# Patient Record
Sex: MALE | Race: WHITE | NOT HISPANIC OR LATINO | Employment: OTHER | URBAN - METROPOLITAN AREA
[De-identification: names, ages, dates, MRNs, and addresses within clinical notes are randomized per-mention and may not be internally consistent; named-entity substitution may affect disease eponyms.]

---

## 2017-02-20 ENCOUNTER — OFFICE VISIT (OUTPATIENT)
Dept: INTERNAL MEDICINE | Facility: CLINIC | Age: 77
End: 2017-02-20
Payer: COMMERCIAL

## 2017-02-20 VITALS
SYSTOLIC BLOOD PRESSURE: 142 MMHG | TEMPERATURE: 98.4 F | DIASTOLIC BLOOD PRESSURE: 84 MMHG | OXYGEN SATURATION: 98 % | HEART RATE: 54 BPM

## 2017-02-20 DIAGNOSIS — Z12.11 COLON CANCER SCREENING: ICD-10-CM

## 2017-02-20 DIAGNOSIS — D69.6 THROMBOCYTOPENIA (H): ICD-10-CM

## 2017-02-20 DIAGNOSIS — Z12.5 SPECIAL SCREENING FOR MALIGNANT NEOPLASM OF PROSTATE: ICD-10-CM

## 2017-02-20 DIAGNOSIS — M10.9 GOUT, UNSPECIFIED: ICD-10-CM

## 2017-02-20 DIAGNOSIS — I10 ESSENTIAL HYPERTENSION, BENIGN: ICD-10-CM

## 2017-02-20 DIAGNOSIS — N52.9 ERECTILE DYSFUNCTION, UNSPECIFIED ERECTILE DYSFUNCTION TYPE: ICD-10-CM

## 2017-02-20 DIAGNOSIS — Z00.00 MEDICARE ANNUAL WELLNESS VISIT, SUBSEQUENT: Primary | ICD-10-CM

## 2017-02-20 DIAGNOSIS — I34.2 NON-RHEUMATIC MITRAL VALVE STENOSIS: ICD-10-CM

## 2017-02-20 DIAGNOSIS — E78.5 HYPERLIPIDEMIA LDL GOAL <130: ICD-10-CM

## 2017-02-20 LAB
ALBUMIN SERPL-MCNC: 4.1 G/DL (ref 3.4–5)
ALP SERPL-CCNC: 74 U/L (ref 40–150)
ALT SERPL W P-5'-P-CCNC: 25 U/L (ref 0–70)
ANION GAP SERPL CALCULATED.3IONS-SCNC: 8 MMOL/L (ref 3–14)
AST SERPL W P-5'-P-CCNC: 23 U/L (ref 0–45)
BILIRUB SERPL-MCNC: 0.7 MG/DL (ref 0.2–1.3)
BUN SERPL-MCNC: 15 MG/DL (ref 7–30)
CALCIUM SERPL-MCNC: 9.5 MG/DL (ref 8.5–10.1)
CHLORIDE SERPL-SCNC: 103 MMOL/L (ref 94–109)
CHOLEST SERPL-MCNC: 160 MG/DL
CO2 SERPL-SCNC: 28 MMOL/L (ref 20–32)
CREAT SERPL-MCNC: 0.99 MG/DL (ref 0.66–1.25)
GFR SERPL CREATININE-BSD FRML MDRD: 73 ML/MIN/1.7M2
GLUCOSE SERPL-MCNC: 98 MG/DL (ref 70–99)
HDLC SERPL-MCNC: 40 MG/DL
HGB BLD-MCNC: 13.9 G/DL (ref 13.3–17.7)
LDLC SERPL CALC-MCNC: 90 MG/DL
NONHDLC SERPL-MCNC: 120 MG/DL
PLATELET # BLD AUTO: 57 10E9/L (ref 150–450)
POTASSIUM SERPL-SCNC: 4.9 MMOL/L (ref 3.4–5.3)
PROT SERPL-MCNC: 7.7 G/DL (ref 6.8–8.8)
SODIUM SERPL-SCNC: 139 MMOL/L (ref 133–144)
TRIGL SERPL-MCNC: 148 MG/DL

## 2017-02-20 PROCEDURE — 80061 LIPID PANEL: CPT | Performed by: INTERNAL MEDICINE

## 2017-02-20 PROCEDURE — 85049 AUTOMATED PLATELET COUNT: CPT | Performed by: INTERNAL MEDICINE

## 2017-02-20 PROCEDURE — 80053 COMPREHEN METABOLIC PANEL: CPT | Performed by: INTERNAL MEDICINE

## 2017-02-20 PROCEDURE — 36415 COLL VENOUS BLD VENIPUNCTURE: CPT | Performed by: INTERNAL MEDICINE

## 2017-02-20 PROCEDURE — 99397 PER PM REEVAL EST PAT 65+ YR: CPT | Performed by: INTERNAL MEDICINE

## 2017-02-20 PROCEDURE — 85018 HEMOGLOBIN: CPT | Performed by: INTERNAL MEDICINE

## 2017-02-20 RX ORDER — LISINOPRIL 20 MG/1
20 TABLET ORAL 2 TIMES DAILY
Qty: 180 TABLET | Refills: 3 | Status: SHIPPED | OUTPATIENT
Start: 2017-02-20 | End: 2018-03-14

## 2017-02-20 RX ORDER — SILDENAFIL 100 MG/1
100 TABLET, FILM COATED ORAL DAILY PRN
Qty: 5 TABLET | Refills: 11 | Status: SHIPPED | OUTPATIENT
Start: 2017-02-20 | End: 2018-05-17

## 2017-02-20 RX ORDER — PRAVASTATIN SODIUM 20 MG
20 TABLET ORAL DAILY
Qty: 90 TABLET | Refills: 3 | Status: SHIPPED | OUTPATIENT
Start: 2017-02-20 | End: 2018-03-14 | Stop reason: SINTOL

## 2017-02-20 RX ORDER — ALLOPURINOL 100 MG/1
100 TABLET ORAL DAILY
Qty: 90 TABLET | Refills: 3 | Status: SHIPPED | OUTPATIENT
Start: 2017-02-20 | End: 2018-03-05

## 2017-02-20 NOTE — PROGRESS NOTES
SUBJECTIVE:                                                            Mars Reyes is a 76 year old male who presents for Preventive Visit.  Are you in the first 12 months of your Medicare Part B coverage?  No    Answers for HPI/ROS submitted by the patient on 2/17/2017   Annual Exam:  Getting at least 3 servings of Calcium per day:: Yes  Bi-annual eye exam:: Yes  Dental care twice a year:: Yes  Sleep apnea or symptoms of sleep apnea:: None  Diet:: Regular (no restrictions)  Frequency of exercise:: 4-5 days/week  Taking medications regularly:: Yes  Medication side effects:: Lightheadedness  Additional concerns today:: YES  PHQ-2 Depressed: Not at all, Not at all  PHQ-2 Score: 0  Duration of exercise:: 45-60 minutes          PROBLEMS TO ADD ON...  1. Intermittent dizziness with going from sitting to standings     All Histories reviewed and updated in [facility] as appropriate.  Social History   Substance Use Topics     Smoking status: Never Smoker     Smokeless tobacco: Never Used     Alcohol use 0.0 oz/week     0 Standard drinks or equivalent per week      Comment: 3 beers per week       The patient does not drink >3 drinks per day nor >7 drinks per week.    Today's PHQ-2 Score:   PHQ-2 ( 1999 Pfizer) 2/17/2017 6/6/2016   Q1: Little interest or pleasure in doing things - 0   Q2: Feeling down, depressed or hopeless - 0   PHQ-2 Score - 0   Little interest or pleasure in doing things Not at all -   Feeling down, depressed or hopeless Not at all -   PHQ-2 Score 0 -       Do you feel safe in your environment - Yes    Do you have a Health Care Directive?: No: Advance care planning was reviewed with patient; patient declined at this time.    Current providers sharing in care for this patient include:   Patient Care Team:  Pb Warren MD as PCP - General      Hearing impairment: No    Ability to successfully perform activities of daily living: Yes, no assistance needed     Fall risk:       Home safety:  none  "identified      The following health maintenance items are reviewed in Epic and correct as of today:  Health Maintenance   Topic Date Due     AORTIC ANEURYSM SCREENING (SYSTEM ASSIGNED)  10/10/2006     INFLUENZA VACCINE (SYSTEM ASSIGNED)  09/01/2016     ADVANCE DIRECTIVE PLANNING Q5 YRS (NO INBASKET)  12/01/2016     FALL RISK ASSESSMENT  02/10/2017     TETANUS IMMUNIZATION (SYSTEM ASSIGNED)  02/21/2018     COLONOSCOPY Q10 YR INBASKET MESSAGE  12/14/2020     LIPID SCREEN Q5 YR MALE (SYSTEM ASSIGNED)  02/10/2021     PNEUMOCOCCAL  Completed         Immunization History   Administered Date(s) Administered     Hepatitis A Vac Ped/Adol-2 Dose 02/20/1997, 05/20/2005     Hepatitis B 02/20/1997     IPV 02/20/1997     Influenza (High Dose) 3 valent vaccine 10/20/2015, 10/01/2016     Influenza (IIV3) 02/20/1997, 11/16/2005, 12/04/2006, 10/16/2007, 11/20/2008, 10/17/2010, 01/07/2013, 09/16/2013     Meningococcal (Menomune ) 02/20/1997     Pneumococcal (PCV 13) 02/10/2016     Pneumococcal 23 valent 12/04/2006     TD (ADULT, 7+) 02/20/1997     TDAP (ADACEL AGES 11-64) 02/21/2008     Typhoid IM 02/20/1997     Yellow Fever 05/20/2005          ROS:  C: NEGATIVE for fever, chills, change in weight  E/M: NEGATIVE for ear, mouth and throat problems  R: NEGATIVE for significant cough or SOB  CV: NEGATIVE for chest pain, palpitations or peripheral edema  GI: NEGATIVE for nausea, abdominal pain, heartburn, or change in bowel habits  : NEGATIVE for frequency, dysuria, or hematuria  M: NEGATIVE for significant arthralgias or myalgia less mild chronic hip pain.  H: NEGATIVE for bleeding problems  P: NEGATIVE for changes in mood or affect    OBJECTIVE:                                                            /84  Pulse 54  Temp 98.4  F (36.9  C) (Oral)  SpO2 98% Estimated body mass index is 26.57 kg/(m^2) as calculated from the following:    Height as of 2/10/16: 5' 10\" (1.778 m).    Weight as of 6/6/16: 185 lb 3.2 oz (84 " kg).  EXAM:   GENERAL: alert and no distress  EYES: Eyes grossly normal to inspection, PERRL and conjunctivae and sclerae normal  HENT: ear canals and TM's normal, nose and mouth without ulcers or lesions  NECK: no adenopathy, no asymmetry, masses, or scars and thyroid normal to palpation  RESP: lungs clear to auscultation - no rales, rhonchi or wheezes  CV: regular rate and rhythm, normal S1 S2, no click or rub, no peripheral edema and peripheral pulses strong, noted mitral murmur.  MS: no gross musculoskeletal defects noted, no edema  NEURO: No focal changes  PSYCH: mentation appears normal, affect normal/bright    ASSESSMENT / PLAN:                                                            1. Medicare annual wellness visit, subsequent  As ordered  - Hemoglobin    2. Essential hypertension, benign  Stable on therapy  - Comprehensive metabolic panel  - Lipid Profile  - lisinopril (PRINIVIL/ZESTRIL) 20 MG tablet; Take 1 tablet (20 mg) by mouth 2 times daily  Dispense: 180 tablet; Refill: 3    3. Hyperlipidemia LDL goal <130  Labs as fasting  - Lipid Profile  - pravastatin (PRAVACHOL) 20 MG tablet; Take 1 tablet (20 mg) by mouth daily  Dispense: 90 tablet; Refill: 3    4. Colon cancer screening  Discussed future FIT    6. Gout, unspecified  stable  - allopurinol (ZYLOPRIM) 100 MG tablet; Take 1 tablet (100 mg) by mouth daily  Dispense: 90 tablet; Refill: 3    7. Erectile dysfunction, unspecified erectile dysfunction type  Refilled per request  - sildenafil (REVATIO/VIAGRA) 100 MG cap/tab; Take 1 tablet (100 mg) by mouth daily as needed for erectile dysfunction Take 30 min to 4 hours before intercourse.  Never use with nitroglycerin, terazosin or doxazosin.  Dispense: 5 tablet; Refill: 11    8. Non-rheumatic mitral valve stenosis  Repeat echocardiogram discussed  - Echocardiogram Complete; Future    9. Special screening for malignant neoplasm of prostate  As screening 6/2017  - PSA, screen; Future    PSA at labs  "6/2017 ordered    End of Life Planning:  Patient currently has an advanced directive: No.  I have verified the patient's ablity to prepare an advanced directive/make health care decisions.  Literature was provided to assist patient in preparing an advanced directive.    COUNSELING:  Reviewed preventive health counseling, as reflected in patient instructions       Regular exercise       Healthy diet/nutrition        Estimated body mass index is 26.57 kg/(m^2) as calculated from the following:    Height as of 2/10/16: 5' 10\" (1.778 m).    Weight as of 6/6/16: 185 lb 3.2 oz (84 kg).     reports that he has never smoked. He has never used smokeless tobacco.      Appropriate preventive services were discussed with this patient, including applicable screening as appropriate for cardiovascular disease, diabetes, osteopenia/osteoporosis, and glaucoma.  As appropriate for age/gender, discussed screening for colorectal cancer, prostate cancer, breast cancer, and cervical cancer. Checklist reviewing preventive services available has been given to the patient.    Reviewed patients plan of care and provided an AVS. The Basic Care Plan (routine screening as documented in Health Maintenance) for Mars meets the Care Plan requirement. This Care Plan has been established and reviewed with the Patient.    Counseling Resources:  ATP IV Guidelines  Pooled Cohorts Equation Calculator  Breast Cancer Risk Calculator  FRAX Risk Assessment  ICSI Preventive Guidelines  Dietary Guidelines for Americans, 2010  USDA's MyPlate  ASA Prophylaxis  Lung CA Screening    Pb Warren MD  [facility]    THE MEDICATION LIST HAS BEEN FULLY RECONCILED BY THE M.D. AND THE NURSING STAFF.    "

## 2017-02-20 NOTE — PATIENT INSTRUCTIONS
Services Typically covered by Medicare Recommended Completed   Vaccines    Pneumonoccol    Influenza    Hepatitis B (if medium/high risk)     Once for patients after age 65    Yearly  Medium/high risk factors:    End Stage Kidney Disease    Hemophiliacs who received Factor XIII or IX concentrates    Clients of institutions for developmentally disabled    Persons who live in same house as a Hepatitis B carrier    Homosexual men    Illicit injectable drug users    Health care workers     Mammogram Covered: One-time screen between age 35-39, annually for age 40+     Pap and Pelvic Exam Covered: Annually if  high risk,  or childbearing age with abnormal Pap in last 3 years.  Q24 months for all other women     Prostate Cancer Screening    Digital rectal exam    PSA Covered: Annually for all men > age 50     Corolrectal Cancer Screening Screening colonoscopy every 10 years, more often for high risk patients     Diabetes Self-Management Training Requires referral by treating physician for patient with diabetes     Diabetes Screening    Fasting blood sugar or glucose tolerance test   Once yearly, twice yearly if prediabetic     Cardiovascular Screening Blood Tests    Total Cholesterol    HDL    Triglycerides Every 5 years     Medical Nutrition Therapy for Diabetes or Renal Disease Requires referral by treating physician for patient with diabetes or kidney disease     Glaucoma Screening Annually for patients with one of the following risk factors:    Diabetes Mellitus    Family history of Glaucoma    -American age 50 and over    -American age 65 and over     Bone Mass Measurement Every 24 months if one of the following risk factors:    Estrogen deficiency    Vertebral abnormalities on x-ray indicative of Osteoporosis, Osteopenia, or Vertebral fracture    Receiving/expected to receive the equivalent of at least 5 mg of Prednisone per day for > 3 months    Hyperparathyroidism    Patient being monitored for  response to Osteoporosis Therapy     One-time AAA screen  Must be ordered as part of Medicare IPPE   Any patient with a family history of AAA    Males Age 65-75, with history of smoking at least 100 cigarettes in lifetime     Smoking Cessation Counseling Beneficiaries who use tobacco are eligible to receive 2 cessation attempts per year; each attempt includes maximum of 4 sessions     HIV Screening Annually for beneficiaries at increased risk:       Increased risk for HIV infection is defined in the  National Coverage Determinations (NCD) Manual,  Publication 100-03 Sections 190.14 (diagnostic) and 210.7 (screening). See http://www.cms.gov/manuals/downloads/tjb183k0_Jqol0.pdf and http://www.cms.gov/manuals/downloads/fob065k9_Vbsr6.pdf on the Internet.  Three times per pregnancy for beneficiaries who are pregnant.     Future Annual Wellness Visit Annually, for all beneficiaries.

## 2017-02-20 NOTE — MR AVS SNAPSHOT
After Visit Summary   2/20/2017    Mars Reyes    MRN: [id number]           Patient Information     Date Of Birth          1940        Visit Information        Provider Department      2/20/2017 10:20 AM Pb Warren MD [facility]        Today's Diagnoses     Medicare annual wellness visit, subsequent    -  1    Essential hypertension, benign        Hyperlipidemia LDL goal <130        Colon cancer screening        BENIGN HYPERTENSION        Gout, unspecified        Erectile dysfunction, unspecified erectile dysfunction type        Non-rheumatic mitral valve stenosis          Care Instructions          Services Typically covered by Medicare Recommended Completed   Vaccines    Pneumonoccol    Influenza    Hepatitis B (if medium/high risk)     Once for patients after age 65    Yearly  Medium/high risk factors:    End Stage Kidney Disease    Hemophiliacs who received Factor XIII or IX concentrates    Clients of institutions for developmentally disabled    Persons who live in same house as a Hepatitis B carrier    Homosexual men    Illicit injectable drug users    Health care workers     Mammogram Covered: One-time screen between age 35-39, annually for age 40+     Pap and Pelvic Exam Covered: Annually if  high risk,  or childbearing age with abnormal Pap in last 3 years.  Q24 months for all other women     Prostate Cancer Screening    Digital rectal exam    PSA Covered: Annually for all men > age 50     Corolrectal Cancer Screening Screening colonoscopy every 10 years, more often for high risk patients     Diabetes Self-Management Training Requires referral by treating physician for patient with diabetes     Diabetes Screening    Fasting blood sugar or glucose tolerance test   Once yearly, twice yearly if prediabetic     Cardiovascular Screening Blood Tests    Total Cholesterol    HDL    Triglycerides Every 5 years     Medical Nutrition Therapy for Diabetes or Renal  Disease Requires referral by treating physician for patient with diabetes or kidney disease     Glaucoma Screening Annually for patients with one of the following risk factors:    Diabetes Mellitus    Family history of Glaucoma    -American age 50 and over    -American age 65 and over     Bone Mass Measurement Every 24 months if one of the following risk factors:    Estrogen deficiency    Vertebral abnormalities on x-ray indicative of Osteoporosis, Osteopenia, or Vertebral fracture    Receiving/expected to receive the equivalent of at least 5 mg of Prednisone per day for > 3 months    Hyperparathyroidism    Patient being monitored for response to Osteoporosis Therapy     One-time AAA screen  Must be ordered as part of Medicare IPPE   Any patient with a family history of AAA    Males Age 65-75, with history of smoking at least 100 cigarettes in lifetime     Smoking Cessation Counseling Beneficiaries who use tobacco are eligible to receive 2 cessation attempts per year; each attempt includes maximum of 4 sessions     HIV Screening Annually for beneficiaries at increased risk:       Increased risk for HIV infection is defined in the  National Coverage Determinations (NCD) Manual,  Publication 100-03 Sections 190.14 (diagnostic) and 210.7 (screening). See http://www.cms.gov/manuals/downloads/div996c0_Unpk8.pdf and http://www.cms.gov/manuals/downloads/xnb332t8_Ectb8.pdf on the Internet.  Three times per pregnancy for beneficiaries who are pregnant.     Future Annual Wellness Visit Annually, for all beneficiaries.             Follow-ups after your visit        Follow-up notes from your care team     Return if symptoms worsen or fail to improve.      Future tests that were ordered for you today     Open Future Orders        Priority Expected Expires Ordered    Echocardiogram Complete Routine  2/20/2018 2/20/2017            Who to contact     If you have questions or need follow up information about today's  clinic visit or your schedule please contact [facility] directly at [phone].  Normal or non-critical lab and imaging results will be communicated to you by MyChart, letter or phone within 4 business days after the clinic has received the results. If you do not hear from us within 7 days, please contact the clinic through [employer] or phone. If you have a critical or abnormal lab result, we will notify you by phone as soon as possible.  Submit refill requests through [employer] or call your pharmacy and they will forward the refill request to us. Please allow 3 business days for your refill to be completed.          Additional Information About Your Visit        [employer] Information     [employer] gives you secure access to your electronic health record. If you see a primary care provider, you can also send messages to your care team and make appointments. If you have questions, please call your primary care clinic.  If you do not have a primary care provider, please call [phone] and they will assist you.        Care EveryWhere ID     This is your Care EveryWhere ID. This could be used by other organizations to access your [place] medical records  [id number]        Your Vitals Were     Pulse Temperature Pulse Oximetry             54 98.4  F (36.9  C) (Oral) 98%          Blood Pressure from Last 3 Encounters:   02/20/17 142/84   06/06/16 130/82   02/10/16 120/84    Weight from Last 3 Encounters:   06/06/16 185 lb 3.2 oz (84 kg)   02/10/16 187 lb 8 oz (85 kg)   11/17/15 185 lb 8 oz (84.1 kg)              We Performed the Following     Comprehensive metabolic panel     Hemoglobin     Lipid Profile          Where to get your medicines      These medications were sent to [facility] [address] [place], [place] - [address] AT [facility] [address]  [address], [facility] [address]     Phone:  [phone]     allopurinol 100 MG tablet    lisinopril 20 MG  tablet    pravastatin 20 MG tablet         Some of these will need a paper prescription and others can be bought over the counter.  Ask your nurse if you have questions.     Bring a paper prescription for each of these medications     sildenafil 100 MG cap/tab          Primary Care Provider Office Phone # Fax #    Pb Warren [phone]       [facility] [address] [facility] [address]        Thank you!     Thank you for choosing [facility]  for your care. Our goal is always to provide you with excellent care. Hearing back from our patients is one way we can continue to improve our services. Please take a few minutes to complete the written survey that you may receive in the mail after your visit with us. Thank you!             Your Updated Medication List - Protect others around you: Learn how to safely use, store and throw away your medicines at www.disposemymeds.org.          This list is accurate as of: 2/20/17 10:32 AM.  Always use your most recent med list.                   Brand Name Dispense Instructions for use    allopurinol 100 MG tablet    ZYLOPRIM    90 tablet    Take 1 tablet (100 mg) by mouth daily       aspirin 81 MG tablet     100    1 tab po QD (Once per day)       lisinopril 20 MG tablet    PRINIVIL/ZESTRIL    180 tablet    Take 1 tablet (20 mg) by mouth 2 times daily       pravastatin 20 MG tablet    PRAVACHOL    90 tablet    Take 1 tablet (20 mg) by mouth daily       sildenafil 100 MG cap/tab    REVATIO/VIAGRA    5 tablet    Take 1 tablet (100 mg) by mouth daily as needed for erectile dysfunction Take 30 min to 4 hours before intercourse.  Never use with nitroglycerin, terazosin or doxazosin.

## 2017-02-21 ENCOUNTER — HOSPITAL ENCOUNTER (OUTPATIENT)
Facility: CLINIC | Age: 77
Setting detail: SPECIMEN
Discharge: HOME | End: 2017-02-21
Attending: INTERNAL MEDICINE | Admitting: INTERNAL MEDICINE
Payer: MEDICARE

## 2017-02-21 ENCOUNTER — CARE COORDINATION (OUTPATIENT)
Dept: ONCOLOGY | Facility: CLINIC | Age: 77
End: 2017-02-21

## 2017-02-21 ENCOUNTER — ONCOLOGY VISIT (OUTPATIENT)
Dept: ONCOLOGY | Facility: CLINIC | Age: 77
End: 2017-02-21
Attending: INTERNAL MEDICINE
Payer: COMMERCIAL

## 2017-02-21 VITALS
TEMPERATURE: 98.3 F | HEART RATE: 101 BPM | SYSTOLIC BLOOD PRESSURE: 103 MMHG | OXYGEN SATURATION: 97 % | BODY MASS INDEX: 27.52 KG/M2 | WEIGHT: 191.8 LBS | RESPIRATION RATE: 16 BRPM | DIASTOLIC BLOOD PRESSURE: 66 MMHG

## 2017-02-21 DIAGNOSIS — I10 ESSENTIAL HYPERTENSION, BENIGN: ICD-10-CM

## 2017-02-21 DIAGNOSIS — D69.6 THROMBOCYTOPENIA (H): Primary | ICD-10-CM

## 2017-02-21 LAB
FERRITIN SERPL-MCNC: 84 NG/ML (ref 26–388)
FOLATE SERPL-MCNC: 20 NG/ML
IRON SATN MFR SERPL: 22 % (ref 15–46)
IRON SERPL-MCNC: 77 UG/DL (ref 35–180)
LDH SERPL L TO P-CCNC: 180 U/L (ref 85–227)
RETICS # AUTO: 54.5 10E9/L (ref 25–95)
RETICS/RBC NFR AUTO: 1.2 % (ref 0.5–2)
TIBC SERPL-MCNC: 349 UG/DL (ref 240–430)
TSH SERPL DL<=0.005 MIU/L-ACNC: 2.54 MU/L (ref 0.4–4)
VIT B12 SERPL-MCNC: 372 PG/ML (ref 193–986)

## 2017-02-21 PROCEDURE — 83615 LACTATE (LD) (LDH) ENZYME: CPT | Performed by: INTERNAL MEDICINE

## 2017-02-21 PROCEDURE — 84443 ASSAY THYROID STIM HORMONE: CPT | Performed by: INTERNAL MEDICINE

## 2017-02-21 PROCEDURE — 00000402 ZZHCL STATISTIC TOTAL PROTEIN: Performed by: INTERNAL MEDICINE

## 2017-02-21 PROCEDURE — 99204 OFFICE O/P NEW MOD 45 MIN: CPT | Performed by: INTERNAL MEDICINE

## 2017-02-21 PROCEDURE — 82746 ASSAY OF FOLIC ACID SERUM: CPT | Performed by: INTERNAL MEDICINE

## 2017-02-21 PROCEDURE — 36415 COLL VENOUS BLD VENIPUNCTURE: CPT

## 2017-02-21 PROCEDURE — 99211 OFF/OP EST MAY X REQ PHY/QHP: CPT

## 2017-02-21 PROCEDURE — 85045 AUTOMATED RETICULOCYTE COUNT: CPT | Performed by: INTERNAL MEDICINE

## 2017-02-21 PROCEDURE — 82728 ASSAY OF FERRITIN: CPT | Performed by: INTERNAL MEDICINE

## 2017-02-21 PROCEDURE — 84165 PROTEIN E-PHORESIS SERUM: CPT | Performed by: INTERNAL MEDICINE

## 2017-02-21 PROCEDURE — 85730 THROMBOPLASTIN TIME PARTIAL: CPT | Performed by: INTERNAL MEDICINE

## 2017-02-21 PROCEDURE — 83540 ASSAY OF IRON: CPT | Performed by: INTERNAL MEDICINE

## 2017-02-21 PROCEDURE — 83550 IRON BINDING TEST: CPT | Performed by: INTERNAL MEDICINE

## 2017-02-21 PROCEDURE — 82607 VITAMIN B-12: CPT | Performed by: INTERNAL MEDICINE

## 2017-02-21 ASSESSMENT — PAIN SCALES - GENERAL: PAINLEVEL: NO PAIN (0)

## 2017-02-21 NOTE — MR AVS SNAPSHOT
After Visit Summary   2/21/2017    Mars Reyes    MRN: [id number]           Patient Information     Date Of Birth          1940        Visit Information        Provider Department      2/21/2017 3:00 PM iPno Mayer MD [facility]        Today's Diagnoses     Thrombocytopenia (H)    -  1    Essential hypertension, benign          Care Instructions    Labs.  Bone marrow biopsy this week.  US spleen this week.  Will call him with result.      You are scheduled for a Bone Marrow Biopsy on __________________  Check in 1 hour before your scheduled time.  You will need a .  Nothing to eat or drink after midnight          Follow-ups after your visit        Your next 10 appointments already scheduled     Feb 22, 2017  1:30 PM CST   US ABDOMEN LIMITED with [id number]   [facility] ([facility])    [address]  [facility] [address] [phone]           Please bring a list of your medicines (including vitamins, minerals and over-the-counter drugs). Also, tell your doctor about any allergies you may have. Wear comfortable clothes and leave your valuables at home.  Adults: No eating or drinking for 8 hours before the exam. You may take medicine with a small sip of water.  Children: - Children 6+ years: No food or drink for 6 hours before exam. - Children 1-5 years: No food or drink for 4 hours before exam. - Infants, breast-fed: may have breast milk up to 2 hours before exam. - Infants, formula: may have bottle until 4 hours before exam.  Please call the Imaging Department at your exam site with any questions.              Future tests that were ordered for you today     Open Future Orders        Priority Expected Expires Ordered    US Abdomen Limited Routine  2/21/2018 2/21/2017    PSA, screen Routine 6/1/2017 6/30/2017 2/20/2017    Echocardiogram Complete Routine  2/20/2018 2/20/2017            Who to contact     If  you have questions or need follow up information about today's clinic visit or your schedule please contact [facility] directly at [phone].  Normal or non-critical lab and imaging results will be communicated to you by [employer], letter or phone within 4 business days after the clinic has received the results. If you do not hear from us within 7 days, please contact the clinic through [employer] or phone. If you have a critical or abnormal lab result, we will notify you by phone as soon as possible.  Submit refill requests through [employer] or call your pharmacy and they will forward the refill request to us. Please allow 3 business days for your refill to be completed.          Additional Information About Your Visit        [employer] Information     [employer] gives you secure access to your electronic health record. If you see a primary care provider, you can also send messages to your care team and make appointments. If you have questions, please call your primary care clinic.  If you do not have a primary care provider, please call [phone] and they will assist you.        Care EveryWhere ID     This is your Care EveryWhere ID. This could be used by other organizations to access your [place] medical records  [id number]        Your Vitals Were     Pulse Temperature Respirations Pulse Oximetry BMI (Body Mass Index)       101 98.3  F (36.8  C) (Oral) 16 97% 27.52 kg/m2        Blood Pressure from Last 3 Encounters:   02/21/17 103/66   02/20/17 142/84   06/06/16 130/82    Weight from Last 3 Encounters:   02/21/17 87 kg (191 lb 12.8 oz)   06/06/16 84 kg (185 lb 3.2 oz)   02/10/16 85 kg (187 lb 8 oz)              We Performed the Following     Bone marrow biopsy     Ferritin     Folate     Iron and iron binding capacity     Lactate Dehydrogenase     Lupus panel     Protein electrophoresis     Reticulocyte count     TSH with free T4 reflex     Vitamin B12        Primary Care Provider Office Phone # Fax #     Pb Warren [phone]       [facility] [address] [facility] [address]        Thank you!     Thank you for choosing [facility]  for your care. Our goal is always to provide you with excellent care. Hearing back from our patients is one way we can continue to improve our services. Please take a few minutes to complete the written survey that you may receive in the mail after your visit with us. Thank you!             Your Updated Medication List - Protect others around you: Learn how to safely use, store and throw away your medicines at www.disposemymeds.org.          This list is accurate as of: 2/21/17  3:59 PM.  Always use your most recent med list.                   Brand Name Dispense Instructions for use    allopurinol 100 MG tablet    ZYLOPRIM    90 tablet    Take 1 tablet (100 mg) by mouth daily       lisinopril 20 MG tablet    PRINIVIL/ZESTRIL    180 tablet    Take 1 tablet (20 mg) by mouth 2 times daily       pravastatin 20 MG tablet    PRAVACHOL    90 tablet    Take 1 tablet (20 mg) by mouth daily       sildenafil 100 MG cap/tab    REVATIO/VIAGRA    5 tablet    Take 1 tablet (100 mg) by mouth daily as needed for erectile dysfunction Take 30 min to 4 hours before intercourse.  Never use with nitroglycerin, terazosin or doxazosin.

## 2017-02-21 NOTE — PATIENT INSTRUCTIONS
Labs.  Bone marrow biopsy this week.  US spleen this week.  Will call him with result.      You are scheduled for a Bone Marrow Biopsy on __________________  Check in 1 hour before your scheduled time.  You will need a .  Nothing to eat or drink after midnight

## 2017-02-21 NOTE — PROGRESS NOTES
I called and spoke with patient introducing myself and my role as care coordinator. Records are in [facility]. Directions have been reviewed.  Will follow up with patient as needed for care coordination needs.Thais Saxena

## 2017-02-21 NOTE — PROGRESS NOTES
"Mars Reyes is a 76 year old male who presents for:  Chief Complaint   Patient presents with     Oncology Clinic Visit     thrombocytopenia         Initial Vitals:  /66 (BP Location: Left arm, Patient Position: Chair, Cuff Size: Adult Large)  Pulse 101  Temp 98.3  F (36.8  C) (Oral)  Resp 16  Wt 87 kg (191 lb 12.8 oz)  SpO2 97%  BMI 27.52 kg/m2 Estimated body mass index is 27.52 kg/(m^2) as calculated from the following:    Height as of 2/10/16: 1.778 m (5' 10\").    Weight as of this encounter: 87 kg (191 lb 12.8 oz).. Body surface area is 2.07 meters squared. BP completed using cuff size: regular  No Pain (0) No LMP for male patient. Allergies and medications reviewed.     Medications: Medication refills not needed today.  Pharmacy name entered into [employer]:    [place] [id number] &  [facility] [address] [place] [address] [address] AT [place] [address] [facility] [id number] [place] [address] [facility] [address] AT [facility] [address]    Comments: New patient Thrombocytopenia     5 minutes for nursing intake (face to face time)   Geraldine Granados MA    Medical Assistant Note:  Mars Reyes presents today for lab visit.    Patient seen by provider today: Yes: The patient seen Dr. Mayer today.   present during visit today: Not Applicable.    Concerns: No Concerns.    Procedure:  Lab draw site: LAB, Needle type: BF, Gauge: 21 g gauze and coban applied.    Post Assessment:  Labs drawn without difficulty: Yes.    Discharge Plan:  Departure Mode: Ambulatory.    Geraldine Granados MA          DISCHARGE PLAN:  Zoe to schedule bone marrow biopsy ( without sedation per patient) and Sple[facility]  Will call him with lab, pathology and imaging results.  At [id number] ok to lm     Bone marrow Biopsy sheet given to him     Next appointments: See patient instruction section  Departure Mode: Ambulatory  Accompanied by: Self  8 minutes for nursing " discharge (face to face time)   Nyla Hernandes RN

## 2017-02-22 ENCOUNTER — HOSPITAL ENCOUNTER (OUTPATIENT)
Dept: ULTRASOUND IMAGING | Facility: CLINIC | Age: 77
Discharge: HOME | End: 2017-02-22
Attending: INTERNAL MEDICINE | Admitting: INTERNAL MEDICINE
Payer: MEDICARE

## 2017-02-22 DIAGNOSIS — D69.6 THROMBOCYTOPENIA (H): ICD-10-CM

## 2017-02-22 PROCEDURE — 76705 ECHO EXAM OF ABDOMEN: CPT

## 2017-02-22 NOTE — PROGRESS NOTES
REASON FOR CONSULTATION:  This consult has been requested by Dr. Warren for thrombocytopenia.      HISTORY OF PRESENT ILLNESS:  Mr. Mars Reyes is a 76-year-old gentleman who has thrombocytopenia for at least the last 5 years.  The patient overall is doing good.  No complication from thrombocytopenia.  He does not have any bleeding or bruising problem.  No bleeding from any site.  No blood in the urine or stool.  No black stool.  Labs are reviewed and summarized below.   1.  On 2009, platelets of 191.   2.  On 2011, platelets of 75.   3.  Since then, multiple times platelet has been low.  On 2017, platelets of 57 with hemoglobin of 13.9. Normal CMP.     REVIEW OF SYSTEMS:  The patient is doing well.  He has no complaints or concerns.  He is pretty active for his age.  No headache.  No dizziness.  No neck pain.  No chest pain.  No difficulty breathing.  No abdominal pain, nausea or vomiting.  No urinary or bowel complaints.  No bleeding.  No fever, chills or night sweats.  Appetite has been good.  All other review of systems negative.      ALLERGIES:  Reviewed.      MEDICATIONS:  Reviewed.      PAST MEDICAL HISTORY:   1.  Thrombocytopenia.   2.  Mitral valve stenosis.   3.  Pulmonary hypertension.   4.  Hyperlipidemia.   5.  Hypertension.   6.  Gout.   7.  Osteoarthritis, status post hip replacement.      SOCIAL HISTORY:    -He is a .    -He does not smoke.    -Occasional alcohol use.      FAMILY HISTORY:    -Father  of heart disease.    -Mother  in her 80s from Alzheimer's.    -He has 1 brother in fairly good health for his age.    -No family history of any blood disorder or malignancy.      PHYSICAL EXAMINATION:   GENERAL:  He is alert and oriented x3.   VITAL SIGNS:  Reviewed.   Rest of systems not examined as he had a completed physical on 2017.      LABORATORY DATA:  Reviewed.      ASSESSMENT:   1.  A 76-year-old gentleman with chronic thrombocytopenia.   2.  Other medical  problems including hypertension and hyperlipidemia.      RECOMMENDATIONS:   1.  I had a long discussion with the patient.  Labs were all reviewed.  I explained to him that he has been thrombocytopenic for at least 5 years.  His thrombocytopenia has gotten worse.      Discussed regarding different causes of thrombocytopenia.  General mechanism is decreased production or increased destruction.  Different causes including but not limited to medications, hepatitis C, HIV, vitamin B12 deficiency, folate deficiency, multiple myeloma, myelofibrosis, MDS, ITP, splenomegaly and other causes were discussed.  Given his age, we need to rule out MDS.      We discussed regarding further workup.  Will get some labs.  Will also get a bone marrow biopsy.  I explained to the patient that is the only way to diagnose MDS.  Procedure was explained.  He is agreeable for it.  Will also get an ultrasound of the spleen.      2.  Complications of thrombocytopenia discussed.  It can cause easy bruising and bleeding.  Most of the bleeding complications happen when platelets are below 30.  His platelets are above 50.  With that risk of spontaneous bleeding is low.  I told the patient to be careful and avoid trauma.      3.  The patient is going to [place] next week.  Will call him with the results of the blood tests and bone marrow biopsy.  Further appointment to be decided after those investigations are completed.       4.  Patient had a few questions, which were all answered.  Advised him to see a physician if he has bleeding from any site, easy bruising, red skin rash, unexplained pain or any other concerns.      Thanks for the consult.      Total time spent 45 minutes, most of time was spent in counseling.         AKOSUA CHONG MD             D: 2017 17:13   T: 2017 20:32   MT: INES      Name:     DESEAN MARTINEZ   MRN:      [id number]        Account:      [id number]   :      1940           Visit Date:   2017       Document: [id number]

## 2017-02-23 ENCOUNTER — SURGERY (OUTPATIENT)
Age: 77
End: 2017-02-23

## 2017-02-23 ENCOUNTER — HOSPITAL ENCOUNTER (OUTPATIENT)
Facility: CLINIC | Age: 77
Discharge: HOME | End: 2017-02-23
Attending: PATHOLOGY | Admitting: PATHOLOGY
Payer: MEDICARE

## 2017-02-23 VITALS
SYSTOLIC BLOOD PRESSURE: 124 MMHG | OXYGEN SATURATION: 97 % | DIASTOLIC BLOOD PRESSURE: 70 MMHG | RESPIRATION RATE: 16 BRPM

## 2017-02-23 LAB
ALBUMIN SERPL ELPH-MCNC: 4.2 G/DL (ref 3.7–5.1)
ALPHA1 GLOB SERPL ELPH-MCNC: 0.3 G/DL (ref 0.2–0.4)
ALPHA2 GLOB SERPL ELPH-MCNC: 0.6 G/DL (ref 0.5–0.9)
B-GLOBULIN SERPL ELPH-MCNC: 0.8 G/DL (ref 0.6–1)
BASOPHILS # BLD AUTO: 0.1 10E9/L (ref 0–0.2)
BASOPHILS NFR BLD AUTO: 1 %
DIFFERENTIAL METHOD BLD: ABNORMAL
EOSINOPHIL # BLD AUTO: 0.2 10E9/L (ref 0–0.7)
EOSINOPHIL NFR BLD AUTO: 2.7 %
ERYTHROCYTE [DISTWIDTH] IN BLOOD BY AUTOMATED COUNT: 12.7 % (ref 10–15)
GAMMA GLOB SERPL ELPH-MCNC: 0.9 G/DL (ref 0.7–1.6)
HCT VFR BLD AUTO: 37 % (ref 40–53)
HGB BLD-MCNC: 12.6 G/DL (ref 13.3–17.7)
IMM GRANULOCYTES # BLD: 0 10E9/L (ref 0–0.4)
IMM GRANULOCYTES NFR BLD: 0.3 %
LA PPP-IMP: NORMAL
LYMPHOCYTES # BLD AUTO: 1.5 10E9/L (ref 0.8–5.3)
LYMPHOCYTES NFR BLD AUTO: 23.1 %
M PROTEIN SERPL ELPH-MCNC: 0.1 G/DL
MCH RBC QN AUTO: 29.6 PG (ref 26.5–33)
MCHC RBC AUTO-ENTMCNC: 34.1 G/DL (ref 31.5–36.5)
MCV RBC AUTO: 87 FL (ref 78–100)
MONOCYTES # BLD AUTO: 0.8 10E9/L (ref 0–1.3)
MONOCYTES NFR BLD AUTO: 13.3 %
NEUTROPHILS # BLD AUTO: 3.8 10E9/L (ref 1.6–8.3)
NEUTROPHILS NFR BLD AUTO: 59.6 %
NRBC # BLD AUTO: 0 10*3/UL
NRBC BLD AUTO-RTO: 0 /100
PLATELET # BLD AUTO: 51 10E9/L (ref 150–450)
PROT PATTERN SERPL ELPH-IMP: ABNORMAL
RBC # BLD AUTO: 4.25 10E12/L (ref 4.4–5.9)
RETICS # AUTO: 56.5 10E9/L (ref 25–95)
RETICS/RBC NFR AUTO: 1.3 % (ref 0.5–2)
WBC # BLD AUTO: 6.3 10E9/L (ref 4–11)

## 2017-02-23 PROCEDURE — 85045 AUTOMATED RETICULOCYTE COUNT: CPT | Performed by: PATHOLOGY

## 2017-02-23 PROCEDURE — 85060 BLOOD SMEAR INTERPRETATION: CPT | Performed by: INTERNAL MEDICINE

## 2017-02-23 PROCEDURE — 88185 FLOWCYTOMETRY/TC ADD-ON: CPT | Performed by: PATHOLOGY

## 2017-02-23 PROCEDURE — 85025 COMPLETE CBC W/AUTO DIFF WBC: CPT | Performed by: PATHOLOGY

## 2017-02-23 PROCEDURE — 88311 DECALCIFY TISSUE: CPT | Performed by: INTERNAL MEDICINE

## 2017-02-23 PROCEDURE — 40000424 ZZHCL STATISTIC BONE MARROW CORE PERF TC 38221: Performed by: INTERNAL MEDICINE

## 2017-02-23 PROCEDURE — 40000847 ZZHCL STATISTIC MORPHOLOGY W/INTERP HISTOLOGY TC 85060: Performed by: INTERNAL MEDICINE

## 2017-02-23 PROCEDURE — 88305 TISSUE EXAM BY PATHOLOGIST: CPT | Performed by: INTERNAL MEDICINE

## 2017-02-23 PROCEDURE — 40000948 ZZHCL STATISTIC BONE MARROW ASP TC 85097: Performed by: INTERNAL MEDICINE

## 2017-02-23 PROCEDURE — G0364 BONE MARROW ASPIRATE &BIOPSY: HCPCS

## 2017-02-23 PROCEDURE — 27210995 ZZH RX 272: Performed by: PATHOLOGY

## 2017-02-23 PROCEDURE — 00000058 ZZHCL STATISTIC BONE MARROW ASP PERF TC 38220: Performed by: INTERNAL MEDICINE

## 2017-02-23 PROCEDURE — 00000159 ZZHCL STATISTIC H-SEND OUTS PREP: Performed by: INTERNAL MEDICINE

## 2017-02-23 PROCEDURE — 88313 SPECIAL STAINS GROUP 2: CPT | Mod: 26 | Performed by: INTERNAL MEDICINE

## 2017-02-23 PROCEDURE — 88305 TISSUE EXAM BY PATHOLOGIST: CPT | Mod: 26 | Performed by: INTERNAL MEDICINE

## 2017-02-23 PROCEDURE — 88275 CYTOGENETICS 100-300: CPT | Performed by: PATHOLOGY

## 2017-02-23 PROCEDURE — 88280 CHROMOSOME KARYOTYPE STUDY: CPT | Performed by: PATHOLOGY

## 2017-02-23 PROCEDURE — 88182 CELL MARKER STUDY: CPT | Performed by: INTERNAL MEDICINE

## 2017-02-23 PROCEDURE — 88271 CYTOGENETICS DNA PROBE: CPT | Performed by: PATHOLOGY

## 2017-02-23 PROCEDURE — 38221 DX BONE MARROW BIOPSIES: CPT | Performed by: INTERNAL MEDICINE

## 2017-02-23 PROCEDURE — 88313 SPECIAL STAINS GROUP 2: CPT | Performed by: INTERNAL MEDICINE

## 2017-02-23 PROCEDURE — 88184 FLOWCYTOMETRY/ TC 1 MARKER: CPT | Performed by: PATHOLOGY

## 2017-02-23 PROCEDURE — 88264 CHROMOSOME ANALYSIS 20-25: CPT | Performed by: PATHOLOGY

## 2017-02-23 PROCEDURE — 88342 IMHCHEM/IMCYTCHM 1ST ANTB: CPT | Performed by: INTERNAL MEDICINE

## 2017-02-23 PROCEDURE — 88341 IMHCHEM/IMCYTCHM EA ADD ANTB: CPT | Performed by: INTERNAL MEDICINE

## 2017-02-23 PROCEDURE — 88305 TISSUE EXAM BY PATHOLOGIST: CPT | Mod: 26,76 | Performed by: INTERNAL MEDICINE

## 2017-02-23 PROCEDURE — 88341 IMHCHEM/IMCYTCHM EA ADD ANTB: CPT | Mod: 26 | Performed by: INTERNAL MEDICINE

## 2017-02-23 PROCEDURE — 88342 IMHCHEM/IMCYTCHM 1ST ANTB: CPT | Mod: 26 | Performed by: INTERNAL MEDICINE

## 2017-02-23 PROCEDURE — 88311 DECALCIFY TISSUE: CPT | Mod: 26 | Performed by: INTERNAL MEDICINE

## 2017-02-23 PROCEDURE — 38221 DX BONE MARROW BIOPSIES: CPT | Performed by: PATHOLOGY

## 2017-02-23 PROCEDURE — 36415 COLL VENOUS BLD VENIPUNCTURE: CPT | Performed by: PATHOLOGY

## 2017-02-23 PROCEDURE — 85097 BONE MARROW INTERPRETATION: CPT | Performed by: INTERNAL MEDICINE

## 2017-02-23 PROCEDURE — 88237 TISSUE CULTURE BONE MARROW: CPT | Performed by: PATHOLOGY

## 2017-02-23 RX ORDER — LIDOCAINE HYDROCHLORIDE 10 MG/ML
8-10 INJECTION, SOLUTION EPIDURAL; INFILTRATION; INTRACAUDAL; PERINEURAL
Status: DISCONTINUED | OUTPATIENT
Start: 2017-02-23 | End: 2017-02-23 | Stop reason: HOSPADM

## 2017-02-23 RX ADMIN — LIDOCAINE HYDROCHLORIDE 5 ML: 10 INJECTION, SOLUTION EPIDURAL; INFILTRATION; INTRACAUDAL; PERINEURAL at 09:55

## 2017-02-23 NOTE — PROCEDURES
Bone marrow Biopsy.   Indication: thrombocytopenia  The patient's identification was positively verified.  Risks and benefits to include but not limited to pain, bleeding, infection, need for addtional biopsies etc, were discussed with the patient.  An opportunity for questions and answers was given and questions were answered.  Signed informed consent was obtained.  The patient was placed in the prone position.  Medical staff reconfirmed the patient's name, date of birth, procedure, and the procedure site marking. The right posterior iliac crest was sterilely prepped in the usual fashion.   4 mL of 1% Lidocaine was used for local anesthesia. Trephine bone marrow core was obtained from the right posterior iliac crest with a 8 gauge needle. 25 cc bone marrow aspirate was obtained from the right posterior iliac crest with a 15 gauge needle.  There were no complications and the patient tolerated the procedure well. The biopsy site was cleaned with alcohol and a sterile dressing was placed over the biopsy incision using a pressure bandage. Post-procedure wound care instructions, including routine dressing instructions and analgesia, were given to the patient.

## 2017-02-23 NOTE — IP AVS SNAPSHOT
[facility]    [address] [facility] [place] [address]    Phone:  [phone]    Fax:  [phone]                                       After Visit Summary   2/23/2017    Mars Reyes    MRN: [id number]           After Visit Summary Signature Page     I have received my discharge instructions, and my questions have been answered. I have discussed any challenges I see with this plan with the nurse or doctor.    ..........................................................................................................................................  Patient/Patient Representative Signature      ..........................................................................................................................................  Patient Representative Print Name and Relationship to Patient    ..................................................               ................................................  Date                                            Time    ..........................................................................................................................................  Reviewed by Signature/Title    ...................................................              ..............................................  Date                                                            Time

## 2017-02-23 NOTE — IP AVS SNAPSHOT
MRN:[id number]                      After Visit Summary   2/23/2017    Mars Reyes    MRN: [id number]           Thank you!     Thank you for choosing [facility] for your care. Our goal is always to provide you with excellent care. Hearing back from our patients is one way we can continue to improve our services. Please take a few minutes to complete the written survey that you may receive in the mail after you visit. If you would like to speak to someone directly about your visit please contact Patient Relations at [phone]. Thank you!          Patient Information     Date Of Birth          1940        About your hospital stay     You were admitted on:  February 23, 2017 You last received care in the:  [facility]    You were discharged on:  February 23, 2017       Who to Call     For medical emergencies, please call 911.  For non-urgent questions about your medical care, please call your primary care provider or clinic, [phone]  For questions related to your surgery, please call your surgery clinic        Attending Provider     Provider Specialty    Romie Esquivel MD Pathology       Primary Care Provider Office Phone # Fax #    Pb Warren [phone]       [facility] [address] [facility] [address]        After Care Instructions     Activity       Relax and take it easy for 24 hours.  Resume regular activity after 24 hours.            Diet Instructions       Resume pre-procedure diet and drink plenty of fluids.  If you received sedation, you may feel a little nauseated, so start with a clear liquid diet until the nausea passes.            Do not  immerse bone marrow biopsy puncture site in water       Do not immerse puncture site in water.  Do not take a bath, sit in a hot tub, spa or swim until the puncture site has healed.            Drainage       Drainage should be minimal.  IF bleeding should occur  and soaks through the dressing, lie down and put pressure on the puncture site.  IF bleeding persists, apply gentle pressure with your hand over the dressing for 5 minutes.  IF the pressure doesn't stop the bleeding, contact your Provider immediately.            Dressing       Keep the dressing dry and in place for 24 hours, unless instructed otherwise.  IF bleeding soaks through the dressing in the first 24 hours do NOT remove the dressing as you may pull off any scab that has formed.  Instead, reinforce the dressing with extra gauze and tape.            No Alcohol       For 24 hours post procedure            Shower       No shower for 24 hours post procedure. May shower Postoperative Day 1.                  Further instructions from your care team       BONE MARROW BIOPSY   PATIENT DISCHARGE INSTRUCTIONS  What is a bone marrow biopsy?    A bone marrow biopsy is a procedure done to remove a small amount of marrow from your bone. Bone marrow is the soft, spongy inside of your larger bones. Bone marrow makes blood cells called platelets, red blood cells (RBCs) and white blood cells (WBCs). Platelets help your blood to clot (stop bleeding).RBCs carry oxygen to your tissues, and WBCs help your body to fight infection.     During a biopsy, caregivers use a needle and syringe to draw marrow out of your bone. The bone marrow is usually taken from the hip bone.     After the biopsy, caregivers send your bone marrow to a lab for tests.   Why do I need a bone marrow biopsy?    You may need a bone marrow biopsy to find out why you are having problems with your blood cells.     You may need the biopsy if you have anemia (not enough RBCs).      You may need a biopsy to learn if you have cancer in your bone marrow.     A bone marrow biopsy may also be done to see how you are doing during some cancer treatments.     The biopsy results will help caregivers plan the best treatment for you.   How can I take care of myself after the  biopsy?    Rest for the remainder of the day.     You may feel sore for two or three days after the biopsy. Your caregiver may suggest ice treatments. Ice causes blood vessels to constrict (get small) which helps decrease inflammation (swelling, pain, and redness). Ice is best started after the biopsy and for the next 24 to 48 hours afterwards. Put crushed ice in a plastic bag and cover it with a towel. Place this on the biopsy area for 15 to 20 minutes every hour as long as you need it. Do not sleep on the ice pack as you can get frostbite.    Keep the area clean and dry for 24 hours. After 24 hours, carefully wash the area with soap and  water during your bath or shower. Afterwards put on a clean, new bandage. Change your bandage if it gets wet or dirty. If you cannot reach the bandage, ask someone else to help you change it.    Diet: Eat a variety of healthy foods, and drink plenty of fluids.     Resume meds on at home. With the exception of ________________________________________________________________________________________________________________________________    Quit Smoking: It is never too late to quit smoking. Smoking harms the heart, lungs, and the blood. You are more likely to have a heart attack, lung disease, and cancer if you smoke. You will help yourself and those around you by not smoking. Ask your caregiver for more information about how to stop smoking if you are having trouble quitting.   Notify your Doctor if you experience the following symptoms:     You have severe pain the area of the biopsy for more than 24 hours.    You have any bleeding other than a small spot on the dressing.     You have an increased amount of bruising after the first 24 hours.     You have chills, a cough, or feel weak and achy. These could be signs of an infection.    Your skin is itchy, swollen, and has a rash.    **Call immediately if you experience chills and or a temperature over 100 degrees**    Follow up  visit information:     Keep all appointments.     Write down any questions you may have. This way you will remember to ask these questions at your next office visit.     Keep a list of medications you take, the amounts, and when you take them.        Pending Results     Date and Time Order Name Status Description    2/23/2017 1009 Surgical pathology exam In process     2/23/2017 1009 CHROMOSOME BONE MARROW With Professional Interpretation In process     2/23/2017 0957 Leukemia Lymphoma Evaluation (Flow Cytometry) In process     2/23/2017 0957 Bone marrow biopsy In process     2/21/2017 1535 LUPUS PANEL In process     2/21/2017 1528 PROTEIN ELECTROPHORESIS In process             Admission Information     Date & Time Provider Department Dept. Phone    2/23/2017 Romie Esquivel MD [facility] [phone]      MyChart Information     [employer] gives you secure access to your electronic health record. If you see a primary care provider, you can also send messages to your care team and make appointments. If you have questions, please call your primary care clinic.  If you do not have a primary care provider, please call [phone] and they will assist you.        Care EveryWhere ID     This is your Care EveryWhere ID. This could be used by other organizations to access your [place] medical records  [id number]           Review of your medicines      CONTINUE these medicines which have NOT CHANGED        Dose / Directions    allopurinol 100 MG tablet   Commonly known as:  ZYLOPRIM   Used for:  Gout, unspecified        Dose:  100 mg   Take 1 tablet (100 mg) by mouth daily   Quantity:  90 tablet   Refills:  3       lisinopril 20 MG tablet   Commonly known as:  PRINIVIL/ZESTRIL   Used for:  Essential hypertension, benign        Dose:  20 mg   Take 1 tablet (20 mg) by mouth 2 times daily   Quantity:  180 tablet   Refills:  3       pravastatin 20 MG tablet   Commonly known as:  PRAVACHOL   Used for:   Hyperlipidemia LDL goal <130        Dose:  20 mg   Take 1 tablet (20 mg) by mouth daily   Quantity:  90 tablet   Refills:  3       sildenafil 100 MG cap/tab   Commonly known as:  REVATIO/VIAGRA   Used for:  Erectile dysfunction, unspecified erectile dysfunction type        Dose:  100 mg   Take 1 tablet (100 mg) by mouth daily as needed for erectile dysfunction Take 30 min to 4 hours before intercourse.  Never use with nitroglycerin, terazosin or doxazosin.   Quantity:  5 tablet   Refills:  11                Protect others around you: Learn how to safely use, store and throw away your medicines at www.disposemymeds.org.             Medication List: This is a list of all your medications and when to take them. Check marks below indicate your daily home schedule. Keep this list as a reference.      Medications           Morning Afternoon Evening Bedtime As Needed    allopurinol 100 MG tablet   Commonly known as:  ZYLOPRIM   Take 1 tablet (100 mg) by mouth daily                                lisinopril 20 MG tablet   Commonly known as:  PRINIVIL/ZESTRIL   Take 1 tablet (20 mg) by mouth 2 times daily                                pravastatin 20 MG tablet   Commonly known as:  PRAVACHOL   Take 1 tablet (20 mg) by mouth daily                                sildenafil 100 MG cap/tab   Commonly known as:  REVATIO/VIAGRA   Take 1 tablet (100 mg) by mouth daily as needed for erectile dysfunction Take 30 min to 4 hours before intercourse.  Never use with nitroglycerin, terazosin or doxazosin.

## 2017-02-23 NOTE — OR NURSING
Joaquina STOREY assisting Dr Esquivel, pt tolerating well, to recovery for 45 minutes with bandage checks. MD stated he wanted t to sit up with back against bed

## 2017-02-23 NOTE — DISCHARGE INSTRUCTIONS
BONE MARROW BIOPSY   PATIENT DISCHARGE INSTRUCTIONS  What is a bone marrow biopsy?    A bone marrow biopsy is a procedure done to remove a small amount of marrow from your bone. Bone marrow is the soft, spongy inside of your larger bones. Bone marrow makes blood cells called platelets, red blood cells (RBCs) and white blood cells (WBCs). Platelets help your blood to clot (stop bleeding).RBCs carry oxygen to your tissues, and WBCs help your body to fight infection.     During a biopsy, caregivers use a needle and syringe to draw marrow out of your bone. The bone marrow is usually taken from the hip bone.     After the biopsy, caregivers send your bone marrow to a lab for tests.   Why do I need a bone marrow biopsy?    You may need a bone marrow biopsy to find out why you are having problems with your blood cells.     You may need the biopsy if you have anemia (not enough RBCs).      You may need a biopsy to learn if you have cancer in your bone marrow.     A bone marrow biopsy may also be done to see how you are doing during some cancer treatments.     The biopsy results will help caregivers plan the best treatment for you.   How can I take care of myself after the biopsy?    Rest for the remainder of the day.     You may feel sore for two or three days after the biopsy. Your caregiver may suggest ice treatments. Ice causes blood vessels to constrict (get small) which helps decrease inflammation (swelling, pain, and redness). Ice is best started after the biopsy and for the next 24 to 48 hours afterwards. Put crushed ice in a plastic bag and cover it with a towel. Place this on the biopsy area for 15 to 20 minutes every hour as long as you need it. Do not sleep on the ice pack as you can get frostbite.    Keep the area clean and dry for 24 hours. After 24 hours, carefully wash the area with soap and  water during your bath or shower. Afterwards put on a clean, new bandage. Change your bandage if it gets wet or dirty.  If you cannot reach the bandage, ask someone else to help you change it.    Diet: Eat a variety of healthy foods, and drink plenty of fluids.     Resume meds on at home. With the exception of ________________________________________________________________________________________________________________________________    Quit Smoking: It is never too late to quit smoking. Smoking harms the heart, lungs, and the blood. You are more likely to have a heart attack, lung disease, and cancer if you smoke. You will help yourself and those around you by not smoking. Ask your caregiver for more information about how to stop smoking if you are having trouble quitting.   Notify your Doctor if you experience the following symptoms:     You have severe pain the area of the biopsy for more than 24 hours.    You have any bleeding other than a small spot on the dressing.     You have an increased amount of bruising after the first 24 hours.     You have chills, a cough, or feel weak and achy. These could be signs of an infection.    Your skin is itchy, swollen, and has a rash.    **Call immediately if you experience chills and or a temperature over 100 degrees**    Follow up visit information:     Keep all appointments.     Write down any questions you may have. This way you will remember to ask these questions at your next office visit.     Keep a list of medications you take, the amounts, and when you take them.

## 2017-02-23 NOTE — OR NURSING
Ice pack applied to procedure site, low back. Dressing remains dry and intact. Patient instructed to use ice pack off and on today. And to report any bleeding from site.

## 2017-02-24 LAB
COPATH REPORT: NORMAL
COPATH REPORT: NORMAL

## 2017-02-27 ENCOUNTER — TELEPHONE (OUTPATIENT)
Dept: ONCOLOGY | Facility: CLINIC | Age: 77
End: 2017-02-27

## 2017-02-27 NOTE — TELEPHONE ENCOUNTER
Patient called requesting his labs, bone marrow biopsy,and ultrasound of the spleen results. Patient is leaving to go out of town so he is requesting that Dr. Mayer call him with results. Richelle Watt RN

## 2017-03-09 ENCOUNTER — MYC MEDICAL ADVICE (OUTPATIENT)
Dept: ONCOLOGY | Facility: CLINIC | Age: 77
End: 2017-03-09

## 2017-03-14 LAB — COPATH REPORT: NORMAL

## 2017-03-15 LAB — COPATH REPORT: NORMAL

## 2017-03-15 NOTE — TELEPHONE ENCOUNTER
Patient informed of bone marrow. No MDS. No leukemia. He will see us with CBC when he returns from [place] in next 1-2 months.

## 2017-05-10 ENCOUNTER — MYC MEDICAL ADVICE (OUTPATIENT)
Dept: INTERNAL MEDICINE | Facility: CLINIC | Age: 77
End: 2017-05-10

## 2017-05-10 ENCOUNTER — TELEPHONE (OUTPATIENT)
Dept: INTERNAL MEDICINE | Facility: CLINIC | Age: 77
End: 2017-05-10

## 2017-05-10 ENCOUNTER — RADIANT APPOINTMENT (OUTPATIENT)
Dept: CARDIOLOGY | Facility: CLINIC | Age: 77
End: 2017-05-10
Attending: INTERNAL MEDICINE
Payer: COMMERCIAL

## 2017-05-10 DIAGNOSIS — I34.2 NON-RHEUMATIC MITRAL VALVE STENOSIS: Primary | ICD-10-CM

## 2017-05-10 DIAGNOSIS — I35.9 AORTIC VALVE DISORDER: ICD-10-CM

## 2017-05-10 DIAGNOSIS — I34.2 NON-RHEUMATIC MITRAL VALVE STENOSIS: ICD-10-CM

## 2017-05-10 DIAGNOSIS — D69.6 THROMBOCYTOPENIA (H): Primary | ICD-10-CM

## 2017-05-10 PROCEDURE — 93306 TTE W/DOPPLER COMPLETE: CPT | Mod: TC

## 2017-05-10 PROCEDURE — 93306 TTE W/DOPPLER COMPLETE: CPT | Mod: 26 | Performed by: INTERNAL MEDICINE

## 2017-05-10 NOTE — TELEPHONE ENCOUNTER
Patient called and informed of echocardiogram results, see below.  Suggest appt with Cardiology for assessment and need for further evaluation.    [facility]: [facility] [phone]     Interpretation Summary     Left ventricular systolic function is normal. The visual ejection fraction is  estimated at 60-65%. There is mild to moderate concentric left ventricular  hypertrophy.  The left atrium is moderate to severely dilated.  There is mild mitral stenosis. There is mild to moderate (1-2+) mitral  regurgitation.  Moderate to severe valvular aortic stenosis is present and the mean AoV  pressure gradient is 21mmHg. There is mild to moderate (1-2+) aortic  regurgitation.  There is moderate (2+) tricuspid regurgitation. Right ventricular systolic  pressure is elevated, consistent with mild to moderate pulmonary hypertension.  Aortic stenosis severity has progressed as has mitral insufficiency.

## 2017-05-12 ENCOUNTER — OFFICE VISIT (OUTPATIENT)
Dept: CARDIOLOGY | Facility: CLINIC | Age: 77
End: 2017-05-12
Payer: COMMERCIAL

## 2017-05-12 VITALS
SYSTOLIC BLOOD PRESSURE: 104 MMHG | BODY MASS INDEX: 27.3 KG/M2 | HEIGHT: 70 IN | HEART RATE: 88 BPM | WEIGHT: 190.7 LBS | DIASTOLIC BLOOD PRESSURE: 64 MMHG

## 2017-05-12 DIAGNOSIS — I35.0 NONRHEUMATIC AORTIC VALVE STENOSIS: ICD-10-CM

## 2017-05-12 DIAGNOSIS — I34.2 NON-RHEUMATIC MITRAL VALVE STENOSIS: Primary | ICD-10-CM

## 2017-05-12 DIAGNOSIS — R06.09 DYSPNEA ON EXERTION: ICD-10-CM

## 2017-05-12 PROCEDURE — 99204 OFFICE O/P NEW MOD 45 MIN: CPT | Performed by: INTERNAL MEDICINE

## 2017-05-12 PROCEDURE — 93000 ELECTROCARDIOGRAM COMPLETE: CPT | Performed by: INTERNAL MEDICINE

## 2017-05-12 NOTE — MR AVS SNAPSHOT
After Visit Summary   5/12/2017    Mars Reyes    MRN: [id number]           Patient Information     Date Of Birth          1940        Visit Information        Provider Department      5/12/2017 2:15 PM Brandt Iglesias MD [facility] [place]        Today's Diagnoses     Non-rheumatic mitral valve stenosis    -  1    Dyspnea on exertion        Nonrheumatic aortic valve stenosis           Follow-ups after your visit        Additional Services     Follow-Up with Cardiologist       F/u after echo                  Your next 10 appointments already scheduled     May 16, 2017  1:15 PM CDT   Return Visit with  Oncology Nurse   [facility] ([facility])    [facility]  [address]  [place] [address]   [phone]            May 16, 2017  1:30 PM CDT   Return Visit with Pino Mayer MD   [facility] ([facility])    [facility]  [address]  [place] [address]   [phone]            May 17, 2017  1:00 PM CDT   NM MPI TREADMILL with RHNM1   [facility] ([facility])    [address]  [facility] [id number] [phone]           For a ONE day exam: Allow 3-4 hours for test. For a TWO day exam: Allow 2 hours PER day for test.  You may need to stop some medicines before the test. Follow your doctor s orders. - If you take a beta blocker: Follow your doctor s specific instructions on taking it prior to and on the day of your exam. - If you take Aggrenox or dipyridamole (Persantine, Permole), stop taking it 48 hours before your test. - If you take Viagra, Cialis or Levitra, stop taking it 48 hours before your test. - If you take theophylline or aminophylline, stop taking it 12 hours before your test.  For patients with diabetes: - If you take insulin, call your diabetes care team. Ask if you  should take a 1/2 dose the morning of your test. - If you take diabetes medicine by mouth, don t take it on the morning of your test. Bring it with you to take after the test. (If you have questions, call your diabetes care team.)  Do not take nitrates on the day of your test. Do not wear your Nitro-Patch.  Stop all caffeine 12 hours before the test. This includes coffee, tea, soda pop, chocolate and certain medicines (such as Anacin, Excedrin and NoDoz). Also avoid decaf coffee and tea, as these contain small amounts of caffeine.  No alcohol, smoking or other tobacco for 12 hours before the test.  Stop eating 3 hours before the test. You may drink water.  Please wear a loose two-piece outfit. If you will have an exercise test, bring rubber-soled walking shoes.  When you arrive, please tell us if you: - Have diabetes - Are breastfeeding - May be pregnant - Have a pacemaker of ICD (implantable defibrillator).  Please call your Imaging Department at your exam site with any questions.            May 17, 2017  2:30 PM CDT   Ekg Stress Nm Exercise with RESRM3   [facility] ([facility])    [address] [facility] [address] [phone]              Future tests that were ordered for you today     Open Future Orders        Priority Expected Expires Ordered    Echocardiogram Routine 5/12/2018 6/16/2018 5/12/2017    Follow-Up with Cardiologist Routine 5/12/2018 9/24/2018 5/12/2017    NM Exercise stress test (nuc card) Routine 5/19/2017 5/12/2018 5/12/2017            Who to contact     If you have questions or need follow up information about today's clinic visit or your schedule please contact [facility] [place] directly at [phone].  Normal or non-critical lab and imaging results will be communicated to you by MyChart, letter or phone within 4 business days after the clinic has received the results. If you do not hear from us within 7  "days, please contact the clinic through Meteo-Logic or phone. If you have a critical or abnormal lab result, we will notify you by phone as soon as possible.  Submit refill requests through Meteo-Logic or call your pharmacy and they will forward the refill request to us. Please allow 3 business days for your refill to be completed.          Additional Information About Your Visit        ClicDataharThe Muse Information     Meteo-Logic gives you secure access to your electronic health record. If you see a primary care provider, you can also send messages to your care team and make appointments. If you have questions, please call your primary care clinic.  If you do not have a primary care provider, please call [phone] and they will assist you.        Care EveryWhere ID     This is your Care EveryWhere ID. This could be used by other organizations to access your [place] medical records  [id number]        Your Vitals Were     Pulse Height BMI (Body Mass Index)             88 1.778 m (5' 10\") 27.36 kg/m2          Blood Pressure from Last 3 Encounters:   05/12/17 104/64   02/23/17 124/70   02/21/17 103/66    Weight from Last 3 Encounters:   05/12/17 86.5 kg (190 lb 11.2 oz)   02/21/17 87 kg (191 lb 12.8 oz)   06/06/16 84 kg (185 lb 3.2 oz)              We Performed the Following     EKG 12-lead complete w/read - Clinics (performed today)        Primary Care Provider Office Phone # Fax #    Pb Warren [phone]       [facility] [address] [place] [id number]        Thank you!     Thank you for choosing [facility] [place]  for your care. Our goal is always to provide you with excellent care. Hearing back from our patients is one way we can continue to improve our services. Please take a few minutes to complete the written survey that you may receive in the mail after your visit with us. Thank you!             Your Updated Medication List - Protect others around " you: Learn how to safely use, store and throw away your medicines at www.disposemymeds.org.          This list is accurate as of: 5/12/17  2:55 PM.  Always use your most recent med list.                   Brand Name Dispense Instructions for use    allopurinol 100 MG tablet    ZYLOPRIM    90 tablet    Take 1 tablet (100 mg) by mouth daily       lisinopril 20 MG tablet    PRINIVIL/ZESTRIL    180 tablet    Take 1 tablet (20 mg) by mouth 2 times daily       pravastatin 20 MG tablet    PRAVACHOL    90 tablet    Take 1 tablet (20 mg) by mouth daily       sildenafil 100 MG cap/tab    REVATIO/VIAGRA    5 tablet    Take 1 tablet (100 mg) by mouth daily as needed for erectile dysfunction Take 30 min to 4 hours before intercourse.  Never use with nitroglycerin, terazosin or doxazosin.

## 2017-05-12 NOTE — LETTER
5/12/2017    Pb Warren MD  [facility]  [address] [place] [address]    RE: Mars Reyes       Dear Colleague,    I had the pleasure of seeing Mars Reyes in the [facility].    CARDIOLOGY CONSULT    REASON FOR CONSULT: aortic stenosis    PRIMARY CARE PHYSICIAN:  Pb Warren    HISTORY OF PRESENT ILLNESS:  76-year-old male seen for evaluation of valvular heart disease.    Echo in 2015: EF 60%, mild-moderate LVH, severe LAE, mild MR, mild MS (mean 4mmHg), moderate TR, RVSP 34mmHg, mild AS (mean 14mmHg, Vmax 2.7m/s, DI 0.44, LV 1.5cm2).     Overall he's been feeling fairly well recently.  He will go to the gym and worked out on occasion.  He has some mild dyspnea with exertion.  On one or 2 occasions he has a little twinge of discomfort in his chest, but nothing predictable.  He has no lightheadedness, dizziness, or syncope.    Echo May 2017 showed ejection fraction 60%, mild to moderate LVH, normal right ventricle, moderate to severe left atrial enlargement, mild-to-moderate mitral regurgitation, mild mitral stenosis with mean gradient 5 mmHg, moderate tricuspid regurgitation, RVSP 44 mmHg, moderate aortic stenosis (mean 21mmHg, Vmax 3.3m/s, DI 0.39, LV 1.3cm2, SVI 44ml/m2), mild-moderate AI.     He recently has undergone evaluation for thrombocytopenia.  He has been off his aspirin recently because of this.    PAST MEDICAL HISTORY:  Past Medical History:   Diagnosis Date     Aftercare following joint replacement 6/3/2009     Contact dermatitis and other eczema, due to unspecified cause      Diverticulosis of colon (without mention of hemorrhage)      Essential hypertension, benign     abstracted 7/22/02     Gout, unspecified      Grief reaction 11/17/2010     Hip joint replacement by other means 6/3/2009     HTN (hypertension)      Hyperlipidemia LDL goal <130 10/31/2010     Mitral stenosis      Pulmonary HTN (H) 10/18/2011        MEDICATIONS:  Current Outpatient Prescriptions   Medication     lisinopril (PRINIVIL/ZESTRIL) 20 MG tablet     allopurinol (ZYLOPRIM) 100 MG tablet     pravastatin (PRAVACHOL) 20 MG tablet     sildenafil (REVATIO/VIAGRA) 100 MG cap/tab     No current facility-administered medications for this visit.        ALLERGIES:  Allergies   Allergen Reactions     Simvastatin      10/26/12: Arm pain - pt stopped it x 4-5 wks and arm pain didn't resolve sore started it        SOCIAL HISTORY:  I have reviewed this patient's social history and updated it with pertinent information if needed. Mars Reyes  reports that he has never smoked. He has never used smokeless tobacco. He reports that he drinks alcohol. He reports that he does not use illicit drugs.    FAMILY HISTORY:  I have reviewed this patient's family history and updated it with pertinent information if needed.   Family History   Problem Relation Age of Onset     Hypertension Mother        REVIEW OF SYSTEMS:  Constitutional:  No weight loss, fever, chills, weakness or fatigue.  HEENT:  Eyes:  No visual loss, blurred vision, double vision or yellow sclerae. No hearing loss, sneezing, congestion, runny nose or sore throat.  Skin:  No rash or itching.  Cardiovascular: per HPI  Respiratory: per HPI  GI:  No anorexia, nausea, vomiting or diarrhea. No abdominal pain or blood.  :  No dysurea, hematuria  Neurologic:  No headache, dizziness, syncope, paralysis, ataxia, numbness or tingling in the extremities. No change in bowel or bladder control.  Musculoskeletal:  No muscle, back pain, joint pain or stiffness.  Hematologic:  No anemia, bleeding or bruising.  Lymphatics:  No enlarged nodes. No history of splenectomy.  Psychiatric:  No history of depression or anxiety.  Endocrine:  No reports of sweating, cold or heat intolerance. No polyuria or polydipsia.  Allergies:  No history of asthma, hives, eczema or rhinitis.    PHYSICAL EXAM:      BP: 104/64 Pulse: 88             Vital Signs with Ranges  Pulse:  [88] 88  BP: (104)/(64) 104/64  190 lbs 11.2 oz    Constitutional: awake, alert, no distress  Eyes: PERRL, sclera nonicteric  ENT: trachea midline  Respiratory: Lungs clear  Cardiovascular: Regular rate and rhythm, 2/6 systolic crescendo ejection murmur at the upper sternal border, 2/6 holosystolic systolic murmur at the apex  GI: nondistended, nontender, bowel sounds present  Lymph/Hematologic: no lymphadenopathy  Skin: dry, no rash  Musculoskeletal: good muscle tone, strength 5/5 in upper and lower extremities  Neurologic: no focal deficits  Neuropsychiatric: appropriate affact    DATA:  Labs:   February 2017: Cholesterol 160, triglycerides 148, HDL 40, LDL 90    ASSESSMENT:  76-year-old male seen for evaluation of valvular heart disease.  There has been slight progression of the mitral and aortic valve disease in the past 2 years.  However nothing is worse than moderate at this point.  He really should not be having any symptoms from this.  He will need follow-up echo in one year.  It was explained that he'll probably need aortic and mitral valve replacement at some point.  He's probably 3-5 years away from needing surgery.    He has some mild dyspnea with exertion.  With the extensive calcification noted on the valves, he is at higher risk for underlying coronary disease.  Therefore treadmill nuclear stress test will be done.    RECOMMENDATIONS:  1.  Moderate aortic stenosis, mild to moderate mitral stenosis  - Repeat echo in one year    2.  Dyspnea on exertion  - Treadmill nuclear stress test    Follow-up in 1 year.    Brandt Iglesias MD  Cardiology - [facility]  Pager:  [phone]  Text Page  May 12, 2017    Thank you for allowing me to participate in the care of your patient.    Sincerely,     Brandt Iglesias MD     [facility]

## 2017-05-12 NOTE — PROGRESS NOTES
CARDIOLOGY CONSULT    REASON FOR CONSULT: aortic stenosis    PRIMARY CARE PHYSICIAN:  Pb Warren    HISTORY OF PRESENT ILLNESS:  76-year-old male seen for evaluation of valvular heart disease.    Echo in 2015: EF 60%, mild-moderate LVH, severe LAE, mild MR, mild MS (mean 4mmHg), moderate TR, RVSP 34mmHg, mild AS (mean 14mmHg, Vmax 2.7m/s, DI 0.44, LV 1.5cm2).     Overall he's been feeling fairly well recently.  He will go to the gym and worked out on occasion.  He has some mild dyspnea with exertion.  On one or 2 occasions he has a little twinge of discomfort in his chest, but nothing predictable.  He has no lightheadedness, dizziness, or syncope.    Echo May 2017 showed ejection fraction 60%, mild to moderate LVH, normal right ventricle, moderate to severe left atrial enlargement, mild-to-moderate mitral regurgitation, mild mitral stenosis with mean gradient 5 mmHg, moderate tricuspid regurgitation, RVSP 44 mmHg, moderate aortic stenosis (mean 21mmHg, Vmax 3.3m/s, DI 0.39, LV 1.3cm2, SVI 44ml/m2), mild-moderate AI.     He recently has undergone evaluation for thrombocytopenia.  He has been off his aspirin recently because of this.    PAST MEDICAL HISTORY:  Past Medical History:   Diagnosis Date     Aftercare following joint replacement 6/3/2009     Contact dermatitis and other eczema, due to unspecified cause      Diverticulosis of colon (without mention of hemorrhage)      Essential hypertension, benign     abstracted 7/22/02     Gout, unspecified      Grief reaction 11/17/2010     Hip joint replacement by other means 6/3/2009     HTN (hypertension)      Hyperlipidemia LDL goal <130 10/31/2010     Mitral stenosis      Pulmonary HTN (H) 10/18/2011       MEDICATIONS:  Current Outpatient Prescriptions   Medication     lisinopril (PRINIVIL/ZESTRIL) 20 MG tablet     allopurinol (ZYLOPRIM) 100 MG tablet     pravastatin (PRAVACHOL) 20 MG tablet     sildenafil (REVATIO/VIAGRA) 100 MG cap/tab     No current  facility-administered medications for this visit.        ALLERGIES:  Allergies   Allergen Reactions     Simvastatin      10/26/12: Arm pain - pt stopped it x 4-5 wks and arm pain didn't resolve sore started it        SOCIAL HISTORY:  I have reviewed this patient's social history and updated it with pertinent information if needed. Mars Reyes  reports that he has never smoked. He has never used smokeless tobacco. He reports that he drinks alcohol. He reports that he does not use illicit drugs.    FAMILY HISTORY:  I have reviewed this patient's family history and updated it with pertinent information if needed.   Family History   Problem Relation Age of Onset     Hypertension Mother        REVIEW OF SYSTEMS:  Constitutional:  No weight loss, fever, chills, weakness or fatigue.  HEENT:  Eyes:  No visual loss, blurred vision, double vision or yellow sclerae. No hearing loss, sneezing, congestion, runny nose or sore throat.  Skin:  No rash or itching.  Cardiovascular: per HPI  Respiratory: per HPI  GI:  No anorexia, nausea, vomiting or diarrhea. No abdominal pain or blood.  :  No dysurea, hematuria  Neurologic:  No headache, dizziness, syncope, paralysis, ataxia, numbness or tingling in the extremities. No change in bowel or bladder control.  Musculoskeletal:  No muscle, back pain, joint pain or stiffness.  Hematologic:  No anemia, bleeding or bruising.  Lymphatics:  No enlarged nodes. No history of splenectomy.  Psychiatric:  No history of depression or anxiety.  Endocrine:  No reports of sweating, cold or heat intolerance. No polyuria or polydipsia.  Allergies:  No history of asthma, hives, eczema or rhinitis.    PHYSICAL EXAM:      BP: 104/64 Pulse: 88            Vital Signs with Ranges  Pulse:  [88] 88  BP: (104)/(64) 104/64  190 lbs 11.2 oz    Constitutional: awake, alert, no distress  Eyes: PERRL, sclera nonicteric  ENT: trachea midline  Respiratory: Lungs clear  Cardiovascular: Regular rate and rhythm, 2/6  systolic crescendo ejection murmur at the upper sternal border, 2/6 holosystolic systolic murmur at the apex  GI: nondistended, nontender, bowel sounds present  Lymph/Hematologic: no lymphadenopathy  Skin: dry, no rash  Musculoskeletal: good muscle tone, strength 5/5 in upper and lower extremities  Neurologic: no focal deficits  Neuropsychiatric: appropriate affact    DATA:  Labs:   February 2017: Cholesterol 160, triglycerides 148, HDL 40, LDL 90    ASSESSMENT:  76-year-old male seen for evaluation of valvular heart disease.  There has been slight progression of the mitral and aortic valve disease in the past 2 years.  However nothing is worse than moderate at this point.  He really should not be having any symptoms from this.  He will need follow-up echo in one year.  It was explained that he'll probably need aortic and mitral valve replacement at some point.  He's probably 3-5 years away from needing surgery.    He has some mild dyspnea with exertion.  With the extensive calcification noted on the valves, he is at higher risk for underlying coronary disease.  Therefore treadmill nuclear stress test will be done.    RECOMMENDATIONS:  1.  Moderate aortic stenosis, mild to moderate mitral stenosis  - Repeat echo in one year    2.  Dyspnea on exertion  - Treadmill nuclear stress test    Follow-up in 1 year.    Brandt Iglesias MD  Cardiology - [facility]  Pager:  [phone]  Text Page  May 12, 2017

## 2017-05-16 ENCOUNTER — ONCOLOGY VISIT (OUTPATIENT)
Dept: ONCOLOGY | Facility: CLINIC | Age: 77
End: 2017-05-16
Attending: INTERNAL MEDICINE
Payer: MEDICARE

## 2017-05-16 ENCOUNTER — HOSPITAL ENCOUNTER (OUTPATIENT)
Facility: CLINIC | Age: 77
Setting detail: SPECIMEN
Discharge: HOME | End: 2017-05-16
Attending: INTERNAL MEDICINE | Admitting: INTERNAL MEDICINE
Payer: MEDICARE

## 2017-05-16 VITALS
TEMPERATURE: 98.4 F | DIASTOLIC BLOOD PRESSURE: 76 MMHG | RESPIRATION RATE: 16 BRPM | WEIGHT: 191 LBS | SYSTOLIC BLOOD PRESSURE: 112 MMHG | OXYGEN SATURATION: 99 % | HEART RATE: 82 BPM | BODY MASS INDEX: 26.74 KG/M2 | HEIGHT: 71 IN

## 2017-05-16 DIAGNOSIS — D47.2 MGUS (MONOCLONAL GAMMOPATHY OF UNKNOWN SIGNIFICANCE): ICD-10-CM

## 2017-05-16 DIAGNOSIS — D69.6 THROMBOCYTOPENIA (H): Primary | ICD-10-CM

## 2017-05-16 DIAGNOSIS — D69.6 THROMBOCYTOPENIA (H): ICD-10-CM

## 2017-05-16 LAB
BASOPHILS # BLD AUTO: 0 10E9/L (ref 0–0.2)
BASOPHILS NFR BLD AUTO: 0.3 %
DIFFERENTIAL METHOD BLD: ABNORMAL
EOSINOPHIL # BLD AUTO: 0.1 10E9/L (ref 0–0.7)
EOSINOPHIL NFR BLD AUTO: 1.6 %
ERYTHROCYTE [DISTWIDTH] IN BLOOD BY AUTOMATED COUNT: 12.9 % (ref 10–15)
HCT VFR BLD AUTO: 36.6 % (ref 40–53)
HGB BLD-MCNC: 12.6 G/DL (ref 13.3–17.7)
IMM GRANULOCYTES # BLD: 0 10E9/L (ref 0–0.4)
IMM GRANULOCYTES NFR BLD: 0.4 %
LYMPHOCYTES # BLD AUTO: 1.7 10E9/L (ref 0.8–5.3)
LYMPHOCYTES NFR BLD AUTO: 24.4 %
MCH RBC QN AUTO: 29.8 PG (ref 26.5–33)
MCHC RBC AUTO-ENTMCNC: 34.4 G/DL (ref 31.5–36.5)
MCV RBC AUTO: 87 FL (ref 78–100)
MONOCYTES # BLD AUTO: 0.9 10E9/L (ref 0–1.3)
MONOCYTES NFR BLD AUTO: 12.9 %
NEUTROPHILS # BLD AUTO: 4.3 10E9/L (ref 1.6–8.3)
NEUTROPHILS NFR BLD AUTO: 60.4 %
NRBC # BLD AUTO: 0 10*3/UL
NRBC BLD AUTO-RTO: 0 /100
PLATELET # BLD AUTO: 57 10E9/L (ref 150–450)
RBC # BLD AUTO: 4.23 10E12/L (ref 4.4–5.9)
WBC # BLD AUTO: 7.1 10E9/L (ref 4–11)

## 2017-05-16 PROCEDURE — 85025 COMPLETE CBC W/AUTO DIFF WBC: CPT | Performed by: INTERNAL MEDICINE

## 2017-05-16 PROCEDURE — 36415 COLL VENOUS BLD VENIPUNCTURE: CPT

## 2017-05-16 PROCEDURE — 99213 OFFICE O/P EST LOW 20 MIN: CPT | Performed by: INTERNAL MEDICINE

## 2017-05-16 PROCEDURE — 99211 OFF/OP EST MAY X REQ PHY/QHP: CPT

## 2017-05-16 ASSESSMENT — PAIN SCALES - GENERAL: PAINLEVEL: MODERATE PAIN (4)

## 2017-05-16 NOTE — MR AVS SNAPSHOT
After Visit Summary   5/16/2017    Mars Reyes    MRN: [id number]           Patient Information     Date Of Birth          1940        Visit Information        Provider Department      5/16/2017 1:15 PM Nurse, [facility]        Today's Diagnoses     Thrombocytopenia (H)           Follow-ups after your visit        Your next 10 appointments already scheduled     May 16, 2017  1:30 PM CDT   Return Visit with Pino Mayer MD   [facility] ([facility])    [facility]  [address]  [facility] [address]   [phone]            May 17, 2017  1:00 PM CDT   NM MPI TREADMILL with RHNM1   [facility] Nuclear Medicine ([facility])    [address]  [facility] [address] [phone]           For a ONE day exam: Allow 3-4 hours for test. For a TWO day exam: Allow 2 hours PER day for test.  You may need to stop some medicines before the test. Follow your doctor s orders. - If you take a beta blocker: Follow your doctor s specific instructions on taking it prior to and on the day of your exam. - If you take Aggrenox or dipyridamole (Persantine, Permole), stop taking it 48 hours before your test. - If you take Viagra, Cialis or Levitra, stop taking it 48 hours before your test. - If you take theophylline or aminophylline, stop taking it 12 hours before your test.  For patients with diabetes: - If you take insulin, call your diabetes care team. Ask if you should take a 1/2 dose the morning of your test. - If you take diabetes medicine by mouth, don t take it on the morning of your test. Bring it with you to take after the test. (If you have questions, call your diabetes care team.)  Do not take nitrates on the day of your test. Do not wear your Nitro-Patch.  Stop all caffeine 12 hours before the test. This includes coffee, tea, soda pop, chocolate and certain medicines (such as Anacin,  Excedrin and NoDoz). Also avoid decaf coffee and tea, as these contain small amounts of caffeine.  No alcohol, smoking or other tobacco for 12 hours before the test.  Stop eating 3 hours before the test. You may drink water.  Please wear a loose two-piece outfit. If you will have an exercise test, bring rubber-soled walking shoes.  When you arrive, please tell us if you: - Have diabetes - Are breastfeeding - May be pregnant - Have a pacemaker of ICD (implantable defibrillator).  Please call your Imaging Department at your exam site with any questions.            May 17, 2017  2:30 PM CDT   Ekg Stress Nm Exercise with RESRM3   [facility] ([facility])    [address] [facility] [address] [phone]              Who to contact     If you have questions or need follow up information about today's clinic visit or your schedule please contact [facility] directly at [phone].  Normal or non-critical lab and imaging results will be communicated to you by Rentamushart, letter or phone within 4 business days after the clinic has received the results. If you do not hear from us within 7 days, please contact the clinic through Locomizer or phone. If you have a critical or abnormal lab result, we will notify you by phone as soon as possible.  Submit refill requests through Locomizer or call your pharmacy and they will forward the refill request to us. Please allow 3 business days for your refill to be completed.          Additional Information About Your Visit        Locomizer Information     Locomizer gives you secure access to your electronic health record. If you see a primary care provider, you can also send messages to your care team and make appointments. If you have questions, please call your primary care clinic.  If you do not have a primary care provider, please call [phone] and they will assist you.        Care EveryWhere ID     This is your Care EveryWhere  ID. This could be used by other organizations to access your [place] medical records  [id number]         Blood Pressure from Last 3 Encounters:   05/12/17 104/64   02/23/17 124/70   02/21/17 103/66    Weight from Last 3 Encounters:   05/12/17 86.5 kg (190 lb 11.2 oz)   02/21/17 87 kg (191 lb 12.8 oz)   06/06/16 84 kg (185 lb 3.2 oz)              We Performed the Following     CBC with platelets differential        Primary Care Provider Office Phone # Fax #    Pb Warren [phone]       [facility] [address] [place] [address]        Thank you!     Thank you for choosing [facility]  for your care. Our goal is always to provide you with excellent care. Hearing back from our patients is one way we can continue to improve our services. Please take a few minutes to complete the written survey that you may receive in the mail after your visit with us. Thank you!             Your Updated Medication List - Protect others around you: Learn how to safely use, store and throw away your medicines at www.disposemymeds.org.          This list is accurate as of: 5/16/17  1:16 PM.  Always use your most recent med list.                   Brand Name Dispense Instructions for use    allopurinol 100 MG tablet    ZYLOPRIM    90 tablet    Take 1 tablet (100 mg) by mouth daily       lisinopril 20 MG tablet    PRINIVIL/ZESTRIL    180 tablet    Take 1 tablet (20 mg) by mouth 2 times daily       pravastatin 20 MG tablet    PRAVACHOL    90 tablet    Take 1 tablet (20 mg) by mouth daily       sildenafil 100 MG cap/tab    REVATIO/VIAGRA    5 tablet    Take 1 tablet (100 mg) by mouth daily as needed for erectile dysfunction Take 30 min to 4 hours before intercourse.  Never use with nitroglycerin, terazosin or doxazosin.

## 2017-05-16 NOTE — PROGRESS NOTES
Medical Assistant Note:  Mars Reyes presents today for lab visit.    Patient seen by provider today: Yes: Dr. Mayer.   present during visit today: Not Applicable.    Concerns: No Concerns.    Procedure:  Lab draw site: LAC, Needle type: BF, Gauge: 21 g gauze and coban applied.    Post Assessment:  Labs drawn without difficulty: Yes.    Discharge Plan:  Departure Mode: Ambulatory.    Face to Face Time: 5.    Geraldine Granados MA

## 2017-05-16 NOTE — PROGRESS NOTES
"Oncology Rooming Note    May 16, 2017 1:29 PM   Mars Reyes is a 76 year old male who presents for:    Chief Complaint   Patient presents with     Oncology Clinic Visit     follow - up     Initial Vitals: /76 (BP Location: Left arm, Patient Position: Chair, Cuff Size: Adult Regular)  Pulse 82  Temp 98.4  F (36.9  C) (Oral)  Resp 16  Ht 1.803 m (5' 11\")  Wt 86.6 kg (191 lb)  SpO2 99%  BMI 26.64 kg/m2 Estimated body mass index is 26.64 kg/(m^2) as calculated from the following:    Height as of this encounter: 1.803 m (5' 11\").    Weight as of this encounter: 86.6 kg (191 lb). Body surface area is 2.08 meters squared.  Moderate Pain (4) Comment: Data Unavailable   No LMP for male patient.  Allergies reviewed: Yes  Medications reviewed: Yes    Medications: Medication refills not needed today.  Pharmacy name entered into [employer]:    [facility] [address] [place] [address] [id number] [facility] [place] [id number] [facility] [address] [place] [address] [facility] [address] AT [facility] OF [address]    Clinical concerns: none Moreno was notified.    10 minutes for nursing intake (face to face time)     Elisabeth Benson MA              DISCHARGE PLAN:    Follow up in 3 months with labs  [facility]      Next appointments: See patient instruction section  Departure Mode: Ambulatory  Accompanied by: self  1 minutes for nursing discharge (face to face time)   Nyla Hernandes RN      "

## 2017-05-16 NOTE — MR AVS SNAPSHOT
"              After Visit Summary   5/16/2017    Mars Reyes    MRN: [id number]           Patient Information     Date Of Birth          1940        Visit Information        Provider Department      5/16/2017 1:30 PM Pino Mayer MD [facility]        Today's Diagnoses     Thrombocytopenia (H)    -  1    MGUS (monoclonal gammopathy of unknown significance)          Care Instructions    Follow up in 3 months with labs.        Follow-ups after your visit        Who to contact     If you have questions or need follow up information about today's clinic visit or your schedule please contact [facility] directly at [phone].  Normal or non-critical lab and imaging results will be communicated to you by Realvu Inchart, letter or phone within 4 business days after the clinic has received the results. If you do not hear from us within 7 days, please contact the clinic through [employer] or phone. If you have a critical or abnormal lab result, we will notify you by phone as soon as possible.  Submit refill requests through CamStent or call your pharmacy and they will forward the refill request to us. Please allow 3 business days for your refill to be completed.          Additional Information About Your Visit        MyChart Information     CamStent gives you secure access to your electronic health record. If you see a primary care provider, you can also send messages to your care team and make appointments. If you have questions, please call your primary care clinic.  If you do not have a primary care provider, please call [phone] and they will assist you.        Care EveryWhere ID     This is your Care EveryWhere ID. This could be used by other organizations to access your [place] medical records  [id number]        Your Vitals Were     Pulse Temperature Respirations Height Pulse Oximetry BMI (Body Mass Index)    82 98.4  F (36.9  C) (Oral) 16 1.803 m (5' 11\") 99% 26.64 kg/m2       Blood " Pressure from Last 3 Encounters:   No data found for BP    Weight from Last 3 Encounters:   No data found for Wt              Today, you had the following     No orders found for display       Primary Care Provider Office Phone # Fax #    Pb Warren [phone]       [facility] [address] [facility] [address]        Thank you!     Thank you for choosing [facility]  for your care. Our goal is always to provide you with excellent care. Hearing back from our patients is one way we can continue to improve our services. Please take a few minutes to complete the written survey that you may receive in the mail after your visit with us. Thank you!             Your Updated Medication List - Protect others around you: Learn how to safely use, store and throw away your medicines at www.disposemymeds.org.          This list is accurate as of: 5/16/17 11:59 PM.  Always use your most recent med list.                   Brand Name Dispense Instructions for use    allopurinol 100 MG tablet    ZYLOPRIM    90 tablet    Take 1 tablet (100 mg) by mouth daily       lisinopril 20 MG tablet    PRINIVIL/ZESTRIL    180 tablet    Take 1 tablet (20 mg) by mouth 2 times daily       pravastatin 20 MG tablet    PRAVACHOL    90 tablet    Take 1 tablet (20 mg) by mouth daily       sildenafil 100 MG cap/tab    REVATIO/VIAGRA    5 tablet    Take 1 tablet (100 mg) by mouth daily as needed for erectile dysfunction Take 30 min to 4 hours before intercourse.  Never use with nitroglycerin, terazosin or doxazosin.

## 2017-05-17 ENCOUNTER — HOSPITAL ENCOUNTER (OUTPATIENT)
Dept: NUCLEAR MEDICINE | Facility: CLINIC | Age: 77
Setting detail: NUCLEAR MEDICINE
End: 2017-05-17
Attending: INTERNAL MEDICINE
Payer: MEDICARE

## 2017-05-17 ENCOUNTER — HOSPITAL ENCOUNTER (OUTPATIENT)
Dept: CARDIOLOGY | Facility: CLINIC | Age: 77
Discharge: HOME | End: 2017-05-17
Attending: INTERNAL MEDICINE | Admitting: INTERNAL MEDICINE
Payer: MEDICARE

## 2017-05-17 DIAGNOSIS — I35.0 NONRHEUMATIC AORTIC VALVE STENOSIS: ICD-10-CM

## 2017-05-17 DIAGNOSIS — I34.2 NON-RHEUMATIC MITRAL VALVE STENOSIS: ICD-10-CM

## 2017-05-17 DIAGNOSIS — R06.09 DYSPNEA ON EXERTION: ICD-10-CM

## 2017-05-17 PROCEDURE — 93016 CV STRESS TEST SUPVJ ONLY: CPT | Performed by: INTERNAL MEDICINE

## 2017-05-17 PROCEDURE — 78452 HT MUSCLE IMAGE SPECT MULT: CPT | Mod: 26 | Performed by: INTERNAL MEDICINE

## 2017-05-17 PROCEDURE — A9502 TC99M TETROFOSMIN: HCPCS | Performed by: INTERNAL MEDICINE

## 2017-05-17 PROCEDURE — 93018 CV STRESS TEST I&R ONLY: CPT | Performed by: INTERNAL MEDICINE

## 2017-05-17 PROCEDURE — 34300033 ZZH RX 343: Performed by: INTERNAL MEDICINE

## 2017-05-17 PROCEDURE — 78452 HT MUSCLE IMAGE SPECT MULT: CPT

## 2017-05-17 RX ADMIN — TETROFOSMIN 10 MCI.: 0.23 INJECTION, POWDER, LYOPHILIZED, FOR SOLUTION INTRAVENOUS at 13:00

## 2017-05-17 RX ADMIN — TETROFOSMIN 31 MCI.: 0.23 INJECTION, POWDER, LYOPHILIZED, FOR SOLUTION INTRAVENOUS at 14:55

## 2017-05-17 NOTE — PROGRESS NOTES
HEMATOLOGIC HISTORY:  Mr. Mars Reyes is a gentleman with chronic thrombocytopenia and MGUS.   1.  On 05/20/2009, platelets of 191.   2.  On 12/21/2011, platelets of 75.   3.  On 02/20/2017, platelets of 57 with hemoglobin of 13.9.   -Normal CMP.  4  On 02/21/2017:    -Iron 77 and ferritin of 84.    -Folate of 20.    -LDH of 180.    -TSH of 2.54.    -Vitamin B12 of 372.    -SPEP reveals M spike of 0.1.   - Lupus negative.   5. US on 02/22/2017 reveals normal spleen size.  6.  Bone marrow aspiration biopsy was done on 02/23/2017:  It reveals normocellular bone marrow with 50% cellularity with trilineage hematopoiesis.  Adequate megakaryocyte.  Slight erythroid hypoplasia.  No increase in blasts. 5% to 10% plasma cells.  Cytogenetics normal.  FISH for myeloma panel normal.  Flow cytometry reveals rare lambda monotypic plasma cells.      SUBJECTIVE:  Mr. Reyes is a 76-year-old gentleman with chronic thrombocytopenia.  The patient has had multiple workups done including bone marrow biopsy.  No evidence of MDS or leukemia.  Clinical picture is consistent with chronic ITP.   The patient overall is doing good.  No complication from thrombocytopenia.  He does not have any bleeding or bruising problem.  No bleeding from any site.  No blood in the urine or stool.  No black stool.       The patient also has MGUS.      He is doing well.  No headache.  No dizziness.  No neck pain.  No chest pain or difficulty breathing.  No abdominal pain, nausea or vomiting.  No urinary or bowel complaints.  No bleeding.  No fever, chills or night sweats.  Appetite good.      PHYSICAL EXAMINATION:   GENERAL:  The patient was alert and oriented x3.   VITAL SIGNS:  Reviewed.   Rest of systems not examined.      LABORATORY DATA:  Reviewed.  Platelet is low, but stable at 57.  Hemoglobin mildly low at 12.6.  Normal WBC.      ASSESSMENT:   1.  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   2.  Monoclonal gammopathy of undetermined significance (MGUS).   3.  Other medical problems including hypertension and hyperlipidemia.      PLAN:   1.  I reviewed the CBC.  I explained to him that his platelet is low, but stable.  Hemoglobin mildly low.  WBC is normal.      The previous investigations were all discussed.  I told him that it is consistent with chronic ITP.  Bone marrow did not reveal any MDS, myelofibrosis or myeloma.      Complications of thrombocytopenia discussed.  I told the patient that if his platelet starts getting below 30, I will plan on treating him with a steroid.  He is agreeable for it.      He will keep a watch for any bleeding.  Advised him to go to the ER if he has bleeding from ear, nose or throat, blood in the stool, blood in the urine, black stool, red skin rash.      2.  The patient has MGUS.  With next lab, we will get immunofixation, free light chain and immunoglobulin levels.  Risk of progression to myeloma is low at 1% per year.      3.  He had multiple questions, which were all answered.  I will see him in 3 months' time with a CBC.  He will return sooner if he has any problem.         AKOSUA CHONG MD             D: 2017 14:14   T: 2017 04:49   MT: dina      Name:     DESEAN MARTINEZ   MRN:      [id number]        Account:      [id number]   :      1940           Visit Date:   2017      Document: [id number]

## 2017-05-18 ENCOUNTER — TELEPHONE (OUTPATIENT)
Dept: CARDIOLOGY | Facility: CLINIC | Age: 77
End: 2017-05-18

## 2017-05-18 NOTE — TELEPHONE ENCOUNTER
Pt called back, advised of normal results and per Dr. Iglesias f/u in one year, however to call if he has any questions or concerns prior to that. Pt agreed.   Nazario MEEHAN

## 2017-05-18 NOTE — TELEPHONE ENCOUNTER
Called pt to review nuc stress test result, no answer, left vm for call back.   Per  plan for f/u in 1 yr.   Nazario MEEHAN

## 2017-06-13 ENCOUNTER — MYC MEDICAL ADVICE (OUTPATIENT)
Dept: INTERNAL MEDICINE | Facility: CLINIC | Age: 77
End: 2017-06-13

## 2017-08-15 ENCOUNTER — HOSPITAL ENCOUNTER (OUTPATIENT)
Facility: CLINIC | Age: 77
Setting detail: SPECIMEN
Discharge: HOME | End: 2017-08-15
Attending: INTERNAL MEDICINE | Admitting: INTERNAL MEDICINE
Payer: MEDICARE

## 2017-08-15 ENCOUNTER — ONCOLOGY VISIT (OUTPATIENT)
Dept: ONCOLOGY | Facility: CLINIC | Age: 77
End: 2017-08-15
Attending: INTERNAL MEDICINE
Payer: MEDICARE

## 2017-08-15 DIAGNOSIS — D47.2 MGUS (MONOCLONAL GAMMOPATHY OF UNKNOWN SIGNIFICANCE): ICD-10-CM

## 2017-08-15 DIAGNOSIS — D69.6 THROMBOCYTOPENIA (H): ICD-10-CM

## 2017-08-15 LAB
ANION GAP SERPL CALCULATED.3IONS-SCNC: 8 MMOL/L (ref 3–14)
BUN SERPL-MCNC: 15 MG/DL (ref 7–30)
CALCIUM SERPL-MCNC: 9.3 MG/DL (ref 8.5–10.1)
CHLORIDE SERPL-SCNC: 100 MMOL/L (ref 94–109)
CO2 SERPL-SCNC: 25 MMOL/L (ref 20–32)
CREAT SERPL-MCNC: 0.89 MG/DL (ref 0.66–1.25)
ERYTHROCYTE [DISTWIDTH] IN BLOOD BY AUTOMATED COUNT: 12.9 % (ref 10–15)
GFR SERPL CREATININE-BSD FRML MDRD: 83 ML/MIN/1.7M2
GLUCOSE SERPL-MCNC: 88 MG/DL (ref 70–99)
HCT VFR BLD AUTO: 40.4 % (ref 40–53)
HGB BLD-MCNC: 14.2 G/DL (ref 13.3–17.7)
MCH RBC QN AUTO: 30 PG (ref 26.5–33)
MCHC RBC AUTO-ENTMCNC: 35.1 G/DL (ref 31.5–36.5)
MCV RBC AUTO: 85 FL (ref 78–100)
PLATELET # BLD AUTO: 62 10E9/L (ref 150–450)
POTASSIUM SERPL-SCNC: 4.9 MMOL/L (ref 3.4–5.3)
RBC # BLD AUTO: 4.74 10E12/L (ref 4.4–5.9)
SODIUM SERPL-SCNC: 133 MMOL/L (ref 133–144)
WBC # BLD AUTO: 6.5 10E9/L (ref 4–11)

## 2017-08-15 PROCEDURE — 82784 ASSAY IGA/IGD/IGG/IGM EACH: CPT | Performed by: INTERNAL MEDICINE

## 2017-08-15 PROCEDURE — 85027 COMPLETE CBC AUTOMATED: CPT | Performed by: INTERNAL MEDICINE

## 2017-08-15 PROCEDURE — 80048 BASIC METABOLIC PNL TOTAL CA: CPT | Performed by: INTERNAL MEDICINE

## 2017-08-15 PROCEDURE — 84165 PROTEIN E-PHORESIS SERUM: CPT | Performed by: INTERNAL MEDICINE

## 2017-08-15 PROCEDURE — 83883 ASSAY NEPHELOMETRY NOT SPEC: CPT | Performed by: INTERNAL MEDICINE

## 2017-08-15 PROCEDURE — 00000402 ZZHCL STATISTIC TOTAL PROTEIN: Performed by: INTERNAL MEDICINE

## 2017-08-15 PROCEDURE — 86334 IMMUNOFIX E-PHORESIS SERUM: CPT | Performed by: INTERNAL MEDICINE

## 2017-08-15 PROCEDURE — 36415 COLL VENOUS BLD VENIPUNCTURE: CPT

## 2017-08-15 NOTE — PROGRESS NOTES
Medical Assistant Note:  Mars Reyes presents today for labs.    Patient seen by provider today: No.   present during visit today: Not Applicable.    Concerns: No Concerns.    Procedure:  Lab draw site: LAC, Needle type: BF, Gauge: 21.    Post Assessment:  Labs drawn without difficulty: Yes.    Discharge Plan:  Gauze and coban applied. Some concerns about taking tylenol and that having an effect on plt results? I told him no.    Face to Face Time: 8min.    Elisabeth Benson MA

## 2017-08-15 NOTE — MR AVS SNAPSHOT
After Visit Summary   8/15/2017    Mars Reyes    MRN: [id number]           Patient Information     Date Of Birth          1940        Visit Information        Provider Department      8/15/2017 1:00 PM Nurse,  Oncology [facility]        Today's Diagnoses     Thrombocytopenia (H)        MGUS (monoclonal gammopathy of unknown significance)           Follow-ups after your visit        Who to contact     If you have questions or need follow up information about today's clinic visit or your schedule please contact [facility] directly at [phone].  Normal or non-critical lab and imaging results will be communicated to you by [email or web address], letter or phone within 4 business days after the clinic has received the results. If you do not hear from us within 7 days, please contact the clinic through ThoughtLeadr or phone. If you have a critical or abnormal lab result, we will notify you by phone as soon as possible.  Submit refill requests through ThoughtLeadr or call your pharmacy and they will forward the refill request to us. Please allow 3 business days for your refill to be completed.          Additional Information About Your Visit        MyChart Information     ThoughtLeadr gives you secure access to your electronic health record. If you see a primary care provider, you can also send messages to your care team and make appointments. If you have questions, please call your primary care clinic.  If you do not have a primary care provider, please call [phone] and they will assist you.        Care EveryWhere ID     This is your Care EveryWhere ID. This could be used by other organizations to access your [place] medical records  [id number]         Blood Pressure from Last 3 Encounters:   05/16/17 112/76   05/12/17 104/64   02/23/17 124/70    Weight from Last 3 Encounters:   05/16/17 86.6 kg (191 lb)   05/12/17 86.5 kg (190 lb 11.2 oz)   02/21/17 87 kg (191 lb 12.8 oz)              We  Performed the Following     Basic metabolic panel     CBC with platelets     Kappa and lambda light chain     Protein electrophoresis     Protein Immunofixation Serum        Primary Care Provider Office Phone # Fax #    Pb Warren [phone]       [address] [place] [address]        Equal Access to Services     PHANIZABELA JONNY : Hadii aad ku hadasho Soomaali, waaxda luqadaha, qaybta kaalmada adeegyada, waxay idiin haysamn ademin sawyer laterra . So [facility] [phone].    ATENCIÓN: Si habla español, tiene a candelario disposición servicios gratuitos de asistencia lingüística. [facility] [phone].    We comply with applicable federal civil rights laws and [place] laws. We do not discriminate on the basis of race, color, national origin, age, disability sex, sexual orientation or gender identity.            Thank you!     Thank you for choosing [facility]  for your care. Our goal is always to provide you with excellent care. Hearing back from our patients is one way we can continue to improve our services. Please take a few minutes to complete the written survey that you may receive in the mail after your visit with us. Thank you!             Your Updated Medication List - Protect others around you: Learn how to safely use, store and throw away your medicines at www.disposemymeds.org.          This list is accurate as of: 8/15/17  1:25 PM.  Always use your most recent med list.                   Brand Name Dispense Instructions for use Diagnosis    allopurinol 100 MG tablet    ZYLOPRIM    90 tablet    Take 1 tablet (100 mg) by mouth daily    Gout, unspecified       lisinopril 20 MG tablet    PRINIVIL/ZESTRIL    180 tablet    Take 1 tablet (20 mg) by mouth 2 times daily    Essential hypertension, benign       pravastatin 20 MG tablet    PRAVACHOL    90 tablet    Take 1 tablet (20 mg) by mouth daily    Hyperlipidemia LDL goal <130       sildenafil 100 MG cap/tab    REVATIO/VIAGRA     5 tablet    Take 1 tablet (100 mg) by mouth daily as needed for erectile dysfunction Take 30 min to 4 hours before intercourse.  Never use with nitroglycerin, terazosin or doxazosin.    Erectile dysfunction, unspecified erectile dysfunction type

## 2017-08-16 LAB
ALBUMIN SERPL ELPH-MCNC: 3.5 G/DL (ref 3.7–5.1)
ALPHA1 GLOB SERPL ELPH-MCNC: 0.3 G/DL (ref 0.2–0.4)
ALPHA2 GLOB SERPL ELPH-MCNC: 0.8 G/DL (ref 0.5–0.9)
B-GLOBULIN SERPL ELPH-MCNC: 0.7 G/DL (ref 0.6–1)
GAMMA GLOB SERPL ELPH-MCNC: 0.7 G/DL (ref 0.7–1.6)
IGA SERPL-MCNC: 180 MG/DL (ref 70–380)
IGG SERPL-MCNC: 887 MG/DL (ref 695–1620)
IGM SERPL-MCNC: 67 MG/DL (ref 60–265)
KAPPA LC UR-MCNC: 0.98 MG/DL (ref 0.33–1.94)
KAPPA LC/LAMBDA SER: 0.74 {RATIO} (ref 0.26–1.65)
LAMBDA LC SERPL-MCNC: 1.32 MG/DL (ref 0.57–2.63)
M PROTEIN SERPL ELPH-MCNC: 0 G/DL
PROT PATTERN SERPL ELPH-IMP: ABNORMAL
PROT PATTERN SERPL IFE-IMP: NORMAL

## 2017-08-31 NOTE — PROGRESS NOTES
Efrainsveta Mr. Reyes,    Your blood test are stable.  Platelet is low at 62.  It has been low for many months.  It's not getting worse.  WBC and hemoglobin are normal.  You are not anemic.  Basic metabolic panel is normal. SPEP and immunofixation are all normal.  Please follow-up in the clinic in three months time with CBC.  Please call us if you have any questions.     (Arrange for three months follow-up with CBC).    Pino Mayer

## 2017-10-25 ENCOUNTER — TELEPHONE (OUTPATIENT)
Dept: ONCOLOGY | Facility: CLINIC | Age: 77
End: 2017-10-25

## 2017-10-25 NOTE — TELEPHONE ENCOUNTER
Pt called stating that he is due for his lab test & exam with .    Per  on 8-31-17: (Arrange for three months follow-up with CBC).    Notified Pt of this & transferred him to scheduling to set up his appts.

## 2017-10-26 ENCOUNTER — ONCOLOGY VISIT (OUTPATIENT)
Dept: ONCOLOGY | Facility: CLINIC | Age: 77
End: 2017-10-26
Attending: INTERNAL MEDICINE
Payer: MEDICARE

## 2017-10-26 ENCOUNTER — HOSPITAL ENCOUNTER (OUTPATIENT)
Facility: CLINIC | Age: 77
Setting detail: SPECIMEN
Discharge: HOME | End: 2017-10-26
Attending: INTERNAL MEDICINE | Admitting: INTERNAL MEDICINE
Payer: MEDICARE

## 2017-10-26 VITALS
OXYGEN SATURATION: 96 % | WEIGHT: 182.8 LBS | HEART RATE: 74 BPM | RESPIRATION RATE: 16 BRPM | TEMPERATURE: 98.3 F | BODY MASS INDEX: 25.5 KG/M2 | DIASTOLIC BLOOD PRESSURE: 83 MMHG | SYSTOLIC BLOOD PRESSURE: 130 MMHG

## 2017-10-26 VITALS
OXYGEN SATURATION: 96 % | DIASTOLIC BLOOD PRESSURE: 83 MMHG | TEMPERATURE: 98.3 F | RESPIRATION RATE: 16 BRPM | BODY MASS INDEX: 25.5 KG/M2 | HEART RATE: 74 BPM | SYSTOLIC BLOOD PRESSURE: 130 MMHG | WEIGHT: 182.8 LBS

## 2017-10-26 DIAGNOSIS — D47.2 MGUS (MONOCLONAL GAMMOPATHY OF UNKNOWN SIGNIFICANCE): ICD-10-CM

## 2017-10-26 DIAGNOSIS — D69.6 THROMBOCYTOPENIA (H): Primary | ICD-10-CM

## 2017-10-26 DIAGNOSIS — D47.2 MGUS (MONOCLONAL GAMMOPATHY OF UNKNOWN SIGNIFICANCE): Primary | ICD-10-CM

## 2017-10-26 LAB
BASOPHILS # BLD AUTO: 0 10E9/L (ref 0–0.2)
BASOPHILS NFR BLD AUTO: 0.3 %
DIFFERENTIAL METHOD BLD: ABNORMAL
EOSINOPHIL # BLD AUTO: 0.1 10E9/L (ref 0–0.7)
EOSINOPHIL NFR BLD AUTO: 1.6 %
ERYTHROCYTE [DISTWIDTH] IN BLOOD BY AUTOMATED COUNT: 12.7 % (ref 10–15)
HCT VFR BLD AUTO: 41.4 % (ref 40–53)
HGB BLD-MCNC: 14.7 G/DL (ref 13.3–17.7)
IMM GRANULOCYTES # BLD: 0 10E9/L (ref 0–0.4)
IMM GRANULOCYTES NFR BLD: 0.2 %
LYMPHOCYTES # BLD AUTO: 1.3 10E9/L (ref 0.8–5.3)
LYMPHOCYTES NFR BLD AUTO: 21.5 %
MCH RBC QN AUTO: 30.4 PG (ref 26.5–33)
MCHC RBC AUTO-ENTMCNC: 35.5 G/DL (ref 31.5–36.5)
MCV RBC AUTO: 86 FL (ref 78–100)
MONOCYTES # BLD AUTO: 0.8 10E9/L (ref 0–1.3)
MONOCYTES NFR BLD AUTO: 12.2 %
NEUTROPHILS # BLD AUTO: 4 10E9/L (ref 1.6–8.3)
NEUTROPHILS NFR BLD AUTO: 64.2 %
NRBC # BLD AUTO: 0 10*3/UL
NRBC BLD AUTO-RTO: 0 /100
PLATELET # BLD AUTO: 64 10E9/L (ref 150–450)
RBC # BLD AUTO: 4.84 10E12/L (ref 4.4–5.9)
WBC # BLD AUTO: 6.2 10E9/L (ref 4–11)

## 2017-10-26 PROCEDURE — 99211 OFF/OP EST MAY X REQ PHY/QHP: CPT

## 2017-10-26 PROCEDURE — 36415 COLL VENOUS BLD VENIPUNCTURE: CPT

## 2017-10-26 PROCEDURE — 85025 COMPLETE CBC W/AUTO DIFF WBC: CPT | Performed by: INTERNAL MEDICINE

## 2017-10-26 PROCEDURE — 99213 OFFICE O/P EST LOW 20 MIN: CPT | Performed by: INTERNAL MEDICINE

## 2017-10-26 ASSESSMENT — PAIN SCALES - GENERAL
PAINLEVEL: MILD PAIN (3)
PAINLEVEL: MILD PAIN (3)

## 2017-10-26 NOTE — PROGRESS NOTES
Medical Assistant Note:  Mars Reyes presents today for lab only.    Patient seen by provider today: No.   present during visit today: Not Applicable.    Concerns: No Concerns.    Procedure:  Lab draw site: LAC, Needle type: BF, Gauge: 21.    Post Assessment:  Labs drawn without difficulty: Yes.    Discharge Plan:  Departure Mode: Ambulatory.    Face to Face Time: 5 minutes.    Alexandra Omalley MA

## 2017-10-26 NOTE — MR AVS SNAPSHOT
After Visit Summary   10/26/2017    Mars Reyes    MRN: [id number]           Patient Information     Date Of Birth          1940        Visit Information        Provider Department      10/26/2017 9:30 AM Pino Mayer MD [facility]        Today's Diagnoses     Thrombocytopenia (H)    -  1    MGUS (monoclonal gammopathy of unknown significance)          Care Instructions    Follow up in 6 months with labs.          Follow-ups after your visit        Your next 10 appointments already scheduled     Apr 25, 2018  8:30 AM CDT   Return Visit with  Oncology Nurse   [facility] ([facility])    [facility]  [address]  [place] [address]   [phone]            Apr 25, 2018  9:00 AM CDT   Return Visit with Pino Mayer MD   [facility] ([facility])    [facility]  [address]  [facility] [address]   [phone]              Who to contact     If you have questions or need follow up information about today's clinic visit or your schedule please contact [facility] directly at [phone].  Normal or non-critical lab and imaging results will be communicated to you by InCytuhart, letter or phone within 4 business days after the clinic has received the results. If you do not hear from us within 7 days, please contact the clinic through InCytuhart or phone. If you have a critical or abnormal lab result, we will notify you by phone as soon as possible.  Submit refill requests through eGenerations or call your pharmacy and they will forward the refill request to us. Please allow 3 business days for your refill to be completed.          Additional Information About Your Visit        InCytuhart Information     eGenerations gives you secure access to your electronic health record. If you see a primary care provider, you can also send messages to your care team and make  appointments. If you have questions, please call your primary care clinic.  If you do not have a primary care provider, please call [phone] and they will assist you.        Care EveryWhere ID     This is your Care EveryWhere ID. This could be used by other organizations to access your [place] medical records  [id number]        Your Vitals Were     Pulse Temperature Respirations Pulse Oximetry BMI (Body Mass Index)       74 98.3  F (36.8  C) (Oral) 16 96% 25.5 kg/m2        Blood Pressure from Last 3 Encounters:   No data found for BP    Weight from Last 3 Encounters:   No data found for Wt              Today, you had the following     No orders found for display       Primary Care Provider Office Phone # Fax #    Pb Warren [phone]       [address] [place] [address]        Equal Access to Services     ZEE FULLER : Hadii aad ku hadasho Soomaali, waaxda luqadaha, qaybta kaalmada adeegyada, michael lopez hayaan olegario warner . So [facility] [phone].    ATENCIÓN: Si habla español, tiene a candelario disposición servicios gratuitos de asistencia lingüística. [facility] [phone].    We comply with applicable federal civil rights laws and [place] laws. We do not discriminate on the basis of race, color, national origin, age, disability, sex, sexual orientation, or gender identity.            Thank you!     Thank you for choosing [facility]  for your care. Our goal is always to provide you with excellent care. Hearing back from our patients is one way we can continue to improve our services. Please take a few minutes to complete the written survey that you may receive in the mail after your visit with us. Thank you!             Your Updated Medication List - Protect others around you: Learn how to safely use, store and throw away your medicines at www.disposemymeds.org.          This list is accurate as of: 10/26/17 11:59 PM.  Always use your most recent med list.                    Brand Name Dispense Instructions for use Diagnosis    allopurinol 100 MG tablet    ZYLOPRIM    90 tablet    Take 1 tablet (100 mg) by mouth daily    Gout, unspecified       lisinopril 20 MG tablet    PRINIVIL/ZESTRIL    180 tablet    Take 1 tablet (20 mg) by mouth 2 times daily    Essential hypertension, benign       pravastatin 20 MG tablet    PRAVACHOL    90 tablet    Take 1 tablet (20 mg) by mouth daily    Hyperlipidemia LDL goal <130       sildenafil 100 MG tablet    VIAGRA    5 tablet    Take 1 tablet (100 mg) by mouth daily as needed for erectile dysfunction Take 30 min to 4 hours before intercourse.  Never use with nitroglycerin, terazosin or doxazosin.    Erectile dysfunction, unspecified erectile dysfunction type

## 2017-10-26 NOTE — PROGRESS NOTES
HEMATOLOGIC HISTORY:  Mr. Mars Reyes is a gentleman with chronic thrombocytopenia and MGUS.   1.  On 05/20/2009, platelets of 191.   2.  On 12/21/2011, platelets of 75.   3.  On 02/20/2017, platelets of 57 with hemoglobin of 13.9.   -Normal CMP.  4  On 02/21/2017:    -Iron 77 and ferritin of 84.    -Folate of 20.    -LDH of 180.    -TSH of 2.54.    -Vitamin B12 of 372.    -SPEP reveals M spike of 0.1.   - Lupus negative.   5. US on 02/22/2017 reveals normal spleen size.  6.  Bone marrow aspiration biopsy was done on 02/23/2017:  It reveals normocellular bone marrow with 50% cellularity with trilineage hematopoiesis.  Adequate megakaryocyte.  Slight erythroid hypoplasia.  No increase in blasts. 5% to 10% plasma cells.  Cytogenetics normal.  FISH for myeloma panel normal.  Flow cytometry reveals rare lambda monotypic plasma cells.     SUBJECTIVE:    Mr. Mars Reyes is a 77-year-old gentleman who follows up in Hematology Clinic for chronic thrombocytopenia and MGUS.  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  He did have a bone marrow biopsy in 02/2017 which had revealed normocellular marrow with 50% cellularity and trilineage hematopoiesis.  There were adequate megakaryocytes.      Patient's platelet have been about 50.  He is doing well.  He has not been bleeding from any site.  No easy bruising.  No red skin rash.      Denies any headache.  No dizziness.  No neck pain.  No chest pain or difficulty breathing.  No abdominal pain, nausea or vomiting.  No urinary or bowel complaints.  No bleeding.  No fever, chills or night sweats.  Appetite has been good.      PHYSICAL EXAMINATION:   GENERAL:  He was alert and oriented x3.   VITAL SIGNS:  Reviewed.   EYES:  No icterus.   THROAT:  No ulcer.  No petechia.   NECK:  Supple, no lymphadenopathy.   LUNGS:  Good air entry bilaterally.  No crackles or wheezing.   HEART:  Regular.  No murmur.   ABDOMEN:  Soft and nontender.  No mass.   EXTREMITIES:  No edema.  No calf  swelling or tenderness.   SKIN:  No rash.  No petechiae.      LABORATORY DATA:  Reviewed.      ASSESSMENT:   1.  A 77-year-old gentleman with chronic idiopathic thrombocytopenic purpura.  Thrombocytopenia is stable.   2.  Monoclonal gammopathy of undetermined significance.   3.  Other medical problems including hypertension and hyperlipidemia.      PLAN:   1.  Labs were reviewed with him.  His thrombocytopenia is stable.  Platelet is 64.  No anemia or leukopenia.      With regard to thrombocytopenia, we will continue to monitor him.  Since his platelets have been stable for more than 6 months, we will recheck his CBC every 6 months. Patient advised to go to the emergency room if he has bleeding from any site, easy bruising, red skin rash or any other concerns      2.  I explained to the patient that if his platelet goes below 30 or if he has any bleeding symptoms, will start him on treatment.  I would recommend starting him on prednisone or dexamethasone as initial treatment.      3.  Discussed regarding MGUS.  Labs in 2017 had revealed M spike of 0.1.  Labs were repeated on 08/15/2017.  SPEP does not reveal any monoclonal protein.  Immunofixation did not reveal any monoclonal protein.  Kappa, lambda light chains are all normal.      I told the patient that at this time there is no evidence of MGUS but I want to repeat the labs 1 more time to make sure it is not present.  Will plan on repeating labs in 6 months.      4.  He had a few questions which were all answered.  He will be seen back in 6 months' time with labs including CBC, SPEP and immunofixation.         AKOSUA CHONG MD             D: 10/26/2017 10:42   T: 10/26/2017 11:06   MT: MARY      Name:     DESEAN MARTINEZ   MRN:      [id number]        Account:      [id number]   :      1940           Visit Date:   10/26/2017      Document: [id number]

## 2017-10-26 NOTE — MR AVS SNAPSHOT
After Visit Summary   10/26/2017    Mars Reyes    MRN: [id number]           Patient Information     Date Of Birth          1940        Visit Information        Provider Department      10/26/2017 8:45 AM Nurse,  Oncology [facility]        Today's Diagnoses     MGUS (monoclonal gammopathy of unknown significance)    -  1       Follow-ups after your visit        Your next 10 appointments already scheduled     Oct 26, 2017  9:30 AM CDT   Return Visit with Pino Mayer MD   [facility] ([facility])    [facility]  [address]  [facility] [id number] [phone]              Who to contact     If you have questions or need follow up information about today's clinic visit or your schedule please contact [facility] directly at [phone].  Normal or non-critical lab and imaging results will be communicated to you by MyChart, letter or phone within 4 business days after the clinic has received the results. If you do not hear from us within 7 days, please contact the clinic through [employer] or phone. If you have a critical or abnormal lab result, we will notify you by phone as soon as possible.  Submit refill requests through [employer] or call your pharmacy and they will forward the refill request to us. Please allow 3 business days for your refill to be completed.          Additional Information About Your Visit        MyChart Information     [employer] gives you secure access to your electronic health record. If you see a primary care provider, you can also send messages to your care team and make appointments. If you have questions, please call your primary care clinic.  If you do not have a primary care provider, please call [phone] and they will assist you.        Care EveryWhere ID     This is your Care EveryWhere ID. This could be used by other organizations to access your [facility]  records  [id number]        Your Vitals Were     Pulse Temperature Respirations Pulse Oximetry BMI (Body Mass Index)       74 98.3  F (36.8  C) (Oral) 16 96% 25.5 kg/m2        Blood Pressure from Last 3 Encounters:   10/26/17 130/83   05/16/17 112/76   05/12/17 104/64    Weight from Last 3 Encounters:   10/26/17 82.9 kg (182 lb 12.8 oz)   05/16/17 86.6 kg (191 lb)   05/12/17 86.5 kg (190 lb 11.2 oz)              We Performed the Following     CBC with platelets differential        Primary Care Provider Office Phone # Fax #    Pb Warren [phone]       [address] [place] [address]        Equal Access to Services     [facility]: Hadii aad ku hadasho Socarola, waaxda luqadaha, qaybta kaalmada olegarioyamicheal, michael warner . So [facility] [phone].    ATENCIÓN: Si habla español, tiene a candelario disposición servicios gratuitos de asistencia lingüística. [facility] [phone].    We comply with applicable federal civil rights laws and [place] laws. We do not discriminate on the basis of race, color, national origin, age, disability, sex, sexual orientation, or gender identity.            Thank you!     Thank you for choosing [facility]  for your care. Our goal is always to provide you with excellent care. Hearing back from our patients is one way we can continue to improve our services. Please take a few minutes to complete the written survey that you may receive in the mail after your visit with us. Thank you!             Your Updated Medication List - Protect others around you: Learn how to safely use, store and throw away your medicines at www.disposemymeds.org.          This list is accurate as of: 10/26/17  8:57 AM.  Always use your most recent med list.                   Brand Name Dispense Instructions for use Diagnosis    allopurinol 100 MG tablet    ZYLOPRIM    90 tablet    Take 1 tablet (100 mg) by mouth daily    Gout, unspecified        lisinopril 20 MG tablet    PRINIVIL/ZESTRIL    180 tablet    Take 1 tablet (20 mg) by mouth 2 times daily    Essential hypertension, benign       pravastatin 20 MG tablet    PRAVACHOL    90 tablet    Take 1 tablet (20 mg) by mouth daily    Hyperlipidemia LDL goal <130       sildenafil 100 MG tablet    VIAGRA    5 tablet    Take 1 tablet (100 mg) by mouth daily as needed for erectile dysfunction Take 30 min to 4 hours before intercourse.  Never use with nitroglycerin, terazosin or doxazosin.    Erectile dysfunction, unspecified erectile dysfunction type

## 2017-10-26 NOTE — PROGRESS NOTES
"Oncology Rooming Note    October 26, 2017 8:52 AM   Mars Reyes is a 77 year old male who presents for:    Chief Complaint   Patient presents with     Oncology Clinic Visit     MGUS     Initial Vitals: /83 (BP Location: Left arm)  Pulse 74  Temp 98.3  F (36.8  C) (Oral)  Resp 16  Wt 82.9 kg (182 lb 12.8 oz)  SpO2 96%  BMI 25.5 kg/m2 Estimated body mass index is 25.5 kg/(m^2) as calculated from the following:    Height as of 5/16/17: 1.803 m (5' 11\").    Weight as of this encounter: 82.9 kg (182 lb 12.8 oz). Body surface area is 2.04 meters squared.  Mild Pain (3) Comment: back pain   No LMP for male patient.  Allergies reviewed: Yes  Medications reviewed: Yes    Medications: Medication refills not needed today.  Pharmacy name entered into [employer]:    [place] [address] [address] [place] [address] DRUG STORE [address] [place] [address] [address] AT [place] [address] & [facility] DRUG STORE [id number] [place] [address] [facility] [address] AT [facility] OF [address]    Clinical concerns: None                     4 minutes for nursing intake (face to face time)      Geraldine Granados MA    DISCHARGE PLAN:  Next appointments: See patient instruction section.  Brought the patient to the [facility] for scheduling.  Departure Mode: Ambulatory  Accompanied by: self  3 minutes for nursing discharge (face to face time)   Geraldine Granados MA    "

## 2017-10-26 NOTE — Clinical Note
"    10/26/2017         RE: Mars Reyes  [address] [facility] [address]        Dear Colleague,    Thank you for referring your patient, Mars Reyes, to the [facility]. Please see a copy of my visit note below.    Oncology Rooming Note    October 26, 2017 8:52 AM   Mars Reyes is a 77 year old male who presents for:    Chief Complaint   Patient presents with     Oncology Clinic Visit     MGUS     Initial Vitals: /83 (BP Location: Left arm)  Pulse 74  Temp 98.3  F (36.8  C) (Oral)  Resp 16  Wt 82.9 kg (182 lb 12.8 oz)  SpO2 96%  BMI 25.5 kg/m2 Estimated body mass index is 25.5 kg/(m^2) as calculated from the following:    Height as of 5/16/17: 1.803 m (5' 11\").    Weight as of this encounter: 82.9 kg (182 lb 12.8 oz). Body surface area is 2.04 meters squared.  Mild Pain (3) Comment: back pain   No LMP for male patient.  Allergies reviewed: Yes  Medications reviewed: Yes    Medications: Medication refills not needed today.  Pharmacy name entered into [employer]:    [facility]-  &[address] [place] [address] DRUG STORE [address] [place] [address] - [address] AT [place] [address] & [facility] DRUG STORE [address] - [place] - [address] [facility] [address] AT [facility] OF [address]    Clinical concerns: None                     4 minutes for nursing intake (face to face time)      Geraldine Granados MA    DISCHARGE PLAN:  Next appointments: See patient instruction section.  Brought the patient to the [facility] for scheduling.  Departure Mode: Ambulatory  Accompanied by: self  3 minutes for nursing discharge (face to face time)   Geraldine Granados MA      HEMATOLOGIC HISTORY:  Mr. Mars Reyes is a gentleman with chronic thrombocytopenia and MGUS.   1.  On 05/20/2009, platelets of 191.   2.  On 12/21/2011, platelets of 75.   3.  On 02/20/2017, platelets of 57 with hemoglobin of 13.9.   -Normal CMP.  4  On 02/21/2017:    -Iron 77 and ferritin of 84.    -Folate of 20.  "   -LDH of 180.    -TSH of 2.54.    -Vitamin B12 of 372.    -SPEP reveals M spike of 0.1.   - Lupus negative.   5. US on 02/22/2017 reveals normal spleen size.  6.  Bone marrow aspiration biopsy was done on 02/23/2017:  It reveals normocellular bone marrow with 50% cellularity with trilineage hematopoiesis.  Adequate megakaryocyte.  Slight erythroid hypoplasia.  No increase in blasts. 5% to 10% plasma cells.  Cytogenetics normal.  FISH for myeloma panel normal.  Flow cytometry reveals rare lambda monotypic plasma cells.     SUBJECTIVE:    Mr. Mars Reyes is a 77-year-old gentleman who follows up in Hematology Clinic for chronic thrombocytopenia and MGUS.  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  He did have a bone marrow biopsy in 02/2017 which had revealed normocellular marrow with 50% cellularity and trilineage hematopoiesis.  There were adequate megakaryocytes.      Patient's platelet have been about 50.  He is doing well.  He has not been bleeding from any site.  No easy bruising.  No red skin rash.      Denies any headache.  No dizziness.  No neck pain.  No chest pain or difficulty breathing.  No abdominal pain, nausea or vomiting.  No urinary or bowel complaints.  No bleeding.  No fever, chills or night sweats.  Appetite has been good.      PHYSICAL EXAMINATION:   GENERAL:  He was alert and oriented x3.   VITAL SIGNS:  Reviewed.   EYES:  No icterus.   THROAT:  No ulcer.  No petechia.   NECK:  Supple, no lymphadenopathy.   LUNGS:  Good air entry bilaterally.  No crackles or wheezing.   HEART:  Regular.  No murmur.   ABDOMEN:  Soft and nontender.  No mass.   EXTREMITIES:  No edema.  No calf swelling or tenderness.   SKIN:  No rash.  No petechiae.      LABORATORY DATA:  Reviewed.      ASSESSMENT:   1.  A 77-year-old gentleman with chronic idiopathic thrombocytopenic purpura.  Thrombocytopenia is stable.   2.  Monoclonal gammopathy of undetermined significance.   3.  Other medical problems including  hypertension and hyperlipidemia.      PLAN:   1.  Labs were reviewed with him.  His thrombocytopenia is stable.  Platelet is 64.  No anemia or leukopenia.      With regard to thrombocytopenia, we will continue to monitor him.  Since his platelets have been stable for more than 6 months, we will recheck his CBC every 6 months. Patient advised to go to the emergency room if he has bleeding from any site, easy bruising, red skin rash or any other concerns      2.  I explained to the patient that if his platelet goes below 30 or if he has any bleeding symptoms, will start him on treatment.  I would recommend starting him on prednisone or dexamethasone as initial treatment.      3.  Discussed regarding MGUS.  Labs in 2017 had revealed M spike of 0.1.  Labs were repeated on 08/15/2017.  SPEP does not reveal any monoclonal protein.  Immunofixation did not reveal any monoclonal protein.  Kappa, lambda light chains are all normal.      I told the patient that at this time there is no evidence of MGUS but I want to repeat the labs 1 more time to make sure it is not present.  Will plan on repeating labs in 6 months.      4.  He had a few questions which were all answered.  He will be seen back in 6 months' time with labs including CBC, SPEP and immunofixation.         AKOSUA CHONG MD             D: 10/26/2017 10:42   T: 10/26/2017 11:06   MT: MARY      Name:     DESEAN MARTINEZ   MRN:      [id number]        Account:      [id number]   :      1940           Visit Date:   10/26/2017      Document: [id number]        Again, thank you for allowing me to participate in the care of your patient.        Sincerely,        Akosua Chong MD

## 2018-02-26 ENCOUNTER — TRANSFERRED RECORDS (OUTPATIENT)
Dept: HEALTH INFORMATION MANAGEMENT | Facility: CLINIC | Age: 78
End: 2018-02-26

## 2018-02-27 ENCOUNTER — TRANSFERRED RECORDS (OUTPATIENT)
Dept: HEALTH INFORMATION MANAGEMENT | Facility: CLINIC | Age: 78
End: 2018-02-27

## 2018-03-05 DIAGNOSIS — M10.9 GOUT: ICD-10-CM

## 2018-03-05 NOTE — LETTER
[facility]  [address] [address]  [phone]            Mars Reyes  [address] [facility] [address]        March 6, 2018    Dear Mars,    While refilling your prescription today for allipurinal, we noticed that you are due to have labs drawn and see your provider which you have an appt schedule 3/14/18..  We will refill your prescription for 30 days. Additional refills can be done after fasting labs are done which could be done day of your appt, 3/14/2018.    Taking care of your health is important to us and we look forward to seeing you in the near future.  Please call us at [phone] or [phone] (or use [employer]) to schedule an appointment.     Please disregard this notice if you have already made an appointment.    Sincerely,        [facility]

## 2018-03-06 RX ORDER — ALLOPURINOL 100 MG/1
TABLET ORAL
Qty: 30 TABLET | Refills: 0 | Status: SHIPPED | OUTPATIENT
Start: 2018-03-06 | End: 2018-03-14

## 2018-03-06 NOTE — TELEPHONE ENCOUNTER
"R            Prescription approved per [facility] Refill Protocol.  Pt has annual physical scheduled 3/14/18.  Labs for Uric Acid ALT entered.  Letter to pt informing pt that he will have lab work due , could be done at the appt.                                quested Prescriptions   Pending Prescriptions Disp Refills     allopurinol (ZYLOPRIM) 100 MG tablet [Pharmacy Med Name: ALLOPURINOL 100MG TABLETS] 90 tablet 0     Sig: TAKE 1 TABLET BY MOUTH DAILY    Gout Agents Protocol Failed    3/5/2018  6:24 PM       Failed - ALT on file in past 12 months    Recent Labs   Lab Test  02/20/17   1038   ALT  25            Failed - Uric acid greater than or equal to 6 on file in past 12 months    No lab results found.  If level is 6mg/dL or greater, ok to refill one time and refer to provider.          Passed - CBC on file in past 12 months    Recent Labs   Lab Test  10/26/17   0855   WBC  6.2   RBC  4.84   HGB  14.7   HCT  41.4   PLT  64*            Passed - Recent (12 mo) or future (30 days) visit within the authorizing provider's specialty    Patient had office visit in the last year or has a visit in the next 30 days with authorizing provider.  See \"Patient Info\" tab in inbasket, or \"Choose Columns\" in Meds & Orders section of the refill encounter.            Passed - Patient is age 18 or older       Passed - Normal serum creatinine on file in the past 12 months    Recent Labs   Lab Test  08/15/17   1320   CR  0.89             "

## 2018-03-11 ASSESSMENT — ACTIVITIES OF DAILY LIVING (ADL)
I_NEED_ASSISTANCE_FOR_THE_FOLLOWING_DAILY_ACTIVITIES:: NO ASSISTANCE IS NEEDED
CURRENT_FUNCTION: NO ASSISTANCE NEEDED

## 2018-03-14 ENCOUNTER — OFFICE VISIT (OUTPATIENT)
Dept: INTERNAL MEDICINE | Facility: CLINIC | Age: 78
End: 2018-03-14
Payer: COMMERCIAL

## 2018-03-14 VITALS
HEART RATE: 85 BPM | HEIGHT: 71 IN | RESPIRATION RATE: 16 BRPM | TEMPERATURE: 97.8 F | BODY MASS INDEX: 26.65 KG/M2 | DIASTOLIC BLOOD PRESSURE: 80 MMHG | OXYGEN SATURATION: 96 % | SYSTOLIC BLOOD PRESSURE: 128 MMHG | WEIGHT: 190.4 LBS

## 2018-03-14 DIAGNOSIS — I10 ESSENTIAL HYPERTENSION, BENIGN: ICD-10-CM

## 2018-03-14 DIAGNOSIS — Z00.00 MEDICARE ANNUAL WELLNESS VISIT, SUBSEQUENT: Primary | ICD-10-CM

## 2018-03-14 DIAGNOSIS — R55 SYNCOPE, UNSPECIFIED SYNCOPE TYPE: ICD-10-CM

## 2018-03-14 DIAGNOSIS — Z12.5 SPECIAL SCREENING FOR MALIGNANT NEOPLASM OF PROSTATE: ICD-10-CM

## 2018-03-14 DIAGNOSIS — M1A.9XX0 CHRONIC GOUT INVOLVING TOE WITHOUT TOPHUS, UNSPECIFIED CAUSE, UNSPECIFIED LATERALITY: ICD-10-CM

## 2018-03-14 DIAGNOSIS — N52.9 ERECTILE DYSFUNCTION, UNSPECIFIED ERECTILE DYSFUNCTION TYPE: ICD-10-CM

## 2018-03-14 DIAGNOSIS — E78.5 HYPERLIPIDEMIA LDL GOAL <130: ICD-10-CM

## 2018-03-14 DIAGNOSIS — D47.2 MGUS (MONOCLONAL GAMMOPATHY OF UNKNOWN SIGNIFICANCE): ICD-10-CM

## 2018-03-14 LAB
CHOLEST SERPL-MCNC: 196 MG/DL
HDLC SERPL-MCNC: 38 MG/DL
LDLC SERPL CALC-MCNC: 122 MG/DL
NONHDLC SERPL-MCNC: 158 MG/DL
PSA SERPL-ACNC: 3.21 UG/L (ref 0–4)
TRIGL SERPL-MCNC: 178 MG/DL

## 2018-03-14 PROCEDURE — 99397 PER PM REEVAL EST PAT 65+ YR: CPT | Performed by: INTERNAL MEDICINE

## 2018-03-14 PROCEDURE — 80061 LIPID PANEL: CPT | Performed by: INTERNAL MEDICINE

## 2018-03-14 PROCEDURE — 36415 COLL VENOUS BLD VENIPUNCTURE: CPT | Performed by: INTERNAL MEDICINE

## 2018-03-14 PROCEDURE — G0103 PSA SCREENING: HCPCS | Performed by: INTERNAL MEDICINE

## 2018-03-14 RX ORDER — LISINOPRIL 20 MG/1
20 TABLET ORAL 2 TIMES DAILY
Qty: 180 TABLET | Refills: 3 | Status: CANCELLED | OUTPATIENT
Start: 2018-03-14

## 2018-03-14 RX ORDER — ALLOPURINOL 100 MG/1
100 TABLET ORAL DAILY
Qty: 90 TABLET | Refills: 3 | Status: SHIPPED | OUTPATIENT
Start: 2018-03-14 | End: 2018-05-17

## 2018-03-14 RX ORDER — SILDENAFIL 100 MG/1
100 TABLET, FILM COATED ORAL DAILY PRN
Qty: 5 TABLET | Refills: 11 | Status: CANCELLED | OUTPATIENT
Start: 2018-03-14

## 2018-03-14 RX ORDER — LISINOPRIL 20 MG/1
20 TABLET ORAL DAILY
Qty: 90 TABLET | Refills: 3
Start: 2018-03-14 | End: 2018-11-05

## 2018-03-14 NOTE — MR AVS SNAPSHOT
After Visit Summary   3/14/2018    Mars Reyes    MRN: [id number]           Patient Information     Date Of Birth          1940        Visit Information        Provider Department      3/14/2018 11:20 AM Pb Warren MD [facility]        Today's Diagnoses     Medicare annual wellness visit, subsequent    -  1    Essential hypertension, benign        Hyperlipidemia LDL goal <130        MGUS (monoclonal gammopathy of unknown significance)        Erectile dysfunction, unspecified erectile dysfunction type        Special screening for malignant neoplasm of prostate        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, unspecified cause, unspecified laterality        Syncope, unspecified syncope type           Follow-ups after your visit        Your next 10 appointments already scheduled     Apr 25, 2018  8:30 AM CDT   Return Visit with  Oncology Nurse   [facility] ([facility])    [facility]  [address]  [facility] [address]   [phone]            Apr 25, 2018  9:00 AM CDT   Return Visit with Pino Mayer MD   [facility] ([facility])    [facility]  [address]  [facility] [address]   [phone]              Who to contact     If you have questions or need follow up information about today's clinic visit or your schedule please contact [facility] directly at [phone].  Normal or non-critical lab and imaging results will be communicated to you by MyChart, letter or phone within 4 business days after the clinic has received the results. If you do not hear from us within 7 days, please contact the clinic through MyChart or phone. If you have a critical or abnormal lab result, we will notify you by phone as soon as possible.  Submit refill requests through [employer] or call your pharmacy and they will forward the refill  "request to us. Please allow 3 business days for your refill to be completed.          Additional Information About Your Visit        [employer] Information     [employer] gives you secure access to your electronic health record. If you see a primary care provider, you can also send messages to your care team and make appointments. If you have questions, please call your primary care clinic.  If you do not have a primary care provider, please call [phone] and they will assist you.        Care EveryWhere ID     This is your Care EveryWhere ID. This could be used by other organizations to access your [place] medical records  [id number]        Your Vitals Were     Pulse Temperature Respirations Height Pulse Oximetry BMI (Body Mass Index)    85 97.8  F (36.6  C) (Oral) 16 5' 11\" (1.803 m) 96% 26.56 kg/m2       Blood Pressure from Last 3 Encounters:   03/14/18 128/80   10/26/17 130/83   10/26/17 130/83    Weight from Last 3 Encounters:   03/14/18 190 lb 6.4 oz (86.4 kg)   10/26/17 182 lb 12.8 oz (82.9 kg)   10/26/17 182 lb 12.8 oz (82.9 kg)              We Performed the Following     Lipid Profile     PSA, screen          Today's Medication Changes          These changes are accurate as of 3/14/18 11:58 AM.  If you have any questions, ask your nurse or doctor.               These medicines have changed or have updated prescriptions.        Dose/Directions    allopurinol 100 MG tablet   Commonly known as:  ZYLOPRIM   This may have changed:  See the new instructions.   Used for:  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, unspecified cause, unspecified laterality   Changed by:  Pb Warren MD        Dose:  100 mg   Take 1 tablet (100 mg) by mouth daily   Quantity:  90 tablet   Refills:  3         Stop taking these medicines if you haven't already. Please contact your care team if you have questions.     pravastatin 20 MG tablet   Commonly known as:  PRAVACHOL   Stopped by:  Pb Warren MD                Where to " get your medicines      These medications were sent to [id number] - [place], [place] - [address] AT [facility] [address]  [address], [facility] [address]     Phone:  [phone]     allopurinol 100 MG tablet         Some of these will need a paper prescription and others can be bought over the counter.  Ask your nurse if you have questions.     You don't need a prescription for these medications     lisinopril 20 MG tablet                Primary Care Provider Office Phone # Fax #    Pb Warren [phone]       [address] [facility] [address]        Equal Access to Services     ZEE FULLER : Hadii kwabena mcdaniels hadasho Soomaali, waaxda luqadaha, qaybta kaalmada adeegyada, michael cohen. So [facility] [phone].    ATENCIÓN: Si habla español, tiene a candelario disposición servicios gratuitos de asistencia lingüística. [facility] [phone].    We comply with applicable federal civil rights laws and [place] laws. We do not discriminate on the basis of race, color, national origin, age, disability, sex, sexual orientation, or gender identity.            Thank you!     Thank you for choosing [facility]  for your care. Our goal is always to provide you with excellent care. Hearing back from our patients is one way we can continue to improve our services. Please take a few minutes to complete the written survey that you may receive in the mail after your visit with us. Thank you!             Your Updated Medication List - Protect others around you: Learn how to safely use, store and throw away your medicines at www.disposemymeds.org.          This list is accurate as of 3/14/18 11:58 AM.  Always use your most recent med list.                   Brand Name Dispense Instructions for use Diagnosis    allopurinol 100 MG tablet    ZYLOPRIM    90 tablet    Take 1 tablet (100 mg) by mouth daily    Chronic gout  involving toe without tophus, unspecified cause, unspecified laterality       lisinopril 20 MG tablet    PRINIVIL/ZESTRIL    90 tablet    Take 1 tablet (20 mg) by mouth daily        sildenafil 100 MG tablet    VIAGRA    5 tablet    Take 1 tablet (100 mg) by mouth daily as needed for erectile dysfunction Take 30 min to 4 hours before intercourse.  Never use with nitroglycerin, terazosin or doxazosin.    Erectile dysfunction, unspecified erectile dysfunction type

## 2018-03-14 NOTE — PROGRESS NOTES
SUBJECTIVE:      Mars Reyes is a 77 year old male who presents for Preventive Visit.    Are you in the first 12 months of your Medicare Part B coverage?  No     Answers for HPI/ROS submitted by the patient on 3/11/2018   Annual Exam:  Getting at least 3 servings of Calcium per day:: Yes  Bi-annual eye exam:: Yes  Dental care twice a year:: Yes  Sleep apnea or symptoms of sleep apnea:: None  Diet:: Regular (no restrictions)  Frequency of exercise:: 4-5 days/week  Taking medications regularly:: Yes  Medication side effects:: Muscle aches  Additional concerns today:: YES  Activities of Daily Living: no assistance needed  Home safety: no safety concerns identified  Hearing Impairment:: no hearing concerns  PHQ-2 Score: 0  Duration of exercise:: 45-60 minutes    PROBLEMS TO ADD ON:    1. Seen in [place] ER on 2/26/18 for episode of syncope. See care everywhere. Zio patch and echo completed. Patient was instructed to decrease lisinopril to one tablet daily  2. Bilateral arm pain at night time while laying flat- concerns about side effects of statin.    All Histories reviewed and updated in EPIC as appropriate.              Social History    Substance Use Topics      Smoking status: Never Smoker      Smokeless tobacco: Never Used      Alcohol use 0.0 oz/week       0 Standard drinks or equivalent per week         Comment: 3 beers per week          The patient does not drink >3 drinks per day nor >7 drinks per week.     Today's PHQ-2 Score:   PHQ-2 ( 1999 [employer]) 2/17/2017 6/6/2016   Q1: Little interest or pleasure in doing things - 0   Q2: Feeling down, depressed or hopeless - 0   PHQ-2 Score - 0   Little interest or pleasure in doing things Not at all -   Feeling down, depressed or hopeless Not at all -   PHQ-2 Score 0 -         Do you feel safe in your environment - Yes     Do you have a Health Care Directive?: No: Advance care planning was reviewed with patient; patient declined at this time.     Current providers  sharing in care for this patient include:   Patient Care Team:  Pb Warren MD as PCP - General       Hearing impairment: No    Ability to successfully perform activities of daily living: Yes, no assistance needed     Fall risk:  Home safety:  none identified        The following health maintenance items are reviewed in Epic and correct as of today:       Health Maintenance   Topic Date Due     AORTIC ANEURYSM SCREENING (SYSTEM ASSIGNED)  10/10/2006     INFLUENZA VACCINE (SYSTEM ASSIGNED)  09/01/2016     ADVANCE DIRECTIVE PLANNING Q5 YRS (NO INBASKET)  12/01/2016     FALL RISK ASSESSMENT  02/10/2017     TETANUS IMMUNIZATION (SYSTEM ASSIGNED)  02/21/2018     COLONOSCOPY Q10 YR INBASKET MESSAGE  12/14/2020     LIPID SCREEN Q5 YR MALE (SYSTEM ASSIGNED)  02/10/2021     PNEUMOCOCCAL  Completed                 Immunization History   Administered Date(s) Administered     Hepatitis A Vac Ped/Adol-2 Dose 02/20/1997, 05/20/2005     Hepatitis B 02/20/1997     IPV 02/20/1997     Influenza (High Dose) 3 valent vaccine 10/20/2015, 10/01/2016     Influenza (IIV3) 02/20/1997, 11/16/2005, 12/04/2006, 10/16/2007, 11/20/2008, 10/17/2010, 01/07/2013, 09/16/2013     Meningococcal (Menomune ) 02/20/1997     Pneumococcal (PCV 13) 02/10/2016     Pneumococcal 23 valent 12/04/2006     TD (ADULT, 7+) 02/20/1997     TDAP (ADACEL AGES 11-64) 02/21/2008     Typhoid IM 02/20/1997     Yellow Fever 05/20/2005            ROS:  C: NEGATIVE for fever, chills, change in weight  E/M: NEGATIVE for ear, mouth and throat problems  R: NEGATIVE for significant cough or SOB  CV: NEGATIVE for chest pain, palpitations or peripheral edema  GI: NEGATIVE for nausea, abdominal pain, heartburn, or change in bowel habits  : NEGATIVE for frequency, dysuria, or hematuria  M: NEGATIVE for significant arthralgias or myalgia less intermittent arm pain for which she has related this to his statin which apparently resolved with discontinuation of his Pravachol.  H:  "NEGATIVE for bleeding problems  P: NEGATIVE for changes in mood or affect     OBJECTIVE:      /80  Pulse 85  Temp 97.8  F (36.6  C) (Oral)  Resp 16  Ht 5' 11\" (1.803 m)  Wt 190 lb 6.4 oz (86.4 kg)  SpO2 96%  BMI 26.56 kg/m2    EXAM:    GENERAL: alert and no distress  EYES: Eyes grossly normal to inspection, PERRL and conjunctivae and sclerae normal  HENT: ear canals and TM's normal, nose and mouth without ulcers or lesions  NECK: no adenopathy, no asymmetry, masses, or scars and thyroid normal to palpation  RESP: lungs clear to auscultation - no rales, rhonchi or wheezes  CV: regular rate and rhythm, normal S1 S2, no click or rub, no peripheral edema and peripheral pulses strong, noted mitral/aortic murmur.  Prostate nontender and w/o obvious nodular changes  MS: no gross musculoskeletal defects noted, no edema  NEURO: No focal changes  PSYCH: mentation appears normal, affect normal/bright     ASSESSMENT/PLAN:       (Z00.00) Medicare annual wellness visit, subsequent  (primary encounter diagnosis)  Comment: Letter sent to patient regarding need for updated tetanus booster unless done elsewhere.  Plan: Routine labs ordered.    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  Plan: As known platelet count 74,000 per    (N52.9) Erectile dysfunction, unspecified erectile dysfunction type  Comment: Patient has declined refill of Viagra per  Plan:     (Z12.5) Special screening for malignant neoplasm of prostate  Comment: Ordered as routine screening and discussed benefits and risks of screening and his age  Plan: PSA, screen            (M1A.9XX0) " 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, unspecified cause, unspecified laterality  Comment: Stable without recurrence on allopurinol medication refill  Plan: allopurinol (ZYLOPRIM) 100 MG tablet            (R55) Syncope, unspecified syncope type  Comment: No focal etiology identified.  Outpatient labs chest x-ray, EKG and echocardiogram all reviewed with patient done at outpatient clinic setting  Plan: Just patient follow-up with cardiologist for routine assessment.  Continue with single dosing of lisinopril    THE MEDICATION LIST HAS BEEN FULLY RECONCILED BY THE M.D. AND THE NURSING STAFF.

## 2018-03-14 NOTE — NURSING NOTE
"Chief Complaint   Patient presents with     Wellness Visit       Initial /80  Pulse 85  Temp 97.8  F (36.6  C) (Oral)  Resp 16  Ht 5' 11\" (1.803 m)  Wt 190 lb 6.4 oz (86.4 kg)  SpO2 96%  BMI 26.56 kg/m2 Estimated body mass index is 26.56 kg/(m^2) as calculated from the following:    Height as of this encounter: 5' 11\" (1.803 m).    Weight as of this encounter: 190 lb 6.4 oz (86.4 kg).  Medication Reconciliation: complete   Rosie Campoverde CMA      "

## 2018-03-14 NOTE — LETTER
[facility]  [address] [address]  [phone]      3/14/2018       Mars Reyes  [address] [facility] [address]        Dear Mars,      Your cholesterol is slightly high and could be treated more aggressively.  Please follow-up with me to discuss your further options if you are interested.  If you have no interest in starting medication at this point I would increase your diet and exercise and watch your weight closely and consider repeating your cholesterol level in 6-12 months for comparison.    In addition, your PSA or prostate screening antigen is normal.  Your PSA level falls within a normal value but again is still slightly higher than your baseline.  I think would be reasonable to recheck this also in 6-12 months for reassurance in comparison.      Sincerely,      Pb Warren MD  Internal Medicine

## 2018-03-16 ENCOUNTER — CARE COORDINATION (OUTPATIENT)
Dept: CARDIOLOGY | Facility: CLINIC | Age: 78
End: 2018-03-16

## 2018-03-16 NOTE — PROGRESS NOTES
Pt called, is going to setup f/u appt 5/2018 w/ Dr. Iglesias, had an echo 2/26/18 (in care everywhere) during an ED visit for syncope, likely r/t orthostatic hypotension. Pt wants to know if he still needs the ordered echo for 5/2018 prior to f/u w/ Dr. Iglesias.   Sent message to Dr. Iglesias for recommendations. Advised pt to setup appt w/ Dr. Iglesias and I will let him know about the echo.       Addendum - per Dr. Iglesias, pt does not need another echo prior to f/u. Called pt who agreed and cancelled echo. Confirmed f/u appt 5/25/18 w/ Dr. Iglesias.   Nazario MEEHAN

## 2018-03-19 ENCOUNTER — CARE COORDINATION (OUTPATIENT)
Dept: CARDIOLOGY | Facility: CLINIC | Age: 78
End: 2018-03-19

## 2018-03-19 NOTE — TELEPHONE ENCOUNTER
Patient can come in tomorrow during my vascular time or on Friday at 9:15.  He probably should be seen sooner with the abnormal Zio results.    Brandt Iglesias MD  Cardiology - [facility]  Pager: [phone]  Text Page  March 19, 2018

## 2018-03-19 NOTE — PROGRESS NOTES
Last OV 5/12/2017 w/ Dr. Iglesias for moderate aortic stenosis, mild to moderate mitral stenosis. Echo 5/2017. Nuc stress test done 5/2017 for ARGUETA, was negative for ischemia.    Pt was hospitalized 2/26/18 at [facility] in [place] for syncopal event associated w/ nausea. Had echo with no changes from previous year. Thought to be r/t orthostatic hypotension, less likely seizure. Pt was dc'd with ziopatch. Pt is scheduled to see Dr. Iglesias 5/12/18.    Today, received copy of Ziopatch results, showing one episode of V tach on 3/1/18 lasting 36.5 seconds with max  (avg 149). 34 SVT runs with fastest lasting 1 min 28 sec with max , longest lasting 1 min 46 sec avg . Noted 2nd degree AVB - Mobitz 1 (wenckeback) present. Noted PACs 2.7% burden.   Pt did not trigger any events on monitor, all were auto-triggered.     F/u is scheduled 5/12/18. Routing to Dr. Iglesias - I have copy of ziopatch results/strips - will leave copy for you to review in clinic tomorrow. Advised if you'd like sooner f/u. Soonest on your schedule would be 4/17/18.  Nazario MEEHAN

## 2018-03-19 NOTE — PROGRESS NOTES
Called pt, states he was asymptomatic while he had ziopatch on. Agreed to see Dr. Iglesias tomorrow 3/20 at 2:15pm. Sent stat rec request fax to [facility] med recs.   Nazario MEEHAN

## 2018-03-20 ENCOUNTER — OFFICE VISIT (OUTPATIENT)
Dept: CARDIOLOGY | Facility: CLINIC | Age: 78
End: 2018-03-20
Attending: INTERNAL MEDICINE
Payer: COMMERCIAL

## 2018-03-20 VITALS
SYSTOLIC BLOOD PRESSURE: 120 MMHG | BODY MASS INDEX: 26.81 KG/M2 | WEIGHT: 191.5 LBS | HEART RATE: 92 BPM | DIASTOLIC BLOOD PRESSURE: 82 MMHG | HEIGHT: 71 IN

## 2018-03-20 DIAGNOSIS — I34.2 NON-RHEUMATIC MITRAL VALVE STENOSIS: ICD-10-CM

## 2018-03-20 DIAGNOSIS — R06.09 DYSPNEA ON EXERTION: ICD-10-CM

## 2018-03-20 DIAGNOSIS — I35.0 NONRHEUMATIC AORTIC VALVE STENOSIS: ICD-10-CM

## 2018-03-20 PROCEDURE — 99214 OFFICE O/P EST MOD 30 MIN: CPT | Performed by: INTERNAL MEDICINE

## 2018-03-20 NOTE — PROGRESS NOTES
CARDIOLOGY VISIT    REASON FOR VISIT: f/u AS, SVT    SUBJECTIVE:  76-year-old male seen for evaluation of valvular heart disease.     Echo in 2015: EF 60%, mild-moderate LVH, severe LAE, mild MR, mild MS (mean 4mmHg), moderate TR, RVSP 34mmHg, mild AS (mean 14mmHg, Vmax 2.7m/s, DI 0.44, LV 1.5cm2).      Echo May 2017 showed ejection fraction 60%, mild to moderate LVH, normal right ventricle, moderate to severe left atrial enlargement, mild-to-moderate mitral regurgitation, mild mitral stenosis with mean gradient 5 mmHg, moderate tricuspid regurgitation, RVSP 44 mmHg, moderate aortic stenosis (mean 21mmHg, Vmax 3.3m/s, DI 0.39, LV 1.3cm2, SVI 44ml/m2), mild-moderate AI.      He recently has undergone evaluation for thrombocytopenia.  He has been off his aspirin recently because of this.     Echo Feb 2018 ([facility]) showed EF 75%, mild-moderate MS with mean 5mmHg, moderate AS (mean 23mmHg, Vmax 3.5m/s, LV 1.3cm2, DI 0.44, SVI 45ml/m2).     He spends the winter in [place].  Recently he was there and had a syncopal episode.  He got up in the morning around 9 AM and was walking to the bathroom.  He felt dizzy and then had a syncopal episode.  He was out for no more than a few minutes.  EMS was called and reportedly his blood pressure was 85 systolic but no arrhythmia was documented.  He was watched overnight in the hospital with stable vital signs and no arrhythmias.    11 day Zio patch from March 2018 showed baseline rhythm of sinus, he had paroxysmal SVT lasting up to 90 seconds on several occasions, on one episode QRS was borderline wide, likely SVT with aberrancy, there were no pauses, heart block, or VT.    Otherwise is feeling quite well.  He continues to exercise on a regular basis with no exertional symptoms.    MEDICATIONS:  Current Outpatient Prescriptions   Medication     allopurinol (ZYLOPRIM) 100 MG tablet     lisinopril (PRINIVIL/ZESTRIL) 20 MG tablet     sildenafil (REVATIO/VIAGRA) 100 MG  "cap/tab     No current facility-administered medications for this visit.        ALLERGIES:  Allergies   Allergen Reactions     Pravachol [Pravastatin]      Simvastatin      10/26/12: Arm pain - pt stopped it x 4-5 wks and arm pain didn't resolve sore started it        REVIEW OF SYSTEMS:  Constitutional:  No weight loss, fever, chills, weakness or fatigue.  HEENT:  Eyes:  No visual loss, blurred vision, double vision or yellow sclerae. No hearing loss, sneezing, congestion, runny nose or sore throat.  Skin:  No rash or itching.  Cardiovascular: per HPI  Respiratory: per HPI  GI:  No anorexia, nausea, vomiting or diarrhea. No abdominal pain or blood.  :  No dysurea, hematuria  Neurologic:  No headache, dizziness, syncope, paralysis, ataxia, numbness or tingling in the extremities. No change in bowel or bladder control.  Musculoskeletal:  No muscle, back pain, joint pain or stiffness.  Hematologic:  No anemia, bleeding or bruising.  Lymphatics:  No enlarged nodes. No history of splenectomy.  Psychiatric:  No history of depression or anxiety.  Endocrine:  No reports of sweating, cold or heat intolerance. No polyuria or polydipsia.  Allergies:  No history of asthma, hives, eczema or rhinitis.    PHYSICAL EXAM:  /82 (BP Location: Right arm, Patient Position: Sitting, Cuff Size: Adult Regular)  Pulse 92  Ht 1.803 m (5' 11\")  Wt 86.9 kg (191 lb 8 oz)  BMI 26.71 kg/m2  Constitutional: awake, alert, no distress  Eyes: PERRL, sclera nonicteric  ENT: trachea midline  Respiratory: Lungs clear  Cardiovascular: Regular rate and rhythm, 2/6 crescendo systolic murmur at the upper sternal border  GI: nondistended, nontender, bowel sounds present  Lymph/Hematologic: no lymphadenopathy  Skin: dry, no rash  Musculoskeletal: good muscle tone, strength 5/5 in upper and lower extremities  Neurologic: no focal deficits  Neuropsychiatric: appropriate affact    ASSESSMENT:  77-year-old male seen for follow-up of valvular heart " disease and paroxysmal SVT.  It is hard to say if the paroxysmal SVT was the cause of his recent syncopal episode.  Suspect his syncope was more from a lower blood pressure and orthostatic related.  He did not have any symptoms when he had up to 90 seconds of SVT on his Zio patch.  At this point would not recommend starting a beta blocker or other medical therapy.  If he were to continue to have symptoms of dizziness, palpitations, or lightheadedness, repeat Zio patch could be ordered.  There would be a low threshold for starting metoprolol or considering SVT ablation.    His valves are stable.  Echo can be repeated next year.    RECOMMENDATIONS:  1.  Paroxysmal SVT  - No further management now, if symptoms increase, consider repeat Zio patch and possibly starting metoprolol  - Low threshold for EP referral to discuss ablation    2.  Moderate mitral and aortic stenosis  - Repeat echo In early 2019     Follow-up in 4 months.    Brandt Iglesias MD  Cardiology - [facility]  Pager:  [phone]  Text Page  March 20, 2018

## 2018-03-20 NOTE — LETTER
3/20/2018    Pb Warren MD  [address] [facility] [address]    RE: Mars Reyes       Dear Colleague,    I had the pleasure of seeing Mars Reyes in the [facility].    CARDIOLOGY VISIT    REASON FOR VISIT: f/u AS, SVT    SUBJECTIVE:  76-year-old male seen for evaluation of valvular heart disease.     Echo in 2015: EF 60%, mild-moderate LVH, severe LAE, mild MR, mild MS (mean 4mmHg), moderate TR, RVSP 34mmHg, mild AS (mean 14mmHg, Vmax 2.7m/s, DI 0.44, LV 1.5cm2).      Echo May 2017 showed ejection fraction 60%, mild to moderate LVH, normal right ventricle, moderate to severe left atrial enlargement, mild-to-moderate mitral regurgitation, mild mitral stenosis with mean gradient 5 mmHg, moderate tricuspid regurgitation, RVSP 44 mmHg, moderate aortic stenosis (mean 21mmHg, Vmax 3.3m/s, DI 0.39, LV 1.3cm2, SVI 44ml/m2), mild-moderate AI.      He recently has undergone evaluation for thrombocytopenia.  He has been off his aspirin recently because of this.     Echo Feb 2018 ([facility]) showed EF 75%, mild-moderate MS with mean 5mmHg, moderate AS (mean 23mmHg, Vmax 3.5m/s, LV 1.3cm2, DI 0.44, SVI 45ml/m2).     He spends the winter in [place].  Recently he was there and had a syncopal episode.  He got up in the morning around 9 AM and was walking to the bathroom.  He felt dizzy and then had a syncopal episode.  He was out for no more than a few minutes.  EMS was called and reportedly his blood pressure was 85 systolic but no arrhythmia was documented.  He was watched overnight in the hospital with stable vital signs and no arrhythmias.    11 day Zio patch from March 2018 showed baseline rhythm of sinus, he had paroxysmal SVT lasting up to 90 seconds on several occasions, on one episode QRS was borderline wide, likely SVT with aberrancy, there were no pauses, heart block, or VT.    Otherwise is feeling quite well.  He continues to exercise on a regular basis with no  "exertional symptoms.    MEDICATIONS:  Current Outpatient Prescriptions   Medication     allopurinol (ZYLOPRIM) 100 MG tablet     lisinopril (PRINIVIL/ZESTRIL) 20 MG tablet     sildenafil (REVATIO/VIAGRA) 100 MG cap/tab     No current facility-administered medications for this visit.        ALLERGIES:  Allergies   Allergen Reactions     Pravachol [Pravastatin]      Simvastatin      10/26/12: Arm pain - pt stopped it x 4-5 wks and arm pain didn't resolve sore started it        REVIEW OF SYSTEMS:  Constitutional:  No weight loss, fever, chills, weakness or fatigue.  HEENT:  Eyes:  No visual loss, blurred vision, double vision or yellow sclerae. No hearing loss, sneezing, congestion, runny nose or sore throat.  Skin:  No rash or itching.  Cardiovascular: per HPI  Respiratory: per HPI  GI:  No anorexia, nausea, vomiting or diarrhea. No abdominal pain or blood.  :  No dysurea, hematuria  Neurologic:  No headache, dizziness, syncope, paralysis, ataxia, numbness or tingling in the extremities. No change in bowel or bladder control.  Musculoskeletal:  No muscle, back pain, joint pain or stiffness.  Hematologic:  No anemia, bleeding or bruising.  Lymphatics:  No enlarged nodes. No history of splenectomy.  Psychiatric:  No history of depression or anxiety.  Endocrine:  No reports of sweating, cold or heat intolerance. No polyuria or polydipsia.  Allergies:  No history of asthma, hives, eczema or rhinitis.    PHYSICAL EXAM:  /82 (BP Location: Right arm, Patient Position: Sitting, Cuff Size: Adult Regular)  Pulse 92  Ht 1.803 m (5' 11\")  Wt 86.9 kg (191 lb 8 oz)  BMI 26.71 kg/m2  Constitutional: awake, alert, no distress  Eyes: PERRL, sclera nonicteric  ENT: trachea midline  Respiratory: Lungs clear  Cardiovascular: Regular rate and rhythm, 2/6 crescendo systolic murmur at the upper sternal border  GI: nondistended, nontender, bowel sounds present  Lymph/Hematologic: no lymphadenopathy  Skin: dry, no " rash  Musculoskeletal: good muscle tone, strength 5/5 in upper and lower extremities  Neurologic: no focal deficits  Neuropsychiatric: appropriate affact    ASSESSMENT:  77-year-old male seen for follow-up of valvular heart disease and paroxysmal SVT.  It is hard to say if the paroxysmal SVT was the cause of his recent syncopal episode.  Suspect his syncope was more from a lower blood pressure and orthostatic related.  He did not have any symptoms when he had up to 90 seconds of SVT on his Zio patch.  At this point would not recommend starting a beta blocker or other medical therapy.  If he were to continue to have symptoms of dizziness, palpitations, or lightheadedness, repeat Zio patch could be ordered.  There would be a low threshold for starting metoprolol or considering SVT ablation.    His valves are stable.  Echo can be repeated next year.    RECOMMENDATIONS:  1.  Paroxysmal SVT  - No further management now, if symptoms increase, consider repeat Zio patch and possibly starting metoprolol  - Low threshold for EP referral to discuss ablation    2.  Moderate mitral and aortic stenosis  - Repeat echo In early 2019     Follow-up in 4 months.    Brandt Iglesias MD  Cardiology - [facility]  Pager:  [phone]  Text Page  March 20, 2018      Thank you for allowing me to participate in the care of your patient.    Sincerely,     Brandt Iglesias MD     [facility]

## 2018-03-20 NOTE — LETTER
3/20/2018    Pb Warren MD  [address] [facility] [address]    RE: Mars Reyes       Dear Colleague,    I had the pleasure of seeing Mars Reyes in the [facility].    CARDIOLOGY VISIT    REASON FOR VISIT: f/u AS, SVT    SUBJECTIVE:  76-year-old male seen for evaluation of valvular heart disease.     Echo in 2015: EF 60%, mild-moderate LVH, severe LAE, mild MR, mild MS (mean 4mmHg), moderate TR, RVSP 34mmHg, mild AS (mean 14mmHg, Vmax 2.7m/s, DI 0.44, LV 1.5cm2).      Echo May 2017 showed ejection fraction 60%, mild to moderate LVH, normal right ventricle, moderate to severe left atrial enlargement, mild-to-moderate mitral regurgitation, mild mitral stenosis with mean gradient 5 mmHg, moderate tricuspid regurgitation, RVSP 44 mmHg, moderate aortic stenosis (mean 21mmHg, Vmax 3.3m/s, DI 0.39, LV 1.3cm2, SVI 44ml/m2), mild-moderate AI.      He recently has undergone evaluation for thrombocytopenia.  He has been off his aspirin recently because of this.     Echo Feb 2018 ([facility]) showed EF 75%, mild-moderate MS with mean 5mmHg, moderate AS (mean 23mmHg, Vmax 3.5m/s, LV 1.3cm2, DI 0.44, SVI 45ml/m2).     He spends the winter in [place].  Recently he was there and had a syncopal episode.  He got up in the morning around 9 AM and was walking to the bathroom.  He felt dizzy and then had a syncopal episode.  He was out for no more than a few minutes.  EMS was called and reportedly his blood pressure was 85 systolic but no arrhythmia was documented.  He was watched overnight in the hospital with stable vital signs and no arrhythmias.    11 day Zio patch from March 2018 showed baseline rhythm of sinus, he had paroxysmal SVT lasting up to 90 seconds on several occasions, on one episode QRS was borderline wide, likely SVT with aberrancy, there were no pauses, heart block, or VT.    Otherwise is feeling quite well.  He continues to exercise on a regular basis with no  "exertional symptoms.    MEDICATIONS:  Current Outpatient Prescriptions   Medication     allopurinol (ZYLOPRIM) 100 MG tablet     lisinopril (PRINIVIL/ZESTRIL) 20 MG tablet     sildenafil (REVATIO/VIAGRA) 100 MG cap/tab     No current facility-administered medications for this visit.        ALLERGIES:  Allergies   Allergen Reactions     Pravachol [Pravastatin]      Simvastatin      10/26/12: Arm pain - pt stopped it x 4-5 wks and arm pain didn't resolve sore started it        REVIEW OF SYSTEMS:  Constitutional:  No weight loss, fever, chills, weakness or fatigue.  HEENT:  Eyes:  No visual loss, blurred vision, double vision or yellow sclerae. No hearing loss, sneezing, congestion, runny nose or sore throat.  Skin:  No rash or itching.  Cardiovascular: per HPI  Respiratory: per HPI  GI:  No anorexia, nausea, vomiting or diarrhea. No abdominal pain or blood.  :  No dysurea, hematuria  Neurologic:  No headache, dizziness, syncope, paralysis, ataxia, numbness or tingling in the extremities. No change in bowel or bladder control.  Musculoskeletal:  No muscle, back pain, joint pain or stiffness.  Hematologic:  No anemia, bleeding or bruising.  Lymphatics:  No enlarged nodes. No history of splenectomy.  Psychiatric:  No history of depression or anxiety.  Endocrine:  No reports of sweating, cold or heat intolerance. No polyuria or polydipsia.  Allergies:  No history of asthma, hives, eczema or rhinitis.    PHYSICAL EXAM:  /82 (BP Location: Right arm, Patient Position: Sitting, Cuff Size: Adult Regular)  Pulse 92  Ht 1.803 m (5' 11\")  Wt 86.9 kg (191 lb 8 oz)  BMI 26.71 kg/m2  Constitutional: awake, alert, no distress  Eyes: PERRL, sclera nonicteric  ENT: trachea midline  Respiratory: Lungs clear  Cardiovascular: Regular rate and rhythm, 2/6 crescendo systolic murmur at the upper sternal border  GI: nondistended, nontender, bowel sounds present  Lymph/Hematologic: no lymphadenopathy  Skin: dry, no " rash  Musculoskeletal: good muscle tone, strength 5/5 in upper and lower extremities  Neurologic: no focal deficits  Neuropsychiatric: appropriate affact    ASSESSMENT:  77-year-old male seen for follow-up of valvular heart disease and paroxysmal SVT.  It is hard to say if the paroxysmal SVT was the cause of his recent syncopal episode.  Suspect his syncope was more from a lower blood pressure and orthostatic related.  He did not have any symptoms when he had up to 90 seconds of SVT on his Zio patch.  At this point would not recommend starting a beta blocker or other medical therapy.  If he were to continue to have symptoms of dizziness, palpitations, or lightheadedness, repeat Zio patch could be ordered.  There would be a low threshold for starting metoprolol or considering SVT ablation.    His valves are stable.  Echo can be repeated next year.    RECOMMENDATIONS:  1.  Paroxysmal SVT  - No further management now, if symptoms increase, consider repeat Zio patch and possibly starting metoprolol  - Low threshold for EP referral to discuss ablation    2.  Moderate mitral and aortic stenosis  - Repeat echo In early 2019     Follow-up in 4 months.    Brandt Iglesias MD  Cardiology - [facility]  Pager:  [phone]  Text Page  March 20, 2018      Thank you for allowing me to participate in the care of your patient.      Sincerely,     Brandt Iglesias MD     [facility]    cc:   Brandt Iglesias MD  [facility]  [address] [place] [address]

## 2018-03-20 NOTE — MR AVS SNAPSHOT
After Visit Summary   3/20/2018    Mars Reyes    MRN: [id number]           Patient Information     Date Of Birth          1940        Visit Information        Provider Department      3/20/2018 2:15 PM Brandt Iglesias MD [facility]        Today's Diagnoses     Non-rheumatic mitral valve stenosis        Dyspnea on exertion        Nonrheumatic aortic valve stenosis           Follow-ups after your visit        Additional Services     Follow-Up with Cardiologist                 Your next 10 appointments already scheduled     Apr 25, 2018  8:30 AM CDT   Return Visit with  Oncology Nurse   [facility] ([facility])    [facility]  [address]  [place] [address] [phone]            Apr 25, 2018  9:00 AM CDT   Return Visit with Pino Mayer MD   [facility] ([facility])    [facility]  [address]  [place] [address] [phone]              Future tests that were ordered for you today     Open Future Orders        Priority Expected Expires Ordered    Follow-Up with Cardiologist Routine 7/18/2018 3/20/2019 3/20/2018            Who to contact     If you have questions or need follow up information about today's clinic visit or your schedule please contact [facility] directly at [phone].  Normal or non-critical lab and imaging results will be communicated to you by MyChart, letter or phone within 4 business days after the clinic has received the results. If you do not hear from us within 7 days, please contact the clinic through MyChart or phone. If you have a critical or abnormal lab result, we will notify you by phone as soon as possible.  Submit refill requests through [email or web address] or call your pharmacy and they will forward the refill request to us. Please allow 3  "business days for your refill to be completed.          Additional Information About Your Visit        MyChart Information     [employer] gives you secure access to your electronic health record. If you see a primary care provider, you can also send messages to your care team and make appointments. If you have questions, please call your primary care clinic.  If you do not have a primary care provider, please call [phone] and they will assist you.        Care EveryWhere ID     This is your Care EveryWhere ID. This could be used by other organizations to access your [facility] medical records  [id number]        Your Vitals Were     Pulse Height BMI (Body Mass Index)             92 1.803 m (5' 11\") 26.71 kg/m2          Blood Pressure from Last 3 Encounters:   03/20/18 120/82   03/14/18 128/80   10/26/17 130/83    Weight from Last 3 Encounters:   03/20/18 86.9 kg (191 lb 8 oz)   03/14/18 86.4 kg (190 lb 6.4 oz)   10/26/17 82.9 kg (182 lb 12.8 oz)              We Performed the Following     Follow-Up with Cardiologist        Primary Care Provider Office Phone # Fax #    Pb Warren [phone]       [address] [place] [address]        Equal Access to Services     ZEE FULLER AH: Hadii aad ku hadasho Soomaali, waaxda luqadaha, qaybta kaalmada adeegyada, waxay idiin hayaan olegario kharastarr laterra cohen. So [facility] [phone].    ATENCIÓN: Si habla español, tiene a candelario disposición servicios gratuitos de asistencia lingüística. [facility] [phone].    We comply with applicable federal civil rights laws and [place] laws. We do not discriminate on the basis of race, color, national origin, age, disability, sex, sexual orientation, or gender identity.            Thank you!     Thank you for choosing [facility]  for your care. Our goal is always to provide you with excellent care. Hearing back from our patients is one way we can continue to improve our " services. Please take a few minutes to complete the written survey that you may receive in the mail after your visit with us. Thank you!             Your Updated Medication List - Protect others around you: Learn how to safely use, store and throw away your medicines at www.disposemymeds.org.          This list is accurate as of 3/20/18  2:58 PM.  Always use your most recent med list.                   Brand Name Dispense Instructions for use Diagnosis    allopurinol 100 MG tablet    ZYLOPRIM    90 tablet    Take 1 tablet (100 mg) by mouth daily    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, unspecified cause, unspecified laterality       lisinopril 20 MG tablet    PRINIVIL/ZESTRIL    90 tablet    Take 1 tablet (20 mg) by mouth daily        sildenafil 100 MG tablet    VIAGRA    5 tablet    Take 1 tablet (100 mg) by mouth daily as needed for erectile dysfunction Take 30 min to 4 hours before intercourse.  Never use with nitroglycerin, terazosin or doxazosin.    Erectile dysfunction, unspecified erectile dysfunction type

## 2018-04-07 DIAGNOSIS — M10.9 GOUT: ICD-10-CM

## 2018-04-08 NOTE — TELEPHONE ENCOUNTER
"Requested Prescriptions   Pending Prescriptions Disp Refills     allopurinol (ZYLOPRIM) 100 MG tablet [Pharmacy Med Name: ALLOPURINOL 100MG TABLETS]  Last Written Prescription Date: 03/14/2018  Last Fill Quantity: 90,  # refills: 3   Last office visit: 3/14/2018 with prescribing provider:  OMARI   Future Office Visit:   Next 5 appointments (look out 90 days)     Apr 25, 2018  8:30 AM CDT   Return Visit with  Oncology Nurse   [facility] ([facility])    [facility]  [address]  [facility] [address]   [phone]            Apr 25, 2018  9:00 AM CDT   Return Visit with Pino Mayer MD   [facility])    [facility]  [address] [facility] [address]  [facility] [address]   [phone]                  30 tablet 0     Sig: TAKE 1 TABLET BY MOUTH DAILY    Gout Agents Protocol Failed    4/7/2018  5:56 PM       Failed - ALT on file in past 12 months    Recent Labs   Lab Test  02/20/17   1038   ALT  25            Failed - Uric acid greater than or equal to 6 on file in past 12 months    No lab results found.  If level is 6mg/dL or greater, ok to refill one time and refer to provider.          Passed - CBC on file in past 12 months    Recent Labs   Lab Test  10/26/17   0855   WBC  6.2   RBC  4.84   HGB  14.7   HCT  41.4   PLT  64*            Passed - Recent (12 mo) or future (30 days) visit within the authorizing provider's specialty    Patient had office visit in the last 12 months or has a visit in the next 30 days with authorizing provider or within the authorizing provider's specialty.  See \"Patient Info\" tab in inbasket, or \"Choose Columns\" in Meds & Orders section of the refill encounter.           Passed - Patient is age 18 or older       Passed - Normal serum creatinine on file in the past 12 months    Recent Labs   Lab Test  08/15/17   1320   CR  0.89               "

## 2018-04-10 RX ORDER — ALLOPURINOL 100 MG/1
TABLET ORAL
Qty: 30 TABLET | Refills: 0 | Status: SHIPPED | OUTPATIENT
Start: 2018-04-10 | End: 2018-11-05

## 2018-05-17 ENCOUNTER — HOSPITAL ENCOUNTER (OUTPATIENT)
Facility: CLINIC | Age: 78
Setting detail: SPECIMEN
Discharge: HOME | End: 2018-05-17
Attending: INTERNAL MEDICINE | Admitting: INTERNAL MEDICINE
Payer: MEDICARE

## 2018-05-17 ENCOUNTER — ONCOLOGY VISIT (OUTPATIENT)
Dept: ONCOLOGY | Facility: CLINIC | Age: 78
End: 2018-05-17
Attending: INTERNAL MEDICINE
Payer: MEDICARE

## 2018-05-17 VITALS
SYSTOLIC BLOOD PRESSURE: 115 MMHG | HEART RATE: 60 BPM | WEIGHT: 193.2 LBS | DIASTOLIC BLOOD PRESSURE: 67 MMHG | TEMPERATURE: 98.6 F | OXYGEN SATURATION: 95 % | BODY MASS INDEX: 26.95 KG/M2 | RESPIRATION RATE: 16 BRPM

## 2018-05-17 DIAGNOSIS — D69.6 THROMBOCYTOPENIA (H): ICD-10-CM

## 2018-05-17 DIAGNOSIS — D47.2 MGUS (MONOCLONAL GAMMOPATHY OF UNKNOWN SIGNIFICANCE): Primary | ICD-10-CM

## 2018-05-17 DIAGNOSIS — D47.2 MGUS (MONOCLONAL GAMMOPATHY OF UNKNOWN SIGNIFICANCE): ICD-10-CM

## 2018-05-17 LAB
ERYTHROCYTE [DISTWIDTH] IN BLOOD BY AUTOMATED COUNT: 12.9 % (ref 10–15)
HCT VFR BLD AUTO: 37.2 % (ref 40–53)
HGB BLD-MCNC: 12.7 G/DL (ref 13.3–17.7)
MCH RBC QN AUTO: 29.1 PG (ref 26.5–33)
MCHC RBC AUTO-ENTMCNC: 34.1 G/DL (ref 31.5–36.5)
MCV RBC AUTO: 85 FL (ref 78–100)
PLATELET # BLD AUTO: 86 10E9/L (ref 150–450)
RBC # BLD AUTO: 4.36 10E12/L (ref 4.4–5.9)
WBC # BLD AUTO: 7.6 10E9/L (ref 4–11)

## 2018-05-17 PROCEDURE — 86334 IMMUNOFIX E-PHORESIS SERUM: CPT | Performed by: INTERNAL MEDICINE

## 2018-05-17 PROCEDURE — 84165 PROTEIN E-PHORESIS SERUM: CPT | Performed by: INTERNAL MEDICINE

## 2018-05-17 PROCEDURE — 82784 ASSAY IGA/IGD/IGG/IGM EACH: CPT | Performed by: INTERNAL MEDICINE

## 2018-05-17 PROCEDURE — 00000402 ZZHCL STATISTIC TOTAL PROTEIN: Performed by: INTERNAL MEDICINE

## 2018-05-17 PROCEDURE — G0463 HOSPITAL OUTPT CLINIC VISIT: HCPCS

## 2018-05-17 PROCEDURE — 36415 COLL VENOUS BLD VENIPUNCTURE: CPT

## 2018-05-17 PROCEDURE — 85027 COMPLETE CBC AUTOMATED: CPT | Performed by: INTERNAL MEDICINE

## 2018-05-17 PROCEDURE — 99214 OFFICE O/P EST MOD 30 MIN: CPT | Performed by: INTERNAL MEDICINE

## 2018-05-17 ASSESSMENT — PAIN SCALES - GENERAL: PAINLEVEL: NO PAIN (0)

## 2018-05-17 NOTE — LETTER
5/17/2018         RE: Mars Reyes  [address] [facility] [address]        Dear Colleague,    Thank you for referring your patient, Mars Reyes, to the [facility]. Please see a copy of my visit note below.    Medical Assistant Note:  Mars Reyes presents today for yes.    Patient seen by provider today: Yes: Dr Mayer.   present during visit today: Not Applicable.    Concerns: No Concerns.    Procedure:  Lab draw site: LAC, Needle type: BF, Gauge: 21.  Gauze and coban applied  Post Assessment:  Labs drawn without difficulty: Yes.    Discharge Plan:  Departure Mode: Ambulatory.    Face to Face Time: 5.    Ynes Carreno MA              Again, thank you for allowing me to participate in the care of your patient.        Sincerely,        Oncology Nurse

## 2018-05-17 NOTE — PROGRESS NOTES
"Oncology Rooming Note    May 17, 2018 1:55 PM   Mars Reyes is a 77 year old male who presents for:    Chief Complaint   Patient presents with     Oncology Clinic Visit     MGUS (monoclonal gammopathy of unknown significance)     Initial Vitals: /67 (BP Location: Left arm, Patient Position: Sitting, Cuff Size: Adult Large)  Pulse 60  Temp 98.6  F (37  C) (Oral)  Resp 16  Wt 87.6 kg (193 lb 3.2 oz)  SpO2 95%  BMI 26.95 kg/m2 Estimated body mass index is 26.95 kg/(m^2) as calculated from the following:    Height as of 3/20/18: 1.803 m (5' 11\").    Weight as of this encounter: 87.6 kg (193 lb 3.2 oz). Body surface area is 2.09 meters squared.  No Pain (0) Comment: Data Unavailable   No LMP for male patient.  Allergies reviewed: Yes  Medications reviewed: Yes    Medications: Medication refills not needed today.  Pharmacy name entered into [facility]:    [facility] [address] [place] [address] DRUG STORE [id number] [place] [address] - [address] AT [place] [address] & [facility] DRUG STORE [address] - [place], [place] - [address] [facility] [address] AT [facility] [address]    Clinical concerns: None               5 minutes for nursing intake (face to face time)     Geraldine Granados MA            "

## 2018-05-17 NOTE — Clinical Note
"    5/17/2018         RE: Mars Reyes  [address] [facility] [address]        Dear Colleague,    Thank you for referring your patient, Mars Reyes, to the [facility]. Please see a copy of my visit note below.    Oncology Rooming Note    May 17, 2018 1:55 PM   Mars Reyes is a 77 year old male who presents for:    Chief Complaint   Patient presents with     Oncology Clinic Visit     MGUS (monoclonal gammopathy of unknown significance)     Initial Vitals: /67 (BP Location: Left arm, Patient Position: Sitting, Cuff Size: Adult Large)  Pulse 60  Temp 98.6  F (37  C) (Oral)  Resp 16  Wt 87.6 kg (193 lb 3.2 oz)  SpO2 95%  BMI 26.95 kg/m2 Estimated body mass index is 26.95 kg/(m^2) as calculated from the following:    Height as of 3/20/18: 1.803 m (5' 11\").    Weight as of this encounter: 87.6 kg (193 lb 3.2 oz). Body surface area is 2.09 meters squared.  No Pain (0) Comment: Data Unavailable   No LMP for male patient.  Allergies reviewed: Yes  Medications reviewed: Yes    Medications: Medication refills not needed today.  Pharmacy name entered into [facility]:    [place] [address] [address] [place] [address] DRUG STORE [id number] [place] [address] - [address] [place] [address] & [facility] DRUG STORE [id number] [place] [address] [facility] [address] AT [facility] OF [address]    Clinical concerns: None               5 minutes for nursing intake (face to face time)     Geraldine Granados MA              Visit Date:   05/17/2018      SUBJECTIVE:  Mr. Reyes is a 77-year-old gentleman who is here for followup regarding thrombocytopenia and MGUS.  Overall, patient is doing well.  The patient had a syncopal episode while he was in [place] for the winter.  He had multiple investigations done.  He has cardiac disease.  He follows up with a cardiologist.      REVIEW OF SYSTEMS:  Denies any headache.  No dizziness.  No chest pain.  No difficulty breathing.  No abdominal " pain, nausea or vomiting.  No new bowel complaints.  No bleeding.  No fever, chills or night sweats.  Appetite good.      PHYSICAL EXAMINATION:  Unchanged.      LABORATORY DATA:  Reviewed.  Platelet low but stable at 86.      ASSESSMENT:   1.  A 77-year-old gentleman with chronic idiopathic thrombocytopenic purpura.  Thrombocytopenia is stable.   2.  Monoclonal gammopathy of undetermined significance.   3.  Syncopal attack.   4.  Other medical problems including hypertension, hyperlipidemia.     5.  Mild normocytic anemia.      PLAN:   1.  I discussed with him regarding thrombocytopenia.  It is stable.  He has chronic ITP which is stable.   2.  Discussed regarding MGUS.  No clinical suspicion of progression.  SPEP is pending.  We will inform him of the results.   3.  The patient is mildly anemic.  At this time, we will simply monitor it.  If there is any worsening, further workup will be done.   4.  I will see him in October with CBC before he leaves for [place] for the winter.  Advised him to see a physician if he has bleeding from any site, worsening weakness, shortness of breath, bone pain or any other concerns.         AKOSUA CHONG MD             D: 2018   T: 2018   MT: JUDY      Name:     DESEAN MARTINEZ   MRN:      [id number]        Account:      [id number]   :      1940           Visit Date:   2018      Document: [id number]       Again, thank you for allowing me to participate in the care of your patient.        Sincerely,        Akosua Chong MD

## 2018-05-17 NOTE — PROGRESS NOTES
Medical Assistant Note:  Mars Reyes presents today for yes.    Patient seen by provider today: Yes: Dr Mayer.   present during visit today: Not Applicable.    Concerns: No Concerns.    Procedure:  Lab draw site: LAC, Needle type: BF, Gauge: 21.  Gauze and coban applied  Post Assessment:  Labs drawn without difficulty: Yes.    Discharge Plan:  Departure Mode: Ambulatory.    Face to Face Time: 5.    Ynes Carreno MA

## 2018-05-17 NOTE — MR AVS SNAPSHOT
After Visit Summary   5/17/2018    Mars Reyes    MRN: [id number]           Patient Information     Date Of Birth          1940        Visit Information        Provider Department      5/17/2018 1:00 PM Nurse,  [facility]        Today's Diagnoses     Thrombocytopenia (H)        MGUS (monoclonal gammopathy of unknown significance)           Follow-ups after your visit        Your next 10 appointments already scheduled     May 17, 2018  2:00 PM CDT   Return Visit with Pino Mayer MD   [facility] ([facility])    [facility]  [address]  [facility] [id number] [phone]              Who to contact     If you have questions or need follow up information about today's clinic visit or your schedule please contact [facility] [place] directly at [phone].  Normal or non-critical lab and imaging results will be communicated to you by MyChart, letter or phone within 4 business days after the clinic has received the results. If you do not hear from us within 7 days, please contact the clinic through SPAR[employer] or phone. If you have a critical or abnormal lab result, we will notify you by phone as soon as possible.  Submit refill requests through [employer] or call your pharmacy and they will forward the refill request to us. Please allow 3 business days for your refill to be completed.          Additional Information About Your Visit        MyChart Information     [employer] gives you secure access to your electronic health record. If you see a primary care provider, you can also send messages to your care team and make appointments. If you have questions, please call your primary care clinic.  If you do not have a primary care provider, please call [phone] and they will assist you.        Care EveryWhere ID     This is your Care EveryWhere ID. This could be used by other organizations to access your [place]  medical records  [id number]         Blood Pressure from Last 3 Encounters:   03/20/18 120/82   03/14/18 128/80   10/26/17 130/83    Weight from Last 3 Encounters:   03/20/18 86.9 kg (191 lb 8 oz)   03/14/18 86.4 kg (190 lb 6.4 oz)   10/26/17 82.9 kg (182 lb 12.8 oz)              We Performed the Following     CBC with platelets     Protein electrophoresis     Protein Immunofixation Serum        Primary Care Provider Office Phone # Fax #    Pb Warren [phone]       [address] [facility] [address]        Equal Access to Services     ZEE FULLER : Hadii kwabena matthewso Socarola, waaxda ahsanqadaha, qaybta kaalmada ademinyamicheal, michael warner . So [facility] [phone].    ATENCIÓN: Si habla español, tiene a candelario disposición servicios gratuitos de asistencia lingüística. Llame al [phone].    We comply with applicable federal civil rights laws and [place] laws. We do not discriminate on the basis of race, color, national origin, age, disability, sex, sexual orientation, or gender identity.            Thank you!     Thank you for choosing [facility]  for your care. Our goal is always to provide you with excellent care. Hearing back from our patients is one way we can continue to improve our services. Please take a few minutes to complete the written survey that you may receive in the mail after your visit with us. Thank you!             Your Updated Medication List - Protect others around you: Learn how to safely use, store and throw away your medicines at www.disposemymeds.org.          This list is accurate as of 5/17/18  1:16 PM.  Always use your most recent med list.                   Brand Name Dispense Instructions for use Diagnosis    * allopurinol 100 MG tablet    ZYLOPRIM    90 tablet    Take 1 tablet (100 mg) by mouth daily    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, unspecified cause, unspecified laterality       * allopurinol 100 MG  tablet    ZYLOPRIM    30 tablet    TAKE 1 TABLET BY MOUTH DAILY    Gout       lisinopril 20 MG tablet    PRINIVIL/ZESTRIL    90 tablet    Take 1 tablet (20 mg) by mouth daily        sildenafil 100 MG tablet    VIAGRA    5 tablet    Take 1 tablet (100 mg) by mouth daily as needed for erectile dysfunction Take 30 min to 4 hours before intercourse.  Never use with nitroglycerin, terazosin or doxazosin.    Erectile dysfunction, unspecified erectile dysfunction type       * Notice:  This list has 2 medication(s) that are the same as other medications prescribed for you. Read the directions carefully, and ask your doctor or other care provider to review them with you.

## 2018-05-18 LAB
ALBUMIN SERPL ELPH-MCNC: 4.2 G/DL (ref 3.7–5.1)
ALPHA1 GLOB SERPL ELPH-MCNC: 0.3 G/DL (ref 0.2–0.4)
ALPHA2 GLOB SERPL ELPH-MCNC: 0.7 G/DL (ref 0.5–0.9)
B-GLOBULIN SERPL ELPH-MCNC: 0.9 G/DL (ref 0.6–1)
GAMMA GLOB SERPL ELPH-MCNC: 0.9 G/DL (ref 0.7–1.6)
IGA SERPL-MCNC: 203 MG/DL (ref 70–380)
IGG SERPL-MCNC: 879 MG/DL (ref 695–1620)
IGM SERPL-MCNC: 24 MG/DL (ref 60–265)
M PROTEIN SERPL ELPH-MCNC: 0.1 G/DL
PROT PATTERN SERPL ELPH-IMP: ABNORMAL
PROT PATTERN SERPL IFE-IMP: ABNORMAL

## 2018-05-18 NOTE — PROGRESS NOTES
Visit Date:   05/17/2018     HEMATOLOGIC HISTORY:  Mr. Mars Reyes is a gentleman with chronic thrombocytopenia and MGUS.   1.  On 05/20/2009, platelets of 191. On 12/21/2011, platelets of 75.   2.  On 02/20/2017, platelets of 57 with hemoglobin of 13.9.   3  On 02/21/2017:    -Normal folate, LDH,TSH and vitamin B12 of 372.    -SPEP reveals M-spike of 0.1.   - Lupus negative.   4. US on 02/22/2017 reveals normal spleen size.  5.  Bone marrow biopsy on 02/23/2017 reveals normocellular bone marrow with 50% cellularity with trilineage hematopoiesis.  Adequate megakaryocyte.  Slight erythroid hypoplasia.  No increase in blasts. 5% to 10% plasma cells.  Cytogenetics normal.  FISH for myeloma panel normal.       SUBJECTIVE:    Mr. Reyes is a 77-year-old gentleman who is here for followup regarding thrombocytopenia and MGUS.  Overall, patient is doing well.  Patient had a syncopal episode while he was in [place] for the winter.  He had multiple investigations done.  He has cardiac disease.  He follows up with cardiologist.      REVIEW OF SYSTEMS:    Denies any headache.  No dizziness.  No chest pain.  No difficulty breathing.  No abdominal pain, nausea or vomiting.  No bowel complaints.  No bleeding.  No fever, chills or night sweats.  Appetite is good.      PHYSICAL EXAMINATION:   GENERAL:  Alert and oriented x 3.   VITAL SIGNS:  Reviewed.   EYES:  No icterus.   THROAT:  No ulcer.  No petechia.   NECK:  Supple. No lymphadenopathy.   LUNGS:  Good air entry bilaterally.  No crackles or wheezing.   HEART:  Regular.  No murmur.   ABDOMEN:  Soft and nontender.  No mass.   EXTREMITIES:  No edema.  No calf swelling or tenderness.   SKIN:  No rash.  No petechiae.     LABORATORY DATA:  Reviewed.  Platelet low but stable at 86.      ASSESSMENT:   1.  A 77-year-old gentleman with chronic immune thrombocytopenic purpura.  Thrombocytopenia is stable.   2.  Monoclonal gammopathy of undetermined significance.   3.  Syncopal attack.   4.   Other medical problems including hypertension and hyperlipidemia.     5.  Mild normocytic anemia.      PLAN:   1.  I discussed with him regarding thrombocytopenia.  It is stable.  He has chronic ITP which is stable.   2.  Discussed regarding MGUS.  No clinical suspicion of progression.  SPEP is pending.  We will inform him of the results.   3.  Patient is mildly anemic.  At this time, we will simply monitor it.  If there is any worsening, further workup will be done.   4.  I will see him in October with CBC before he leaves for [place] for the winter.  Advised him to see a physician if he has bleeding from any site, worsening weakness, shortness of breath, bone pain or any other concerns.     ADDENDUM:  Labs reveals stale MGUS.        AKOSUA CHONG MD             D: 2018   T: 2018   MT: JUDY      Name:     DESEAN MARTINEZ   MRN:      [id number]        Account:      [id number]   :      1940           Visit Date:   2018      Document: [id number]

## 2018-06-01 NOTE — MR AVS SNAPSHOT
After Visit Summary   5/17/2018    Mars Reyes    MRN: [id number]           Patient Information     Date Of Birth          1940        Visit Information        Provider Department      5/17/2018 2:00 PM Pino Mayer MD [facility]        Today's Diagnoses     MGUS (monoclonal gammopathy of unknown significance)    -  1    Thrombocytopenia (H)          Care Instructions    Follow up in October with CBC.    Patient will call us to schedule/Jerri          Follow-ups after your visit        Who to contact     If you have questions or need follow up information about today's clinic visit or your schedule please contact [facility] directly at [phone].  Normal or non-critical lab and imaging results will be communicated to you by [employer], letter or phone within 4 business days after the clinic has received the results. If you do not hear from us within 7 days, please contact the clinic through [employer] or phone. If you have a critical or abnormal lab result, we will notify you by phone as soon as possible.  Submit refill requests through [employer] or call your pharmacy and they will forward the refill request to us. Please allow 3 business days for your refill to be completed.          Additional Information About Your Visit        MyChart Information     [employer] gives you secure access to your electronic health record. If you see a primary care provider, you can also send messages to your care team and make appointments. If you have questions, please call your primary care clinic.  If you do not have a primary care provider, please call [phone] and they will assist you.        Care EveryWhere ID     This is your Care EveryWhere ID. This could be used by other organizations to access your [place] medical records  [id number]        Your Vitals Were     Pulse Temperature Respirations Pulse Oximetry BMI (Body Mass Index)       60 98.6  F (37  C) (Oral) 16 95% 26.95  kg/m2        Blood Pressure from Last 3 Encounters:   No data found for BP    Weight from Last 3 Encounters:   No data found for Wt              Today, you had the following     No orders found for display         Today's Medication Changes          These changes are accurate as of 5/17/18 11:59 PM.  If you have any questions, ask your nurse or doctor.               These medicines have changed or have updated prescriptions.        Dose/Directions    allopurinol 100 MG tablet   Commonly known as:  ZYLOPRIM   This may have changed:  Another medication with the same name was removed. Continue taking this medication, and follow the directions you see here.   Used for:  Gout   Changed by:  Pino Mayer MD        TAKE 1 TABLET BY MOUTH DAILY   Quantity:  30 tablet   Refills:  0         Stop taking these medicines if you haven't already. Please contact your care team if you have questions.     sildenafil 100 MG tablet   Commonly known as:  VIAGRA   Stopped by:  Pino Mayer MD                    Primary Care Provider Office Phone # Fax #    Pb Warren [phone]       [address] [place] [address]        Equal Access to Services     [facility]: Hadii kwabena ku hadasho Soomaali, waaxda luqadaha, qaybta kaalmada adeegyada, waxlorene warner . So [facility] [phone].    ATENCIÓN: Si habla español, tiene a candelario disposición servicios gratuitos de asistencia lingüística. Llame al [phone].    We comply with applicable federal civil rights laws and [place] laws. We do not discriminate on the basis of race, color, national origin, age, disability, sex, sexual orientation, or gender identity.            Thank you!     Thank you for choosing [facility]  for your care. Our goal is always to provide you with excellent care. Hearing back from our patients is one way we can continue to improve our services. Please take a few minutes to complete the written  survey that you may receive in the mail after your visit with us. Thank you!             Your Updated Medication List - Protect others around you: Learn how to safely use, store and throw away your medicines at www.disposemymeds.org.          This list is accurate as of 5/17/18 11:59 PM.  Always use your most recent med list.                   Brand Name Dispense Instructions for use Diagnosis    allopurinol 100 MG tablet    ZYLOPRIM    30 tablet    TAKE 1 TABLET BY MOUTH DAILY    Gout       ASPIRIN PO      Take 81 mg by mouth daily        lisinopril 20 MG tablet    PRINIVIL/ZESTRIL    90 tablet    Take 1 tablet (20 mg) by mouth daily        PRAVASTATIN SODIUM PO      Take 20 mg by mouth daily           independent

## 2018-06-16 DIAGNOSIS — E78.5 HYPERLIPIDEMIA LDL GOAL <130: ICD-10-CM

## 2018-06-18 NOTE — TELEPHONE ENCOUNTER
"Requested Prescriptions   Pending Prescriptions Disp Refills     pravastatin (PRAVACHOL) 20 MG tablet [Pharmacy Med Name: PRAVASTATIN 20MG TABLETS] 90 tablet 0      Last Written Prescription Date:    Last Fill Quantity: ,  # refills:    Last office visit: 3/14/2018 with prescribing provider:  03/14/18   Future Office Visit: 0  Next 5 appointments (look out 90 days)     Jul 11, 2018  1:45 PM CDT   Return Visit with Brandt Iglesias MD   [address] [address]   [phone]                Sig: TAKE 1 TABLET BY MOUTH DAILY    Statins Protocol Passed    6/16/2018 12:21 PM       Passed - LDL on file in past 12 months    Recent Labs   Lab Test  03/14/18   1154   LDL  122*            Passed - No abnormal creatine kinase in past 12 months    No lab results found.            Passed - Recent (12 mo) or future (30 days) visit within the authorizing provider's specialty    Patient had office visit in the last 12 months or has a visit in the next 30 days with authorizing provider or within the authorizing provider's specialty.  See \"Patient Info\" tab in inbasket, or \"Choose Columns\" in Meds & Orders section of the refill encounter.           Passed - Patient is age 18 or older        "

## 2018-06-21 RX ORDER — PRAVASTATIN SODIUM 20 MG
TABLET ORAL
Qty: 90 TABLET | Refills: 0 | Status: SHIPPED | OUTPATIENT
Start: 2018-06-21 | End: 2018-09-16

## 2018-06-21 NOTE — TELEPHONE ENCOUNTER
Medication did cause muscle pain in the past.  Patient re-started medication about 3 months ago with no side effects.  Would like refill sent to [facility].

## 2018-07-11 ENCOUNTER — OFFICE VISIT (OUTPATIENT)
Dept: CARDIOLOGY | Facility: CLINIC | Age: 78
End: 2018-07-11
Attending: INTERNAL MEDICINE
Payer: COMMERCIAL

## 2018-07-11 VITALS
OXYGEN SATURATION: 99 % | DIASTOLIC BLOOD PRESSURE: 70 MMHG | WEIGHT: 190.4 LBS | SYSTOLIC BLOOD PRESSURE: 130 MMHG | BODY MASS INDEX: 26.56 KG/M2 | HEART RATE: 75 BPM

## 2018-07-11 DIAGNOSIS — I35.0 NONRHEUMATIC AORTIC VALVE STENOSIS: ICD-10-CM

## 2018-07-11 DIAGNOSIS — I34.2 NON-RHEUMATIC MITRAL VALVE STENOSIS: ICD-10-CM

## 2018-07-11 PROCEDURE — 99214 OFFICE O/P EST MOD 30 MIN: CPT | Performed by: INTERNAL MEDICINE

## 2018-07-11 NOTE — LETTER
7/11/2018    Pb Warren MD  [address] [facility] [address]    RE: Mars Reyes       Dear Colleague,    I had the pleasure of seeing Mars Reyes in the [facility].    CARDIOLOGY VISIT    REASON FOR VISIT: f/u valve disease    SUBJECTIVE:  76-year-old male seen for evaluation of valvular heart disease and SVT.    Echo May 2017 showed ejection fraction 60%, mild to moderate LVH, normal right ventricle, moderate to severe left atrial enlargement, mild-to-moderate mitral regurgitation, mild mitral stenosis with mean gradient 5 mmHg, moderate tricuspid regurgitation, RVSP 44 mmHg, moderate aortic stenosis (mean 21mmHg, Vmax 3.3m/s, DI 0.39, LV 1.3cm2, SVI 44ml/m2), mild-moderate AI.       He recently has undergone evaluation for thrombocytopenia.  He has been off his aspirin recently because of this.      Echo Feb 2018 ([facility]) showed EF 75%, mild-moderate MS with mean 5mmHg, moderate AS (mean 23mmHg, Vmax 3.5m/s, LV 1.3cm2, DI 0.44, SVI 45ml/m2).      11 day Zio patch from March 2018 showed baseline rhythm of sinus, he had paroxysmal SVT lasting up to 90 seconds on several occasions, on one episode QRS was borderline wide, likely SVT with aberrancy, there were no pauses, heart block, or VT.     He's been doing well recently.  He goes walking frequently including a treadmill at 3.2 miles per hour with 3% incline for 15 minutes.  He has no exertional shortness breath or chest pain.  He has very brief episodes of dizziness on occasion.  A few times with his pulse oximeter he noted heart rate was over 120, but he had no symptoms.  His blood pressure runs  with heart rate in the 70-80 range.    MEDICATIONS:  Current Outpatient Prescriptions   Medication     allopurinol (ZYLOPRIM) 100 MG tablet     ASPIRIN PO     lisinopril (PRINIVIL/ZESTRIL) 20 MG tablet     pravastatin (PRAVACHOL) 20 MG tablet     PRAVASTATIN SODIUM PO     No current facility-administered  medications for this visit.      ALLERGIES:  Allergies   Allergen Reactions     Pravachol [Pravastatin]      Simvastatin      10/26/12: Arm pain - pt stopped it x 4-5 wks and arm pain didn't resolve sore started it      REVIEW OF SYSTEMS:  Constitutional:  No weight loss, fever, chills, weakness or fatigue.  HEENT:  Eyes:  No visual loss, blurred vision, double vision or yellow sclerae. No hearing loss, sneezing, congestion, runny nose or sore throat.  Skin:  No rash or itching.  Cardiovascular: per HPI  Respiratory: per HPI  GI:  No anorexia, nausea, vomiting or diarrhea. No abdominal pain or blood.  :  No dysurea, hematuria  Neurologic:  No headache, dizziness, syncope, paralysis, ataxia, numbness or tingling in the extremities. No change in bowel or bladder control.  Musculoskeletal:  No muscle, back pain, joint pain or stiffness.  Hematologic:  No anemia, bleeding or bruising.  Lymphatics:  No enlarged nodes. No history of splenectomy.  Psychiatric:  No history of depression or anxiety.  Endocrine:  No reports of sweating, cold or heat intolerance. No polyuria or polydipsia.  Allergies:  No history of asthma, hives, eczema or rhinitis.    PHYSICAL EXAM:      BP: 130/70 Pulse: 75     SpO2: 99 %      Vital Signs with Ranges  Pulse:  [75] 75  BP: (130)/(70) 130/70  SpO2:  [99 %] 99 %  190 lbs 6.4 oz    Constitutional: awake, alert, no distress  Eyes: PERRL, sclera nonicteric  ENT: trachea midline  Respiratory: Lungs clear  Cardiovascular: Regular rate and rhythm, 2/6 mid peaking systolic murmur at the upper sternal border, no lower extremity edema  GI: nondistended, nontender, bowel sounds present  Lymph/Hematologic: no lymphadenopathy  Skin: dry, no rash  Musculoskeletal: good muscle tone, strength 5/5 in upper and lower extremities  Neurologic: no focal deficits  Neuropsychiatric: appropriate affact    ASSESSMENT:  77-year-old male seen for follow-up of valvular heart disease and SVT.  He is probably having  some asymptomatic paroxysmal SVT.  However it is likely very brief, would not start any medication at this point.  However if symptoms occurred or increased, he could be referred to EP to discuss ablation.    He has no symptoms to suggest severe valvular heart disease.  He appears euvolemic on exam.    RECOMMENDATIONS:  1.  Paroxysmal SVT  - If he develops symptoms or more frequent SVT, then metoprolol  - Low threshold for EP referral to discuss ablation    2.  Moderate mitral and aortic stenosis  - Echo in 6 months.    Follow-up in 6 months.    Brandt Iglesias MD  Cardiology - [facility]  Pager:  [phone]  Text Page  July 11, 2018    Thank you for allowing me to participate in the care of your patient.    Sincerely,     Brandt Iglesias MD     [facility]

## 2018-07-11 NOTE — MR AVS SNAPSHOT
After Visit Summary   7/11/2018    Mars Reyes    MRN: [id number]           Patient Information     Date Of Birth          1940        Visit Information        Provider Department      7/11/2018 1:45 PM Brandt Iglesias MD [facility]   [place]        Today's Diagnoses     Non-rheumatic mitral valve stenosis        Nonrheumatic aortic valve stenosis           Follow-ups after your visit        Additional Services     Follow-Up with Cardiologist       F/u after echo                  Future tests that were ordered for you today     Open Future Orders        Priority Expected Expires Ordered    Follow-Up with Cardiologist Routine 1/7/2019 7/11/2019 7/11/2018    Echocardiogram Routine 1/7/2019 7/11/2019 7/11/2018            Who to contact     If you have questions or need follow up information about today's clinic visit or your schedule please contact [facility]   [facility] directly at [phone].  Normal or non-critical lab and imaging results will be communicated to you by [employer], letter or phone within 4 business days after the clinic has received the results. If you do not hear from us within 7 days, please contact the clinic through [employer] or phone. If you have a critical or abnormal lab result, we will notify you by phone as soon as possible.  Submit refill requests through [employer] or call your pharmacy and they will forward the refill request to us. Please allow 3 business days for your refill to be completed.          Additional Information About Your Visit        MyChart Information     [employer] gives you secure access to your electronic health record. If you see a primary care provider, you can also send messages to your care team and make appointments. If you have questions, please call your primary care clinic.  If you do not have a primary care provider, please call [phone] and they will assist you.        Care  EveryWhere ID     This is your Care EveryWhere ID. This could be used by other organizations to access your [place] medical records  [id number]        Your Vitals Were     Pulse Pulse Oximetry BMI (Body Mass Index)             75 99% 26.56 kg/m2          Blood Pressure from Last 3 Encounters:   07/11/18 130/70   05/17/18 115/67   03/20/18 120/82    Weight from Last 3 Encounters:   07/11/18 86.4 kg (190 lb 6.4 oz)   05/17/18 87.6 kg (193 lb 3.2 oz)   03/20/18 86.9 kg (191 lb 8 oz)              We Performed the Following     Follow-Up with Cardiologist        Primary Care Provider Office Phone # Fax #    Pb Warren [phone]       [address] [place] [address]        Equal Access to Services     [facility]: Hadii aad ku hadasho Soomaali, waaxda luqadaha, qaybta kaalmada adeegyada, michael lopez haybautista warner . So [facility] [phone].    ATENCIÓN: Si habla español, tiene a candelario disposición servicios gratuitos de asistencia lingüística. Llame al [phone].    We comply with applicable federal civil rights laws and [place] laws. We do not discriminate on the basis of race, color, national origin, age, disability, sex, sexual orientation, or gender identity.            Thank you!     Thank you for choosing [facility]   [facility]  for your care. Our goal is always to provide you with excellent care. Hearing back from our patients is one way we can continue to improve our services. Please take a few minutes to complete the written survey that you may receive in the mail after your visit with us. Thank you!             Your Updated Medication List - Protect others around you: Learn how to safely use, store and throw away your medicines at www.disposemymeds.org.          This list is accurate as of 7/11/18  2:24 PM.  Always use your most recent med list.                   Brand Name Dispense Instructions for use Diagnosis    allopurinol 100 MG  tablet    ZYLOPRIM    30 tablet    TAKE 1 TABLET BY MOUTH DAILY    Gout       ASPIRIN PO      Take 81 mg by mouth daily        lisinopril 20 MG tablet    PRINIVIL/ZESTRIL    90 tablet    Take 1 tablet (20 mg) by mouth daily        * PRAVASTATIN SODIUM PO      Take 20 mg by mouth daily        * pravastatin 20 MG tablet    PRAVACHOL    90 tablet    TAKE 1 TABLET BY MOUTH DAILY    Hyperlipidemia LDL goal <130       * Notice:  This list has 2 medication(s) that are the same as other medications prescribed for you. Read the directions carefully, and ask your doctor or other care provider to review them with you.

## 2018-07-11 NOTE — PROGRESS NOTES
CARDIOLOGY VISIT    REASON FOR VISIT: f/u valve disease    SUBJECTIVE:  76-year-old male seen for evaluation of valvular heart disease and SVT.    Echo May 2017 showed ejection fraction 60%, mild to moderate LVH, normal right ventricle, moderate to severe left atrial enlargement, mild-to-moderate mitral regurgitation, mild mitral stenosis with mean gradient 5 mmHg, moderate tricuspid regurgitation, RVSP 44 mmHg, moderate aortic stenosis (mean 21mmHg, Vmax 3.3m/s, DI 0.39, LV 1.3cm2, SVI 44ml/m2), mild-moderate AI.       He recently has undergone evaluation for thrombocytopenia.  He has been off his aspirin recently because of this.      Echo Feb 2018 ([facility]) showed EF 75%, mild-moderate MS with mean 5mmHg, moderate AS (mean 23mmHg, Vmax 3.5m/s, LV 1.3cm2, DI 0.44, SVI 45ml/m2).      11 day Zio patch from March 2018 showed baseline rhythm of sinus, he had paroxysmal SVT lasting up to 90 seconds on several occasions, on one episode QRS was borderline wide, likely SVT with aberrancy, there were no pauses, heart block, or VT.     He's been doing well recently.  He goes walking frequently including a treadmill at 3.2 miles per hour with 3% incline for 15 minutes.  He has no exertional shortness breath or chest pain.  He has very brief episodes of dizziness on occasion.  A few times with his pulse oximeter he noted heart rate was over 120, but he had no symptoms.  His blood pressure runs  with heart rate in the 70-80 range.    MEDICATIONS:  Current Outpatient Prescriptions   Medication     allopurinol (ZYLOPRIM) 100 MG tablet     ASPIRIN PO     lisinopril (PRINIVIL/ZESTRIL) 20 MG tablet     pravastatin (PRAVACHOL) 20 MG tablet     PRAVASTATIN SODIUM PO     No current facility-administered medications for this visit.        ALLERGIES:  Allergies   Allergen Reactions     Pravachol [Pravastatin]      Simvastatin      10/26/12: Arm pain - pt stopped it x 4-5 wks and arm pain didn't resolve sore started it         REVIEW OF SYSTEMS:  Constitutional:  No weight loss, fever, chills, weakness or fatigue.  HEENT:  Eyes:  No visual loss, blurred vision, double vision or yellow sclerae. No hearing loss, sneezing, congestion, runny nose or sore throat.  Skin:  No rash or itching.  Cardiovascular: per HPI  Respiratory: per HPI  GI:  No anorexia, nausea, vomiting or diarrhea. No abdominal pain or blood.  :  No dysurea, hematuria  Neurologic:  No headache, dizziness, syncope, paralysis, ataxia, numbness or tingling in the extremities. No change in bowel or bladder control.  Musculoskeletal:  No muscle, back pain, joint pain or stiffness.  Hematologic:  No anemia, bleeding or bruising.  Lymphatics:  No enlarged nodes. No history of splenectomy.  Psychiatric:  No history of depression or anxiety.  Endocrine:  No reports of sweating, cold or heat intolerance. No polyuria or polydipsia.  Allergies:  No history of asthma, hives, eczema or rhinitis.    PHYSICAL EXAM:      BP: 130/70 Pulse: 75     SpO2: 99 %      Vital Signs with Ranges  Pulse:  [75] 75  BP: (130)/(70) 130/70  SpO2:  [99 %] 99 %  190 lbs 6.4 oz    Constitutional: awake, alert, no distress  Eyes: PERRL, sclera nonicteric  ENT: trachea midline  Respiratory: Lungs clear  Cardiovascular: Regular rate and rhythm, 2/6 mid peaking systolic murmur at the upper sternal border, no lower extremity edema  GI: nondistended, nontender, bowel sounds present  Lymph/Hematologic: no lymphadenopathy  Skin: dry, no rash  Musculoskeletal: good muscle tone, strength 5/5 in upper and lower extremities  Neurologic: no focal deficits  Neuropsychiatric: appropriate affact    ASSESSMENT:  77-year-old male seen for follow-up of valvular heart disease and SVT.  He is probably having some asymptomatic paroxysmal SVT.  However it is likely very brief, would not start any medication at this point.  However if symptoms occurred or increased, he could be referred to EP to discuss ablation.    He has  no symptoms to suggest severe valvular heart disease.  He appears euvolemic on exam.    RECOMMENDATIONS:  1.  Paroxysmal SVT  - If he develops symptoms or more frequent SVT, then metoprolol  - Low threshold for EP referral to discuss ablation    2.  Moderate mitral and aortic stenosis  - Echo in 6 months.    Follow-up in 6 months.    Brandt Iglesias MD  Cardiology - [facility]  Pager:  [phone]  Text Page  July 11, 2018

## 2018-07-14 DIAGNOSIS — I10 ESSENTIAL HYPERTENSION, BENIGN: ICD-10-CM

## 2018-07-14 NOTE — TELEPHONE ENCOUNTER
"Requested Prescriptions   Pending Prescriptions Disp Refills     lisinopril (PRINIVIL/ZESTRIL) 20 MG tablet [Pharmacy Med Name: LISINOPRIL 20MG TABLETS]  Last Written Prescription Date:  03/14/2018  Last Fill Quantity: 90 tablet,  # refills: 3   Last office visit: 3/14/2018 with prescribing provider:  Pb Warren MD    Future Office Visit:     180 tablet 0     Sig: TAKE 1 TABLET BY MOUTH TWICE DAILY    ACE Inhibitors (Including Combos) Protocol Passed    7/14/2018  5:57 PM       Passed - Blood pressure under 140/90 in past 12 months    BP Readings from Last 3 Encounters:   07/11/18 130/70   05/17/18 115/67   03/20/18 120/82                Passed - Recent (12 mo) or future (30 days) visit within the authorizing provider's specialty    Patient had office visit in the last 12 months or has a visit in the next 30 days with authorizing provider or within the authorizing provider's specialty.  See \"Patient Info\" tab in inbasket, or \"Choose Columns\" in Meds & Orders section of the refill encounter.           Passed - Patient is age 18 or older       Passed - Normal serum creatinine on file in past 12 months    Recent Labs   Lab Test  08/15/17   1320   CR  0.89            Passed - Normal serum potassium on file in past 12 months    Recent Labs   Lab Test  08/15/17   1320   POTASSIUM  4.9               "

## 2018-07-17 RX ORDER — LISINOPRIL 20 MG/1
TABLET ORAL
Qty: 180 TABLET | Refills: 0 | Status: SHIPPED | OUTPATIENT
Start: 2018-07-17 | End: 2018-08-16

## 2018-08-16 ENCOUNTER — OFFICE VISIT (OUTPATIENT)
Dept: INTERNAL MEDICINE | Facility: CLINIC | Age: 78
End: 2018-08-16
Payer: COMMERCIAL

## 2018-08-16 ENCOUNTER — CARE COORDINATION (OUTPATIENT)
Dept: CARDIOLOGY | Facility: CLINIC | Age: 78
End: 2018-08-16

## 2018-08-16 ENCOUNTER — TELEPHONE (OUTPATIENT)
Dept: INTERNAL MEDICINE | Facility: CLINIC | Age: 78
End: 2018-08-16

## 2018-08-16 VITALS
DIASTOLIC BLOOD PRESSURE: 90 MMHG | TEMPERATURE: 97.8 F | SYSTOLIC BLOOD PRESSURE: 148 MMHG | BODY MASS INDEX: 26.3 KG/M2 | HEART RATE: 79 BPM | RESPIRATION RATE: 12 BRPM | OXYGEN SATURATION: 97 % | WEIGHT: 188.6 LBS

## 2018-08-16 DIAGNOSIS — I10 ESSENTIAL HYPERTENSION, BENIGN: Primary | ICD-10-CM

## 2018-08-16 PROCEDURE — 99213 OFFICE O/P EST LOW 20 MIN: CPT | Performed by: INTERNAL MEDICINE

## 2018-08-16 NOTE — TELEPHONE ENCOUNTER
Pt calling blood pressure running in 180's /100. Pt states he called cardiology and that MD wants him seen by someone here today before he will adjust the medication . Pt PCP has no appt available so put pt with partner at 3 pm . PCP please advise .Susan Gerardo RN

## 2018-08-16 NOTE — PROGRESS NOTES
"Pt called c/o elevated BPs for about 1 week. Pt's BP before meds is around 180/108, after meds has been 150-160's/. Pt's HR have been high 80's. Pt has been asymptomatic until this morning, he has a headache. He has been taking lisinopril 20 mg daily.     Last OV 7/11/18 w/ Dr. Iglesias for paroxysmal SVT, mod mitral and aortic stenosis. Noted \"He has very brief episodes of dizziness on occasion.  A few times with his pulse oximeter he noted heart rate was over 120, but he had no symptoms.  His blood pressure runs  with heart rate in the 70-80 range\". Also noted \"probably having some asymptomatic paroxysmal SVT.  However it is likely very brief, would not start any medication at this point.  However if symptoms occurred or increased, he could be referred to EP to discuss ablation. He has no symptoms to suggest severe valvular heart disease\". No med changes made. F/u in 6 months.      Routing to Dr. Iglesias for recommendations.   Nazario MEEHAN   "

## 2018-08-16 NOTE — PROGRESS NOTES
Pt called back. Will see his PCP today at 3pm. Wants to cancel appt w/ Melissa HUNT for tmr. Will call me back if needing to schedule w/ Dr. Iglesias. I advised pt to let PCP know to look at Dr. Iglesias 's note regarding metoprolol.   Nazario MEEHAN

## 2018-08-16 NOTE — TELEPHONE ENCOUNTER
I'm not sure whey his BP is up, as it seems to run OK when he was seen recently.  He should probably see someone, an EBENEZER or his PCP if BP continues to run high.  Metoprolol would be a good next med, as it might suppress his SVT also.    Brandt Iglesias MD  Cardiology - [facility]  Pager: [phone]  Text Page  August 16, 2018

## 2018-08-16 NOTE — MR AVS SNAPSHOT
After Visit Summary   8/16/2018    Mars Reyes    MRN: [id number]           Patient Information     Date Of Birth          1940        Visit Information        Provider Department      8/16/2018 3:00 PM Yaakov Yanes MD [facility]         Follow-ups after your visit        Who to contact     If you have questions or need follow up information about today's clinic visit or your schedule please contact [facility] directly at [phone].  Normal or non-critical lab and imaging results will be communicated to you by MyChart, letter or phone within 4 business days after the clinic has received the results. If you do not hear from us within 7 days, please contact the clinic through [employer] or phone. If you have a critical or abnormal lab result, we will notify you by phone as soon as possible.  Submit refill requests through [employer] or call your pharmacy and they will forward the refill request to us. Please allow 3 business days for your refill to be completed.          Additional Information About Your Visit        MyChart Information     [employer] gives you secure access to your electronic health record. If you see a primary care provider, you can also send messages to your care team and make appointments. If you have questions, please call your primary care clinic.  If you do not have a primary care provider, please call [phone] and they will assist you.        Care EveryWhere ID     This is your Care EveryWhere ID. This could be used by other organizations to access your [facility] medical records  [id number]        Your Vitals Were     Pulse Temperature Respirations Pulse Oximetry BMI (Body Mass Index)       79 97.8  F (36.6  C) (Oral) 12 97% 26.3 kg/m2        Blood Pressure from Last 3 Encounters:   08/16/18 148/90   07/11/18 130/70   05/17/18 115/67    Weight from Last 3 Encounters:   08/16/18 188 lb 9.6 oz (85.5 kg)   07/11/18 190  lb 6.4 oz (86.4 kg)   05/17/18 193 lb 3.2 oz (87.6 kg)              Today, you had the following     No orders found for display       Primary Care Provider Office Phone # Fax #    Pb Warren [phone]       [address] [facility] [address]        Equal Access to Services     [facility] : Hadii aad ku hadasho Soomaali, waaxda luqadaha, qaybta kaalmada adeegyada, waxay idiin hayaan adeeg kharash la'aan ah. So [place] [phone].    ATENCIÓN: Si habla español, tiene a candelario disposición servicios gratuitos de asistencia lingüística. Jesusame al [phone].    We comply with applicable federal civil rights laws and [place] laws. We do not discriminate on the basis of race, color, national origin, age, disability, sex, sexual orientation, or gender identity.            Thank you!     Thank you for choosing [facility]  for your care. Our goal is always to provide you with excellent care. Hearing back from our patients is one way we can continue to improve our services. Please take a few minutes to complete the written survey that you may receive in the mail after your visit with us. Thank you!             Your Updated Medication List - Protect others around you: Learn how to safely use, store and throw away your medicines at www.disposemymeds.org.          This list is accurate as of 8/16/18  3:41 PM.  Always use your most recent med list.                   Brand Name Dispense Instructions for use Diagnosis    allopurinol 100 MG tablet    ZYLOPRIM    30 tablet    TAKE 1 TABLET BY MOUTH DAILY    Gout       ASPIRIN PO      Take 81 mg by mouth daily        lisinopril 20 MG tablet    PRINIVIL/ZESTRIL    90 tablet    Take 1 tablet (20 mg) by mouth daily        pravastatin 20 MG tablet    PRAVACHOL    90 tablet    TAKE 1 TABLET BY MOUTH DAILY    Hyperlipidemia LDL goal <130

## 2018-08-16 NOTE — PROGRESS NOTES
"Called pt, relayed Dr. Iglesias recommendations \"I'm not sure whey his BP is up, as it seems to run OK when he was seen recently.  He should probably see someone, an EBENEZER or his PCP if BP continues to run high.  Metoprolol would be a good next med, as it might suppress his SVT also\". Scheduled pt to see Saumya Wong PA-C tomorrow at 0800. No other spots available until 8/27 or 8/28/18. Advised he could also see his PCP. Will keep scheduled for tmr for now and will call me back if he just wants to see his PCP.   Nazario MEEHAN   "

## 2018-08-16 NOTE — PROGRESS NOTES
SUBJECTIVE:   Mars Reyes is a 77 year old male who presents to clinic today for the following health issues:    Pt called clinic today-  blood pressure running in 180's /100. Pt states he called cardiology and that MD wants him seen by someone here today before he will adjust the medication . Pt PCP has no appt available so put pt with partner at 3 pm     Hypertension Follow-up    Outpatient blood pressures are being checked at home.  Results are 128 - 188 / 20 - 108.    Low Salt Diet: low salt      Amount of exercise or physical activity: None    Problems taking medications regularly: No    Medication side effects: none    Diet: low salt    Problem list and histories reviewed & adjusted, as indicated.  Additional history: as documented    Labs reviewed in EPIC    Reviewed and updated as needed this visit by clinical staff  Tobacco  Allergies  Meds       Reviewed and updated as needed this visit by Provider         ROS:  Constitutional, HEENT, cardiovascular, pulmonary, gi and gu systems are negative, except as otherwise noted.    OBJECTIVE:                                                    /90  Pulse 79  Temp 97.8  F (36.6  C) (Oral)  Resp 12  Wt 188 lb 9.6 oz (85.5 kg)  SpO2 97%  BMI 26.3 kg/m2  Body mass index is 26.3 kg/(m^2).  GENERAL APPEARANCE: alert and no distress  HENT: nose and mouth without ulcers or lesions and oropharynx clear  NECK: no adenopathy, no asymmetry, masses, or scars and thyroid normal to palpation  RESP: lungs clear to auscultation - no rales, rhonchi or wheezes  CV: regular rates and rhythm, normal S1 S2, no S3 or S4 and no murmur, click or rub  MS: extremities normal- no gross deformities noted    Diagnostic test results:  none      ASSESSMENT/PLAN:                                                    (I10) Essential hypertension, benign  (primary encounter diagnosis)  Plan: f/u w/PCP. # are not significantly elevated as noted by pt previously.    - though f/u needed .      Yaakov Yanes MD  [facility]

## 2018-08-17 ENCOUNTER — DOCUMENTATION ONLY (OUTPATIENT)
Dept: CARDIOLOGY | Facility: CLINIC | Age: 78
End: 2018-08-17

## 2018-08-17 NOTE — PROGRESS NOTES
Pt left vm advising he went to PCP, his BP was 148/90. PCP was not concerned at this point. Advised to f/u w/ PMD next week.   Notes in Epic.   Nazario MEEHAN

## 2018-09-16 DIAGNOSIS — E78.5 HYPERLIPIDEMIA LDL GOAL <130: ICD-10-CM

## 2018-09-16 NOTE — TELEPHONE ENCOUNTER
"Requested Prescriptions   Pending Prescriptions Disp Refills     pravastatin (PRAVACHOL) 20 MG tablet [Pharmacy Med Name: PRAVASTATIN 20MG TABLETS] 90 tablet 0    Last Written Prescription Date:  6/21/2108  Last Fill Quantity: 90,  # refills: 0   Last office visit: 8/16/2018 with prescribing provider:  8/16/2018   Future Office Visit:     Sig: TAKE 1 TABLET BY MOUTH DAILY    Statins Protocol Passed    9/16/2018 10:26 AM       Passed - LDL on file in past 12 months    Recent Labs   Lab Test  03/14/18   1154   LDL  122*            Passed - No abnormal creatine kinase in past 12 months    No lab results found.            Passed - Recent (12 mo) or future (30 days) visit within the authorizing provider's specialty    Patient had office visit in the last 12 months or has a visit in the next 30 days with authorizing provider or within the authorizing provider's specialty.  See \"Patient Info\" tab in inbasket, or \"Choose Columns\" in Meds & Orders section of the refill encounter.           Passed - Patient is age 18 or older          "

## 2018-09-18 RX ORDER — PRAVASTATIN SODIUM 20 MG
TABLET ORAL
Qty: 90 TABLET | Refills: 1 | Status: SHIPPED | OUTPATIENT
Start: 2018-09-18 | End: 2018-11-05

## 2018-10-20 DIAGNOSIS — I10 ESSENTIAL HYPERTENSION, BENIGN: ICD-10-CM

## 2018-10-20 NOTE — TELEPHONE ENCOUNTER
"Requested Prescriptions   Pending Prescriptions Disp Refills     lisinopril (PRINIVIL/ZESTRIL) 20 MG tablet [Pharmacy Med Name: LISINOPRIL 20MG TABLETS] 180 tablet 0    Last Written Prescription Date:  3/14/2018  Last Fill Quantity: 90,  # refills: 3   Last office visit: 8/16/2018 with prescribing provider:  8/16/2018   Future Office Visit:     Sig: TAKE 1 TABLET BY MOUTH TWICE DAILY    ACE Inhibitors (Including Combos) Protocol Failed    10/20/2018  1:46 PM       Failed - Blood pressure under 140/90 in past 12 months    BP Readings from Last 3 Encounters:   08/16/18 148/90   07/11/18 130/70   05/17/18 115/67                Failed - Normal serum creatinine on file in past 12 months    Recent Labs   Lab Test  08/15/17   1320   10/19/15   2205   CR  0.89   < >   --    CREAT   --    --   1.0    < > = values in this interval not displayed.            Failed - Normal serum potassium on file in past 12 months    Recent Labs   Lab Test  08/15/17   1320   POTASSIUM  4.9            Passed - Recent (12 mo) or future (30 days) visit within the authorizing provider's specialty    Patient had office visit in the last 12 months or has a visit in the next 30 days with authorizing provider or within the authorizing provider's specialty.  See \"Patient Info\" tab in inbasket, or \"Choose Columns\" in Meds & Orders section of the refill encounter.             Passed - Patient is age 18 or older          "

## 2018-10-22 RX ORDER — LISINOPRIL 20 MG/1
TABLET ORAL
Qty: 180 TABLET | Refills: 0 | OUTPATIENT
Start: 2018-10-22

## 2018-11-04 NOTE — PROGRESS NOTES
SUBJECTIVE:   Mars Reyes is a 78 year old male who presents to clinic today for the following health issues:    Hypertension Follow-up      Outpatient blood pressures are being checked at home.  Results are 130-120/90-80.    Low Salt Diet: low salt      Amount of exercise or physical activity: None    Problems taking medications regularly: No    Medication side effects: none    Diet: low fat/cholesterol      Problem list and histories reviewed & adjusted, as indicated.  Additional history: as documented    Patient Active Problem List   Diagnosis     Essential hypertension, benign     Gout     Anemia     Diverticulosis of large intestine     DJD (degenerative joint disease)     Aftercare following joint replacement     Hip joint replacement by other means     HYPERLIPIDEMIA LDL GOAL <130     Grief reaction     Pulmonary HTN (H)     Advanced directives, counseling/discussion     Non-rheumatic mitral valve stenosis     Thrombocytopenia (H)     MGUS (monoclonal gammopathy of unknown significance)     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, unspecified cause, unspecified laterality     Syncope, unspecified syncope type     Past Surgical History:   Procedure Laterality Date     BIOPSY  3/2017     BONE MARROW BIOPSY, BONE SPECIMEN, NEEDLE/TROCAR Right 2/23/2017    Procedure: BIOPSY BONE MARROW;  Surgeon: Romie Esquivel MD;  Location:  GI     COLONOSCOPY  2010     EYE SURGERY      Cataract, both eyes     ORTHOPEDIC SURGERY         Social History   Substance Use Topics     Smoking status: Never Smoker     Smokeless tobacco: Never Used     Alcohol use 0.0 oz/week      Comment: Max 2 beers per day     Family History   Problem Relation Age of Onset     Hypertension Mother          Current Outpatient Prescriptions   Medication Sig Dispense Refill     allopurinol (ZYLOPRIM) 100 MG tablet TAKE 1 TABLET BY MOUTH DAILY 30 tablet 0     ASPIRIN PO Take 81 mg by mouth daily       lisinopril (PRINIVIL/ZESTRIL) 20 MG  "tablet Take 1 tablet (20 mg) by mouth daily 90 tablet 3     pravastatin (PRAVACHOL) 20 MG tablet TAKE 1 TABLET BY MOUTH DAILY 90 tablet 1     Allergies   Allergen Reactions     Pravachol [Pravastatin]      Simvastatin      10/26/12: Arm pain - pt stopped it x 4-5 wks and arm pain didn't resolve sore started it      BP Readings from Last 3 Encounters:   08/16/18 148/90   07/11/18 130/70   05/17/18 115/67    Wt Readings from Last 3 Encounters:   08/16/18 188 lb 9.6 oz (85.5 kg)   07/11/18 190 lb 6.4 oz (86.4 kg)   05/17/18 193 lb 3.2 oz (87.6 kg)           Reviewed and updated as needed this visit by clinical staff       Reviewed and updated as needed this visit by Provider         ROS:  CONSTITUTIONAL: NEGATIVE for fever, chills, change in weight  EYES: NEGATIVE for vision changes or irritation  ENT/MOUTH: NEGATIVE for ear, mouth and throat problems  RESP: NEGATIVE for significant cough or SOB  CV: NEGATIVE for chest pain, palpitations or peripheral edema  GI: NEGATIVE for nausea, abdominal pain, heartburn, or change in bowel habits  : NEGATIVE for frequency, dysuria, or hematuria  MUSCULOSKELETAL: NEGATIVE for significant arthralgias or myalgia  NEURO: NEGATIVE for weakness, dizziness or paresthesias  ENDOCRINE: NEGATIVE for temperature intolerance, skin/hair changes  HEME: NEGATIVE for bleeding problems  PSYCHIATRIC: NEGATIVE for changes in mood or affect    OBJECTIVE:                                                    /78  Pulse 69  Temp 97.8  F (36.6  C) (Oral)  Resp 15  Ht 5' 11\" (1.803 m)  Wt 189 lb 6.4 oz (85.9 kg)  SpO2 97%  BMI 26.42 kg/m2  Body mass index is 26.42 kg/(m^2).  GENERAL: alert and no distress  EYES: Eyes grossly normal to inspection, extraocular movements - intact, and PERRL  HENT: ear canals- normal; TMs- normal; Nose- normal; Mouth- no ulcers, no lesions  NECK: no tenderness, no adenopathy, no asymmetry, no masses, no stiffness; thyroid- normal to palpation  RESP: lungs clear " to auscultation - no rales, no rhonchi, no wheezes  CV: regular rates and rhythm, normal S1 S2, no S3 or S4 and no murmur, no click or rub -  ABDOMEN: soft, no tenderness, no  hepatosplenomegaly, no masses, normal bowel sounds  MS: extremities- no gross deformities noted  PSYCH: Alert and oriented times 3; speech- coherent , normal rate and volume; able to articulate logical thoughts, able to abstract reason, no tangential thoughts, no hallucinations or delusions, affect- normal    LDL Cholesterol Calculated   Date Value Ref Range Status   03/14/2018 122 (H) <100 mg/dL Final     Comment:     Above desirable:  100-129 mg/dl  Borderline High:  130-159 mg/dL  High:             160-189 mg/dL  Very high:       >189 mg/dl          ASSESSMENT/PLAN:                                                      (I10) Essential hypertension, benign  (primary encounter diagnosis)  Comment: stable on therapy  Plan: lisinopril (PRINIVIL/ZESTRIL) 20 MG tablet,         Comprehensive metabolic panel            (D47.2) MGUS (monoclonal gammopathy of unknown significance)  Comment: as followed per Hematolgy  Plan:     (E78.5) Hyperlipidemia LDL goal <130  Comment: as ordered  Plan: pravastatin (PRAVACHOL) 20 MG tablet,         Comprehensive metabolic panel, Lipid Profile            (M1A.9XX0) 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, unspecified cause, unspecified laterality  Comment: stable on theapy  Plan: allopurinol (ZYLOPRIM) 100 MG tablet, Uric acid          See Patient Instructions    Pb Warren MD  [facility]    THE MEDICATION LIST HAS BEEN FULLY RECONCILED BY THE M.D. AND THE NURSING STAFF.

## 2018-11-05 ENCOUNTER — OFFICE VISIT (OUTPATIENT)
Dept: INTERNAL MEDICINE | Facility: CLINIC | Age: 78
End: 2018-11-05
Payer: COMMERCIAL

## 2018-11-05 VITALS
DIASTOLIC BLOOD PRESSURE: 78 MMHG | SYSTOLIC BLOOD PRESSURE: 138 MMHG | BODY MASS INDEX: 26.52 KG/M2 | WEIGHT: 189.4 LBS | OXYGEN SATURATION: 97 % | TEMPERATURE: 97.8 F | HEIGHT: 71 IN | HEART RATE: 69 BPM | RESPIRATION RATE: 15 BRPM

## 2018-11-05 DIAGNOSIS — I10 ESSENTIAL HYPERTENSION, BENIGN: Primary | ICD-10-CM

## 2018-11-05 DIAGNOSIS — E78.5 HYPERLIPIDEMIA LDL GOAL <130: ICD-10-CM

## 2018-11-05 DIAGNOSIS — D47.2 MGUS (MONOCLONAL GAMMOPATHY OF UNKNOWN SIGNIFICANCE): ICD-10-CM

## 2018-11-05 DIAGNOSIS — M1A.9XX0 CHRONIC GOUT INVOLVING TOE WITHOUT TOPHUS, UNSPECIFIED CAUSE, UNSPECIFIED LATERALITY: ICD-10-CM

## 2018-11-05 DIAGNOSIS — Z23 NEED FOR PROPHYLACTIC VACCINATION AND INOCULATION AGAINST INFLUENZA: ICD-10-CM

## 2018-11-05 PROCEDURE — 90662 IIV NO PRSV INCREASED AG IM: CPT | Performed by: INTERNAL MEDICINE

## 2018-11-05 PROCEDURE — 99214 OFFICE O/P EST MOD 30 MIN: CPT | Mod: 25 | Performed by: INTERNAL MEDICINE

## 2018-11-05 PROCEDURE — G0008 ADMIN INFLUENZA VIRUS VAC: HCPCS | Performed by: INTERNAL MEDICINE

## 2018-11-05 RX ORDER — PRAVASTATIN SODIUM 20 MG
20 TABLET ORAL DAILY
Qty: 90 TABLET | Refills: 3 | Status: SHIPPED | OUTPATIENT
Start: 2018-11-05 | End: 2019-08-14

## 2018-11-05 RX ORDER — LISINOPRIL 20 MG/1
20 TABLET ORAL DAILY
Qty: 90 TABLET | Refills: 3 | Status: SHIPPED | OUTPATIENT
Start: 2018-11-05 | End: 2019-08-14

## 2018-11-05 RX ORDER — ALLOPURINOL 100 MG/1
100 TABLET ORAL DAILY
Qty: 90 TABLET | Refills: 3 | Status: SHIPPED | OUTPATIENT
Start: 2018-11-05 | End: 2019-05-31

## 2018-11-05 NOTE — MR AVS SNAPSHOT
After Visit Summary   11/5/2018    Mars Reyes    MRN: [id number]           Patient Information     Date Of Birth          1940        Visit Information        Provider Department      11/5/2018 10:20 AM Pb Warren MD [facility]        Today's Diagnoses     Essential hypertension, benign    -  1    MGUS (monoclonal gammopathy of unknown significance)        Hyperlipidemia LDL goal <130        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, unspecified cause, unspecified laterality           Follow-ups after your visit        Future tests that were ordered for you today     Open Future Orders        Priority Expected Expires Ordered    Comprehensive metabolic panel Routine 11/5/2018 11/30/2018 11/5/2018    Lipid Profile Routine 11/5/2018 11/30/2018 11/5/2018    Uric acid Routine 11/5/2018 11/30/2018 11/5/2018            Who to contact     If you have questions or need follow up information about today's clinic visit or your schedule please contact [facility] directly at [phone].  Normal or non-critical lab and imaging results will be communicated to you by [employer], letter or phone within 4 business days after the clinic has received the results. If you do not hear from us within 7 days, please contact the clinic through tydyt or phone. If you have a critical or abnormal lab result, we will notify you by phone as soon as possible.  Submit refill requests through [employer] or call your pharmacy and they will forward the refill request to us. Please allow 3 business days for your refill to be completed.          Additional Information About Your Visit        MyChart Information     [employer] gives you secure access to your electronic health record. If you see a primary care provider, you can also send messages to your care team and make appointments. If you have questions, please call your primary care clinic.  If you do not have a primary care  "provider, please call [phone] and they will assist you.        Care EveryWhere ID     This is your Care EveryWhere ID. This could be used by other organizations to access your [place] medical records  [id number]        Your Vitals Were     Pulse Temperature Respirations Height Pulse Oximetry BMI (Body Mass Index)    69 97.8  F (36.6  C) (Oral) 15 5' 11\" (1.803 m) 97% 26.42 kg/m2       Blood Pressure from Last 3 Encounters:   11/05/18 138/78   08/16/18 148/90   07/11/18 130/70    Weight from Last 3 Encounters:   11/05/18 189 lb 6.4 oz (85.9 kg)   08/16/18 188 lb 9.6 oz (85.5 kg)   07/11/18 190 lb 6.4 oz (86.4 kg)                 Today's Medication Changes          These changes are accurate as of 11/5/18 10:35 AM.  If you have any questions, ask your nurse or doctor.               These medicines have changed or have updated prescriptions.        Dose/Directions    allopurinol 100 MG tablet   Commonly known as:  ZYLOPRIM   This may have changed:  See the new instructions.   Used for:  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, unspecified cause, unspecified laterality   Changed by:  Pb Warren MD        Dose:  100 mg   Take 1 tablet (100 mg) by mouth daily   Quantity:  90 tablet   Refills:  3       pravastatin 20 MG tablet   Commonly known as:  PRAVACHOL   This may have changed:  See the new instructions.   Used for:  Hyperlipidemia LDL goal <130   Changed by:  Pb Warren MD        Dose:  20 mg   Take 1 tablet (20 mg) by mouth daily   Quantity:  90 tablet   Refills:  3            Where to get your medicines      These medications were sent to [facility] [address] [place] [address] [address] AT [place] [address] & [facility] [address]  [address], [facility] [address]     Phone:  [phone]     allopurinol 100 MG tablet    lisinopril 20 MG tablet    pravastatin 20 MG tablet                Primary Care Provider Office Phone # Fax #    Pb Warren [phone] " [phone]       [address] [facility] [address]        Equal Access to Services     ZEE FULLER : Hadii kwabena mcdaniels jody Socarola, waaparnada luqadaha, qaybta kaalmada donn, michael hoin hayaabe chavezmin sawyer timmy cohen. So [facility] [phone].    ATENCIÓN: Si habla español, tiene a candelario disposición servicios gratuitos de asistencia lingüística. Llame al [phone].    We comply with applicable federal civil rights laws and [place] laws. We do not discriminate on the basis of race, color, national origin, age, disability, sex, sexual orientation, or gender identity.            Thank you!     Thank you for choosing [facility]  for your care. Our goal is always to provide you with excellent care. Hearing back from our patients is one way we can continue to improve our services. Please take a few minutes to complete the written survey that you may receive in the mail after your visit with us. Thank you!             Your Updated Medication List - Protect others around you: Learn how to safely use, store and throw away your medicines at www.disposemymeds.org.          This list is accurate as of 11/5/18 10:35 AM.  Always use your most recent med list.                   Brand Name Dispense Instructions for use Diagnosis    allopurinol 100 MG tablet    ZYLOPRIM    90 tablet    Take 1 tablet (100 mg) by mouth daily    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, unspecified cause, unspecified laterality       ASPIRIN PO      Take 81 mg by mouth daily        lisinopril 20 MG tablet    PRINIVIL/ZESTRIL    90 tablet    Take 1 tablet (20 mg) by mouth daily    Essential hypertension, benign       pravastatin 20 MG tablet    PRAVACHOL    90 tablet    Take 1 tablet (20 mg) by mouth daily    Hyperlipidemia LDL goal <130

## 2018-11-05 NOTE — PROGRESS NOTES

## 2018-11-08 DIAGNOSIS — E78.5 HYPERLIPIDEMIA LDL GOAL <130: ICD-10-CM

## 2018-11-08 DIAGNOSIS — M1A.9XX0 CHRONIC GOUT INVOLVING TOE WITHOUT TOPHUS, UNSPECIFIED CAUSE, UNSPECIFIED LATERALITY: ICD-10-CM

## 2018-11-08 DIAGNOSIS — I10 ESSENTIAL HYPERTENSION, BENIGN: ICD-10-CM

## 2018-11-08 LAB
ALBUMIN SERPL-MCNC: 3.9 G/DL (ref 3.4–5)
ALP SERPL-CCNC: 79 U/L (ref 40–150)
ALT SERPL W P-5'-P-CCNC: 23 U/L (ref 0–70)
ANION GAP SERPL CALCULATED.3IONS-SCNC: 8 MMOL/L (ref 3–14)
AST SERPL W P-5'-P-CCNC: 25 U/L (ref 0–45)
BILIRUB SERPL-MCNC: 1.7 MG/DL (ref 0.2–1.3)
BUN SERPL-MCNC: 14 MG/DL (ref 7–30)
CALCIUM SERPL-MCNC: 9.6 MG/DL (ref 8.5–10.1)
CHLORIDE SERPL-SCNC: 101 MMOL/L (ref 94–109)
CHOLEST SERPL-MCNC: 137 MG/DL
CO2 SERPL-SCNC: 28 MMOL/L (ref 20–32)
CREAT SERPL-MCNC: 1.09 MG/DL (ref 0.66–1.25)
GFR SERPL CREATININE-BSD FRML MDRD: 66 ML/MIN/1.7M2
GLUCOSE SERPL-MCNC: 95 MG/DL (ref 70–99)
HDLC SERPL-MCNC: 37 MG/DL
LDLC SERPL CALC-MCNC: 67 MG/DL
NONHDLC SERPL-MCNC: 100 MG/DL
POTASSIUM SERPL-SCNC: 4.6 MMOL/L (ref 3.4–5.3)
PROT SERPL-MCNC: 7.7 G/DL (ref 6.8–8.8)
SODIUM SERPL-SCNC: 137 MMOL/L (ref 133–144)
TRIGL SERPL-MCNC: 163 MG/DL
URATE SERPL-MCNC: 7.2 MG/DL (ref 3.5–7.2)

## 2018-11-08 PROCEDURE — 36415 COLL VENOUS BLD VENIPUNCTURE: CPT | Performed by: INTERNAL MEDICINE

## 2018-11-08 PROCEDURE — 80053 COMPREHEN METABOLIC PANEL: CPT | Performed by: INTERNAL MEDICINE

## 2018-11-08 PROCEDURE — 80061 LIPID PANEL: CPT | Performed by: INTERNAL MEDICINE

## 2018-11-08 PROCEDURE — 84550 ASSAY OF BLOOD/URIC ACID: CPT | Performed by: INTERNAL MEDICINE

## 2019-03-14 ASSESSMENT — ACTIVITIES OF DAILY LIVING (ADL): CURRENT_FUNCTION: NO ASSISTANCE NEEDED

## 2019-03-18 ENCOUNTER — HOSPITAL ENCOUNTER (OUTPATIENT)
Dept: CARDIOLOGY | Facility: CLINIC | Age: 79
Discharge: HOME | End: 2019-03-18
Attending: FAMILY MEDICINE | Admitting: FAMILY MEDICINE
Payer: COMMERCIAL

## 2019-03-18 ENCOUNTER — OFFICE VISIT (OUTPATIENT)
Dept: CARDIOLOGY | Facility: CLINIC | Age: 79
End: 2019-03-18
Payer: COMMERCIAL

## 2019-03-18 ENCOUNTER — HOSPITAL ENCOUNTER (OUTPATIENT)
Facility: CLINIC | Age: 79
End: 2019-03-18
Attending: INTERNAL MEDICINE
Payer: COMMERCIAL

## 2019-03-18 VITALS
HEIGHT: 71 IN | WEIGHT: 187.3 LBS | HEART RATE: 68 BPM | SYSTOLIC BLOOD PRESSURE: 150 MMHG | DIASTOLIC BLOOD PRESSURE: 82 MMHG | BODY MASS INDEX: 26.22 KG/M2

## 2019-03-18 DIAGNOSIS — I34.2 NON-RHEUMATIC MITRAL VALVE STENOSIS: ICD-10-CM

## 2019-03-18 DIAGNOSIS — I35.0 NONRHEUMATIC AORTIC VALVE STENOSIS: ICD-10-CM

## 2019-03-18 DIAGNOSIS — I48.3 TYPICAL ATRIAL FLUTTER (H): Primary | ICD-10-CM

## 2019-03-18 PROCEDURE — 93000 ELECTROCARDIOGRAM COMPLETE: CPT | Performed by: INTERNAL MEDICINE

## 2019-03-18 PROCEDURE — 99214 OFFICE O/P EST MOD 30 MIN: CPT | Mod: 25 | Performed by: INTERNAL MEDICINE

## 2019-03-18 PROCEDURE — 93306 TTE W/DOPPLER COMPLETE: CPT | Mod: 26 | Performed by: INTERNAL MEDICINE

## 2019-03-18 PROCEDURE — 93306 TTE W/DOPPLER COMPLETE: CPT

## 2019-03-18 ASSESSMENT — MIFFLIN-ST. JEOR: SCORE: 1596.72

## 2019-03-18 NOTE — PATIENT INSTRUCTIONS
1. Continue Eliquis until we see you back in spring    2. Will schedule a cardioversion (or shock) to the heart at [facility] this week.  The gets the heart out of atrial flutter 90% of the time.    3. It should be fine to travel this weekend    4. If you go back into atrial flutter, then I would like to to meet with an electrophysiologist to discuss an ablation

## 2019-03-18 NOTE — LETTER
3/18/2019    Pb Warren MD  [address] [facility] [address]    RE: Mars Reyes       Dear Colleague,    I had the pleasure of seeing Mars Reyes in the [facility].    CARDIOLOGY VISIT    REASON FOR VISIT: Aortic stenosis, atrial flutter    SUBJECTIVE:  76-year-old male seen for evaluation of valvular heart disease and SVT.     Echo May 2017 showed ejection fraction 60%, mild to moderate LVH, normal right ventricle, moderate to severe left atrial enlargement, mild-to-moderate mitral regurgitation, mild mitral stenosis with mean gradient 5 mmHg, moderate tricuspid regurgitation, RVSP 44 mmHg, moderate aortic stenosis (mean 21mmHg, Vmax 3.3m/s, DI 0.39, LV 1.3cm2, SVI 44ml/m2), mild-moderate AI.        He recently has undergone evaluation for thrombocytopenia.  He has been off his aspirin recently because of this.       Echo Feb 2018 ([facility]) showed EF 75%, mild-moderate MS with mean 5mmHg, moderate AS (mean 23mmHg, Vmax 3.5m/s, LV 1.3cm2, DI 0.44, SVI 45ml/m2).       11 day Zio patch from March 2018 showed baseline rhythm of sinus, he had paroxysmal SVT lasting up to 90 seconds on several occasions, on one episode QRS was borderline wide, likely SVT with aberrancy, there were no pauses, heart block, or VT.     In January he was in [place].  He had a syncopal episode.  He was found to be in rate controlled atrial flutter.  Eliquis was started, but no cardioversion or ablation was done.  They thought the syncope was from dehydration.    He continues to feel well.  He will exercise occasionally and has no symptoms.  Heart rate has been in the 60-80 range.    Echocardiogram March 2019 showed EF 60%, mild to moderate mitral stenosis with mean gradient 5 mmHg, moderate aortic stenosis with mean 24 mmHg, V-max 3.5 m/s, area 1.2 cm , DI 0.37.    MEDICATIONS:  Current Outpatient Medications   Medication     allopurinol (ZYLOPRIM) 100 MG tablet     ASPIRIN PO      "lisinopril (PRINIVIL/ZESTRIL) 20 MG tablet     pravastatin (PRAVACHOL) 20 MG tablet     No current facility-administered medications for this visit.        ALLERGIES:  Allergies   Allergen Reactions     Pravachol [Pravastatin]      Simvastatin      10/26/12: Arm pain - pt stopped it x 4-5 wks and arm pain didn't resolve sore started it        REVIEW OF SYSTEMS:  Constitutional:  No weight loss, fever, chills, weakness or fatigue.  HEENT:  Eyes:  No visual loss, blurred vision, double vision or yellow sclerae. No hearing loss, sneezing, congestion, runny nose or sore throat.  Skin:  No rash or itching.  Cardiovascular: per HPI  Respiratory: per HPI  GI:  No anorexia, nausea, vomiting or diarrhea. No abdominal pain or blood.  :  No dysurea, hematuria  Neurologic:  No headache, dizziness, syncope, paralysis, ataxia, numbness or tingling in the extremities. No change in bowel or bladder control.  Musculoskeletal:  No muscle, back pain, joint pain or stiffness.  Hematologic:  No anemia, bleeding or bruising.  Lymphatics:  No enlarged nodes. No history of splenectomy.  Psychiatric:  No history of depression or anxiety.  Endocrine:  No reports of sweating, cold or heat intolerance. No polyuria or polydipsia.  Allergies:  No history of asthma, hives, eczema or rhinitis.    PHYSICAL EXAM:  /82 (BP Location: Right arm, Patient Position: Chair, Cuff Size: Adult Regular)   Pulse 68   Ht 1.803 m (5' 11\")   Wt 85 kg (187 lb 4.8 oz)   BMI 26.12 kg/m     Constitutional: awake, alert, no distress  Eyes: PERRL, sclera nonicteric  ENT: trachea midline  Respiratory: Lungs clear  Cardiovascular: Regular rate, totally irregular rhythm, 2/6 mid peaking systolic ejection murmur at the upper sternal border, no lower extremity edema  GI: nondistended, nontender, bowel sounds present  Lymph/Hematologic: no lymphadenopathy  Skin: dry, no rash  Musculoskeletal: good muscle tone, strength 5/5 in upper and lower " extremities  Neurologic: no focal deficits  Neuropsychiatric: appropriate affact    DATA:  Lab: November 2018: Cholesterol 137, HDL 37, LDL 67, triglycerides 167, creatinine 1.1     EKG: March 18, 2019: Atrial flutter, rate 70    ASSESSMENT:  78-year-old male seen for follow-up of valvular heart disease and atrial flutter.  His valve issues are stable.  The aortic stenosis is in the moderate range.  He can have a repeat echo in about 1 year.    The atrial flutter is a new diagnosis.  He has had SVT in the past, but no documented flutter.  He has no symptoms, rates are controlled.  We talked about the options of cardioversion or ablation.  We agreed that cardioversion will be scheduled for this week.  He will stay on Eliquis.  He is traveling to [place] this weekend for about a month.  If he had recurrent atrial flutter, he will be sent to EP to discuss ablation.    RECOMMENDATIONS:  1.  Moderate aortic stenosis  -Atrial flutter, newly diagnosed repeat echo in about 1 year    2.  Atrial flutter, newly diagnosed  -Continue Eliquis, he has not missed any doses for the past 2 months cardioversion this week, no transesophageal echo needed  -If recurrent flutter in the future, refer to electrophysiology to discuss ablation    Follow-up in about 2 months when he returns from [place].    Brandt Iglesias MD  Cardiology - [facility]  Pager:  [phone]  Text Page  March 18, 2019      Thank you for allowing me to participate in the care of your patient.      Sincerely,     Brandt Iglesias MD     [facility]

## 2019-03-18 NOTE — PROGRESS NOTES
CARDIOLOGY VISIT    REASON FOR VISIT: Aortic stenosis, atrial flutter    SUBJECTIVE:  76-year-old male seen for evaluation of valvular heart disease and SVT.     Echo May 2017 showed ejection fraction 60%, mild to moderate LVH, normal right ventricle, moderate to severe left atrial enlargement, mild-to-moderate mitral regurgitation, mild mitral stenosis with mean gradient 5 mmHg, moderate tricuspid regurgitation, RVSP 44 mmHg, moderate aortic stenosis (mean 21mmHg, Vmax 3.3m/s, DI 0.39, LV 1.3cm2, SVI 44ml/m2), mild-moderate AI.        He recently has undergone evaluation for thrombocytopenia.  He has been off his aspirin recently because of this.       Echo Feb 2018 ([facility]) showed EF 75%, mild-moderate MS with mean 5mmHg, moderate AS (mean 23mmHg, Vmax 3.5m/s, LV 1.3cm2, DI 0.44, SVI 45ml/m2).       11 day Zio patch from March 2018 showed baseline rhythm of sinus, he had paroxysmal SVT lasting up to 90 seconds on several occasions, on one episode QRS was borderline wide, likely SVT with aberrancy, there were no pauses, heart block, or VT.     In January he was in [place].  He had a syncopal episode.  He was found to be in rate controlled atrial flutter.  Eliquis was started, but no cardioversion or ablation was done.  They thought the syncope was from dehydration.    He continues to feel well.  He will exercise occasionally and has no symptoms.  Heart rate has been in the 60-80 range.    Echocardiogram March 2019 showed EF 60%, mild to moderate mitral stenosis with mean gradient 5 mmHg, moderate aortic stenosis with mean 24 mmHg, V-max 3.5 m/s, area 1.2 cm , DI 0.37.    MEDICATIONS:  Current Outpatient Medications   Medication     allopurinol (ZYLOPRIM) 100 MG tablet     ASPIRIN PO     lisinopril (PRINIVIL/ZESTRIL) 20 MG tablet     pravastatin (PRAVACHOL) 20 MG tablet     No current facility-administered medications for this visit.        ALLERGIES:  Allergies   Allergen Reactions     Pravachol  "[Pravastatin]      Simvastatin      10/26/12: Arm pain - pt stopped it x 4-5 wks and arm pain didn't resolve sore started it        REVIEW OF SYSTEMS:  Constitutional:  No weight loss, fever, chills, weakness or fatigue.  HEENT:  Eyes:  No visual loss, blurred vision, double vision or yellow sclerae. No hearing loss, sneezing, congestion, runny nose or sore throat.  Skin:  No rash or itching.  Cardiovascular: per HPI  Respiratory: per HPI  GI:  No anorexia, nausea, vomiting or diarrhea. No abdominal pain or blood.  :  No dysurea, hematuria  Neurologic:  No headache, dizziness, syncope, paralysis, ataxia, numbness or tingling in the extremities. No change in bowel or bladder control.  Musculoskeletal:  No muscle, back pain, joint pain or stiffness.  Hematologic:  No anemia, bleeding or bruising.  Lymphatics:  No enlarged nodes. No history of splenectomy.  Psychiatric:  No history of depression or anxiety.  Endocrine:  No reports of sweating, cold or heat intolerance. No polyuria or polydipsia.  Allergies:  No history of asthma, hives, eczema or rhinitis.    PHYSICAL EXAM:  /82 (BP Location: Right arm, Patient Position: Chair, Cuff Size: Adult Regular)   Pulse 68   Ht 1.803 m (5' 11\")   Wt 85 kg (187 lb 4.8 oz)   BMI 26.12 kg/m    Constitutional: awake, alert, no distress  Eyes: PERRL, sclera nonicteric  ENT: trachea midline  Respiratory: Lungs clear  Cardiovascular: Regular rate, totally irregular rhythm, 2/6 mid peaking systolic ejection murmur at the upper sternal border, no lower extremity edema  GI: nondistended, nontender, bowel sounds present  Lymph/Hematologic: no lymphadenopathy  Skin: dry, no rash  Musculoskeletal: good muscle tone, strength 5/5 in upper and lower extremities  Neurologic: no focal deficits  Neuropsychiatric: appropriate affact    DATA:  Lab: November 2018: Cholesterol 137, HDL 37, LDL 67, triglycerides 167, creatinine 1.1     EKG: March 18, 2019: Atrial flutter, rate " 70    ASSESSMENT:  78-year-old male seen for follow-up of valvular heart disease and atrial flutter.  His valve issues are stable.  The aortic stenosis is in the moderate range.  He can have a repeat echo in about 1 year.    The atrial flutter is a new diagnosis.  He has had SVT in the past, but no documented flutter.  He has no symptoms, rates are controlled.  We talked about the options of cardioversion or ablation.  We agreed that cardioversion will be scheduled for this week.  He will stay on Eliquis.  He is traveling to [place] this weekend for about a month.  If he had recurrent atrial flutter, he will be sent to EP to discuss ablation.    RECOMMENDATIONS:  1.  Moderate aortic stenosis  -Atrial flutter, newly diagnosed repeat echo in about 1 year    2.  Atrial flutter, newly diagnosed  -Continue Eliquis, he has not missed any doses for the past 2 months cardioversion this week, no transesophageal echo needed  -If recurrent flutter in the future, refer to electrophysiology to discuss ablation    Follow-up in about 2 months when he returns from [place].    Brandt Iglesias MD  Cardiology - [facility]  Pager:  [phone]  Text Page  March 18, 2019

## 2019-03-19 ENCOUNTER — HOSPITAL ENCOUNTER (OUTPATIENT)
Facility: CLINIC | Age: 79
Setting detail: SPECIMEN
Discharge: HOME | End: 2019-03-19
Attending: INTERNAL MEDICINE | Admitting: INTERNAL MEDICINE
Payer: COMMERCIAL

## 2019-03-19 ENCOUNTER — TELEPHONE (OUTPATIENT)
Dept: CARDIOLOGY | Facility: CLINIC | Age: 79
End: 2019-03-19

## 2019-03-19 ENCOUNTER — ONCOLOGY VISIT (OUTPATIENT)
Dept: ONCOLOGY | Facility: CLINIC | Age: 79
End: 2019-03-19
Attending: INTERNAL MEDICINE
Payer: COMMERCIAL

## 2019-03-19 ENCOUNTER — INFUSION THERAPY VISIT (OUTPATIENT)
Dept: INFUSION THERAPY | Facility: CLINIC | Age: 79
End: 2019-03-19
Attending: INTERNAL MEDICINE
Payer: COMMERCIAL

## 2019-03-19 VITALS
RESPIRATION RATE: 16 BRPM | HEART RATE: 61 BPM | HEIGHT: 71 IN | WEIGHT: 187 LBS | DIASTOLIC BLOOD PRESSURE: 82 MMHG | OXYGEN SATURATION: 98 % | TEMPERATURE: 97.9 F | SYSTOLIC BLOOD PRESSURE: 131 MMHG | BODY MASS INDEX: 26.18 KG/M2

## 2019-03-19 DIAGNOSIS — D47.2 MGUS (MONOCLONAL GAMMOPATHY OF UNKNOWN SIGNIFICANCE): Primary | ICD-10-CM

## 2019-03-19 DIAGNOSIS — D69.6 THROMBOCYTOPENIA (H): ICD-10-CM

## 2019-03-19 LAB
ERYTHROCYTE [DISTWIDTH] IN BLOOD BY AUTOMATED COUNT: 12.7 % (ref 10–15)
HCT VFR BLD AUTO: 41 % (ref 40–53)
HGB BLD-MCNC: 14.1 G/DL (ref 13.3–17.7)
MCH RBC QN AUTO: 29.1 PG (ref 26.5–33)
MCHC RBC AUTO-ENTMCNC: 34.4 G/DL (ref 31.5–36.5)
MCV RBC AUTO: 85 FL (ref 78–100)
PLATELET # BLD AUTO: 69 10E9/L (ref 150–450)
RBC # BLD AUTO: 4.84 10E12/L (ref 4.4–5.9)
WBC # BLD AUTO: 8.8 10E9/L (ref 4–11)

## 2019-03-19 PROCEDURE — 36415 COLL VENOUS BLD VENIPUNCTURE: CPT

## 2019-03-19 PROCEDURE — G0463 HOSPITAL OUTPT CLINIC VISIT: HCPCS

## 2019-03-19 PROCEDURE — 85027 COMPLETE CBC AUTOMATED: CPT | Performed by: INTERNAL MEDICINE

## 2019-03-19 PROCEDURE — 99213 OFFICE O/P EST LOW 20 MIN: CPT | Performed by: INTERNAL MEDICINE

## 2019-03-19 ASSESSMENT — PAIN SCALES - GENERAL: PAINLEVEL: NO PAIN (0)

## 2019-03-19 ASSESSMENT — MIFFLIN-ST. JEOR: SCORE: 1595.36

## 2019-03-19 NOTE — TELEPHONE ENCOUNTER
Cardioversion    Scheduled: 03-21-19  Location: [facility]  Check-in time: 930 am  Procedure time: 1130 am     Instructions...     NPO after midnight, the night before the procedure.      Medications that can be taken on the morning of the procedure (with small sips of water): Eliquis and Lisinopril     Patient should take his other medications when he returns home.       Patient will need a ride home and a person stay with him for 24 hours after the procedure.      --------------------------------------------------------------------------------------------    Prep instructions were reviewed with patient over the phone. Patient has not missed any doses of Xarelto in the last four weeks. TGarbersRN

## 2019-03-19 NOTE — PROGRESS NOTES
Medical Assistant Note:  Mars Reyes presents today for lab draw  Patient seen by provider today: Yes / More[place]   present during visit today: Not Applicable.    Concerns: No Concerns.    Procedure:  Lab draw site: LAC, Needle type: BF, Gauge: 23g with gauze and coban.    Post Assessment:  Labs drawn without difficulty: Yes.    Discharge Plan:  Departure Mode: Ambulatory.    Face to Face Time: 4mins.    Savita Pradhan CMA

## 2019-03-19 NOTE — Clinical Note
3/19/2019         RE: Mars Reyes  [address] [facility] [address]        Dear Colleague,    Thank you for referring your patient, Mars Reyes, to the [facility]. Please see a copy of my visit note below.      Patient    Visit Date:   03/19/2019      SUBJECTIVE:  Mr. Reyes is a 78-year-old gentleman with MGUS and chronic thrombocytopenia.  He is here for followup.      Overall, he has been doing well.  He has atrial flutter.  He will be undergoing cardioversion in the next few days.      No headache.  No dizziness.  No chest pain.  No shortness of breath.  No abdominal pain, nausea or vomiting.  No bleeding.  Appetite good.  No urinary or bowel complaints.      PHYSICAL EXAMINATION:  Unchanged.      LABORATORY DATA:  Reviewed.      ASSESSMENT:   1.  A 78-year-old gentleman with chronic idiopathic thrombocytopenic purpura.  Thrombocytopenia is stable.  No bleeding.   2.  MGUS (IgG kappa).  MGUS has been stable.      PLAN:   1.  The patient is doing well.  Labs were reviewed with him.  He has chronic thrombocytopenia.  Platelet is stable.  No bleeding complication.      The patient will be undergoing cardioversion.  His platelets are in the safe range for the procedure or anticoagulation if needed.      The patient's thrombocytopenia is due to chronic ITP.  If his platelet goes below 30 or if he has bleeding, we will plan on treating him with a steroid.  I advised him to see a physician if he has bleeding from any site, easy bruising, red skin rash or any unexplained pain.      2.  He has MGUS.  Natural history of disease is explained.  I explained to him the risk of progression to myeloma is very low at 1% per year.  We will continue to monitor him.  I will see him in 6 months' time with labs.  I advised him to see a physician sooner if he has unexplained pain, bone pain, bone fracture, worsening weakness or any other concerns.         AKOSUA CHONG MD             D: 03/20/2019   T:  2019   MT: VH      Name:     DESEAN MARTINEZ   MRN:      [id number]        Account:      [id number]   :      1940           Visit Date:   2019      Document: [id number]       Again, thank you for allowing me to participate in the care of your patient.        Sincerely,        Pino Mayer MD

## 2019-03-20 ENCOUNTER — OFFICE VISIT (OUTPATIENT)
Dept: INTERNAL MEDICINE | Facility: CLINIC | Age: 79
End: 2019-03-20
Payer: COMMERCIAL

## 2019-03-20 ENCOUNTER — HOSPITAL ENCOUNTER (OUTPATIENT)
Dept: ULTRASOUND IMAGING | Facility: CLINIC | Age: 79
Discharge: HOME | End: 2019-03-20
Attending: INTERNAL MEDICINE | Admitting: INTERNAL MEDICINE
Payer: COMMERCIAL

## 2019-03-20 VITALS
RESPIRATION RATE: 14 BRPM | HEART RATE: 63 BPM | HEIGHT: 71 IN | TEMPERATURE: 97.2 F | BODY MASS INDEX: 25.89 KG/M2 | OXYGEN SATURATION: 98 % | DIASTOLIC BLOOD PRESSURE: 80 MMHG | SYSTOLIC BLOOD PRESSURE: 132 MMHG | WEIGHT: 184.9 LBS

## 2019-03-20 DIAGNOSIS — Z12.11 COLON CANCER SCREENING: ICD-10-CM

## 2019-03-20 DIAGNOSIS — D47.2 MGUS (MONOCLONAL GAMMOPATHY OF UNKNOWN SIGNIFICANCE): ICD-10-CM

## 2019-03-20 DIAGNOSIS — R09.89 BILATERAL CAROTID BRUITS: ICD-10-CM

## 2019-03-20 DIAGNOSIS — I48.92 ATRIAL FLUTTER, UNSPECIFIED TYPE (H): ICD-10-CM

## 2019-03-20 DIAGNOSIS — Z12.5 SCREENING PSA (PROSTATE SPECIFIC ANTIGEN): ICD-10-CM

## 2019-03-20 DIAGNOSIS — Z23 NEED FOR PROPHYLACTIC VACCINATION WITH TETANUS-DIPHTHERIA (TD): ICD-10-CM

## 2019-03-20 DIAGNOSIS — Z00.00 MEDICARE ANNUAL WELLNESS VISIT, SUBSEQUENT: Primary | ICD-10-CM

## 2019-03-20 DIAGNOSIS — I10 ESSENTIAL HYPERTENSION, BENIGN: ICD-10-CM

## 2019-03-20 DIAGNOSIS — E78.5 HYPERLIPIDEMIA LDL GOAL <130: ICD-10-CM

## 2019-03-20 LAB — PSA SERPL-ACNC: 3.86 UG/L (ref 0–4)

## 2019-03-20 PROCEDURE — G0103 PSA SCREENING: HCPCS | Performed by: INTERNAL MEDICINE

## 2019-03-20 PROCEDURE — 99214 OFFICE O/P EST MOD 30 MIN: CPT | Mod: 25 | Performed by: INTERNAL MEDICINE

## 2019-03-20 PROCEDURE — 36415 COLL VENOUS BLD VENIPUNCTURE: CPT | Performed by: INTERNAL MEDICINE

## 2019-03-20 PROCEDURE — 93880 EXTRACRANIAL BILAT STUDY: CPT

## 2019-03-20 PROCEDURE — 99397 PER PM REEVAL EST PAT 65+ YR: CPT | Performed by: INTERNAL MEDICINE

## 2019-03-20 ASSESSMENT — MIFFLIN-ST. JEOR: SCORE: 1585.83

## 2019-03-20 NOTE — PATIENT INSTRUCTIONS
Services Typically covered by Medicare Recommended Completed   Vaccines    Pneumonoccol    Influenza    Hepatitis B (if medium/high risk)     Once for patients after age 65    Yearly  Medium/high risk factors:    End Stage Kidney Disease    Hemophiliacs who received Factor XIII or IX concentrates    Clients of institutions for developmentally disabled    Persons who live in same house as a Hepatitis B carrier    Homosexual men    Illicit injectable drug users    Health care workers     Mammogram Covered: One-time screen between age 35-39, annually for age 40+     Pap and Pelvic Exam Covered: Annually if  high risk,  or childbearing age with abnormal Pap in last 3 years.  Q24 months for all other women     Prostate Cancer Screening    Digital rectal exam    PSA Covered: Annually for all men > age 50     Corolrectal Cancer Screening Screening colonoscopy every 10 years, more often for high risk patients     Diabetes Self-Management Training Requires referral by treating physician for patient with diabetes     Diabetes Screening    Fasting blood sugar or glucose tolerance test   Once yearly, twice yearly if prediabetic     Cardiovascular Screening Blood Tests    Total Cholesterol    HDL    Triglycerides Every 5 years     Medical Nutrition Therapy for Diabetes or Renal Disease Requires referral by treating physician for patient with diabetes or kidney disease     Glaucoma Screening Annually for patients with one of the following risk factors:    Diabetes Mellitus    Family history of Glaucoma    -American age 50 and over    -American age 65 and over     Bone Mass Measurement Every 24 months if one of the following risk factors:    Estrogen deficiency    Vertebral abnormalities on x-ray indicative of Osteoporosis, Osteopenia, or Vertebral fracture    Receiving/expected to receive the equivalent of at least 5 mg of Prednisone per day for > 3 months    Hyperparathyroidism    Patient being monitored for  response to Osteoporosis Therapy     One-time AAA screen  Must be ordered as part of Medicare IPPE   Any patient with a family history of AAA    Males Age 65-75, with history of smoking at least 100 cigarettes in lifetime     Smoking Cessation Counseling Beneficiaries who use tobacco are eligible to receive 2 cessation attempts per year; each attempt includes maximum of 4 sessions     HIV Screening Annually for beneficiaries at increased risk:       Increased risk for HIV infection is defined in the  National Coverage Determinations (NCD) Manual,  Publication 100-03 Sections 190.14 (diagnostic) and 210.7 (screening). See http://www.cms.gov/manuals/downloads/iro849c0_Tadd1.pdf and http://www.cms.gov/manuals/downloads/qfc191n3_Zsed2.pdf on the Internet.  Three times per pregnancy for beneficiaries who are pregnant.     Future Annual Wellness Visit Annually, for all beneficiaries.       Preventive Health Recommendations:     See your health care provider every year to    Review health changes.     Discuss preventive care.      Review your medicines if your doctor has prescribed any.    Talk with your health care provider about whether you should have a test to screen for prostate cancer (PSA).    Every 3 years, have a diabetes test (fasting glucose). If you are at risk for diabetes, you should have this test more often.    Every 5 years, have a cholesterol test. Have this test more often if you are at risk for high cholesterol or heart disease.     Every 10 years, have a colonoscopy. Or, have a yearly FIT test (stool test). These exams will check for colon cancer.    Talk to with your health care provider about screening for Abdominal Aortic Aneurysm if you have a family history of AAA or have a history of smoking.  Shots:     Get a flu shot each year.     Get a tetanus shot every 10 years.     Talk to your doctor about your pneumonia vaccines. There are now two you should receive - Pneumovax (PPSV 23) and Prevnar (PCV  13).    Talk to your pharmacist about a shingles vaccine.     Talk to your doctor about the hepatitis B vaccine.  Nutrition:     Eat at least 5 servings of fruits and vegetables each day.     Eat whole-grain bread, whole-wheat pasta and brown rice instead of white grains and rice.     Get adequate Calcium and Vitamin D.   Lifestyle    Exercise for at least 150 minutes a week (30 minutes a day, 5 days a week). This will help you control your weight and prevent disease.     Limit alcohol to one drink per day.     No smoking.     Wear sunscreen to prevent skin cancer.     See your dentist every six months for an exam and cleaning.     See your eye doctor every 1 to 2 years to screen for conditions such as glaucoma, macular degeneration and cataracts.    Personalized Prevention Plan  You are due for the preventive services outlined below.  Your care team is available to assist you in scheduling these services.  If you have already completed any of these items, please share that information with your care team to update in your medical record.    Health Maintenance Due   Topic Date Due     Zoster (Shingles) Vaccine (1 of 2) 10/10/1991     Diptheria Tetanus Pertussis (DTAP/TDAP/TD) Vaccine (3 - Td) 02/21/2018     Annual Wellness Visit  03/14/2019     FALL RISK ASSESSMENT  03/14/2019

## 2019-03-20 NOTE — LETTER
[facility]  [address] [address]  [phone]      3/20/2019       Mars Reyes  [facility] [place] [facility] [place] [facility] [id number]        Dear Mars,    In addition, your PSA or prostate screening antigen is stable and should be repeated annually.    Sincerely,      Pb Warren MD  Internal Medicine

## 2019-03-20 NOTE — PROGRESS NOTES
"  SUBJECTIVE:   Mars Reyes is a 78 year old male who presents for Preventive Visit.  Are you in the first 12 months of your Medicare Part B coverage?  No    Patient informs me that he was traveling abroad fell after getting off the plane and was seen and evaluated and found to be in atrial flutter.  At that time he was placed on Eliquis and is scheduled to see a cardiologist in the upcoming next few days for potential conversion.      Answers for HPI/ROS submitted by the patient on 3/14/2019   Annual Exam:  In general, how would you rate your overall physical health?: good  Frequency of exercise:: 4-5 days/week  Do you usually eat at least 4 servings of fruit and vegetables a day, include whole grains & fiber, and avoid regularly eating high fat or \"junk\" foods? : Yes  Taking medications regularly:: Yes  Medication side effects:: None  Activities of Daily Living: no assistance needed  Home safety: no safety concerns identified  Hearing Impairment:: no hearing concerns  In the past 6 months, have you been bothered by leaking of urine?: No  In general, how would you rate your overall mental or emotional health?: good  Additional concerns today:: No  Duration of exercise:: 45-60 minutes  PHQ-2 Score: (P) 0    Do you feel safe in your environment? Yes    Do you have a Health Care Directive? Yes: Advance Directive has been received and scanned.    Additional concerns to address?  YES    Fall risk: 0      Cognitive Screening: normal    Do you have sleep apnea, excessive snoring or daytime drowsiness?: no      Other concerns:  1. Patient had episode of syncope while in [place] in January. Was noted that patient was in atrial flutter when seen at the time. Scheduled with cardiology for a cardioversion   2. Left hip pain- does see orthopedist. Previous hx of total hip replacement.     Reviewed and updated as needed this visit by clinical staff  Meds         Reviewed and updated as needed this visit by Provider      "   Social History     Tobacco Use     Smoking status: Never Smoker     Smokeless tobacco: Never Used   Substance Use Topics     Alcohol use: Yes     Alcohol/week: 0.0 oz     Comment: Max 2 beers per day                           Current providers sharing in care for this patient include:   Patient Care Team:  Pb Warren MD as PCP - General  Pb Warren MD as Assigned PCP    The following health maintenance items are reviewed in Epic and correct as of today:  Health Maintenance   Topic Date Due     ZOSTER IMMUNIZATION (1 of 2) 10/10/1991     DTAP/TDAP/TD IMMUNIZATION (3 - Td) 02/21/2018     MEDICARE ANNUAL WELLNESS VISIT  03/14/2019     FALL RISK ASSESSMENT  03/14/2019     PHQ-2 Q1 YR  03/20/2020     COLONOSCOPY Q10 YR  12/14/2020     ADVANCE DIRECTIVE PLANNING Q5 YRS  03/14/2023     LIPID SCREEN Q5 YR MALE (SYSTEM ASSIGNED)  11/08/2023     INFLUENZA VACCINE  Completed     IPV IMMUNIZATION  Aged Out     MENINGITIS IMMUNIZATION  Aged Out       Immunization History   Administered Date(s) Administered     HEPA 02/20/1997, 05/20/2005     HepB 02/20/1997     Influenza (High Dose) 3 valent vaccine 10/20/2015, 10/01/2016, 11/05/2018     Influenza (IIV3) PF 02/20/1997, 11/16/2005, 12/04/2006, 10/16/2007, 11/20/2008, 10/17/2010, 01/07/2013, 09/16/2013, 09/30/2017     Meningococcal (Menomune ) 02/20/1997     Pneumo Conj 13-V (2010&after) 02/10/2016     Pneumococcal 23 valent 12/04/2006     Poliovirus, inactivated (IPV) 02/20/1997     TD (ADULT, 7+) 02/20/1997     TDAP Vaccine (Adacel) 02/21/2008     Typhoid IM 02/20/1997     Yellow Fever 05/20/2005         ROS:  CONSTITUTIONAL: NEGATIVE for fever, chills, change in weight  ENT/MOUTH: NEGATIVE for ear, mouth and throat problems  RESP: NEGATIVE for significant cough or SOB  CV: NEGATIVE for chest pain, palpitations or peripheral edema  GI: NEGATIVE for nausea, abdominal pain, heartburn, or change in bowel habits  : NEGATIVE for frequency, dysuria, or  "hematuria  MUSCULOSKELETAL: NEGATIVE for significant arthralgias or myalgia  NEURO: NEGATIVE for weakness, dizziness or paresthesias  HEME: NEGATIVE for bleeding problems  PSYCHIATRIC: NEGATIVE for changes in mood or affect    OBJECTIVE:   /80   Pulse 63   Temp 97.2  F (36.2  C) (Oral)   Resp 14   Ht 1.803 m (5' 11\")   Wt 83.9 kg (184 lb 14.4 oz)   SpO2 98%   BMI 25.79 kg/m   Estimated body mass index is 25.79 kg/m  as calculated from the following:    Height as of this encounter: 1.803 m (5' 11\").    Weight as of this encounter: 83.9 kg (184 lb 14.4 oz).  EXAM:   GENERAL: alert and no distress  EYES: Eyes grossly normal to inspection, PERRL and conjunctivae and sclerae normal  HENT: ear canals and TM's normal, nose and mouth without ulcers or lesions  NECK: no adenopathy, no asymmetry, masses, or scars and thyroid normal to palpation, + carotid bruits R>L.  RESP: lungs clear to auscultation - no rales, rhonchi or wheezes  CV: regular rate and rhythm, normal S1 S2, no S3 or S4, no click or rub, no peripheral edema and peripheral pulses strong  ABDOMEN: soft, nontender, no hepatosplenomegaly, no masses and bowel sounds normal.  MS: no gross musculoskeletal defects noted  NEURO: No focal changes  PSYCH: mentation appears normal, affect normal/bright      ASSESSMENT / PLAN:     (Z00.00) Medicare annual wellness visit, subsequent  (primary encounter diagnosis)  Comment: Advised updated tetanus booster as well as shingles vaccination  Plan:     (I48.92) Atrial flutter, unspecified type (H)  Comment: Rate controlled and following up with cardiology for potential conversion  Plan: Continue with anticoagulation as ordered    (R09.89) Bilateral carotid bruits  Comment: Suggest bilateral carotid ultrasound with consideration for CT angiogram  Plan: US Carotid Bilateral, OFFICE/OUTPT         VISIT,EST,LEVL IV            (I10) Essential hypertension, benign  Comment: Stable on current therapy continuing with " "medical management  Plan:     (E78.5) Hyperlipidemia LDL goal <130  Comment:   LDL Cholesterol Calculated   Date Value Ref Range Status   11/08/2018 67 <100 mg/dL Final     Comment:     Desirable:       <100 mg/dl     Plan: Stable at goal on therapy continue with current meds as ordered  (D47.2) MGUS (monoclonal gammopathy of unknown significance)  Comment: Followed per hematology, oncology as ordered with labs pending  Plan:     (Z12.5) Screening PSA (prostate specific antigen)  Comment: Ordered as routine screening per age  Plan: PSA, screen            (Z12.11) Colon cancer screening  Comment: Prior colonoscopy reviewed  Plan: Fecal colorectal cancer screen (FIT)            (Z23) Need for prophylactic vaccination with tetanus-diphtheria (Td)  Comment: Advised tetanus booster in pharmacy due to insurance coverage    Plan:        End of Life Planning:  Patient currently has an advanced directive: No.  I have verified the patient's ablity to prepare an advanced directive/make health care decisions.  Literature was provided to assist patient in preparing an advanced directive.    COUNSELING:  Reviewed preventive health counseling, as reflected in patient instructions       Regular exercise       Healthy diet/nutrition    BP Readings from Last 1 Encounters:   03/19/19 131/82     Estimated body mass index is 26.08 kg/m  as calculated from the following:    Height as of 3/19/19: 1.803 m (5' 11\").    Weight as of 3/19/19: 84.8 kg (187 lb).     reports that  has never smoked. he has never used smokeless tobacco.    Appropriate preventive services were discussed with this patient, including applicable screening as appropriate for cardiovascular disease, diabetes, osteopenia/osteoporosis, and glaucoma.  As appropriate for age/gender, discussed screening for colorectal cancer, prostate cancer, breast cancer, and cervical cancer. Checklist reviewing preventive services available has been given to the patient.    Reviewed " patients plan of care and provided an AVS. The Basic Care Plan (routine screening as documented in Health Maintenance) for Mars meets the Care Plan requirement. This Care Plan has been established and reviewed with the Patient.    Counseling Resources:  ATP IV Guidelines  Pooled Cohorts Equation Calculator  Breast Cancer Risk Calculator  FRAX Risk Assessment  ICSI Preventive Guidelines  Dietary Guidelines for Americans, 2010  [employer] MyPlate  ASA Prophylaxis  Lung CA Screening    Pb Warren MD  [facility]    THE MEDICATION LIST HAS BEEN FULLY RECONCILED BY THE M.D. AND THE NURSING STAFF.

## 2019-03-21 ENCOUNTER — HOSPITAL ENCOUNTER (OUTPATIENT)
Dept: CARDIOLOGY | Facility: CLINIC | Age: 79
End: 2019-03-21
Attending: INTERNAL MEDICINE
Payer: COMMERCIAL

## 2019-03-21 ENCOUNTER — ANESTHESIA (OUTPATIENT)
Dept: SURGERY | Facility: CLINIC | Age: 79
End: 2019-03-21
Payer: COMMERCIAL

## 2019-03-21 ENCOUNTER — ANESTHESIA EVENT (OUTPATIENT)
Dept: SURGERY | Facility: CLINIC | Age: 79
End: 2019-03-21
Payer: COMMERCIAL

## 2019-03-21 ENCOUNTER — HOSPITAL ENCOUNTER (OUTPATIENT)
Facility: CLINIC | Age: 79
Discharge: HOME | End: 2019-03-21
Attending: INTERNAL MEDICINE | Admitting: INTERNAL MEDICINE
Payer: COMMERCIAL

## 2019-03-21 VITALS
SYSTOLIC BLOOD PRESSURE: 127 MMHG | DIASTOLIC BLOOD PRESSURE: 81 MMHG | OXYGEN SATURATION: 97 % | RESPIRATION RATE: 16 BRPM | HEART RATE: 77 BPM

## 2019-03-21 DIAGNOSIS — I48.3 TYPICAL ATRIAL FLUTTER (H): ICD-10-CM

## 2019-03-21 LAB
ANION GAP SERPL CALCULATED.3IONS-SCNC: 5 MMOL/L (ref 3–14)
BUN SERPL-MCNC: 17 MG/DL (ref 7–30)
CALCIUM SERPL-MCNC: 9 MG/DL (ref 8.5–10.1)
CHLORIDE SERPL-SCNC: 107 MMOL/L (ref 94–109)
CO2 SERPL-SCNC: 25 MMOL/L (ref 20–32)
CREAT SERPL-MCNC: 0.87 MG/DL (ref 0.66–1.25)
GFR SERPL CREATININE-BSD FRML MDRD: 83 ML/MIN/{1.73_M2}
GLUCOSE SERPL-MCNC: 103 MG/DL (ref 70–99)
MAGNESIUM SERPL-MCNC: 2.1 MG/DL (ref 1.6–2.3)
POTASSIUM SERPL-SCNC: 4.3 MMOL/L (ref 3.4–5.3)
SODIUM SERPL-SCNC: 137 MMOL/L (ref 133–144)

## 2019-03-21 PROCEDURE — 25000128 H RX IP 250 OP 636: Performed by: NURSE ANESTHETIST, CERTIFIED REGISTERED

## 2019-03-21 PROCEDURE — 37000008 ZZH ANESTHESIA TECHNICAL FEE, 1ST 30 MIN

## 2019-03-21 PROCEDURE — 93010 ELECTROCARDIOGRAM REPORT: CPT | Performed by: INTERNAL MEDICINE

## 2019-03-21 PROCEDURE — G0328 FECAL BLOOD SCRN IMMUNOASSAY: HCPCS | Performed by: INTERNAL MEDICINE

## 2019-03-21 PROCEDURE — 92960 CARDIOVERSION ELECTRIC EXT: CPT

## 2019-03-21 PROCEDURE — 92960 CARDIOVERSION ELECTRIC EXT: CPT | Performed by: INTERNAL MEDICINE

## 2019-03-21 PROCEDURE — 83735 ASSAY OF MAGNESIUM: CPT | Performed by: INTERNAL MEDICINE

## 2019-03-21 PROCEDURE — 80048 BASIC METABOLIC PNL TOTAL CA: CPT | Performed by: INTERNAL MEDICINE

## 2019-03-21 RX ORDER — POTASSIUM CHLORIDE 1500 MG/1
20 TABLET, EXTENDED RELEASE ORAL
Status: DISCONTINUED | OUTPATIENT
Start: 2019-03-21 | End: 2019-03-21 | Stop reason: HOSPADM

## 2019-03-21 RX ORDER — LIDOCAINE 40 MG/G
CREAM TOPICAL
Status: DISCONTINUED | OUTPATIENT
Start: 2019-03-21 | End: 2019-03-21 | Stop reason: HOSPADM

## 2019-03-21 RX ORDER — PROPOFOL 10 MG/ML
INJECTION, EMULSION INTRAVENOUS PRN
Status: DISCONTINUED | OUTPATIENT
Start: 2019-03-21 | End: 2019-03-21

## 2019-03-21 RX ADMIN — PROPOFOL 20 MG: 10 INJECTION, EMULSION INTRAVENOUS at 11:42

## 2019-03-21 RX ADMIN — PROPOFOL 40 MG: 10 INJECTION, EMULSION INTRAVENOUS at 11:41

## 2019-03-21 NOTE — DISCHARGE INSTRUCTIONS
Discharge Instructions for Cardioversion  Your healthcare provider performed a procedure called cardioversion. Your healthcare provider used a controlled electric shock or a medicine to briefly stop all electrical activity in your heart. This helped restore your heart s normal rhythm. Here are some instructions to follow while you recover.  Home care    Because cardioversion typically requires sedation, you won't be able to drive home. You will need a ride. Wait at least 24 hours before driving a car or operating heavy machinery after receiving sedating medicines.    Don t be alarmed if the skin on your chest is irritated or feels like it is sunburned. Your healthcare provider may prescribe a soothing lotion to relieve this discomfort. These minor symptoms will go away in a few days.    Ask your healthcare provider about medicines to keep your heart rhythm steady.    If you were prescribed medicine, take it as instructed by your healthcare provider. Don t skip doses or take double doses. Cardioversion requires blood thinners for at least 4 weeks to prevent a delayed risk of stroke when treating atrial fibrillation or atrial flutter. Be sure you discuss which medicine you are taking to prevent stroke. Ask when you need to have your medicine levels checked, and whether you may be able to stop taking it in the future or whether it is recommended that you take it for life. Some of these blood-thinning medicines will have the dose adjusted, and interact with other medicines or foods. Your healthcare team will give you full instructions on what to watch out for. Report bleeding or symptoms of stroke immediately to your healthcare team and seek emergency medical attention.    Learn to take your own pulse. Keep a record of your results. Ask your healthcare provider when you should seek emergency medical attention. He or she will tell you which pulse rate reading is dangerous.     Keep in mind this procedure may need to be  repeated if the abnormal heart rhythm returns. After the procedure, your healthcare provider will tell you if the treatment worked or if you will need further treatments or medication.  Follow-up care  Make a follow-up appointment, or as directed.  Call 911  Call 911 right away if you have:    Chest pain    Shortness of breath    Loss of vision, speech, or strength or coordination in any body part   When to call your healthcare provider  Call your healthcare provider right away if you:    Feel faint, dizzy, or lightheaded    Have chest pain with increased activity    Have irregular heartbeat or fast pulse    Have bleeding issues from blood-thinning medicines  Date Last Reviewed: 3/1/2017    [id number] [employer]. [address] [place] [address], [place] [address]. All rights reserved. This information is not intended as a substitute for professional medical care. Always follow your healthcare professional's instructions.

## 2019-03-21 NOTE — PROGRESS NOTES
Visit Date:   03/19/2019     HEMATOLOGIC HISTORY:  Mr. Mars Reyes is a gentleman with chronic thrombocytopenia and MGUS.   1.  On 05/20/2009, platelets of 191. On 12/21/2011, platelets of 75.   2.  On 02/20/2017, platelets of 57 with hemoglobin of 13.9.   3  On 02/21/2017:    -Normal folate, LDH,TSH and vitamin B12 of 372.    -SPEP reveals M-spike of 0.1.   - Lupus negative.   4. US on 02/22/2017 reveals normal spleen size.  5.  Bone marrow biopsy on 02/23/2017 reveals normocellular bone marrow with 50% cellularity with trilineage hematopoiesis.  Adequate megakaryocyte.  Slight erythroid hypoplasia.  No increase in blasts. 5% to 10% plasma cells. -Cytogenetics and FISH for myeloma panel is normal.    6. Serum ROSA reveals monoclonal IgG immunoglobulin of kappa light chain type and monoclonal free immunoglobulin light chain of lambda chain type.      SUBJECTIVE:  Mr. Reyes is a 78-year-old gentleman with MGUS and chronic thrombocytopenia.  He is here for followup.      Overall, he has been doing well.  He has atrial flutter.  He will be undergoing cardioversion in the next few days.      No headache.  No dizziness.  No chest pain.  No shortness of breath.  No abdominal pain, nausea or vomiting.  No bleeding.  Appetite is good.  No urinary or bowel complaints.      PHYSICAL EXAMINATION:   GENERAL:  Alert and oriented x 3.   VITAL SIGNS:  Reviewed. ECOG PS os 1.  EYES:  No icterus.   THROAT:  No ulcer.  No petechia.   NECK:  Supple. No lymphadenopathy.   LUNGS:  Good air entry bilaterally.  No crackles or wheezing.   HEART:  Regular.  No murmur.   ABDOMEN:  Soft and nontender.  No mass.   EXTREMITIES:  No edema.  No calf swelling or tenderness.   SKIN:  No rash.  No petechiae.     LABORATORY DATA:  Reviewed.      ASSESSMENT:   1.  A 78-year-old gentleman with chronic immune thrombocytopenic purpura.  Thrombocytopenia is stable.  No bleeding.   2.  MGUS (IgG kappa).  MGUS has been stable.      PLAN:   1.  The patient is  doing well.  Labs were reviewed with him.  He has chronic thrombocytopenia.  Platelet is stable.  No bleeding complication.      The patient will be undergoing cardioversion.  His platelets are in the safe range for the procedure or anticoagulation if needed.      The patient's thrombocytopenia is due to chronic ITP.  If his platelet goes below 30 or if he has bleeding, we will plan on treating him with a steroid.  I advised him to see a physician if he has bleeding from any site, easy bruising, red skin rash or any unexplained pain.      2.  He has MGUS.  Natural history of disease is explained.  I explained to him the risk of progression to myeloma is very low at 1% per year.  We will continue to monitor him.      3. I will see him in 6 months' time with labs.  I advised him to see a physician sooner if he has unexplained pain, bone pain, bone fracture, worsening weakness or any other concerns.         AKOSUA CHONG MD             D: 2019   T: 2019   MT: VH      Name:     DESEAN MARTINEZ   MRN:      [id number]        Account:      [id number]   :      1940           Visit Date:   2019      Document: [id number]

## 2019-03-21 NOTE — IP AVS SNAPSHOT
MRN:[id number]                      After Visit Summary   3/21/2019    Mars Reyes    MRN: [id number]           Visit Information        Department      3/21/2019  9:21 AM [facility] Cardiopulmonary          Review of your medicines      UNREVIEWED medicines. Ask your doctor about these medicines       Dose / Directions   allopurinol 100 MG tablet  Commonly known as:  ZYLOPRIM  Used for:  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, unspecified cause, unspecified laterality      Dose:  100 mg  Take 1 tablet (100 mg) by mouth daily  Quantity:  90 tablet  Refills:  3     apixaban ANTICOAGULANT 5 MG tablet  Commonly known as:  ELIQUIS  Used for:  Typical atrial flutter (H)      Dose:  5 mg  Take 1 tablet (5 mg) by mouth 2 times daily  Quantity:  60 tablet  Refills:  3     lisinopril 20 MG tablet  Commonly known as:  PRINIVIL/ZESTRIL  Used for:  Essential hypertension, benign      Dose:  20 mg  Take 1 tablet (20 mg) by mouth daily  Quantity:  90 tablet  Refills:  3     pravastatin 20 MG tablet  Commonly known as:  PRAVACHOL  Used for:  Hyperlipidemia LDL goal <130      Dose:  20 mg  Take 1 tablet (20 mg) by mouth daily  Quantity:  90 tablet  Refills:  3              Protect others around you: Learn how to safely use, store and throw away your medicines at www.disposemymeds.org.       Follow-ups after your visit       Your next 10 appointments already scheduled    May 23, 2019 10:00 AM CDT  Return Visit with Alexandra Rojas PA-C  [facility] ([facility]) [address] [address] [address] [phone]      Care Instructions       Further instructions from your care team         Discharge Instructions for Cardioversion  Your healthcare provider performed a procedure called cardioversion. Your healthcare provider used a controlled electric shock or a medicine to briefly stop all electrical activity in your heart. This helped  restore your heart s normal rhythm. Here are some instructions to follow while you recover.  Home care    Because cardioversion typically requires sedation, you won't be able to drive home. You will need a ride. Wait at least 24 hours before driving a car or operating heavy machinery after receiving sedating medicines.    Don t be alarmed if the skin on your chest is irritated or feels like it is sunburned. Your healthcare provider may prescribe a soothing lotion to relieve this discomfort. These minor symptoms will go away in a few days.    Ask your healthcare provider about medicines to keep your heart rhythm steady.    If you were prescribed medicine, take it as instructed by your healthcare provider. Don t skip doses or take double doses. Cardioversion requires blood thinners for at least 4 weeks to prevent a delayed risk of stroke when treating atrial fibrillation or atrial flutter. Be sure you discuss which medicine you are taking to prevent stroke. Ask when you need to have your medicine levels checked, and whether you may be able to stop taking it in the future or whether it is recommended that you take it for life. Some of these blood-thinning medicines will have the dose adjusted, and interact with other medicines or foods. Your healthcare team will give you full instructions on what to watch out for. Report bleeding or symptoms of stroke immediately to your healthcare team and seek emergency medical attention.    Learn to take your own pulse. Keep a record of your results. Ask your healthcare provider when you should seek emergency medical attention. He or she will tell you which pulse rate reading is dangerous.     Keep in mind this procedure may need to be repeated if the abnormal heart rhythm returns. After the procedure, your healthcare provider will tell you if the treatment worked or if you will need further treatments or medication.  Follow-up care  Make a follow-up appointment, or as  directed.  Call 911  Call 911 right away if you have:    Chest pain    Shortness of breath    Loss of vision, speech, or strength or coordination in any body part   When to call your healthcare provider  Call your healthcare provider right away if you:    Feel faint, dizzy, or lightheaded    Have chest pain with increased activity    Have irregular heartbeat or fast pulse    Have bleeding issues from blood-thinning medicines  Date Last Reviewed: 3/1/2017    2245-3997 The [employer]. [address] [place] [address]. All rights reserved. This information is not intended as a substitute for professional medical care. Always follow your healthcare professional's instructions.          Additional Information About Your Visit       MyChart Information    [employer] gives you secure access to your electronic health record. If you see a primary care provider, you can also send messages to your care team and make appointments. If you have questions, please call your primary care clinic.  If you do not have a primary care provider, please call [phone] and they will assist you.       Care EveryWhere ID    This is your Care EveryWhere ID. This could be used by other organizations to access your [facility] medical records  [id number]       Your Vitals Were     Blood Pressure   141/94      Pulse   70    Respirations   16    Pulse Oximetry   100%           Primary Care Provider Office Phone # Fax #    Pb Warren [phone]      Equal Access to Services    [facility] : Hadii kwabena ku hadasho Soomaali, waaxda luqadaha, qaybta kaalmada adeegyada, michael lopez haybautista warner . So [facility] [phone].    ATENCIÓN: Si habla español, tiene a candelario disposición servicios gratuitos de asistencia lingüística. Llame al [phone].    We comply with applicable federal civil rights laws and [place] laws. We do not discriminate on the basis of race, color, national origin, age,  disability, sex, sexual orientation, or gender identity.           Thank you!    Thank you for choosing [facility] for your care. Our goal is always to provide you with excellent care. Hearing back from our patients is one way we can continue to improve our services. Please take a few minutes to complete the written survey that you may receive in the mail after you visit. If you would like to speak to someone directly about your visit please contact Patient Relations at [phone]. Thank you!              Medication List      ASK your doctor about these medications          Morning Afternoon Evening Bedtime As Needed    allopurinol 100 MG tablet  Also known as:  ZYLOPRIM  INSTRUCTIONS:  Take 1 tablet (100 mg) by mouth daily  Doctor's comments:  Profile Rx: patient will contact pharmacy when needed                     apixaban ANTICOAGULANT 5 MG tablet  Also known as:  ELIQUIS  INSTRUCTIONS:  Take 1 tablet (5 mg) by mouth 2 times daily                     lisinopril 20 MG tablet  Also known as:  PRINIVIL/ZESTRIL  INSTRUCTIONS:  Take 1 tablet (20 mg) by mouth daily                     pravastatin 20 MG tablet  Also known as:  PRAVACHOL  INSTRUCTIONS:  Take 1 tablet (20 mg) by mouth daily  Doctor's comments:  Profile Rx: patient will contact pharmacy when needed

## 2019-03-21 NOTE — IP AVS SNAPSHOT
[facility] Cardiopulmonary  [address]  [facility] [address]  Phone:  [phone]                                    After Visit Summary   3/21/2019    Mars Reyes    MRN: [id number]           After Visit Summary Signature Page    I have received my discharge instructions, and my questions have been answered. I have discussed any challenges I see with this plan with the nurse or doctor.    ..........................................................................................................................................  Patient/Patient Representative Signature      ..........................................................................................................................................  Patient Representative Print Name and Relationship to Patient    ..................................................               ................................................  Date                                   Time    ..........................................................................................................................................  Reviewed by Signature/Title    ...................................................              ..............................................  Date                                               Time          22EPIC Rev 08/18

## 2019-03-21 NOTE — PROCEDURES
DCCV    Indication- symptomatic atrial flutter  Risks/benefits discussed. Informed consent obtained. Patient has been in uninterrupted apixaban for more than a month including taking this am dose. Time out done. Under MAC by anesthesia 1 x 200 J synchronized biphasic shock successfully cardioverted to sinus rhythm, HR 70s, occasional PACs noted.    Recommendations  - continue apixaban   - f/u with Dr Iglesias already scheduled.  - no driving or operate heavy machinery for 24 hrs    D/w patient and his son Jakub.

## 2019-03-21 NOTE — ANESTHESIA PREPROCEDURE EVALUATION
Anesthesia Pre-Procedure Evaluation    Patient: Mars Reyes   MRN: [id number] : 1940          Preoperative Diagnosis: afib    Procedure(s):  ANESTHESIA CARDIOVERSION    Past Medical History:   Diagnosis Date     Aftercare following joint replacement 6/3/2009     Contact dermatitis and other eczema, due to unspecified cause      Diverticulosis of colon (without mention of hemorrhage)      DJD (degenerative joint disease)      Essential hypertension, benign     abstracted 02     Gout, unspecified      Grief reaction 2010     Hip joint replacement by other means 6/3/2009     Hyperlipidemia LDL goal <130 10/31/2010     MGUS (monoclonal gammopathy of unknown significance)      MGUS (monoclonal gammopathy of unknown significance)      Non-rheumatic aortic stenosis      Non-rheumatic mitral valve stenosis      PSVT (paroxysmal supraventricular tachycardia) (H)      Pulmonary HTN (H) 10/18/2011     Thrombocytopenia (H)      Thrombocytopenia (H)      Past Surgical History:   Procedure Laterality Date     BIOPSY  3/2017     BONE MARROW BIOPSY, BONE SPECIMEN, NEEDLE/TROCAR Right 2017    Procedure: BIOPSY BONE MARROW;  Surgeon: Romie Esquivel MD;  Location:  GI     COLONOSCOPY       EYE SURGERY      Cataract, both eyes     ORTHOPEDIC SURGERY       Anesthesia Evaluation     .             ROS/MED HX    ENT/Pulmonary:     (+), . Other pulmonary disease HTN.    Neurologic:       Cardiovascular:     (+) Dyslipidemia, hypertension----. : . . . :. Irregular Heartbeat/Palpitations, .       METS/Exercise Tolerance:     Hematologic:         Musculoskeletal:   (+) arthritis, , , -       GI/Hepatic:         Renal/Genitourinary:         Endo:         Psychiatric:         Infectious Disease:         Malignancy:         Other:                          Physical Exam      Airway   Mallampati: I    Dental     Cardiovascular       Pulmonary             Lab Results   Component Value Date    WBC 8.8 2019  "   HGB 14.1 03/19/2019    HCT 41.0 03/19/2019    PLT 69 (L) 03/19/2019     03/21/2019    POTASSIUM 4.3 03/21/2019    CHLORIDE 107 03/21/2019    CO2 25 03/21/2019    BUN 17 03/21/2019    CR 0.87 03/21/2019     (H) 03/21/2019    DIPAK 9.0 03/21/2019    MAG 2.1 03/21/2019    ALBUMIN 3.9 11/08/2018    PROTTOTAL 7.7 11/08/2018    ALT 23 11/08/2018    AST 25 11/08/2018    ALKPHOS 79 11/08/2018    BILITOTAL 1.7 (H) 11/08/2018    TSH 2.54 02/21/2017       Preop Vitals  BP Readings from Last 3 Encounters:   03/21/19 (!) 141/94   03/20/19 132/80   03/19/19 131/82    Pulse Readings from Last 3 Encounters:   03/21/19 70   03/20/19 63   03/19/19 61      Resp Readings from Last 3 Encounters:   03/21/19 16   03/20/19 14   03/19/19 16    SpO2 Readings from Last 3 Encounters:   03/21/19 100%   03/20/19 98%   03/19/19 98%      Temp Readings from Last 1 Encounters:   03/20/19 97.2  F (36.2  C) (Oral)    Ht Readings from Last 1 Encounters:   03/20/19 1.803 m (5' 11\")      Wt Readings from Last 1 Encounters:   03/20/19 83.9 kg (184 lb 14.4 oz)    Estimated body mass index is 25.79 kg/m  as calculated from the following:    Height as of 3/20/19: 1.803 m (5' 11\").    Weight as of 3/20/19: 83.9 kg (184 lb 14.4 oz).       Anesthesia Plan      History & Physical Review      ASA Status:  3 .    NPO Status:  > 8 hours    Plan for MAC with Intravenous induction. Reason for MAC:  Deep or markedly invasive procedure (G8)         Postoperative Care      Consents  Anesthetic plan, risks, benefits and alternatives discussed with:  Patient..                 ALEXANDRA Suh CRNA                    .  "

## 2019-03-21 NOTE — PROGRESS NOTES
Pt had successful cardioversion from atrial flutter to normal siinus rhythm. Vitals were stable and discharge instructions given to pt. And son.

## 2019-03-21 NOTE — ANESTHESIA CARE TRANSFER NOTE
Patient: Mars Reyes    Procedure(s):  ANESTHESIA CARDIOVERSION    Diagnosis: afib  Diagnosis Additional Information: No value filed.    Anesthesia Type:   MAC     Note:  Airway :Nasopharyngeal Airway  Patient transferred to:Telemetry/Step Down Unit  Handoff Report: Identifed the Patient, Identified the Reponsible Provider, Reviewed the pertinent medical history, Discussed the surgical course, Reviewed Intra-OP anesthesia mangement and issues during anesthesia, Set expectations for post-procedure period and Allowed opportunity for questions and acknowledgement of understanding      Vitals: (Last set prior to Anesthesia Care Transfer)    CRNA VITALS  3/21/2019 1115 - 3/21/2019 1203      3/21/2019             NIBP:  103/74    Pulse:  76    EKG:  NSR                Electronically Signed By: ALEXANDRA Suh CRNA  March 21, 2019  12:03 PM

## 2019-03-22 ENCOUNTER — HOSPITAL ENCOUNTER (EMERGENCY)
Facility: CLINIC | Age: 79
Discharge: HOME | End: 2019-03-22
Attending: EMERGENCY MEDICINE | Admitting: EMERGENCY MEDICINE
Payer: COMMERCIAL

## 2019-03-22 VITALS
RESPIRATION RATE: 12 BRPM | HEART RATE: 84 BPM | DIASTOLIC BLOOD PRESSURE: 95 MMHG | SYSTOLIC BLOOD PRESSURE: 161 MMHG | OXYGEN SATURATION: 97 % | TEMPERATURE: 97.4 F

## 2019-03-22 DIAGNOSIS — I10 ESSENTIAL HYPERTENSION: ICD-10-CM

## 2019-03-22 LAB
ANION GAP SERPL CALCULATED.3IONS-SCNC: 8 MMOL/L (ref 3–14)
BASOPHILS # BLD AUTO: 0.1 10E9/L (ref 0–0.2)
BASOPHILS NFR BLD AUTO: 0.7 %
BUN SERPL-MCNC: 14 MG/DL (ref 7–30)
CALCIUM SERPL-MCNC: 8.9 MG/DL (ref 8.5–10.1)
CHLORIDE SERPL-SCNC: 102 MMOL/L (ref 94–109)
CO2 SERPL-SCNC: 25 MMOL/L (ref 20–32)
CREAT SERPL-MCNC: 0.85 MG/DL (ref 0.66–1.25)
DIFFERENTIAL METHOD BLD: ABNORMAL
EOSINOPHIL # BLD AUTO: 0.1 10E9/L (ref 0–0.7)
EOSINOPHIL NFR BLD AUTO: 1.1 %
ERYTHROCYTE [DISTWIDTH] IN BLOOD BY AUTOMATED COUNT: 12.5 % (ref 10–15)
GFR SERPL CREATININE-BSD FRML MDRD: 84 ML/MIN/{1.73_M2}
GLUCOSE SERPL-MCNC: 91 MG/DL (ref 70–99)
HCT VFR BLD AUTO: 40.6 % (ref 40–53)
HGB BLD-MCNC: 13.5 G/DL (ref 13.3–17.7)
IMM GRANULOCYTES # BLD: 0 10E9/L (ref 0–0.4)
IMM GRANULOCYTES NFR BLD: 0.2 %
INTERPRETATION ECG - MUSE: NORMAL
LYMPHOCYTES # BLD AUTO: 1.1 10E9/L (ref 0.8–5.3)
LYMPHOCYTES NFR BLD AUTO: 13.5 %
MCH RBC QN AUTO: 29 PG (ref 26.5–33)
MCHC RBC AUTO-ENTMCNC: 33.3 G/DL (ref 31.5–36.5)
MCV RBC AUTO: 87 FL (ref 78–100)
MONOCYTES # BLD AUTO: 0.8 10E9/L (ref 0–1.3)
MONOCYTES NFR BLD AUTO: 10.3 %
NEUTROPHILS # BLD AUTO: 6 10E9/L (ref 1.6–8.3)
NEUTROPHILS NFR BLD AUTO: 74.2 %
NRBC # BLD AUTO: 0 10*3/UL
NRBC BLD AUTO-RTO: 0 /100
PLATELET # BLD AUTO: 72 10E9/L (ref 150–450)
POTASSIUM SERPL-SCNC: 4.2 MMOL/L (ref 3.4–5.3)
RBC # BLD AUTO: 4.66 10E12/L (ref 4.4–5.9)
SODIUM SERPL-SCNC: 135 MMOL/L (ref 133–144)
WBC # BLD AUTO: 8.1 10E9/L (ref 4–11)

## 2019-03-22 PROCEDURE — 93005 ELECTROCARDIOGRAM TRACING: CPT

## 2019-03-22 PROCEDURE — 85025 COMPLETE CBC W/AUTO DIFF WBC: CPT | Performed by: EMERGENCY MEDICINE

## 2019-03-22 PROCEDURE — 25000132 ZZH RX MED GY IP 250 OP 250 PS 637: Performed by: EMERGENCY MEDICINE

## 2019-03-22 PROCEDURE — 80048 BASIC METABOLIC PNL TOTAL CA: CPT | Performed by: EMERGENCY MEDICINE

## 2019-03-22 PROCEDURE — 99284 EMERGENCY DEPT VISIT MOD MDM: CPT

## 2019-03-22 RX ORDER — METOPROLOL TARTRATE 25 MG/1
12.5 TABLET, FILM COATED ORAL 2 TIMES DAILY
Qty: 14 TABLET | Refills: 0 | Status: SHIPPED | OUTPATIENT
Start: 2019-03-22 | End: 2019-05-23

## 2019-03-22 RX ORDER — ACETAMINOPHEN 500 MG
500 TABLET ORAL ONCE
Status: COMPLETED | OUTPATIENT
Start: 2019-03-22 | End: 2019-03-22

## 2019-03-22 RX ADMIN — ACETAMINOPHEN 500 MG: 500 TABLET, FILM COATED ORAL at 18:18

## 2019-03-22 RX ADMIN — METOPROLOL TARTRATE 12.5 MG: 25 TABLET ORAL at 19:33

## 2019-03-22 ASSESSMENT — ENCOUNTER SYMPTOMS
SHORTNESS OF BREATH: 0
HEADACHES: 1

## 2019-03-22 NOTE — ED AVS SNAPSHOT
[facility] Emergency Department  [address]  [facility] [address]  Phone:  [phone]  Fax:  [phone]                                    Mars Reyes   MRN: [id number]    Department:  [facility] Emergency Department   Date of Visit:  3/22/2019           After Visit Summary Signature Page    I have received my discharge instructions, and my questions have been answered. I have discussed any challenges I see with this plan with the nurse or doctor.    ..........................................................................................................................................  Patient/Patient Representative Signature      ..........................................................................................................................................  Patient Representative Print Name and Relationship to Patient    ..................................................               ................................................  Date                                   Time    ..........................................................................................................................................  Reviewed by Signature/Title    ...................................................              ..............................................  Date                                               Time          22EPIC Rev 08/18

## 2019-03-22 NOTE — ED PROVIDER NOTES
History     Chief Complaint:  headache    HPI   Mars Reyes is a 78 year old male with a history of HTN status post cardioversion under anesthesia yesterday for atrial flutter who presents with a headache and hypertension. The patient states that after his cardioversion yesterday he was at his baseline and he took his Lisinopril and Eliquis this morning as usual. Around 1400, he developed a posterior headache and took his blood pressure and it was 190/120. Yesterday before his cardioversion it was 150s/80s, which he notes is higher than normal for him. The headache has waxed and waned and he rates it a 5 out of 10 in severity.  He denies any chest pain or shortness of breath.         Allergies:  Pravachol [Pravastatin]  Simvastatin     Medications:    Zyloprin  Eliquis  Prinivil  Pravachol     Past Medical History:    DJD  HTN  Gout  Hyperlipidemia  MGUS  Aortic stenosis  Mitral valve stenosis  PSVT  Pulmonary HTN  Thrombocytopenia  Atrial flutter    Past Surgical History:    Anesthesia Cardioversion  Bone marrow biopsy  Cataract surgery  Orthopedic surgery    Family History:    HTN    Social History:  Patient presents alone  Negative for tobacco use.  Positive for alcohol use.   Marital Status:        Review of Systems   Respiratory: Negative for shortness of breath.    Cardiovascular: Negative for chest pain.   Neurological: Positive for headaches.   All other systems reviewed and are negative.        Physical Exam     Patient Vitals for the past 24 hrs:   BP Temp Temp src Pulse Heart Rate Resp SpO2   03/22/19 1845 (!) 160/94 -- -- -- 85 16 97 %   03/22/19 1830 (!) 158/93 -- -- 83 83 8 98 %   03/22/19 1820 (!) 156/101 -- -- 84 87 15 97 %   03/22/19 1815 (!) 162/100 -- -- 89 88 15 98 %   03/22/19 1810 (!) 162/100 -- -- -- 84 11 98 %   03/22/19 1745 (!) 152/101 -- -- -- 82 18 97 %   03/22/19 1718 (!) 178/117 97.4  F (36.3  C) Oral 95 -- 16 98 %   03/22/19 1715 (!) 178/117 -- -- -- -- -- 97 %          Physical Exam   Constitutional: He appears well-developed and well-nourished.   HENT:   Right Ear: External ear normal.   Left Ear: External ear normal.   Mouth/Throat: Oropharynx is clear and moist. No oropharyngeal exudate.   TM's clear bilaterally   Eyes: Conjunctivae are normal. Pupils are equal, round, and reactive to light. No scleral icterus.   Neck: Normal range of motion. Neck supple.   Cardiovascular: Normal rate, regular rhythm, normal heart sounds and intact distal pulses. Exam reveals no gallop and no friction rub.   No murmur heard.  Pulmonary/Chest: Effort normal and breath sounds normal. No respiratory distress. He has no wheezes. He has no rales.   Abdominal: Soft. Bowel sounds are normal. He exhibits no distension and no mass. There is no tenderness.   Musculoskeletal: Normal range of motion. He exhibits no edema.   Lymphadenopathy:     He has no cervical adenopathy.   Neurological: He is alert. No cranial nerve deficit.   Speech clear. 5/5 strength x 4. Gait normal. Normal sensation to light touch   Skin: Skin is warm and dry. No rash noted.   Psychiatric: He has a normal mood and affect.         Emergency Department Course   ECG:  Time: 1724  Vent. Rate 94 bpm. MI interval 216. QRS duration 84. QT/QTc 366/457. P-R-T axis 62 -4 18. Sinus rhythm with 1st degree AV block. Otherwise normal ECG.  Read time: 1729    Laboratory:  BMP: WNL (Creatinine: 0.85)(glucose: 91)  CBC: WBC: 8.1, HGB: 13.5, PLT: 72 (L)    Interventions:  1818 - Tylenol 500 mg PO  1933 - Lopressor 12.5 mg PO    Emergency Department Course:  Nursing notes and vitals reviewed.  1721: I performed an exam of the patient as documented above.     1850: I rechecked the patient. His headache has resolved.  Findings and plan explained to the Patient. Patient discharged home with instructions regarding supportive care, medications, and reasons to return. The importance of close follow-up was reviewed.     Impression & Plan    Medical  Decision Making:  Mars Reyes is a 78 year old male who presents with concern for hypertension and a posterior headache. His neurologic exam is normal. I do not believe at this point he needs a CT scan. His pressures have come down on her own gradually, although not completely resolved. His headache resolved after Tylenol. Pressures have been hanging around 150s/100, which is elevated for him. He is already on 20 mg of Lisinopril and there has been some mentioning of possibly starting on metoprolol in the past. We will go ahead and start him on a low dose of metoprolol. He is agreeable with that and we will have him check his blood pressure daily. He is given a prescription to go home with and he is asked to do that 12.5 mg twice daily. He needs to follow up next week with his primary care doctor for repeat evaluation. They can discuss if they want to continue with metoprolol or do something else. He is asymptomatic now so I do not believe at this point we would need to do any other procedures.     Diagnosis:    ICD-10-CM    1. Essential hypertension I10        Disposition:  discharged to home    Discharge Medications:     Medication List      Started    metoprolol tartrate 25 MG tablet  Commonly known as:  LOPRESSOR  12.5 mg, Oral, 2 TIMES DAILY            ILuisito, am serving as a scribe on 3/22/2019 at 5:21 PM to personally document services performed by Garry Perez MD based on my observations and the provider's statements to me.       Luisito Polk  3/22/2019   [facility] EMERGENCY DEPARTMENT       Garry Perez MD  03/22/19 5613

## 2019-03-23 DIAGNOSIS — Z12.11 COLON CANCER SCREENING: ICD-10-CM

## 2019-03-23 LAB
HEMOCCULT STL QL IA: NEGATIVE
INTERPRETATION ECG - MUSE: NORMAL

## 2019-04-05 ENCOUNTER — TRANSFERRED RECORDS (OUTPATIENT)
Dept: HEALTH INFORMATION MANAGEMENT | Facility: CLINIC | Age: 79
End: 2019-04-05

## 2019-05-21 ENCOUNTER — MYC MEDICAL ADVICE (OUTPATIENT)
Dept: INTERNAL MEDICINE | Facility: CLINIC | Age: 79
End: 2019-05-21

## 2019-05-23 ENCOUNTER — OFFICE VISIT (OUTPATIENT)
Dept: CARDIOLOGY | Facility: CLINIC | Age: 79
End: 2019-05-23
Attending: INTERNAL MEDICINE
Payer: COMMERCIAL

## 2019-05-23 VITALS
HEART RATE: 80 BPM | HEIGHT: 71 IN | WEIGHT: 189.3 LBS | DIASTOLIC BLOOD PRESSURE: 82 MMHG | SYSTOLIC BLOOD PRESSURE: 110 MMHG | BODY MASS INDEX: 26.5 KG/M2 | OXYGEN SATURATION: 96 %

## 2019-05-23 DIAGNOSIS — I35.0 NONRHEUMATIC AORTIC VALVE STENOSIS: ICD-10-CM

## 2019-05-23 DIAGNOSIS — I34.2 NON-RHEUMATIC MITRAL VALVE STENOSIS: ICD-10-CM

## 2019-05-23 DIAGNOSIS — I48.3 TYPICAL ATRIAL FLUTTER (H): Primary | ICD-10-CM

## 2019-05-23 PROCEDURE — 93000 ELECTROCARDIOGRAM COMPLETE: CPT | Performed by: PHYSICIAN ASSISTANT

## 2019-05-23 PROCEDURE — 99214 OFFICE O/P EST MOD 30 MIN: CPT | Performed by: PHYSICIAN ASSISTANT

## 2019-05-23 ASSESSMENT — MIFFLIN-ST. JEOR: SCORE: 1605.53

## 2019-05-23 NOTE — PATIENT INSTRUCTIONS
Thank you for your [facility] visit today. Your provider has recommended the following:  Medication Changes:  No change. I sent in a refill of the Eliquis for 1 year.  Recommendations:  Nothing new  Follow-up:  See Dr Iglesias for cardiology follow up in Spring 2020.    Reminder:  Please bring in your current medication list or your medication, over the counter supplements and vitamin bottles as we will review these at each office visit.

## 2019-05-23 NOTE — PROGRESS NOTES
Please see separate dictation for HPI, PHYSICAL EXAM AND IMPRESSION/PLAN.    CURRENT MEDICATIONS:  Current Outpatient Medications   Medication Sig Dispense Refill     allopurinol (ZYLOPRIM) 100 MG tablet Take 1 tablet (100 mg) by mouth daily 90 tablet 3     apixaban ANTICOAGULANT (ELIQUIS) 5 MG tablet Take 1 tablet (5 mg) by mouth 2 times daily 60 tablet 3     lisinopril (PRINIVIL/ZESTRIL) 20 MG tablet Take 1 tablet (20 mg) by mouth daily 90 tablet 3     pravastatin (PRAVACHOL) 20 MG tablet Take 1 tablet (20 mg) by mouth daily 90 tablet 3       ALLERGIES:     Allergies   Allergen Reactions     Pravachol [Pravastatin]      Simvastatin      10/26/12: Arm pain - pt stopped it x 4-5 wks and arm pain didn't resolve sore started it        PAST MEDICAL HISTORY:  Past Medical History:   Diagnosis Date     Aftercare following joint replacement 6/3/2009     Contact dermatitis and other eczema, due to unspecified cause      Diverticulosis of colon (without mention of hemorrhage)      DJD (degenerative joint disease)      Essential hypertension, benign     abstracted 7/22/02     Gout, unspecified      Grief reaction 11/17/2010     Hip joint replacement by other means 6/3/2009     Hyperlipidemia LDL goal <130 10/31/2010     MGUS (monoclonal gammopathy of unknown significance)      MGUS (monoclonal gammopathy of unknown significance)      Non-rheumatic aortic stenosis      Non-rheumatic mitral valve stenosis      PSVT (paroxysmal supraventricular tachycardia) (H)      Pulmonary HTN (H) 10/18/2011     Thrombocytopenia (H)      Thrombocytopenia (H)        PAST SURGICAL HISTORY:  Past Surgical History:   Procedure Laterality Date     ANESTHESIA CARDIOVERSION N/A 3/21/2019    Procedure: ANESTHESIA CARDIOVERSION;  Surgeon: GENERIC ANESTHESIA PROVIDER;  Location: [facility] OR     BIOPSY  3/2017     BONE MARROW BIOPSY, BONE SPECIMEN, NEEDLE/TROCAR Right 2/23/2017    Procedure: BIOPSY BONE MARROW;  Surgeon: Romie Esquivel MD;  Location:  [facility]     COLONOSCOPY  2010     EYE SURGERY      Cataract, both eyes     ORTHOPEDIC SURGERY         SOCIAL HISTORY:  Social History     Socioeconomic History     Marital status:      Spouse name: Not on file     Number of children: Not on file     Years of education: Not on file     Highest education level: Not on file   Occupational History     Not on file   Social Needs     Financial resource strain: Not on file     Food insecurity:     Worry: Not on file     Inability: Not on file     Transportation needs:     Medical: Not on file     Non-medical: Not on file   Tobacco Use     Smoking status: Never Smoker     Smokeless tobacco: Never Used   Substance and Sexual Activity     Alcohol use: Yes     Alcohol/week: 0.0 oz     Comment: Max 2 beers per day     Drug use: No     Sexual activity: Yes     Partners: Female     Birth control/protection: None   Lifestyle     Physical activity:     Days per week: Not on file     Minutes per session: Not on file     Stress: Not on file   Relationships     Social connections:     Talks on phone: Not on file     Gets together: Not on file     Attends Restorationist service: Not on file     Active member of club or organization: Not on file     Attends meetings of clubs or organizations: Not on file     Relationship status: Not on file     Intimate partner violence:     Fear of current or ex partner: Not on file     Emotionally abused: Not on file     Physically abused: Not on file     Forced sexual activity: Not on file   Other Topics Concern     Parent/sibling w/ CABG, MI or angioplasty before 65F 55M? No   Social History Narrative     Not on file       FAMILY HISTORY:  Family History   Problem Relation Age of Onset     Hypertension Mother        Review of Systems:  Skin:          Eyes:         ENT:         Respiratory:          Cardiovascular:         Gastroenterology:        Genitourinary:         Musculoskeletal:         Neurologic:         Psychiatric:         Heme/Lymph/Imm:          Endocrine:

## 2019-05-23 NOTE — LETTER
5/23/2019    Pb Warren MD  [address] [facility] [address]    RE: Mars Reyes       Dear Colleague,    I had the pleasure of seeing Mars Reyes in the [facility].    Please see separate dictation for HPI, PHYSICAL EXAM AND IMPRESSION/PLAN.    CURRENT MEDICATIONS:  Current Outpatient Medications   Medication Sig Dispense Refill     allopurinol (ZYLOPRIM) 100 MG tablet Take 1 tablet (100 mg) by mouth daily 90 tablet 3     apixaban ANTICOAGULANT (ELIQUIS) 5 MG tablet Take 1 tablet (5 mg) by mouth 2 times daily 60 tablet 3     lisinopril (PRINIVIL/ZESTRIL) 20 MG tablet Take 1 tablet (20 mg) by mouth daily 90 tablet 3     pravastatin (PRAVACHOL) 20 MG tablet Take 1 tablet (20 mg) by mouth daily 90 tablet 3       ALLERGIES:     Allergies   Allergen Reactions     Pravachol [Pravastatin]      Simvastatin      10/26/12: Arm pain - pt stopped it x 4-5 wks and arm pain didn't resolve sore started it        PAST MEDICAL HISTORY:  Past Medical History:   Diagnosis Date     Aftercare following joint replacement 6/3/2009     Contact dermatitis and other eczema, due to unspecified cause      Diverticulosis of colon (without mention of hemorrhage)      DJD (degenerative joint disease)      Essential hypertension, benign     abstracted 7/22/02     Gout, unspecified      Grief reaction 11/17/2010     Hip joint replacement by other means 6/3/2009     Hyperlipidemia LDL goal <130 10/31/2010     MGUS (monoclonal gammopathy of unknown significance)      MGUS (monoclonal gammopathy of unknown significance)      Non-rheumatic aortic stenosis      Non-rheumatic mitral valve stenosis      PSVT (paroxysmal supraventricular tachycardia) (H)      Pulmonary HTN (H) 10/18/2011     Thrombocytopenia (H)      Thrombocytopenia (H)        PAST SURGICAL HISTORY:  Past Surgical History:   Procedure Laterality Date     ANESTHESIA CARDIOVERSION N/A 3/21/2019    Procedure: ANESTHESIA CARDIOVERSION;  Surgeon:  GENERIC ANESTHESIA PROVIDER;  Location: [facility] OR     BIOPSY  3/2017     BONE MARROW BIOPSY, BONE SPECIMEN, NEEDLE/TROCAR Right 2/23/2017    Procedure: BIOPSY BONE MARROW;  Surgeon: Romie Esquivel MD;  Location:  GI     COLONOSCOPY  2010     EYE SURGERY      Cataract, both eyes     ORTHOPEDIC SURGERY         SOCIAL HISTORY:  Social History     Socioeconomic History     Marital status:      Spouse name: Not on file     Number of children: Not on file     Years of education: Not on file     Highest education level: Not on file   Occupational History     Not on file   Social Needs     Financial resource strain: Not on file     Food insecurity:     Worry: Not on file     Inability: Not on file     Transportation needs:     Medical: Not on file     Non-medical: Not on file   Tobacco Use     Smoking status: Never Smoker     Smokeless tobacco: Never Used   Substance and Sexual Activity     Alcohol use: Yes     Alcohol/week: 0.0 oz     Comment: Max 2 beers per day     Drug use: No     Sexual activity: Yes     Partners: Female     Birth control/protection: None   Lifestyle     Physical activity:     Days per week: Not on file     Minutes per session: Not on file     Stress: Not on file   Relationships     Social connections:     Talks on phone: Not on file     Gets together: Not on file     Attends Zoroastrianism service: Not on file     Active member of club or organization: Not on file     Attends meetings of clubs or organizations: Not on file     Relationship status: Not on file     Intimate partner violence:     Fear of current or ex partner: Not on file     Emotionally abused: Not on file     Physically abused: Not on file     Forced sexual activity: Not on file   Other Topics Concern     Parent/sibling w/ CABG, MI or angioplasty before 65F 55M? No   Social History Narrative     Not on file       FAMILY HISTORY:  Family History   Problem Relation Age of Onset     Hypertension Mother        Review of  Systems:  Skin:          Eyes:         ENT:         Respiratory:          Cardiovascular:         Gastroenterology:        Genitourinary:         Musculoskeletal:         Neurologic:         Psychiatric:         Heme/Lymph/Imm:         Endocrine:           Service Date: 05/23/2019      HISTORY OF PRESENT ILLNESS:  Mr. Reyes is a pleasant 78-year-old gentleman who presents to the office today for followup after a cardioversion back in March.      His cardiovascular history includes valvular heart disease and a history of paroxysmal SVT and typical atrial flutter.  Earlier this year, while traveling in [place] he had a syncopal episode.  He was found to be in rate controlled atrial flutter.  This was a new diagnosis for him.  He was started on Eliquis, but no cardioversion or ablation were performed.  It was felt that the syncope was due to dehydration.  When he returned to the [place], he had a followup echocardiogram which showed mild to moderate mitral stenosis and moderate aortic stenosis.  This was overall unchanged from his echocardiogram a year prior.  He saw Dr. Iglesias, who recommended he undergo a cardioversion.  This was performed on 03/21 with successful restoration of sinus rhythm.  He subsequently traveled to [place] and now presents today for followup.      The patient states he was not symptomatic with the atrial flutter previously.  He has not been having any cardiac symptoms or concerns since his cardioversion.  He does remain on the Eliquis.  He is on lisinopril for history of hypertension.      CURRENT CARDIAC MEDICATIONS:   1.  Eliquis 5 mg b.i.d.   2.  Lisinopril 20 mg daily.   3.  Pravastatin 20 mg daily.      The remainder of his medications, allergies and review of systems were reviewed and as are documented separately.      PHYSICAL EXAMINATION:   GENERAL:  The patient is a 78-year-old gentleman who appears his stated age.  He is in no apparent distress.   VITAL SIGNS:  Blood  pressure is 110/82, pulse is 80, weight is 189 pounds, which is stable.    PULMONARY:  Breathing is nonlabored.  Lungs are clear to auscultation.   CARDIAC:  Examination reveals a regular rate and rhythm.  He has a 2/6 systolic ejection murmur heard best at the upper left sternal border.  He also has a 1/6 diastolic murmur heard at the apex.   EXTREMITIES:  Lower extremities show no evidence of edema.   NEUROLOGIC:  Alert and oriented.      EKG performed at today's office visit shows sinus rhythm.  No other concerning findings are noted.      ASSESSMENT AND PLAN:  Mr. Reyes is a pleasant 78-year-old gentleman with a history of valvular heart disease including aortic and mitral stenosis, a history of paroxysmal SVT and a recent diagnosis of atrial flutter who underwent a cardioversion to sinus rhythm.  He remains in sinus rhythm today.  Overall, he is doing quite well from a cardiac standpoint.  We discussed that his CHADS-VASc score is 3 due to age and hypertension.  Additionally, with his history of valvular heart disease and his severely dilated left atrium.  We discussed that the risk of recurrence is quite elevated.  I recommended he stay on Eliquis long-term, and he was agreeable to this.  This was refilled today.      He will plan to follow up in the Cardiology office in the spring of 2020.  Of course, I encouraged him to contact us sooner with any questions or concerns.         ALEXANDRA SAUNDERS PA-C             D: 2019   T: 2019   MT: TODD      Name:     DESEAN REYES   MRN:      [id number]        Account:      [id number]   :      1940           Service Date: 2019      Document: [id number]       Thank you for allowing me to participate in the care of your patient.      Sincerely,     Alexandra Saunders PA-C     [facility]    cc:   Brandt Iglesias MD  [facility]  [address] [address]

## 2019-05-23 NOTE — LETTER
5/23/2019      Pb Warren MD  [address] [facility] [address]      RE: Mars Reyes       Dear Colleague,    I had the pleasure of seeing Mars Reyes in the [facility].    Service Date: 05/23/2019      HISTORY OF PRESENT ILLNESS:  Mr. Reyes is a pleasant 78-year-old gentleman who presents to the office today for followup after a cardioversion back in March.      His cardiovascular history includes valvular heart disease and a history of paroxysmal SVT and typical atrial flutter.  Earlier this year, while traveling in [place] he had a syncopal episode.  He was found to be in rate controlled atrial flutter.  This was a new diagnosis for him.  He was started on Eliquis, but no cardioversion or ablation were performed.  It was felt that the syncope was due to dehydration.  When he returned to the [place], he had a followup echocardiogram which showed mild to moderate mitral stenosis and moderate aortic stenosis.  This was overall unchanged from his echocardiogram a year prior.  He saw Dr. Iglesias, who recommended he undergo a cardioversion.  This was performed on 03/21 with successful restoration of sinus rhythm.  He subsequently traveled to [place] and now presents today for followup.      The patient states he was not symptomatic with the atrial flutter previously.  He has not been having any cardiac symptoms or concerns since his cardioversion.  He does remain on the Eliquis.  He is on lisinopril for history of hypertension.      CURRENT CARDIAC MEDICATIONS:   1.  Eliquis 5 mg b.i.d.   2.  Lisinopril 20 mg daily.   3.  Pravastatin 20 mg daily.      The remainder of his medications, allergies and review of systems were reviewed and as are documented separately.      PHYSICAL EXAMINATION:   GENERAL:  The patient is a 78-year-old gentleman who appears his stated age.  He is in no apparent distress.   VITAL SIGNS:  Blood pressure is 110/82, pulse is 80, weight is  189 pounds, which is stable.    PULMONARY:  Breathing is nonlabored.  Lungs are clear to auscultation.   CARDIAC:  Examination reveals a regular rate and rhythm.  He has a 2/6 systolic ejection murmur heard best at the upper left sternal border.  He also has a 1/6 diastolic murmur heard at the apex.   EXTREMITIES:  Lower extremities show no evidence of edema.   NEUROLOGIC:  Alert and oriented.      EKG performed at today's office visit shows sinus rhythm.  No other concerning findings are noted.      ASSESSMENT AND PLAN:  Mr. Martinez is a pleasant 78-year-old gentleman with a history of valvular heart disease including aortic and mitral stenosis, a history of paroxysmal SVT and a recent diagnosis of atrial flutter who underwent a cardioversion to sinus rhythm.  He remains in sinus rhythm today.  Overall, he is doing quite well from a cardiac standpoint.  We discussed that his CHADS-VASc score is 3 due to age and hypertension.  Additionally, with his history of valvular heart disease and his severely dilated left atrium.  We discussed that the risk of recurrence is quite elevated.  I recommended he stay on Eliquis long-term, and he was agreeable to this.  This was refilled today.      He will plan to follow up in the Cardiology office in the spring of 2020.  Of course, I encouraged him to contact us sooner with any questions or concerns.         TYSON SAUNDERS PA-C             D: 2019   T: 2019   MT: TODD      Name:     DESEAN MARTINEZ   MRN:      [id number]        Account:      [id number]   :      1940           Service Date: 2019      Document: [id number]         Outpatient Encounter Medications as of 2019   Medication Sig Dispense Refill     apixaban ANTICOAGULANT (ELIQUIS) 5 MG tablet Take 1 tablet (5 mg) by mouth 2 times daily 180 tablet 3     allopurinol (ZYLOPRIM) 100 MG tablet Take 1 tablet (100 mg) by mouth daily 90 tablet 3     lisinopril (PRINIVIL/ZESTRIL) 20 MG tablet  Take 1 tablet (20 mg) by mouth daily 90 tablet 3     pravastatin (PRAVACHOL) 20 MG tablet Take 1 tablet (20 mg) by mouth daily 90 tablet 3     [DISCONTINUED] apixaban ANTICOAGULANT (ELIQUIS) 5 MG tablet Take 1 tablet (5 mg) by mouth 2 times daily 60 tablet 3     [DISCONTINUED] metoprolol tartrate (LOPRESSOR) 25 MG tablet Take 0.5 tablets (12.5 mg) by mouth 2 times daily for 14 days 14 tablet 0     No facility-administered encounter medications on file as of 5/23/2019.        Again, thank you for allowing me to participate in the care of your patient.      Sincerely,    Alexandra Rojas PA-C     [facility]

## 2019-05-23 NOTE — PROGRESS NOTES
Service Date: 05/23/2019      HISTORY OF PRESENT ILLNESS:  Mr. Reyes is a pleasant 78-year-old gentleman who presents to the office today for followup after a cardioversion back in March.      His cardiovascular history includes valvular heart disease and a history of paroxysmal SVT and typical atrial flutter.  Earlier this year, while traveling in [place] he had a syncopal episode.  He was found to be in rate controlled atrial flutter.  This was a new diagnosis for him.  He was started on Eliquis, but no cardioversion or ablation were performed.  It was felt that the syncope was due to dehydration.  When [place] returned [place] the [place], he had a followup echocardiogram which showed mild to moderate mitral stenosis and moderate aortic stenosis.  This was overall unchanged from his echocardiogram a year prior.  He saw Dr. Iglesias, who recommended he undergo a cardioversion.  This was performed on 03/21 with successful restoration of sinus rhythm.  He subsequently traveled to [place] and now presents today for followup.      The patient states he was not symptomatic with the atrial flutter previously.  He has not been having any cardiac symptoms or concerns since his cardioversion.  He does remain on the Eliquis.  He is on lisinopril for history of hypertension.      CURRENT CARDIAC MEDICATIONS:   1.  Eliquis 5 mg b.i.d.   2.  Lisinopril 20 mg daily.   3.  Pravastatin 20 mg daily.      The remainder of his medications, allergies and review of systems were reviewed and as are documented separately.      PHYSICAL EXAMINATION:   GENERAL:  The patient is a 78-year-old gentleman who appears his stated age.  He is in no apparent distress.   VITAL SIGNS:  Blood pressure is 110/82, pulse is 80, weight is 189 pounds, which is stable.    PULMONARY:  Breathing is nonlabored.  Lungs are clear to auscultation.   CARDIAC:  Examination reveals a regular rate and rhythm.  He has a 2/6 systolic ejection murmur heard best at the  upper left sternal border.  He also has a 1/6 diastolic murmur heard at the apex.   EXTREMITIES:  Lower extremities show no evidence of edema.   NEUROLOGIC:  Alert and oriented.      EKG performed at today's office visit shows sinus rhythm.  No other concerning findings are noted.      ASSESSMENT AND PLAN:  Mr. Martinez is a pleasant 78-year-old gentleman with a history of valvular heart disease including aortic and mitral stenosis, a history of paroxysmal SVT and a recent diagnosis of atrial flutter who underwent a cardioversion to sinus rhythm.  He remains in sinus rhythm today.  Overall, he is doing quite well from a cardiac standpoint.  We discussed that his CHADS-VASc score is 3 due to age and hypertension.  Additionally, with his history of valvular heart disease and his severely dilated left atrium.  We discussed that the risk of recurrence is quite elevated.  I recommended he stay on Eliquis long-term, and he was agreeable to this.  This was refilled today.      He will plan to follow up in the Cardiology office in the spring of 2020.  Of course, I encouraged him to contact us sooner with any questions or concerns.         TYSON SAUNDERS PA-C             D: 2019   T: 2019   MT: TODD      Name:     DESEAN MARTINEZ   MRN:      [id number]        Account:      [id number]   :      1940           Service Date: 2019      Document: [id number]

## 2019-05-29 ENCOUNTER — TRANSFERRED RECORDS (OUTPATIENT)
Dept: HEALTH INFORMATION MANAGEMENT | Facility: CLINIC | Age: 79
End: 2019-05-29

## 2019-06-05 ENCOUNTER — HOSPITAL ENCOUNTER (INPATIENT)
Facility: CLINIC | Age: 79
Setting detail: SURGERY ADMIT
End: 2019-06-05
Attending: ORTHOPAEDIC SURGERY | Admitting: ORTHOPAEDIC SURGERY
Payer: COMMERCIAL

## 2019-06-05 ENCOUNTER — TRANSFERRED RECORDS (OUTPATIENT)
Dept: HEALTH INFORMATION MANAGEMENT | Facility: CLINIC | Age: 79
End: 2019-06-05

## 2019-06-07 ENCOUNTER — TRANSFERRED RECORDS (OUTPATIENT)
Dept: HEALTH INFORMATION MANAGEMENT | Facility: CLINIC | Age: 79
End: 2019-06-07

## 2019-07-10 ENCOUNTER — TELEPHONE (OUTPATIENT)
Dept: CARDIOLOGY | Facility: CLINIC | Age: 79
End: 2019-07-10

## 2019-07-10 NOTE — TELEPHONE ENCOUNTER
"Pt called stating he is having a \"point tooth extraction\" sometime next week. Dentist is wondering if he needs to hold his Eliquis. There was not a specific hold time requested. Called [facility] and spoke with staff working with . They don't work with Eliquis much to know the hold time. Reviewed w/ Alexandra MEEKS. Okay to hold for 2 days prior to procedure if dentist thinks bleeding risk is high. Of course we prefer pt to stay on AC if hold is not truly needed. ZORAN Rosales    "

## 2019-08-14 ENCOUNTER — OFFICE VISIT (OUTPATIENT)
Dept: INTERNAL MEDICINE | Facility: CLINIC | Age: 79
End: 2019-08-14
Payer: COMMERCIAL

## 2019-08-14 VITALS
SYSTOLIC BLOOD PRESSURE: 134 MMHG | BODY MASS INDEX: 25.41 KG/M2 | HEART RATE: 57 BPM | WEIGHT: 182.1 LBS | TEMPERATURE: 97.7 F | OXYGEN SATURATION: 98 % | DIASTOLIC BLOOD PRESSURE: 68 MMHG | RESPIRATION RATE: 15 BRPM

## 2019-08-14 DIAGNOSIS — Z01.812 PRE-OPERATIVE LABORATORY EXAMINATION: ICD-10-CM

## 2019-08-14 DIAGNOSIS — I48.92 ATRIAL FLUTTER, UNSPECIFIED TYPE (H): ICD-10-CM

## 2019-08-14 DIAGNOSIS — I10 ESSENTIAL HYPERTENSION, BENIGN: ICD-10-CM

## 2019-08-14 DIAGNOSIS — M16.9 OSTEOARTHRITIS OF HIP, UNSPECIFIED LATERALITY, UNSPECIFIED OSTEOARTHRITIS TYPE: ICD-10-CM

## 2019-08-14 DIAGNOSIS — D47.2 MGUS (MONOCLONAL GAMMOPATHY OF UNKNOWN SIGNIFICANCE): ICD-10-CM

## 2019-08-14 DIAGNOSIS — Z01.818 PREOP GENERAL PHYSICAL EXAM: Primary | ICD-10-CM

## 2019-08-14 DIAGNOSIS — E78.5 HYPERLIPIDEMIA LDL GOAL <130: ICD-10-CM

## 2019-08-14 DIAGNOSIS — I27.20 PULMONARY HTN (H): ICD-10-CM

## 2019-08-14 LAB
HGB BLD-MCNC: 14.5 G/DL (ref 13.3–17.7)
MRSA DNA SPEC QL NAA+PROBE: NEGATIVE
PLATELET # BLD AUTO: 68 10E9/L (ref 150–450)
POTASSIUM SERPL-SCNC: 4.6 MMOL/L (ref 3.4–5.3)
SPECIMEN SOURCE: NORMAL

## 2019-08-14 PROCEDURE — 85018 HEMOGLOBIN: CPT | Performed by: INTERNAL MEDICINE

## 2019-08-14 PROCEDURE — 36415 COLL VENOUS BLD VENIPUNCTURE: CPT | Performed by: INTERNAL MEDICINE

## 2019-08-14 PROCEDURE — 99215 OFFICE O/P EST HI 40 MIN: CPT | Performed by: INTERNAL MEDICINE

## 2019-08-14 PROCEDURE — 87641 MR-STAPH DNA AMP PROBE: CPT | Performed by: INTERNAL MEDICINE

## 2019-08-14 PROCEDURE — 85049 AUTOMATED PLATELET COUNT: CPT | Performed by: INTERNAL MEDICINE

## 2019-08-14 PROCEDURE — 93000 ELECTROCARDIOGRAM COMPLETE: CPT | Performed by: INTERNAL MEDICINE

## 2019-08-14 PROCEDURE — 84132 ASSAY OF SERUM POTASSIUM: CPT | Performed by: INTERNAL MEDICINE

## 2019-08-14 RX ORDER — LISINOPRIL 20 MG/1
20 TABLET ORAL DAILY
Qty: 90 TABLET | Refills: 3 | Status: SHIPPED | OUTPATIENT
Start: 2019-08-14 | End: 2020-03-26

## 2019-08-14 RX ORDER — PRAVASTATIN SODIUM 20 MG
20 TABLET ORAL DAILY
Qty: 90 TABLET | Refills: 3 | Status: SHIPPED | OUTPATIENT
Start: 2019-08-14 | End: 2020-07-14

## 2019-08-14 NOTE — Clinical Note
Sherry saw your patient Mars Reyes here for preoperative exam for revision of his hip.  He states you have been treating him for his atrial flutter via recent cardioversion.  It appears his atrial flutter is back according to our EKG even though his heart rate is controlled and he has no symptoms.  I wanted to make you aware this to make sure that you do not feel he needs to be seen by you prior to any surgical procedure at this time.Wanda [facility]yaH[facility] internal medicine

## 2019-08-14 NOTE — PROGRESS NOTES
[facility]  [address] [address]  [phone]  Dept: [phone]    PRE-OP EVALUATION:  Today's date: 2019    Mars Reyes (: 1940) presents for pre-operative evaluation assessment as requested by Dr. Stoner.  He requires evaluation and anesthesia risk assessment prior to undergoing surgery/procedure for treatment of left hip.    Proposed Surgery/ Procedure: Revision left total hip arthroplasty   Date of Surgery/ Procedure: 19  Time of Surgery/ Procedure: 7:30 am  Hospital/Surgical Facility: [facility]  Primary Physician: Pb Warren  Type of Anesthesia Anticipated: Spinal    Patient has a Health Care Directive or Living Will:  YES      HPI:     HPI related to upcoming procedure: He requires evaluation and anesthesia risk assessment prior to undergoing surgery/procedure for treatment of left hip.    Proposed Surgery/ Procedure: Revision left total hip arthroplasty   Date of Surgery/ Procedure: 19    See problem list for active medical problems.  Problems all longstanding and stable, except as noted/documented.  See ROS for pertinent symptoms related to these conditions.      MEDICAL HISTORY:     Patient Active Problem List    Diagnosis Date Noted     Atrial flutter, unspecified type (H) 2019     Priority: Medium     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, unspecified cause, unspecified laterality 2018     Priority: Medium     MGUS (monoclonal gammopathy of unknown significance) 2017     Priority: Medium     Thrombocytopenia (H) 2017     Priority: Medium     Non-rheumatic mitral valve stenosis 2015     Priority: Medium     Advanced directives, counseling/discussion 2011     Priority: Medium     Advance Directive Problem List Overview:   Name Relationship Phone    Primary Health Care Agent Cameron Reyes son [phone]         Alternative Health Care Agent Damian Reyes son [phone]       Advance  Directive received and scanned. Click on Code in the patient header to view. Verified with patient health care agents demographics as listed above  MEREDITH Mock RN  1/4/2012   Advance Directive Problem List Overview:      Patient states has Advance Directive and will bring in a copy to clinic. 12/1/2011          Pulmonary HTN (H) 10/18/2011     Priority: Medium     Per echocardiogram       Grief reaction 11/17/2010     Priority: Medium     HYPERLIPIDEMIA LDL GOAL <130 10/31/2010     Priority: Medium     Aftercare following joint replacement 06/03/2009     Priority: Medium     Hip joint replacement by other means 06/03/2009     Priority: Medium     DJD (degenerative joint disease) 11/20/2008     Priority: Medium     Essential hypertension, benign 09/18/2002     Priority: Medium     Anemia 09/18/2002     Priority: Medium     Problem list name updated by automated process. Provider to review       Diverticulosis of large intestine 09/18/2002     Priority: Medium     Problem list name updated by automated process. Provider to review        Past Medical History:   Diagnosis Date     Aftercare following joint replacement 6/3/2009     Arrhythmia     has had SVT and atrial flutter     Contact dermatitis and other eczema, due to unspecified cause      Diverticulosis of colon (without mention of hemorrhage)      DJD (degenerative joint disease)      Essential hypertension, benign     abstracted 7/22/02     Gout, unspecified      Grief reaction 11/17/2010     Hip joint replacement by other means 6/3/2009     Hyperlipidemia LDL goal <130 10/31/2010     MGUS (monoclonal gammopathy of unknown significance)      Non-rheumatic aortic stenosis      Non-rheumatic mitral valve stenosis      PSVT (paroxysmal supraventricular tachycardia) (H)      Pulmonary HTN (H) 10/18/2011     Thrombocytopenia (H)      Past Surgical History:   Procedure Laterality Date     ANESTHESIA CARDIOVERSION N/A 3/21/2019    Procedure: ANESTHESIA CARDIOVERSION;   Surgeon: SUKHWINDER ANESTHESIA PROVIDER;  Location: [facility] OR     BIOPSY  3/2017     BONE MARROW BIOPSY, BONE SPECIMEN, NEEDLE/TROCAR Right 2/23/2017    Procedure: BIOPSY BONE MARROW;  Surgeon: Romie Esquivel MD;  Location:  GI     COLONOSCOPY  2010     EYE SURGERY      Cataract, both eyes     ORTHOPEDIC SURGERY       Current Outpatient Medications   Medication Sig Dispense Refill     allopurinol (ZYLOPRIM) 100 MG tablet TAKE 1 TABLET BY MOUTH EVERY ONCE DAILY 90 tablet 1     apixaban ANTICOAGULANT (ELIQUIS) 5 MG tablet Take 1 tablet (5 mg) by mouth 2 times daily 180 tablet 3     lisinopril (PRINIVIL/ZESTRIL) 20 MG tablet Take 1 tablet (20 mg) by mouth daily 90 tablet 3     pravastatin (PRAVACHOL) 20 MG tablet Take 1 tablet (20 mg) by mouth daily 90 tablet 3     OTC products: None, except as noted above    Allergies   Allergen Reactions     Simvastatin      10/26/12: Arm pain - pt stopped it x 4-5 wks and arm pain didn't resolve sore started it       Latex Allergy: NO    Social History     Tobacco Use     Smoking status: Never Smoker     Smokeless tobacco: Never Used   Substance Use Topics     Alcohol use: Yes     Alcohol/week: 0.0 oz     Comment: Max 2 beers per day     History   Drug Use No       REVIEW OF SYSTEMS:   CONSTITUTIONAL: NEGATIVE for fever, chills, change in weight  INTEGUMENTARY/SKIN: NEGATIVE for worrisome rashes, moles or lesions  ENT/MOUTH: NEGATIVE for ear, mouth and throat problems  RESP: NEGATIVE for significant cough or SOB  CV: NEGATIVE for chest pain, palpitations or peripheral edema  GI: NEGATIVE for nausea, abdominal pain, heartburn, or change in bowel habits  : NEGATIVE for frequency, dysuria, or hematuria  NEURO: NEGATIVE for weakness, dizziness or paresthesias  HEME: NEGATIVE for bleeding problems  PSYCHIATRIC: NEGATIVE for changes in mood or affect    EXAM:   /68   Pulse 57   Temp 97.7  F (36.5  C) (Oral)   Resp 15   Wt 82.6 kg (182 lb 1.6 oz)   SpO2 98%   BMI 25.41  kg/m      GENERAL APPEARANCE:  alert and no distress     EYES: EOMI,  PERRL     HENT: ear canals and TM's normal and nose and mouth without ulcers or lesions     NECK: no adenopathy, no asymmetry, masses, or scars and thyroid normal to palpation     RESP: lungs clear to auscultation - no rales, rhonchi or wheezes     CV: regular rates and rhythm, normal S1 S2 and no click or rub, + ADRIAN/DM     ABDOMEN:  soft, nontender, no HSM or masses and bowel sounds normal     MS: antalgic gait     PSYCH: mentation appears normal and affect normal/bright    DIAGNOSTICS:     EKG: There appears to be an atrial flutter rhythm with a heart rate of roughly 57 and nonspecific ST and T wave changes associated with a flutter  Labs Drawn and in Process:   Unresulted Labs Ordered in the Past 30 Days of this Admission     No orders found from 7/15/2019 to 8/15/2019.          Recent Labs   Lab Test 03/22/19  1729 03/21/19  0951 03/19/19  1418   HGB 13.5  --  14.1   PLT 72*  --  69*    137  --    POTASSIUM 4.2 4.3  --    CR 0.85 0.87  --         IMPRESSION:   Reason for surgery/procedure: He requires evaluation and anesthesia risk assessment prior to undergoing surgery/procedure for treatment of left hip.    Proposed Surgery/ Procedure: Revision left total hip arthroplasty   Date of Surgery/ Procedure: 9/6/19    The proposed surgical procedure is considered mild risk.    REVISED CARDIAC RISK INDEX  The patient has the following serious cardiovascular risks for perioperative complications such as (MI, PE, VFib and 3  AV Block):  No serious cardiac risks  INTERPRETATION: 1 risks: Class II (low risk - 0.9% complication rate)    The patient has the following additional risks for perioperative complications:  No identified additional risks:      ICD-10-CM    1. Preop general physical exam Z01.818 Potassium     Hemoglobin     Platelet count     EKG 12-lead complete w/read - Clinics     EKG 12-lead complete w/read - Clinics   2. Osteoarthritis  of hip, unspecified laterality, unspecified osteoarthritis type M16.9    3. MGUS (monoclonal gammopathy of unknown significance) D47.2    4. Essential hypertension, benign I10 lisinopril (PRINIVIL/ZESTRIL) 20 MG tablet   5. Atrial flutter, unspecified type (H) I48.92 EKG 12-lead complete w/read - Clinics   6. Pulmonary HTN (H) I27.20    7. Hyperlipidemia LDL goal <130 E78.5 pravastatin (PRAVACHOL) 20 MG tablet       RECOMMENDATIONS:     --Consult hospital rounder / IM to assist post-op medical management    Cardiovascular Risk  Performs 4 METs exercise without symptoms (Light housework (dusting, washing dishes), Climb a flight of stairs and Walk on level ground at 15 minutes per mile (4 miles/hour)) .     His atrial fibrillation appears to not cause him any symptoms at present time.  He is adequately anticoagulated and has been advised to stop his anticoagulation therapy 3 days prior to surgery.  I will forward a message to his cardiologist to let them know that it appears that he is in a rate controlled atrial flutter rhythm at present time.    --Patient is to take all scheduled medications on the day of surgery EXCEPT for modifications listed below.    Anticoagulant or Antiplatelet Medication Use  ELIQUIS: Bleeding risk is at least moderate for this procedure and apixaban (Eliquis) should be stopped at least 72 hours prior to procedure based on a creatinine clearance < 30.      ACE Inhibitor or Angiotensin Receptor Blocker (ARB) Use  Ace inhibitor or Angiotensin Receptor Blocker (ARB) and should HOLD this medication for the 24 hours prior to surgery.    Please make note of patient's mild thrombocytopenia.    Also advised the patient to make contact with his primary cardiologist in regards to his atrial flutter which was recently treated with cardioversion.    APPROVAL GIVEN to proceed with proposed procedure, without further diagnostic evaluation     Signed Electronically by: Pb Warren MD    Copy of this  evaluation report is provided to requesting physician, Dr. Herbie Gomez Preop Guidelines    Revised Cardiac Risk Index

## 2019-08-15 ENCOUNTER — TELEPHONE (OUTPATIENT)
Dept: CARDIOLOGY | Facility: CLINIC | Age: 79
End: 2019-08-15

## 2019-08-15 DIAGNOSIS — I48.92 ATRIAL FLUTTER (H): Primary | ICD-10-CM

## 2019-08-15 NOTE — PROGRESS NOTES
Pb,    If he has not symptoms and heart rate is controlled, the aflutter should not be an issue for surgery.  The next step for treatment of his flutter would be seeing EP to discuss ablation, which usually is a more permanent fix.  I will have my nurse arrange an EP clinic visit; this can be done anytime, such as after surgery.    Brandt Iglesias MD  Cardiology - [facility]  Pager: [phone]  Text Page  August 15, 2019

## 2019-08-15 NOTE — TELEPHONE ENCOUNTER
----- Message from Brandt Iglesias MD sent at 8/15/2019  7:48 AM CDT -----  Regarding: EP referral  This patient has been seen for atrial flutter.  He was seen by his PCP for a pre-op and is back in rate controlled flutter.  He should be fine for surgery, but I would like him to see EP to discuss ablation.  This can be done anytime, no rush.  Thanks,  Pool        Called pt, left vm to call me back.   Entered order for EP referral.   Nazario RN, BSN  08/15/19 8:31 AM    Pt called back, reviewed recommendations and transferred to scheduling. Pt had no questions.   Nazario MEEHAN, BSN  08/15/19 8:44 AM

## 2019-09-09 ENCOUNTER — APPOINTMENT (OUTPATIENT)
Dept: PHYSICAL THERAPY | Facility: CLINIC | Age: 79
DRG: 467 | End: 2019-09-09
Attending: ORTHOPAEDIC SURGERY
Payer: COMMERCIAL

## 2019-09-09 ENCOUNTER — APPOINTMENT (OUTPATIENT)
Dept: GENERAL RADIOLOGY | Facility: CLINIC | Age: 79
DRG: 467 | End: 2019-09-09
Attending: PHYSICIAN ASSISTANT
Payer: COMMERCIAL

## 2019-09-09 ENCOUNTER — ANESTHESIA (OUTPATIENT)
Dept: SURGERY | Facility: CLINIC | Age: 79
DRG: 467 | End: 2019-09-09
Payer: COMMERCIAL

## 2019-09-09 ENCOUNTER — APPOINTMENT (OUTPATIENT)
Dept: GENERAL RADIOLOGY | Facility: CLINIC | Age: 79
DRG: 467 | End: 2019-09-09
Attending: ORTHOPAEDIC SURGERY
Payer: COMMERCIAL

## 2019-09-09 ENCOUNTER — ANESTHESIA EVENT (OUTPATIENT)
Dept: SURGERY | Facility: CLINIC | Age: 79
DRG: 467 | End: 2019-09-09
Payer: COMMERCIAL

## 2019-09-09 ENCOUNTER — HOSPITAL ENCOUNTER (INPATIENT)
Facility: CLINIC | Age: 79
LOS: 2 days | Discharge: HOME | DRG: 467 | End: 2019-09-11
Attending: ORTHOPAEDIC SURGERY | Admitting: ORTHOPAEDIC SURGERY
Payer: COMMERCIAL

## 2019-09-09 DIAGNOSIS — Z96.649 S/P REVISION OF TOTAL HIP: Primary | ICD-10-CM

## 2019-09-09 LAB
ABO + RH BLD: NORMAL
ABO + RH BLD: NORMAL
BLD GP AB SCN SERPL QL: NORMAL
BLOOD BANK CMNT PATIENT-IMP: NORMAL
CREAT SERPL-MCNC: 0.92 MG/DL (ref 0.66–1.25)
ERYTHROCYTE [DISTWIDTH] IN BLOOD BY AUTOMATED COUNT: 12.6 % (ref 10–15)
GFR SERPL CREATININE-BSD FRML MDRD: 80 ML/MIN/{1.73_M2}
GRAM STN SPEC: NORMAL
HCT VFR BLD AUTO: 42.1 % (ref 40–53)
HGB BLD-MCNC: 14.6 G/DL (ref 13.3–17.7)
Lab: NORMAL
MCH RBC QN AUTO: 29.7 PG (ref 26.5–33)
MCHC RBC AUTO-ENTMCNC: 34.7 G/DL (ref 31.5–36.5)
MCV RBC AUTO: 86 FL (ref 78–100)
PLATELET # BLD AUTO: 69 10E9/L (ref 150–450)
POTASSIUM SERPL-SCNC: 4.4 MMOL/L (ref 3.4–5.3)
RBC # BLD AUTO: 4.91 10E12/L (ref 4.4–5.9)
SPECIMEN EXP DATE BLD: NORMAL
SPECIMEN SOURCE: NORMAL
WBC # BLD AUTO: 7.4 10E9/L (ref 4–11)

## 2019-09-09 PROCEDURE — 27210794 ZZH OR GENERAL SUPPLY STERILE: Performed by: ORTHOPAEDIC SURGERY

## 2019-09-09 PROCEDURE — C1776 JOINT DEVICE (IMPLANTABLE): HCPCS | Performed by: ORTHOPAEDIC SURGERY

## 2019-09-09 PROCEDURE — 36415 COLL VENOUS BLD VENIPUNCTURE: CPT | Performed by: ANESTHESIOLOGY

## 2019-09-09 PROCEDURE — 86850 RBC ANTIBODY SCREEN: CPT | Performed by: ANESTHESIOLOGY

## 2019-09-09 PROCEDURE — 25000128 H RX IP 250 OP 636: Performed by: ANESTHESIOLOGY

## 2019-09-09 PROCEDURE — 25800025 ZZH RX 258: Performed by: ORTHOPAEDIC SURGERY

## 2019-09-09 PROCEDURE — 87176 TISSUE HOMOGENIZATION CULTR: CPT | Performed by: ORTHOPAEDIC SURGERY

## 2019-09-09 PROCEDURE — 25000128 H RX IP 250 OP 636: Performed by: PHYSICIAN ASSISTANT

## 2019-09-09 PROCEDURE — C1762 CONN TISS, HUMAN(INC FASCIA): HCPCS | Performed by: ORTHOPAEDIC SURGERY

## 2019-09-09 PROCEDURE — 25000566 ZZH SEVOFLURANE, EA 15 MIN: Performed by: ORTHOPAEDIC SURGERY

## 2019-09-09 PROCEDURE — C1713 ANCHOR/SCREW BN/BN,TIS/BN: HCPCS | Performed by: ORTHOPAEDIC SURGERY

## 2019-09-09 PROCEDURE — 40000170 ZZH STATISTIC PRE-PROCEDURE ASSESSMENT II: Performed by: ORTHOPAEDIC SURGERY

## 2019-09-09 PROCEDURE — 12000000 ZZH R&B MED SURG/OB

## 2019-09-09 PROCEDURE — 25000128 H RX IP 250 OP 636: Performed by: NURSE ANESTHETIST, CERTIFIED REGISTERED

## 2019-09-09 PROCEDURE — 25800030 ZZH RX IP 258 OP 636: Performed by: NURSE ANESTHETIST, CERTIFIED REGISTERED

## 2019-09-09 PROCEDURE — 86900 BLOOD TYPING SEROLOGIC ABO: CPT | Performed by: ANESTHESIOLOGY

## 2019-09-09 PROCEDURE — 87070 CULTURE OTHR SPECIMN AEROBIC: CPT | Performed by: ORTHOPAEDIC SURGERY

## 2019-09-09 PROCEDURE — 86901 BLOOD TYPING SEROLOGIC RH(D): CPT | Performed by: ANESTHESIOLOGY

## 2019-09-09 PROCEDURE — 25000125 ZZHC RX 250: Performed by: PHYSICIAN ASSISTANT

## 2019-09-09 PROCEDURE — 36000069 ZZH SURGERY LEVEL 5 EA 15 ADDTL MIN: Performed by: ORTHOPAEDIC SURGERY

## 2019-09-09 PROCEDURE — 0SRB03A REPLACEMENT OF LEFT HIP JOINT WITH CERAMIC SYNTHETIC SUBSTITUTE, UNCEMENTED, OPEN APPROACH: ICD-10-PCS | Performed by: ORTHOPAEDIC SURGERY

## 2019-09-09 PROCEDURE — 37000009 ZZH ANESTHESIA TECHNICAL FEE, EACH ADDTL 15 MIN: Performed by: ORTHOPAEDIC SURGERY

## 2019-09-09 PROCEDURE — 87205 SMEAR GRAM STAIN: CPT | Performed by: ORTHOPAEDIC SURGERY

## 2019-09-09 PROCEDURE — 97116 GAIT TRAINING THERAPY: CPT | Mod: GP

## 2019-09-09 PROCEDURE — 71000012 ZZH RECOVERY PHASE 1 LEVEL 1 FIRST HR: Performed by: ORTHOPAEDIC SURGERY

## 2019-09-09 PROCEDURE — 25800030 ZZH RX IP 258 OP 636: Performed by: PHYSICIAN ASSISTANT

## 2019-09-09 PROCEDURE — 40000277 XR SURGERY CARM FLUORO LESS THAN 5 MIN W STILLS

## 2019-09-09 PROCEDURE — 40000985 XR PELVIS AND HIP PORTABLE LEFT 1 VIEW

## 2019-09-09 PROCEDURE — 84132 ASSAY OF SERUM POTASSIUM: CPT | Performed by: ANESTHESIOLOGY

## 2019-09-09 PROCEDURE — 71000013 ZZH RECOVERY PHASE 1 LEVEL 1 EA ADDTL HR: Performed by: ORTHOPAEDIC SURGERY

## 2019-09-09 PROCEDURE — 85027 COMPLETE CBC AUTOMATED: CPT | Performed by: ANESTHESIOLOGY

## 2019-09-09 PROCEDURE — 25000125 ZZHC RX 250: Performed by: NURSE ANESTHETIST, CERTIFIED REGISTERED

## 2019-09-09 PROCEDURE — 97161 PT EVAL LOW COMPLEX 20 MIN: CPT | Mod: GP

## 2019-09-09 PROCEDURE — 36000067 ZZH SURGERY LEVEL 5 1ST 30 MIN: Performed by: ORTHOPAEDIC SURGERY

## 2019-09-09 PROCEDURE — 25000132 ZZH RX MED GY IP 250 OP 250 PS 637: Performed by: ANESTHESIOLOGY

## 2019-09-09 PROCEDURE — 97530 THERAPEUTIC ACTIVITIES: CPT | Mod: GP

## 2019-09-09 PROCEDURE — P9041 ALBUMIN (HUMAN),5%, 50ML: HCPCS | Performed by: ANESTHESIOLOGY

## 2019-09-09 PROCEDURE — 37000008 ZZH ANESTHESIA TECHNICAL FEE, 1ST 30 MIN: Performed by: ORTHOPAEDIC SURGERY

## 2019-09-09 PROCEDURE — 87075 CULTR BACTERIA EXCEPT BLOOD: CPT | Performed by: ORTHOPAEDIC SURGERY

## 2019-09-09 PROCEDURE — 25000125 ZZHC RX 250: Performed by: ORTHOPAEDIC SURGERY

## 2019-09-09 PROCEDURE — 82565 ASSAY OF CREATININE: CPT | Performed by: ANESTHESIOLOGY

## 2019-09-09 PROCEDURE — 0S9B3ZZ DRAINAGE OF LEFT HIP JOINT, PERCUTANEOUS APPROACH: ICD-10-PCS | Performed by: ORTHOPAEDIC SURGERY

## 2019-09-09 PROCEDURE — 97110 THERAPEUTIC EXERCISES: CPT | Mod: GP

## 2019-09-09 PROCEDURE — 25000132 ZZH RX MED GY IP 250 OP 250 PS 637: Performed by: PHYSICIAN ASSISTANT

## 2019-09-09 PROCEDURE — 25000128 H RX IP 250 OP 636: Performed by: ORTHOPAEDIC SURGERY

## 2019-09-09 PROCEDURE — 0SPB0JZ REMOVAL OF SYNTHETIC SUBSTITUTE FROM LEFT HIP JOINT, OPEN APPROACH: ICD-10-PCS | Performed by: ORTHOPAEDIC SURGERY

## 2019-09-09 DEVICE — IMPLANTABLE DEVICE: Type: IMPLANTABLE DEVICE | Site: HIP | Status: FUNCTIONAL

## 2019-09-09 DEVICE — GRAFT BONE CRUSH CANC 15ML 400075: Type: IMPLANTABLE DEVICE | Site: HIP | Status: FUNCTIONAL

## 2019-09-09 RX ORDER — NEOSTIGMINE METHYLSULFATE 1 MG/ML
VIAL (ML) INJECTION PRN
Status: DISCONTINUED | OUTPATIENT
Start: 2019-09-09 | End: 2019-09-09

## 2019-09-09 RX ORDER — CEFAZOLIN SODIUM 1 G/3ML
1 INJECTION, POWDER, FOR SOLUTION INTRAMUSCULAR; INTRAVENOUS SEE ADMIN INSTRUCTIONS
Status: DISCONTINUED | OUTPATIENT
Start: 2019-09-09 | End: 2019-09-09 | Stop reason: HOSPADM

## 2019-09-09 RX ORDER — ACETAMINOPHEN 500 MG
1000 TABLET ORAL ONCE
Status: DISCONTINUED | OUTPATIENT
Start: 2019-09-09 | End: 2019-09-09 | Stop reason: HOSPADM

## 2019-09-09 RX ORDER — ALLOPURINOL 100 MG/1
100 TABLET ORAL DAILY
Status: DISCONTINUED | OUTPATIENT
Start: 2019-09-09 | End: 2019-09-11 | Stop reason: HOSPADM

## 2019-09-09 RX ORDER — CELECOXIB 100 MG/1
100 CAPSULE ORAL 2 TIMES DAILY
Status: COMPLETED | OUTPATIENT
Start: 2019-09-09 | End: 2019-09-11

## 2019-09-09 RX ORDER — KETOROLAC TROMETHAMINE 30 MG/ML
30 INJECTION, SOLUTION INTRAMUSCULAR; INTRAVENOUS ONCE
Status: COMPLETED | OUTPATIENT
Start: 2019-09-09 | End: 2019-09-09

## 2019-09-09 RX ORDER — SODIUM CHLORIDE, SODIUM LACTATE, POTASSIUM CHLORIDE, CALCIUM CHLORIDE 600; 310; 30; 20 MG/100ML; MG/100ML; MG/100ML; MG/100ML
INJECTION, SOLUTION INTRAVENOUS CONTINUOUS PRN
Status: DISCONTINUED | OUTPATIENT
Start: 2019-09-09 | End: 2019-09-09

## 2019-09-09 RX ORDER — OXYCODONE HYDROCHLORIDE 5 MG/1
5-10 TABLET ORAL EVERY 4 HOURS PRN
Status: DISCONTINUED | OUTPATIENT
Start: 2019-09-09 | End: 2019-09-11 | Stop reason: HOSPADM

## 2019-09-09 RX ORDER — DEXAMETHASONE SODIUM PHOSPHATE 4 MG/ML
INJECTION, SOLUTION INTRA-ARTICULAR; INTRALESIONAL; INTRAMUSCULAR; INTRAVENOUS; SOFT TISSUE PRN
Status: DISCONTINUED | OUTPATIENT
Start: 2019-09-09 | End: 2019-09-09

## 2019-09-09 RX ORDER — PREGABALIN 150 MG/1
150 CAPSULE ORAL ONCE
Status: DISCONTINUED | OUTPATIENT
Start: 2019-09-09 | End: 2019-09-09 | Stop reason: DRUGHIGH

## 2019-09-09 RX ORDER — GABAPENTIN 100 MG/1
100 CAPSULE ORAL 3 TIMES DAILY
Status: DISCONTINUED | OUTPATIENT
Start: 2019-09-09 | End: 2019-09-11 | Stop reason: HOSPADM

## 2019-09-09 RX ORDER — AMOXICILLIN 500 MG/1
2000 TABLET, FILM COATED ORAL DAILY PRN
COMMUNITY

## 2019-09-09 RX ORDER — ONDANSETRON 2 MG/ML
4 INJECTION INTRAMUSCULAR; INTRAVENOUS EVERY 6 HOURS PRN
Status: DISCONTINUED | OUTPATIENT
Start: 2019-09-09 | End: 2019-09-11 | Stop reason: HOSPADM

## 2019-09-09 RX ORDER — BUPIVACAINE HYDROCHLORIDE AND EPINEPHRINE 5; 5 MG/ML; UG/ML
INJECTION, SOLUTION EPIDURAL; INTRACAUDAL; PERINEURAL
Status: DISCONTINUED
Start: 2019-09-09 | End: 2019-09-09 | Stop reason: HOSPADM

## 2019-09-09 RX ORDER — TRAMADOL HYDROCHLORIDE 50 MG/1
50 TABLET ORAL EVERY 6 HOURS PRN
Status: DISCONTINUED | OUTPATIENT
Start: 2019-09-09 | End: 2019-09-11 | Stop reason: HOSPADM

## 2019-09-09 RX ORDER — HYDROXYZINE HYDROCHLORIDE 25 MG/1
25 TABLET, FILM COATED ORAL ONCE
Status: COMPLETED | OUTPATIENT
Start: 2019-09-09 | End: 2019-09-09

## 2019-09-09 RX ORDER — ACETAMINOPHEN 325 MG/1
975 TABLET ORAL EVERY 8 HOURS
Status: DISCONTINUED | OUTPATIENT
Start: 2019-09-09 | End: 2019-09-11 | Stop reason: HOSPADM

## 2019-09-09 RX ORDER — HYDRALAZINE HYDROCHLORIDE 20 MG/ML
2.5-5 INJECTION INTRAMUSCULAR; INTRAVENOUS EVERY 10 MIN PRN
Status: DISCONTINUED | OUTPATIENT
Start: 2019-09-09 | End: 2019-09-09 | Stop reason: HOSPADM

## 2019-09-09 RX ORDER — GABAPENTIN 100 MG/1
100 CAPSULE ORAL
Status: COMPLETED | OUTPATIENT
Start: 2019-09-09 | End: 2019-09-09

## 2019-09-09 RX ORDER — PROPOFOL 10 MG/ML
INJECTION, EMULSION INTRAVENOUS PRN
Status: DISCONTINUED | OUTPATIENT
Start: 2019-09-09 | End: 2019-09-09

## 2019-09-09 RX ORDER — AMOXICILLIN 250 MG
1 CAPSULE ORAL 2 TIMES DAILY
Status: DISCONTINUED | OUTPATIENT
Start: 2019-09-09 | End: 2019-09-11 | Stop reason: HOSPADM

## 2019-09-09 RX ORDER — FENTANYL CITRATE 50 UG/ML
25-50 INJECTION, SOLUTION INTRAMUSCULAR; INTRAVENOUS
Status: DISCONTINUED | OUTPATIENT
Start: 2019-09-09 | End: 2019-09-09 | Stop reason: HOSPADM

## 2019-09-09 RX ORDER — BUPIVACAINE HYDROCHLORIDE AND EPINEPHRINE 5; 5 MG/ML; UG/ML
INJECTION, SOLUTION PERINEURAL PRN
Status: DISCONTINUED | OUTPATIENT
Start: 2019-09-09 | End: 2019-09-09 | Stop reason: HOSPADM

## 2019-09-09 RX ORDER — FENTANYL CITRATE 50 UG/ML
INJECTION, SOLUTION INTRAMUSCULAR; INTRAVENOUS PRN
Status: DISCONTINUED | OUTPATIENT
Start: 2019-09-09 | End: 2019-09-09

## 2019-09-09 RX ORDER — ALBUMIN, HUMAN INJ 5% 5 %
12.5 SOLUTION INTRAVENOUS ONCE
Status: COMPLETED | OUTPATIENT
Start: 2019-09-09 | End: 2019-09-09

## 2019-09-09 RX ORDER — AMOXICILLIN 250 MG
2 CAPSULE ORAL 2 TIMES DAILY
Status: DISCONTINUED | OUTPATIENT
Start: 2019-09-09 | End: 2019-09-11 | Stop reason: HOSPADM

## 2019-09-09 RX ORDER — ONDANSETRON 4 MG/1
4 TABLET, ORALLY DISINTEGRATING ORAL EVERY 30 MIN PRN
Status: DISCONTINUED | OUTPATIENT
Start: 2019-09-09 | End: 2019-09-09 | Stop reason: HOSPADM

## 2019-09-09 RX ORDER — LIDOCAINE HYDROCHLORIDE 20 MG/ML
INJECTION, SOLUTION INFILTRATION; PERINEURAL PRN
Status: DISCONTINUED | OUTPATIENT
Start: 2019-09-09 | End: 2019-09-09

## 2019-09-09 RX ORDER — NALOXONE HYDROCHLORIDE 0.4 MG/ML
.1-.4 INJECTION, SOLUTION INTRAMUSCULAR; INTRAVENOUS; SUBCUTANEOUS
Status: DISCONTINUED | OUTPATIENT
Start: 2019-09-09 | End: 2019-09-11 | Stop reason: HOSPADM

## 2019-09-09 RX ORDER — GLYCOPYRROLATE 0.2 MG/ML
INJECTION, SOLUTION INTRAMUSCULAR; INTRAVENOUS PRN
Status: DISCONTINUED | OUTPATIENT
Start: 2019-09-09 | End: 2019-09-09

## 2019-09-09 RX ORDER — VANCOMYCIN HYDROCHLORIDE 1 G/20ML
INJECTION, POWDER, LYOPHILIZED, FOR SOLUTION INTRAVENOUS PRN
Status: DISCONTINUED | OUTPATIENT
Start: 2019-09-09 | End: 2019-09-09 | Stop reason: HOSPADM

## 2019-09-09 RX ORDER — NALOXONE HYDROCHLORIDE 0.4 MG/ML
.1-.4 INJECTION, SOLUTION INTRAMUSCULAR; INTRAVENOUS; SUBCUTANEOUS
Status: DISCONTINUED | OUTPATIENT
Start: 2019-09-09 | End: 2019-09-09

## 2019-09-09 RX ORDER — HYDROMORPHONE HYDROCHLORIDE 1 MG/ML
.3-.5 INJECTION, SOLUTION INTRAMUSCULAR; INTRAVENOUS; SUBCUTANEOUS EVERY 5 MIN PRN
Status: DISCONTINUED | OUTPATIENT
Start: 2019-09-09 | End: 2019-09-09 | Stop reason: HOSPADM

## 2019-09-09 RX ORDER — LIDOCAINE 40 MG/G
CREAM TOPICAL
Status: DISCONTINUED | OUTPATIENT
Start: 2019-09-09 | End: 2019-09-11 | Stop reason: HOSPADM

## 2019-09-09 RX ORDER — LISINOPRIL 20 MG/1
20 TABLET ORAL DAILY
Status: DISCONTINUED | OUTPATIENT
Start: 2019-09-10 | End: 2019-09-11 | Stop reason: HOSPADM

## 2019-09-09 RX ORDER — ONDANSETRON 2 MG/ML
4 INJECTION INTRAMUSCULAR; INTRAVENOUS EVERY 30 MIN PRN
Status: DISCONTINUED | OUTPATIENT
Start: 2019-09-09 | End: 2019-09-09 | Stop reason: HOSPADM

## 2019-09-09 RX ORDER — CELECOXIB 200 MG/1
200 CAPSULE ORAL ONCE
Status: COMPLETED | OUTPATIENT
Start: 2019-09-09 | End: 2019-09-09

## 2019-09-09 RX ORDER — CEFAZOLIN SODIUM 2 G/100ML
2 INJECTION, SOLUTION INTRAVENOUS EVERY 8 HOURS
Status: COMPLETED | OUTPATIENT
Start: 2019-09-09 | End: 2019-09-10

## 2019-09-09 RX ORDER — HYDROMORPHONE HYDROCHLORIDE 1 MG/ML
.3-.5 INJECTION, SOLUTION INTRAMUSCULAR; INTRAVENOUS; SUBCUTANEOUS
Status: DISCONTINUED | OUTPATIENT
Start: 2019-09-09 | End: 2019-09-11 | Stop reason: HOSPADM

## 2019-09-09 RX ORDER — HYDROXYZINE HYDROCHLORIDE 10 MG/1
10 TABLET, FILM COATED ORAL EVERY 6 HOURS PRN
Status: DISCONTINUED | OUTPATIENT
Start: 2019-09-09 | End: 2019-09-11 | Stop reason: HOSPADM

## 2019-09-09 RX ORDER — ONDANSETRON 2 MG/ML
INJECTION INTRAMUSCULAR; INTRAVENOUS PRN
Status: DISCONTINUED | OUTPATIENT
Start: 2019-09-09 | End: 2019-09-09

## 2019-09-09 RX ORDER — ALBUMIN (HUMAN) 12.5 G/50ML
12.5 SOLUTION INTRAVENOUS ONCE
Status: DISCONTINUED | OUTPATIENT
Start: 2019-09-09 | End: 2019-09-09

## 2019-09-09 RX ORDER — CEFAZOLIN SODIUM 2 G/100ML
2 INJECTION, SOLUTION INTRAVENOUS
Status: COMPLETED | OUTPATIENT
Start: 2019-09-09 | End: 2019-09-09

## 2019-09-09 RX ORDER — ALBUTEROL SULFATE 0.83 MG/ML
2.5 SOLUTION RESPIRATORY (INHALATION) EVERY 4 HOURS PRN
Status: DISCONTINUED | OUTPATIENT
Start: 2019-09-09 | End: 2019-09-09 | Stop reason: HOSPADM

## 2019-09-09 RX ORDER — EPHEDRINE SULFATE 50 MG/ML
INJECTION, SOLUTION INTRAMUSCULAR; INTRAVENOUS; SUBCUTANEOUS PRN
Status: DISCONTINUED | OUTPATIENT
Start: 2019-09-09 | End: 2019-09-09

## 2019-09-09 RX ORDER — PRAVASTATIN SODIUM 20 MG
20 TABLET ORAL EVERY EVENING
Status: DISCONTINUED | OUTPATIENT
Start: 2019-09-09 | End: 2019-09-11 | Stop reason: HOSPADM

## 2019-09-09 RX ORDER — ACETAMINOPHEN 325 MG/1
650 TABLET ORAL EVERY 4 HOURS PRN
Status: DISCONTINUED | OUTPATIENT
Start: 2019-09-12 | End: 2019-09-11 | Stop reason: HOSPADM

## 2019-09-09 RX ORDER — ONDANSETRON 4 MG/1
4 TABLET, ORALLY DISINTEGRATING ORAL EVERY 6 HOURS PRN
Status: DISCONTINUED | OUTPATIENT
Start: 2019-09-09 | End: 2019-09-11 | Stop reason: HOSPADM

## 2019-09-09 RX ORDER — ACETAMINOPHEN 500 MG
1000 TABLET ORAL ONCE
Status: COMPLETED | OUTPATIENT
Start: 2019-09-09 | End: 2019-09-09

## 2019-09-09 RX ORDER — SODIUM CHLORIDE, SODIUM LACTATE, POTASSIUM CHLORIDE, CALCIUM CHLORIDE 600; 310; 30; 20 MG/100ML; MG/100ML; MG/100ML; MG/100ML
INJECTION, SOLUTION INTRAVENOUS CONTINUOUS
Status: DISCONTINUED | OUTPATIENT
Start: 2019-09-09 | End: 2019-09-09 | Stop reason: HOSPADM

## 2019-09-09 RX ADMIN — DEXAMETHASONE SODIUM PHOSPHATE 4 MG: 4 INJECTION, SOLUTION INTRA-ARTICULAR; INTRALESIONAL; INTRAMUSCULAR; INTRAVENOUS; SOFT TISSUE at 07:50

## 2019-09-09 RX ADMIN — CELECOXIB 100 MG: 100 CAPSULE ORAL at 20:29

## 2019-09-09 RX ADMIN — ACETAMINOPHEN 975 MG: 325 TABLET ORAL at 22:01

## 2019-09-09 RX ADMIN — CEFAZOLIN SODIUM 2 G: 2 INJECTION, SOLUTION INTRAVENOUS at 19:07

## 2019-09-09 RX ADMIN — ONDANSETRON 4 MG: 2 INJECTION INTRAMUSCULAR; INTRAVENOUS at 07:50

## 2019-09-09 RX ADMIN — ROCURONIUM BROMIDE 20 MG: 10 INJECTION INTRAVENOUS at 09:05

## 2019-09-09 RX ADMIN — Medication 7.5 MG: at 07:43

## 2019-09-09 RX ADMIN — PROPOFOL 100 MG: 10 INJECTION, EMULSION INTRAVENOUS at 07:39

## 2019-09-09 RX ADMIN — NEOSTIGMINE METHYLSULFATE 4 MG: 1 INJECTION, SOLUTION INTRAVENOUS at 10:26

## 2019-09-09 RX ADMIN — SODIUM CHLORIDE 1 G: 9 INJECTION, SOLUTION INTRAVENOUS at 10:00

## 2019-09-09 RX ADMIN — ROCURONIUM BROMIDE 50 MG: 10 INJECTION INTRAVENOUS at 07:39

## 2019-09-09 RX ADMIN — HYDROXYZINE HYDROCHLORIDE 25 MG: 25 TABLET ORAL at 06:39

## 2019-09-09 RX ADMIN — ALLOPURINOL 100 MG: 100 TABLET ORAL at 16:36

## 2019-09-09 RX ADMIN — SODIUM CHLORIDE 1 G: 9 INJECTION, SOLUTION INTRAVENOUS at 07:50

## 2019-09-09 RX ADMIN — DEXMEDETOMIDINE HYDROCHLORIDE 0.3 MCG/KG/HR: 100 INJECTION, SOLUTION INTRAVENOUS at 07:55

## 2019-09-09 RX ADMIN — CEFAZOLIN SODIUM 2 G: 2 INJECTION, SOLUTION INTRAVENOUS at 07:50

## 2019-09-09 RX ADMIN — ACETAMINOPHEN 975 MG: 325 TABLET ORAL at 16:36

## 2019-09-09 RX ADMIN — CELECOXIB 200 MG: 200 CAPSULE ORAL at 06:33

## 2019-09-09 RX ADMIN — SENNOSIDES AND DOCUSATE SODIUM 1 TABLET: 8.6; 5 TABLET ORAL at 20:29

## 2019-09-09 RX ADMIN — PROPOFOL 100 MG: 10 INJECTION, EMULSION INTRAVENOUS at 07:41

## 2019-09-09 RX ADMIN — FENTANYL CITRATE 100 MCG: 50 INJECTION, SOLUTION INTRAMUSCULAR; INTRAVENOUS at 07:39

## 2019-09-09 RX ADMIN — GABAPENTIN 100 MG: 100 CAPSULE ORAL at 22:01

## 2019-09-09 RX ADMIN — GLYCOPYRROLATE 0.6 MG: 0.2 INJECTION, SOLUTION INTRAMUSCULAR; INTRAVENOUS at 10:26

## 2019-09-09 RX ADMIN — ALBUMIN HUMAN 12.5 G: 0.05 INJECTION, SOLUTION INTRAVENOUS at 13:08

## 2019-09-09 RX ADMIN — ACETAMINOPHEN 1000 MG: 500 TABLET, FILM COATED ORAL at 06:33

## 2019-09-09 RX ADMIN — Medication 5 MG: at 10:15

## 2019-09-09 RX ADMIN — SODIUM CHLORIDE, POTASSIUM CHLORIDE, SODIUM LACTATE AND CALCIUM CHLORIDE: 600; 310; 30; 20 INJECTION, SOLUTION INTRAVENOUS at 07:30

## 2019-09-09 RX ADMIN — PHENYLEPHRINE HYDROCHLORIDE 100 MCG: 10 INJECTION INTRAVENOUS at 10:15

## 2019-09-09 RX ADMIN — GABAPENTIN 100 MG: 100 CAPSULE ORAL at 06:33

## 2019-09-09 RX ADMIN — Medication 1 LOZENGE: at 22:10

## 2019-09-09 RX ADMIN — LIDOCAINE HYDROCHLORIDE 100 MG: 20 INJECTION, SOLUTION INFILTRATION; PERINEURAL at 07:39

## 2019-09-09 RX ADMIN — CEFAZOLIN SODIUM 1 G: 2 INJECTION, SOLUTION INTRAVENOUS at 09:50

## 2019-09-09 RX ADMIN — HYDROMORPHONE HYDROCHLORIDE 0.5 MG: 1 INJECTION, SOLUTION INTRAMUSCULAR; INTRAVENOUS; SUBCUTANEOUS at 09:15

## 2019-09-09 RX ADMIN — PHENYLEPHRINE HYDROCHLORIDE 0.3 MCG/KG/MIN: 10 INJECTION INTRAVENOUS at 07:45

## 2019-09-09 RX ADMIN — PRAVASTATIN SODIUM 20 MG: 20 TABLET ORAL at 20:29

## 2019-09-09 RX ADMIN — DEXMEDETOMIDINE HYDROCHLORIDE 12 MCG: 100 INJECTION, SOLUTION INTRAVENOUS at 07:30

## 2019-09-09 RX ADMIN — ALBUMIN HUMAN 12.5 G: 0.05 INJECTION, SOLUTION INTRAVENOUS at 12:22

## 2019-09-09 RX ADMIN — PHENYLEPHRINE HYDROCHLORIDE 150 MCG: 10 INJECTION INTRAVENOUS at 07:43

## 2019-09-09 RX ADMIN — GABAPENTIN 100 MG: 100 CAPSULE ORAL at 16:36

## 2019-09-09 RX ADMIN — DEXMEDETOMIDINE HYDROCHLORIDE 8 MCG: 100 INJECTION, SOLUTION INTRAVENOUS at 07:39

## 2019-09-09 RX ADMIN — Medication 1 LOZENGE: at 23:45

## 2019-09-09 ASSESSMENT — MIFFLIN-ST. JEOR: SCORE: 1534.12

## 2019-09-09 ASSESSMENT — ENCOUNTER SYMPTOMS
SEIZURES: 0
DYSRHYTHMIAS: 1

## 2019-09-09 ASSESSMENT — ACTIVITIES OF DAILY LIVING (ADL)
ADLS_ACUITY_SCORE: 10
ADLS_ACUITY_SCORE: 10

## 2019-09-09 ASSESSMENT — LIFESTYLE VARIABLES: TOBACCO_USE: 0

## 2019-09-09 ASSESSMENT — COPD QUESTIONNAIRES: COPD: 0

## 2019-09-09 NOTE — PROGRESS NOTES
Arrived from Recovery room.  A&O, able to make needs known.   Oriented to room/unit.  Denies surgical pain, ice pack applied.  Pt's wife at the bedside

## 2019-09-09 NOTE — PROGRESS NOTES
Admission medication history interview status for the 9/9/2019  admission is complete. See EPIC admission navigator for prior to admission medications     Medication history source reliability:Good    Medication history interview source(s):Patient    Medication history resources (including written lists, pill bottles, clinic record):None    Primary pharmacy.[facility]    Additional medication history information not noted on PTA med list :None    Time spent in this activity: 30 minutes    Prior to Admission medications    Medication Sig Last Dose Taking? Auth Provider   allopurinol (ZYLOPRIM) 100 MG tablet TAKE 1 TABLET BY MOUTH EVERY ONCE DAILY 9/8/2019 at am Yes Pb Warren MD   amoxicillin (AMOXIL) 500 MG tablet Take 2,000 mg by mouth daily as needed (1 hour prior to dental procedures) more than a month at prn Yes Reported, Patient   apixaban ANTICOAGULANT (ELIQUIS) 5 MG tablet Take 1 tablet (5 mg) by mouth 2 times daily 9/6/2019 Yes Alexandra Rojas PA-C   lisinopril (PRINIVIL/ZESTRIL) 20 MG tablet Take 1 tablet (20 mg) by mouth daily 9/9/2019 at 0330 Yes Pb Warren MD   pravastatin (PRAVACHOL) 20 MG tablet Take 1 tablet (20 mg) by mouth daily 9/8/2019 at pm Yes Pb Warren MD

## 2019-09-09 NOTE — PROGRESS NOTES
09/09/19 1500   Quick Adds   Type of Visit Initial PT Evaluation   Living Environment   Lives With spouse   Living Arrangements house   Home Accessibility stairs to enter home;stairs within home   Number of Stairs, Main Entrance 1   Stair Railings, Main Entrance none   Number of Stairs, Within Home, Primary 10   Stair Railings, Within Home, Primary railing on right side (ascending)   Self-Care   Usual Activity Tolerance good   Current Activity Tolerance moderate   Regular Exercise Yes   Activity/Exercise Type walking;strength training   Exercise Amount/Frequency 3-5 times/wk   Equipment Currently Used at Home none   Functional Level Prior   Ambulation 0-->independent   Transferring 0-->independent   Fall history within last six months yes   Number of times patient has fallen within last six months 1   Which of the above functional risks had a recent onset or change? fall history   General Information   Onset of Illness/Injury or Date of Surgery - Date 09/09/19   Referring Physician Mike Richardson PA-C   Patient/Family Goals Statement Return home tomorrow   Pertinent History of Current Problem (include personal factors and/or comorbidities that impact the POC) Pt is POD 0 L SONIA direct anterior revision. PMH: aflutter, gout, MGUS, mitral valve stenosis, HTN, anemia, pulmonary HTN.   Precautions/Limitations fall precautions;left hip precautions  (only no ER past midline, no hyperextension)   Weight-Bearing Status - LLE weight-bearing as tolerated   Weight-Bearing Status - RLE full weight-bearing   Cognitive Status Examination   Orientation orientation to person, place and time   Level of Consciousness alert   Follows Commands and Answers Questions 100% of the time;able to follow multistep instructions   Personal Safety and Judgment intact   Memory intact   Pain Assessment   Patient Currently in Pain No   Posture    Posture Not impaired   Range of Motion (ROM)   ROM Comment Dec L hip motion due to pain, R LE WFL  "  Strength   Strength Comments L LE functionally weak, R LE WFL   Bed Mobility   Bed Mobility Comments Supine to sit with SBA   Transfer Skills   Transfer Comments SIt to stand with FWW and CGA   Gait   Gait Comments Pt amb 10 ft with FWW and CGA   Balance   Balance Comments Balance steady with FWW   Sensory Examination   Sensory Perception Comments Denies any numbness or tingling   General Therapy Interventions   Planned Therapy Interventions bed mobility training;gait training;strengthening;transfer training   Clinical Impression   Criteria for Skilled Therapeutic Intervention yes, treatment indicated   PT Diagnosis Difficulty ambulating   Influenced by the following impairments Dec strength, balance, activity tolerance, ROM   Functional limitations due to impairments Difficulty ambulating and transferring   Clinical Presentation Stable/Uncomplicated   Clinical Presentation Rationale medically stable post-op   Clinical Decision Making (Complexity) Low complexity   Therapy Frequency 2x/day   Predicted Duration of Therapy Intervention (days/wks) 2 days   Anticipated Discharge Disposition Home with Assist   Risk & Benefits of therapy have been explained Yes   Patient, Family & other staff in agreement with plan of care Yes   [facility] [employer] TM \"6 Clicks\"   2016, Trustees of [facility], under license to [employer].  All rights reserved.   6 Clicks Short Forms Basic Mobility Inpatient Short Form   [facility] AM-PAC  \"6 Clicks\" V.2 Basic Mobility Inpatient Short Form   1. Turning from your back to your side while in a flat bed without using bedrails? 3 - A Little   2. Moving from lying on your back to sitting on the side of a flat bed without using bedrails? 3 - A Little   3. Moving to and from a bed to a chair (including a wheelchair)? 3 - A Little   4. Standing up from a chair using your arms (e.g., wheelchair, or bedside chair)? 3 - A Little   5. To walk in hospital room? 3 - A Little   6. " Climbing 3-5 steps with a railing? 3 - A Little   Basic Mobility Raw Score (Score out of 24.Lower scores equate to lower levels of function) 18   Total Evaluation Time   Total Evaluation Time (Minutes) 15

## 2019-09-09 NOTE — ANESTHESIA POSTPROCEDURE EVALUATION
Patient: Mars Reyes    Procedure(s):  REVISION DIRECT ANTERIOR TOTAL LEFT HIP ARTHROPLASTY    Diagnosis:FAILED METAL ON METAL LEFT TOTAL HIP ARTHROPLASTY  Diagnosis Additional Information: No value filed.    Anesthesia Type:  General, ETT    Note:  Anesthesia Post Evaluation    Patient location during evaluation: PACU  Patient participation: Able to fully participate in evaluation  Level of consciousness: awake and alert  Pain management: adequate  Airway patency: patent  Cardiovascular status: acceptable, hemodynamically stable and blood pressure returned to baseline  Respiratory status: acceptable and room air  Hydration status: acceptable  PONV: none     Anesthetic complications: None          Last vitals:  Vitals:    09/09/19 1455 09/09/19 1547 09/09/19 1617   BP: 106/65  111/69   Pulse: 55     Resp: 15  18   Temp:      SpO2: 99% 96% 99%         Electronically Signed By: Gisell Hraden MD  September 9, 2019  5:05 PM

## 2019-09-09 NOTE — BRIEF OP NOTE
[facility]    Brief Operative Note    Pre-operative diagnosis: FAILED METAL ON METAL LEFT TOTAL HIP ARTHROPLASTY  Post-operative diagnosis Failed metal on metal left total hip  Procedure: Procedure(s):  REVISION DIRECT ANTERIOR TOTAL LEFT HIP ARTHROPLASTY  Surgeon: Surgeon(s) and Role:     * Luisito Hammonds MD - Primary  Anesthesia: Spinal   Estimated blood loss: 400 ml  Drains: None  Specimens:   ID Type Source Tests Collected by Time Destination   1 : LEFT HIP FLUID Fluid Hip, Left FLUID CULTURE AEROBIC BACTERIAL, GRAM STAIN Luisito Hammonds MD 9/9/2019  8:32 AM    2 : LEFT HIP FLUID Fluid Hip, Left ANAEROBIC BACTERIAL CULTURE Luisito Hammonds MD 9/9/2019  8:33 AM    3 : ANTERIOR HIP CAPSULE Tissue Hip, Left ANAEROBIC BACTERIAL CULTURE, GRAM STAIN, TISSUE CULTURE AEROBIC BACTERIAL Luisito Hammonds MD 9/9/2019  8:46 AM    4 : CALCAR TISSUE Tissue Hip, Left ANAEROBIC BACTERIAL CULTURE, GRAM STAIN, TISSUE CULTURE AEROBIC BACTERIAL Luisito Hammonds MD 9/9/2019  9:17 AM    5 : LEFT HIP CAPSULE #2 Tissue Hip, Left ANAEROBIC BACTERIAL CULTURE, GRAM STAIN, TISSUE CULTURE AEROBIC BACTERIAL Luisito Hammonds MD 9/9/2019  9:50 AM      Findings:   None.  Complications: None.  Implants:    Implant Name Type Inv. Item Serial No.  Lot No. LRB No. Used   40MM 1.5 OFFSET METAL DEPUY FEMORAL HEAD      Left 1   BIOLOX DELTA CERAMIC FEMORAL HEAD REVISION, 28MM, +1.5 12/14 TAPER    Depuy [id number] Left 1   RESTORATION ADM X3 INSERT, 28MM, 28/46     [id number] Left 1   GRAFT BONE CRUSH CANC 15ML [id number] Bone/Tissue/Biologic GRAFT BONE CRUSH CANC 15ML [id number] MUSCULOSKELETAL PRINGLE  Left 1

## 2019-09-09 NOTE — OP NOTE
Procedure Date: 09/09/2019      PREOPERATIVE DIAGNOSES:   1.  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   2.  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   3.  Osteolysis of left proximal femoral component.      POSTOPERATIVE DIAGNOSES:   1.  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   2.  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   3.  Osteolysis of left proximal femoral component.      PROCEDURE PERFORMED:   1.  Revision, left direct anterior hip arthroplasty of femoral head component.   2.  Sharp excision of all metal-on-metal pericapsular soft tissue reaction, left hip capsule.   3.  Bone grafting, left proximal femur.      SURGEON:  Luisito Hammonds MD      ASSISTANT:  Mike Richardson PA-C, whose assistance was critical for positioning the patient, exposure during resection of reactive hip capsule and dislocation of removal of implants, as well as closure.      ANESTHESIA:  General.      ESTIMATED BLOOD LOSS:  400 mL.      FINDINGS:  Large fluid on the hip aspirated.  Appeared like chocolate milk.  This was sent for stat Gram stain and manual cell count.  No neutrophils were seen or organisms.  Extensive osteolysis, proximal femur.  Well fixed stem.      INDICATIONS:  This is a 78-year-old male, who in 2009 had a left direct anterior metal-on-metal DePuy total hip arthroplasty with a [place] cup size 58 with a metal liner and a Corail stem.  He is having increasing pain in his groin extending down his thigh.  This has been manageable.  He was water skiing this weekend and was on a fishing trip recently.  Blood work shows elevated chromium and cobalt levels.  Imaging shows osteolysis around the proximal femur.  Discussed operative versus nonoperative treatment options and have recommended revision of the metal-on-metal total hip arthroplasty.  Discussed retention of the components and switching to a dual mobility head versus  other revision options.  The risks of surgery were discussed included but not limited to bleeding, infection, damage to surrounding neurovascular structures, leg length discrepancy, periprosthetic fracture, dislocation, need for additional surgery, blood clots going to the heart, lung or brain, anesthetic complications, even death.  No guarantees were given or implied.  He wished to proceed.  Consent was signed.      DESCRIPTION OF PROCEDURE:  The patient was identified in the preop holding area per hospital policy, correct operative site marked, and lowered onto the OR table.  General anesthesia was induced and transferred onto the [place] table.  All bony prominences were well padded.  Chlorhexidine prescrub, ChloraPrep and draped.  A timeout was performed.  All in the room agreed.  I extended the previous direct anterior incision, dissecting through skin and subcutaneous tissue, identifying the fascia of the tensor fascia frankie.  Slightly difficult to identify the plane due to scarring from previous surgery.  The fascia was incised and the muscle was retracted laterally.  There was scarring from previous surgery.  Dissected down onto the anterior hip capsule, which was thickened from the metal-on-metal reaction.  Aspirated and a large, brown, chocolate milk-colored fluid was sent to the lab.  Excised the anterior hip capsule and metal-on-metal surrounding reaction.  Exposed the entire anterior femoral neck of the component, as well as the anterior and superior aspects of the acetabulum.  There was extensive osteolysis around the proximal femur.  Using a curet, curetted out this material from laterally, anterior calcar and posterior calcar regions.  The hip was dislocated.  With some difficulty, the metal-on-metal head was removed.  Found mild trunnionosis on the distal part of the trunnion, however, the trunnion was intact.  It was cleaned.  Using a tamp in an accessory perc incision, tamped directly on the stem and  the stem was well fixed.  No rotation.  Now directly visualized the acetabular liner.  There was no scratching seen.  Inspected the cup with a tamp and Paiz and it was well fixed.  Elected to retain the liner and cup and switch to a dual mobility construct.  Using cancellous bone chips, bone grafted the proximal femur.  After trialing, appplied a DePuy 28 mm +1 ceramic femoral revision head, which has an inner metal sleeve with a 12/14 taper.  This was placed on a 2d2c ADM X3 insert 28/46, which gives a 40 mm outer diameter.  After trialing and finding the appropriate leg lengths and stable at 90 degrees external rotation, the actual implant was placed on the clean and dried taper.  The hip was again reduced.  Copiously irrigated the hip after performing a rush Betadine lavage protocol.  Placed a gram of vancomycin deep.  The tensor fascia frankie was closed with #1 Vicryl, 0 Vicryl in the fatty layer, 2-0 Vicryl in subcutaneous, 4-0 Monocryl subcuticular, Exofin glue and sterile dressing were applied.  Awakened and transferred stable to the PACU.      POSTOPERATIVE PLAN:   1.  The patient will resume his Eliquis for atrial flutter.   2.  Weightbearing as tolerated.   3.  Direct anterior hip precautions.  No external rotation or hyperextension.   4.  Ancef x 24 hours.  Follow cultures.   5.  Keep dressing clean, dry and intact.  Okay to shower.   6.  PACU x-rays.     7.  Plan for followup at 2 weeks and 8 weeks.    8.  Likely discharge home 09/10/2019 or 2019.         JAZMIN REA MD             D: 2019   T: 2019   MT: JENAE      Name:     DESEAN MARTINEZ   MRN:      [id number]        Account:        [id number]   :      1940           Procedure Date: 2019      Document: [id number]

## 2019-09-09 NOTE — ANESTHESIA PREPROCEDURE EVALUATION
Anesthesia Pre-Procedure Evaluation    Patient: Mars Reyes   MRN: [id number] : 1940          Preoperative Diagnosis: FAILED METAL ON METAL LEFT TOTAL HIP ARTHROPLASTY    Procedure(s):  REVISION DIRECT ANTERIOR TOTAL LEFT HIP ARTHROPLASTY (DEPUY: OUT//DEPUY:  IN)^ (POSSIBLE BOBBY GOING IN)    Past Medical History:   Diagnosis Date     Aftercare following joint replacement 6/3/2009     Arrhythmia     has had SVT and atrial flutter     Contact dermatitis and other eczema, due to unspecified cause      Diverticulosis of colon (without mention of hemorrhage)      DJD (degenerative joint disease)      Essential hypertension, benign     abstracted 02     Gout, unspecified      Grief reaction 2010     Hip joint replacement by other means 6/3/2009     Hyperlipidemia LDL goal <130 10/31/2010     MGUS (monoclonal gammopathy of unknown significance)      Non-rheumatic aortic stenosis      Non-rheumatic mitral valve stenosis      PSVT (paroxysmal supraventricular tachycardia) (H)      Pulmonary HTN (H) 10/18/2011     Thrombocytopenia (H)      Past Surgical History:   Procedure Laterality Date     ANESTHESIA CARDIOVERSION N/A 3/21/2019    Procedure: ANESTHESIA CARDIOVERSION;  Surgeon: GENERIC ANESTHESIA PROVIDER;  Location: [facility] OR     BIOPSY  3/2017     BONE MARROW BIOPSY, BONE SPECIMEN, NEEDLE/TROCAR Right 2017    Procedure: BIOPSY BONE MARROW;  Surgeon: Romie Esquivel MD;  Location:  GI     COLONOSCOPY       EYE SURGERY      Cataract, both eyes     ORTHOPEDIC SURGERY      (L) total hip     Allergies   Allergen Reactions     Simvastatin      10/26/12: Arm pain - pt stopped it x 4-5 wks and arm pain didn't resolve sore started it      Prior to Admission medications    Medication Sig Start Date End Date Taking? Authorizing Provider   allopurinol (ZYLOPRIM) 100 MG tablet TAKE 1 TABLET BY MOUTH EVERY ONCE DAILY 19   Pb Warren MD   apixaban ANTICOAGULANT (ELIQUIS) 5 MG tablet  "Take 1 tablet (5 mg) by mouth 2 times daily 5/23/19   Alexandra Rojas PA-C   lisinopril (PRINIVIL/ZESTRIL) 20 MG tablet Take 1 tablet (20 mg) by mouth daily 8/14/19   Pb Warren MD   pravastatin (PRAVACHOL) 20 MG tablet Take 1 tablet (20 mg) by mouth daily 8/14/19   Pb Warren MD      ECG: Atrial flutter   ABNORMAL RHYTHM  ECHO: Procedure  Complete Echo Adult.     Interpretation Summary     There is moderate concentric left ventricular hypertrophy.  Left ventricular hypertrophy: asymmetric with no LVOT obstruction  Left ventricular systolic function is normal.  The right ventricular systolic function is borderline reduced.  The left atrium is severely dilated.  There is moderate to severe mitral annular calcification.  Calcified mitral apparatus.  MR PISA regurg vol 47cc, ERO 0.3cm2  The mean mitral valve gradient is at HR 71, mean gradient 5mmHg.  There is mild to moderate (1-2+) tricuspid regurgitation.  Right ventricular systolic pressure is elevated, consistent with mild  pulmonary hypertension.  Moderate valvular aortic stenosis.  Moderate \"plus\" MR, the MV does not appear Rheumatic        Left Ventricle  The left ventricle is normal in size. There is moderate concentric left  ventricular hypertrophy. Left ventricular hypertrophy: asymmetric with no LVOT  obstruction. Left ventricular systolic function is normal. The visual ejection  fraction is estimated at 55-60%. Diastolic function not assessed due to atrial  fibrillation. Normal left ventricular wall motion.     Right Ventricle  Normal right ventricle structure and size. The right ventricular systolic  function is borderline reduced.     Atria  The left atrium is severely dilated. The right atrium is borderline dilated.     Mitral Valve  Calcified mitral apparatus. There is moderate to severe mitral annular  calcification. There is moderate to mod-severe (2-3+) mitral regurgitation. MR  PISA regurg vol 47cc, ERO 0.3cm2. The mean mitral " valve gradient is 5.4 mmHg.  The mean mitral valve gradient is at HR 71, mean gradient 5mmHg. There is mild  mitral stenosis.        Tricuspid Valve  There is mild to moderate (1-2+) tricuspid regurgitation. The right  ventricular systolic pressure is approximated at 40.4 mmHg plus the right  atrial pressure. Right ventricular systolic pressure is elevated, consistent  with mild pulmonary hypertension. Normal IVC (1.5-2.5cm) with >50% respiratory  collapse; right atrial pressure is estimated at 5-10mmHg.     Aortic Valve  The aortic valve is trileaflet. There is trace to mild aortic regurgitation.  The mean AoV pressure gradient is 25.1 mmHg. The calculated aortic valve are  is 1.3 cm^2. Moderate valvular aortic stenosis.     Pulmonic Valve  The pulmonic valve is not well seen, but is grossly normal. There is mild to  moderate (1-2+) pulmonic valvular regurgitation.     Vessels  The ascending aorta is Borderline dilated. 38mm.     Pericardium  The pericardium appears normal.        Rhythm  The rhythm was atrial fibrillation with controlled ventricular rate at rest      Anesthesia Evaluation     .             ROS/MED HX    ENT/Pulmonary:      (-) tobacco use, asthma, COPD and sleep apnea   Neurologic:      (-) seizures, CVA and migraines   Cardiovascular:     (+) Dyslipidemia, hypertension----. : . . . :. dysrhythmias a-flutter, valvular problems/murmurs type: AS MV stenosis:. pulmonary hypertension,      (-) CAD and ARGUETA   METS/Exercise Tolerance:  >4 METS   Hematologic:     (+) Other Hematologic Disorder-thrombocytopenia     (-) anemia   Musculoskeletal:   (+)  other musculoskeletal- gout      GI/Hepatic:        (-) GERD and liver disease   Renal/Genitourinary:      (-) renal disease and nephrolithiasis   Endo:      (-) Type I DM, Type II DM, thyroid disease and obesity   Psychiatric:        (-) psychiatric history   Infectious Disease:        (-) Recent Fever   Malignancy:         Other: Comment: MGUS       "                     Physical Exam  Normal systems: cardiovascular and pulmonary    Airway   Mallampati: II  TM distance: >3 FB  Neck ROM: full    Dental   (+) implants, missing and caps    Cardiovascular   Rhythm and rate: regular and normal  (-) no murmur    Pulmonary    breath sounds clear to auscultation            Lab Results   Component Value Date    WBC 8.1 03/22/2019    HGB 14.5 08/14/2019    HCT 40.6 03/22/2019    PLT 68 (L) 08/14/2019     03/22/2019    POTASSIUM 4.6 08/14/2019    CHLORIDE 102 03/22/2019    CO2 25 03/22/2019    BUN 14 03/22/2019    CR 0.85 03/22/2019    GLC 91 03/22/2019    DIPAK 8.9 03/22/2019    MAG 2.1 03/21/2019    ALBUMIN 3.9 11/08/2018    PROTTOTAL 7.7 11/08/2018    ALT 23 11/08/2018    AST 25 11/08/2018    ALKPHOS 79 11/08/2018    BILITOTAL 1.7 (H) 11/08/2018    TSH 2.54 02/21/2017       Preop Vitals  BP Readings from Last 3 Encounters:   08/14/19 134/68   05/23/19 110/82   03/22/19 (!) 161/95    Pulse Readings from Last 3 Encounters:   08/14/19 57   05/23/19 80   03/22/19 84      Resp Readings from Last 3 Encounters:   08/14/19 15   03/22/19 12   03/21/19 16    SpO2 Readings from Last 3 Encounters:   08/14/19 98%   05/23/19 96%   03/22/19 97%      Temp Readings from Last 1 Encounters:   08/14/19 36.5  C (97.7  F) (Oral)    Ht Readings from Last 1 Encounters:   05/23/19 1.803 m (5' 10.98\")      Wt Readings from Last 1 Encounters:   08/14/19 82.6 kg (182 lb 1.6 oz)    Estimated body mass index is 25.41 kg/m  as calculated from the following:    Height as of 5/23/19: 1.803 m (5' 10.98\").    Weight as of 8/14/19: 82.6 kg (182 lb 1.6 oz).       Anesthesia Plan      History & Physical Review      ASA Status:  2 .    NPO Status:  > 8 hours    Plan for General and ETT with Propofol induction. Maintenance will be Balanced.    PONV prophylaxis:  Ondansetron (or other 5HT-3) and Dexamethasone or Solumedrol  Pre-op tylenol, hydroxyzine, lyrica  Dexmedetomidine infusion  Phenylephrine " infusion      Postoperative Care  Postoperative pain management:  Multi-modal analgesia.      Consents  Anesthetic plan, risks, benefits and alternatives discussed with:  Patient..                 Gisell Harden MD

## 2019-09-09 NOTE — PLAN OF CARE
PT:  Discharge Planner PT   Patient plan for discharge: Return home tomorrow  Current status: Orders received, eval completed, treatment initiated. Pt is POD 0 L SONAI direct anterior approach revision. Prior to admit pt was living with his spouse in a split level home, no AD use and independent with mobility, active going to the gym and walking. Currently requires SBA for supine to/from sit, CGA sit to/from stand with FWW, CGA for gait of 75 ft with FWW with step-through pattern and minimal pain. Pt demonstrates pain, dec strength, balance, activity tolerance and difficulty ambulating and transferring and would benefit from skilled PT services in order to improve this.  Barriers to return to prior living situation: Has not yet done stairs  Recommendations for discharge: Return home with wife as planned  Rationale for recommendations: Pt is moving well and should be able to return home with wife as planned. Has FWW at home already.       Entered by: Halley Mckeon 09/09/2019 3:54 PM

## 2019-09-09 NOTE — ANESTHESIA CARE TRANSFER NOTE
Patient: Mars Reyes    Procedure(s):  REVISION DIRECT ANTERIOR TOTAL LEFT HIP ARTHROPLASTY    Diagnosis: FAILED METAL ON METAL LEFT TOTAL HIP ARTHROPLASTY  Diagnosis Additional Information: No value filed.    Anesthesia Type:   General, ETT     Note:  Airway :Face Mask  Patient transferred to:PACU  Comments: Neuromuscular blockade reversed after TOF 4/4, spontaneous respirations, adequate tidal volumes, followed commands to voice, oropharynx suctioned with soft flexible catheter, extubated atraumatically, extubated with suction, airway patent after extubation.  Oxygen via facemask at 8 liters per minute to PACU. Oxygen tubing connected to wall O2 in PACU, SpO2, NiBP, and EKG monitors and alarms on and functioning, Jeffrey Hugger warmer connected to patient gown, report on patient's clinical status given to PACU RN, RN questions answered. Handoff Report: Identifed the Patient, Identified the Reponsible Provider, Reviewed the pertinent medical history, Discussed the surgical course, Reviewed Intra-OP anesthesia mangement and issues during anesthesia, Set expectations for post-procedure period and Allowed opportunity for questions and acknowledgement of understanding      Vitals: (Last set prior to Anesthesia Care Transfer)    CRNA VITALS  9/9/2019 1021 - 9/9/2019 1057      9/9/2019             NIBP:  127/87    Pulse:  109    NIBP Mean:  104    SpO2:  99 %    Resp Rate (observed):  5  (Abnormal)     Resp Rate (set):  10                Electronically Signed By: ALEXANDRA Lofton CRNA  September 9, 2019  10:57 AM

## 2019-09-10 ENCOUNTER — APPOINTMENT (OUTPATIENT)
Dept: OCCUPATIONAL THERAPY | Facility: CLINIC | Age: 79
DRG: 467 | End: 2019-09-10
Attending: ORTHOPAEDIC SURGERY
Payer: COMMERCIAL

## 2019-09-10 ENCOUNTER — APPOINTMENT (OUTPATIENT)
Dept: PHYSICAL THERAPY | Facility: CLINIC | Age: 79
DRG: 467 | End: 2019-09-10
Attending: ORTHOPAEDIC SURGERY
Payer: COMMERCIAL

## 2019-09-10 LAB
CREAT SERPL-MCNC: 1.06 MG/DL (ref 0.66–1.25)
GFR SERPL CREATININE-BSD FRML MDRD: 67 ML/MIN/{1.73_M2}
GLUCOSE BLDC GLUCOMTR-MCNC: 119 MG/DL (ref 70–99)
GLUCOSE SERPL-MCNC: 111 MG/DL (ref 70–99)
HGB BLD-MCNC: 11.9 G/DL (ref 13.3–17.7)

## 2019-09-10 PROCEDURE — 12000000 ZZH R&B MED SURG/OB

## 2019-09-10 PROCEDURE — 25000132 ZZH RX MED GY IP 250 OP 250 PS 637: Performed by: ORTHOPAEDIC SURGERY

## 2019-09-10 PROCEDURE — 82565 ASSAY OF CREATININE: CPT | Performed by: PHYSICIAN ASSISTANT

## 2019-09-10 PROCEDURE — 85018 HEMOGLOBIN: CPT | Performed by: PHYSICIAN ASSISTANT

## 2019-09-10 PROCEDURE — 25000132 ZZH RX MED GY IP 250 OP 250 PS 637: Performed by: PHYSICIAN ASSISTANT

## 2019-09-10 PROCEDURE — 97165 OT EVAL LOW COMPLEX 30 MIN: CPT | Mod: GO | Performed by: OCCUPATIONAL THERAPIST

## 2019-09-10 PROCEDURE — 97116 GAIT TRAINING THERAPY: CPT | Mod: GP | Performed by: PHYSICAL THERAPY ASSISTANT

## 2019-09-10 PROCEDURE — 97535 SELF CARE MNGMENT TRAINING: CPT | Mod: GO | Performed by: OCCUPATIONAL THERAPIST

## 2019-09-10 PROCEDURE — 97110 THERAPEUTIC EXERCISES: CPT | Mod: GP | Performed by: PHYSICAL THERAPY ASSISTANT

## 2019-09-10 PROCEDURE — 36415 COLL VENOUS BLD VENIPUNCTURE: CPT | Performed by: PHYSICIAN ASSISTANT

## 2019-09-10 PROCEDURE — 82947 ASSAY GLUCOSE BLOOD QUANT: CPT | Performed by: PHYSICIAN ASSISTANT

## 2019-09-10 PROCEDURE — 25000128 H RX IP 250 OP 636: Performed by: PHYSICIAN ASSISTANT

## 2019-09-10 PROCEDURE — 00000146 ZZHCL STATISTIC GLUCOSE BY METER IP

## 2019-09-10 RX ORDER — GABAPENTIN 100 MG/1
100 CAPSULE ORAL 3 TIMES DAILY
Qty: 9 CAPSULE | Refills: 0 | Status: ON HOLD | OUTPATIENT
Start: 2019-09-10 | End: 2019-10-18

## 2019-09-10 RX ORDER — ACETAMINOPHEN 325 MG/1
650 TABLET ORAL EVERY 4 HOURS PRN
Qty: 40 TABLET | Refills: 0 | Status: SHIPPED | OUTPATIENT
Start: 2019-09-12 | End: 2020-06-30

## 2019-09-10 RX ORDER — AMOXICILLIN 250 MG
1 CAPSULE ORAL 2 TIMES DAILY
Qty: 15 TABLET | Refills: 0 | Status: ON HOLD | OUTPATIENT
Start: 2019-09-10 | End: 2019-10-18

## 2019-09-10 RX ORDER — HYDROXYZINE HYDROCHLORIDE 10 MG/1
10 TABLET, FILM COATED ORAL EVERY 6 HOURS PRN
Qty: 15 TABLET | Refills: 0 | Status: ON HOLD | OUTPATIENT
Start: 2019-09-10 | End: 2019-10-18

## 2019-09-10 RX ORDER — ONDANSETRON 4 MG/1
4 TABLET, ORALLY DISINTEGRATING ORAL EVERY 6 HOURS PRN
Qty: 5 TABLET | Refills: 0 | Status: ON HOLD | OUTPATIENT
Start: 2019-09-10 | End: 2019-10-18

## 2019-09-10 RX ORDER — OXYCODONE HYDROCHLORIDE 5 MG/1
5-10 TABLET ORAL EVERY 4 HOURS PRN
Qty: 30 TABLET | Refills: 0 | Status: ON HOLD | OUTPATIENT
Start: 2019-09-10 | End: 2019-10-18

## 2019-09-10 RX ORDER — TRAMADOL HYDROCHLORIDE 50 MG/1
50 TABLET ORAL EVERY 6 HOURS PRN
Qty: 30 TABLET | Refills: 0 | Status: ON HOLD | OUTPATIENT
Start: 2019-09-10 | End: 2019-10-18

## 2019-09-10 RX ORDER — CELECOXIB 100 MG/1
100 CAPSULE ORAL 2 TIMES DAILY
Qty: 10 CAPSULE | Refills: 0 | Status: ON HOLD | OUTPATIENT
Start: 2019-09-10 | End: 2019-10-18

## 2019-09-10 RX ORDER — CALCIUM CARBONATE 500 MG/1
1000 TABLET, CHEWABLE ORAL EVERY 4 HOURS PRN
Status: DISCONTINUED | OUTPATIENT
Start: 2019-09-10 | End: 2019-09-11 | Stop reason: HOSPADM

## 2019-09-10 RX ADMIN — ACETAMINOPHEN 975 MG: 325 TABLET ORAL at 15:22

## 2019-09-10 RX ADMIN — CELECOXIB 100 MG: 100 CAPSULE ORAL at 09:16

## 2019-09-10 RX ADMIN — CELECOXIB 100 MG: 100 CAPSULE ORAL at 21:11

## 2019-09-10 RX ADMIN — LISINOPRIL 20 MG: 20 TABLET ORAL at 09:17

## 2019-09-10 RX ADMIN — PRAVASTATIN SODIUM 20 MG: 20 TABLET ORAL at 21:11

## 2019-09-10 RX ADMIN — ACETAMINOPHEN 975 MG: 325 TABLET ORAL at 06:14

## 2019-09-10 RX ADMIN — GABAPENTIN 100 MG: 100 CAPSULE ORAL at 21:11

## 2019-09-10 RX ADMIN — ALLOPURINOL 100 MG: 100 TABLET ORAL at 09:16

## 2019-09-10 RX ADMIN — APIXABAN 5 MG: 5 TABLET, FILM COATED ORAL at 09:16

## 2019-09-10 RX ADMIN — CALCIUM CARBONATE (ANTACID) CHEW TAB 500 MG 1000 MG: 500 CHEW TAB at 21:11

## 2019-09-10 RX ADMIN — CALCIUM CARBONATE (ANTACID) CHEW TAB 500 MG 1000 MG: 500 CHEW TAB at 12:48

## 2019-09-10 RX ADMIN — GABAPENTIN 100 MG: 100 CAPSULE ORAL at 15:22

## 2019-09-10 RX ADMIN — APIXABAN 5 MG: 5 TABLET, FILM COATED ORAL at 21:11

## 2019-09-10 RX ADMIN — SENNOSIDES AND DOCUSATE SODIUM 2 TABLET: 8.6; 5 TABLET ORAL at 21:12

## 2019-09-10 RX ADMIN — SENNOSIDES AND DOCUSATE SODIUM 2 TABLET: 8.6; 5 TABLET ORAL at 09:16

## 2019-09-10 RX ADMIN — GABAPENTIN 100 MG: 100 CAPSULE ORAL at 09:16

## 2019-09-10 RX ADMIN — CEFAZOLIN SODIUM 2 G: 2 INJECTION, SOLUTION INTRAVENOUS at 02:21

## 2019-09-10 RX ADMIN — ACETAMINOPHEN 975 MG: 325 TABLET ORAL at 22:17

## 2019-09-10 ASSESSMENT — ACTIVITIES OF DAILY LIVING (ADL)
ADLS_ACUITY_SCORE: 13
ADLS_ACUITY_SCORE: 12
ADLS_ACUITY_SCORE: 12
ADLS_ACUITY_SCORE: 13
ADLS_ACUITY_SCORE: 12
ADLS_ACUITY_SCORE: 13

## 2019-09-10 NOTE — PLAN OF CARE
Discharge Planner OT   Patient plan for discharge: Home w/assist  Current status: Evaluation/treatment completed. Pt. is s/p L SONIA, anterior approach;precautions=no ER past neutral + no hyperextension. Pt. resides w/ his spouse, who will be able to assist at home. Pt. has walk-in shower w/grab bars, comfort height toilets. Pt. typically active, indep. W/ I/ADL's.     Currently pt. is dong well, indep. w/ bed mobility, supervision/SBA w/ sit-stand transfer from EOB/toilet using WW, SBA-CGA room mobility;In order to assist w/increased strength/endurance for I/ADL's, pt. Ambulated in hallway approx. 200 feet w/ SBA-CGA/WW, tolerated well;Ed. in AE/comp. techs. for LE dressing--able to garland pants/underrwear =indep., no AE used/needed;pt. prefers spouse to assist w/shoes/socks, does have a reacher and LHSH. Ed. In safe shower transfer tech.w/ therapist demo.--verb.understanding;ed. in use of shower chair as needed, will acquire as needed;Ed. in WW/home environmental safety--verb.understanding;Pt. back to bed at end of session, doing well, goals met.No further skilled OT intervention needed/rec.  Barriers to return to prior living situation: None  Recommendations for discharge: Home w/assist  Rationale for recommendations: Pt. doing well, has met goals. Spouse will be able to assist at home as needed.     Occupational Therapy Discharge Summary    Reason for therapy discharge:    All goals and outcomes met, no further needs identified.    Progress towards therapy goal(s). See goals on Care Plan in [facility] electronic health record for goal details.  Goals met    Therapy recommendation(s):    No further therapy is recommended.           Entered by: Caprice Lewis 09/10/2019 12:29 PM

## 2019-09-10 NOTE — PLAN OF CARE
VSS, minimal c/o pain-scheduled tylenol given, fluids running, DTV-bladder scan only shows 50cc, good fluid intake, left hip dsg CDI, cms intact, up x2 to bathroom with SBA and walker, continue to monitor closely.

## 2019-09-10 NOTE — PLAN OF CARE
"PT-  Pt just started breakfast.  C/o having the sensation that the food won't go down all the way and feels like it is \"stuck\" and points to sternum area. Nurse aware.  Unable to tolerate PT at time of appt.    "

## 2019-09-10 NOTE — PLAN OF CARE
A&O x4. VSS on RA. Denies pain. CMS intact. Surgical dressing to left hip CDI. Up A1 with gait belt and walker. Regular diet. Tolerating well. LS clear. IS at bedside. BS active, + Flatus. Pt had urinary retention this shift. Bladder scan 585 at 1000. Straight cath x1 for 650ml. Urinating frequent but small amounts following straight cath. PVRs 310, 328. Pt plans to walk following dinner and attempt to urinate once more prior to additional straight cath. DTV. Progressing toward plan of care. Discharge home pending urinary sx.

## 2019-09-10 NOTE — PLAN OF CARE
Patient A&Ox4, VSS on RA. Denies pain. Dressing CDI. CMS intact. Iv running  TKO  take schedule tylenol. Up with  1 & walker. Bladder scan 255 &405 Pt doesn't;. want to be straight cath instead will try use urinal again. Good fluid intake . Regular diet. Will continue monitor.    Writer talked ortho PA regarding  pt unable to void  & just encourage pt fluid intake  & Bladder scan again @ 0600  535 & pt want to try to  Urinal &  Not to be  straight cath for now.will pass it to Morning RN..

## 2019-09-10 NOTE — PLAN OF CARE
Discharge Planner PT   Patient plan for discharge: Return home tomorrow  Current status: Pt performed bed mobility with very minimal assist and sit to/from stand transfers with SBA.  Gait training 10 ft x 1 and 130 ft x 1 using wheeled walker and SBA.  Cues for improved gait quality.  Distance limited due to IV site bleeding and needed to be addressed.  Nurse notified.  Pt performed 3 stairs x 3 trials using 1 rail and CGA.   Barriers to return to prior living situation: None  Recommendations for discharge: Return home with wife as planned per plan established by the PT.  Rationale for recommendations: Pt is progressing well and will have assist of wife as needed.  Has FWW at home already.       Entered by: Lillian Shaikh 09/10/2019 4:23 PM         Pt is discharging home today with assist of wife.  PT goals partially met.

## 2019-09-10 NOTE — PROGRESS NOTES
Orthopedic Surgery  Mars Reyes  9/10/2019  Admit Date:  2019  POD 1 Day Post-Op  S/P Procedure(s):  REVISION DIRECT ANTERIOR TOTAL LEFT HIP ARTHROPLASTY    Patient is resting comfortably in bed.  His pain is well controlled on oral pain medications.  Tolerating oral intake.  No events overnight.     He denies any chest pain, shortness of breath, or N/V/D  Vital Sign Ranges  Temperature Temp  Av.1  F (36.7  C)  Min: 97.5  F (36.4  C)  Max: 98.6  F (37  C)   Blood pressure Systolic (24hrs), Av , Min:79 , Max:151        Diastolic (24hrs), Av, Min:48, Max:101      Pulse Pulse  Av.8  Min: 46  Max: 75   Respirations Resp  Av  Min: 0  Max: 23   Pulse oximetry SpO2  Av.8 %  Min: 96 %  Max: 100 %      Unlabored breathing without audible wheeze   Surgical dressing is clean, dry, and intact.   Bilateral calves are soft, non-tender.  Left lower extremity is NVI.  5/5 df/pf/ehl   Palpable dp pulse     Labs:  Recent Labs   Lab Test 19  0612 19  1507 19  1729   POTASSIUM 4.4 4.6 4.2     Recent Labs   Lab Test 19  0612 19  1507 19  1729   HGB 14.6 14.5 13.5     No results for input(s): INR in the last 32241 hours.  Recent Labs   Lab Test 19  0612 19  1507 19  1729   PLT 69* 68* 72*       A/P  78 year old male POD 1 s/p revision left direct anterior hip arthroplasty of femoral head component.  1.  The patient will resume his Eliquis for atrial flutter.   2.  Weightbearing as tolerated.   3.  Direct anterior hip precautions.  No external rotation or hyperextension.   4.  Ancef x 24 hours.  Follow cultures.   5.  Keep dressing clean, dry and intact.  Okay to shower.  6.  Plan for followup at 2 weeks and 8 weeks.       7 Disposition   Anticipate d/c to home later today.     Mike Richardson PA-C

## 2019-09-10 NOTE — PROGRESS NOTES
09/10/19 0900   Quick Adds   Type of Visit Initial Occupational Therapy Evaluation   Living Environment   Lives With spouse   Living Arrangements house   Home Accessibility stairs to enter home;stairs within home   Number of Stairs, Main Entrance 1   Stair Railings, Main Entrance none   Number of Stairs, Within Home, Primary 10   Stair Railings, Within Home, Primary railing on left side (ascending)   Transportation Anticipated car, drives self   Living Environment Comment Pt. reprots a walk-in shower w2 grab bars;comfort height toilet w/vanity support L side.   Self-Care   Usual Activity Tolerance good   Current Activity Tolerance moderate   Regular Exercise Yes   Activity/Exercise Type strength training;walking   Exercise Amount/Frequency daily   Equipment Currently Used at Home grab bar, tub/shower  (has comfort height toilets;has ramón, THEERSA, RUPERTO)   Activity/Exercise/Self-Care Comment Pt. typically indep. w/ I/ADL's;spouse able to assist at home.   Functional Level   Ambulation 0-->independent   Transferring 0-->independent   Toileting 0-->independent   Bathing 1-->assistive equipment  (has grab bars)   Dressing 0-->independent   Eating 0-->independent   Communication 0-->understands/communicates without difficulty   Swallowing 0-->swallows foods/liquids without difficulty   Cognition 0 - no cognition issues reported   Fall history within last six months yes   Number of times patient has fallen within last six months 1   Which of the above functional risks had a recent onset or change? ambulation;transferring;toileting;bathing;dressing   General Information   Onset of Illness/Injury or Date of Surgery - Date 09/09/19   Referring Physician Mike Barbosa PA-C   Patient/Family Goals Statement Plans to DC home, spouse assist   Additional Occupational Profile Info/Pertinent History of Current Problem OT:Pt is POD 1 L SONIA direct anterior revision. PMH: aflutter, gout, MGUS, mitral valve stenosis, HTN, anemia,  pulmonary HTN.   Precautions/Limitations fall precautions;left hip precautions  (no ER past neutral;no hyperextension)   Weight-Bearing Status - LLE weight-bearing as tolerated   General Observations Pt. agreeable to OT, no pain reported, pleasasnt and cooperative;has episode of difficulty swallowing earlier, now resolved.   General Info Comments Pt.'s spouse availavle to assist at home;pt. reports has a reacher, LHSH, RTS available   Cognitive Status Examination   Orientation orientation to person, place and time   Cognitive Comment congition intact per observation/converation   Visual Perception   Visual Perception Comments no vision problems noted/reported   Sensory Examination   Sensory Comments no numb ness/tingling reported   Pain Assessment   Patient Currently in Pain No  (L hip discomfort)   Range of Motion (ROM)   ROM Comment BUE WNL   Strength   Strength Comments BUE WNL   Hand Strength   Hand Strength Comments WNL    Mobility   Bed Mobility Comments indep. w/supine-sit-supine   Transfer Skill: Sit to Stand   Level of [place]: Sit/Stand stand-by assist   Physical Assist/Nonphysical Assist: Sit/Stand 1 person assist   Transfer Skill: Sit to Stand weight-bearing as tolerated   Assistive Device for Transfer: Sit/Stand rolling walker   Toilet Transfer   Toilet Transfer Comments comfort height, suport on L   Tub/Shower Transfer   Tub/Shower Transfer Comments walk-in shower, grab bars   Balance   Balance Comments good balance using WW   Upper Body Dressing   Level of [place]: Dress Upper Body independent   Lower Body Dressing   Level of [place]: Dress Lower Body stand-by assist   Grooming   Level of [place]: Grooming stand-by assist   Instrumental Activities of Daily Living (IADL)   Previous Responsibilities   (spouse tom assist w/IADL's)   Activities of Daily Living Analysis   Impairments Contributing to Impaired Activities of Daily Living balance impaired;flexibility decreased;pain;ROM  "decreased;strength decreased  (decreased LE strength/ROM)   General Therapy Interventions   Planned Therapy Interventions ADL retraining   Clinical Impression   Criteria for Skilled Therapeutic Interventions Met yes, treatment indicated   OT Diagnosis Decline in ADL performance   Influenced by the following impairments pain, impaired balance, decreased flexibility, decreased LE strength/ROM   Assessment of Occupational Performance 1-3 Performance Deficits   Identified Performance Deficits Currently below baseline w/ dressing,toileting, bathing, fx. transfers/mobility, IADL's   Clinical Decision Making (Complexity) Low complexity   Therapy Frequency   (Eval. + 1 treat,emt session only)   Predicted Duration of Therapy Intervention (days/wks)   (Eval. + 1 treatment session only)   Anticipated Equipment Needs at Discharge shower chair   Anticipated Discharge Disposition Home;Home with Assist   Risks and Benefits of Treatment have been explained. Yes   Patient, Family & other staff in agreement with plan of care Yes   [facility] TM \"6 Clicks\"   2016, Trustees of [facility], under license to [employer].  All rights reserved.   6 Clicks Short Forms Daily Activity Inpatient Short Form   [facility]  \"6 Clicks\" Daily Activity Inpatient Short Form   1. Putting on and taking off regular lower body clothing? 3 - A Little   2. Bathing (including washing, rinsing, drying)? 3 - A Little   3. Toileting, which includes using toilet, bedpan or urinal? 4 - None   4. Putting on and taking off regular upper body clothing? 4 - None   5. Taking care of personal grooming such as brushing teeth? 4 - None   6. Eating meals? 4 - None   Daily Activity Raw Score (Score out of 24.Lower scores equate to lower levels of function) 22   Total Evaluation Time   Total Evaluation Time (Minutes) 12     "

## 2019-09-11 ENCOUNTER — APPOINTMENT (OUTPATIENT)
Dept: PHYSICAL THERAPY | Facility: CLINIC | Age: 79
DRG: 467 | End: 2019-09-11
Attending: PHYSICIAN ASSISTANT
Payer: COMMERCIAL

## 2019-09-11 VITALS
RESPIRATION RATE: 16 BRPM | HEART RATE: 55 BPM | OXYGEN SATURATION: 95 % | HEIGHT: 70 IN | SYSTOLIC BLOOD PRESSURE: 101 MMHG | TEMPERATURE: 97.7 F | DIASTOLIC BLOOD PRESSURE: 58 MMHG | BODY MASS INDEX: 25.34 KG/M2 | WEIGHT: 177 LBS

## 2019-09-11 LAB
GLUCOSE SERPL-MCNC: 85 MG/DL (ref 70–99)
HGB BLD-MCNC: 10.6 G/DL (ref 13.3–17.7)

## 2019-09-11 PROCEDURE — 97530 THERAPEUTIC ACTIVITIES: CPT | Mod: GP

## 2019-09-11 PROCEDURE — 85018 HEMOGLOBIN: CPT | Performed by: PHYSICIAN ASSISTANT

## 2019-09-11 PROCEDURE — 97110 THERAPEUTIC EXERCISES: CPT | Mod: GP

## 2019-09-11 PROCEDURE — 36415 COLL VENOUS BLD VENIPUNCTURE: CPT | Performed by: PHYSICIAN ASSISTANT

## 2019-09-11 PROCEDURE — 82947 ASSAY GLUCOSE BLOOD QUANT: CPT | Performed by: PHYSICIAN ASSISTANT

## 2019-09-11 PROCEDURE — 97116 GAIT TRAINING THERAPY: CPT | Mod: GP

## 2019-09-11 PROCEDURE — 25000132 ZZH RX MED GY IP 250 OP 250 PS 637: Performed by: PHYSICIAN ASSISTANT

## 2019-09-11 RX ADMIN — CELECOXIB 100 MG: 100 CAPSULE ORAL at 08:23

## 2019-09-11 RX ADMIN — SENNOSIDES AND DOCUSATE SODIUM 2 TABLET: 8.6; 5 TABLET ORAL at 08:22

## 2019-09-11 RX ADMIN — ACETAMINOPHEN 975 MG: 325 TABLET ORAL at 06:19

## 2019-09-11 RX ADMIN — ALLOPURINOL 100 MG: 100 TABLET ORAL at 08:22

## 2019-09-11 RX ADMIN — APIXABAN 5 MG: 5 TABLET, FILM COATED ORAL at 08:22

## 2019-09-11 RX ADMIN — GABAPENTIN 100 MG: 100 CAPSULE ORAL at 08:22

## 2019-09-11 ASSESSMENT — ACTIVITIES OF DAILY LIVING (ADL)
ADLS_ACUITY_SCORE: 13

## 2019-09-11 NOTE — DISCHARGE INSTRUCTIONS
TOTAL HIP REPLACEMENT TAKE HOME INSTRUCTIONS   Your surgeon will answer any questions about your progress. General guidelines for your care are listed below. Your surgeon may give additional instructions for your care at home. Please follow them carefully.    Activity Level  1. Physical activity may be resumed gradually according to your comfort level and your surgeon s instructions. Follow your exercise program as instructed by your therapist. Do exercises at least twice daily    Good Health Practices  1. Maintain an adequate fluid intake and eat a well balanced diet.  2. Be sure to include the basic food groups, such as dairy products, meat/fish, vegetables, and fruit. Each of these foods contributes to helping your wound heal and increasing your strength.  3. Surgery, decreased activity and pain medication all contribute to a decrease in bowel activity that can result in constipation. It is recommended that you increase your liquid intake, add fiber to your diet, increase activity, and decrease pain medication use. If you have any problems, notify your physician.  4. Notify your dentist of your total hip surgery and call your dentist one week before a dental appointment for antibiotics.    Things to Watch For  1. Check incision daily for increased redness, tenderness, swelling, or drainage along the incision line. If these occur, please notify your doctor. Also, call if you develop a fever above 101 .  2. Please notify your doctor if you experience any calf pain and/or if you have surgical pain not relieved by the pain medication prescribed by your doctor.        Rev 3/5/2019

## 2019-09-11 NOTE — PLAN OF CARE
"Discharge Planner PT   Patient plan for discharge: Home today  Current status: PT orders received, Pt initially seen for PT shyam 9/9, was discharged from PT 9/10 due to anticipated discharge home, pt remained admitted due to urinary retention, see for PT treatment today to continue to progress mobility. Pt received supine in bed, agreeable to PT. Demos ind supine>sit. Able to don pants sitting/standing EOB with SBA. Sit>stand SBA without AD, Mod ind with FWW. Ambulated 350' with FWW, demos mod ind level- with cues for improved gait quality, discussion on gradually increasing time ambulating at home. Performed 2x3 steps with L rail, demos mod ind level. Engaged in supine SONIA exercises with min cues, tolerated well, pt states spouse will be his \"\" at home. Pt able to state 2/2 precautions without cues.     Barriers to return to prior living situation: no mobility concerns  Recommendations for discharge: Home with assist of spouse to bring FWW up/down stairs  Rationale for recommendations: Patient moving well, progressed within session to meet goals. Discussed recommendations for home, pt demos good understanding of precautions and HEP/ recs for home.     Physical Therapy Discharge Summary    Reason for therapy discharge:    All goals and outcomes met, no further needs identified.    Progress towards therapy goal(s). [facility] for goal details.  Goals met    Therapy recommendation(s):    Continue home exercise program.  Ambulation program, SONIA exercises 3x/day            Entered by: Shereen Ellis 09/11/2019 9:48 AM       "

## 2019-09-11 NOTE — PLAN OF CARE
Pt is A & O x 4. Lungs sound clear. Bowel sounds active, cms intact. Dressing is CDI. BP better. Discharge instruction and meds were reviewed and given. Was discharged home.

## 2019-09-11 NOTE — PLAN OF CARE
1900-0730 Pt A/Ox4. VSS ex soft bp on RA. LS clear. CMS intact. Dressing CDI. Denies pain. Regular diet. Ax1 GBWW. Voiding adequately per urinal. BS active, +flatus. Possible discharge today. Progressing towards plan of care.

## 2019-09-11 NOTE — PROGRESS NOTES
Orthopedic Surgery  Mars Reyes  2019  Admit Date:  2019  POD 2 Days Post-Op  S/P Procedure(s):  REVISION DIRECT ANTERIOR TOTAL LEFT HIP ARTHROPLASTY    Patient resting comfortably in bed.  His pain is well controlled.  Tolerating oral intake.  No events overnight. The patient reports he was able to successfully void over night several times.       Vital Sign Ranges  Temperature Temp  Av.9  F (36.6  C)  Min: 97.6  F (36.4  C)  Max: 98.5  F (36.9  C)   Blood pressure Systolic (24hrs), Av , Min:112 , Max:118        Diastolic (24hrs), Av, Min:63, Max:72      Pulse Pulse  Av  Min: 59  Max: 59   Respirations Resp  Av  Min: 16  Max: 16   Pulse oximetry SpO2  Av.3 %  Min: 95 %  Max: 99 %      Non-labored breathing without audible wheeze.    Surgical dressing is clean, dry, and intact. Minimal ecchymosis of the surrounding skin.  Bilateral calves are soft, non-tender.  left lower extremity is NVI. + DP pulse     Labs:  Recent Labs   Lab Test 19  0612 19  1507 19  1729   POTASSIUM 4.4 4.6 4.2     Recent Labs   Lab Test 09/10/19  0706 19  0612 19  1507   HGB 11.9* 14.6 14.5     No results for input(s): INR in the last 15721 hours.  Recent Labs   Lab Test 19  0612 19  1507 19  1729   PLT 69* 68* 72*       A/P  78 year old male POD 2 s/p revision left direct anterior hip arthroplasty of femoral head component.  1.  DVT prophylaxis: Eliquis  2.  Weightbearing as tolerated.   3.  Direct anterior hip precautions.  Avoid external rotation or hyperextension.   4.  Keep dressing clean, dry and intact.  Okay to shower.  5.  Plan for followup at 2 weeks and 8 weeks.                  7 Disposition              Anticipate d/c to home today with assist     Mike Richardson PA-C

## 2019-09-11 NOTE — PROVIDER NOTIFICATION
Voice message left for Dr. Hammonds for pt low BP. Awaiting return call. Dr VAZQUEZ, called back. Told MD BP is slightly better. MD said to hold  BP med. MD said pt should still be able to discharge today.

## 2019-09-12 ENCOUNTER — TELEPHONE (OUTPATIENT)
Dept: INTERNAL MEDICINE | Facility: CLINIC | Age: 79
End: 2019-09-12

## 2019-09-12 NOTE — TELEPHONE ENCOUNTER
"Hospital/TCU/ED for chronic condition Discharge Protocol    \"Hi, my name is Sinai Caldera RN, a registered nurse, and I am calling from [facility].  I am calling to follow up and see how things are going for you after your recent emergency visit/hospital/TCU stay.\"    Tell me how you are doing now that you are home?\" doing well      Discharge Instructions    \"Let's review your discharge instructions.  What is/are the follow-up recommendations?  Pt. Response: PT, f/u with orth    \"Has an appointment with your primary care provider been scheduled?\"   does not feel needed at this time    \"When you see the provider, I would recommend that you bring your medications with you.\"    Medications    \"Tell me what changed about your medicines when you discharged?\"    Changes to chronic meds?    0-1    \"What questions do you have about your medications?\"    None     New diagnoses of heart failure, COPD, diabetes, or MI?    No              Post Discharge Medication Reconciliation Status: discharge medications reconciled, continue medications without change.    Was MTM referral placed (*Make sure to put transitions as reason for referral)?   No    Call Summary    \"What questions or concerns do you have about your recent visit and your follow-up care?\"     none    \"If you have questions or things don't continue to improve, we encourage you contact us through the main clinic number (give number).  Even if the clinic is not open, triage nurses are available 24/7 to help you.     We would like you to know that our clinic has extended hours (provide information).  We also have urgent care (provide details on closest location and hours/contact info)\"      \"Thank you for your time and take care!\"             "

## 2019-09-12 NOTE — TELEPHONE ENCOUNTER
ED / Discharge Outreach Protocol    Patient Contact    Attempt # 1    Was call answered?  No.  Disconnected.

## 2019-09-14 LAB
BACTERIA SPEC CULT: NO GROWTH
Lab: NORMAL
SPECIMEN SOURCE: NORMAL

## 2019-09-16 LAB
BACTERIA SPEC CULT: NORMAL
Lab: NORMAL
SPECIMEN SOURCE: NORMAL

## 2019-09-16 NOTE — DISCHARGE SUMMARY
Discharge Summary    Mars Reyes MRN# [id number]   YOB: 1940 Age: 78 year old     Date of Admission:  9/9/2019  Date of Discharge:  9/17/19  Admitting Physician:  Luisito Hammonds MD  Discharge Physician:  Luisito Hammonds     Primary Provider: Pb Warren R11          Admission Diagnoses:   Osteoarthritis left hip          Discharge Diagnosis:   Same           Surgical Procedure:   Total Hip arthroplasty           Discharge Disposition:     Discharged to home           Medications Prior to Admission:     No medications prior to admission.             Discharge Medications:     Discharge Medication List as of 9/11/2019 10:42 AM      START taking these medications    Details   acetaminophen (TYLENOL) 325 MG tablet Take 2 tablets (650 mg) by mouth every 4 hours as needed for other (multimodal surgical pain management along with NSAIDS and opioid medication as indicated based on pain control and physical function.), Disp-40 tablet, R-0, Local Print      celecoxib (CELEBREX) 100 MG capsule Take 1 capsule (100 mg) by mouth 2 times daily For 5 days, Disp-10 capsule, R-0, Local Print      gabapentin (NEURONTIN) 100 MG capsule Take 1 capsule (100 mg) by mouth 3 times daily, Disp-9 capsule, R-0, Local Print      hydrOXYzine (ATARAX) 10 MG tablet Take 1 tablet (10 mg) by mouth every 6 hours as needed for itching, Disp-15 tablet, R-0, Local Print      ondansetron (ZOFRAN-ODT) 4 MG ODT tab Take 1 tablet (4 mg) by mouth every 6 hours as needed for nausea or vomiting, Disp-5 tablet, R-0, Local Print      oxyCODONE (ROXICODONE) 5 MG tablet Take 1-2 tablets (5-10 mg) by mouth every 4 hours as needed for breakthrough pain or severe pain, Disp-30 tablet, R-0, Local Print      senna-docusate (SENOKOT-S/PERICOLACE) 8.6-50 MG tablet Take 1 tablet by mouth 2 times daily, Disp-15 tablet, R-0, Local Print      traMADol (ULTRAM) 50 MG tablet Take 1 tablet (50 mg) by mouth every 6 hours as needed for moderate pain,  Disp-30 tablet, R-0, Local Print         CONTINUE these medications which have NOT CHANGED    Details   allopurinol (ZYLOPRIM) 100 MG tablet TAKE 1 TABLET BY MOUTH EVERY ONCE DAILY, Disp-90 tablet, R-1, E-Prescribe      amoxicillin (AMOXIL) 500 MG tablet Take 2,000 mg by mouth daily as needed (1 hour prior to dental procedures), Historical      apixaban ANTICOAGULANT (ELIQUIS) 5 MG tablet Take 1 tablet (5 mg) by mouth 2 times daily, Disp-180 tablet, R-3, E-Prescribe      lisinopril (PRINIVIL/ZESTRIL) 20 MG tablet Take 1 tablet (20 mg) by mouth daily, Disp-90 tablet, R-3, E-PrescribeProfile Rx: patient will contact pharmacy when needed      pravastatin (PRAVACHOL) 20 MG tablet Take 1 tablet (20 mg) by mouth daily, Disp-90 tablet, R-3, E-PrescribeProfile Rx: patient will contact pharmacy when needed                   Consultations:     Physical therapy for assistance with ambulation and gait training.  Occupational Therapy for assistance and training with ADLs.            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 The patient had been on Eliquis previously which was restarted for DVT prophylaxis. Home medications have been reconciled. Acetaminophen, Tramadol, and Oxycodone were prescribed for pain control.              Discharge Instructions and Follow-Up:          Discharge activity: WBAT with a walker   Discharge follow-up: Follow-up with Dr. Hammonds in ~14 days post-op. [phone]   Outpatient therapy: Should already be arranged by office.  If not, patient should call to schedule 2x/week x 3 weeks.   Home Care agency: None   Supplies and equipment: None        Wound care: Daily dry dressing changes.  May use adaptic/xeroform directly over incision if available.     Other instructions: Direct anterior hip precautions. Avoid hip hyperextension and external rotation past midline.      Mike  JEANNA Richardson

## 2019-09-23 ENCOUNTER — TRANSFERRED RECORDS (OUTPATIENT)
Dept: HEALTH INFORMATION MANAGEMENT | Facility: CLINIC | Age: 79
End: 2019-09-23

## 2019-09-23 LAB
BACTERIA SPEC CULT: NORMAL
Lab: NORMAL
SPECIMEN SOURCE: NORMAL

## 2019-09-25 ENCOUNTER — OFFICE VISIT (OUTPATIENT)
Dept: CARDIOLOGY | Facility: CLINIC | Age: 79
End: 2019-09-25
Attending: INTERNAL MEDICINE
Payer: COMMERCIAL

## 2019-09-25 VITALS
HEART RATE: 90 BPM | DIASTOLIC BLOOD PRESSURE: 64 MMHG | WEIGHT: 181 LBS | BODY MASS INDEX: 25.34 KG/M2 | SYSTOLIC BLOOD PRESSURE: 120 MMHG | HEIGHT: 71 IN

## 2019-09-25 DIAGNOSIS — I48.3 TYPICAL ATRIAL FLUTTER (H): Primary | ICD-10-CM

## 2019-09-25 PROCEDURE — 99213 OFFICE O/P EST LOW 20 MIN: CPT | Performed by: INTERNAL MEDICINE

## 2019-09-25 ASSESSMENT — MIFFLIN-ST. JEOR: SCORE: 1568.14

## 2019-09-25 NOTE — PROGRESS NOTES
Service Date: 09/25/2019      HISTORY OF PRESENT ILLNESS:  I saw Mr. Reyes for evaluation of recurrent atrial flutter.  He is a 78-year-old white male who had atrial flutter early this year and underwent cardioversion around 03/2019.  When he was back for Cardiology for followup in May, he was still in sinus rhythm.  Unfortunately, he was found to be back in atrial flutter in August when he was undergoing preoperative evaluation for left hip surgery.  He has been flutter since.        Symptomatically, he is not aware of palpitations.  With limited physical activities from the hip surgery, he is not aware of shortness of breath.  He is back on Eliquis without bleeding problem.  He is known to have thrombocytopenia.      PAST MEDICAL HISTORY:  Remarkable for moderate aortic stenosis, moderate mitral regurgitation with mild mitral stenosis.  The left ventricular ejection fraction is normal.  He has a history of hypertension, gout and pulmonary hypertension.      PHYSICAL EXAMINATION:   VITAL SIGNS:  Blood pressure was 120/64, heart rate 90 beats per minute, body weight 181 pounds.   GENERAL:  He uses a cane for walking because of pain over the left hip from surgery.   HEENT:  Eyes and ENT were unremarkable.   LUNGS:  Clear.   CARDIAC:  Rhythm was irregular.  The heart sounds were normal with no murmur.   ABDOMEN:  Examination showed no hepatomegaly.   EXTREMITIES:  There was no pedal edema.      EKG in August showed atrial flutter with controlled ventricular rate.      ASSESSMENT AND RECOMMENDATIONS:  Mr. Reyes has recurrent atrial flutter.  With exertion, he appeared to have rapid ventricular rate.  The EKG seemed to indicate typical atrial flutter.  He has underlying valvular disease, but he is not to the point of needing valve surgery yet.  For management of atrial flutter, I have recommended catheter ablation.  The risks and benefits of catheter ablation were explained to the patient and his wife today.  They  expressed understanding and consented for the procedure.  For the time being, he will keep taking the same medications.         ROHIT REED MD             D: 2019   T: 2019   MT: AYLA      Name:     DESEAN MARTINEZ   MRN:      [id number]        Account:      [id number]   :      1940           Service Date: 2019      Document: [id number]

## 2019-09-25 NOTE — LETTER
9/25/2019    Pb Warren MD  [address] [facility] [address]    RE: Mars Reyes       Dear Colleague,    I had the pleasure of seeing Mars Reyes in the [facility].    HPI and Plan:   See dictation    No orders of the defined types were placed in this encounter.      No orders of the defined types were placed in this encounter.      There are no discontinued medications.      Encounter Diagnoses   Name Primary?     Atrial flutter (H)      Typical atrial flutter (H) Yes       CURRENT MEDICATIONS:  Current Outpatient Medications   Medication Sig Dispense Refill     acetaminophen (TYLENOL) 325 MG tablet Take 2 tablets (650 mg) by mouth every 4 hours as needed for other (multimodal surgical pain management along with NSAIDS and opioid medication as indicated based on pain control and physical function.) 40 tablet 0     allopurinol (ZYLOPRIM) 100 MG tablet TAKE 1 TABLET BY MOUTH EVERY ONCE DAILY 90 tablet 1     amoxicillin (AMOXIL) 500 MG tablet Take 2,000 mg by mouth daily as needed (1 hour prior to dental procedures)       apixaban ANTICOAGULANT (ELIQUIS) 5 MG tablet Take 1 tablet (5 mg) by mouth 2 times daily 180 tablet 3     lisinopril (PRINIVIL/ZESTRIL) 20 MG tablet Take 1 tablet (20 mg) by mouth daily 90 tablet 3     pravastatin (PRAVACHOL) 20 MG tablet Take 1 tablet (20 mg) by mouth daily 90 tablet 3     senna-docusate (SENOKOT-S/PERICOLACE) 8.6-50 MG tablet Take 1 tablet by mouth 2 times daily 15 tablet 0     celecoxib (CELEBREX) 100 MG capsule Take 1 capsule (100 mg) by mouth 2 times daily For 5 days (Patient not taking: Reported on 9/25/2019) 10 capsule 0     gabapentin (NEURONTIN) 100 MG capsule Take 1 capsule (100 mg) by mouth 3 times daily (Patient not taking: Reported on 9/25/2019) 9 capsule 0     hydrOXYzine (ATARAX) 10 MG tablet Take 1 tablet (10 mg) by mouth every 6 hours as needed for itching (Patient not taking: Reported on 9/25/2019) 15 tablet 0      ondansetron (ZOFRAN-ODT) 4 MG ODT tab Take 1 tablet (4 mg) by mouth every 6 hours as needed for nausea or vomiting (Patient not taking: Reported on 9/25/2019) 5 tablet 0     oxyCODONE (ROXICODONE) 5 MG tablet Take 1-2 tablets (5-10 mg) by mouth every 4 hours as needed for breakthrough pain or severe pain (Patient not taking: Reported on 9/25/2019) 30 tablet 0     traMADol (ULTRAM) 50 MG tablet Take 1 tablet (50 mg) by mouth every 6 hours as needed for moderate pain (Patient not taking: Reported on 9/25/2019) 30 tablet 0       ALLERGIES     Allergies   Allergen Reactions     Simvastatin      10/26/12: Arm pain - pt stopped it x 4-5 wks and arm pain didn't resolve sore started it        PAST MEDICAL HISTORY:  Past Medical History:   Diagnosis Date     Aortic stenosis     moderate     Contact dermatitis and other eczema, due to unspecified cause      Diverticulosis of colon (without mention of hemorrhage)      Essential hypertension, benign     abstracted 7/22/02     Gout, unspecified      Hip joint replacement by other means 6/3/2009     MGUS (monoclonal gammopathy of unknown significance)      Mitral valve disease     mild MS, moderate-severe MR     PSVT (paroxysmal supraventricular tachycardia) (H)      Pulmonary HTN (H) 10/18/2011     Thrombocytopenia (H)      Typical atrial flutter (H)        PAST SURGICAL HISTORY:  Past Surgical History:   Procedure Laterality Date     ANESTHESIA CARDIOVERSION N/A 3/21/2019    Procedure: ANESTHESIA CARDIOVERSION;  Surgeon: GENERIC ANESTHESIA PROVIDER;  Location:  OR     ARTHROPLASTY REVISION HIP ANTERIOR Left 9/9/2019    Procedure: REVISION DIRECT ANTERIOR TOTAL LEFT HIP ARTHROPLASTY;  Surgeon: Luisito Hammonds MD;  Location:  OR     BIOPSY  3/2017     BONE MARROW BIOPSY, BONE SPECIMEN, NEEDLE/TROCAR Right 2/23/2017    Procedure: BIOPSY BONE MARROW;  Surgeon: Romie Esquivel MD;  Location:  GI     COLONOSCOPY  2010     EYE SURGERY      Cataract, both eyes      ORTHOPEDIC SURGERY  2009    (L) total hip       FAMILY HISTORY:  Family History   Problem Relation Age of Onset     Hypertension Mother        SOCIAL HISTORY:  Social History     Socioeconomic History     Marital status:      Spouse name: None     Number of children: None     Years of education: None     Highest education level: None   Occupational History     None   Social Needs     Financial resource strain: None     Food insecurity:     Worry: None     Inability: None     Transportation needs:     Medical: None     Non-medical: None   Tobacco Use     Smoking status: Never Smoker     Smokeless tobacco: Never Used   Substance and Sexual Activity     Alcohol use: Yes     Alcohol/week: 0.0 standard drinks     Comment: occ     Drug use: No     Sexual activity: Yes     Partners: Female     Birth control/protection: None   Lifestyle     Physical activity:     Days per week: None     Minutes per session: None     Stress: None   Relationships     Social connections:     Talks on phone: None     Gets together: None     Attends Faith service: None     Active member of club or organization: None     Attends meetings of clubs or organizations: None     Relationship status: None     Intimate partner violence:     Fear of current or ex partner: None     Emotionally abused: None     Physically abused: None     Forced sexual activity: None   Other Topics Concern     Parent/sibling w/ CABG, MI or angioplasty before 65F 55M? No   Social History Narrative     None       Review of Systems:  Skin:  Negative for       Eyes:  Negative for      ENT:         Respiratory:  Negative for       Cardiovascular:  Negative      Gastroenterology: Negative for      Genitourinary:         Musculoskeletal:  Positive for back pain left hip replacement  Neurologic:  Negative for      Psychiatric:  Negative for      Heme/Lymph/Imm:  Negative      Endocrine:  Negative for        Physical Exam:  Vitals: /64   Pulse 90   Ht 1.803 m (5'  "11\")   Wt 82.1 kg (181 lb)   BMI 25.24 kg/m       Constitutional:  cooperative, alert and oriented, well developed, well nourished, in no acute distress        Skin:  warm and dry to the touch, no apparent skin lesions or masses noted          Head:  normocephalic, no masses or lesions        Eyes:  pupils equal and round, conjunctivae and lids unremarkable, sclera white, no xanthalasma, EOMS intact, no nystagmus        Lymph:No Cervical lymphadenopathy present     ENT:  no pallor or cyanosis, dentition good        Neck:  carotid pulses are full and equal bilaterally, JVP normal, no carotid bruit        Respiratory:  normal breath sounds, clear to auscultation, normal A-P diameter, normal symmetry, normal respiratory excursion, no use of accessory muscles         Cardiac: regular rhythm, normal S1/S2, no S3 or S4, apical impulse not displaced, no murmurs, gallops or rubs                pulses full and equal, no bruits auscultated                                        GI:  abdomen soft, non-tender, BS normoactive, no mass, no HSM, no bruits        Extremities and Muscular Skeletal:  no deformities, clubbing, cyanosis, erythema observed              Neurological:  no gross motor deficits        Psych:  Alert and Oriented x 3        CC  Brandt Iglesias MD  [facility] [address] [id number] [place] [address]                Thank you for allowing me to participate in the care of your patient.      Sincerely,     Delfino Gee MD     [facility]    cc:   Brandt Iglesias MD  [facility]  [address] [place] [address]        "

## 2019-09-25 NOTE — LETTER
9/25/2019      Pb Warren MD  [address] [facility] [address]      RE: Mars Reyes       Dear Colleague,    I had the pleasure of seeing Mars Reyes in the [facility].    Service Date: 09/25/2019      HISTORY OF PRESENT ILLNESS:  I saw Mr. Reyes for evaluation of recurrent atrial flutter.  He is a 78-year-old white male who had atrial flutter early this year and underwent cardioversion around 03/2019.  When he was back for Cardiology for followup in May, he was still in sinus rhythm.  Unfortunately, he was found to be back in atrial flutter in August when he was undergoing preoperative evaluation for left hip surgery.  He has been flutter since.        Symptomatically, he is not aware of palpitations.  With limited physical activities from the hip surgery, he is not aware of shortness of breath.  He is back on Eliquis without bleeding problem.  He is known to have thrombocytopenia.      PAST MEDICAL HISTORY:  Remarkable for moderate aortic stenosis, moderate mitral regurgitation with mild mitral stenosis.  The left ventricular ejection fraction is normal.  He has a history of hypertension, gout and pulmonary hypertension.      PHYSICAL EXAMINATION:   VITAL SIGNS:  Blood pressure was 120/64, heart rate 90 beats per minute, body weight 181 pounds.   GENERAL:  He uses a cane for walking because of pain over the left hip from surgery.   HEENT:  Eyes and ENT were unremarkable.   LUNGS:  Clear.   CARDIAC:  Rhythm was irregular.  The heart sounds were normal with no murmur.   ABDOMEN:  Examination showed no hepatomegaly.   EXTREMITIES:  There was no pedal edema.      EKG in August showed atrial flutter with controlled ventricular rate.      ASSESSMENT AND RECOMMENDATIONS:  Mr. Reyes has recurrent atrial flutter.  With exertion, he appeared to have rapid ventricular rate.  The EKG seemed to indicate typical atrial flutter.  He has underlying valvular disease, but he is not to  the point of needing valve surgery yet.  For management of atrial flutter, I have recommended catheter ablation.  The risks and benefits of catheter ablation were explained to the patient and his wife today.  They expressed understanding and consented for the procedure.  For the time being, he will keep taking the same medications.         ROHIT REED MD             D: 2019   T: 2019   MT: AYLA      Name:     DESEAN MARTINEZ   MRN:      [id number]        Account:      [id number]   :      1940           Service Date: 2019      Document: [id number]           Outpatient Encounter Medications as of 2019   Medication Sig Dispense Refill     acetaminophen (TYLENOL) 325 MG tablet Take 2 tablets (650 mg) by mouth every 4 hours as needed for other (multimodal surgical pain management along with NSAIDS and opioid medication as indicated based on pain control and physical function.) 40 tablet 0     allopurinol (ZYLOPRIM) 100 MG tablet TAKE 1 TABLET BY MOUTH EVERY ONCE DAILY 90 tablet 1     amoxicillin (AMOXIL) 500 MG tablet Take 2,000 mg by mouth daily as needed (1 hour prior to dental procedures)       apixaban ANTICOAGULANT (ELIQUIS) 5 MG tablet Take 1 tablet (5 mg) by mouth 2 times daily 180 tablet 3     lisinopril (PRINIVIL/ZESTRIL) 20 MG tablet Take 1 tablet (20 mg) by mouth daily 90 tablet 3     pravastatin (PRAVACHOL) 20 MG tablet Take 1 tablet (20 mg) by mouth daily 90 tablet 3     senna-docusate (SENOKOT-S/PERICOLACE) 8.6-50 MG tablet Take 1 tablet by mouth 2 times daily 15 tablet 0     celecoxib (CELEBREX) 100 MG capsule Take 1 capsule (100 mg) by mouth 2 times daily For 5 days (Patient not taking: Reported on 2019) 10 capsule 0     gabapentin (NEURONTIN) 100 MG capsule Take 1 capsule (100 mg) by mouth 3 times daily (Patient not taking: Reported on 2019) 9 capsule 0     hydrOXYzine (ATARAX) 10 MG tablet Take 1 tablet (10 mg) by mouth every 6 hours as needed for itching (Patient  not taking: Reported on 9/25/2019) 15 tablet 0     ondansetron (ZOFRAN-ODT) 4 MG ODT tab Take 1 tablet (4 mg) by mouth every 6 hours as needed for nausea or vomiting (Patient not taking: Reported on 9/25/2019) 5 tablet 0     oxyCODONE (ROXICODONE) 5 MG tablet Take 1-2 tablets (5-10 mg) by mouth every 4 hours as needed for breakthrough pain or severe pain (Patient not taking: Reported on 9/25/2019) 30 tablet 0     traMADol (ULTRAM) 50 MG tablet Take 1 tablet (50 mg) by mouth every 6 hours as needed for moderate pain (Patient not taking: Reported on 9/25/2019) 30 tablet 0     No facility-administered encounter medications on file as of 9/25/2019.        Again, thank you for allowing me to participate in the care of your patient.      Sincerely,    Delfino Gee MD     [facility]

## 2019-09-25 NOTE — PROGRESS NOTES
HPI and Plan:   See dictation    No orders of the defined types were placed in this encounter.      No orders of the defined types were placed in this encounter.      There are no discontinued medications.      Encounter Diagnoses   Name Primary?     Atrial flutter (H)      Typical atrial flutter (H) Yes       CURRENT MEDICATIONS:  Current Outpatient Medications   Medication Sig Dispense Refill     acetaminophen (TYLENOL) 325 MG tablet Take 2 tablets (650 mg) by mouth every 4 hours as needed for other (multimodal surgical pain management along with NSAIDS and opioid medication as indicated based on pain control and physical function.) 40 tablet 0     allopurinol (ZYLOPRIM) 100 MG tablet TAKE 1 TABLET BY MOUTH EVERY ONCE DAILY 90 tablet 1     amoxicillin (AMOXIL) 500 MG tablet Take 2,000 mg by mouth daily as needed (1 hour prior to dental procedures)       apixaban ANTICOAGULANT (ELIQUIS) 5 MG tablet Take 1 tablet (5 mg) by mouth 2 times daily 180 tablet 3     lisinopril (PRINIVIL/ZESTRIL) 20 MG tablet Take 1 tablet (20 mg) by mouth daily 90 tablet 3     pravastatin (PRAVACHOL) 20 MG tablet Take 1 tablet (20 mg) by mouth daily 90 tablet 3     senna-docusate (SENOKOT-S/PERICOLACE) 8.6-50 MG tablet Take 1 tablet by mouth 2 times daily 15 tablet 0     celecoxib (CELEBREX) 100 MG capsule Take 1 capsule (100 mg) by mouth 2 times daily For 5 days (Patient not taking: Reported on 9/25/2019) 10 capsule 0     gabapentin (NEURONTIN) 100 MG capsule Take 1 capsule (100 mg) by mouth 3 times daily (Patient not taking: Reported on 9/25/2019) 9 capsule 0     hydrOXYzine (ATARAX) 10 MG tablet Take 1 tablet (10 mg) by mouth every 6 hours as needed for itching (Patient not taking: Reported on 9/25/2019) 15 tablet 0     ondansetron (ZOFRAN-ODT) 4 MG ODT tab Take 1 tablet (4 mg) by mouth every 6 hours as needed for nausea or vomiting (Patient not taking: Reported on 9/25/2019) 5 tablet 0     oxyCODONE (ROXICODONE) 5 MG tablet Take 1-2  tablets (5-10 mg) by mouth every 4 hours as needed for breakthrough pain or severe pain (Patient not taking: Reported on 9/25/2019) 30 tablet 0     traMADol (ULTRAM) 50 MG tablet Take 1 tablet (50 mg) by mouth every 6 hours as needed for moderate pain (Patient not taking: Reported on 9/25/2019) 30 tablet 0       ALLERGIES     Allergies   Allergen Reactions     Simvastatin      10/26/12: Arm pain - pt stopped it x 4-5 wks and arm pain didn't resolve sore started it        PAST MEDICAL HISTORY:  Past Medical History:   Diagnosis Date     Aortic stenosis     moderate     Contact dermatitis and other eczema, due to unspecified cause      Diverticulosis of colon (without mention of hemorrhage)      Essential hypertension, benign     abstracted 7/22/02     Gout, unspecified      Hip joint replacement by other means 6/3/2009     MGUS (monoclonal gammopathy of unknown significance)      Mitral valve disease     mild MS, moderate-severe MR     PSVT (paroxysmal supraventricular tachycardia) (H)      Pulmonary HTN (H) 10/18/2011     Thrombocytopenia (H)      Typical atrial flutter (H)        PAST SURGICAL HISTORY:  Past Surgical History:   Procedure Laterality Date     ANESTHESIA CARDIOVERSION N/A 3/21/2019    Procedure: ANESTHESIA CARDIOVERSION;  Surgeon: GENERIC ANESTHESIA PROVIDER;  Location: [facility] OR     ARTHROPLASTY REVISION HIP ANTERIOR Left 9/9/2019    Procedure: REVISION DIRECT ANTERIOR TOTAL LEFT HIP ARTHROPLASTY;  Surgeon: Luisito Hammonds MD;  Location:  OR     BIOPSY  3/2017     BONE MARROW BIOPSY, BONE SPECIMEN, NEEDLE/TROCAR Right 2/23/2017    Procedure: BIOPSY BONE MARROW;  Surgeon: Romie Esquivel MD;  Location:  GI     COLONOSCOPY  2010     EYE SURGERY      Cataract, both eyes     ORTHOPEDIC SURGERY  2009    (L) total hip       FAMILY HISTORY:  Family History   Problem Relation Age of Onset     Hypertension Mother        SOCIAL HISTORY:  Social History     Socioeconomic History     Marital status:  "     Spouse name: None     Number of children: None     Years of education: None     Highest education level: None   Occupational History     None   Social Needs     Financial resource strain: None     Food insecurity:     Worry: None     Inability: None     Transportation needs:     Medical: None     Non-medical: None   Tobacco Use     Smoking status: Never Smoker     Smokeless tobacco: Never Used   Substance and Sexual Activity     Alcohol use: Yes     Alcohol/week: 0.0 standard drinks     Comment: occ     Drug use: No     Sexual activity: Yes     Partners: Female     Birth control/protection: None   Lifestyle     Physical activity:     Days per week: None     Minutes per session: None     Stress: None   Relationships     Social connections:     Talks on phone: None     Gets together: None     Attends Cheondoism service: None     Active member of club or organization: None     Attends meetings of clubs or organizations: None     Relationship status: None     Intimate partner violence:     Fear of current or ex partner: None     Emotionally abused: None     Physically abused: None     Forced sexual activity: None   Other Topics Concern     Parent/sibling w/ CABG, MI or angioplasty before 65F 55M? No   Social History Narrative     None       Review of Systems:  Skin:  Negative for       Eyes:  Negative for      ENT:         Respiratory:  Negative for       Cardiovascular:  Negative      Gastroenterology: Negative for      Genitourinary:         Musculoskeletal:  Positive for back pain left hip replacement  Neurologic:  Negative for      Psychiatric:  Negative for      Heme/Lymph/Imm:  Negative      Endocrine:  Negative for        Physical Exam:  Vitals: /64   Pulse 90   Ht 1.803 m (5' 11\")   Wt 82.1 kg (181 lb)   BMI 25.24 kg/m      Constitutional:  cooperative, alert and oriented, well developed, well nourished, in no acute distress        Skin:  warm and dry to the touch, no apparent skin lesions " or masses noted          Head:  normocephalic, no masses or lesions        Eyes:  pupils equal and round, conjunctivae and lids unremarkable, sclera white, no xanthalasma, EOMS intact, no nystagmus        Lymph:No Cervical lymphadenopathy present     ENT:  no pallor or cyanosis, dentition good        Neck:  carotid pulses are full and equal bilaterally, JVP normal, no carotid bruit        Respiratory:  normal breath sounds, clear to auscultation, normal A-P diameter, normal symmetry, normal respiratory excursion, no use of accessory muscles         Cardiac: regular rhythm, normal S1/S2, no S3 or S4, apical impulse not displaced, no murmurs, gallops or rubs                pulses full and equal, no bruits auscultated                                        GI:  abdomen soft, non-tender, BS normoactive, no mass, no HSM, no bruits        Extremities and Muscular Skeletal:  no deformities, clubbing, cyanosis, erythema observed              Neurological:  no gross motor deficits        Psych:  Alert and Oriented x 3        CC  Brandt Iglesias MD  [facility]  [address] [place] [address]

## 2019-09-30 ENCOUNTER — HOSPITAL ENCOUNTER (OUTPATIENT)
Dept: ULTRASOUND IMAGING | Facility: CLINIC | Age: 79
Discharge: HOME | End: 2019-09-30
Attending: ORTHOPAEDIC SURGERY | Admitting: ORTHOPAEDIC SURGERY
Payer: COMMERCIAL

## 2019-09-30 DIAGNOSIS — R53.1 WEAKNESS: ICD-10-CM

## 2019-09-30 DIAGNOSIS — Z47.89 ORTHOPEDIC AFTERCARE: ICD-10-CM

## 2019-09-30 DIAGNOSIS — M25.559 HIP PAIN: ICD-10-CM

## 2019-09-30 PROCEDURE — 93971 EXTREMITY STUDY: CPT | Mod: LT

## 2019-10-01 ENCOUNTER — TRANSFERRED RECORDS (OUTPATIENT)
Dept: HEALTH INFORMATION MANAGEMENT | Facility: CLINIC | Age: 79
End: 2019-10-01

## 2019-10-02 ENCOUNTER — HEALTH MAINTENANCE LETTER (OUTPATIENT)
Age: 79
End: 2019-10-02

## 2019-10-17 ENCOUNTER — TELEPHONE (OUTPATIENT)
Dept: CARDIOLOGY | Facility: CLINIC | Age: 79
End: 2019-10-17

## 2019-10-17 RX ORDER — SODIUM CHLORIDE 450 MG/100ML
INJECTION, SOLUTION INTRAVENOUS CONTINUOUS
Status: CANCELLED | OUTPATIENT
Start: 2019-10-17

## 2019-10-17 RX ORDER — LIDOCAINE 40 MG/G
CREAM TOPICAL
Status: CANCELLED | OUTPATIENT
Start: 2019-10-17

## 2019-10-17 NOTE — TELEPHONE ENCOUNTER
10/17/19 Called pt regarding Typical Aflutter Ablation  tomorrow. Notified of arrival time (8:30 am) , NPO after midnight with sips of water for am meds. Discussed meds to be held (none)  and that patient will need a  for ride home. Pt expressed understanding. Didier 8:50 am

## 2019-10-18 ENCOUNTER — SURGERY (OUTPATIENT)
Age: 79
End: 2019-10-18
Payer: COMMERCIAL

## 2019-10-18 ENCOUNTER — HOSPITAL ENCOUNTER (OUTPATIENT)
Facility: CLINIC | Age: 79
Discharge: HOME | End: 2019-10-18
Admitting: INTERNAL MEDICINE
Payer: COMMERCIAL

## 2019-10-18 VITALS
BODY MASS INDEX: 25.91 KG/M2 | SYSTOLIC BLOOD PRESSURE: 100 MMHG | TEMPERATURE: 97 F | HEIGHT: 70 IN | HEART RATE: 80 BPM | DIASTOLIC BLOOD PRESSURE: 56 MMHG | OXYGEN SATURATION: 99 % | WEIGHT: 181 LBS | RESPIRATION RATE: 16 BRPM

## 2019-10-18 DIAGNOSIS — I48.3 TYPICAL ATRIAL FLUTTER (H): ICD-10-CM

## 2019-10-18 LAB
ANION GAP SERPL CALCULATED.3IONS-SCNC: 6 MMOL/L (ref 3–14)
BUN SERPL-MCNC: 12 MG/DL (ref 7–30)
CALCIUM SERPL-MCNC: 9.1 MG/DL (ref 8.5–10.1)
CHLORIDE SERPL-SCNC: 98 MMOL/L (ref 94–109)
CO2 SERPL-SCNC: 26 MMOL/L (ref 20–32)
CREAT SERPL-MCNC: 0.82 MG/DL (ref 0.66–1.25)
ERYTHROCYTE [DISTWIDTH] IN BLOOD BY AUTOMATED COUNT: 13.4 % (ref 10–15)
GFR SERPL CREATININE-BSD FRML MDRD: 85 ML/MIN/{1.73_M2}
GLUCOSE SERPL-MCNC: 91 MG/DL (ref 70–99)
HCT VFR BLD AUTO: 33.1 % (ref 40–53)
HGB BLD-MCNC: 10.8 G/DL (ref 13.3–17.7)
INR PPP: 1.35 (ref 0.86–1.14)
KCT BLD-ACNC: 146 SEC (ref 75–150)
KCT BLD-ACNC: 227 SEC (ref 75–150)
MCH RBC QN AUTO: 27.3 PG (ref 26.5–33)
MCHC RBC AUTO-ENTMCNC: 32.6 G/DL (ref 31.5–36.5)
MCV RBC AUTO: 84 FL (ref 78–100)
PLATELET # BLD AUTO: 82 10E9/L (ref 150–450)
POTASSIUM SERPL-SCNC: 4.2 MMOL/L (ref 3.4–5.3)
RBC # BLD AUTO: 3.96 10E12/L (ref 4.4–5.9)
SODIUM SERPL-SCNC: 130 MMOL/L (ref 133–144)
WBC # BLD AUTO: 6.8 10E9/L (ref 4–11)

## 2019-10-18 PROCEDURE — 85610 PROTHROMBIN TIME: CPT | Performed by: INTERNAL MEDICINE

## 2019-10-18 PROCEDURE — 93656 COMPRE EP EVAL ABLTJ ATR FIB: CPT | Performed by: INTERNAL MEDICINE

## 2019-10-18 PROCEDURE — 99153 MOD SED SAME PHYS/QHP EA: CPT | Performed by: INTERNAL MEDICINE

## 2019-10-18 PROCEDURE — 80048 BASIC METABOLIC PNL TOTAL CA: CPT | Performed by: INTERNAL MEDICINE

## 2019-10-18 PROCEDURE — 93621 COMP EP EVL L PAC&REC C SINS: CPT | Mod: 26 | Performed by: INTERNAL MEDICINE

## 2019-10-18 PROCEDURE — C1888 ENDOVAS NON-CARDIAC ABL CATH: HCPCS | Performed by: INTERNAL MEDICINE

## 2019-10-18 PROCEDURE — 93010 ELECTROCARDIOGRAM REPORT: CPT | Mod: 76 | Performed by: INTERNAL MEDICINE

## 2019-10-18 PROCEDURE — C1730 CATH, EP, 19 OR FEW ELECT: HCPCS | Performed by: INTERNAL MEDICINE

## 2019-10-18 PROCEDURE — 99152 MOD SED SAME PHYS/QHP 5/>YRS: CPT | Performed by: INTERNAL MEDICINE

## 2019-10-18 PROCEDURE — 93462 L HRT CATH TRNSPTL PUNCTURE: CPT | Performed by: INTERNAL MEDICINE

## 2019-10-18 PROCEDURE — 40000065 ZZH STATISTIC EKG NON-CHARGEABLE

## 2019-10-18 PROCEDURE — C1894 INTRO/SHEATH, NON-LASER: HCPCS | Performed by: INTERNAL MEDICINE

## 2019-10-18 PROCEDURE — C1759 CATH, INTRA ECHOCARDIOGRAPHY: HCPCS | Performed by: INTERNAL MEDICINE

## 2019-10-18 PROCEDURE — 93613 INTRACARDIAC EPHYS 3D MAPG: CPT | Performed by: INTERNAL MEDICINE

## 2019-10-18 PROCEDURE — 27210794 ZZH OR GENERAL SUPPLY STERILE: Performed by: INTERNAL MEDICINE

## 2019-10-18 PROCEDURE — 93653 COMPRE EP EVAL TX SVT: CPT | Performed by: INTERNAL MEDICINE

## 2019-10-18 PROCEDURE — 25000128 H RX IP 250 OP 636: Performed by: INTERNAL MEDICINE

## 2019-10-18 PROCEDURE — 40000235 ZZH STATISTIC TELEMETRY

## 2019-10-18 PROCEDURE — 25000125 ZZHC RX 250: Performed by: INTERNAL MEDICINE

## 2019-10-18 PROCEDURE — 93462 L HRT CATH TRNSPTL PUNCTURE: CPT

## 2019-10-18 PROCEDURE — 85347 COAGULATION TIME ACTIVATED: CPT

## 2019-10-18 PROCEDURE — 85027 COMPLETE CBC AUTOMATED: CPT | Performed by: INTERNAL MEDICINE

## 2019-10-18 PROCEDURE — 36415 COLL VENOUS BLD VENIPUNCTURE: CPT

## 2019-10-18 PROCEDURE — 25800029 ZZH RX IP 258 OP 250: Performed by: INTERNAL MEDICINE

## 2019-10-18 PROCEDURE — C1769 GUIDE WIRE: HCPCS | Performed by: INTERNAL MEDICINE

## 2019-10-18 PROCEDURE — 40000852 ZZH STATISTIC HEART CATH LAB OR EP LAB

## 2019-10-18 PROCEDURE — 93621 COMP EP EVL L PAC&REC C SINS: CPT

## 2019-10-18 PROCEDURE — 93005 ELECTROCARDIOGRAM TRACING: CPT

## 2019-10-18 RX ORDER — NALOXONE HYDROCHLORIDE 0.4 MG/ML
.1-.4 INJECTION, SOLUTION INTRAMUSCULAR; INTRAVENOUS; SUBCUTANEOUS
Status: DISCONTINUED | OUTPATIENT
Start: 2019-10-18 | End: 2019-10-18 | Stop reason: HOSPADM

## 2019-10-18 RX ORDER — HEPARIN SODIUM 1000 [USP'U]/ML
INJECTION, SOLUTION INTRAVENOUS; SUBCUTANEOUS PRN
Status: DISCONTINUED | OUTPATIENT
Start: 2019-10-18 | End: 2019-10-18 | Stop reason: HOSPADM

## 2019-10-18 RX ORDER — OXYCODONE AND ACETAMINOPHEN 5; 325 MG/1; MG/1
1 TABLET ORAL EVERY 4 HOURS PRN
Status: DISCONTINUED | OUTPATIENT
Start: 2019-10-18 | End: 2019-10-18 | Stop reason: HOSPADM

## 2019-10-18 RX ORDER — PROTAMINE SULFATE 10 MG/ML
INJECTION, SOLUTION INTRAVENOUS PRN
Status: DISCONTINUED | OUTPATIENT
Start: 2019-10-18 | End: 2019-10-18 | Stop reason: HOSPADM

## 2019-10-18 RX ORDER — SODIUM CHLORIDE 450 MG/100ML
INJECTION, SOLUTION INTRAVENOUS CONTINUOUS
Status: DISCONTINUED | OUTPATIENT
Start: 2019-10-18 | End: 2019-10-18 | Stop reason: HOSPADM

## 2019-10-18 RX ORDER — LIDOCAINE 40 MG/G
CREAM TOPICAL
Status: DISCONTINUED | OUTPATIENT
Start: 2019-10-18 | End: 2019-10-18 | Stop reason: HOSPADM

## 2019-10-18 RX ORDER — FENTANYL CITRATE 50 UG/ML
INJECTION, SOLUTION INTRAMUSCULAR; INTRAVENOUS PRN
Status: DISCONTINUED | OUTPATIENT
Start: 2019-10-18 | End: 2019-10-18 | Stop reason: HOSPADM

## 2019-10-18 RX ADMIN — HEPARIN SODIUM 5000 UNITS: 1000 INJECTION, SOLUTION INTRAVENOUS; SUBCUTANEOUS at 11:25

## 2019-10-18 RX ADMIN — MIDAZOLAM 1 MG: 1 INJECTION INTRAMUSCULAR; INTRAVENOUS at 11:10

## 2019-10-18 RX ADMIN — FENTANYL CITRATE 25 MCG: 50 INJECTION, SOLUTION INTRAMUSCULAR; INTRAVENOUS at 11:10

## 2019-10-18 RX ADMIN — LIDOCAINE HYDROCHLORIDE 10 ML: 10 INJECTION, SOLUTION EPIDURAL; INFILTRATION; INTRACAUDAL; PERINEURAL at 10:43

## 2019-10-18 RX ADMIN — PROTAMINE SULFATE 20 MG: 10 INJECTION, SOLUTION INTRAVENOUS at 12:18

## 2019-10-18 RX ADMIN — FENTANYL CITRATE 25 MCG: 50 INJECTION, SOLUTION INTRAMUSCULAR; INTRAVENOUS at 11:34

## 2019-10-18 RX ADMIN — MIDAZOLAM 0.5 MG: 1 INJECTION INTRAMUSCULAR; INTRAVENOUS at 11:35

## 2019-10-18 RX ADMIN — FENTANYL CITRATE 50 MCG: 50 INJECTION, SOLUTION INTRAMUSCULAR; INTRAVENOUS at 10:57

## 2019-10-18 RX ADMIN — FENTANYL CITRATE 50 MCG: 50 INJECTION, SOLUTION INTRAMUSCULAR; INTRAVENOUS at 10:40

## 2019-10-18 RX ADMIN — LIDOCAINE HYDROCHLORIDE 10 ML: 10 INJECTION, SOLUTION EPIDURAL; INFILTRATION; INTRACAUDAL; PERINEURAL at 10:49

## 2019-10-18 RX ADMIN — SODIUM CHLORIDE: 4.5 INJECTION, SOLUTION INTRAVENOUS at 09:18

## 2019-10-18 RX ADMIN — MIDAZOLAM 1 MG: 1 INJECTION INTRAMUSCULAR; INTRAVENOUS at 10:40

## 2019-10-18 ASSESSMENT — MIFFLIN-ST. JEOR: SCORE: 1547.26

## 2019-10-18 NOTE — PROGRESS NOTES
[facility] Post-Procedure Note    Procedure: Atrial flutter ablation  CS arrival time: 1230  Accompanied by: OTIS Barrett RN   Concerns/abnormal assessment after procedure: no  Plan: Continue post procedure plan of care    Right groin site CDI, no hematoma, pulses are present.  Left S/C site dressing CDI, no hematoma.   Denies any pain or discomfort. Taking po fluids well.  VS stable.  Family is informed and at the bedside.    Anticipate 4 hours bedrest, up at 1630.  Anticipate discharge after 1700 if stable.    Report back to Selena to continue monitoring pt.

## 2019-10-18 NOTE — DISCHARGE INSTRUCTIONS
A Flutter Discharge Instructions- Right Femoral (groin) & Chest        After you go home:    Have an adult stay with you until tomorrow.    You may eat your normal diet, unless your doctor tells you otherwise.    RELAX and take it easy for 3 days.        For 24-48 hours (due to the sedation you received):    DO NOT DRIVE FOR 2 DAYS!     Do NOT make any important or legal decisions.    Do NOT drive or operate machines at home or at work.    Do NOT drink alcohol.    Care of Chest & Groin Puncture Sites:      For the first 24 hrs - check the puncture site every 1-2 hours while awake.    For 2 days, when you cough, sneeze, laugh or move your bowels, hold your hand over the puncture site and press firmly.    Remove the bandaids after 24 hours. If there is minor oozing, apply another bandaid and remove it after 12 hours.    It is normal to have a small bruise, pea sized lump or soreness at the site.    You may shower tomorrow. Do NOT take a bath, or use a hot tub or pool for at least 3 days. Do NOT scrub the site. Do not use lotion or powder near the puncture site.        Activity - For 2 days:  Activity:    Do NOT lift, push or pull more than 10 pounds (equal to a gallon of milk) for 3 days.    NO repetitive motions such as loading , vacuuming, raking, shoveling.     Neck/Chest site:    Avoid heavy lifting or the overuse of your shoulder.        Groin site:      No stooping or squatting    Do NOT do any heavy activity such as exercise, lifting, or straining.     No housework, yard work or any activity that make you sweat    Do NOT lift more than 10 pounds      Bleeding:     Chest site:    If you start bleeding from the site in your neck/chest, sit down and press firmly on the site for 10 minutes.     Once bleeding stops, sit still for 2 hours.     Call [facility] as soon as you can      Groin site:    If you start bleeding from the site in your groin, lie down flat and press firmly on the site for 10  minutes.     Once bleeding stops, lay flat for 2 hours.    Call [facility] as soon as you can.         Call 911 right away if you have heavy bleeding or bleeding that does not stop.      Medications:    Take your medications, including blood thinners, unless your doctor tells you not to.    If you have stopped any other medicines, check with your nurse or provider about when to restart them.    If you have pain, you may takeTylenol (acetaminophen) and if this does not help may take Advil (ibuprofen-400 mg with food).    Call the A Fib RN if:    Chest pain not relieved by tylenol or ibuprofen    Difficulty swallowing and/or coughing up blood    Shortness of breath    Increased groin pain or a large or growing hard lump around the site.    Groin site is red, swollen, hot or tender.    Blood or fluid is draining from the groin site.    You have chills or a fever greater than 101 F (38 C).    Your leg feels numb, cool or changes color.    If groin pain is not relieved by Tylenol or Ibuprofen.    Recurrent irregular or fast heart rate lasting over 2-3 hours.    Any questions or concerns.    Heart rhythms:  You may have some irregular heartbeats. These feel very strong. They may make you feel that the A Fib is going to start again.  Give it time. The irregular beats should occur less often.    Follow Up Appointments:    1 month follow up Monday, November 18th at 12:30 with Saumya Wong PA-C in [facility]: A Fib clinic RN's Connie Hoffmann [phone] (Mon-Fri, 8:00-5:00)   [phone] (7 days a week) after hours for on call Cardiologist.

## 2019-10-18 NOTE — PROGRESS NOTES
Ablation of atypical atrial flutter.  Persistent flutter with  sm. Intracardiac recording demonstrated that the flutter was not TVI dependent.  RA mapping showed earliest focal activities at mid septum but ablation over there had no impact on the arrhythmia.  Transeptal puncture was then performed.  Left atrial mapping showed a normal size left atrium but significant left atrial scar.   PV antrum isolation was performed to clean up a few spots of atrial activities.  The flutter was stopped by ablation over the left atrial roof posteriorly.  No complications.    May be discharged around 5 PM.  Resume home medications.

## 2019-10-18 NOTE — PRE-PROCEDURE
GENERAL PRE-PROCEDURE:   Procedure:  Atrial flutter ablation  Date/Time:  10/18/2019 8:45 AM    Verbal consent obtained?: Yes    Written consent obtained?: Yes    Risks and benefits: Risks, benefits and alternatives were discussed    Consent given by:  Patient  Patient states understanding of procedure being performed: Yes    Patient's understanding of procedure matches consent: Yes    Procedure consent matches procedure scheduled: Yes    Expected level of sedation:  Moderate  Appropriately NPO:  Yes  ASA Class:  Class 3- Severe systemic disease, definite functional limitations  Mallampati  :  Grade 1- soft palate, uvula, tonsillar pillars, and posterior pharyngeal wall visible  Lungs:  Lungs clear with good breath sounds bilaterally  Heart:  Normal heart sounds and rate  History & Physical reviewed:  History and physical reviewed and no updates needed  Statement of review:  I have reviewed the lab findings, diagnostic data, medications, and the plan for sedation

## 2019-10-18 NOTE — PROGRESS NOTES
VSS.  Pain free.  Tolerating bedrest.  Tolerated PO.  Left S/C site remains CDI, no hematoma.  Right groin site remains CDI, no hematoma.  Pulses at baseline.  Hand off report given to Sean BUSTILLO RN

## 2019-10-18 NOTE — PROGRESS NOTES
Report received from Selena to continue monitoring pt.    VS stable, pt is resting comfortably.  Right groin site CDI, no hematoma.  Left S/C site CDI, no hematoma  Review discharge instruction with pt.  All questions are answered.  Family is at bedside.  Anticipate bedrest until 1630  Anticipate discharge after 1700 if stable.  Pt is off bedrest at 1700, up to the bathroom without any difficulty.  Stable sites.   Reinforce discharge instruction.  All questions are answered.     Care Suites Discharge Nursing Note    Education/questions answered: yes  Patient DC location: door 2/home  Accompanied by: wife  CS discharge time: 1700

## 2019-10-18 NOTE — PROGRESS NOTES
Care Suites Admission Nursing Note    Reason for admission: A-flutter ablation.  CS arrival time: 0820  Accompanied by: Wife, Angelia.  Name/phone of DC : Angelia [phone]  Medications held: None.  Consent signed: Yes.  Abnormal assessment/labs: Labs slightly abnormal.    If abnormal, provider notified: Yes.  No change in treatment at this time.  Education/questions answered: Yes.  Discussed/reviewed A-flutter POC/procedure including post activity restrictions with family.  Verbal understanding received.  Plan: Proceed as scheduled.

## 2019-10-19 LAB
INTERPRETATION ECG - MUSE: NORMAL
INTERPRETATION ECG - MUSE: NORMAL

## 2019-10-21 ENCOUNTER — TELEPHONE (OUTPATIENT)
Dept: CARDIOLOGY | Facility: CLINIC | Age: 79
End: 2019-10-21

## 2019-10-21 NOTE — TELEPHONE ENCOUNTER
10/21/19 Spoke to patient about discharge instructions post A Flutter Ablation.  Patient made aware that all instructions, appointments and phone numbers have been included in AVS, that will be given to pt on discharge by Nurse. Patient reminded that there is no driving for 2 days and no lifting, pushing or pulling of more than 10 pounds for 3 days. Patient reminded to call with any concerns or problems including, coughing up blood, difficulty swallowing, groin bleed or swelling, increased shortness of breath, fever greater than 101, A Fib lasting longer than 2-3 hours. Pt made aware to call A Fib RN, if follow up appointments need to be changed. Pt has no questions at this time. Didier 10:15 am

## 2019-10-22 ENCOUNTER — TRANSFERRED RECORDS (OUTPATIENT)
Dept: HEALTH INFORMATION MANAGEMENT | Facility: CLINIC | Age: 79
End: 2019-10-22

## 2019-11-06 NOTE — PROGRESS NOTES
"HPI:   I had the pleasure of seeing Mars when he came for follow up of atrial flutter.  This 79 year old sees Dr. Gee for his history of:    1. Atrial Flutter dx'd during a syncopal event while in [place] 1/2019, s/p DCCV 3/2019. Recurrent flutter noted 8/2019. Now s/p atypical atrial flutter ablation (Lsided) 10/18/2019) with Dr. Gee  2. Chronic ITP and MGUS (IgG kappa) followed by Dr. Mayer. Last OV 3/2019. Dr. Mayer has rec'd steroids if Plt <30K/bleeding.  3. 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 Gee met with Mars 9/2019 he recommended AFlutter ablation given recurrent AFlutter.     This was done 10/18/2019 and EPS revealed it was not isthmus-dependent. Therefore, further evaluation was done and LA showed extensive LA scar and PV antrum ablation was easily performed. LA roof line performed from LS-RS PVs.  In the middle of the posterior LA roof, ablation terminated the flutter. He's now back for follow-up.     His EKG today showed that he's back in atypical atrial flutter with CVR today.    Interval History:  Overall, he feels \"great.\" He was unaware of arrhythmia, without palpitations, lightheadedness or dizziness. He felt no different after the ablation than before.  He states he was \"never\" sx'c with this and states his energy levels are \"perfect.\"    Denies trouble with groin or subclavian site discomfort. No complaints of chest pain, pressure or tightness.  No orthopnea, PND or edema. No change in exercise tolerance or breathing. Overall, he is feeling well.     HR at home has stayed 70-80 bpm. BPs \"look great.\"    Diagnostic Testing:  EKG today, which I overread, showed atypical atrial flutter @ 61 bpm.    Echocardiogam 3/2019 showed EF 55-60% with moderate concentric LVH (asymmetric) without LVOT obstruction. Normal RV size/function. LA severely dilated. Mod-sev MAC with 2-3+ MR (PISA regurg vol 47 ml with ERO 0.3 cm2). Mean MV gradient " 5.4 mmHg @ 71 bpm (mild MS). 1-2+ TR with RVSP 40.4 mmHg+RAP, c/w mild pHTN. Trace-mild AI. Moderate aortic stenosis with mean gradient 25.1 mmHg and LV 1.3 cm2. Mild-moderate 1-2+ TN. Ascending aorta and root mildly dilated @ 3.8 cm.    Nuclear Stress Test 5/2017 showed small mild intensity apical ischemia. Borderline TID.   Carotid Doppler 3/2019 with 50-69% BHAVIK and 50-60% LICA.   Component      Latest Ref Rng & Units 3/19/2019 9/9/2019 10/18/2019   WBC      4.0 - 11.0 10e9/L 8.8 7.4 6.8   RBC Count      4.4 - 5.9 10e12/L 4.84 4.91 3.96 (L)   Hemoglobin      13.3 - 17.7 g/dL 14.1 14.6 10.8 (L)   Hematocrit      40.0 - 53.0 % 41.0 42.1 33.1 (L)   MCV      78 - 100 fl 85 86 84   MCH      26.5 - 33.0 pg 29.1 29.7 27.3   MCHC      31.5 - 36.5 g/dL 34.4 34.7 32.6   RDW      10.0 - 15.0 % 12.7 12.6 13.4   Platelet Count      150 - 450 10e9/L 69 (L) 69 (L) 82 (L)     Component      Latest Ref Rng & Units 3/22/2019 10/18/2019   Sodium      133 - 144 mmol/L 135 130 (L)   Potassium      3.4 - 5.3 mmol/L 4.2 4.2   Chloride      94 - 109 mmol/L 102 98   Carbon Dioxide      20 - 32 mmol/L 25 26   Anion Gap      3 - 14 mmol/L 8 6   Glucose      70 - 99 mg/dL 91 91   Urea Nitrogen      7 - 30 mg/dL 14 12   Creatinine      0.66 - 1.25 mg/dL 0.85 0.82   GFR Estimate      >60 mL/min/1.73:m2 84 85   GFR Estimate If Black      >60 mL/min/1.73:m2 >90 >90   Calcium      8.5 - 10.1 mg/dL 8.9 9.1     Assessment & Plan:    1. Atypical AFlutter    Echo 3/2019 with normal EF 55-60%    EP Study 10/18/2019 showed non-isthmus dependent AFlutter and he underwent ablation in the LA (PV antrum and lines). LA was noted to be quite scarred.     EKG today showed he's back in Atypical AFlutter, which he's completely unaware of.    He remains on Eliquis 5 mg BID for valvular dz and CHADSVASc 3 (CVDz, age)    CBC last checked 10/2019 at time of ablation showed significant drop in Hgb (see above). Plts still low but >80K    PLAN:    D/w Dr. Gee, who  noted he had quite a bit of scar in the LA.  As he's completely asx'c with arrhythmia, agrees to continue AC and continue to follow-up for rate control    He's leaving for [place] tomorrow and will return Spring 2020    Up-to-date CBC    Continue Eliquis 5 mg BID. OK to switch to Xarelto should insurance prefer.     24 hour Holter for rate control when he returns. Records given to him to take to OR with him      2. Anemia and Chronic ITP    Sees Dr. Mayer    Significant drop in Hgb at time of ablation to ~10. Plts low but OK    Remains on AC with Eliquis    PLAN:    Dr. Mayer was due to see him 9/2019 based on 3/2019 note but he'll plan to be seen in Spring when he returns    CBC and BMP today    3. Valvular disorders and asymmetric LVH (no LVOT obstruction)    Followed by Dr. Iglesias and GILBERT Ojeda    Euvolemic     PLAN:    Echo and Dr. Iglesias appt 5/2020 as previously ordered      Saumya Wong PA-C, MSPAS      Orders Placed This Encounter   Procedures     CBC with platelets     Basic metabolic panel     EKG 12-lead complete w/read - Clinics (performed today)     Holter Monitor 24 hour Adult Pediatric     No orders of the defined types were placed in this encounter.    There are no discontinued medications.      Encounter Diagnoses   Name Primary?     Atrial fibrillation (H) Yes     Atrial flutter, unspecified type (H)        CURRENT MEDICATIONS:  Current Outpatient Medications   Medication Sig Dispense Refill     acetaminophen (TYLENOL) 325 MG tablet Take 2 tablets (650 mg) by mouth every 4 hours as needed for other (multimodal surgical pain management along with NSAIDS and opioid medication as indicated based on pain control and physical function.) 40 tablet 0     allopurinol (ZYLOPRIM) 100 MG tablet TAKE 1 TABLET BY MOUTH EVERY ONCE DAILY 90 tablet 1     amoxicillin (AMOXIL) 500 MG tablet Take 2,000 mg by mouth daily as needed (1 hour prior to dental procedures)       apixaban ANTICOAGULANT  (ELIQUIS) 5 MG tablet Take 1 tablet (5 mg) by mouth 2 times daily 180 tablet 3     lisinopril (PRINIVIL/ZESTRIL) 20 MG tablet Take 1 tablet (20 mg) by mouth daily 90 tablet 3     pravastatin (PRAVACHOL) 20 MG tablet Take 1 tablet (20 mg) by mouth daily 90 tablet 3       ALLERGIES     Allergies   Allergen Reactions     Simvastatin      10/26/12: Arm pain - pt stopped it x 4-5 wks and arm pain didn't resolve sore started it        PAST MEDICAL HISTORY:  Past Medical History:   Diagnosis Date     Aortic stenosis     moderate     Atypical atrial flutter (H)     PVI and LA linear ablation with successful termination of AFL 10/18/2019     Contact dermatitis and other eczema, due to unspecified cause      Diverticulosis of colon (without mention of hemorrhage)      Essential hypertension, benign     abstracted 7/22/02     Gout, unspecified      Hip joint replacement by other means 6/3/2009     MGUS (monoclonal gammopathy of unknown significance)      Mitral valve disease     mild MS, moderate-severe MR     PSVT (paroxysmal supraventricular tachycardia) (H)      Pulmonary HTN (H) 10/18/2011     Thrombocytopenia (H)        PAST SURGICAL HISTORY:  Past Surgical History:   Procedure Laterality Date     ANESTHESIA CARDIOVERSION N/A 3/21/2019    Procedure: ANESTHESIA CARDIOVERSION;  Surgeon: GENERIC ANESTHESIA PROVIDER;  Location: [facility] OR     ARTHROPLASTY REVISION HIP ANTERIOR Left 9/9/2019    Procedure: REVISION DIRECT ANTERIOR TOTAL LEFT HIP ARTHROPLASTY;  Surgeon: Luisito Hammonds MD;  Location:  OR     BIOPSY  3/2017     BONE MARROW BIOPSY, BONE SPECIMEN, NEEDLE/TROCAR Right 2/23/2017    Procedure: BIOPSY BONE MARROW;  Surgeon: Romie Esquivel MD;  Location:  GI     COLONOSCOPY  2010     EP 3D TACHYCARDIA MAPPING N/A 10/18/2019    Procedure: EP 3D Tachycardia Mapping;  Surgeon: Delfino Gee MD;  Location:  [facility]     EP ABLATION FOCAL AFIB N/A 10/18/2019    Procedure: EP Ablation Focal AFIB;   Surgeon: Delfino Gee MD;  Location:  [facility]     EYE SURGERY      Cataract, both eyes     ORTHOPEDIC SURGERY  2009    (L) total hip       FAMILY HISTORY:  Family History   Problem Relation Age of Onset     Hypertension Mother        SOCIAL HISTORY:  Social History     Socioeconomic History     Marital status:      Spouse name: None     Number of children: None     Years of education: None     Highest education level: None   Occupational History     None   Social Needs     Financial resource strain: None     Food insecurity:     Worry: None     Inability: None     Transportation needs:     Medical: None     Non-medical: None   Tobacco Use     Smoking status: Never Smoker     Smokeless tobacco: Never Used   Substance and Sexual Activity     Alcohol use: Yes     Alcohol/week: 0.0 standard drinks     Comment: 1 beer daily     Drug use: No     Sexual activity: Yes     Partners: Female     Birth control/protection: None   Lifestyle     Physical activity:     Days per week: None     Minutes per session: None     Stress: None   Relationships     Social connections:     Talks on phone: None     Gets together: None     Attends Voodoo service: None     Active member of club or organization: None     Attends meetings of clubs or organizations: None     Relationship status: None     Intimate partner violence:     Fear of current or ex partner: None     Emotionally abused: None     Physically abused: None     Forced sexual activity: None   Other Topics Concern     Parent/sibling w/ CABG, MI or angioplasty before 65F 55M? No   Social History Narrative     None       Review of Systems:  Skin:  Negative for     Eyes:  Negative for    ENT:       Respiratory:  Negative for shortness of breath;dyspnea on exertion;cough  Cardiovascular:  Negative for;palpitations;chest pain;edema;dizziness;lightheadedness;fatigue    Gastroenterology: Negative for    Genitourinary:  Negative    Musculoskeletal:  Positive for  "back pain  Neurologic:  Negative for    Psychiatric:  Negative for    Heme/Lymph/Imm:  Negative    Endocrine:  Negative for      Physical Exam:  Vitals: /58   Pulse 64   Ht 1.803 m (5' 11\")   Wt 78.9 kg (174 lb)   BMI 24.27 kg/m      Constitutional:  cooperative, alert and oriented, well developed, well nourished, in no acute distress        Skin:  warm and dry to the touch, no apparent skin lesions or masses noted        Head:  normocephalic, no masses or lesions        Eyes:  pupils equal and round;sclera white;conjunctivae and lids unremarkable        ENT:  no pallor or cyanosis, dentition good        Neck:  JVP normal;no carotid bruit        Chest:  normal breath sounds, clear to auscultation, normal A-P diameter, normal symmetry, normal respiratory excursion, no use of accessory muscles        Cardiac: regular rhythm, normal S1/S2, no S3 or S4, apical impulse not displaced, no murmurs, gallops or rubs                  Abdomen:  abdomen soft, non-tender, BS normoactive, no mass, no HSM, no bruits        Vascular: pulses full and equal, no bruits auscultated                                      Extremities and Back:  no deformities, clubbing, cyanosis, erythema observed        Neurological:  no gross motor deficits          Recent Lab Results:  LIPID RESULTS:  Lab Results   Component Value Date    CHOL 137 11/08/2018    HDL 37 (L) 11/08/2018    LDL 67 11/08/2018    TRIG 163 (H) 11/08/2018    CHOLHDLRATIO 3.4 02/05/2015       LIVER ENZYME RESULTS:  Lab Results   Component Value Date    AST 25 11/08/2018    ALT 23 11/08/2018       CBC RESULTS:  Lab Results   Component Value Date    WBC 8.0 11/08/2019    RBC 4.59 11/08/2019    HGB 12.5 (L) 11/08/2019    HCT 38.5 (L) 11/08/2019    MCV 84 11/08/2019    MCH 27.2 11/08/2019    MCHC 32.5 11/08/2019    RDW 14.0 11/08/2019    PLT 77 (L) 11/08/2019       BMP RESULTS:  Lab Results   Component Value Date     (L) 11/08/2019    POTASSIUM 4.6 11/08/2019    " CHLORIDE 100 11/08/2019    CO2 25 11/08/2019    ANIONGAP 10.6 11/08/2019    GLC 92 11/08/2019    BUN 16 11/08/2019    CR 1.07 11/08/2019    GFRESTIMATED 67 11/08/2019    GFRESTBLACK 81 11/08/2019    DIPAK 9.8 11/08/2019     INR RESULTS:  Lab Results   Component Value Date    INR 1.35 (H) 10/18/2019

## 2019-11-08 ENCOUNTER — DOCUMENTATION ONLY (OUTPATIENT)
Dept: CARDIOLOGY | Facility: CLINIC | Age: 79
End: 2019-11-08

## 2019-11-08 ENCOUNTER — OFFICE VISIT (OUTPATIENT)
Dept: CARDIOLOGY | Facility: CLINIC | Age: 79
End: 2019-11-08
Payer: COMMERCIAL

## 2019-11-08 VITALS
BODY MASS INDEX: 24.36 KG/M2 | HEART RATE: 64 BPM | HEIGHT: 71 IN | DIASTOLIC BLOOD PRESSURE: 58 MMHG | WEIGHT: 174 LBS | SYSTOLIC BLOOD PRESSURE: 102 MMHG

## 2019-11-08 DIAGNOSIS — I48.92 ATRIAL FLUTTER, UNSPECIFIED TYPE (H): Primary | ICD-10-CM

## 2019-11-08 DIAGNOSIS — I48.92 ATRIAL FLUTTER, UNSPECIFIED TYPE (H): ICD-10-CM

## 2019-11-08 LAB
ANION GAP SERPL CALCULATED.3IONS-SCNC: 10.6 MMOL/L (ref 6–17)
BUN SERPL-MCNC: 16 MG/DL (ref 7–30)
CALCIUM SERPL-MCNC: 9.8 MG/DL (ref 8.5–10.5)
CHLORIDE SERPL-SCNC: 100 MMOL/L (ref 98–107)
CO2 SERPL-SCNC: 25 MMOL/L (ref 23–29)
CREAT SERPL-MCNC: 1.07 MG/DL (ref 0.7–1.3)
ERYTHROCYTE [DISTWIDTH] IN BLOOD BY AUTOMATED COUNT: 14 % (ref 10–15)
GFR SERPL CREATININE-BSD FRML MDRD: 67 ML/MIN/{1.73_M2}
GLUCOSE SERPL-MCNC: 92 MG/DL (ref 70–105)
HCT VFR BLD AUTO: 38.5 % (ref 40–53)
HGB BLD-MCNC: 12.5 G/DL (ref 13.3–17.7)
MCH RBC QN AUTO: 27.2 PG (ref 26.5–33)
MCHC RBC AUTO-ENTMCNC: 32.5 G/DL (ref 31.5–36.5)
MCV RBC AUTO: 84 FL (ref 78–100)
PLATELET # BLD AUTO: 77 10E9/L (ref 150–450)
POTASSIUM SERPL-SCNC: 4.6 MMOL/L (ref 3.5–5.1)
RBC # BLD AUTO: 4.59 10E12/L (ref 4.4–5.9)
SODIUM SERPL-SCNC: 131 MMOL/L (ref 136–145)
WBC # BLD AUTO: 8 10E9/L (ref 4–11)

## 2019-11-08 PROCEDURE — 80048 BASIC METABOLIC PNL TOTAL CA: CPT | Performed by: PHYSICIAN ASSISTANT

## 2019-11-08 PROCEDURE — 99214 OFFICE O/P EST MOD 30 MIN: CPT | Performed by: PHYSICIAN ASSISTANT

## 2019-11-08 PROCEDURE — 36415 COLL VENOUS BLD VENIPUNCTURE: CPT | Performed by: PHYSICIAN ASSISTANT

## 2019-11-08 PROCEDURE — 85027 COMPLETE CBC AUTOMATED: CPT | Performed by: PHYSICIAN ASSISTANT

## 2019-11-08 PROCEDURE — 93000 ELECTROCARDIOGRAM COMPLETE: CPT | Performed by: PHYSICIAN ASSISTANT

## 2019-11-08 ASSESSMENT — MIFFLIN-ST. JEOR: SCORE: 1531.39

## 2019-11-08 NOTE — PATIENT INSTRUCTIONS
1. EKG today showed you're back in atrypical atrial flutter. This is OK as long as anticoagulation is on board to prevent strokes, your heart rate is controlled and you don't feel it!    2. I'll chat with Dr. Gee and call with any recommendations    3. For anticoagulation - check Xarelto 20 mg daily at with supper. OK to use this instead of Eliquis if insurance company likes it better.    CALL and we can send in any new Rx! [phone]

## 2019-11-08 NOTE — LETTER
"11/8/2019    Pb Warren MD  [address] [facility] [address]    RE: Mars Reyes       Dear Colleague,    I had the pleasure of seeing Mars Reyes in the [facility].    HPI:   I had the pleasure of seeing Mars when he came for follow up of atrial flutter.  This 79 year old sees Dr. Gee for his history of:    1. Atrial Flutter dx'd during a syncopal event while in [place] 1/2019, s/p DCCV 3/2019. Recurrent flutter noted 8/2019. Now s/p atypical atrial flutter ablation (Lsided) 10/18/2019) with Dr. Gee  2. Chronic ITP and MGUS (IgG kappa) followed by Dr. Mayer. Last OV 3/2019. Dr. Mayer has rec'd steroids if Plt <30K/bleeding.  3. 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 Gee met with Mars 9/2019 he recommended AFlutter ablation given recurrent AFlutter.     This was done 10/18/2019 and EPS revealed it was not isthmus-dependent. Therefore, further evaluation was done and LA showed extensive LA scar and PV antrum ablation was easily performed. LA roof line performed from LS-RS PVs.  In the middle of the posterior LA roof, ablation terminated the flutter. He's now back for follow-up.     His EKG today showed that he's back in atypical atrial flutter with CVR today.    Interval History:  Overall, he feels \"great.\" He was unaware of arrhythmia, without palpitations, lightheadedness or dizziness. He felt no different after the ablation than before.  He states he was \"never\" sx'c with this and states his energy levels are \"perfect.\"    Denies trouble with groin or subclavian site discomfort. No complaints of chest pain, pressure or tightness.  No orthopnea, PND or edema. No change in exercise tolerance or breathing. Overall, he is feeling well.     HR at home has stayed 70-80 bpm. BPs \"look great.\"    Diagnostic Testing:  EKG today, which I overread, showed atypical atrial flutter @ 61 bpm.    Echocardiogam 3/2019 " showed EF 55-60% with moderate concentric LVH (asymmetric) without LVOT obstruction. Normal RV size/function. LA severely dilated. Mod-sev MAC with 2-3+ MR (PISA regurg vol 47 ml with ERO 0.3 cm2). Mean MV gradient 5.4 mmHg @ 71 bpm (mild MS). 1-2+ TR with RVSP 40.4 mmHg+RAP, c/w mild pHTN. Trace-mild AI. Moderate aortic stenosis with mean gradient 25.1 mmHg and LV 1.3 cm2. Mild-moderate 1-2+ NH. Ascending aorta and root mildly dilated @ 3.8 cm.    Nuclear Stress Test 5/2017 showed small mild intensity apical ischemia. Borderline TID.   Carotid Doppler 3/2019 with 50-69% BHAVIK and 50-60% LICA.   Component      Latest Ref Rng & Units 3/19/2019 9/9/2019 10/18/2019   WBC      4.0 - 11.0 10e9/L 8.8 7.4 6.8   RBC Count      4.4 - 5.9 10e12/L 4.84 4.91 3.96 (L)   Hemoglobin      13.3 - 17.7 g/dL 14.1 14.6 10.8 (L)   Hematocrit      40.0 - 53.0 % 41.0 42.1 33.1 (L)   MCV      78 - 100 fl 85 86 84   MCH      26.5 - 33.0 pg 29.1 29.7 27.3   MCHC      31.5 - 36.5 g/dL 34.4 34.7 32.6   RDW      10.0 - 15.0 % 12.7 12.6 13.4   Platelet Count      150 - 450 10e9/L 69 (L) 69 (L) 82 (L)     Component      Latest Ref Rng & Units 3/22/2019 10/18/2019   Sodium      133 - 144 mmol/L 135 130 (L)   Potassium      3.4 - 5.3 mmol/L 4.2 4.2   Chloride      94 - 109 mmol/L 102 98   Carbon Dioxide      20 - 32 mmol/L 25 26   Anion Gap      3 - 14 mmol/L 8 6   Glucose      70 - 99 mg/dL 91 91   Urea Nitrogen      7 - 30 mg/dL 14 12   Creatinine      0.66 - 1.25 mg/dL 0.85 0.82   GFR Estimate      >60 mL/min/1.73:m2 84 85   GFR Estimate If Black      >60 mL/min/1.73:m2 >90 >90   Calcium      8.5 - 10.1 mg/dL 8.9 9.1     Assessment & Plan:    1. Atypical AFlutter    Echo 3/2019 with normal EF 55-60%    EP Study 10/18/2019 showed non-isthmus dependent AFlutter and he underwent ablation in the LA (PV antrum and lines). LA was noted to be quite scarred.     EKG today showed he's back in Atypical AFlutter, which he's completely unaware of.    He  remains on Eliquis 5 mg BID for valvular dz and CHADSVASc 3 (CVDz, age)    CBC last checked 10/2019 at time of ablation showed significant drop in Hgb (see above). Plts still low but >80K    PLAN:    D/w Dr. Gee, who noted he had quite a bit of scar in the LA.  As he's completely asx'c with arrhythmia, agrees to continue AC and continue to follow-up for rate control    He's leaving for [place] tomorrow and will return Spring 2020    Up-to-date CBC    Continue Eliquis 5 mg BID. OK to switch to Xarelto should insurance prefer.     24 hour Holter for rate control when he returns. Records given to him to take to OR with him      2. Anemia and Chronic ITP    Sees Dr. Mayer    Significant drop in Hgb at time of ablation to ~10. Plts low but OK    Remains on AC with Eliquis    PLAN:    Dr. Mayer was due to see him 9/2019 based on 3/2019 note but he'll plan to be seen in Spring when he returns    CBC and BMP today    3. Valvular disorders and asymmetric LVH (no LVOT obstruction)    Followed by Dr. Iglesias and GILBERT Ojeda    Euvolemic     PLAN:    Echo and Dr. Iglesias appt 5/2020 as previously ordered      Saumya Wong PA-C, MSPAS      Orders Placed This Encounter   Procedures     CBC with platelets     Basic metabolic panel     EKG 12-lead complete w/read - Clinics (performed today)     Holter Monitor 24 hour Adult Pediatric     No orders of the defined types were placed in this encounter.    There are no discontinued medications.      Encounter Diagnoses   Name Primary?     Atrial fibrillation (H) Yes     Atrial flutter, unspecified type (H)        CURRENT MEDICATIONS:  Current Outpatient Medications   Medication Sig Dispense Refill     acetaminophen (TYLENOL) 325 MG tablet Take 2 tablets (650 mg) by mouth every 4 hours as needed for other (multimodal surgical pain management along with NSAIDS and opioid medication as indicated based on pain control and physical function.) 40 tablet 0     allopurinol  (ZYLOPRIM) 100 MG tablet TAKE 1 TABLET BY MOUTH EVERY ONCE DAILY 90 tablet 1     amoxicillin (AMOXIL) 500 MG tablet Take 2,000 mg by mouth daily as needed (1 hour prior to dental procedures)       apixaban ANTICOAGULANT (ELIQUIS) 5 MG tablet Take 1 tablet (5 mg) by mouth 2 times daily 180 tablet 3     lisinopril (PRINIVIL/ZESTRIL) 20 MG tablet Take 1 tablet (20 mg) by mouth daily 90 tablet 3     pravastatin (PRAVACHOL) 20 MG tablet Take 1 tablet (20 mg) by mouth daily 90 tablet 3       ALLERGIES     Allergies   Allergen Reactions     Simvastatin      10/26/12: Arm pain - pt stopped it x 4-5 wks and arm pain didn't resolve sore started it        PAST MEDICAL HISTORY:  Past Medical History:   Diagnosis Date     Aortic stenosis     moderate     Atypical atrial flutter (H)     PVI and LA linear ablation with successful termination of AFL 10/18/2019     Contact dermatitis and other eczema, due to unspecified cause      Diverticulosis of colon (without mention of hemorrhage)      Essential hypertension, benign     abstracted 7/22/02     Gout, unspecified      Hip joint replacement by other means 6/3/2009     MGUS (monoclonal gammopathy of unknown significance)      Mitral valve disease     mild MS, moderate-severe MR     PSVT (paroxysmal supraventricular tachycardia) (H)      Pulmonary HTN (H) 10/18/2011     Thrombocytopenia (H)        PAST SURGICAL HISTORY:  Past Surgical History:   Procedure Laterality Date     ANESTHESIA CARDIOVERSION N/A 3/21/2019    Procedure: ANESTHESIA CARDIOVERSION;  Surgeon: GENERIC ANESTHESIA PROVIDER;  Location:  OR     ARTHROPLASTY REVISION HIP ANTERIOR Left 9/9/2019    Procedure: REVISION DIRECT ANTERIOR TOTAL LEFT HIP ARTHROPLASTY;  Surgeon: Luisito Hammonds MD;  Location:  OR     BIOPSY  3/2017     BONE MARROW BIOPSY, BONE SPECIMEN, NEEDLE/TROCAR Right 2/23/2017    Procedure: BIOPSY BONE MARROW;  Surgeon: Romie Esquivel MD;  Location:  GI     COLONOSCOPY  2010     EP 3D  TACHYCARDIA MAPPING N/A 10/18/2019    Procedure: EP 3D Tachycardia Mapping;  Surgeon: Delfino Gee MD;  Location:  HEART CARDIAC CATH LAB     EP ABLATION FOCAL AFIB N/A 10/18/2019    Procedure: EP Ablation Focal AFIB;  Surgeon: Delfino Gee MD;  Location:  HEART CARDIAC CATH LAB     EYE SURGERY      Cataract, both eyes     ORTHOPEDIC SURGERY  2009    (L) total hip       FAMILY HISTORY:  Family History   Problem Relation Age of Onset     Hypertension Mother        SOCIAL HISTORY:  Social History     Socioeconomic History     Marital status:      Spouse name: None     Number of children: None     Years of education: None     Highest education level: None   Occupational History     None   Social Needs     Financial resource strain: None     Food insecurity:     Worry: None     Inability: None     Transportation needs:     Medical: None     Non-medical: None   Tobacco Use     Smoking status: Never Smoker     Smokeless tobacco: Never Used   Substance and Sexual Activity     Alcohol use: Yes     Alcohol/week: 0.0 standard drinks     Comment: 1 beer daily     Drug use: No     Sexual activity: Yes     Partners: Female     Birth control/protection: None   Lifestyle     Physical activity:     Days per week: None     Minutes per session: None     Stress: None   Relationships     Social connections:     Talks on phone: None     Gets together: None     Attends Muslim service: None     Active member of club or organization: None     Attends meetings of clubs or organizations: None     Relationship status: None     Intimate partner violence:     Fear of current or ex partner: None     Emotionally abused: None     Physically abused: None     Forced sexual activity: None   Other Topics Concern     Parent/sibling w/ CABG, MI or angioplasty before 65F 55M? No   Social History Narrative     None       Review of Systems:  Skin:  Negative for     Eyes:  Negative for    ENT:       Respiratory:  Negative for shortness of  "breath;dyspnea on exertion;cough  Cardiovascular:  Negative for;palpitations;chest pain;edema;dizziness;lightheadedness;fatigue    Gastroenterology: Negative for    Genitourinary:  Negative    Musculoskeletal:  Positive for back pain  Neurologic:  Negative for    Psychiatric:  Negative for    Heme/Lymph/Imm:  Negative    Endocrine:  Negative for      Physical Exam:  Vitals: /58   Pulse 64   Ht 1.803 m (5' 11\")   Wt 78.9 kg (174 lb)   BMI 24.27 kg/m       Constitutional:  cooperative, alert and oriented, well developed, well nourished, in no acute distress        Skin:  warm and dry to the touch, no apparent skin lesions or masses noted        Head:  normocephalic, no masses or lesions        Eyes:  pupils equal and round;sclera white;conjunctivae and lids unremarkable        ENT:  no pallor or cyanosis, dentition good        Neck:  JVP normal;no carotid bruit        Chest:  normal breath sounds, clear to auscultation, normal A-P diameter, normal symmetry, normal respiratory excursion, no use of accessory muscles        Cardiac: regular rhythm, normal S1/S2, no S3 or S4, apical impulse not displaced, no murmurs, gallops or rubs                  Abdomen:  abdomen soft, non-tender, BS normoactive, no mass, no HSM, no bruits        Vascular: pulses full and equal, no bruits auscultated                                      Extremities and Back:  no deformities, clubbing, cyanosis, erythema observed        Neurological:  no gross motor deficits          Recent Lab Results:  LIPID RESULTS:  Lab Results   Component Value Date    CHOL 137 11/08/2018    HDL 37 (L) 11/08/2018    LDL 67 11/08/2018    TRIG 163 (H) 11/08/2018    CHOLHDLRATIO 3.4 02/05/2015       LIVER ENZYME RESULTS:  Lab Results   Component Value Date    AST 25 11/08/2018    ALT 23 11/08/2018       CBC RESULTS:  Lab Results   Component Value Date    WBC 8.0 11/08/2019    RBC 4.59 11/08/2019    HGB 12.5 (L) 11/08/2019    HCT 38.5 (L) 11/08/2019    MCV " 84 11/08/2019    MCH 27.2 11/08/2019    MCHC 32.5 11/08/2019    RDW 14.0 11/08/2019    PLT 77 (L) 11/08/2019       BMP RESULTS:  Lab Results   Component Value Date     (L) 11/08/2019    POTASSIUM 4.6 11/08/2019    CHLORIDE 100 11/08/2019    CO2 25 11/08/2019    ANIONGAP 10.6 11/08/2019    GLC 92 11/08/2019    BUN 16 11/08/2019    CR 1.07 11/08/2019    GFRESTIMATED 67 11/08/2019    GFRESTBLACK 81 11/08/2019    DIPAK 9.8 11/08/2019     INR RESULTS:  Lab Results   Component Value Date    INR 1.35 (H) 10/18/2019               Thank you for allowing me to participate in the care of your patient.    Sincerely,     BREANN MelaraC     [facility]

## 2019-11-08 NOTE — PROGRESS NOTES
Called pt and let him know blood work looked OK. Na low but stable. Hgb better and Plts just slightly lower than previous.    He's aware Dr. Gee comfortable with plan for Holter/OV with Dr. Iglesias in 5/2020 unless he has any issues       Component      Latest Ref Rng & Units 10/18/2019 11/8/2019   Sodium      136 - 145 mmol/L 130 (L) 131 (L)   Potassium      3.5 - 5.1 mmol/L 4.2 4.6   Chloride      98 - 107 mmol/L 98 100   Carbon Dioxide      23 - 29 mmol/L 26 25   Anion Gap      6 - 17 mmol/L 6 10.6   Glucose      70 - 105 mg/dL 91 92   Urea Nitrogen      7 - 30 mg/dL 12 16   Creatinine      0.70 - 1.30 mg/dL 0.82 1.07   GFR Estimate      >60 mL/min/1.73:m2 85 67   GFR Estimate If Black      >60 mL/min/1.73:m2 >90 81   Calcium      8.5 - 10.5 mg/dL 9.1 9.8     Component      Latest Ref Rng & Units 10/18/2019 11/8/2019   WBC      4.0 - 11.0 10e9/L 6.8 8.0   RBC Count      4.4 - 5.9 10e12/L 3.96 (L) 4.59   Hemoglobin      13.3 - 17.7 g/dL 10.8 (L) 12.5 (L)   Hematocrit      40.0 - 53.0 % 33.1 (L) 38.5 (L)   MCV      78 - 100 fl 84 84   MCH      26.5 - 33.0 pg 27.3 27.2   MCHC      31.5 - 36.5 g/dL 32.6 32.5   RDW      10.0 - 15.0 % 13.4 14.0   Platelet Count      150 - 450 10e9/L 82 (L) 77 (L)

## 2019-11-27 DIAGNOSIS — M1A.9XX0 CHRONIC GOUT INVOLVING TOE WITHOUT TOPHUS, UNSPECIFIED CAUSE, UNSPECIFIED LATERALITY: ICD-10-CM

## 2019-11-27 RX ORDER — ALLOPURINOL 100 MG/1
TABLET ORAL
Qty: 90 TABLET | Refills: 0 | Status: SHIPPED | OUTPATIENT
Start: 2019-11-27 | End: 2020-03-03

## 2019-11-27 NOTE — TELEPHONE ENCOUNTER
"Requested Prescriptions   Pending Prescriptions Disp Refills     allopurinol (ZYLOPRIM) 100 MG tablet [Pharmacy Med Name: ALLOPURINOL 100 MG TABLET]  Last Written Prescription Date:  06/04/2019  Last Fill Quantity: 90,  # refills: 01   Last Office Visit: 8/14/2019   Future Office Visit:      90 tablet 1     Sig: TAKE 1 TABLET BY MOUTH EVERY ONCE DAILY       Gout Agents Protocol Failed - 11/27/2019  5:43 AM        Failed - ALT on file in past 12 months     Recent Labs   Lab Test 11/08/18  1006   ALT 23             Failed - Has Uric Acid on file in past 12 months and value is less than 6     Recent Labs   Lab Test 11/08/18  1006   URIC 7.2     If level is 6mg/dL or greater, ok to refill one time and refer to provider.           Passed - CBC on file in past 12 months     Recent Labs   Lab Test 11/08/19  1433   WBC 8.0   RBC 4.59   HGB 12.5*   HCT 38.5*   PLT 77*                 Passed - Recent (12 mo) or future (30 days) visit within the authorizing provider's specialty     Patient has had an office visit with the authorizing provider or a provider within the authorizing providers department within the previous 12 mos or has a future within next 30 days. See \"Patient Info\" tab in inbasket, or \"Choose Columns\" in Meds & Orders section of the refill encounter.              Passed - Medication is active on med list        Passed - Patient is age 18 or older        Passed - Normal serum creatinine on file in the past 12 months     Recent Labs   Lab Test 11/08/19  1433  10/19/15  2205   CR 1.07   < >  --    CREAT  --   --  1.0    < > = values in this interval not displayed.               "

## 2019-11-27 NOTE — LETTER
[facility]  [address] [id number] [phone]            Mars Reyes  [address] [facility] [address]        November 27, 2019    Dear Mars,    While refilling your prescription today, we noticed that you are due to have labs drawn.  We will refill your prescription for 30 days, but a follow-up appointment must be made before any additional refills can be approved.     Taking care of your health is important to us and we look forward to seeing you in the near future.  Please call us at [phone] or [phone] (or use Xiam) to schedule an appointment.     Please disregard this notice if you have already made an appointment.    Sincerely,        [facility]

## 2020-03-03 ENCOUNTER — TELEPHONE (OUTPATIENT)
Dept: INTERNAL MEDICINE | Facility: CLINIC | Age: 80
End: 2020-03-03

## 2020-03-03 DIAGNOSIS — M1A.9XX0 CHRONIC GOUT INVOLVING TOE WITHOUT TOPHUS, UNSPECIFIED CAUSE, UNSPECIFIED LATERALITY: ICD-10-CM

## 2020-03-03 NOTE — TELEPHONE ENCOUNTER
"Requested Prescriptions   Pending Prescriptions Disp Refills     allopurinol (ZYLOPRIM) 100 MG tablet 90 tablet 0     Sig: TAKE 1 TABLET BY MOUTH EVERY ONCE DAILY       Gout Agents Protocol Failed - 3/3/2020  3:04 PM        Failed - ALT on file in past 12 months     Recent Labs   Lab Test 11/08/18  1006   ALT 23             Failed - Has Uric Acid on file in past 12 months and value is less than 6     Recent Labs   Lab Test 11/08/18  1006   URIC 7.2     If level is 6mg/dL or greater, ok to refill one time and refer to provider.           Passed - CBC on file in past 12 months     Recent Labs   Lab Test 11/08/19  1433   WBC 8.0   RBC 4.59   HGB 12.5*   HCT 38.5*   PLT 77*                 Passed - Recent (12 mo) or future (30 days) visit within the authorizing provider's specialty     Patient has had an office visit with the authorizing provider or a provider within the authorizing providers department within the previous 12 mos or has a future within next 30 days. See \"Patient Info\" tab in inbasket, or \"Choose Columns\" in Meds & Orders section of the refill encounter.              Passed - Medication is active on med list        Passed - Patient is age 18 or older        Passed - Normal serum creatinine on file in the past 12 months     Recent Labs   Lab Test 11/08/19  1433  10/19/15  2205   CR 1.07   < >  --    CREAT  --   --  1.0    < > = values in this interval not displayed.               Last Written Prescription Date:  11/27/19  Last Fill Quantity: 90,  # refills: 0   Last office visit: 8/14/2019 with prescribing provider:  Kelvin   Future Office Visit:      "

## 2020-03-04 RX ORDER — ALLOPURINOL 100 MG/1
TABLET ORAL
Qty: 30 TABLET | Refills: 0 | Status: SHIPPED | OUTPATIENT
Start: 2020-03-04 | End: 2020-04-06

## 2020-03-09 NOTE — TELEPHONE ENCOUNTER
Triage spoke with patient he would like lab order sent to a lab to [place] [facility]. He will call back with a fax number.    Patient has 2 future lab orders.     Savita MARTINEZ, RN, PHN

## 2020-03-09 NOTE — TELEPHONE ENCOUNTER
Pt called and gave the fax number [phone] [facility]. Please fax the lab orders to that number. Thanks.

## 2020-03-09 NOTE — TELEPHONE ENCOUNTER
Pt called back regarding getting labs done in a month, pt said that he is out of the [place] till may and would like to do labs in  [facility]. Please call pt back to let him know if he can get the labs done there. Thanks.

## 2020-03-26 ENCOUNTER — MYC MEDICAL ADVICE (OUTPATIENT)
Dept: CARDIOLOGY | Facility: CLINIC | Age: 80
End: 2020-03-26

## 2020-03-26 ENCOUNTER — TELEPHONE (OUTPATIENT)
Dept: CARDIOLOGY | Facility: CLINIC | Age: 80
End: 2020-03-26

## 2020-03-26 DIAGNOSIS — I10 HYPERTENSION, UNSPECIFIED TYPE: Primary | ICD-10-CM

## 2020-03-26 DIAGNOSIS — I10 ESSENTIAL HYPERTENSION, BENIGN: ICD-10-CM

## 2020-03-26 RX ORDER — LISINOPRIL 20 MG/1
TABLET ORAL
Qty: 90 TABLET | Refills: 3
Start: 2020-03-26 | End: 2020-04-21

## 2020-03-26 NOTE — TELEPHONE ENCOUNTER
03/26/20 Pt called to report that he has been noticing an upward trend in his Bps, starting on/around 3/18. Systolic Bps have been running 160's-190's. States he has been trying to keep stress level down with current COVID 19 situation, has been walking for exercise and watching his diet. Pt denies symptoms and verbalizes he has been taking Lisinopril 20 mg every day. Will update Saumya Wong PA-C and call pt back w recommendations. Didier 1233 pm

## 2020-03-26 NOTE — TELEPHONE ENCOUNTER
03/26/20 Order for BMP faxed to [facility] fax [phone] w instructions that pt will call to schedule to have done by 4/20. Requested results to be faxed to [phone]. Didier 510 pm

## 2020-03-26 NOTE — TELEPHONE ENCOUNTER
03/26/20 Spoke w patient and explained recommendations to increase Lisinopril. Pt states he does not need refill. Pt scheduled for telephone visit 4/27 at 3:10 pm Pt is out of state until early May so will have BMP drawn at that location. Pt will send fax to clinic of choice. Writer will then fax order for lab draw. Pt voiced understanding and agreement w plan. Didier 215 pm

## 2020-03-26 NOTE — TELEPHONE ENCOUNTER
Glad he's exercising and limiting sodium intake.  Certainly stress can increase BPs.    Would add 10 mg lisinopril - would take 20 mg qPM and 10 mg qAM.  Ok to cut pills in 1/2, but update Epic/send in new Rx.    Pls have him set up for TELEPHONE visit with me 1 m (I've ordered) to review BPs. He should get non-fasting labs that day as well.    Elizabeth Kerns

## 2020-04-04 DIAGNOSIS — M1A.9XX0 CHRONIC GOUT INVOLVING TOE WITHOUT TOPHUS, UNSPECIFIED CAUSE, UNSPECIFIED LATERALITY: ICD-10-CM

## 2020-04-05 NOTE — TELEPHONE ENCOUNTER
"Requested Prescriptions   Pending Prescriptions Disp Refills     allopurinol (ZYLOPRIM) 100 MG tablet [Pharmacy Med Name: ALLOPURINOL 100 MG TABLET] 30 tablet 0     Sig: TAKE 1 TABLET BY MOUTH EVERY ONCE DAILY       Gout Agents Protocol Failed - 4/4/2020  2:36 PM        Failed - ALT on file in past 12 months     Recent Labs   Lab Test 11/08/18  1006   ALT 23             Failed - Has Uric Acid on file in past 12 months and value is less than 6     Recent Labs   Lab Test 11/08/18  1006   URIC 7.2     If level is 6mg/dL or greater, ok to refill one time and refer to provider.           Passed - CBC on file in past 12 months     Recent Labs   Lab Test 11/08/19  1433   WBC 8.0   RBC 4.59   HGB 12.5*   HCT 38.5*   PLT 77*                 Passed - Recent (12 mo) or future (30 days) visit within the authorizing provider's specialty     Patient has had an office visit with the authorizing provider or a provider within the authorizing providers department within the previous 12 mos or has a future within next 30 days. See \"Patient Info\" tab in inbasket, or \"Choose Columns\" in Meds & Orders section of the refill encounter.              Passed - Medication is active on med list        Passed - Patient is age 18 or older        Passed - Normal serum creatinine on file in the past 12 months     Recent Labs   Lab Test 11/08/19  1433  10/19/15  2205   CR 1.07   < >  --    CREAT  --   --  1.0    < > = values in this interval not displayed.       Ok to refill medication if creatinine is low             Last Written Prescription Date:  3/4/2020  Last Fill Quantity: 30,  # refills: 0   Last office visit: 8/14/2019 with prescribing provider:  8/14/2019   Future Office Visit:   Next 5 appointments (look out 90 days)    Apr 27, 2020  3:10 PM CDT  Telephone Visit with Alis Wong PA-C  [facility] ([facility]) [address] [place] [id number] [phone] OPT 2 "

## 2020-04-06 RX ORDER — ALLOPURINOL 100 MG/1
TABLET ORAL
Qty: 30 TABLET | Refills: 0 | Status: SHIPPED | OUTPATIENT
Start: 2020-04-06 | End: 2020-04-09

## 2020-04-09 ENCOUNTER — MYC MEDICAL ADVICE (OUTPATIENT)
Dept: INTERNAL MEDICINE | Facility: CLINIC | Age: 80
End: 2020-04-09

## 2020-04-09 DIAGNOSIS — M1A.9XX0 CHRONIC GOUT INVOLVING TOE WITHOUT TOPHUS, UNSPECIFIED CAUSE, UNSPECIFIED LATERALITY: ICD-10-CM

## 2020-04-09 DIAGNOSIS — Z96.649 S/P REVISION OF TOTAL HIP: ICD-10-CM

## 2020-04-09 RX ORDER — ALLOPURINOL 100 MG/1
TABLET ORAL
Qty: 90 TABLET | Refills: 0 | Status: SHIPPED | OUTPATIENT
Start: 2020-04-09 | End: 2020-07-14

## 2020-04-09 RX ORDER — ALLOPURINOL 100 MG/1
TABLET ORAL
Qty: 30 TABLET | Refills: 0 | Status: CANCELLED | OUTPATIENT
Start: 2020-04-09

## 2020-04-09 NOTE — TELEPHONE ENCOUNTER
PCP please advise of further refills on Allopurinol-    Uric Acids did not upload correctly through epic as they are from an outside lab facility.     Per chart review labs collected on 3/13/2020:     Uric Acid levels- 5.5  ALT-13

## 2020-04-20 NOTE — TELEPHONE ENCOUNTER
Blood work 4/17 looked good.    Has BP consistently been high? Or was this just one reading? If it's consistently been elevated, increase lisinopril to 20 mg BID.  If it was a one time thing, no changes for now and continue to monitor.    Remington Kerns

## 2020-04-21 RX ORDER — LISINOPRIL 20 MG/1
TABLET ORAL
Qty: 90 TABLET | Refills: 3
Start: 2020-04-21 | End: 2020-05-18

## 2020-04-21 NOTE — TELEPHONE ENCOUNTER
04/21/20 Spoke w patient who stated his Bps have been running > 150 for the most part for about 2 weeks or so. Explained recommendations to increase Lisinopril to 20 mg BID per recommendations below. Pt will check Bps every day and discuss rosanna Kerns during video call on 4/27. Pt has no further questions. Med list updated. Didier 1118 am

## 2020-04-26 ENCOUNTER — MYC MEDICAL ADVICE (OUTPATIENT)
Dept: CARDIOLOGY | Facility: CLINIC | Age: 80
End: 2020-04-26

## 2020-04-26 DIAGNOSIS — I10 ESSENTIAL HYPERTENSION, BENIGN: ICD-10-CM

## 2020-04-26 DIAGNOSIS — I48.92 ATRIAL FLUTTER, UNSPECIFIED TYPE (H): Primary | ICD-10-CM

## 2020-05-11 NOTE — TELEPHONE ENCOUNTER
BPs look better!  HR/EKG looks great as well.    1) No changes to meds - pls send Rxs if needed    2) Last time I saw him in person 11/2019, he was back in atypical AFlutter after his ablation but as he was feeling really good, we opted to leave him along and keep in atypical AFlutter with rate control   It's a little hard to tell on ChangeYourFlight conrad, but it actually looks like he's back in normal rhythm!!   To help figure out if he's going in and out of arrhythmia, I have ordered a 2 week ZioPatch and CANCELLED the plan for the 24 hour Holter so we can get more days of data.    3) This can be done Tier 3 (within 2 months - late June/July) - I have placed order and cancelled Holter order.    Thx! Saumya

## 2020-05-18 RX ORDER — LISINOPRIL 20 MG/1
TABLET ORAL
Qty: 90 TABLET | Refills: 3 | Status: SHIPPED | OUTPATIENT
Start: 2020-05-18 | End: 2020-06-30

## 2020-06-17 ENCOUNTER — TELEPHONE (OUTPATIENT)
Dept: CARDIOLOGY | Facility: CLINIC | Age: 80
End: 2020-06-17

## 2020-06-17 DIAGNOSIS — I48.3 TYPICAL ATRIAL FLUTTER (H): ICD-10-CM

## 2020-06-17 NOTE — TELEPHONE ENCOUNTER
I received a refill request for Eliquis from [facility] in [place], [place]. I left the patient a message asking him to call me back and tell me where he wants this filled.

## 2020-06-24 ENCOUNTER — HOSPITAL ENCOUNTER (OUTPATIENT)
Dept: CARDIOLOGY | Facility: CLINIC | Age: 80
End: 2020-06-24
Attending: PHYSICIAN ASSISTANT
Payer: COMMERCIAL

## 2020-06-24 DIAGNOSIS — I48.92 ATRIAL FLUTTER, UNSPECIFIED TYPE (H): ICD-10-CM

## 2020-06-24 DIAGNOSIS — I35.0 NONRHEUMATIC AORTIC VALVE STENOSIS: ICD-10-CM

## 2020-06-24 DIAGNOSIS — I34.2 NON-RHEUMATIC MITRAL VALVE STENOSIS: ICD-10-CM

## 2020-06-24 PROCEDURE — 0296T ZIO PATCH HOLTER ADULT PEDIATRIC GREATER THAN 48 HRS: CPT

## 2020-06-24 PROCEDURE — 0298T ZIO PATCH HOLTER ADULT PEDIATRIC GREATER THAN 48 HRS: CPT | Performed by: INTERNAL MEDICINE

## 2020-06-24 PROCEDURE — 93306 TTE W/DOPPLER COMPLETE: CPT

## 2020-06-24 PROCEDURE — 93306 TTE W/DOPPLER COMPLETE: CPT | Mod: 26 | Performed by: INTERNAL MEDICINE

## 2020-06-26 ENCOUNTER — TRANSFERRED RECORDS (OUTPATIENT)
Dept: HEALTH INFORMATION MANAGEMENT | Facility: CLINIC | Age: 80
End: 2020-06-26

## 2020-06-28 ENCOUNTER — MYC MEDICAL ADVICE (OUTPATIENT)
Dept: CARDIOLOGY | Facility: CLINIC | Age: 80
End: 2020-06-28

## 2020-06-28 DIAGNOSIS — I10 ESSENTIAL HYPERTENSION, BENIGN: ICD-10-CM

## 2020-06-30 RX ORDER — LISINOPRIL 20 MG/1
20 TABLET ORAL DAILY
Qty: 90 TABLET | Refills: 3 | COMMUNITY
Start: 2020-06-30 | End: 2020-10-29

## 2020-06-30 NOTE — TELEPHONE ENCOUNTER
Replied to pt re: low BP. Instructed to reduce dose of lisinopril to 20mg once a day. Follow up as planned.  Alexandra Rojas PA-C

## 2020-07-14 ENCOUNTER — OFFICE VISIT (OUTPATIENT)
Dept: INTERNAL MEDICINE | Facility: CLINIC | Age: 80
End: 2020-07-14
Payer: COMMERCIAL

## 2020-07-14 VITALS
HEART RATE: 65 BPM | RESPIRATION RATE: 15 BRPM | DIASTOLIC BLOOD PRESSURE: 82 MMHG | SYSTOLIC BLOOD PRESSURE: 138 MMHG | BODY MASS INDEX: 24.26 KG/M2 | WEIGHT: 173.3 LBS | OXYGEN SATURATION: 99 % | HEIGHT: 71 IN | TEMPERATURE: 97.2 F

## 2020-07-14 DIAGNOSIS — I48.92 ATRIAL FLUTTER, UNSPECIFIED TYPE (H): ICD-10-CM

## 2020-07-14 DIAGNOSIS — I10 ESSENTIAL HYPERTENSION, BENIGN: ICD-10-CM

## 2020-07-14 DIAGNOSIS — Z12.11 COLON CANCER SCREENING: ICD-10-CM

## 2020-07-14 DIAGNOSIS — E78.5 HYPERLIPIDEMIA LDL GOAL <130: ICD-10-CM

## 2020-07-14 DIAGNOSIS — I35.0 AORTIC VALVE STENOSIS, ETIOLOGY OF CARDIAC VALVE DISEASE UNSPECIFIED: ICD-10-CM

## 2020-07-14 DIAGNOSIS — Z12.5 SCREENING PSA (PROSTATE SPECIFIC ANTIGEN): ICD-10-CM

## 2020-07-14 DIAGNOSIS — D47.2 MGUS (MONOCLONAL GAMMOPATHY OF UNKNOWN SIGNIFICANCE): ICD-10-CM

## 2020-07-14 DIAGNOSIS — M1A.9XX0 CHRONIC GOUT INVOLVING TOE WITHOUT TOPHUS, UNSPECIFIED CAUSE, UNSPECIFIED LATERALITY: ICD-10-CM

## 2020-07-14 DIAGNOSIS — Z00.00 ENCOUNTER FOR MEDICARE ANNUAL WELLNESS EXAM: Primary | ICD-10-CM

## 2020-07-14 LAB
CHOLEST SERPL-MCNC: 149 MG/DL
HDLC SERPL-MCNC: 44 MG/DL
LDLC SERPL CALC-MCNC: 84 MG/DL
NONHDLC SERPL-MCNC: 105 MG/DL
PSA SERPL-ACNC: 3.05 UG/L (ref 0–4)
TRIGL SERPL-MCNC: 107 MG/DL

## 2020-07-14 PROCEDURE — 80061 LIPID PANEL: CPT | Performed by: INTERNAL MEDICINE

## 2020-07-14 PROCEDURE — G0103 PSA SCREENING: HCPCS | Performed by: INTERNAL MEDICINE

## 2020-07-14 PROCEDURE — 36415 COLL VENOUS BLD VENIPUNCTURE: CPT | Performed by: INTERNAL MEDICINE

## 2020-07-14 PROCEDURE — 99213 OFFICE O/P EST LOW 20 MIN: CPT | Mod: 25 | Performed by: INTERNAL MEDICINE

## 2020-07-14 PROCEDURE — G0439 PPPS, SUBSEQ VISIT: HCPCS | Performed by: INTERNAL MEDICINE

## 2020-07-14 RX ORDER — PRAVASTATIN SODIUM 20 MG
20 TABLET ORAL DAILY
Qty: 90 TABLET | Refills: 3 | Status: SHIPPED | OUTPATIENT
Start: 2020-07-14 | End: 2021-06-18

## 2020-07-14 RX ORDER — ALLOPURINOL 100 MG/1
TABLET ORAL
Qty: 90 TABLET | Refills: 3 | Status: SHIPPED | OUTPATIENT
Start: 2020-07-14 | End: 2021-06-18

## 2020-07-14 ASSESSMENT — MIFFLIN-ST. JEOR: SCORE: 1528.21

## 2020-07-14 ASSESSMENT — ACTIVITIES OF DAILY LIVING (ADL): CURRENT_FUNCTION: NO ASSISTANCE NEEDED

## 2020-07-14 NOTE — PROGRESS NOTES
"  SUBJECTIVE:   Mars Reyes is a 79 year old male who presents for Preventive Visit.  Are you in the first 12 months of your Medicare Part B coverage?  No    Answers for HPI/ROS submitted by the patient on 2020   Annual Exam:  In general, how would you rate your overall physical health?: fair  Frequency of exercise:: 4-5 days/week  Do you usually eat at least 4 servings of fruit and vegetables a day, include whole grains & fiber, and avoid regularly eating high fat or \"junk\" foods? : Yes  Taking medications regularly:: Yes  Medication side effects:: None  Activities of Daily Living: no assistance needed  Home safety: no safety concerns identified  Hearing Impairment:: no hearing concerns  In the past 6 months, have you been bothered by leaking of urine?: No  In general, how would you rate your overall mental or emotional health?: good  Additional concerns today:: No  Duration of exercise:: 30-45 minutes      Mental Health:    In general, how would you rate your overall mental or emotional health? good  PHQ-2 Score: 0     Do you feel safe in your environment? Yes    Have you ever done Advance Care Planning? (For example, a Health Directive, POLST, or a discussion with a medical provider or your loved ones about your wishes): Yes, advance care planning is on file.    Additional concerns to address?  YES    Fall risk:     Cognitive Screenin) Repeat 3 items (Leader, Season, Table)    2) Clock draw: NORMAL  3) 3 item recall: Recalls 3 objects  Results: 3 items recalled: COGNITIVE IMPAIRMENT LESS LIKELY    Mini-CogTM Copyright JAIME Heredia. Licensed by the author for use in [facility]; reprinted with permission (juan@.[facility]). All rights reserved.      Do you have sleep apnea, excessive snoring or daytime drowsiness?: no      Other concerns:  1. Discuss worsening aortic stenosis that was noted on recent echo. Has cardiology follow up on 20  2.  Worsening right hip and leg- being followed by [facility]. " Will need right hip replacement in near future.     Reviewed and updated as needed this visit by clinical staff       Reviewed and updated as needed this visit by Provider        Social History     Tobacco Use     Smoking status: Never Smoker     Smokeless tobacco: Never Used   Substance Use Topics     Alcohol use: Yes     Alcohol/week: 0.0 standard drinks     Comment: 1 beer daily                           Current providers sharing in care for this patient include:   Patient Care Team:  Pb Warren MD as PCP - General  Pb Warren MD as Assigned PCP    The following health maintenance items are reviewed in Epic and correct as of today:  Health Maintenance   Topic Date Due     ZOSTER IMMUNIZATION (1 of 2) 10/10/1991     DTAP/TDAP/TD IMMUNIZATION (3 - Td) 02/21/2018     PHQ-2  01/01/2020     MEDICARE ANNUAL WELLNESS VISIT  03/20/2020     FALL RISK ASSESSMENT  03/20/2020     INFLUENZA VACCINE (1) 09/01/2020     COLORECTAL CANCER SCREENING  12/14/2020     ADVANCE CARE PLANNING  03/14/2023     LIPID  11/08/2023     PNEUMOCOCCAL IMMUNIZATION 65+ LOW/MEDIUM RISK  Completed     IPV IMMUNIZATION  Aged Out     MENINGITIS IMMUNIZATION  Aged Out       Immunization History   Administered Date(s) Administered     HEPA 02/20/1997, 05/20/2005     HepB 02/20/1997     Influenza (High Dose) 3 valent vaccine 10/20/2015, 10/01/2016, 11/05/2018, 10/30/2019     Influenza (IIV3) PF 02/20/1997, 11/16/2005, 12/04/2006, 10/16/2007, 11/20/2008, 10/17/2010, 01/07/2013, 09/16/2013, 09/30/2017     Meningococcal (Menomune ) 02/20/1997     Pneumo Conj 13-V (2010&after) 02/10/2016     Pneumococcal 23 valent 12/04/2006     Poliovirus, inactivated (IPV) 02/20/1997     TD (ADULT, 7+) 02/20/1997     TDAP Vaccine (Adacel) 02/21/2008     Typhoid IM 02/20/1997     Yellow Fever 05/20/2005         ROS:  CONSTITUTIONAL: NEGATIVE for fever, chills, change in weight  ENT/MOUTH: NEGATIVE for ear, mouth and throat problems  RESP: NEGATIVE for  "significant cough or SOB  CV: NEGATIVE for chest pain, palpitations or peripheral edema  GI: NEGATIVE for nausea, abdominal pain, heartburn, or change in bowel habits  : NEGATIVE for frequency, dysuria, or hematuria  MUSCULOSKELETAL:  + for significant arthralgias or myalgia  NEURO: NEGATIVE for weakness, dizziness or paresthesias  HEME: NEGATIVE for bleeding problems  PSYCHIATRIC: NEGATIVE for changes in mood or affect    OBJECTIVE:   /82   Pulse 65   Temp 97.2  F (36.2  C) (Temporal)   Resp 15   Ht 1.803 m (5' 11\")   Wt 78.6 kg (173 lb 4.8 oz)   SpO2 99%   BMI 24.17 kg/m   Estimated body mass index is 25.1 kg/m  as calculated from the following:    Height as of 11/8/19: 1.803 m (5' 11\").    Weight as of 4/27/20: 81.6 kg (180 lb).     EXAM:   GENERAL: alert and no distress  EYES: Eyes grossly normal to inspection, PERRL and conjunctivae and sclerae normal  HENT: ear canals and TM's normal, nose and mouth without ulcers or lesions  NECK: no adenopathy, no asymmetry, masses, or scars and thyroid normal to palpation  RESP: lungs clear to auscultation - no rales, rhonchi or wheezes  CV: regular rate and rhythm, normal S1 S2, no S3 or S4, noted aortic murmur, no click or rub, no peripheral edema and peripheral pulses strong  ABDOMEN: soft, nontender, no hepatosplenomegaly, no masses and bowel sounds normal  RECTAL: normal sphincter tone, no rectal masses, prostate normal size, smooth, nontender without nodules or masses  MS: no gross musculoskeletal defects noted, no edema, noted antalgic gait.  NEURO: Normal strength and tone, mentation intact and speech normal  PSYCH: mentation appears normal, affect normal/bright.    ASSESSMENT / PLAN:     1. Encounter for Medicare annual wellness exam  Advised updated tetanus as well as shingles vaccination    2. Atrial flutter, unspecified type (H)  Currently rate controlled and anticoagulated    3. Essential hypertension, benign  Appears fluctuant but currently " "stable upon recheck    4. Aortic valve stenosis, etiology of cardiac valve disease unspecified  Discussed recent echocardiogram and need to follow-up with cardiology to determine timing, if any, repair of aortic valve plus or minus mitral valve, plus or minus bypass    5. MGUS (monoclonal gammopathy of unknown significance)  Follow-up with oncology and hematology as ordered    6. Screening PSA (prostate specific antigen)  Ordered his routine screening  - PSA, screen    7. Colon cancer screening  Colonoscopy due in December of this upcoming year, patient advised and will be important [place] and will contact me accordingly  - Fecal colorectal cancer screen (FIT); Future    8. Hyperlipidemia LDL goal <130  Labs ordered as fasting per routine.  - Lipid Profile  - pravastatin (PRAVACHOL) 20 MG tablet; Take 1 tablet (20 mg) by mouth daily  Dispense: 90 tablet; Refill: 3    9. 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, unspecified cause, unspecified laterality  Stable on therapy and continuing with medical management  - allopurinol (ZYLOPRIM) 100 MG tablet; TAKE 1 TABLET BY MOUTH EVERY ONCE DAILY  Dispense: 90 tablet; Refill: 3    Advised updated DTAP and Shingrix.    COUNSELING:  Reviewed preventive health counseling, as reflected in patient instructions       Regular exercise       Healthy diet/nutrition    Estimated body mass index is 25.1 kg/m  as calculated from the following:    Height as of 11/8/19: 1.803 m (5' 11\").    Weight as of 4/27/20: 81.6 kg (180 lb).         reports that he has never smoked. He has never used smokeless tobacco.      Appropriate preventive services were discussed with this patient, including applicable screening as appropriate for cardiovascular disease, diabetes, osteopenia/osteoporosis, and glaucoma.  As appropriate for age/gender, discussed screening for colorectal cancer, prostate cancer, breast cancer, and cervical cancer. Checklist reviewing preventive services available has been given " to the patient.    Reviewed patients plan of care and provided an AVS. The Basic Care Plan (routine screening as documented in Health Maintenance) for Mars meets the Care Plan requirement. This Care Plan has been established and reviewed with the Patient.    Counseling Resources:  ATP IV Guidelines  Pooled Cohorts Equation Calculator  Breast Cancer Risk Calculator  FRAX Risk Assessment  ICSI Preventive Guidelines  Dietary Guidelines for Americans, 2010  Shweeb's MyPlate  ASA Prophylaxis  Lung CA Screening    Pb Warren MD  [facility]

## 2020-07-14 NOTE — PATIENT INSTRUCTIONS
Patient Education   Personalized Prevention Plan  You are due for the preventive services outlined below.  Your care team is available to assist you in scheduling these services.  If you have already completed any of these items, please share that information with your care team to update in your medical record.  Health Maintenance Due   Topic Date Due     Zoster (Shingles) Vaccine (1 of 2) 10/10/1991     Diptheria Tetanus Pertussis (DTAP/TDAP/TD) Vaccine (3 - Td) 02/21/2018     PHQ-2  01/01/2020     Annual Wellness Visit  03/20/2020     FALL RISK ASSESSMENT  03/20/2020

## 2020-07-15 ENCOUNTER — MYC MEDICAL ADVICE (OUTPATIENT)
Dept: INTERNAL MEDICINE | Facility: CLINIC | Age: 80
End: 2020-07-15

## 2020-07-16 ENCOUNTER — HOSPITAL ENCOUNTER (OUTPATIENT)
Facility: CLINIC | Age: 80
Setting detail: SPECIMEN
Discharge: HOME | End: 2020-07-16
Attending: INTERNAL MEDICINE | Admitting: INTERNAL MEDICINE
Payer: COMMERCIAL

## 2020-07-16 ENCOUNTER — DOCUMENTATION ONLY (OUTPATIENT)
Dept: CARDIOLOGY | Facility: CLINIC | Age: 80
End: 2020-07-16

## 2020-07-16 DIAGNOSIS — D47.2 MGUS (MONOCLONAL GAMMOPATHY OF UNKNOWN SIGNIFICANCE): ICD-10-CM

## 2020-07-16 DIAGNOSIS — D69.6 THROMBOCYTOPENIA (H): ICD-10-CM

## 2020-07-16 LAB
ANION GAP SERPL CALCULATED.3IONS-SCNC: 6 MMOL/L (ref 3–14)
BUN SERPL-MCNC: 15 MG/DL (ref 7–30)
CALCIUM SERPL-MCNC: 9.1 MG/DL (ref 8.5–10.1)
CHLORIDE SERPL-SCNC: 102 MMOL/L (ref 94–109)
CO2 SERPL-SCNC: 26 MMOL/L (ref 20–32)
CREAT SERPL-MCNC: 0.9 MG/DL (ref 0.66–1.25)
ERYTHROCYTE [DISTWIDTH] IN BLOOD BY AUTOMATED COUNT: 14.2 % (ref 10–15)
GFR SERPL CREATININE-BSD FRML MDRD: 81 ML/MIN/{1.73_M2}
GLUCOSE SERPL-MCNC: 91 MG/DL (ref 70–99)
HCT VFR BLD AUTO: 41.5 % (ref 40–53)
HGB BLD-MCNC: 13.4 G/DL (ref 13.3–17.7)
MCH RBC QN AUTO: 28.4 PG (ref 26.5–33)
MCHC RBC AUTO-ENTMCNC: 32.3 G/DL (ref 31.5–36.5)
MCV RBC AUTO: 88 FL (ref 78–100)
PLATELET # BLD AUTO: 81 10E9/L (ref 150–450)
POTASSIUM SERPL-SCNC: 4.7 MMOL/L (ref 3.4–5.3)
RBC # BLD AUTO: 4.72 10E12/L (ref 4.4–5.9)
SODIUM SERPL-SCNC: 134 MMOL/L (ref 133–144)
WBC # BLD AUTO: 7.3 10E9/L (ref 4–11)

## 2020-07-16 PROCEDURE — 83883 ASSAY NEPHELOMETRY NOT SPEC: CPT | Performed by: INTERNAL MEDICINE

## 2020-07-16 PROCEDURE — 00000402 ZZHCL STATISTIC TOTAL PROTEIN: Performed by: INTERNAL MEDICINE

## 2020-07-16 PROCEDURE — 36415 COLL VENOUS BLD VENIPUNCTURE: CPT

## 2020-07-16 PROCEDURE — 80048 BASIC METABOLIC PNL TOTAL CA: CPT | Performed by: INTERNAL MEDICINE

## 2020-07-16 PROCEDURE — 84165 PROTEIN E-PHORESIS SERUM: CPT | Performed by: INTERNAL MEDICINE

## 2020-07-16 PROCEDURE — 85027 COMPLETE CBC AUTOMATED: CPT | Performed by: INTERNAL MEDICINE

## 2020-07-16 PROCEDURE — 82784 ASSAY IGA/IGD/IGG/IGM EACH: CPT | Performed by: INTERNAL MEDICINE

## 2020-07-16 NOTE — PROGRESS NOTES
Dr. Iglesias - for your upcoming appt 7/30.       I saw Mars virtually 4/27 - doing well. He was s/p atypical AFlutter ablation 10/2019 but EKG 11/2019 showed recurrent atypical Flutter. No sxs, so decided to leave him in this.  Initially planned 24 hour Holter when he returned from [place] for rate control check ... he then contacted us in 5/2020 and Kardia strips he scanned showed he looked to be back in [place]!  Opted for ZP x 2 weeks in lieu of Holter.    1. 2 week ZP confirmed SR - NO AFlutter or AFib seen!    Had 6 episodes of SVT - longest 8 beats   Had 10 runs of VT - longest 8 beats. Normal EF on echo 6/2020   2 episodes of Mobitz II - Tues 7/7 @ 0545 and again Wed 7/8 @ 0800 - asx'c.    2. You are seeing him for progressive AoS    3. I will ask the RNs to contact him re: results of the monitor but wanted you to be aware given you're seeing him next!    Saumya

## 2020-07-16 NOTE — PROGRESS NOTES
Medical Assistant Note:  Mars Reyes presents today for blood draw.    Patient seen by provider today: No.   present during visit today: Not Applicable.    Concerns: No Concerns.    Procedure:  Labs drawn    Post Assessment:  Labs drawn without difficulty: Yes.    Discharge Plan:  Departure Mode: Ambulatory.    Face to Face Time: 10 min.    Halley Serrano CMA

## 2020-07-16 NOTE — RESULT ENCOUNTER NOTE
Dear Grayson Reyes,    Your platelet is low but stable at 81.  Normal hemoglobin and WBC.    Please, call me with any questions.    Pino Mayer MD

## 2020-07-17 ENCOUNTER — TELEPHONE (OUTPATIENT)
Dept: CARDIOLOGY | Facility: CLINIC | Age: 80
End: 2020-07-17

## 2020-07-17 LAB
ALBUMIN SERPL ELPH-MCNC: 4.3 G/DL (ref 3.7–5.1)
ALPHA1 GLOB SERPL ELPH-MCNC: 0.3 G/DL (ref 0.2–0.4)
ALPHA2 GLOB SERPL ELPH-MCNC: 0.7 G/DL (ref 0.5–0.9)
B-GLOBULIN SERPL ELPH-MCNC: 0.9 G/DL (ref 0.6–1)
GAMMA GLOB SERPL ELPH-MCNC: 1 G/DL (ref 0.7–1.6)
IGG SERPL-MCNC: 1080 MG/DL (ref 610–1616)
KAPPA LC UR-MCNC: 2.06 MG/DL (ref 0.33–1.94)
KAPPA LC/LAMBDA SER: 1.69 {RATIO} (ref 0.26–1.65)
LAMBDA LC SERPL-MCNC: 1.22 MG/DL (ref 0.57–2.63)
M PROTEIN SERPL ELPH-MCNC: 0.1 G/DL
PROT PATTERN SERPL ELPH-IMP: ABNORMAL

## 2020-07-17 NOTE — TELEPHONE ENCOUNTER
Called and updated patient regarding his ZioPatch results per Saumya's request. Patient very excited to hear that no Afib of Aflutter was noted. Reviewed both Mobitz II episodes documented with patient who denied having any symptoms.     Patient did have some structural questions regarding his last Echo and he was encouraged to bring those questions up at his visit scheduled with Dr. Iglesias on 07/30/20.

## 2020-07-17 NOTE — TELEPHONE ENCOUNTER
----- Message from Alis Wong PA-C sent at 7/16/2020  5:03 PM CDT -----  Pls let Mars know that ZioPatch showed NO recurrent AFlutter!!    2 week ZP confirmed SR - NO AFlutter or AFib seen!    Had 6 episodes of SVT - longest 8 beats   Had 10 runs of VT - longest 8 beats. Normal EF on echo 6/2020   2 episodes of Mobitz II - Tues 7/7 @ 0545 and again Wed 7/8 @ 0800 - PLEASE CHECK TO SEE IF SLEEPING or IF ANY SXS!      He has appt with Dr. Iglesias for AoS on 7/30 - he should keep that.    Remington Kerns

## 2020-07-20 DIAGNOSIS — Z12.11 COLON CANCER SCREENING: ICD-10-CM

## 2020-07-20 PROCEDURE — 82274 ASSAY TEST FOR BLOOD FECAL: CPT | Performed by: INTERNAL MEDICINE

## 2020-07-28 LAB — HEMOCCULT STL QL IA: NEGATIVE

## 2020-07-30 ENCOUNTER — VIRTUAL VISIT (OUTPATIENT)
Dept: CARDIOLOGY | Facility: CLINIC | Age: 80
End: 2020-07-30
Payer: COMMERCIAL

## 2020-07-30 VITALS
BODY MASS INDEX: 24.41 KG/M2 | WEIGHT: 175 LBS | DIASTOLIC BLOOD PRESSURE: 86 MMHG | SYSTOLIC BLOOD PRESSURE: 148 MMHG | HEART RATE: 65 BPM

## 2020-07-30 DIAGNOSIS — I35.0 NONRHEUMATIC AORTIC VALVE STENOSIS: Primary | ICD-10-CM

## 2020-07-30 PROCEDURE — 99214 OFFICE O/P EST MOD 30 MIN: CPT | Mod: 95 | Performed by: INTERNAL MEDICINE

## 2020-07-30 RX ORDER — ACETAMINOPHEN 325 MG/1
325-650 TABLET ORAL EVERY 6 HOURS PRN
COMMUNITY
End: 2021-05-24

## 2020-07-30 NOTE — PROGRESS NOTES
"Mars Reyes is a 79 year old male who is being evaluated via a billable video visit.      The patient has been notified of following:     \"This video visit will be conducted via a call between you and your physician/provider. We have found that certain health care needs can be provided without the need for an in-person physical exam.  This service lets us provide the care you need with a video conversation.  If a prescription is necessary we can send it directly to your pharmacy.  If lab work is needed we can place an order for that and you can then stop by our lab to have the test done at a later time.    Video visits are billed at different rates depending on your insurance coverage.  Please reach out to your insurance provider with any questions.    If during the course of the call the physician/provider feels a video visit is not appropriate, you will not be charged for this service.\"    Patient has given verbal consent for Video visit? Yes  How would you like to obtain your AVS? MyChart  If you are dropped from the video visit, the video invite should be resent to: Text to cell phone: [phone]  Will anyone else be joining your video visit? No      Adriana Smith MA 7/30/2020 9:17 AM    Video-Visit Details      Additional provider notes:  79-year-old male seen for evaluation of valvular heart disease and SVT. He has ITP and MGUS (IgG kappa), lumbar spinal stenosis.           Echo Feb 2018 ([facility]) showed EF 75%, mild-moderate MS with mean 5mmHg, moderate AS (mean 23mmHg, Vmax 3.5m/s, LV 1.3cm2, DI 0.44, SVI 45ml/m2).          11 day Zio patch from March 2018 showed baseline rhythm of sinus, he had paroxysmal SVT lasting up to 90 seconds on several occasions, on one episode QRS was borderline wide, likely SVT with aberrancy, there were no pauses, heart block, or VT.        January 2019 he had atrial flutter while in [place].  He underwent cardioversion March 2019.  He had recurrent flutter " and underwent ablation in October 2019.  Flutter recurred and he has been rate controlled since, since flutter was atypical and hard to ablate.        Echocardiogram March 2019 showed EF 60%, mild to moderate mitral stenosis with mean gradient 5 mmHg, moderate aortic stenosis with mean 24 mmHg, V-max 3.5 m/s, area 1.2 cm , DI 0.37.      Echo June 2020 (sinus) showed EF 65%, mild to moderate LVH, grade 2 diastolic dysfunction, normal RV, severe left atrial enlargement, severe MAC, 2+ MR, mitral mean 6 mmHg, 1-2+ TR, RVSP 53 mmHg, 1+ AI, moderate to severe aortic stenosis with mean 38 mmHg, V-max 3.9 m/s, area 1.0 cm , DI 0.27.      14-day ZIO July 2020 showed sinus rhythm, no flutter, 8% PVC's, 10 episodes of VT up to 8 beats, 6 episodes of SVT up to 18 beats, two episodes second-degree AV block type II up to 17 seconds, some Mobitz type I.      Overall he is doing well.  He will walk 1 to 1.5 miles with no chest pain or shortness of breath.  He can do some yard work.  He denies any lightheadedness, dizziness, palpitations, chest pain, or lower extremity edema.  Blood pressure will be 140 or less with heart rate in the 60s.    He had a left hip revision last year, he eventually will need right hip surgery also.    Exam:  General Appearance: no distress, normal body habitus, upright.  ENT/Mouth: membranes moist, no nasal discharge or bleeding gums. Normal head shape, no evidence of injury or laceration.  EYES: no scleral icterus, normal conjunctivae  Neck: no evidence of thyromegaly. Supple  Chest/Lungs: No audible wheezing equal chest wall expansion. Non labored breathing. No cough.  Cardiovascular: No evidence of elevated jugular venous pressure.   Abdomen: No observed jaundice.  Extremities: no cyanosis or clubbing noted.  Skin: no xanthelasma, normal skin collar. No evidence of facial lacerations.  Neurologic: Normal arm motion bilateral, no tremors. No evidence of focal defect.  Psychiatric: alert and oriented  x3, calm    The rest of a comprehensive physical examination is deferred due to PHE (public health emergency) video visit restrictions.    Data:  July 16, 2020: Sodium 134, potassium 4.7, creatinine 0.9, hemoglobin 13, platelets 81.     Assessment:  79-year-old male seen for follow-up of valvular heart disease.  Aortic stenosis is now in the early severe range.  He has preserved ejection fraction with no evidence of heart failure.  We talked about timing of aortic valve replacement.  He is interested in meeting with the TAVR team.  The option of TAVR versus surgical valve replacement was discussed.  He of course would need a TAVR CT and coronary angiogram before decision can be made.    His constellation of LVH, diastolic dysfunction, aortic stenosis, arrhythmias, and lumbar spinal stenosis raises some suspicion for amyloidosis.  He does have MGUS and follows closely with hematology.  We will contact them to make sure there is no suspicion for AL amyloid.  A nuclear PYP scan will be ordered to screen for ATTR type amyloid.  Of note there is a higher incidence of ATTR amyloid in older males aortic stenosis patients.    Recommendations:  1.  Aortic stenosis, early severe range  -Refer to TAVR clinic    2.  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  Atrial flutter, status post ablation  -Continue Eliquis    4.  PVCs and SVT with short runs of type II AV block  -Asymptomatic, consider repeat ZIO if any new symptoms concerning for arrhythmias or bradycardia  -Nuclear technetium PYP scan to assess for ATTR amyloid    Follow-up with general cardiology in 3 to 4 months.    Video-Visit Details    Type of service:  Video Visit    Video Start Time: 11:25 AM  Video End Time (time video stopped): 11:36 AM    Originating Location (pt. Location):  Home    Distant Location (provider location):  [facility] cardiology clinic    Mode of Communication:  Video Conference via [employer]    MOHAMUD  total of 40 minutes was spent with clinic visit, including chart review and coordinating care, >50% of time was spent talking with patient.    Brandt Iglesias MD  Cardiology - [facility]  Pager: [phone]  Text Page  July 30, 2020

## 2020-07-30 NOTE — LETTER
7/30/2020    Pb Warren MD  [address] [facility] [address]    RE: Mars Reyes       Dear Colleague,    I had the pleasure of seeing Mars Reyes in the [facility].    Mars Reyes is a 79 year old male who is being evaluated via a billable video visit.        Additional provider notes:  79-year-old male seen for evaluation of valvular heart disease and SVT. He has ITP and MGUS (IgG kappa), lumbar spinal stenosis.           Echo Feb 2018 ([facility]) showed EF 75%, mild-moderate MS with mean 5mmHg, moderate AS (mean 23mmHg, Vmax 3.5m/s, LV 1.3cm2, DI 0.44, SVI 45ml/m2).          11 day Zio patch from March 2018 showed baseline rhythm of sinus, he had paroxysmal SVT lasting up to 90 seconds on several occasions, on one episode QRS was borderline wide, likely SVT with aberrancy, there were no pauses, heart block, or VT.        January 2019 he had atrial flutter while in [place].  He underwent cardioversion March 2019.  He had recurrent flutter and underwent ablation in October 2019.  Flutter recurred and he has been rate controlled since, since flutter was atypical and hard to ablate.        Echocardiogram March 2019 showed EF 60%, mild to moderate mitral stenosis with mean gradient 5 mmHg, moderate aortic stenosis with mean 24 mmHg, V-max 3.5 m/s, area 1.2 cm , DI 0.37.      Echo June 2020 (sinus) showed EF 65%, mild to moderate LVH, grade 2 diastolic dysfunction, normal RV, severe left atrial enlargement, severe MAC, 2+ MR, mitral mean 6 mmHg, 1-2+ TR, RVSP 53 mmHg, 1+ AI, moderate to severe aortic stenosis with mean 38 mmHg, V-max 3.9 m/s, area 1.0 cm , DI 0.27.      14-day ZIO July 2020 showed sinus rhythm, no flutter, 8% PVC's, 10 episodes of VT up to 8 beats, 6 episodes of SVT up to 18 beats, two episodes second-degree AV block type II up to 17 seconds, some Mobitz type I.      Overall he is doing well.  He will walk 1 to 1.5 miles with no chest pain or  shortness of breath.  He can do some yard work.  He denies any lightheadedness, dizziness, palpitations, chest pain, or lower extremity edema.  Blood pressure will be 140 or less with heart rate in the 60s.    He had a left hip revision last year, he eventually will need right hip surgery also.    Exam:  General Appearance: no distress, normal body habitus, upright.  ENT/Mouth: membranes moist, no nasal discharge or bleeding gums. Normal head shape, no evidence of injury or laceration.  EYES: no scleral icterus, normal conjunctivae  Neck: no evidence of thyromegaly. Supple  Chest/Lungs: No audible wheezing equal chest wall expansion. Non labored breathing. No cough.  Cardiovascular: No evidence of elevated jugular venous pressure.   Abdomen: No observed jaundice.  Extremities: no cyanosis or clubbing noted.  Skin: no xanthelasma, normal skin collar. No evidence of facial lacerations.  Neurologic: Normal arm motion bilateral, no tremors. No evidence of focal defect.  Psychiatric: alert and oriented x3, calm    The rest of a comprehensive physical examination is deferred due to PHE (public health emergency) video visit restrictions.    Data:  July 16, 2020: Sodium 134, potassium 4.7, creatinine 0.9, hemoglobin 13, platelets 81.     Assessment:  79-year-old male seen for follow-up of valvular heart disease.  Aortic stenosis is now in the early severe range.  He has preserved ejection fraction with no evidence of heart failure.  We talked about timing of aortic valve replacement.  He is interested in meeting with the TAVR team.  The option of TAVR versus surgical valve replacement was discussed.  He of course would need a TAVR CT and coronary angiogram before decision can be made.    His constellation of LVH, diastolic dysfunction, aortic stenosis, arrhythmias, and lumbar spinal stenosis raises some suspicion for amyloidosis.  He does have MGUS and follows closely with hematology.  We will contact them to make sure  there is no suspicion for AL amyloid.  A nuclear PYP scan will be ordered to screen for ATTR type amyloid.  Of note there is a higher incidence of ATTR amyloid in older males aortic stenosis patients.    Recommendations:  1.  Aortic stenosis, early severe range  -Refer to TAVR clinic    2.  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  Atrial flutter, status post ablation  -Continue Eliquis    4.  PVCs and SVT with short runs of type II AV block  -Asymptomatic, consider repeat ZIO if any new symptoms concerning for arrhythmias or bradycardia  -Nuclear technetium PYP scan to assess for ATTR amyloid    Follow-up with general cardiology in 3 to 4 months.      A total of 40 minutes was spent with clinic visit, including chart review and coordinating care, >50% of time was spent talking with patient.    Brandt Iglesias MD  Cardiology - [facility]  Pager: [phone]  Text Page  July 30, 2020       Thank you for allowing me to participate in the care of your patient.    Sincerely,     Brandt Iglesias MD     [facility]

## 2020-07-31 ENCOUNTER — TELEPHONE (OUTPATIENT)
Dept: CARDIOLOGY | Facility: CLINIC | Age: 80
End: 2020-07-31

## 2020-07-31 DIAGNOSIS — R09.89 CHEST SOUNDS ABNORMAL ON PERCUSSION OR AUSCULTATION: ICD-10-CM

## 2020-07-31 DIAGNOSIS — I35.0 NONRHEUMATIC AORTIC VALVE STENOSIS: Primary | ICD-10-CM

## 2020-07-31 NOTE — PROGRESS NOTES
Mars called back and we were able to discuss aortic stenosis and he looks forward to hearing more about the TAVR process. I told him to look for the packet in the mail and we made a TAVR clinic apt for the 13th of August. He will call if he has any questions.Leonides Gardner RN

## 2020-07-31 NOTE — TELEPHONE ENCOUNTER
Called to reach out to Mars regarding his referral to the TAVR clinic by Dr. Iglesias. I left him a message to give us a call back and we get something set up.I will put a new TAVR patient packet in the mail. Leonides Gardner RN

## 2020-08-12 ENCOUNTER — TELEPHONE (OUTPATIENT)
Dept: CARDIOLOGY | Facility: CLINIC | Age: 80
End: 2020-08-12

## 2020-08-12 NOTE — TELEPHONE ENCOUNTER
PATIENT WELLNESS TELEPHONE SCREENING     Step 1 Screening Questions    In the past 3 weeks, have you been exposed to someone with a suspected or known illness?  COVID-19? No  Chickenpox? No   Measles? No  Pertussis? No    In the past 2 weeks, have you had any of the following symptoms?   Fever/Chills? No   Cough? No   Shortness of breath? No   New loss of taste or smell? No  Sore throat? No  Muscle or body aches? No  Headaches? No  Fatigue? No  Vomiting or diarrhea? No    Step 2 Screening Results (Skip if the patient is negative for symptoms)    If the patient is positive for new or worsening symptoms, contact the ordering provider to determine if the procedure is deemed necessary. Determine if patient can be re-scheduled when the patient is symptom free or has a negative COVID test.     If ordering provider deems the procedure is necessary, notify your manager/supervisor. Provide the patient with the procedural department phone number and inform the patient to call the procedural department upon arrival.  The patient will be registered over the phone.    Step 3 Review Visitor Policy  Patient informed of the updated visitor policy   1 visitor allowed per patient   Visitor must screen negative for COVID symptoms   Visitor must wear a mask    Lynette Camacho

## 2020-08-13 ENCOUNTER — HOSPITAL ENCOUNTER (OUTPATIENT)
Dept: ULTRASOUND IMAGING | Facility: CLINIC | Age: 80
End: 2020-08-13
Attending: INTERNAL MEDICINE
Payer: COMMERCIAL

## 2020-08-13 ENCOUNTER — OFFICE VISIT (OUTPATIENT)
Dept: CARDIOLOGY | Facility: CLINIC | Age: 80
End: 2020-08-13
Payer: COMMERCIAL

## 2020-08-13 ENCOUNTER — HOSPITAL ENCOUNTER (OUTPATIENT)
Dept: CARDIOLOGY | Facility: CLINIC | Age: 80
End: 2020-08-13
Attending: INTERNAL MEDICINE
Payer: COMMERCIAL

## 2020-08-13 VITALS — SYSTOLIC BLOOD PRESSURE: 135 MMHG | HEART RATE: 62 BPM | DIASTOLIC BLOOD PRESSURE: 79 MMHG

## 2020-08-13 VITALS
SYSTOLIC BLOOD PRESSURE: 138 MMHG | WEIGHT: 177.1 LBS | DIASTOLIC BLOOD PRESSURE: 77 MMHG | BODY MASS INDEX: 24.7 KG/M2 | HEART RATE: 65 BPM

## 2020-08-13 DIAGNOSIS — I05.9 MITRAL VALVE DISORDER: ICD-10-CM

## 2020-08-13 DIAGNOSIS — I35.0 NONRHEUMATIC AORTIC VALVE STENOSIS: Primary | ICD-10-CM

## 2020-08-13 DIAGNOSIS — R09.89 CHEST SOUNDS ABNORMAL ON PERCUSSION OR AUSCULTATION: ICD-10-CM

## 2020-08-13 DIAGNOSIS — I35.0 NONRHEUMATIC AORTIC VALVE STENOSIS: ICD-10-CM

## 2020-08-13 PROCEDURE — 74174 CTA ABD&PLVS W/CONTRAST: CPT | Mod: 26 | Performed by: INTERNAL MEDICINE

## 2020-08-13 PROCEDURE — 71275 CT ANGIOGRAPHY CHEST: CPT | Mod: 26 | Performed by: INTERNAL MEDICINE

## 2020-08-13 PROCEDURE — 93005 ELECTROCARDIOGRAM TRACING: CPT | Performed by: INTERNAL MEDICINE

## 2020-08-13 PROCEDURE — 99214 OFFICE O/P EST MOD 30 MIN: CPT | Performed by: INTERNAL MEDICINE

## 2020-08-13 PROCEDURE — 93880 EXTRACRANIAL BILAT STUDY: CPT

## 2020-08-13 PROCEDURE — 74174 CTA ABD&PLVS W/CONTRAST: CPT

## 2020-08-13 PROCEDURE — 25000128 H RX IP 250 OP 636: Performed by: INTERNAL MEDICINE

## 2020-08-13 PROCEDURE — 93880 EXTRACRANIAL BILAT STUDY: CPT | Performed by: INTERNAL MEDICINE

## 2020-08-13 RX ORDER — IOPAMIDOL 755 MG/ML
500 INJECTION, SOLUTION INTRAVASCULAR ONCE
Status: COMPLETED | OUTPATIENT
Start: 2020-08-13 | End: 2020-08-13

## 2020-08-13 RX ADMIN — IOPAMIDOL 115 ML: 755 INJECTION, SOLUTION INTRAVENOUS at 11:29

## 2020-08-13 NOTE — PROGRESS NOTES
HPI and Plan:   See dictation    Orders Placed This Encounter   Procedures     EKG 12-lead complete w/read - Clinics (performed today)     Exercise Stress Test - Adult       No orders of the defined types were placed in this encounter.      There are no discontinued medications.      Encounter Diagnoses   Name Primary?     Nonrheumatic aortic valve stenosis Yes     Mitral valve disorder        CURRENT MEDICATIONS:  Current Outpatient Medications   Medication Sig Dispense Refill     acetaminophen (TYLENOL) 325 MG tablet Take 325-650 mg by mouth every 6 hours as needed for mild pain       allopurinol (ZYLOPRIM) 100 MG tablet TAKE 1 TABLET BY MOUTH EVERY ONCE DAILY 90 tablet 3     amoxicillin (AMOXIL) 500 MG tablet Take 2,000 mg by mouth daily as needed (1 hour prior to dental procedures)       apixaban ANTICOAGULANT (ELIQUIS ANTICOAGULANT) 5 MG tablet Take 1 tablet (5 mg) by mouth 2 times daily 180 tablet 0     carvedilol (COREG) 3.125 MG tablet Take 1 tablet (3.125 mg) by mouth 2 times daily (with meals) (Patient taking differently: Take 3.125 mg by mouth daily ) 60 tablet 5     lisinopril (ZESTRIL) 20 MG tablet Take 1 tablet (20 mg) by mouth daily 90 tablet 3     pravastatin (PRAVACHOL) 20 MG tablet Take 1 tablet (20 mg) by mouth daily 90 tablet 3       ALLERGIES     Allergies   Allergen Reactions     Simvastatin      10/26/12: Arm pain - pt stopped it x 4-5 wks and arm pain didn't resolve sore started it        PAST MEDICAL HISTORY:  Past Medical History:   Diagnosis Date     Aortic stenosis     moderate     Atypical atrial flutter (H)     PVI and LA linear ablation with successful termination of AFL 10/18/2019     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, unspecified cause, unspecified laterality      Contact dermatitis and other eczema, due to unspecified cause      Diverticulosis of colon (without mention of hemorrhage)      DJD (degenerative joint disease)      Essential hypertension, benign     abstracted  7/22/02     Gout, unspecified      Hip joint replacement by other means 6/3/2009     MGUS (monoclonal gammopathy of unknown significance)      Mitral valve disease     mild MS, moderate-severe MR     PSVT (paroxysmal supraventricular tachycardia) (H)      Pulmonary HTN (H) 10/18/2011     Thrombocytopenia (H)        PAST SURGICAL HISTORY:  Past Surgical History:   Procedure Laterality Date     ANESTHESIA CARDIOVERSION N/A 3/21/2019    Procedure: ANESTHESIA CARDIOVERSION;  Surgeon: GENERIC ANESTHESIA PROVIDER;  Location: [facility] OR     ARTHROPLASTY REVISION HIP ANTERIOR Left 9/9/2019    Procedure: REVISION DIRECT ANTERIOR TOTAL LEFT HIP ARTHROPLASTY;  Surgeon: Luisito Hammonds MD;  Location:  OR     BIOPSY  3/2017     BONE MARROW BIOPSY, BONE SPECIMEN, NEEDLE/TROCAR Right 2/23/2017    Procedure: BIOPSY BONE MARROW;  Surgeon: Romie Esquivel MD;  Location:  GI     COLONOSCOPY  2010     EP 3D TACHYCARDIA MAPPING N/A 10/18/2019    Procedure: EP 3D Tachycardia Mapping;  Surgeon: Delfino Gee MD;  Location:  HEART CARDIAC CATH LAB     EP ABLATION FOCAL AFIB N/A 10/18/2019    Procedure: EP Ablation Focal AFIB;  Surgeon: Delfino Gee MD;  Location:  HEART CARDIAC CATH LAB     EYE SURGERY      Cataract, both eyes     ORTHOPEDIC SURGERY  2009    (L) total hip       FAMILY HISTORY:  Family History   Problem Relation Age of Onset     Hypertension Mother        SOCIAL HISTORY:  Social History     Socioeconomic History     Marital status:      Spouse name: None     Number of children: None     Years of education: None     Highest education level: None   Occupational History     None   Social Needs     Financial resource strain: None     Food insecurity     Worry: None     Inability: None     Transportation needs     Medical: None     Non-medical: None   Tobacco Use     Smoking status: Never Smoker     Smokeless tobacco: Never Used   Substance and Sexual Activity     Alcohol use: Yes     Alcohol/week: 0.0 standard  drinks     Comment: Max 2 beers per day     Drug use: No     Sexual activity: Yes     Partners: Female     Birth control/protection: None   Lifestyle     Physical activity     Days per week: None     Minutes per session: None     Stress: None   Relationships     Social connections     Talks on phone: None     Gets together: None     Attends Zoroastrian service: None     Active member of club or organization: None     Attends meetings of clubs or organizations: None     Relationship status: None     Intimate partner violence     Fear of current or ex partner: None     Emotionally abused: None     Physically abused: None     Forced sexual activity: None   Other Topics Concern     Parent/sibling w/ CABG, MI or angioplasty before 65F 55M? No   Social History Narrative     None       Review of Systems:  Skin:  Negative       Eyes:  Negative glasses readers; cataract extraction of both eyes  ENT:  Negative      Respiratory:  Negative shortness of breath;dyspnea on exertion;cough;wheezing     Cardiovascular:  Negative      Gastroenterology: Negative   indigestion  Genitourinary:  Negative      Musculoskeletal:  Positive for back pain;joint pain;joint swelling;joint stiffness left hip replacement  Neurologic:  Positive for numbness or tingling of feet left foot  Psychiatric:  Negative      Heme/Lymph/Imm:  Negative      Endocrine:  Negative        Physical Exam:  Vitals: /77   Pulse 65   Wt 80.3 kg (177 lb 1.6 oz)   BMI 24.70 kg/m      Constitutional:  cooperative;in no acute distress        Skin:  no apparent skin lesions or masses noted          Head:  no masses or lesions        Eyes:  pupils equal and round;conjunctivae and lids unremarkable        Lymph:      ENT:  no pallor or cyanosis        Neck:  JVP normal;no carotid bruit        Respiratory:  clear to auscultation         Cardiac: regular rhythm       systolic ejection murmur;grade 3        pulses full and equal                                        GI:   abdomen soft;non-tender        Extremities and Muscular Skeletal:  no edema              Neurological:  no gross motor deficits        Psych:  Alert and Oriented x 3        CC  No referring provider defined for this encounter.

## 2020-08-13 NOTE — LETTER
8/13/2020      Pb Warren MD  [address] [facility] [address]      RE: Mars Reyes       Dear Colleague,    I had the pleasure of seeing Mars Reyes in the [facility].    Service Date: 08/13/2020      CONSULTATION       REASON FOR CONSULTATION:  Severe symptomatic aortic valve stenosis.      REFERRING PHYSICIAN:  Dr. Pool Iglesias      HISTORY OF PRESENT ILLNESS:  I had the pleasure of seeing Mars Reyes at the [facility] in [place] this afternoon.  He is a very pleasant 79-year-old gentleman with history of aortic stenosis, atrial flutter status post prior cardioversions, pulmonary hypertension, moderate mitral valve regurgitation, hyperlipidemia, thrombocytopenia and monoclonal gammopathy of unknown significance who is referred for further evaluation regarding aortic valve stenosis.      He had an echocardiogram recently which demonstrated progressive aortic valve stenosis which is now in the severe range.  His LV function is preserved and his mean gradient across the aortic valve is 38 mmHg.  His valve area is 0.99.  The patient currently does not endorse chest pain, palpitations, presyncope or syncope.  He also does not endorse significant fatigue.  He used to be pretty active in the past but over the last 3 months, due to the coronavirus pandemic, he has not been as active.  He walks with his wife, usually a mile to mile and a half on a daily basis.  With those walks, he does not endorse significant symptoms, although he gets short of breath if he climbs a hill.      REVIEW OF SYSTEMS:  A comprehensive review of systems was performed and essentially negative, except as mentioned in the HPI.      IMPRESSION AND PLAN:   1.  Severe aortic valve stenosis.   2.  Pulmonary hypertension.   3.  Atrial flutter.   4.  Hyperlipidemia.   5.  MGUS.   6.  Thrombocytopenia.   7.  Moderate mitral valve regurgitation.      The patient's aortic valve  stenosis has progressed and is currently in the severe range.  He is not endorsing whole lot of symptoms at this point, but I am not convinced that he is not significantly symptomatic.  He appears to have curtailed his activities over the last 3-4 months.  He also does have some exertional dyspnea, especially if he climbs flights of stairs.  To prove that he is truly symptomatic, we will have him undergo exercise treadmill test next week.  If he becomes symptomatic with the treadmill test, then we will proceed with our TAVR workup.  He will need right and left heart catheterization as well as CT TAVR.      He is chronically on Eliquis which I recommended that he continue.  He will need to see colleagues from [facility] to further discuss his candidacy for aortic valve replacement.  Given his advanced age and other comorbidities, I believe he will be best served with TAVR rather than SAVR.      I appreciate the opportunity to participate in his care.         ALIYAH CARDENAS MD             D: 2020   T: 2020   MT: JOSUE      Name:     DESEAN MARTINEZ   MRN:      [id number]        Account:      [id number]   :      1940           Service Date: 2020      Document: [id number]         Outpatient Encounter Medications as of 2020   Medication Sig Dispense Refill     acetaminophen (TYLENOL) 325 MG tablet Take 325-650 mg by mouth every 6 hours as needed for mild pain       allopurinol (ZYLOPRIM) 100 MG tablet TAKE 1 TABLET BY MOUTH EVERY ONCE DAILY 90 tablet 3     amoxicillin (AMOXIL) 500 MG tablet Take 2,000 mg by mouth daily as needed (1 hour prior to dental procedures)       apixaban ANTICOAGULANT (ELIQUIS ANTICOAGULANT) 5 MG tablet Take 1 tablet (5 mg) by mouth 2 times daily 180 tablet 0     carvedilol (COREG) 3.125 MG tablet Take 1 tablet (3.125 mg) by mouth 2 times daily (with meals) (Patient taking differently: Take 3.125 mg by mouth daily ) 60 tablet 5     lisinopril (ZESTRIL) 20 MG tablet  Take 1 tablet (20 mg) by mouth daily 90 tablet 3     pravastatin (PRAVACHOL) 20 MG tablet Take 1 tablet (20 mg) by mouth daily 90 tablet 3     [] iopamidol (ISOVUE-370) solution 500 mL        [] sodium chloride (PF) 0.9% PF flush 100 mL        No facility-administered encounter medications on file as of 2020.        Again, thank you for allowing me to participate in the care of your patient.      Sincerely,    Hunter Adan MD, MD     [facility]

## 2020-08-13 NOTE — LETTER
8/13/2020    Pb Warren MD  [address] [facility] [address]    RE: Mars Reyes       Dear Colleague,    I had the pleasure of seeing Mars Reyes in the [facility].    HPI and Plan:   See dictation    Orders Placed This Encounter   Procedures     EKG 12-lead complete w/read - Clinics (performed today)     Exercise Stress Test - Adult       No orders of the defined types were placed in this encounter.      There are no discontinued medications.      Encounter Diagnoses   Name Primary?     Nonrheumatic aortic valve stenosis Yes     Mitral valve disorder        CURRENT MEDICATIONS:  Current Outpatient Medications   Medication Sig Dispense Refill     acetaminophen (TYLENOL) 325 MG tablet Take 325-650 mg by mouth every 6 hours as needed for mild pain       allopurinol (ZYLOPRIM) 100 MG tablet TAKE 1 TABLET BY MOUTH EVERY ONCE DAILY 90 tablet 3     amoxicillin (AMOXIL) 500 MG tablet Take 2,000 mg by mouth daily as needed (1 hour prior to dental procedures)       apixaban ANTICOAGULANT (ELIQUIS ANTICOAGULANT) 5 MG tablet Take 1 tablet (5 mg) by mouth 2 times daily 180 tablet 0     carvedilol (COREG) 3.125 MG tablet Take 1 tablet (3.125 mg) by mouth 2 times daily (with meals) (Patient taking differently: Take 3.125 mg by mouth daily ) 60 tablet 5     lisinopril (ZESTRIL) 20 MG tablet Take 1 tablet (20 mg) by mouth daily 90 tablet 3     pravastatin (PRAVACHOL) 20 MG tablet Take 1 tablet (20 mg) by mouth daily 90 tablet 3       ALLERGIES     Allergies   Allergen Reactions     Simvastatin      10/26/12: Arm pain - pt stopped it x 4-5 wks and arm pain didn't resolve sore started it        PAST MEDICAL HISTORY:  Past Medical History:   Diagnosis Date     Aortic stenosis     moderate     Atypical atrial flutter (H)     PVI and LA linear ablation with successful termination of AFL 10/18/2019     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, unspecified cause, unspecified laterality       Contact dermatitis and other eczema, due to unspecified cause      Diverticulosis of colon (without mention of hemorrhage)      DJD (degenerative joint disease)      Essential hypertension, benign     abstracted 7/22/02     Gout, unspecified      Hip joint replacement by other means 6/3/2009     MGUS (monoclonal gammopathy of unknown significance)      Mitral valve disease     mild MS, moderate-severe MR     PSVT (paroxysmal supraventricular tachycardia) (H)      Pulmonary HTN (H) 10/18/2011     Thrombocytopenia (H)        PAST SURGICAL HISTORY:  Past Surgical History:   Procedure Laterality Date     ANESTHESIA CARDIOVERSION N/A 3/21/2019    Procedure: ANESTHESIA CARDIOVERSION;  Surgeon: GENERIC ANESTHESIA PROVIDER;  Location: [facility] OR     ARTHROPLASTY REVISION HIP ANTERIOR Left 9/9/2019    Procedure: REVISION DIRECT ANTERIOR TOTAL LEFT HIP ARTHROPLASTY;  Surgeon: Luisito Hammonds MD;  Location:  OR     BIOPSY  3/2017     BONE MARROW BIOPSY, BONE SPECIMEN, NEEDLE/TROCAR Right 2/23/2017    Procedure: BIOPSY BONE MARROW;  Surgeon: Romie Esquivel MD;  Location:  GI     COLONOSCOPY  2010     EP 3D TACHYCARDIA MAPPING N/A 10/18/2019    Procedure: EP 3D Tachycardia Mapping;  Surgeon: Delfino Gee MD;  Location:  HEART CARDIAC CATH LAB     EP ABLATION FOCAL AFIB N/A 10/18/2019    Procedure: EP Ablation Focal AFIB;  Surgeon: Delfino Gee MD;  Location:  HEART CARDIAC CATH LAB     EYE SURGERY      Cataract, both eyes     ORTHOPEDIC SURGERY  2009    (L) total hip       FAMILY HISTORY:  Family History   Problem Relation Age of Onset     Hypertension Mother        SOCIAL HISTORY:  Social History     Socioeconomic History     Marital status:      Spouse name: None     Number of children: None     Years of education: None     Highest education level: None   Occupational History     None   Social Needs     Financial resource strain: None     Food insecurity     Worry: None     Inability: None      Transportation needs     Medical: None     Non-medical: None   Tobacco Use     Smoking status: Never Smoker     Smokeless tobacco: Never Used   Substance and Sexual Activity     Alcohol use: Yes     Alcohol/week: 0.0 standard drinks     Comment: Max 2 beers per day     Drug use: No     Sexual activity: Yes     Partners: Female     Birth control/protection: None   Lifestyle     Physical activity     Days per week: None     Minutes per session: None     Stress: None   Relationships     Social connections     Talks on phone: None     Gets together: None     Attends Druze service: None     Active member of club or organization: None     Attends meetings of clubs or organizations: None     Relationship status: None     Intimate partner violence     Fear of current or ex partner: None     Emotionally abused: None     Physically abused: None     Forced sexual activity: None   Other Topics Concern     Parent/sibling w/ CABG, MI or angioplasty before 65F 55M? No   Social History Narrative     None       Review of Systems:  Skin:  Negative       Eyes:  Negative glasses readers; cataract extraction of both eyes  ENT:  Negative      Respiratory:  Negative shortness of breath;dyspnea on exertion;cough;wheezing     Cardiovascular:  Negative      Gastroenterology: Negative   indigestion  Genitourinary:  Negative      Musculoskeletal:  Positive for back pain;joint pain;joint swelling;joint stiffness left hip replacement  Neurologic:  Positive for numbness or tingling of feet left foot  Psychiatric:  Negative      Heme/Lymph/Imm:  Negative      Endocrine:  Negative        Physical Exam:  Vitals: /77   Pulse 65   Wt 80.3 kg (177 lb 1.6 oz)   BMI 24.70 kg/m      Constitutional:  cooperative;in no acute distress        Skin:  no apparent skin lesions or masses noted          Head:  no masses or lesions        Eyes:  pupils equal and round;conjunctivae and lids unremarkable        Lymph:      ENT:  no pallor or cyanosis         Neck:  JVP normal;no carotid bruit        Respiratory:  clear to auscultation         Cardiac: regular rhythm       systolic ejection murmur;grade 3        pulses full and equal                                        GI:  abdomen soft;non-tender        Extremities and Muscular Skeletal:  no edema              Neurological:  no gross motor deficits        Psych:  Alert and Oriented x 3        CC  No referring provider defined for this encounter.                Thank you for allowing me to participate in the care of your patient.      Sincerely,     Hunter Adan MD, MD     [facility]    cc:   No referring provider defined for this encounter.

## 2020-08-13 NOTE — PROGRESS NOTES
Service Date: 08/13/2020      CONSULTATION       REASON FOR CONSULTATION:  Severe symptomatic aortic valve stenosis.      REFERRING PHYSICIAN:  Dr. Pool Iglesias      HISTORY OF PRESENT ILLNESS:  I had the pleasure of seeing Mars Reyes at the [facility] in [place] this afternoon.  He is a very pleasant 79-year-old gentleman with history of aortic stenosis, atrial flutter status post prior cardioversions, pulmonary hypertension, moderate mitral valve regurgitation, hyperlipidemia, thrombocytopenia and monoclonal gammopathy of unknown significance who is referred for further evaluation regarding aortic valve stenosis.      He had an echocardiogram recently which demonstrated progressive aortic valve stenosis which is now in the severe range.  His LV function is preserved and his mean gradient across the aortic valve is 38 mmHg.  His valve area is 0.99.  The patient currently does not endorse chest pain, palpitations, presyncope or syncope.  He also does not endorse significant fatigue.  He used to be pretty active in the past but over the last 3 months, due to the coronavirus pandemic, he has not been as active.  He walks with his wife, usually a mile to mile and a half on a daily basis.  With those walks, he does not endorse significant symptoms, although he gets short of breath if he climbs a hill.      REVIEW OF SYSTEMS:  A comprehensive review of systems was performed and essentially negative, except as mentioned in the HPI.      IMPRESSION AND PLAN:   1.  Severe aortic valve stenosis.   2.  Pulmonary hypertension.   3.  Atrial flutter.   4.  Hyperlipidemia.   5.  MGUS.   6.  Thrombocytopenia.   7.  Moderate mitral valve regurgitation.      The patient's aortic valve stenosis has progressed and is currently in the severe range.  He is not endorsing whole lot of symptoms at this point, but I am not convinced that he is not significantly symptomatic.  He appears to have curtailed his  activities over the last 3-4 months.  He also does have some exertional dyspnea, especially if he climbs flights of stairs.  To prove that he is truly symptomatic, we will have him undergo exercise treadmill test next week.  If he becomes symptomatic with the treadmill test, then we will proceed with our TAVR workup.  He will need right and left heart catheterization as well as CT TAVR.      He is chronically on Eliquis which I recommended that he continue.  He will need to see colleagues from [facility] to further discuss his candidacy for aortic valve replacement.  Given his advanced age and other comorbidities, I believe he will be best served with TAVR rather than SAVR.      I appreciate the opportunity to participate in his care.         ALIYAH CARDENAS MD             D: 2020   T: 2020   MT: JOSUE      Name:     DESEAN MARTINEZ   MRN:      [id number]        Account:      [id number]   :      1940           Service Date: 2020      Document: [id number]

## 2020-08-14 ENCOUNTER — HOSPITAL ENCOUNTER (OUTPATIENT)
Dept: CARDIOLOGY | Facility: CLINIC | Age: 80
Discharge: HOME | End: 2020-08-14
Attending: INTERNAL MEDICINE | Admitting: INTERNAL MEDICINE
Payer: COMMERCIAL

## 2020-08-14 DIAGNOSIS — I35.0 NONRHEUMATIC AORTIC VALVE STENOSIS: ICD-10-CM

## 2020-08-14 PROCEDURE — 93017 CV STRESS TEST TRACING ONLY: CPT

## 2020-08-14 PROCEDURE — 93018 CV STRESS TEST I&R ONLY: CPT | Performed by: INTERNAL MEDICINE

## 2020-08-14 PROCEDURE — 93016 CV STRESS TEST SUPVJ ONLY: CPT | Performed by: INTERNAL MEDICINE

## 2020-08-17 ENCOUNTER — TELEPHONE (OUTPATIENT)
Dept: CARDIOLOGY | Facility: CLINIC | Age: 80
End: 2020-08-17

## 2020-08-17 DIAGNOSIS — I35.0 NONRHEUMATIC AORTIC VALVE STENOSIS: Primary | ICD-10-CM

## 2020-08-17 NOTE — TELEPHONE ENCOUNTER
Exercise stress test reviewed with Dr. Adan - he states he discussed test with Dr. Menezes. He thinks we should proceed with TAVR. Pt did exercise almost 13 min, but had blunted BP response and frequent PVCs. Will proceed with angiogram for TAVR workup.    I telephoned patient with these results and recommendations. Pt verbalized understanding. Would like to complete in [place] if possible. Scheduling to contact patient to set-up.    Carmen Mckenna RN  [facility]

## 2020-08-19 DIAGNOSIS — Z11.59 ENCOUNTER FOR SCREENING FOR OTHER VIRAL DISEASES: Primary | ICD-10-CM

## 2020-08-20 ENCOUNTER — TELEPHONE (OUTPATIENT)
Dept: CARDIOLOGY | Facility: CLINIC | Age: 80
End: 2020-08-20

## 2020-08-20 DIAGNOSIS — I35.0 NONRHEUMATIC AORTIC VALVE STENOSIS: Primary | ICD-10-CM

## 2020-08-20 RX ORDER — LIDOCAINE 40 MG/G
CREAM TOPICAL
Status: CANCELLED | OUTPATIENT
Start: 2020-08-20

## 2020-08-20 RX ORDER — POTASSIUM CHLORIDE 1500 MG/1
20 TABLET, EXTENDED RELEASE ORAL
Status: CANCELLED | OUTPATIENT
Start: 2020-08-20

## 2020-08-20 RX ORDER — SODIUM CHLORIDE 9 MG/ML
INJECTION, SOLUTION INTRAVENOUS CONTINUOUS
Status: CANCELLED | OUTPATIENT
Start: 2020-08-20

## 2020-08-20 NOTE — TELEPHONE ENCOUNTER
Spoke with Mars in regards to their up coming procedure scheduled for 8/25. They know to report at the [facility] desk at 10 am for a procedure time of 12 pm. He/she will remain NPO after midnight the night before the procedure and will take morning meds with small sip of water. He will need to holdeliquis for 2 days pre procedure. He knows that the Covid team will be in touch with the details about setting up their testing per procedure. I also informed them of the change in the visitor policy and that one person is allowed to come with them. I asked that they and their visitor wear a mask to the hospital and if they do not have one, one will be given to them at the front door. They are also aware that they and their visitor will be screened for Covid upon arrival at the hospital. He/she will be given conscious sedation for the procedure and therefore will need to have a . No further questions at this time.Leonides Gardner RN

## 2020-08-21 DIAGNOSIS — Z11.59 ENCOUNTER FOR SCREENING FOR OTHER VIRAL DISEASES: ICD-10-CM

## 2020-08-21 PROCEDURE — U0003 INFECTIOUS AGENT DETECTION BY NUCLEIC ACID (DNA OR RNA); SEVERE ACUTE RESPIRATORY SYNDROME CORONAVIRUS 2 (SARS-COV-2) (CORONAVIRUS DISEASE [COVID-19]), AMPLIFIED PROBE TECHNIQUE, MAKING USE OF HIGH THROUGHPUT TECHNOLOGIES AS DESCRIBED BY CMS-2020-01-R: HCPCS | Performed by: FAMILY MEDICINE

## 2020-08-22 LAB
SARS-COV-2 RNA SPEC QL NAA+PROBE: NORMAL
SPECIMEN SOURCE: NORMAL

## 2020-08-23 LAB
LABORATORY COMMENT REPORT: NORMAL
SARS-COV-2 RNA SPEC QL NAA+PROBE: NEGATIVE
SPECIMEN SOURCE: NORMAL

## 2020-08-25 ENCOUNTER — HOSPITAL ENCOUNTER (OUTPATIENT)
Facility: CLINIC | Age: 80
Discharge: HOME | End: 2020-08-25
Admitting: INTERNAL MEDICINE
Payer: COMMERCIAL

## 2020-08-25 ENCOUNTER — SURGERY (OUTPATIENT)
Age: 80
End: 2020-08-25
Payer: COMMERCIAL

## 2020-08-25 VITALS
HEART RATE: 59 BPM | WEIGHT: 175 LBS | HEIGHT: 70 IN | BODY MASS INDEX: 25.05 KG/M2 | RESPIRATION RATE: 16 BRPM | SYSTOLIC BLOOD PRESSURE: 141 MMHG | TEMPERATURE: 95.2 F | OXYGEN SATURATION: 99 % | DIASTOLIC BLOOD PRESSURE: 70 MMHG

## 2020-08-25 DIAGNOSIS — I35.0 NONRHEUMATIC AORTIC VALVE STENOSIS: ICD-10-CM

## 2020-08-25 PROBLEM — Z98.890 STATUS POST CORONARY ANGIOGRAM: Status: ACTIVE | Noted: 2020-08-25

## 2020-08-25 LAB
ANION GAP SERPL CALCULATED.3IONS-SCNC: 4 MMOL/L (ref 3–14)
APTT PPP: 26 SEC (ref 22–37)
BASE DEFICIT BLDA-SCNC: 1.5 MMOL/L
BASE EXCESS BLDV CALC-SCNC: 0.1 MMOL/L
BUN SERPL-MCNC: 13 MG/DL (ref 7–30)
CALCIUM SERPL-MCNC: 8.9 MG/DL (ref 8.5–10.1)
CHLORIDE SERPL-SCNC: 105 MMOL/L (ref 94–109)
CO2 SERPL-SCNC: 27 MMOL/L (ref 20–32)
CREAT SERPL-MCNC: 0.87 MG/DL (ref 0.66–1.25)
ERYTHROCYTE [DISTWIDTH] IN BLOOD BY AUTOMATED COUNT: 14.3 % (ref 10–15)
GFR SERPL CREATININE-BSD FRML MDRD: 82 ML/MIN/{1.73_M2}
GLUCOSE SERPL-MCNC: 98 MG/DL (ref 70–99)
HCO3 BLD-SCNC: 23 MMOL/L (ref 21–28)
HCO3 BLDV-SCNC: 26 MMOL/L (ref 21–28)
HCT VFR BLD AUTO: 41.3 % (ref 40–53)
HGB BLD-MCNC: 13.1 G/DL (ref 13.3–17.7)
INR PPP: 0.96 (ref 0.86–1.14)
MCH RBC QN AUTO: 28.3 PG (ref 26.5–33)
MCHC RBC AUTO-ENTMCNC: 31.7 G/DL (ref 31.5–36.5)
MCV RBC AUTO: 89 FL (ref 78–100)
O2/TOTAL GAS SETTING VFR VENT: ABNORMAL %
O2/TOTAL GAS SETTING VFR VENT: NORMAL %
OXYHGB MFR BLD: 94 % (ref 92–100)
OXYHGB MFR BLDV: 58 %
PCO2 BLD: 36 MM HG (ref 35–45)
PCO2 BLDV: 45 MM HG (ref 40–50)
PH BLD: 7.41 PH (ref 7.35–7.45)
PH BLDV: 7.37 PH (ref 7.32–7.43)
PLATELET # BLD AUTO: 76 10E9/L (ref 150–450)
PO2 BLD: 74 MM HG (ref 80–105)
PO2 BLDV: 32 MM HG (ref 25–47)
POTASSIUM SERPL-SCNC: 4.3 MMOL/L (ref 3.4–5.3)
RBC # BLD AUTO: 4.63 10E12/L (ref 4.4–5.9)
SODIUM SERPL-SCNC: 136 MMOL/L (ref 133–144)
WBC # BLD AUTO: 7.6 10E9/L (ref 4–11)

## 2020-08-25 PROCEDURE — 85610 PROTHROMBIN TIME: CPT | Performed by: INTERNAL MEDICINE

## 2020-08-25 PROCEDURE — 99152 MOD SED SAME PHYS/QHP 5/>YRS: CPT | Performed by: INTERNAL MEDICINE

## 2020-08-25 PROCEDURE — 25000128 H RX IP 250 OP 636: Performed by: INTERNAL MEDICINE

## 2020-08-25 PROCEDURE — 25800030 ZZH RX IP 258 OP 636: Performed by: INTERNAL MEDICINE

## 2020-08-25 PROCEDURE — 25000125 ZZHC RX 250: Performed by: INTERNAL MEDICINE

## 2020-08-25 PROCEDURE — 99153 MOD SED SAME PHYS/QHP EA: CPT | Performed by: INTERNAL MEDICINE

## 2020-08-25 PROCEDURE — 85027 COMPLETE CBC AUTOMATED: CPT | Performed by: INTERNAL MEDICINE

## 2020-08-25 PROCEDURE — C1887 CATHETER, GUIDING: HCPCS | Performed by: INTERNAL MEDICINE

## 2020-08-25 PROCEDURE — 27210794 ZZH OR GENERAL SUPPLY STERILE: Performed by: INTERNAL MEDICINE

## 2020-08-25 PROCEDURE — 82805 BLOOD GASES W/O2 SATURATION: CPT | Performed by: INTERNAL MEDICINE

## 2020-08-25 PROCEDURE — 25000132 ZZH RX MED GY IP 250 OP 250 PS 637: Performed by: INTERNAL MEDICINE

## 2020-08-25 PROCEDURE — 93005 ELECTROCARDIOGRAM TRACING: CPT

## 2020-08-25 PROCEDURE — 99152 MOD SED SAME PHYS/QHP 5/>YRS: CPT | Mod: 59 | Performed by: INTERNAL MEDICINE

## 2020-08-25 PROCEDURE — 85730 THROMBOPLASTIN TIME PARTIAL: CPT | Performed by: INTERNAL MEDICINE

## 2020-08-25 PROCEDURE — 93460 R&L HRT ART/VENTRICLE ANGIO: CPT | Mod: 26 | Performed by: INTERNAL MEDICINE

## 2020-08-25 PROCEDURE — 93010 ELECTROCARDIOGRAM REPORT: CPT | Mod: 76 | Performed by: INTERNAL MEDICINE

## 2020-08-25 PROCEDURE — C1769 GUIDE WIRE: HCPCS | Performed by: INTERNAL MEDICINE

## 2020-08-25 PROCEDURE — C1760 CLOSURE DEV, VASC: HCPCS | Performed by: INTERNAL MEDICINE

## 2020-08-25 PROCEDURE — 40000065 ZZH STATISTIC EKG NON-CHARGEABLE

## 2020-08-25 PROCEDURE — 80048 BASIC METABOLIC PNL TOTAL CA: CPT | Performed by: INTERNAL MEDICINE

## 2020-08-25 PROCEDURE — 93460 R&L HRT ART/VENTRICLE ANGIO: CPT | Performed by: INTERNAL MEDICINE

## 2020-08-25 RX ORDER — POTASSIUM CHLORIDE 1500 MG/1
20 TABLET, EXTENDED RELEASE ORAL
Status: DISCONTINUED | OUTPATIENT
Start: 2020-08-25 | End: 2020-08-25 | Stop reason: HOSPADM

## 2020-08-25 RX ORDER — ATROPINE SULFATE 0.1 MG/ML
0.5 INJECTION INTRAVENOUS
Status: DISCONTINUED | OUTPATIENT
Start: 2020-08-25 | End: 2020-08-25 | Stop reason: HOSPADM

## 2020-08-25 RX ORDER — LIDOCAINE 40 MG/G
CREAM TOPICAL
Status: DISCONTINUED | OUTPATIENT
Start: 2020-08-25 | End: 2020-08-25 | Stop reason: HOSPADM

## 2020-08-25 RX ORDER — ACETAMINOPHEN 325 MG/1
650 TABLET ORAL EVERY 4 HOURS PRN
Status: DISCONTINUED | OUTPATIENT
Start: 2020-08-25 | End: 2020-08-25 | Stop reason: HOSPADM

## 2020-08-25 RX ORDER — NALOXONE HYDROCHLORIDE 0.4 MG/ML
.2-.4 INJECTION, SOLUTION INTRAMUSCULAR; INTRAVENOUS; SUBCUTANEOUS
Status: DISCONTINUED | OUTPATIENT
Start: 2020-08-25 | End: 2020-08-25 | Stop reason: HOSPADM

## 2020-08-25 RX ORDER — SODIUM CHLORIDE 9 MG/ML
INJECTION, SOLUTION INTRAVENOUS CONTINUOUS
Status: DISCONTINUED | OUTPATIENT
Start: 2020-08-25 | End: 2020-08-25 | Stop reason: HOSPADM

## 2020-08-25 RX ORDER — FLUMAZENIL 0.1 MG/ML
0.2 INJECTION, SOLUTION INTRAVENOUS
Status: DISCONTINUED | OUTPATIENT
Start: 2020-08-25 | End: 2020-08-25 | Stop reason: HOSPADM

## 2020-08-25 RX ORDER — NITROGLYCERIN 5 MG/ML
VIAL (ML) INTRAVENOUS
Status: DISCONTINUED
Start: 2020-08-25 | End: 2020-08-25 | Stop reason: HOSPADM

## 2020-08-25 RX ORDER — LIDOCAINE HYDROCHLORIDE 10 MG/ML
INJECTION, SOLUTION EPIDURAL; INFILTRATION; INTRACAUDAL; PERINEURAL
Status: DISCONTINUED
Start: 2020-08-25 | End: 2020-08-25 | Stop reason: HOSPADM

## 2020-08-25 RX ORDER — NALOXONE HYDROCHLORIDE 0.4 MG/ML
.1-.4 INJECTION, SOLUTION INTRAMUSCULAR; INTRAVENOUS; SUBCUTANEOUS
Status: DISCONTINUED | OUTPATIENT
Start: 2020-08-25 | End: 2020-08-25 | Stop reason: HOSPADM

## 2020-08-25 RX ORDER — FENTANYL CITRATE 50 UG/ML
INJECTION, SOLUTION INTRAMUSCULAR; INTRAVENOUS
Status: DISCONTINUED
Start: 2020-08-25 | End: 2020-08-25 | Stop reason: HOSPADM

## 2020-08-25 RX ORDER — FENTANYL CITRATE 50 UG/ML
25-50 INJECTION, SOLUTION INTRAMUSCULAR; INTRAVENOUS
Status: ACTIVE | OUTPATIENT
Start: 2020-08-25 | End: 2020-08-25

## 2020-08-25 RX ORDER — HEPARIN SODIUM 1000 [USP'U]/ML
INJECTION, SOLUTION INTRAVENOUS; SUBCUTANEOUS
Status: DISCONTINUED
Start: 2020-08-25 | End: 2020-08-25 | Stop reason: HOSPADM

## 2020-08-25 RX ORDER — IOPAMIDOL 755 MG/ML
INJECTION, SOLUTION INTRAVASCULAR
Status: DISCONTINUED | OUTPATIENT
Start: 2020-08-25 | End: 2020-08-25 | Stop reason: HOSPADM

## 2020-08-25 RX ORDER — FENTANYL CITRATE 50 UG/ML
INJECTION, SOLUTION INTRAMUSCULAR; INTRAVENOUS
Status: DISCONTINUED | OUTPATIENT
Start: 2020-08-25 | End: 2020-08-25 | Stop reason: HOSPADM

## 2020-08-25 RX ADMIN — LIDOCAINE HYDROCHLORIDE 10 ML: 10 INJECTION, SOLUTION EPIDURAL; INFILTRATION; INTRACAUDAL; PERINEURAL at 12:04

## 2020-08-25 RX ADMIN — IOPAMIDOL 200 ML: 755 INJECTION, SOLUTION INTRAVENOUS at 13:18

## 2020-08-25 RX ADMIN — ASPIRIN 325 MG: 325 TABLET, DELAYED RELEASE ORAL at 10:44

## 2020-08-25 RX ADMIN — FENTANYL CITRATE 25 MCG: 50 INJECTION, SOLUTION INTRAMUSCULAR; INTRAVENOUS at 12:57

## 2020-08-25 RX ADMIN — MIDAZOLAM 0.5 MG: 1 INJECTION INTRAMUSCULAR; INTRAVENOUS at 12:57

## 2020-08-25 RX ADMIN — FENTANYL CITRATE 25 MCG: 50 INJECTION, SOLUTION INTRAMUSCULAR; INTRAVENOUS at 12:03

## 2020-08-25 RX ADMIN — MIDAZOLAM 1 MG: 1 INJECTION INTRAMUSCULAR; INTRAVENOUS at 12:04

## 2020-08-25 RX ADMIN — SODIUM CHLORIDE: 9 INJECTION, SOLUTION INTRAVENOUS at 10:45

## 2020-08-25 ASSESSMENT — MIFFLIN-ST. JEOR: SCORE: 1520.04

## 2020-08-25 NOTE — PROVIDER NOTIFICATION
"Per tele-patient is SB, HR 50's with a 2 sec pause and possibly a 2nd degree block-difficult for tech to tell.  EKG ordered. Cath lab informed. Patient denies chest pain/pressure/tightness/SOB/Nausea/Diaphoresis, VSS, and femoral site WNL  /77 (BP Location: Left arm)   Pulse 57   Temp 97.8  F (36.6  C) (Oral)   Resp 16   Ht 1.778 m (5' 10\")   Wt 79.4 kg (175 lb)   SpO2 98%   BMI 25.11 kg/m       Addendum 14:45: EKG obtained, Dr Iglesias Cardiologist at bedside and Saw EKG results-no new orders, will continue to monitor.   "

## 2020-08-25 NOTE — Clinical Note
dry, intact, no bleeding and no hematoma. Artery--Closure device, angioseal.  Venous--manual pressure

## 2020-08-25 NOTE — PROGRESS NOTES
PRIMARY DIAGNOSIS: TR BAND RECOVERY   OUTPATIENT/OBSERVATION GOALS TO BE MET BEFORE DISCHARGE:  1. TR band status: N/A- femoral approach   2. Radial pulse and CMS (circulation, motion, sensation) are WDL: Femoral approach but cms intact   3. Bleeding or hematoma present at site: No      4. Activity restriction education reviewed with patient: Yes.   5. Stable vital signs:  Yes  6. ADLs back to baseline:  Yes  7. Activity and level of assistance: Up with standby assistance.  8. Interpretation of rhythm per telemetry tech: Normal sinus  and Sinus bradycardia     Discharge Planner Nurse   Safe discharge environment identified: Yes  Barriers to discharge: No       Entered by: Mary Blum 08/25/2020 4:22 PM     Please review provider order for any additional goals.   Nurse to notify provider when observation goals have been met and patient is ready for discharge.

## 2020-08-25 NOTE — PRE-PROCEDURE
GENERAL PRE-PROCEDURE:   Procedure:  L/R heart cath poss intervention  Date/Time:  8/25/2020 11:28 AM    Written consent obtained?: Yes    Risks and benefits: Risks, benefits and alternatives were discussed    Consent given by:  Patient  Patient states understanding of procedure being performed: Yes    Patient's understanding of procedure matches consent: Yes    Procedure consent matches procedure scheduled: Yes    Appropriately NPO:  Yes  ASA Class:  Class 3- Severe systemic disease, definite functional limitations  Mallampati  :  Grade 2- soft palate, base of uvula, tonsillar pillars, and portion of posterior pharyngeal wall visible  Lungs:  Lungs clear with good breath sounds bilaterally  Heart:  Systolic murmur  History & Physical reviewed:  History and physical reviewed and no updates needed  Statement of review:  I have reviewed the lab findings, diagnostic data, medications, and the plan for sedation  risk benefit discussed  Pt wasn't aware of pulm htn from echo so I did discuss with him that finding and why we are doing RHC and could be all secondary to aortic stenosis

## 2020-08-25 NOTE — PROGRESS NOTES
Patient's After Visit Summary was reviewed with patient   Patient verbalized understanding of After Visit Summary, recommended follow up and was given an opportunity to ask questions.   Discharge medications sent home with patient/family: Not applicable   Discharged with spouse    OBSERVATION patient END time:[id number]

## 2020-08-26 LAB
INTERPRETATION ECG - MUSE: NORMAL
INTERPRETATION ECG - MUSE: NORMAL

## 2020-08-28 ENCOUNTER — TELEPHONE (OUTPATIENT)
Dept: CARDIOLOGY | Facility: CLINIC | Age: 80
End: 2020-08-28

## 2020-08-28 NOTE — TELEPHONE ENCOUNTER
Brandt Iglesias MD Dempsey, Debora A, RN    Cc: Loco, MD Chato Landry,     This patient had angiogram today at [facility] with Dr. Yuan.  No severe coronary disease.  His aortic stenosis is in the early severe range.  Symptoms are quite minimal.  He does have some pulmonary hypertension.  It looks like TAVR work-up is now complete.    I think he could be presented at TAVR conference in the next few weeks.  No urgency to replacing the valve, may be sometime this fall.     Thanks,  Pool            Above message reviewed with Dr. Adan, he is in agreement. Pt can follow-up in a few months to assess symptoms. I telephoned patient to review these recommendations. He is in agreement. He and his wife winter in [place], [place] from Oct or Nov-April or May. Appt scheduled in October with Dr. Adan to reassess symptoms prior to departure.   Inquired about access sites from angiogram 8/25/20. He reports bruising on wrist, but no bumps or raised areas. Has not grown in size. States he is feeling well, no issues. Has my direct contact information for any other questions or concerns.    Carmen Mckenna RN  [facility]

## 2020-09-21 DIAGNOSIS — I48.3 TYPICAL ATRIAL FLUTTER (H): ICD-10-CM

## 2020-10-08 ENCOUNTER — OFFICE VISIT (OUTPATIENT)
Dept: CARDIOLOGY | Facility: CLINIC | Age: 80
End: 2020-10-08
Payer: COMMERCIAL

## 2020-10-08 VITALS
BODY MASS INDEX: 24.98 KG/M2 | SYSTOLIC BLOOD PRESSURE: 112 MMHG | WEIGHT: 178.4 LBS | DIASTOLIC BLOOD PRESSURE: 66 MMHG | HEIGHT: 71 IN | HEART RATE: 70 BPM

## 2020-10-08 DIAGNOSIS — I35.0 SEVERE AORTIC STENOSIS: Primary | ICD-10-CM

## 2020-10-08 PROCEDURE — 99214 OFFICE O/P EST MOD 30 MIN: CPT | Performed by: INTERNAL MEDICINE

## 2020-10-08 ASSESSMENT — MIFFLIN-ST. JEOR: SCORE: 1551.35

## 2020-10-08 NOTE — LETTER
10/8/2020    Pb Warren MD  [address] [facility] [address]    RE: Mars Reyes       Dear Colleague,    I had the pleasure of seeing Mars Reyes in the [facility].    HPI and Plan:   See dictation    No orders of the defined types were placed in this encounter.      No orders of the defined types were placed in this encounter.      There are no discontinued medications.      Encounter Diagnosis   Name Primary?     Severe aortic stenosis Yes       CURRENT MEDICATIONS:  Current Outpatient Medications   Medication Sig Dispense Refill     acetaminophen (TYLENOL) 325 MG tablet Take 325-650 mg by mouth every 6 hours as needed for mild pain       allopurinol (ZYLOPRIM) 100 MG tablet TAKE 1 TABLET BY MOUTH EVERY ONCE DAILY 90 tablet 3     amoxicillin (AMOXIL) 500 MG tablet Take 2,000 mg by mouth daily as needed (1 hour prior to dental procedures)       apixaban ANTICOAGULANT (ELIQUIS ANTICOAGULANT) 5 MG tablet Take 1 tablet (5 mg) by mouth 2 times daily 180 tablet 3     carvedilol (COREG) 3.125 MG tablet Take 1 tablet (3.125 mg) by mouth 2 times daily (with meals) (Patient taking differently: Take 3.125 mg by mouth daily ) 60 tablet 5     lisinopril (ZESTRIL) 20 MG tablet Take 1 tablet (20 mg) by mouth daily 90 tablet 3     pravastatin (PRAVACHOL) 20 MG tablet Take 1 tablet (20 mg) by mouth daily 90 tablet 3       ALLERGIES     Allergies   Allergen Reactions     Simvastatin      10/26/12: Arm pain - pt stopped it x 4-5 wks and arm pain didn't resolve sore started it        PAST MEDICAL HISTORY:  Past Medical History:   Diagnosis Date     Aortic stenosis     moderate     Atypical atrial flutter (H)     PVI and LA linear ablation with successful termination of AFL 10/18/2019     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, unspecified cause, unspecified laterality      Contact dermatitis and other eczema, due to unspecified cause      Diverticulosis of colon (without mention of  hemorrhage)      DJD (degenerative joint disease)      Essential hypertension, benign     abstracted 7/22/02     Gout, unspecified      Hip joint replacement by other means 6/3/2009     MGUS (monoclonal gammopathy of unknown significance)      Mitral valve disease     mild MS, moderate-severe MR     PSVT (paroxysmal supraventricular tachycardia) (H)      Pulmonary HTN (H) 10/18/2011     Thrombocytopenia (H)        PAST SURGICAL HISTORY:  Past Surgical History:   Procedure Laterality Date     ANESTHESIA CARDIOVERSION N/A 3/21/2019    Procedure: ANESTHESIA CARDIOVERSION;  Surgeon: GENERIC ANESTHESIA PROVIDER;  Location: [facility] OR     ARTHROPLASTY REVISION HIP ANTERIOR Left 9/9/2019    Procedure: REVISION DIRECT ANTERIOR TOTAL LEFT HIP ARTHROPLASTY;  Surgeon: Luisito Hammonds MD;  Location:  OR     BIOPSY  3/2017     BONE MARROW BIOPSY, BONE SPECIMEN, NEEDLE/TROCAR Right 2/23/2017    Procedure: BIOPSY BONE MARROW;  Surgeon: Romie Esquivel MD;  Location:  GI     COLONOSCOPY  2010     CV HEART CATHETERIZATION WITH POSSIBLE INTERVENTION N/A 8/25/2020    Procedure: Coronary Angiogram;  Surgeon: Brian Simmons MD;  Location:  HEART CARDIAC CATH LAB     CV LEFT HEART CATH N/A 8/25/2020    Procedure: Left Heart Cath;  Surgeon: Brian Simmons MD;  Location:  HEART CARDIAC CATH LAB     CV RIGHT HEART CATH N/A 8/25/2020    Procedure: Right Heart Cath;  Surgeon: Brian Simmons MD;  Location:  HEART CARDIAC CATH LAB     EP 3D TACHYCARDIA MAPPING N/A 10/18/2019    Procedure: EP 3D Tachycardia Mapping;  Surgeon: Delfino Gee MD;  Location:  HEART CARDIAC CATH LAB     EP ABLATION FOCAL AFIB N/A 10/18/2019    Procedure: EP Ablation Focal AFIB;  Surgeon: Delfino Gee MD;  Location:  HEART CARDIAC CATH LAB     EYE SURGERY      Cataract, both eyes     ORTHOPEDIC SURGERY  2009    (L) total hip       FAMILY HISTORY:  Family History   Problem Relation Age of Onset     Hypertension Mother        SOCIAL  "HISTORY:  Social History     Socioeconomic History     Marital status:      Spouse name: None     Number of children: None     Years of education: None     Highest education level: None   Occupational History     None   Social Needs     Financial resource strain: None     Food insecurity     Worry: None     Inability: None     Transportation needs     Medical: None     Non-medical: None   Tobacco Use     Smoking status: Never Smoker     Smokeless tobacco: Never Used   Substance and Sexual Activity     Alcohol use: Yes     Alcohol/week: 0.0 standard drinks     Comment: Max 2 beers per day     Drug use: No     Sexual activity: Yes     Partners: Female     Birth control/protection: None   Lifestyle     Physical activity     Days per week: None     Minutes per session: None     Stress: None   Relationships     Social connections     Talks on phone: None     Gets together: None     Attends Church service: None     Active member of club or organization: None     Attends meetings of clubs or organizations: None     Relationship status: None     Intimate partner violence     Fear of current or ex partner: None     Emotionally abused: None     Physically abused: None     Forced sexual activity: None   Other Topics Concern     Parent/sibling w/ CABG, MI or angioplasty before 65F 55M? No   Social History Narrative     None       Review of Systems:  Skin:  Negative       Eyes:  Positive for glasses    ENT:  Negative      Respiratory:  Negative       Cardiovascular:  Negative      Gastroenterology: Negative      Genitourinary:  Negative      Musculoskeletal:  Positive for back pain;joint pain    Neurologic:  Negative      Psychiatric:  Negative      Heme/Lymph/Imm:  Negative      Endocrine:  Negative        Physical Exam:  Vitals: /66 (BP Location: Right arm, Patient Position: Sitting, Cuff Size: Adult Regular)   Pulse 70   Ht 1.803 m (5' 11\")   Wt 80.9 kg (178 lb 6.4 oz)   BMI 24.88 kg/m      Constitutional: "  cooperative;in no acute distress        Skin:  no apparent skin lesions or masses noted          Head:  no masses or lesions        Eyes:  pupils equal and round;conjunctivae and lids unremarkable        Lymph:      ENT:  no pallor or cyanosis        Neck:  JVP normal;no carotid bruit        Respiratory:  clear to auscultation         Cardiac: regular rhythm       systolic ejection murmur;grade 3        pulses full and equal                                        GI:  abdomen soft;non-tender        Extremities and Muscular Skeletal:  no edema              Neurological:  no gross motor deficits        Psych:  Alert and Oriented x 3        CC  Brandt Iglesias MD  [place] [address] [place] [address]                  Thank you for allowing me to participate in the care of your patient.      Sincerely,     Hunter Adan MD, MD     [facility]    cc:   Brandt Iglesias MD  [place] [address] [place] [address]

## 2020-10-08 NOTE — PROGRESS NOTES
Service Date: 10/08/2020      REASON FOR CONSULTATION:  Severe symptomatic aortic valve stenosis.      REFERRING PHYSICIAN:  Pool Iglesias MD      HISTORY OF PRESENT ILLNESS:  I had the pleasure of again seeing Mars Reyes at the [facility] in [place] this morning.  He is a very pleasant 79-year-old gentleman with history of severe aortic valve stenosis, atrial flutter status post cardioversions, pulmonary hypertension, moderate mitral valve regurgitation, hyperlipidemia, thrombocytopenia and monoclonal gammopathy of unknown significance, who is here for followup.      I saw him in August of this year for an evaluation of severe aortic valve stenosis.  Echocardiogram obtained at that time demonstrated severe aortic stenosis with a mean gradient of 30 mmHg and a valve area of 0.99.  At that time, the patient was not having any significant symptoms.  Therefore, we had him undergo exercise stress test.  He exercised 12 minutes on a modified Jp protocol.  He developed fatigue with exercise.  Additionally, he had a blunted blood pressure response to exercise.  Given this, he proceeded to have right and left heart catheterization.  This demonstrated no significant obstructive coronary artery disease.  His right-sided filling pressures were moderately elevated.  His left-sided filling pressures are also moderately elevated.  He had moderate pulmonary hypertension with mean PA of 44 mmHg.  Valve area per catheterization was 0.64.  CT TAVR obtained in 08/2020 showed trileaflet aortic valve with a calcium score of 2800.      Since our last visit, the patient has remained active.  He is endorsing a little more shortness of breath and fatigue since our last visit, he does not endorse chest pain.  He does not endorse palpitations, presyncope or syncope.      ASSESSMENT AND PLAN:   1.  Severe symptomatic aortic valve stenosis.   2.  Moderate pulmonary hypertension.   3.  Atrial flutter status  post prior cardioversion.   4.  Hyperlipidemia.   5.  MGUS.   6.  Thrombocytopenia.   7.  Moderate mitral valve regurgitation.      I reviewed the patient's recent cardiac evaluations, including CT TAVR, right and left heart catheterization, echo and a treadmill stress test.  Given the echo findings as well as the calcium score and CT, I have no doubt the patient has severe aortic valve stenosis.  He is having more symptoms now compared to when I saw him in the summer.  Additionally, his stress test was abnormal given the fact that his blood pressure did not augment.  Given his symptoms and the abnormal stress test, I would recommend aortic valve replacement.  Given his advanced age and other comorbidities, he would be at high risk of adverse events from open heart surgery.  As such, I believe he will be better served with transcatheter aortic valve replacement.      We will present him in our TAVR conference next week to discuss his candidacy.  He will also need to be seen by a cardiac surgeon.  We will likely plan for transcatheter aortic valve replacement within the next month.      I appreciate the opportunity to participate in his care.         ALIYAH CARDENAS MD             D: 10/08/2020   T: 10/08/2020   MT: AYLA      Name:     DESEAN MARTINEZ   MRN:      [id number]        Account:      [id number]   :      1940           Service Date: 10/08/2020      Document: [id number]

## 2020-10-08 NOTE — LETTER
10/8/2020      Pb Warren MD  [address] [facility] [address]      RE: Mars Reyes       Dear Colleague,    I had the pleasure of seeing Mars Reyes in the [facility].    Service Date: 10/08/2020      REASON FOR CONSULTATION:  Severe symptomatic aortic valve stenosis.      REFERRING PHYSICIAN:  Pool Iglesias MD      HISTORY OF PRESENT ILLNESS:  I had the pleasure of again seeing Mars Reyes at the [facility] in [place] this morning.  He is a very pleasant 79-year-old gentleman with history of severe aortic valve stenosis, atrial flutter status post cardioversions, pulmonary hypertension, moderate mitral valve regurgitation, hyperlipidemia, thrombocytopenia and monoclonal gammopathy of unknown significance, who is here for followup.      I saw him in August of this year for an evaluation of severe aortic valve stenosis.  Echocardiogram obtained at that time demonstrated severe aortic stenosis with a mean gradient of 30 mmHg and a valve area of 0.99.  At that time, the patient was not having any significant symptoms.  Therefore, we had him undergo exercise stress test.  He exercised 12 minutes on a modified Jp protocol.  He developed fatigue with exercise.  Additionally, he had a blunted blood pressure response to exercise.  Given this, he proceeded to have right and left heart catheterization.  This demonstrated no significant obstructive coronary artery disease.  His right-sided filling pressures were moderately elevated.  His left-sided filling pressures are also moderately elevated.  He had moderate pulmonary hypertension with mean PA of 44 mmHg.  Valve area per catheterization was 0.64.  CT TAVR obtained in 08/2020 showed trileaflet aortic valve with a calcium score of 2800.      Since our last visit, the patient has remained active.  He is endorsing a little more shortness of breath and fatigue since our last visit, he does not  endorse chest pain.  He does not endorse palpitations, presyncope or syncope.      ASSESSMENT AND PLAN:   1.  Severe symptomatic aortic valve stenosis.   2.  Moderate pulmonary hypertension.   3.  Atrial flutter status post prior cardioversion.   4.  Hyperlipidemia.   5.  MGUS.   6.  Thrombocytopenia.   7.  Moderate mitral valve regurgitation.      I reviewed the patient's recent cardiac evaluations, including CT TAVR, right and left heart catheterization, echo and a treadmill stress test.  Given the echo findings as well as the calcium score and CT, I have no doubt the patient has severe aortic valve stenosis.  He is having more symptoms now compared to when I saw him in the summer.  Additionally, his stress test was abnormal given the fact that his blood pressure did not augment.  Given his symptoms and the abnormal stress test, I would recommend aortic valve replacement.  Given his advanced age and other comorbidities, he would be at high risk of adverse events from open heart surgery.  As such, I believe he will be better served with transcatheter aortic valve replacement.      We will present him in our TAVR conference next week to discuss his candidacy.  He will also need to be seen by a cardiac surgeon.  We will likely plan for transcatheter aortic valve replacement within the next month.      I appreciate the opportunity to participate in his care.         ALIYAH CARDENAS MD             D: 10/08/2020   T: 10/08/2020   MT: AYLA      Name:     DESEAN MARTINEZ   MRN:      [id number]        Account:      [id number]   :      1940           Service Date: 10/08/2020      Document: [id number]        Outpatient Encounter Medications as of 10/8/2020   Medication Sig Dispense Refill     acetaminophen (TYLENOL) 325 MG tablet Take 325-650 mg by mouth every 6 hours as needed for mild pain       allopurinol (ZYLOPRIM) 100 MG tablet TAKE 1 TABLET BY MOUTH EVERY ONCE DAILY 90 tablet 3     amoxicillin (AMOXIL) 500 MG  tablet Take 2,000 mg by mouth daily as needed (1 hour prior to dental procedures)       apixaban ANTICOAGULANT (ELIQUIS ANTICOAGULANT) 5 MG tablet Take 1 tablet (5 mg) by mouth 2 times daily 180 tablet 3     lisinopril (ZESTRIL) 20 MG tablet Take 1 tablet (20 mg) by mouth daily 90 tablet 3     pravastatin (PRAVACHOL) 20 MG tablet Take 1 tablet (20 mg) by mouth daily 90 tablet 3     [DISCONTINUED] carvedilol (COREG) 3.125 MG tablet Take 1 tablet (3.125 mg) by mouth 2 times daily (with meals) (Patient taking differently: Take 3.125 mg by mouth daily ) 60 tablet 5     No facility-administered encounter medications on file as of 10/8/2020.        Again, thank you for allowing me to participate in the care of your patient.      Sincerely,    Hunter Adan MD, MD     [facility]

## 2020-10-08 NOTE — PROGRESS NOTES
HPI and Plan:   See dictation    No orders of the defined types were placed in this encounter.      No orders of the defined types were placed in this encounter.      There are no discontinued medications.      Encounter Diagnosis   Name Primary?     Severe aortic stenosis Yes       CURRENT MEDICATIONS:  Current Outpatient Medications   Medication Sig Dispense Refill     acetaminophen (TYLENOL) 325 MG tablet Take 325-650 mg by mouth every 6 hours as needed for mild pain       allopurinol (ZYLOPRIM) 100 MG tablet TAKE 1 TABLET BY MOUTH EVERY ONCE DAILY 90 tablet 3     amoxicillin (AMOXIL) 500 MG tablet Take 2,000 mg by mouth daily as needed (1 hour prior to dental procedures)       apixaban ANTICOAGULANT (ELIQUIS ANTICOAGULANT) 5 MG tablet Take 1 tablet (5 mg) by mouth 2 times daily 180 tablet 3     carvedilol (COREG) 3.125 MG tablet Take 1 tablet (3.125 mg) by mouth 2 times daily (with meals) (Patient taking differently: Take 3.125 mg by mouth daily ) 60 tablet 5     lisinopril (ZESTRIL) 20 MG tablet Take 1 tablet (20 mg) by mouth daily 90 tablet 3     pravastatin (PRAVACHOL) 20 MG tablet Take 1 tablet (20 mg) by mouth daily 90 tablet 3       ALLERGIES     Allergies   Allergen Reactions     Simvastatin      10/26/12: Arm pain - pt stopped it x 4-5 wks and arm pain didn't resolve sore started it        PAST MEDICAL HISTORY:  Past Medical History:   Diagnosis Date     Aortic stenosis     moderate     Atypical atrial flutter (H)     PVI and LA linear ablation with successful termination of AFL 10/18/2019     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, unspecified cause, unspecified laterality      Contact dermatitis and other eczema, due to unspecified cause      Diverticulosis of colon (without mention of hemorrhage)      DJD (degenerative joint disease)      Essential hypertension, benign     abstracted 7/22/02     Gout, unspecified      Hip joint replacement by other means 6/3/2009     MGUS (monoclonal gammopathy of  unknown significance)      Mitral valve disease     mild MS, moderate-severe MR     PSVT (paroxysmal supraventricular tachycardia) (H)      Pulmonary HTN (H) 10/18/2011     Thrombocytopenia (H)        PAST SURGICAL HISTORY:  Past Surgical History:   Procedure Laterality Date     ANESTHESIA CARDIOVERSION N/A 3/21/2019    Procedure: ANESTHESIA CARDIOVERSION;  Surgeon: GENERIC ANESTHESIA PROVIDER;  Location: [facility] OR     ARTHROPLASTY REVISION HIP ANTERIOR Left 9/9/2019    Procedure: REVISION DIRECT ANTERIOR TOTAL LEFT HIP ARTHROPLASTY;  Surgeon: Luisito Hammonds MD;  Location:  OR     BIOPSY  3/2017     BONE MARROW BIOPSY, BONE SPECIMEN, NEEDLE/TROCAR Right 2/23/2017    Procedure: BIOPSY BONE MARROW;  Surgeon: Romie Esquivel MD;  Location:  GI     COLONOSCOPY  2010     CV HEART CATHETERIZATION WITH POSSIBLE INTERVENTION N/A 8/25/2020    Procedure: Coronary Angiogram;  Surgeon: Brian Simmons MD;  Location:  HEART CARDIAC CATH LAB     CV LEFT HEART CATH N/A 8/25/2020    Procedure: Left Heart Cath;  Surgeon: Brian Simmons MD;  Location:  HEART CARDIAC CATH LAB     CV RIGHT HEART CATH N/A 8/25/2020    Procedure: Right Heart Cath;  Surgeon: Brian Simmons MD;  Location:  HEART CARDIAC CATH LAB     EP 3D TACHYCARDIA MAPPING N/A 10/18/2019    Procedure: EP 3D Tachycardia Mapping;  Surgeon: Delfino Gee MD;  Location:  HEART CARDIAC CATH LAB     EP ABLATION FOCAL AFIB N/A 10/18/2019    Procedure: EP Ablation Focal AFIB;  Surgeon: Delfino Gee MD;  Location:  HEART CARDIAC CATH LAB     EYE SURGERY      Cataract, both eyes     ORTHOPEDIC SURGERY  2009    (L) total hip       FAMILY HISTORY:  Family History   Problem Relation Age of Onset     Hypertension Mother        SOCIAL HISTORY:  Social History     Socioeconomic History     Marital status:      Spouse name: None     Number of children: None     Years of education: None     Highest education level: None   Occupational History     None  "  Social Needs     Financial resource strain: None     Food insecurity     Worry: None     Inability: None     Transportation needs     Medical: None     Non-medical: None   Tobacco Use     Smoking status: Never Smoker     Smokeless tobacco: Never Used   Substance and Sexual Activity     Alcohol use: Yes     Alcohol/week: 0.0 standard drinks     Comment: Max 2 beers per day     Drug use: No     Sexual activity: Yes     Partners: Female     Birth control/protection: None   Lifestyle     Physical activity     Days per week: None     Minutes per session: None     Stress: None   Relationships     Social connections     Talks on phone: None     Gets together: None     Attends [employer] service: None     Active member of club or organization: None     Attends meetings of clubs or organizations: None     Relationship status: None     Intimate partner violence     Fear of current or ex partner: None     Emotionally abused: None     Physically abused: None     Forced sexual activity: None   Other Topics Concern     Parent/sibling w/ CABG, MI or angioplasty before 65F 55M? No   Social History Narrative     None       Review of Systems:  Skin:  Negative       Eyes:  Positive for glasses    ENT:  Negative      Respiratory:  Negative       Cardiovascular:  Negative      Gastroenterology: Negative      Genitourinary:  Negative      Musculoskeletal:  Positive for back pain;joint pain    Neurologic:  Negative      Psychiatric:  Negative      Heme/Lymph/Imm:  Negative      Endocrine:  Negative        Physical Exam:  Vitals: /66 (BP Location: Right arm, Patient Position: Sitting, Cuff Size: Adult Regular)   Pulse 70   Ht 1.803 m (5' 11\")   Wt 80.9 kg (178 lb 6.4 oz)   BMI 24.88 kg/m      Constitutional:  cooperative;in no acute distress        Skin:  no apparent skin lesions or masses noted          Head:  no masses or lesions        Eyes:  pupils equal and round;conjunctivae and lids unremarkable        Lymph:      ENT:  " no pallor or cyanosis        Neck:  JVP normal;no carotid bruit        Respiratory:  clear to auscultation         Cardiac: regular rhythm       systolic ejection murmur;grade 3        pulses full and equal                                        GI:  abdomen soft;non-tender        Extremities and Muscular Skeletal:  no edema              Neurological:  no gross motor deficits        Psych:  Alert and Oriented x 3        CC  Brandt Iglesias MD  [facility] HEART [facility]ARE  [address] [place] [address]

## 2020-10-16 ENCOUNTER — TELEPHONE (OUTPATIENT)
Dept: CARDIOLOGY | Facility: CLINIC | Age: 80
End: 2020-10-16

## 2020-10-16 DIAGNOSIS — I35.0 SEVERE AORTIC STENOSIS: Primary | ICD-10-CM

## 2020-10-16 DIAGNOSIS — I10 ESSENTIAL HYPERTENSION, BENIGN: ICD-10-CM

## 2020-10-16 RX ORDER — CARVEDILOL 3.12 MG/1
3.12 TABLET ORAL 2 TIMES DAILY WITH MEALS
Qty: 180 TABLET | Refills: 0 | Status: SHIPPED | OUTPATIENT
Start: 2020-10-16 | End: 2021-01-26

## 2020-10-16 NOTE — TELEPHONE ENCOUNTER
Telephoned patient to follow-up on valve plans. Dr. Adan and Dr. Iglesias agree patient is good candidate for TAVR. Will present at next week's valve conference. Discussed procedure date of 11/24/20. Pt agreeable to this. They may not winter in [place] this year. Will call back to finalize procedural plans.    Carmen Mckenna RN  [facility] Heart [facility]

## 2020-10-16 NOTE — LETTER
October 23, 2020     TO: Mars Reyes   [address] [place] [address]     Dear Mr. Reyes,    You are scheduled for the following appointments as part of your pre-operative assessment for TAVR. The lab work on Monday 11/16/20 at 12:00PM will be completed at [facility] laboratory. Please check-in at the welcome desk in the Venue[employer]. Your appointment at 12:30PM that day will be in the Heart Clinic, please come up to the 2nd floor after your lab work ([id number]). You will meet with an advanced practice provider and one of the TAVR coordinators. We will review specific pre-operative instructions with you at this appointment. We ask that you please wear a mask into the building and during your appointments.    Future Appointments   Date Time Provider Dept Center   11/16/2020 12:00 PM  LAB ONLY [facility]   11/16/2020 12:30 PM Jolene Bose APRN CNP [facility] HEART [facility]IC     Your TAVR is scheduled for Tuesday 11/17/20, 3rd case. Please arrive at [facility] at 10:30AM. You will check in at the welcome desk in the VenueBook that day as well. You will need to hold your Eliquis prior to your TAVR. Please take your last dose on: Saturday 11/14/20. Do not take it on 11/15, 11/16 or 11/17. We will resume it after your TAVR.    You will need to have a COVID test prior to your TAVR. Our [place] COVID testing team should contact you to schedule this test. It needs to be completed 2-4 days prior to your procedure. If you have not heard from this team, please call: [phone] to schedule your test.    Please contact me with any additional questions you may have.    Thank you,  Carmen MEEHAN  Structural Heart Care Coordinator  [phone]

## 2020-10-22 ENCOUNTER — HOSPITAL ENCOUNTER (INPATIENT)
Facility: CLINIC | Age: 80
Setting detail: SURGERY ADMIT
End: 2020-10-22
Attending: INTERNAL MEDICINE | Admitting: INTERNAL MEDICINE
Payer: COMMERCIAL

## 2020-10-22 DIAGNOSIS — I35.0 SEVERE AORTIC STENOSIS: ICD-10-CM

## 2020-10-22 NOTE — TELEPHONE ENCOUNTER
Patient was presented this morning at the multidisciplinary TAVR conference. Plan is to proceed with TAVR procedure.    Valve: Medtronic  Approach: TF  [place]: Yes  Sedation: MAC    Above information was called out to the patient.  Plan for TAVR on 11/17/20 with Dr. Adan.     Patient will hold Eliquis prior to procedure, taking last dose Saturday 11/14/20. Pre-op appts scheduled, date times and locations reviewed. All questions answered.    Carmen Mckenna RN  [facility] Heart [facility]

## 2020-10-29 DIAGNOSIS — I10 ESSENTIAL HYPERTENSION, BENIGN: ICD-10-CM

## 2020-10-29 RX ORDER — LISINOPRIL 20 MG/1
20 TABLET ORAL DAILY
Qty: 90 TABLET | Refills: 3 | Status: SHIPPED | OUTPATIENT
Start: 2020-10-29 | End: 2021-06-18

## 2020-11-04 DIAGNOSIS — Z11.59 ENCOUNTER FOR SCREENING FOR OTHER VIRAL DISEASES: Primary | ICD-10-CM

## 2020-11-05 ENCOUNTER — TELEPHONE (OUTPATIENT)
Dept: CARDIOLOGY | Facility: CLINIC | Age: 80
End: 2020-11-05

## 2020-11-05 DIAGNOSIS — Z11.59 ENCOUNTER FOR SCREENING FOR OTHER VIRAL DISEASES: Primary | ICD-10-CM

## 2020-11-05 NOTE — TELEPHONE ENCOUNTER
Telephoned patient to move TAVR date. Pt will now be scheduled 11/24/20, 2nd case. Check in time of 0800. Will keep pre-op appts as scheduled. Will need to schedule COVID testing for 11/20 or 11/21. Pt verbalized understanding. Scheduling team notified.    Carmen Mckenna RN  [facility]

## 2020-11-12 NOTE — TELEPHONE ENCOUNTER
Updated patient, at the direction of Dr. Osuna, to possibility of TAVR being cancelled as COVID inpatient numbers are growing at [facility] and creates limited bed availability. Spoke with patient to update him to this information, he appreciated the call and we will be in contact as we have more information.    Carmen Mckenna RN  [facility] Heart [facility]

## 2020-11-16 ENCOUNTER — TELEPHONE (OUTPATIENT)
Dept: CARDIOLOGY | Facility: CLINIC | Age: 80
End: 2020-11-16

## 2020-11-16 ENCOUNTER — HOSPITAL ENCOUNTER (OUTPATIENT)
Dept: LAB | Facility: CLINIC | Age: 80
Discharge: HOME | End: 2020-11-16
Admitting: INTERNAL MEDICINE
Payer: COMMERCIAL

## 2020-11-16 ENCOUNTER — OFFICE VISIT (OUTPATIENT)
Dept: CARDIOLOGY | Facility: CLINIC | Age: 80
End: 2020-11-16
Payer: COMMERCIAL

## 2020-11-16 VITALS
DIASTOLIC BLOOD PRESSURE: 84 MMHG | HEART RATE: 66 BPM | WEIGHT: 179.5 LBS | HEIGHT: 70 IN | BODY MASS INDEX: 25.7 KG/M2 | SYSTOLIC BLOOD PRESSURE: 128 MMHG

## 2020-11-16 DIAGNOSIS — I51.89 DIASTOLIC DYSFUNCTION: ICD-10-CM

## 2020-11-16 DIAGNOSIS — I48.92 ATRIAL FLUTTER, UNSPECIFIED TYPE (H): ICD-10-CM

## 2020-11-16 DIAGNOSIS — I35.0 SEVERE AORTIC STENOSIS: Primary | ICD-10-CM

## 2020-11-16 DIAGNOSIS — I35.0 SEVERE AORTIC STENOSIS: ICD-10-CM

## 2020-11-16 DIAGNOSIS — I10 ESSENTIAL HYPERTENSION, BENIGN: ICD-10-CM

## 2020-11-16 LAB
ALBUMIN SERPL-MCNC: 3.9 G/DL (ref 3.4–5)
ALP SERPL-CCNC: 97 U/L (ref 40–150)
ALT SERPL W P-5'-P-CCNC: 21 U/L (ref 0–70)
ANION GAP SERPL CALCULATED.3IONS-SCNC: 2 MMOL/L (ref 3–14)
AST SERPL W P-5'-P-CCNC: 19 U/L (ref 0–45)
BILIRUB SERPL-MCNC: 1 MG/DL (ref 0.2–1.3)
BUN SERPL-MCNC: 15 MG/DL (ref 7–30)
CALCIUM SERPL-MCNC: 9.9 MG/DL (ref 8.5–10.1)
CHLORIDE SERPL-SCNC: 102 MMOL/L (ref 94–109)
CO2 SERPL-SCNC: 31 MMOL/L (ref 20–32)
CREAT SERPL-MCNC: 0.96 MG/DL (ref 0.66–1.25)
ERYTHROCYTE [DISTWIDTH] IN BLOOD BY AUTOMATED COUNT: 12.4 % (ref 10–15)
GFR SERPL CREATININE-BSD FRML MDRD: 75 ML/MIN/{1.73_M2}
GLUCOSE SERPL-MCNC: 79 MG/DL (ref 70–99)
HCT VFR BLD AUTO: 44.8 % (ref 40–53)
HGB BLD-MCNC: 15.1 G/DL (ref 13.3–17.7)
INR PPP: 1.22 (ref 0.86–1.14)
MCH RBC QN AUTO: 29.7 PG (ref 26.5–33)
MCHC RBC AUTO-ENTMCNC: 33.7 G/DL (ref 31.5–36.5)
MCV RBC AUTO: 88 FL (ref 78–100)
PLATELET # BLD AUTO: 78 10E9/L (ref 150–450)
POTASSIUM SERPL-SCNC: 5.2 MMOL/L (ref 3.4–5.3)
PROT SERPL-MCNC: 8 G/DL (ref 6.8–8.8)
RBC # BLD AUTO: 5.09 10E12/L (ref 4.4–5.9)
SODIUM SERPL-SCNC: 135 MMOL/L (ref 133–144)
WBC # BLD AUTO: 7.9 10E9/L (ref 4–11)

## 2020-11-16 PROCEDURE — 99214 OFFICE O/P EST MOD 30 MIN: CPT | Performed by: NURSE PRACTITIONER

## 2020-11-16 PROCEDURE — 86850 RBC ANTIBODY SCREEN: CPT | Performed by: INTERNAL MEDICINE

## 2020-11-16 PROCEDURE — 86901 BLOOD TYPING SEROLOGIC RH(D): CPT | Performed by: INTERNAL MEDICINE

## 2020-11-16 PROCEDURE — 85027 COMPLETE CBC AUTOMATED: CPT | Performed by: INTERNAL MEDICINE

## 2020-11-16 PROCEDURE — 36415 COLL VENOUS BLD VENIPUNCTURE: CPT | Performed by: INTERNAL MEDICINE

## 2020-11-16 PROCEDURE — 85610 PROTHROMBIN TIME: CPT | Performed by: INTERNAL MEDICINE

## 2020-11-16 PROCEDURE — 86900 BLOOD TYPING SEROLOGIC ABO: CPT | Performed by: INTERNAL MEDICINE

## 2020-11-16 PROCEDURE — 80053 COMPREHEN METABOLIC PANEL: CPT | Performed by: INTERNAL MEDICINE

## 2020-11-16 ASSESSMENT — MIFFLIN-ST. JEOR: SCORE: 1535.46

## 2020-11-16 NOTE — PROGRESS NOTES
STRUCTURAL HEART CARE   pre-TAVR H&P     Primary Cardiologist: Dr. Iglesias    GREG Reyes is a very pleasant 80 year old male patient who presents for pre-operative H&P accompanied by his wife in preparation for elective TAVR on 11/24/2020 at [facility].     He carries a past medical history notable for severe aortic stenosis, atrial flutter status post cardioversion, pulmonary hypertension, moderate mitral valve regurgitation, hyperlipidemia, thrombocytopenia and monoclonal gammopathy of unknown significance.    He was seen in structural heart clinic in August of this year for evaluation of transcatheter aortic valve replacement.  His cardiac work-up consisted of an echocardiogram that demonstrated a normal ejection fraction estimated at 65 to 70%, grade 3 diastolic dysfunction, severe aortic stenosis with a mean gradient of 37.7 mmHg and a valve area of 0.99, mild aortic regurgitation, mild to moderate tricuspid regurgitation, and severe mitral annular calcification with moderate mitral regurgitation. TAVR CT showed a aortic valve calcium score of 2800.  He underwent an exercise stress test that showed blunted blood pressures to exercise.  A follow-up right and left heart catheterization demonstrated no significant coronary artery disease but moderately elevated right and left-sided filling pressures as well as moderate pulmonary hypertension.  Valve area per heart catheterization showed 0.64 cm .  Due to lack of symptoms at that time, TAVR was deferred.  He was recently seen on 10/8/2020 where he reported a mild progression in symptoms of shortness of breath and fatigue.  He was presented in our multidisciplinary TAVR conference and felt to be a good candidate for TAVR.    He present today feeling well on a cardiac standpoint, denies chest pain, shortness of breath, palpitations, PND, orthopnea, presyncope, syncope, or LE edema.  Upon exam, lungs are clear bilaterally, heart rate and  rhythm regular, audible systolic murmur, no neck vein distention, abdominal distention, or lower extremity edema.    Blood pressure today was mildly elevated at 152/79 with repeat reading reduced to 128/84, managed on low-dose carvedilol and lisinopril.  BMP today showed a sodium of 135, potassium 5.2, BUN 15, creatinine 0.96, GFR 75  Hemoglobin 15.1, WBC 7.9 and platelets 78, denies any evidence of bleeding on Eliquis.    Activity level is unchanged  Compliant with all medications.    Assessment and Plan     1.  Severe Aortic Stenosis -mild progression of shortness of breath and fatigue over the last few months.  He was evaluated in our structural heart clinic and underwent TAVR work-up which is now complete (see above).  He was presented in our multidisciplinary TAVR conference and felt to be a good candidate for transcatheter aortic valve replacement. Risks and benefits of transcatheter aortic valve replacement procedure were discussed today including, bleeding at access site, bruising, infection, allergic reaction, kidney damage (including need for dialysis), stroke, heart attack, vascular damage, arrhythmic abnormalities including high degree AV block (requiring Pacemaker implant) or atrial fibrillation,  emergency open heart surgery, up to and including death.  Patient and his wife verbally understand the risks and benefits of the procedure and are wishing to proceed.  Hold Eliquis beginning Sunday, November 22.  N.p.o. after midnight the evening prior to procedure.  Okay to take a.m. medications with a small sip of water.  Further instructions per valve coordinator.    History of blood transfusions/reactions: No  History of abnormal bleeding/anti-platelet use: No  Steroid use in the last year: No  Personal or family history with difficulty with anesthesia: No    Type and screen orders completed. Supplies for scrubbing provided. Patient is cleared by their dentist.   Patient has no fever, chills, or urinary  symptoms. Patient does not have contrast dye allergy.   Patient is optimized and is acceptable candidate for the proposed procedure.  No further diagnostic evaluation is needed. Pre-TAVR instructions provided in written format.     2.  Atrial flutter -managed on low-dose carvedilol and Eliquis for stroke prevention.  3.  Hypertension -well-controlled on lisinopril and low-dose carvedilol  4.  Diastolic dysfunction - echocardiogram that demonstrated a normal ejection fraction estimated at 65 to 70%, grade 3 diastolic dysfunction, severe aortic stenosis with a mean gradient of 37.7 mmHg and a valve area of 0.99, mild aortic regurgitation, mild to moderate tricuspid regurgitation, and severe mitral annular calcification with moderate mitral regurgitation.  Continue on current medical therapy.  5.  Hyperlipidemia -on low-dose pravastatin      ALEXANDRA Guo, CNP  11/16/2020    Current Medications:  Current Outpatient Medications   Medication Sig Dispense Refill     acetaminophen (TYLENOL) 325 MG tablet Take 325-650 mg by mouth every 6 hours as needed for mild pain       allopurinol (ZYLOPRIM) 100 MG tablet TAKE 1 TABLET BY MOUTH EVERY ONCE DAILY 90 tablet 3     amoxicillin (AMOXIL) 500 MG tablet Take 2,000 mg by mouth daily as needed (1 hour prior to dental procedures)       apixaban ANTICOAGULANT (ELIQUIS ANTICOAGULANT) 5 MG tablet Take 1 tablet (5 mg) by mouth 2 times daily 180 tablet 3     carvedilol (COREG) 3.125 MG tablet Take 1 tablet (3.125 mg) by mouth 2 times daily (with meals) 180 tablet 0     lisinopril (ZESTRIL) 20 MG tablet Take 1 tablet (20 mg) by mouth daily 90 tablet 3     pravastatin (PRAVACHOL) 20 MG tablet Take 1 tablet (20 mg) by mouth daily 90 tablet 3       Allergies:     Allergies   Allergen Reactions     Simvastatin      10/26/12: Arm pain - pt stopped it x 4-5 wks and arm pain didn't resolve sore started it        Past Medical History:  Past Medical History:   Diagnosis Date     Aortic  stenosis     moderate     Atypical atrial flutter (H)     PVI and LA linear ablation with successful termination of AFL 10/18/2019     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, unspecified cause, unspecified laterality      Contact dermatitis and other eczema, due to unspecified cause      Diverticulosis of colon (without mention of hemorrhage)      DJD (degenerative joint disease)      Essential hypertension, benign     abstracted 7/22/02     Gout, unspecified      Hip joint replacement by other means 6/3/2009     MGUS (monoclonal gammopathy of unknown significance)      Mitral valve disease     mild MS, moderate-severe MR     PSVT (paroxysmal supraventricular tachycardia) (H)      Pulmonary HTN (H) 10/18/2011     Thrombocytopenia (H)        Past Surgical History:  Past Surgical History:   Procedure Laterality Date     ANESTHESIA CARDIOVERSION N/A 3/21/2019    Procedure: ANESTHESIA CARDIOVERSION;  Surgeon: GENERIC ANESTHESIA PROVIDER;  Location: [facility] OR     ARTHROPLASTY REVISION HIP ANTERIOR Left 9/9/2019    Procedure: REVISION DIRECT ANTERIOR TOTAL LEFT HIP ARTHROPLASTY;  Surgeon: Luisito Hammonds MD;  Location:  OR     BIOPSY  3/2017     BONE MARROW BIOPSY, BONE SPECIMEN, NEEDLE/TROCAR Right 2/23/2017    Procedure: BIOPSY BONE MARROW;  Surgeon: Romie Esquivel MD;  Location:  GI     COLONOSCOPY  2010     CV HEART CATHETERIZATION WITH POSSIBLE INTERVENTION N/A 8/25/2020    Procedure: Coronary Angiogram;  Surgeon: Brian Simmons MD;  Location:  HEART CARDIAC CATH LAB     CV LEFT HEART CATH N/A 8/25/2020    Procedure: Left Heart Cath;  Surgeon: Brian Simmons MD;  Location:  HEART CARDIAC CATH LAB     CV RIGHT HEART CATH N/A 8/25/2020    Procedure: Right Heart Cath;  Surgeon: Brian Simmons MD;  Location:  HEART CARDIAC CATH LAB     EP 3D TACHYCARDIA MAPPING N/A 10/18/2019    Procedure: EP 3D Tachycardia Mapping;  Surgeon: Delfino Gee MD;  Location:  HEART CARDIAC CATH LAB     EP  ABLATION FOCAL AFIB N/A 10/18/2019    Procedure: EP Ablation Focal AFIB;  Surgeon: Delfino Gee MD;  Location:  HEART CARDIAC CATH LAB     EYE SURGERY      Cataract, both eyes     ORTHOPEDIC SURGERY  2009    (L) total hip       Family History:  Family History   Problem Relation Age of Onset     Hypertension Mother        Social History:  Social History     Socioeconomic History     Marital status:      Spouse name: None     Number of children: None     Years of education: None     Highest education level: None   Occupational History     None   Social Needs     Financial resource strain: None     Food insecurity     Worry: None     Inability: None     Transportation needs     Medical: None     Non-medical: None   Tobacco Use     Smoking status: Never Smoker     Smokeless tobacco: Never Used   Substance and Sexual Activity     Alcohol use: Yes     Alcohol/week: 0.0 standard drinks     Comment: Max 2 beers per day     Drug use: No     Sexual activity: Yes     Partners: Female     Birth control/protection: None   Lifestyle     Physical activity     Days per week: None     Minutes per session: None     Stress: None   Relationships     Social connections     Talks on phone: None     Gets together: None     Attends [employer] service: None     Active member of club or organization: None     Attends meetings of clubs or organizations: None     Relationship status: None     Intimate partner violence     Fear of current or ex partner: None     Emotionally abused: None     Physically abused: None     Forced sexual activity: None   Other Topics Concern     Parent/sibling w/ CABG, MI or angioplasty before 65F 55M? No   Social History Narrative     None       Review of Systems:  Constitutional: No fever, chills, or sweats. No weight gain/loss   ENT: No visual disturbance, ear ache, epistaxis, sore throat  Allergies/Immunologic: Negative.   Respiratory: No cough, hemoptysia  Cardiovascular: As per HPI  GI: No nausea,  "vomiting, hematemesis, melena, or hematochezia  : No urinary frequency, dysuria, or hematuria  Integument: Negative  Psychiatric: Negative  Neuro: Negative  Endocrinology: Negative   Musculoskeletal: Negative      Physical Exam:  Vitals: /84   Pulse 66   Ht 1.778 m (5' 10\")   Wt 81.4 kg (179 lb 8 oz)   BMI 25.76 kg/m     In general, the patient is a pleasant male in no apparent distress.    HEENT: NC/AT.  PERRLA.  EOMI.  Sclerae white, not injected.  Nares clear.  Pharynx without erythema or exudate.  Dentition intact.    Neck: No adenopathy.  No thyromegaly. Carotids +4/4 bilaterally without bruits.  No jugular venous distension.   Heart: RRR. Normal S1, S2 splits physiologically. Systolic murmur, no rub, click, or gallop.   Lungs: CTA.  No ronchi, wheezes, rales.  No dullness to percussion.   Abdomen: Soft, nontender, nondistended. No organomegaly.  No bruits.   Extremities: No clubbing, cyanosis, or edema.  The pulses are +4/4 at the radial, brachial, femoral, popliteal, DP, and PT sites bilaterally.  No bruits are noted.  Neurologic: Alert and oriented to person/place/time, normal speech, gait and affect  Skin: No petechiae, purpura or rash.      Laboratory Studies:  LIPID RESULTS:  Lab Results   Component Value Date    CHOL 149 07/14/2020    HDL 44 07/14/2020    LDL 84 07/14/2020    TRIG 107 07/14/2020    CHOLHDLRATIO 3.4 02/05/2015       LIVER ENZYME RESULTS:  Lab Results   Component Value Date    AST 19 11/16/2020    ALT 21 11/16/2020       CBC RESULTS:  Lab Results   Component Value Date    WBC 7.9 11/16/2020    RBC 5.09 11/16/2020    HGB 15.1 11/16/2020    HCT 44.8 11/16/2020    MCV 88 11/16/2020    MCH 29.7 11/16/2020    MCHC 33.7 11/16/2020    RDW 12.4 11/16/2020    PLT 78 (L) 11/16/2020       BMP RESULTS:  Lab Results   Component Value Date     11/16/2020    POTASSIUM 5.2 11/16/2020    CHLORIDE 102 11/16/2020    CO2 31 11/16/2020    ANIONGAP 2 (L) 11/16/2020    GLC 79 11/16/2020    BUN " 15 11/16/2020    CR 0.96 11/16/2020    GFRESTIMATED 75 11/16/2020    GFRESTBLACK 87 11/16/2020    DIPAK 9.9 11/16/2020        A1C RESULTS:  No results found for: A1C    INR RESULTS:  Lab Results   Component Value Date    INR 1.22 (H) 11/16/2020    INR 0.96 08/25/2020

## 2020-11-16 NOTE — LETTER
11/16/2020    Pb Warren MD  [address] [facility] [address]    RE: Mars Reyes       Dear Colleague,    I had the pleasure of seeing Mars Reyes in the [facility].      STRUCTURAL HEART CARE   pre-TAVR H&P     Primary Cardiologist: Dr. Harris GARZA   Mars Reyes is a very pleasant 80 year old male patient who presents for pre-operative H&P accompanied by his wife in preparation for elective TAVR on 11/24/2020 at [facility].     He carries a past medical history notable for severe aortic stenosis, atrial flutter status post cardioversion, pulmonary hypertension, moderate mitral valve regurgitation, hyperlipidemia, thrombocytopenia and monoclonal gammopathy of unknown significance.    He was seen in structural heart clinic in August of this year for evaluation of transcatheter aortic valve replacement.  His cardiac work-up consisted of an echocardiogram that demonstrated a normal ejection fraction estimated at 65 to 70%, grade 3 diastolic dysfunction, severe aortic stenosis with a mean gradient of 37.7 mmHg and a valve area of 0.99, mild aortic regurgitation, mild to moderate tricuspid regurgitation, and severe mitral annular calcification with moderate mitral regurgitation. TAVR CT showed a aortic valve calcium score of 2800.  He underwent an exercise stress test that showed blunted blood pressures to exercise.  A follow-up right and left heart catheterization demonstrated no significant coronary artery disease but moderately elevated right and left-sided filling pressures as well as moderate pulmonary hypertension.  Valve area per heart catheterization showed 0.64 cm .  Due to lack of symptoms at that time, TAVR was deferred.  He was recently seen on 10/8/2020 where he reported a mild progression in symptoms of shortness of breath and fatigue.  He was presented in our multidisciplinary TAVR conference and felt to be a good candidate for TAVR.    He  present today feeling well on a cardiac standpoint, denies chest pain, shortness of breath, palpitations, PND, orthopnea, presyncope, syncope, or LE edema.  Upon exam, lungs are clear bilaterally, heart rate and rhythm regular, audible systolic murmur, no neck vein distention, abdominal distention, or lower extremity edema.    Blood pressure today was mildly elevated at 152/79 with repeat reading reduced to 128/84, managed on low-dose carvedilol and lisinopril.  BMP today showed a sodium of 135, potassium 5.2, BUN 15, creatinine 0.96, GFR 75  Hemoglobin 15.1, WBC 7.9 and platelets 78, denies any evidence of bleeding on Eliquis.    Activity level is unchanged  Compliant with all medications.    Assessment and Plan     1.  Severe Aortic Stenosis -mild progression of shortness of breath and fatigue over the last few months.  He was evaluated in our structural heart clinic and underwent TAVR work-up which is now complete (see above).  He was presented in our multidisciplinary TAVR conference and felt to be a good candidate for transcatheter aortic valve replacement. Risks and benefits of transcatheter aortic valve replacement procedure were discussed today including, bleeding at access site, bruising, infection, allergic reaction, kidney damage (including need for dialysis), stroke, heart attack, vascular damage, arrhythmic abnormalities including high degree AV block (requiring Pacemaker implant) or atrial fibrillation,  emergency open heart surgery, up to and including death.  Patient and his wife verbally understand the risks and benefits of the procedure and are wishing to proceed.  Hold Eliquis beginning Sunday, November 22.  N.p.o. after midnight the evening prior to procedure.  Okay to take a.m. medications with a small sip of water.  Further instructions per valve coordinator.    History of blood transfusions/reactions: No  History of abnormal bleeding/anti-platelet use: No  Steroid use in the last year:  No  Personal or family history with difficulty with anesthesia: No    Type and screen orders completed. Supplies for scrubbing provided. Patient is cleared by their dentist.   Patient has no fever, chills, or urinary symptoms. Patient does not have contrast dye allergy.   Patient is optimized and is acceptable candidate for the proposed procedure.  No further diagnostic evaluation is needed. Pre-TAVR instructions provided in written format.     2.  Atrial flutter -managed on low-dose carvedilol and Eliquis for stroke prevention.  3.  Hypertension -well-controlled on lisinopril and low-dose carvedilol  4.  Diastolic dysfunction - echocardiogram that demonstrated a normal ejection fraction estimated at 65 to 70%, grade 3 diastolic dysfunction, severe aortic stenosis with a mean gradient of 37.7 mmHg and a valve area of 0.99, mild aortic regurgitation, mild to moderate tricuspid regurgitation, and severe mitral annular calcification with moderate mitral regurgitation.  Continue on current medical therapy.  5.  Hyperlipidemia -on low-dose pravastatin      ALEXANDRA Guo, CNP  11/16/2020    Current Medications:  Current Outpatient Medications   Medication Sig Dispense Refill     acetaminophen (TYLENOL) 325 MG tablet Take 325-650 mg by mouth every 6 hours as needed for mild pain       allopurinol (ZYLOPRIM) 100 MG tablet TAKE 1 TABLET BY MOUTH EVERY ONCE DAILY 90 tablet 3     amoxicillin (AMOXIL) 500 MG tablet Take 2,000 mg by mouth daily as needed (1 hour prior to dental procedures)       apixaban ANTICOAGULANT (ELIQUIS ANTICOAGULANT) 5 MG tablet Take 1 tablet (5 mg) by mouth 2 times daily 180 tablet 3     carvedilol (COREG) 3.125 MG tablet Take 1 tablet (3.125 mg) by mouth 2 times daily (with meals) 180 tablet 0     lisinopril (ZESTRIL) 20 MG tablet Take 1 tablet (20 mg) by mouth daily 90 tablet 3     pravastatin (PRAVACHOL) 20 MG tablet Take 1 tablet (20 mg) by mouth daily 90 tablet 3       Allergies:      Allergies   Allergen Reactions     Simvastatin      10/26/12: Arm pain - pt stopped it x 4-5 wks and arm pain didn't resolve sore started it        Past Medical History:  Past Medical History:   Diagnosis Date     Aortic stenosis     moderate     Atypical atrial flutter (H)     PVI and LA linear ablation with successful termination of AFL 10/18/2019     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, unspecified cause, unspecified laterality      Contact dermatitis and other eczema, due to unspecified cause      Diverticulosis of colon (without mention of hemorrhage)      DJD (degenerative joint disease)      Essential hypertension, benign     abstracted 7/22/02     Gout, unspecified      Hip joint replacement by other means 6/3/2009     MGUS (monoclonal gammopathy of unknown significance)      Mitral valve disease     mild MS, moderate-severe MR     PSVT (paroxysmal supraventricular tachycardia) (H)      Pulmonary HTN (H) 10/18/2011     Thrombocytopenia (H)        Past Surgical History:  Past Surgical History:   Procedure Laterality Date     ANESTHESIA CARDIOVERSION N/A 3/21/2019    Procedure: ANESTHESIA CARDIOVERSION;  Surgeon: GENERIC ANESTHESIA PROVIDER;  Location: [facility] OR     ARTHROPLASTY REVISION HIP ANTERIOR Left 9/9/2019    Procedure: REVISION DIRECT ANTERIOR TOTAL LEFT HIP ARTHROPLASTY;  Surgeon: Luisito Hammonds MD;  Location: [facility] OR     BIOPSY  3/2017     BONE MARROW BIOPSY, BONE SPECIMEN, NEEDLE/TROCAR Right 2/23/2017    Procedure: BIOPSY BONE MARROW;  Surgeon: Romie Esquivel MD;  Location:  GI     COLONOSCOPY  2010     CV HEART CATHETERIZATION WITH POSSIBLE INTERVENTION N/A 8/25/2020    Procedure: Coronary Angiogram;  Surgeon: Brian Simmons MD;  Location:  HEART CARDIAC CATH LAB     CV LEFT HEART CATH N/A 8/25/2020    Procedure: Left Heart Cath;  Surgeon: Brian Simmons MD;  Location:  HEART CARDIAC CATH LAB     CV RIGHT HEART CATH N/A 8/25/2020    Procedure: Right Heart Cath;   Surgeon: Brian Simmons MD;  Location:  HEART CARDIAC CATH LAB     EP 3D TACHYCARDIA MAPPING N/A 10/18/2019    Procedure: EP 3D Tachycardia Mapping;  Surgeon: Delfino Gee MD;  Location:  HEART CARDIAC CATH LAB     EP ABLATION FOCAL AFIB N/A 10/18/2019    Procedure: EP Ablation Focal AFIB;  Surgeon: Delfino Gee MD;  Location:  HEART CARDIAC CATH LAB     EYE SURGERY      Cataract, both eyes     ORTHOPEDIC SURGERY  2009    (L) total hip       Family History:  Family History   Problem Relation Age of Onset     Hypertension Mother        Social History:  Social History     Socioeconomic History     Marital status:      Spouse name: None     Number of children: None     Years of education: None     Highest education level: None   Occupational History     None   Social Needs     Financial resource strain: None     Food insecurity     Worry: None     Inability: None     Transportation needs     Medical: None     Non-medical: None   Tobacco Use     Smoking status: Never Smoker     Smokeless tobacco: Never Used   Substance and Sexual Activity     Alcohol use: Yes     Alcohol/week: 0.0 standard drinks     Comment: Max 2 beers per day     Drug use: No     Sexual activity: Yes     Partners: Female     Birth control/protection: None   Lifestyle     Physical activity     Days per week: None     Minutes per session: None     Stress: None   Relationships     Social connections     Talks on phone: None     Gets together: None     Attends Protestant service: None     Active member of club or organization: None     Attends meetings of clubs or organizations: None     Relationship status: None     Intimate partner violence     Fear of current or ex partner: None     Emotionally abused: None     Physically abused: None     Forced sexual activity: None   Other Topics Concern     Parent/sibling w/ CABG, MI or angioplasty before 65F 55M? No   Social History Narrative     None       Review of Systems:  Constitutional: No  "fever, chills, or sweats. No weight gain/loss   ENT: No visual disturbance, ear ache, epistaxis, sore throat  Allergies/Immunologic: Negative.   Respiratory: No cough, hemoptysia  Cardiovascular: As per HPI  GI: No nausea, vomiting, hematemesis, melena, or hematochezia  : No urinary frequency, dysuria, or hematuria  Integument: Negative  Psychiatric: Negative  Neuro: Negative  Endocrinology: Negative   Musculoskeletal: Negative      Physical Exam:  Vitals: /84   Pulse 66   Ht 1.778 m (5' 10\")   Wt 81.4 kg (179 lb 8 oz)   BMI 25.76 kg/m     In general, the patient is a pleasant male in no apparent distress.    HEENT: NC/AT.  PERRLA.  EOMI.  Sclerae white, not injected.  Nares clear.  Pharynx without erythema or exudate.  Dentition intact.    Neck: No adenopathy.  No thyromegaly. Carotids +4/4 bilaterally without bruits.  No jugular venous distension.   Heart: RRR. Normal S1, S2 splits physiologically. Systolic murmur, no rub, click, or gallop.   Lungs: CTA.  No ronchi, wheezes, rales.  No dullness to percussion.   Abdomen: Soft, nontender, nondistended. No organomegaly.  No bruits.   Extremities: No clubbing, cyanosis, or edema.  The pulses are +4/4 at the radial, brachial, femoral, popliteal, DP, and PT sites bilaterally.  No bruits are noted.  Neurologic: Alert and oriented to person/place/time, normal speech, gait and affect  Skin: No petechiae, purpura or rash.      Laboratory Studies:  LIPID RESULTS:  Lab Results   Component Value Date    CHOL 149 07/14/2020    HDL 44 07/14/2020    LDL 84 07/14/2020    TRIG 107 07/14/2020    CHOLHDLRATIO 3.4 02/05/2015       LIVER ENZYME RESULTS:  Lab Results   Component Value Date    AST 19 11/16/2020    ALT 21 11/16/2020       CBC RESULTS:  Lab Results   Component Value Date    WBC 7.9 11/16/2020    RBC 5.09 11/16/2020    HGB 15.1 11/16/2020    HCT 44.8 11/16/2020    MCV 88 11/16/2020    MCH 29.7 11/16/2020    MCHC 33.7 11/16/2020    RDW 12.4 11/16/2020    PLT 78 " (L) 11/16/2020       BMP RESULTS:  Lab Results   Component Value Date     11/16/2020    POTASSIUM 5.2 11/16/2020    CHLORIDE 102 11/16/2020    CO2 31 11/16/2020    ANIONGAP 2 (L) 11/16/2020    GLC 79 11/16/2020    BUN 15 11/16/2020    CR 0.96 11/16/2020    GFRESTIMATED 75 11/16/2020    GFRESTBLACK 87 11/16/2020    DIPAK 9.9 11/16/2020        A1C RESULTS:  No results found for: A1C    INR RESULTS:  Lab Results   Component Value Date    INR 1.22 (H) 11/16/2020    INR 0.96 08/25/2020         Thank you for allowing me to participate in the care of your patient.    Sincerely,     ALEXANDRA Guo [facility]

## 2020-11-16 NOTE — PATIENT INSTRUCTIONS
Pre-procedure instructions reviewed with you today:    - Your TAVR is scheduled Tuesday 11/24/20, 1st case. Please check-in at 05:30AM.     - Use the Hibiclens soap I have provided you the night before and morning of the procedure.    - Nothing to eat or drink the morning of your TAVR.     - You make take your morning medications with sips of water.    - You will need to hold your Eliquis prior to TAVR, take your last dose on: Saturday 11/21/20.    It was nice to see you today! Please call with any other questions or concerns.    Carmen MEEHAN  [facility]  [phone]

## 2020-11-16 NOTE — TELEPHONE ENCOUNTER
Telephoned Yumiko at [facility] at the request of Garry from [facility]. She wanted to know the planned device to be used in the procedure, which I verified as [employer]tr[employer]. She had no additional questions at this time.    Carmen Mckenna RN  [facility]

## 2020-11-18 DIAGNOSIS — I35.0 SEVERE AORTIC STENOSIS: Primary | ICD-10-CM

## 2020-11-18 LAB
ABO + RH BLD: NORMAL
ABO + RH BLD: NORMAL
BLD GP AB SCN SERPL QL: NORMAL
BLOOD BANK CMNT PATIENT-IMP: NORMAL
BLOOD BANK CMNT PATIENT-IMP: NORMAL
SPECIMEN EXP DATE BLD: NORMAL

## 2020-11-18 RX ORDER — LIDOCAINE 40 MG/G
CREAM TOPICAL
Status: CANCELLED | OUTPATIENT
Start: 2020-11-18

## 2020-11-18 RX ORDER — ASPIRIN 81 MG/1
81 TABLET ORAL DAILY
Status: CANCELLED | OUTPATIENT
Start: 2020-11-18

## 2020-11-18 RX ORDER — SODIUM CHLORIDE 9 MG/ML
INJECTION, SOLUTION INTRAVENOUS CONTINUOUS
Status: CANCELLED | OUTPATIENT
Start: 2020-11-18

## 2020-11-18 NOTE — NURSING NOTE
Met with patient after visit with Jolene GAO.     STS 5 meter walk: 5 sec    KCCQ Results:   1a. 5  1b. 4  1c. 4  2. 5  3. 6  4. 6  5. 5  6. 4  7. 2  8a. 4  8b. 4  8c. 5    Carmen Mckenna RN  [facility]

## 2020-11-21 DIAGNOSIS — Z11.59 ENCOUNTER FOR SCREENING FOR OTHER VIRAL DISEASES: ICD-10-CM

## 2020-11-21 PROCEDURE — U0003 INFECTIOUS AGENT DETECTION BY NUCLEIC ACID (DNA OR RNA); SEVERE ACUTE RESPIRATORY SYNDROME CORONAVIRUS 2 (SARS-COV-2) (CORONAVIRUS DISEASE [COVID-19]), AMPLIFIED PROBE TECHNIQUE, MAKING USE OF HIGH THROUGHPUT TECHNOLOGIES AS DESCRIBED BY CMS-2020-01-R: HCPCS | Performed by: INTERNAL MEDICINE

## 2020-11-22 LAB
LABORATORY COMMENT REPORT: NORMAL
SARS-COV-2 RNA SPEC QL NAA+PROBE: NEGATIVE
SARS-COV-2 RNA SPEC QL NAA+PROBE: NORMAL
SPECIMEN SOURCE: NORMAL
SPECIMEN SOURCE: NORMAL

## 2020-11-23 ENCOUNTER — TELEPHONE (OUTPATIENT)
Dept: CARDIOLOGY | Facility: CLINIC | Age: 80
End: 2020-11-23

## 2020-11-23 NOTE — TELEPHONE ENCOUNTER
Connected with Mars. Patient verbalized understanding regarding cancellation. Patient will resume Eliquis today. Patient is curious about winter plans, he and his wife would like to winter in [place]. Unsure how cancelled TAVR would affect these plans. Mars would like Dr. Adan's input. He states he is fatigued compared to 6 months ago and does get winded on his walks. His ADLs are not affected by symptoms. Will review with Dr. Adan and call patient back.    Carmen Mckenna RN  [facility]

## 2020-11-23 NOTE — TELEPHONE ENCOUNTER
Just notified TAVR cases for 11/24/20 will be cancelled due to limited inpatient beds at [facility] due to COVID-19.    I telephoned patient to inform him TAVR has been cancelled. Left a detailed voicemail for patient explaining this and that we do not have a reschedule date at this time. Patients will be rescheduled on urgent basis based on severity of valve and symptoms. Direct contact information provided.    Carmen Mckenna RN  [facility]

## 2020-11-24 NOTE — TELEPHONE ENCOUNTER
I think the risk of waiting 5 or 6 months would be quite low.  There would be a little progression of the aortic stenosis, but probably nothing significant.    I do not know the odds of rescheduling, it all depends on the Covid numbers.  I suspect Covid numbers will remain high for at least the next 1 to 2 months.    As for reschedule dates, I would think we would have at least a 2 to 3-week notice if he wanted to return.    He could look into options in [place], however I am not familiar with the healthcare systems there.  Since the work-up has already been done here, it probably would be best to have it done here.    Brandt Iglesias MD  Cardiology - [facility]  Pager: [phone]  Text Page  November 24, 2020

## 2020-11-24 NOTE — TELEPHONE ENCOUNTER
Edwin message received with questions he would like answered regarding postponed TAVR. Will route to Dr. Iglesias and Dr. Adan for review.    1.  What are the risks of postponing for 5 or 6 months?  (Risk to me and risk of then not being able to use TAVR).  2. What is probability of being able to reschedule before next year?  3. How much notice would there be for reschedule (we could return from [place] within a week or so).  4. Should I explore options in [facility]?    Carmen Mckenna RN  [facility]

## 2020-11-25 NOTE — TELEPHONE ENCOUNTER
I reviewed patient's questions with Dr. Adan who states it would be okay to wait to proceed with TAVR. Patient can communicate symptoms from [place] and if needed, coordinate return for TAVR.    I telephoned patient to review these recommendations from Dr. Iglesias and Dr. Adan. Left message for patient requesting call back.    Patient returned call. Shared recommendations from Dr. Iglesias and Dr. Adan. He was happy to hear their recommendations and will plan on proceeding to [place] prior to TAVR. He understands we can coordinate scheduling from [place], could reschedule within weeks. He has my direct contact information and will keep me updated on his symptoms, understands to call me with worsening fatigue and shortness of breath.    Carmen Mckenna RN  [facility]

## 2021-01-26 DIAGNOSIS — I10 ESSENTIAL HYPERTENSION, BENIGN: ICD-10-CM

## 2021-01-26 RX ORDER — CARVEDILOL 3.12 MG/1
3.12 TABLET ORAL 2 TIMES DAILY WITH MEALS
Qty: 180 TABLET | Refills: 0 | Status: SHIPPED | OUTPATIENT
Start: 2021-01-26 | End: 2021-04-26

## 2021-03-16 ENCOUNTER — MYC MEDICAL ADVICE (OUTPATIENT)
Dept: CARDIOLOGY | Facility: CLINIC | Age: 81
End: 2021-03-16

## 2021-03-16 DIAGNOSIS — I35.0 SEVERE AORTIC STENOSIS: Primary | ICD-10-CM

## 2021-04-26 DIAGNOSIS — I10 ESSENTIAL HYPERTENSION, BENIGN: ICD-10-CM

## 2021-04-26 RX ORDER — CARVEDILOL 3.12 MG/1
3.12 TABLET ORAL 2 TIMES DAILY WITH MEALS
Qty: 180 TABLET | Refills: 1 | Status: ON HOLD | OUTPATIENT
Start: 2021-04-26 | End: 2021-06-23

## 2021-05-20 ENCOUNTER — HOSPITAL ENCOUNTER (OUTPATIENT)
Dept: CARDIOLOGY | Facility: CLINIC | Age: 81
Discharge: HOME | End: 2021-05-20
Attending: INTERNAL MEDICINE | Admitting: INTERNAL MEDICINE
Payer: COMMERCIAL

## 2021-05-20 DIAGNOSIS — I35.0 SEVERE AORTIC STENOSIS: ICD-10-CM

## 2021-05-20 PROCEDURE — 93306 TTE W/DOPPLER COMPLETE: CPT | Mod: 26 | Performed by: INTERNAL MEDICINE

## 2021-05-20 PROCEDURE — 93306 TTE W/DOPPLER COMPLETE: CPT

## 2021-05-24 ENCOUNTER — OFFICE VISIT (OUTPATIENT)
Dept: CARDIOLOGY | Facility: CLINIC | Age: 81
End: 2021-05-24
Payer: COMMERCIAL

## 2021-05-24 VITALS
WEIGHT: 187.1 LBS | HEIGHT: 71 IN | OXYGEN SATURATION: 97 % | DIASTOLIC BLOOD PRESSURE: 89 MMHG | SYSTOLIC BLOOD PRESSURE: 146 MMHG | BODY MASS INDEX: 26.19 KG/M2 | HEART RATE: 67 BPM

## 2021-05-24 DIAGNOSIS — I35.0 AORTIC VALVE STENOSIS, ETIOLOGY OF CARDIAC VALVE DISEASE UNSPECIFIED: Primary | ICD-10-CM

## 2021-05-24 PROCEDURE — 99214 OFFICE O/P EST MOD 30 MIN: CPT | Performed by: INTERNAL MEDICINE

## 2021-05-24 ASSESSMENT — MIFFLIN-ST. JEOR: SCORE: 1585.81

## 2021-05-24 NOTE — PROGRESS NOTES
HPI and Plan:   See dictation    No orders of the defined types were placed in this encounter.      No orders of the defined types were placed in this encounter.      Medications Discontinued During This Encounter   Medication Reason     acetaminophen (TYLENOL) 325 MG tablet Stopped by Patient         Encounter Diagnosis   Name Primary?     Aortic valve stenosis, etiology of cardiac valve disease unspecified Yes       CURRENT MEDICATIONS:  Current Outpatient Medications   Medication Sig Dispense Refill     allopurinol (ZYLOPRIM) 100 MG tablet TAKE 1 TABLET BY MOUTH EVERY ONCE DAILY 90 tablet 3     amoxicillin (AMOXIL) 500 MG tablet Take 2,000 mg by mouth daily as needed (1 hour prior to dental procedures)       apixaban ANTICOAGULANT (ELIQUIS ANTICOAGULANT) 5 MG tablet Take 1 tablet (5 mg) by mouth 2 times daily 180 tablet 3     carvedilol (COREG) 3.125 MG tablet Take 1 tablet (3.125 mg) by mouth 2 times daily (with meals) 180 tablet 1     lisinopril (ZESTRIL) 20 MG tablet Take 1 tablet (20 mg) by mouth daily 90 tablet 3     pravastatin (PRAVACHOL) 20 MG tablet Take 1 tablet (20 mg) by mouth daily 90 tablet 3       ALLERGIES     Allergies   Allergen Reactions     Simvastatin      10/26/12: Arm pain - pt stopped it x 4-5 wks and arm pain didn't resolve sore started it        PAST MEDICAL HISTORY:  Past Medical History:   Diagnosis Date     Aortic stenosis     moderate     Atypical atrial flutter (H)     PVI and LA linear ablation with successful termination of AFL 10/18/2019     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, unspecified cause, unspecified laterality      Contact dermatitis and other eczema, due to unspecified cause      Diverticulosis of colon (without mention of hemorrhage)      DJD (degenerative joint disease)      Essential hypertension, benign     abstracted 7/22/02     Gout, unspecified      Hip joint replacement by other means 6/3/2009     MGUS (monoclonal gammopathy of unknown significance)       Mitral valve disease     mild MS, moderate-severe MR     PSVT (paroxysmal supraventricular tachycardia) (H)      Pulmonary HTN (H) 10/18/2011     Thrombocytopenia (H)        PAST SURGICAL HISTORY:  Past Surgical History:   Procedure Laterality Date     ANESTHESIA CARDIOVERSION N/A 3/21/2019    Procedure: ANESTHESIA CARDIOVERSION;  Surgeon: GENERIC ANESTHESIA PROVIDER;  Location: [facility] OR     ARTHROPLASTY REVISION HIP ANTERIOR Left 9/9/2019    Procedure: REVISION DIRECT ANTERIOR TOTAL LEFT HIP ARTHROPLASTY;  Surgeon: Luisito Hammonds MD;  Location:  OR     BIOPSY  3/2017     BONE MARROW BIOPSY, BONE SPECIMEN, NEEDLE/TROCAR Right 2/23/2017    Procedure: BIOPSY BONE MARROW;  Surgeon: Romie Esquivel MD;  Location:  GI     COLONOSCOPY  2010     CV HEART CATHETERIZATION WITH POSSIBLE INTERVENTION N/A 8/25/2020    Procedure: Coronary Angiogram;  Surgeon: Brian Simmons MD;  Location:  HEART CARDIAC CATH LAB     CV LEFT HEART CATH N/A 8/25/2020    Procedure: Left Heart Cath;  Surgeon: Brian Simmons MD;  Location:  HEART CARDIAC CATH LAB     CV RIGHT HEART CATH MEASUREMENTS RECORDED N/A 8/25/2020    Procedure: Right Heart Cath;  Surgeon: Brian Simmons MD;  Location:  HEART CARDIAC CATH LAB     EP 3D TACHYCARDIA MAPPING N/A 10/18/2019    Procedure: EP 3D Tachycardia Mapping;  Surgeon: Delfino Gee MD;  Location:  HEART CARDIAC CATH LAB     EP ABLATION FOCAL AFIB N/A 10/18/2019    Procedure: EP Ablation Focal AFIB;  Surgeon: Delfino Gee MD;  Location:  HEART CARDIAC CATH LAB     EYE SURGERY      Cataract, both eyes     ORTHOPEDIC SURGERY  2009    (L) total hip       FAMILY HISTORY:  Family History   Problem Relation Age of Onset     Hypertension Mother        SOCIAL HISTORY:  Social History     Socioeconomic History     Marital status:      Spouse name: None     Number of children: None     Years of education: None     Highest education level: None   Occupational History     None  "  Social Needs     Financial resource strain: None     Food insecurity     Worry: None     Inability: None     Transportation needs     Medical: None     Non-medical: None   Tobacco Use     Smoking status: Never Smoker     Smokeless tobacco: Never Used   Substance and Sexual Activity     Alcohol use: Yes     Alcohol/week: 0.0 standard drinks     Comment: Max 2 beers per day     Drug use: No     Sexual activity: Yes     Partners: Female     Birth control/protection: None   Lifestyle     Physical activity     Days per week: None     Minutes per session: None     Stress: None   Relationships     Social connections     Talks on phone: None     Gets together: None     Attends Voodoo service: None     Active member of club or organization: None     Attends meetings of clubs or organizations: None     Relationship status: None     Intimate partner violence     Fear of current or ex partner: None     Emotionally abused: None     Physically abused: None     Forced sexual activity: None   Other Topics Concern     Parent/sibling w/ CABG, MI or angioplasty before 65F 55M? No   Social History Narrative     None       Review of Systems:  Skin:  Negative       Eyes:  Positive for glasses    ENT:  Negative      Respiratory:  Positive for dyspnea on exertion     Cardiovascular:  Negative fatigue;Positive for    Gastroenterology: Negative      Genitourinary:  Negative      Musculoskeletal:  Positive for joint pain left shoulder pain sometimes  Neurologic:  Negative      Psychiatric:  Negative      Heme/Lymph/Imm:  Positive for allergies    Endocrine:  Negative        Physical Exam:  Vitals: BP (!) 146/89 (BP Location: Left arm, Patient Position: Sitting, Cuff Size: Adult Regular)   Pulse 67   Ht 1.803 m (5' 11\")   Wt 84.9 kg (187 lb 1.6 oz)   SpO2 97%   BMI 26.10 kg/m      Constitutional:  cooperative;in no acute distress        Skin:  no apparent skin lesions or masses noted          Head:  no masses or lesions        Eyes:  " pupils equal and round;conjunctivae and lids unremarkable        Lymph:      ENT:  no pallor or cyanosis        Neck:  JVP normal;no carotid bruit        Respiratory:  clear to auscultation         Cardiac: regular rhythm       systolic ejection murmur;grade 3        pulses full and equal                                        GI:  abdomen soft;non-tender        Extremities and Muscular Skeletal:  no edema              Neurological:  no gross motor deficits        Psych:  Alert and Oriented x 3        CC  No referring provider defined for this encounter.

## 2021-05-24 NOTE — LETTER
5/24/2021    Pb Warren MD  [address] [facility] [id number]    RE: Mars Reyes       Dear Colleague,    I had the pleasure of seeing Mars Reyes in the [facility].    HPI and Plan:   See dictation    No orders of the defined types were placed in this encounter.      No orders of the defined types were placed in this encounter.      Medications Discontinued During This Encounter   Medication Reason     acetaminophen (TYLENOL) 325 MG tablet Stopped by Patient         Encounter Diagnosis   Name Primary?     Aortic valve stenosis, etiology of cardiac valve disease unspecified Yes       CURRENT MEDICATIONS:  Current Outpatient Medications   Medication Sig Dispense Refill     allopurinol (ZYLOPRIM) 100 MG tablet TAKE 1 TABLET BY MOUTH EVERY ONCE DAILY 90 tablet 3     amoxicillin (AMOXIL) 500 MG tablet Take 2,000 mg by mouth daily as needed (1 hour prior to dental procedures)       apixaban ANTICOAGULANT (ELIQUIS ANTICOAGULANT) 5 MG tablet Take 1 tablet (5 mg) by mouth 2 times daily 180 tablet 3     carvedilol (COREG) 3.125 MG tablet Take 1 tablet (3.125 mg) by mouth 2 times daily (with meals) 180 tablet 1     lisinopril (ZESTRIL) 20 MG tablet Take 1 tablet (20 mg) by mouth daily 90 tablet 3     pravastatin (PRAVACHOL) 20 MG tablet Take 1 tablet (20 mg) by mouth daily 90 tablet 3       ALLERGIES     Allergies   Allergen Reactions     Simvastatin      10/26/12: Arm pain - pt stopped it x 4-5 wks and arm pain didn't resolve sore started it        PAST MEDICAL HISTORY:  Past Medical History:   Diagnosis Date     Aortic stenosis     moderate     Atypical atrial flutter (H)     PVI and LA linear ablation with successful termination of AFL 10/18/2019     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, unspecified cause, unspecified laterality      Contact dermatitis and other eczema, due to unspecified cause      Diverticulosis of colon (without mention of  hemorrhage)      DJD (degenerative joint disease)      Essential hypertension, benign     abstracted 7/22/02     Gout, unspecified      Hip joint replacement by other means 6/3/2009     MGUS (monoclonal gammopathy of unknown significance)      Mitral valve disease     mild MS, moderate-severe MR     PSVT (paroxysmal supraventricular tachycardia) (H)      Pulmonary HTN (H) 10/18/2011     Thrombocytopenia (H)        PAST SURGICAL HISTORY:  Past Surgical History:   Procedure Laterality Date     ANESTHESIA CARDIOVERSION N/A 3/21/2019    Procedure: ANESTHESIA CARDIOVERSION;  Surgeon: GENERIC ANESTHESIA PROVIDER;  Location: [facility] OR     ARTHROPLASTY REVISION HIP ANTERIOR Left 9/9/2019    Procedure: REVISION DIRECT ANTERIOR TOTAL LEFT HIP ARTHROPLASTY;  Surgeon: Luisito Hammonds MD;  Location:  OR     BIOPSY  3/2017     BONE MARROW BIOPSY, BONE SPECIMEN, NEEDLE/TROCAR Right 2/23/2017    Procedure: BIOPSY BONE MARROW;  Surgeon: Romie Esquivel MD;  Location:  GI     COLONOSCOPY  2010     CV HEART CATHETERIZATION WITH POSSIBLE INTERVENTION N/A 8/25/2020    Procedure: Coronary Angiogram;  Surgeon: Brian Simmons MD;  Location:  HEART CARDIAC CATH LAB     CV LEFT HEART CATH N/A 8/25/2020    Procedure: Left Heart Cath;  Surgeon: Brian Simmons MD;  Location:  HEART CARDIAC CATH LAB     CV RIGHT HEART CATH MEASUREMENTS RECORDED N/A 8/25/2020    Procedure: Right Heart Cath;  Surgeon: Brian Simmons MD;  Location:  HEART CARDIAC CATH LAB     EP 3D TACHYCARDIA MAPPING N/A 10/18/2019    Procedure: EP 3D Tachycardia Mapping;  Surgeon: Delfino Gee MD;  Location:  HEART CARDIAC CATH LAB     EP ABLATION FOCAL AFIB N/A 10/18/2019    Procedure: EP Ablation Focal AFIB;  Surgeon: Delfino Gee MD;  Location:  HEART CARDIAC CATH LAB     EYE SURGERY      Cataract, both eyes     ORTHOPEDIC SURGERY  2009    (L) total hip       FAMILY HISTORY:  Family History   Problem Relation Age of Onset     Hypertension Mother         SOCIAL HISTORY:  Social History     Socioeconomic History     Marital status:      Spouse name: None     Number of children: None     Years of education: None     Highest education level: None   Occupational History     None   Social Needs     Financial resource strain: None     Food insecurity     Worry: None     Inability: None     Transportation needs     Medical: None     Non-medical: None   Tobacco Use     Smoking status: Never Smoker     Smokeless tobacco: Never Used   Substance and Sexual Activity     Alcohol use: Yes     Alcohol/week: 0.0 standard drinks     Comment: Max 2 beers per day     Drug use: No     Sexual activity: Yes     Partners: Female     Birth control/protection: None   Lifestyle     Physical activity     Days per week: None     Minutes per session: None     Stress: None   Relationships     Social connections     Talks on phone: None     Gets together: None     Attends Baptism service: None     Active member of club or organization: None     Attends meetings of clubs or organizations: None     Relationship status: None     Intimate partner violence     Fear of current or ex partner: None     Emotionally abused: None     Physically abused: None     Forced sexual activity: None   Other Topics Concern     Parent/sibling w/ CABG, MI or angioplasty before 65F 55M? No   Social History Narrative     None       Review of Systems:  Skin:  Negative       Eyes:  Positive for glasses    ENT:  Negative      Respiratory:  Positive for dyspnea on exertion     Cardiovascular:  Negative fatigue;Positive for    Gastroenterology: Negative      Genitourinary:  Negative      Musculoskeletal:  Positive for joint pain left shoulder pain sometimes  Neurologic:  Negative      Psychiatric:  Negative      Heme/Lymph/Imm:  Positive for allergies    Endocrine:  Negative        Physical Exam:  Vitals: BP (!) 146/89 (BP Location: Left arm, Patient Position: Sitting, Cuff Size: Adult Regular)   Pulse 67   Ht  "1.803 m (5' 11\")   Wt 84.9 kg (187 lb 1.6 oz)   SpO2 97%   BMI 26.10 kg/m      Constitutional:  cooperative;in no acute distress        Skin:  no apparent skin lesions or masses noted          Head:  no masses or lesions        Eyes:  pupils equal and round;conjunctivae and lids unremarkable        Lymph:      ENT:  no pallor or cyanosis        Neck:  JVP normal;no carotid bruit        Respiratory:  clear to auscultation         Cardiac: regular rhythm       systolic ejection murmur;grade 3        pulses full and equal                                        GI:  abdomen soft;non-tender        Extremities and Muscular Skeletal:  no edema              Neurological:  no gross motor deficits        Psych:  Alert and Oriented x 3        CC  No referring provider defined for this encounter.                Service Date: 05/24/2021    REASON FOR CONSULTATION:  Followup for severe aortic stenosis.    HISTORY OF PRESENT ILLNESS:  The patient is a very pleasant 80-year-old gentleman with known severe aortic stenosis, atrial flutter, status post cardioversion, pulmonary hypertension, moderate mitral regurgitation, hyperlipidemia, monoclonal gammopathy of unknown significance and thrombocytopenia who is here for followup.  Please see my notes from last summer for details regarding his aortic stenosis.    Briefly, I initially saw him in August of last year for evaluation of severe aortic stenosis.  Echocardiogram at that time demonstrated mean gradient of 37 and a valve area of 0.99.  He had a CT TAVR, which showed aortic valve calcium score of 2800.  He then proceeded to have coronary angiography, which was read as showing mild to moderate nonobstructive coronary artery disease.  His right sided filling pressures are moderately elevated.  His left sided filling pressure was also mildly elevated.    The patient at that time was having minimal symptoms.  He elected to not pursue with aortic valve replacement at that time.  He " went to [place] where they winter.  He recently returned from [place].    He had an echocardiogram last week, which I reviewed.  Echo shows preserved LV function with an EF of 60%-65% and severe aortic stenosis.  His AS has progressed since his last echo.  His valve area is now 0.83 and his mean gradient is 53 mmHg.  His mitral valve regurgitation is now mild.    Since our last visit, he developed exertional shortness of breath.  Additionally, he has been having occasional left shoulder discomfort, which is nonexertional in nature.  No palpitations, presyncope or syncope.    ASSESSMENT AND PLAN:  This patient is a 80-year-old gentleman with progressive aortic stenosis, which is now in the severe range.  He is symptomatic.  I did review his recent echo as well as prior CT TAVR.  I again recommended aortic valve replacement given the severity of his AS and ongoing symptoms.  Given advanced age and other comorbidities, I believe a transcatheter aortic valve replacement will be the more appropriate route for aortic replacement.  He prefers transcatheter aortic valve replacement.    We will present him at our multidisciplinary TAVR conference later this week to further discuss his candidacy.  Since his CT and coronary angiogram were less than a year, I do not believe we need to repeat those tests.    I appreciate the opportunity to participate in his care.    Hunter Adan MD        D: 2021   T: 2021   MT: [email or web address]    Name:     DESEAN MARTINEZ  MRN:      [id number]        Account:      [id number]   :      1940           Service Date: 2021       Document: [id number]    Thank you for allowing me to participate in the care of your patient.      Sincerely,     Hunter Adan MD, MD     [facility]  cc:   No referring provider defined for this encounter.

## 2021-05-24 NOTE — PROGRESS NOTES
Service Date: 05/24/2021    REASON FOR CONSULTATION:  Followup for severe aortic stenosis.    HISTORY OF PRESENT ILLNESS:  The patient is a very pleasant 80-year-old gentleman with known severe aortic stenosis, atrial flutter, status post cardioversion, pulmonary hypertension, moderate mitral regurgitation, hyperlipidemia, monoclonal gammopathy of unknown significance and thrombocytopenia who is here for followup.  Please see my notes from last summer for details regarding his aortic stenosis.    Briefly, I initially saw him in August of last year for evaluation of severe aortic stenosis.  Echocardiogram at that time demonstrated mean gradient of 37 and a valve area of 0.99.  He had a CT TAVR, which showed aortic valve calcium score of 2800.  He then proceeded to have coronary angiography, which was read as showing mild to moderate nonobstructive coronary artery disease.  His right sided filling pressures are moderately elevated.  His left sided filling pressure was also mildly elevated.    The patient at that time was having minimal symptoms.  He elected to not pursue with aortic valve replacement at that time.  He went to [place] where they winter.  He recently returned from [place].    He had an echocardiogram last week, which I reviewed.  Echo shows preserved LV function with an EF of 60%-65% and severe aortic stenosis.  His AS has progressed since his last echo.  His valve area is now 0.83 and his mean gradient is 53 mmHg.  His mitral valve regurgitation is now mild.    Since our last visit, he developed exertional shortness of breath.  Additionally, he has been having occasional left shoulder discomfort, which is nonexertional in nature.  No palpitations, presyncope or syncope.    ASSESSMENT AND PLAN:  This patient is a 80-year-old gentleman with progressive aortic stenosis, which is now in the severe range.  He is symptomatic.  I did review his recent echo as well as prior CT TAVR.  I again recommended aortic  valve replacement given the severity of his AS and ongoing symptoms.  Given advanced age and other comorbidities, I believe a transcatheter aortic valve replacement will be the more appropriate route for aortic replacement.  He prefers transcatheter aortic valve replacement.    We will present him at our multidisciplinary TAVR conference later this week to further discuss his candidacy.  Since his CT and coronary angiogram were less than a year, I do not believe we need to repeat those tests.    I appreciate the opportunity to participate in his care.    Hunter Adan MD        D: 2021   T: 2021   MT: [email or web address]    Name:     DESEAN MARTINEZ  MRN:      [id number]        Account:      [id number]   :      1940           Service Date: 2021       Document: [id number]

## 2021-05-24 NOTE — H&P (VIEW-ONLY)
HPI and Plan:   See dictation    No orders of the defined types were placed in this encounter.      No orders of the defined types were placed in this encounter.      Medications Discontinued During This Encounter   Medication Reason     acetaminophen (TYLENOL) 325 MG tablet Stopped by Patient         Encounter Diagnosis   Name Primary?     Aortic valve stenosis, etiology of cardiac valve disease unspecified Yes       CURRENT MEDICATIONS:  Current Outpatient Medications   Medication Sig Dispense Refill     allopurinol (ZYLOPRIM) 100 MG tablet TAKE 1 TABLET BY MOUTH EVERY ONCE DAILY 90 tablet 3     amoxicillin (AMOXIL) 500 MG tablet Take 2,000 mg by mouth daily as needed (1 hour prior to dental procedures)       apixaban ANTICOAGULANT (ELIQUIS ANTICOAGULANT) 5 MG tablet Take 1 tablet (5 mg) by mouth 2 times daily 180 tablet 3     carvedilol (COREG) 3.125 MG tablet Take 1 tablet (3.125 mg) by mouth 2 times daily (with meals) 180 tablet 1     lisinopril (ZESTRIL) 20 MG tablet Take 1 tablet (20 mg) by mouth daily 90 tablet 3     pravastatin (PRAVACHOL) 20 MG tablet Take 1 tablet (20 mg) by mouth daily 90 tablet 3       ALLERGIES     Allergies   Allergen Reactions     Simvastatin      10/26/12: Arm pain - pt stopped it x 4-5 wks and arm pain didn't resolve sore started it        PAST MEDICAL HISTORY:  Past Medical History:   Diagnosis Date     Aortic stenosis     moderate     Atypical atrial flutter (H)     PVI and LA linear ablation with successful termination of AFL 10/18/2019     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, unspecified cause, unspecified laterality      Contact dermatitis and other eczema, due to unspecified cause      Diverticulosis of colon (without mention of hemorrhage)      DJD (degenerative joint disease)      Essential hypertension, benign     abstracted 7/22/02     Gout, unspecified      Hip joint replacement by other means 6/3/2009     MGUS (monoclonal gammopathy of unknown significance)       Mitral valve disease     mild MS, moderate-severe MR     PSVT (paroxysmal supraventricular tachycardia) (H)      Pulmonary HTN (H) 10/18/2011     Thrombocytopenia (H)        PAST SURGICAL HISTORY:  Past Surgical History:   Procedure Laterality Date     ANESTHESIA CARDIOVERSION N/A 3/21/2019    Procedure: ANESTHESIA CARDIOVERSION;  Surgeon: GENERIC ANESTHESIA PROVIDER;  Location: [facility] OR     ARTHROPLASTY REVISION HIP ANTERIOR Left 9/9/2019    Procedure: REVISION DIRECT ANTERIOR TOTAL LEFT HIP ARTHROPLASTY;  Surgeon: Luisito Hammonds MD;  Location:  OR     BIOPSY  3/2017     BONE MARROW BIOPSY, BONE SPECIMEN, NEEDLE/TROCAR Right 2/23/2017    Procedure: BIOPSY BONE MARROW;  Surgeon: Romie Esquivel MD;  Location:  GI     COLONOSCOPY  2010     CV HEART CATHETERIZATION WITH POSSIBLE INTERVENTION N/A 8/25/2020    Procedure: Coronary Angiogram;  Surgeon: Brian Simmons MD;  Location:  HEART CARDIAC CATH LAB     CV LEFT HEART CATH N/A 8/25/2020    Procedure: Left Heart Cath;  Surgeon: Brian Simmons MD;  Location:  HEART CARDIAC CATH LAB     CV RIGHT HEART CATH MEASUREMENTS RECORDED N/A 8/25/2020    Procedure: Right Heart Cath;  Surgeon: Brian Simmons MD;  Location:  HEART CARDIAC CATH LAB     EP 3D TACHYCARDIA MAPPING N/A 10/18/2019    Procedure: EP 3D Tachycardia Mapping;  Surgeon: Delfino Gee MD;  Location:  HEART CARDIAC CATH LAB     EP ABLATION FOCAL AFIB N/A 10/18/2019    Procedure: EP Ablation Focal AFIB;  Surgeon: Delfino Gee MD;  Location:  HEART CARDIAC CATH LAB     EYE SURGERY      Cataract, both eyes     ORTHOPEDIC SURGERY  2009    (L) total hip       FAMILY HISTORY:  Family History   Problem Relation Age of Onset     Hypertension Mother        SOCIAL HISTORY:  Social History     Socioeconomic History     Marital status:      Spouse name: None     Number of children: None     Years of education: None     Highest education level: None   Occupational History     None  "  Social Needs     Financial resource strain: None     Food insecurity     Worry: None     Inability: None     Transportation needs     Medical: None     Non-medical: None   Tobacco Use     Smoking status: Never Smoker     Smokeless tobacco: Never Used   Substance and Sexual Activity     Alcohol use: Yes     Alcohol/week: 0.0 standard drinks     Comment: Max 2 beers per day     Drug use: No     Sexual activity: Yes     Partners: Female     Birth control/protection: None   Lifestyle     Physical activity     Days per week: None     Minutes per session: None     Stress: None   Relationships     Social connections     Talks on phone: None     Gets together: None     Attends Scientology service: None     Active member of club or organization: None     Attends meetings of clubs or organizations: None     Relationship status: None     Intimate partner violence     Fear of current or ex partner: None     Emotionally abused: None     Physically abused: None     Forced sexual activity: None   Other Topics Concern     Parent/sibling w/ CABG, MI or angioplasty before 65F 55M? No   Social History Narrative     None       Review of Systems:  Skin:  Negative       Eyes:  Positive for glasses    ENT:  Negative      Respiratory:  Positive for dyspnea on exertion     Cardiovascular:  Negative fatigue;Positive for    Gastroenterology: Negative      Genitourinary:  Negative      Musculoskeletal:  Positive for joint pain left shoulder pain sometimes  Neurologic:  Negative      Psychiatric:  Negative      Heme/Lymph/Imm:  Positive for allergies    Endocrine:  Negative        Physical Exam:  Vitals: BP (!) 146/89 (BP Location: Left arm, Patient Position: Sitting, Cuff Size: Adult Regular)   Pulse 67   Ht 1.803 m (5' 11\")   Wt 84.9 kg (187 lb 1.6 oz)   SpO2 97%   BMI 26.10 kg/m      Constitutional:  cooperative;in no acute distress        Skin:  no apparent skin lesions or masses noted          Head:  no masses or lesions        Eyes:  " pupils equal and round;conjunctivae and lids unremarkable        Lymph:      ENT:  no pallor or cyanosis        Neck:  JVP normal;no carotid bruit        Respiratory:  clear to auscultation         Cardiac: regular rhythm       systolic ejection murmur;grade 3        pulses full and equal                                        GI:  abdomen soft;non-tender        Extremities and Muscular Skeletal:  no edema              Neurological:  no gross motor deficits        Psych:  Alert and Oriented x 3        CC  No referring provider defined for this encounter.

## 2021-05-24 NOTE — PATIENT INSTRUCTIONS
Plan on TAVR 6/22/21 with Dr. Adan, 1st case  - Pre-op labs scheduled for 6/16/21. Please go the [facility] lab. After this lab appointment, please come to the Heart Clinic ([address]) to meet with me to review pre-operative instructions.  - Pre-procedure COVID test scheduled for 6/20/21    Call me with any questions: [phone]    Thanks,  Carmen MEEHAN  Structural Heart Coordinator  [facility]

## 2021-05-25 DIAGNOSIS — I35.0 SEVERE AORTIC STENOSIS: Primary | ICD-10-CM

## 2021-05-27 ENCOUNTER — ANCILLARY PROCEDURE (OUTPATIENT)
Dept: GENERAL RADIOLOGY | Facility: CLINIC | Age: 81
End: 2021-05-27
Attending: INTERNAL MEDICINE
Payer: COMMERCIAL

## 2021-05-27 ENCOUNTER — OFFICE VISIT (OUTPATIENT)
Dept: INTERNAL MEDICINE | Facility: CLINIC | Age: 81
End: 2021-05-27
Payer: COMMERCIAL

## 2021-05-27 VITALS
DIASTOLIC BLOOD PRESSURE: 76 MMHG | HEIGHT: 70 IN | WEIGHT: 187 LBS | TEMPERATURE: 95.5 F | HEART RATE: 66 BPM | SYSTOLIC BLOOD PRESSURE: 124 MMHG | OXYGEN SATURATION: 99 % | BODY MASS INDEX: 26.77 KG/M2

## 2021-05-27 DIAGNOSIS — M25.549 ARTHRALGIA OF HAND, UNSPECIFIED LATERALITY: Primary | ICD-10-CM

## 2021-05-27 DIAGNOSIS — H61.23 BILATERAL IMPACTED CERUMEN: ICD-10-CM

## 2021-05-27 DIAGNOSIS — I35.0 SEVERE AORTIC STENOSIS: ICD-10-CM

## 2021-05-27 DIAGNOSIS — M25.549 ARTHRALGIA OF HAND, UNSPECIFIED LATERALITY: ICD-10-CM

## 2021-05-27 DIAGNOSIS — I10 ESSENTIAL HYPERTENSION, BENIGN: ICD-10-CM

## 2021-05-27 DIAGNOSIS — D47.2 MGUS (MONOCLONAL GAMMOPATHY OF UNKNOWN SIGNIFICANCE): ICD-10-CM

## 2021-05-27 LAB — ERYTHROCYTE [SEDIMENTATION RATE] IN BLOOD BY WESTERGREN METHOD: 9 MM/H (ref 0–20)

## 2021-05-27 PROCEDURE — 85652 RBC SED RATE AUTOMATED: CPT | Performed by: INTERNAL MEDICINE

## 2021-05-27 PROCEDURE — 86038 ANTINUCLEAR ANTIBODIES: CPT | Performed by: INTERNAL MEDICINE

## 2021-05-27 PROCEDURE — 99214 OFFICE O/P EST MOD 30 MIN: CPT | Performed by: INTERNAL MEDICINE

## 2021-05-27 PROCEDURE — 86431 RHEUMATOID FACTOR QUANT: CPT | Performed by: INTERNAL MEDICINE

## 2021-05-27 PROCEDURE — 36415 COLL VENOUS BLD VENIPUNCTURE: CPT | Performed by: INTERNAL MEDICINE

## 2021-05-27 PROCEDURE — 86200 CCP ANTIBODY: CPT | Performed by: INTERNAL MEDICINE

## 2021-05-27 PROCEDURE — 73130 X-RAY EXAM OF HAND: CPT | Mod: RT | Performed by: RADIOLOGY

## 2021-05-27 ASSESSMENT — MIFFLIN-ST. JEOR: SCORE: 1569.48

## 2021-05-27 NOTE — H&P (VIEW-ONLY)
"    Assessment & Plan     Arthralgia of hand, unspecified laterality  Suggest imaging right hand as well as metabolic assessment with follow-up in rheumatology as directed.  Discussed potential benefit of starting extra strength Tylenol pending results.  - Rheumatoid factor  - Erythrocyte sedimentation rate auto  - Cyclic Citrullinated Peptide Antibody IgG  - Anti Nuclear Krystyna IgG by IFA with Reflex  - XR Hand Right G/E 3 Views; Future  - Rheumatology Referral; Future    Essential hypertension, benign  Stable and continuing with current medical management as ordered.    MGUS (monoclonal gammopathy of unknown significance)  Following up in the hematology clinic as directed.    Severe aortic stenosis  Scheduled for upcoming TAVR.    Bilateral impacted cerumen  Discussed use of Debrox.  Suggest 1 week Debrox and follow-up for bilateral irrigation       BMI:   Estimated body mass index is 26.83 kg/m  as calculated from the following:    Height as of this encounter: 1.778 m (5' 10\").    Weight as of this encounter: 84.8 kg (187 lb).       See Patient Instructions    No follow-ups on file.    Pb Warren MD  [facility]   Mars is a 80 year old who presents for the following health issues  accompanied by his self:    HPI     Musculoskeletal problem/pain    Mars states that he has noted the symptoms for at least several months when [place] was [place].  It apparently did not bother him enough to actually be seen for these issues prior to today.    Onset/Duration: 3 months  Description  Location: fingers - bilateral  Joint Swelling: YES  Redness: no  Pain: YES-with ROM  Warmth: no  Intensity:  moderate  Progression of Symptoms:  same and constant  Accompanying signs and symptoms:   Fevers: no  Numbness/tingling/weakness: YES-weakness  History  Trauma to the area: no  Recent illness:  no  Previous similar problem: no  Previous evaluation:  no  Precipitating or alleviating " "factors:  Aggravating factors include: ROM  Therapies tried and outcome: cholo tape at night, rest/inactivity      Review of Systems   CONSTITUTIONAL: NEGATIVE for fever, chills, change in weight  EYES: NEGATIVE for vision changes or irritation  ENT/MOUTH: NEGATIVE for ear, mouth and throat problems  RESP: NEGATIVE for significant cough with mild SOB  CV: NEGATIVE for chest pain, palpitations or peripheral edema  GI: NEGATIVE for nausea, abdominal pain, heartburn, or change in bowel habits  : NEGATIVE for frequency, dysuria, or hematuria  HEME: NEGATIVE for bleeding problems  PSYCHIATRIC: NEGATIVE for changes in mood or affect      Objective    /76   Pulse 66   Temp 95.5  F (35.3  C) (Tympanic)   Ht 1.778 m (5' 10\")   Wt 84.8 kg (187 lb)   SpO2 99%   BMI 26.83 kg/m    There is no height or weight on file to calculate BMI.  Physical Exam   GENERAL: alert and no distress  EYES: Eyes grossly normal to inspection, PERRL and conjunctivae and sclerae normal  HENT: ear canals and TM's occluded with cerumen, nose and mouth without ulcers or lesions  NECK: no adenopathy, no asymmetry, masses, or scars and thyroid normal to palpation  RESP: lungs clear to auscultation - no rales, rhonchi or wheezes  CV: regular rate and rhythm, normal S1 S2, no S3 or S4, noted AS murmur  MS: There are bilateral osteoarthritic changes noted to the hands right greater than left.  There are Heberden nodes noted to the distal interphalangeal joints bilaterally.  Some of these are mildly tender.  There is mild carpal metacarpal changes noted right greater than left hand.  The proximal interphalangeal joint of the third digit of the right hand is slightly more tender than others with noted decreased flexion.  There is impaired  strength right greater than left hand.  SKIN: No acute cellulitic changes are noted.  NEURO: No obvious focal neurologic deficits are identified, Tinel's and Phalen sign are negative  PSYCH: mentation " appears normal, affect normal/bright    Echo shows preserved LV function with an EF of 60%-65% and severe aortic stenosis.  His AS has progressed since his last echo.  His valve area is now 0.83 and his mean gradient is 53 mmHg.  His mitral valve regurgitation is now mild.

## 2021-05-27 NOTE — PROGRESS NOTES
"    Assessment & Plan     Arthralgia of hand, unspecified laterality  Suggest imaging right hand as well as metabolic assessment with follow-up in rheumatology as directed.  Discussed potential benefit of starting extra strength Tylenol pending results.  - Rheumatoid factor  - Erythrocyte sedimentation rate auto  - Cyclic Citrullinated Peptide Antibody IgG  - Anti Nuclear Krystyna IgG by IFA with Reflex  - XR Hand Right G/E 3 Views; Future  - Rheumatology Referral; Future    Essential hypertension, benign  Stable and continuing with current medical management as ordered.    MGUS (monoclonal gammopathy of unknown significance)  Following up in the hematology clinic as directed.    Severe aortic stenosis  Scheduled for upcoming TAVR.    Bilateral impacted cerumen  Discussed use of Debrox.  Suggest 1 week Debrox and follow-up for bilateral irrigation       BMI:   Estimated body mass index is 26.83 kg/m  as calculated from the following:    Height as of this encounter: 1.778 m (5' 10\").    Weight as of this encounter: 84.8 kg (187 lb).       See Patient Instructions    No follow-ups on file.    Pb Warren MD  [facility]   Mars is a 80 year old who presents for the following health issues  accompanied by his self:    HPI     Musculoskeletal problem/pain    Mars states that he has noted the symptoms for at least several months when [place] was [place].  It apparently did not bother him enough to actually be seen for these issues prior to today.    Onset/Duration: 3 months  Description  Location: fingers - bilateral  Joint Swelling: YES  Redness: no  Pain: YES-with ROM  Warmth: no  Intensity:  moderate  Progression of Symptoms:  same and constant  Accompanying signs and symptoms:   Fevers: no  Numbness/tingling/weakness: YES-weakness  History  Trauma to the area: no  Recent illness:  no  Previous similar problem: no  Previous evaluation:  no  Precipitating or alleviating " "factors:  Aggravating factors include: ROM  Therapies tried and outcome: cholo tape at night, rest/inactivity      Review of Systems   CONSTITUTIONAL: NEGATIVE for fever, chills, change in weight  EYES: NEGATIVE for vision changes or irritation  ENT/MOUTH: NEGATIVE for ear, mouth and throat problems  RESP: NEGATIVE for significant cough with mild SOB  CV: NEGATIVE for chest pain, palpitations or peripheral edema  GI: NEGATIVE for nausea, abdominal pain, heartburn, or change in bowel habits  : NEGATIVE for frequency, dysuria, or hematuria  HEME: NEGATIVE for bleeding problems  PSYCHIATRIC: NEGATIVE for changes in mood or affect      Objective    /76   Pulse 66   Temp 95.5  F (35.3  C) (Tympanic)   Ht 1.778 m (5' 10\")   Wt 84.8 kg (187 lb)   SpO2 99%   BMI 26.83 kg/m    There is no height or weight on file to calculate BMI.  Physical Exam   GENERAL: alert and no distress  EYES: Eyes grossly normal to inspection, PERRL and conjunctivae and sclerae normal  HENT: ear canals and TM's occluded with cerumen, nose and mouth without ulcers or lesions  NECK: no adenopathy, no asymmetry, masses, or scars and thyroid normal to palpation  RESP: lungs clear to auscultation - no rales, rhonchi or wheezes  CV: regular rate and rhythm, normal S1 S2, no S3 or S4, noted AS murmur  MS: There are bilateral osteoarthritic changes noted to the hands right greater than left.  There are Heberden nodes noted to the distal interphalangeal joints bilaterally.  Some of these are mildly tender.  There is mild carpal metacarpal changes noted right greater than left hand.  The proximal interphalangeal joint of the third digit of the right hand is slightly more tender than others with noted decreased flexion.  There is impaired  strength right greater than left hand.  SKIN: No acute cellulitic changes are noted.  NEURO: No obvious focal neurologic deficits are identified, Tinel's and Phalen sign are negative  PSYCH: mentation " appears normal, affect normal/bright    Echo shows preserved LV function with an EF of 60%-65% and severe aortic stenosis.  His AS has progressed since his last echo.  His valve area is now 0.83 and his mean gradient is 53 mmHg.  His mitral valve regurgitation is now mild.

## 2021-05-28 LAB
ANA SER QL IF: NEGATIVE
CCP AB SER IA-ACNC: 1 U/ML
RHEUMATOID FACT SER NEPH-ACNC: <7 IU/ML (ref 0–20)

## 2021-06-16 ENCOUNTER — CARE COORDINATION (OUTPATIENT)
Dept: CARDIOLOGY | Facility: CLINIC | Age: 81
End: 2021-06-16

## 2021-06-16 ENCOUNTER — HOSPITAL ENCOUNTER (OUTPATIENT)
Dept: LAB | Facility: CLINIC | Age: 81
Discharge: HOME | End: 2021-06-16
Attending: INTERNAL MEDICINE | Admitting: INTERNAL MEDICINE
Payer: COMMERCIAL

## 2021-06-16 DIAGNOSIS — I35.0 SEVERE AORTIC STENOSIS: ICD-10-CM

## 2021-06-16 LAB
ALBUMIN SERPL-MCNC: 4 G/DL (ref 3.4–5)
ALP SERPL-CCNC: 92 U/L (ref 40–150)
ALT SERPL W P-5'-P-CCNC: 20 U/L (ref 0–70)
ANION GAP SERPL CALCULATED.3IONS-SCNC: 4 MMOL/L (ref 3–14)
AST SERPL W P-5'-P-CCNC: 19 U/L (ref 0–45)
BILIRUB SERPL-MCNC: 1.3 MG/DL (ref 0.2–1.3)
BUN SERPL-MCNC: 18 MG/DL (ref 7–30)
CALCIUM SERPL-MCNC: 9.5 MG/DL (ref 8.5–10.1)
CHLORIDE SERPL-SCNC: 101 MMOL/L (ref 94–109)
CO2 SERPL-SCNC: 28 MMOL/L (ref 20–32)
CREAT SERPL-MCNC: 1.02 MG/DL (ref 0.66–1.25)
ERYTHROCYTE [DISTWIDTH] IN BLOOD BY AUTOMATED COUNT: 12.7 % (ref 10–15)
GFR SERPL CREATININE-BSD FRML MDRD: 69 ML/MIN/{1.73_M2}
GLUCOSE SERPL-MCNC: 86 MG/DL (ref 70–99)
HCT VFR BLD AUTO: 42.9 % (ref 40–53)
HGB BLD-MCNC: 14.2 G/DL (ref 13.3–17.7)
INR PPP: 1.01 (ref 0.86–1.14)
MCH RBC QN AUTO: 28.9 PG (ref 26.5–33)
MCHC RBC AUTO-ENTMCNC: 33.1 G/DL (ref 31.5–36.5)
MCV RBC AUTO: 87 FL (ref 78–100)
PLATELET # BLD AUTO: 68 10E9/L (ref 150–450)
POTASSIUM SERPL-SCNC: 4.8 MMOL/L (ref 3.4–5.3)
PROT SERPL-MCNC: 8 G/DL (ref 6.8–8.8)
RBC # BLD AUTO: 4.92 10E12/L (ref 4.4–5.9)
SODIUM SERPL-SCNC: 133 MMOL/L (ref 133–144)
WBC # BLD AUTO: 6.8 10E9/L (ref 4–11)

## 2021-06-16 PROCEDURE — 80053 COMPREHEN METABOLIC PANEL: CPT | Performed by: INTERNAL MEDICINE

## 2021-06-16 PROCEDURE — 85027 COMPLETE CBC AUTOMATED: CPT | Performed by: INTERNAL MEDICINE

## 2021-06-16 PROCEDURE — 86901 BLOOD TYPING SEROLOGIC RH(D): CPT | Performed by: INTERNAL MEDICINE

## 2021-06-16 PROCEDURE — 36415 COLL VENOUS BLD VENIPUNCTURE: CPT | Performed by: INTERNAL MEDICINE

## 2021-06-16 PROCEDURE — 85610 PROTHROMBIN TIME: CPT | Performed by: INTERNAL MEDICINE

## 2021-06-16 PROCEDURE — 86850 RBC ANTIBODY SCREEN: CPT | Performed by: INTERNAL MEDICINE

## 2021-06-16 PROCEDURE — 86900 BLOOD TYPING SEROLOGIC ABO: CPT | Performed by: INTERNAL MEDICINE

## 2021-06-16 PROCEDURE — 86923 COMPATIBILITY TEST ELECTRIC: CPT | Performed by: INTERNAL MEDICINE

## 2021-06-16 NOTE — PROGRESS NOTES
Met with Mars and wife in clinic, reviewed pre-operative instructions. Provided paper copy to pt. He does desire to be full surgical bailout. His wife, Aleja, is his emergency contact.         TAVR pre-procedure instructions reviewed with you today:    - Your TAVR is scheduled Tuesday 6/22/21, 1st case. Please check-in at 05:30AM at the Registration Desk in the [facility] at [facility].    - Use the Hibiclens soap I have provided you the night before and morning of the procedure. Use from chin to toes, please avoid your face and eyes.    - Nothing to eat or drink the morning of your TAVR.     - You make take your morning medications with sips of water.     - You will need to hold your Eliquis prior to TAVR, take your last dose on: 6/19/21. Please take 325mg of aspirin the night before and morning of your TAVR.    - COVID-19 restrictions: You will need a COVID test prior to procedure, this is scheduled on 6/20/21. On the day of the procedure, you may have one visitor in the pre-operative area. Patient and visitor must wear face masks.    It was nice to see you today! Please call with any other questions or concerns: [phone]    Carmen Mckenna RN  [facility]

## 2021-06-16 NOTE — LETTER
June 16, 2021       TO: Mars Reyes   [address] [address]       TAVR pre-procedure instructions reviewed with you today:    - Your TAVR is scheduled Tuesday 6/22/21, 1st case. Please check-in at 05:30AM at the Registration Desk in the [place] [facility] at [facility].    - Use the Hibiclens soap I have provided you the night before and morning of the procedure. Use from chin to toes, please avoid your face and eyes.    - Nothing to eat or drink the morning of your TAVR.     - You make take your morning medications with sips of water.     - You will need to hold your Eliquis prior to TAVR, take your last dose on: 6/19/21. Please take 325mg of aspirin the night before and morning of your TAVR.    - COVID-19 restrictions: You will need a COVID test prior to procedure, this is scheduled on 6/20/21. On the day of the procedure, you may have one visitor in the pre-operative area. Patient and visitor must wear face masks.    It was nice to see you today! Please call with any other questions or concerns: [phone]    Carmen Mckenna RN  [facility]

## 2021-06-18 DIAGNOSIS — I35.0 SEVERE AORTIC STENOSIS: Primary | ICD-10-CM

## 2021-06-18 RX ORDER — SODIUM CHLORIDE 9 MG/ML
INJECTION, SOLUTION INTRAVENOUS CONTINUOUS
Status: CANCELLED | OUTPATIENT
Start: 2021-06-18

## 2021-06-18 RX ORDER — PRAVASTATIN SODIUM 20 MG
20 TABLET ORAL EVERY EVENING
COMMUNITY
End: 2021-07-23

## 2021-06-18 RX ORDER — LIDOCAINE 40 MG/G
CREAM TOPICAL
Status: CANCELLED | OUTPATIENT
Start: 2021-06-18

## 2021-06-18 RX ORDER — ALLOPURINOL 100 MG/1
100 TABLET ORAL EVERY MORNING
COMMUNITY
End: 2021-07-23

## 2021-06-18 RX ORDER — LISINOPRIL 20 MG/1
20 TABLET ORAL EVERY EVENING
COMMUNITY
End: 2022-01-06

## 2021-06-18 RX ORDER — CEFAZOLIN SODIUM 2 G/100ML
2 INJECTION, SOLUTION INTRAVENOUS
Status: CANCELLED | OUTPATIENT
Start: 2021-06-18

## 2021-06-18 RX ORDER — ASPIRIN 81 MG/1
81 TABLET ORAL DAILY
Status: CANCELLED | OUTPATIENT
Start: 2021-06-18

## 2021-06-18 NOTE — PROGRESS NOTES
PTA medications updated by Medication Scribe prior to surgery via phone call with patient (last doses completed by Nurse)     Medication history sources: Patient, Surescripts and H&P  In the past week, patient estimated taking medication this percent of the time: Greater than 90%  Adherence assessment: N/A Not Observed    Significant changes made to the medication list:  None      Additional medication history information:   None    Medication reconciliation completed by provider prior to medication history? No    Time spent in this activity: 20 minutes    The information provided in this note is only as accurate as the sources available at the time of update(s)      Prior to Admission medications    Medication Sig Last Dose Taking? Auth Provider   allopurinol (ZYLOPRIM) 100 MG tablet Take 100 mg by mouth every morning  at am Yes Reported, Patient   amoxicillin (AMOXIL) 500 MG tablet Take 2,000 mg by mouth daily as needed (1 hour prior to dental procedures) 5/19/2021 at prn Yes Reported, Patient   apixaban ANTICOAGULANT (ELIQUIS ANTICOAGULANT) 5 MG tablet Take 1 tablet (5 mg) by mouth 2 times daily  Yes Alis Wong PA-C   aspirin (ASA) 325 MG EC tablet Take 325 mg by mouth See Admin Instructions Take 1 tablet night before surgery, and 1 tablet morning of surgery  Yes Reported, Patient   carvedilol (COREG) 3.125 MG tablet Take 1 tablet (3.125 mg) by mouth 2 times daily (with meals)  Yes Jolene Bose APRN CNP   lisinopril (ZESTRIL) 20 MG tablet Take 20 mg by mouth every evening  at pm Yes Reported, Patient   pravastatin (PRAVACHOL) 20 MG tablet Take 20 mg by mouth every evening  at pm Yes Reported, Patient

## 2021-06-20 DIAGNOSIS — I35.0 SEVERE AORTIC STENOSIS: ICD-10-CM

## 2021-06-20 LAB
SARS-COV-2 RNA RESP QL NAA+PROBE: NORMAL
SPECIMEN SOURCE: NORMAL

## 2021-06-20 PROCEDURE — U0003 INFECTIOUS AGENT DETECTION BY NUCLEIC ACID (DNA OR RNA); SEVERE ACUTE RESPIRATORY SYNDROME CORONAVIRUS 2 (SARS-COV-2) (CORONAVIRUS DISEASE [COVID-19]), AMPLIFIED PROBE TECHNIQUE, MAKING USE OF HIGH THROUGHPUT TECHNOLOGIES AS DESCRIBED BY CMS-2020-01-R: HCPCS | Performed by: INTERNAL MEDICINE

## 2021-06-20 PROCEDURE — U0005 INFEC AGEN DETEC AMPLI PROBE: HCPCS | Performed by: INTERNAL MEDICINE

## 2021-06-21 LAB
LABORATORY COMMENT REPORT: NORMAL
SARS-COV-2 RNA RESP QL NAA+PROBE: NEGATIVE
SPECIMEN SOURCE: NORMAL

## 2021-06-22 ENCOUNTER — ANESTHESIA (OUTPATIENT)
Dept: CARDIOLOGY | Facility: CLINIC | Age: 81
DRG: 267 | End: 2021-06-22
Payer: COMMERCIAL

## 2021-06-22 ENCOUNTER — ANESTHESIA EVENT (OUTPATIENT)
Dept: CARDIOLOGY | Facility: CLINIC | Age: 81
DRG: 267 | End: 2021-06-22
Payer: COMMERCIAL

## 2021-06-22 ENCOUNTER — HOSPITAL ENCOUNTER (INPATIENT)
Facility: CLINIC | Age: 81
LOS: 1 days | Discharge: HOME | DRG: 267 | End: 2021-06-23
Attending: INTERNAL MEDICINE | Admitting: HOSPITALIST
Payer: COMMERCIAL

## 2021-06-22 ENCOUNTER — HOSPITAL ENCOUNTER (OUTPATIENT)
Dept: CARDIOLOGY | Facility: CLINIC | Age: 81
Discharge: HOME | End: 2021-06-22
Attending: INTERNAL MEDICINE | Admitting: INTERNAL MEDICINE
Payer: COMMERCIAL

## 2021-06-22 ENCOUNTER — APPOINTMENT (OUTPATIENT)
Dept: CARDIOLOGY | Facility: CLINIC | Age: 81
DRG: 267 | End: 2021-06-22
Attending: ANESTHESIOLOGY
Payer: COMMERCIAL

## 2021-06-22 DIAGNOSIS — D69.6 THROMBOCYTOPENIA (H): ICD-10-CM

## 2021-06-22 DIAGNOSIS — Z95.2 S/P TAVR (TRANSCATHETER AORTIC VALVE REPLACEMENT): Primary | ICD-10-CM

## 2021-06-22 DIAGNOSIS — I35.0 SEVERE AORTIC STENOSIS: ICD-10-CM

## 2021-06-22 DIAGNOSIS — I10 ESSENTIAL HYPERTENSION, BENIGN: ICD-10-CM

## 2021-06-22 LAB
ABO + RH BLD: NORMAL
ABO + RH BLD: NORMAL
BLD GP AB SCN SERPL QL: NORMAL
BLD PROD TYP BPU: NORMAL
BLD UNIT ID BPU: 0
BLD UNIT ID BPU: 0
BLOOD BANK CMNT PATIENT-IMP: NORMAL
BLOOD PRODUCT CODE: NORMAL
BLOOD PRODUCT CODE: NORMAL
BPU ID: NORMAL
BPU ID: NORMAL
KCT BLD-ACNC: 114 SEC (ref 75–150)
KCT BLD-ACNC: 130 SEC (ref 75–150)
KCT BLD-ACNC: 324 SEC (ref 75–150)
KCT BLD-ACNC: 324 SEC (ref 75–150)
NUM BPU REQUESTED: 2
POTASSIUM SERPL-SCNC: 4.4 MMOL/L (ref 3.4–5.3)
SPECIMEN EXP DATE BLD: NORMAL
TRANSFUSION STATUS PATIENT QL: NORMAL

## 2021-06-22 PROCEDURE — 250N000011 HC RX IP 250 OP 636: Performed by: INTERNAL MEDICINE

## 2021-06-22 PROCEDURE — X2A5312 CEREBRAL EMBOLIC FILTRATION, DUAL FILTER IN INNOMINATE ARTERY AND LEFT COMMON CAROTID ARTERY, PERCUTANEOUS APPROACH, NEW TECHNOLOGY GROUP 2: ICD-10-PCS | Performed by: INTERNAL MEDICINE

## 2021-06-22 PROCEDURE — 93005 ELECTROCARDIOGRAM TRACING: CPT

## 2021-06-22 PROCEDURE — 93325 DOPPLER ECHO COLOR FLOW MAPG: CPT | Mod: 26 | Performed by: INTERNAL MEDICINE

## 2021-06-22 PROCEDURE — 99223 1ST HOSP IP/OBS HIGH 75: CPT | Mod: AI | Performed by: HOSPITALIST

## 2021-06-22 PROCEDURE — 36415 COLL VENOUS BLD VENIPUNCTURE: CPT | Performed by: ANESTHESIOLOGY

## 2021-06-22 PROCEDURE — 258N000003 HC RX IP 258 OP 636: Performed by: STUDENT IN AN ORGANIZED HEALTH CARE EDUCATION/TRAINING PROGRAM

## 2021-06-22 PROCEDURE — 250N000009 HC RX 250: Performed by: NURSE ANESTHETIST, CERTIFIED REGISTERED

## 2021-06-22 PROCEDURE — C1760 CLOSURE DEV, VASC: HCPCS | Performed by: INTERNAL MEDICINE

## 2021-06-22 PROCEDURE — 250N000009 HC RX 250: Performed by: INTERNAL MEDICINE

## 2021-06-22 PROCEDURE — P9041 ALBUMIN (HUMAN),5%, 50ML: HCPCS | Performed by: ANESTHESIOLOGY

## 2021-06-22 PROCEDURE — 272N000001 HC OR GENERAL SUPPLY STERILE: Performed by: INTERNAL MEDICINE

## 2021-06-22 PROCEDURE — 02RF38Z REPLACEMENT OF AORTIC VALVE WITH ZOOPLASTIC TISSUE, PERCUTANEOUS APPROACH: ICD-10-PCS | Performed by: INTERNAL MEDICINE

## 2021-06-22 PROCEDURE — 93321 DOPPLER ECHO F-UP/LMTD STD: CPT | Mod: 26 | Performed by: INTERNAL MEDICINE

## 2021-06-22 PROCEDURE — 93321 DOPPLER ECHO F-UP/LMTD STD: CPT

## 2021-06-22 PROCEDURE — 258N000003 HC RX IP 258 OP 636: Performed by: NURSE ANESTHETIST, CERTIFIED REGISTERED

## 2021-06-22 PROCEDURE — 93306 TTE W/DOPPLER COMPLETE: CPT | Mod: 26 | Performed by: INTERNAL MEDICINE

## 2021-06-22 PROCEDURE — 84132 ASSAY OF SERUM POTASSIUM: CPT | Performed by: ANESTHESIOLOGY

## 2021-06-22 PROCEDURE — 93308 TTE F-UP OR LMTD: CPT | Mod: 26 | Performed by: INTERNAL MEDICINE

## 2021-06-22 PROCEDURE — C1725 CATH, TRANSLUMIN NON-LASER: HCPCS | Performed by: INTERNAL MEDICINE

## 2021-06-22 PROCEDURE — C1730 CATH, EP, 19 OR FEW ELECT: HCPCS | Performed by: INTERNAL MEDICINE

## 2021-06-22 PROCEDURE — 99221 1ST HOSP IP/OBS SF/LOW 40: CPT | Mod: 25 | Performed by: THORACIC SURGERY (CARDIOTHORACIC VASCULAR SURGERY)

## 2021-06-22 PROCEDURE — 370N000017 HC ANESTHESIA TECHNICAL FEE, PER MIN: Performed by: INTERNAL MEDICINE

## 2021-06-22 PROCEDURE — 33361 REPLACE AORTIC VALVE PERQ: CPT | Mod: Q0 | Performed by: INTERNAL MEDICINE

## 2021-06-22 PROCEDURE — 210N000002 HC R&B HEART CARE

## 2021-06-22 PROCEDURE — 258N000003 HC RX IP 258 OP 636: Performed by: INTERNAL MEDICINE

## 2021-06-22 PROCEDURE — 85347 COAGULATION TIME ACTIVATED: CPT

## 2021-06-22 PROCEDURE — 278N000051 HC OR IMPLANT GENERAL: Performed by: INTERNAL MEDICINE

## 2021-06-22 PROCEDURE — C1894 INTRO/SHEATH, NON-LASER: HCPCS | Performed by: INTERNAL MEDICINE

## 2021-06-22 PROCEDURE — C1769 GUIDE WIRE: HCPCS | Performed by: INTERNAL MEDICINE

## 2021-06-22 PROCEDURE — 250N000011 HC RX IP 250 OP 636: Performed by: ANESTHESIOLOGY

## 2021-06-22 PROCEDURE — 93306 TTE W/DOPPLER COMPLETE: CPT

## 2021-06-22 PROCEDURE — 250N000011 HC RX IP 250 OP 636: Performed by: NURSE ANESTHETIST, CERTIFIED REGISTERED

## 2021-06-22 DEVICE — 29MM EVOLUT PROPLUS TRANSCATHETER AORTIC VALVE EVPROPLUS-29U: Type: IMPLANTABLE DEVICE | Status: FUNCTIONAL

## 2021-06-22 RX ORDER — NALOXONE HYDROCHLORIDE 0.4 MG/ML
0.2 INJECTION, SOLUTION INTRAMUSCULAR; INTRAVENOUS; SUBCUTANEOUS
Status: ACTIVE | OUTPATIENT
Start: 2021-06-22 | End: 2021-06-23

## 2021-06-22 RX ORDER — HEPARIN SODIUM 1000 [USP'U]/ML
INJECTION, SOLUTION INTRAVENOUS; SUBCUTANEOUS PRN
Status: DISCONTINUED | OUTPATIENT
Start: 2021-06-22 | End: 2021-06-22

## 2021-06-22 RX ORDER — ALBUMIN, HUMAN INJ 5% 5 %
250 SOLUTION INTRAVENOUS ONCE
Status: COMPLETED | OUTPATIENT
Start: 2021-06-22 | End: 2021-06-22

## 2021-06-22 RX ORDER — ONDANSETRON 4 MG/1
4 TABLET, ORALLY DISINTEGRATING ORAL EVERY 30 MIN PRN
Status: DISCONTINUED | OUTPATIENT
Start: 2021-06-22 | End: 2021-06-22 | Stop reason: HOSPADM

## 2021-06-22 RX ORDER — HYDRALAZINE HYDROCHLORIDE 20 MG/ML
10 INJECTION INTRAMUSCULAR; INTRAVENOUS
Status: DISCONTINUED | OUTPATIENT
Start: 2021-06-22 | End: 2021-06-23 | Stop reason: HOSPADM

## 2021-06-22 RX ORDER — VERAPAMIL HYDROCHLORIDE 2.5 MG/ML
INJECTION, SOLUTION INTRAVENOUS
Status: DISCONTINUED | OUTPATIENT
Start: 2021-06-22 | End: 2021-06-22 | Stop reason: HOSPADM

## 2021-06-22 RX ORDER — FENTANYL CITRATE 50 UG/ML
25-50 INJECTION, SOLUTION INTRAMUSCULAR; INTRAVENOUS
Status: DISCONTINUED | OUTPATIENT
Start: 2021-06-22 | End: 2021-06-22 | Stop reason: HOSPADM

## 2021-06-22 RX ORDER — ASPIRIN 81 MG/1
81 TABLET ORAL DAILY
Status: DISCONTINUED | OUTPATIENT
Start: 2021-06-23 | End: 2021-06-23 | Stop reason: HOSPADM

## 2021-06-22 RX ORDER — LIDOCAINE 40 MG/G
CREAM TOPICAL
Status: DISCONTINUED | OUTPATIENT
Start: 2021-06-22 | End: 2021-06-22 | Stop reason: HOSPADM

## 2021-06-22 RX ORDER — SODIUM CHLORIDE, SODIUM LACTATE, POTASSIUM CHLORIDE, CALCIUM CHLORIDE 600; 310; 30; 20 MG/100ML; MG/100ML; MG/100ML; MG/100ML
INJECTION, SOLUTION INTRAVENOUS CONTINUOUS
Status: DISCONTINUED | OUTPATIENT
Start: 2021-06-22 | End: 2021-06-22 | Stop reason: HOSPADM

## 2021-06-22 RX ORDER — NALOXONE HYDROCHLORIDE 0.4 MG/ML
0.4 INJECTION, SOLUTION INTRAMUSCULAR; INTRAVENOUS; SUBCUTANEOUS
Status: ACTIVE | OUTPATIENT
Start: 2021-06-22 | End: 2021-06-23

## 2021-06-22 RX ORDER — DEXAMETHASONE SODIUM PHOSPHATE 4 MG/ML
INJECTION, SOLUTION INTRA-ARTICULAR; INTRALESIONAL; INTRAMUSCULAR; INTRAVENOUS; SOFT TISSUE PRN
Status: DISCONTINUED | OUTPATIENT
Start: 2021-06-22 | End: 2021-06-22

## 2021-06-22 RX ORDER — ONDANSETRON 2 MG/ML
4 INJECTION INTRAMUSCULAR; INTRAVENOUS EVERY 30 MIN PRN
Status: DISCONTINUED | OUTPATIENT
Start: 2021-06-22 | End: 2021-06-22 | Stop reason: HOSPADM

## 2021-06-22 RX ORDER — ASPIRIN 81 MG/1
81 TABLET ORAL DAILY
Status: DISCONTINUED | OUTPATIENT
Start: 2021-06-22 | End: 2021-06-22 | Stop reason: HOSPADM

## 2021-06-22 RX ORDER — ONDANSETRON 2 MG/ML
INJECTION INTRAMUSCULAR; INTRAVENOUS PRN
Status: DISCONTINUED | OUTPATIENT
Start: 2021-06-22 | End: 2021-06-22

## 2021-06-22 RX ORDER — HYDROMORPHONE HYDROCHLORIDE 1 MG/ML
.3-.5 INJECTION, SOLUTION INTRAMUSCULAR; INTRAVENOUS; SUBCUTANEOUS EVERY 5 MIN PRN
Status: DISCONTINUED | OUTPATIENT
Start: 2021-06-22 | End: 2021-06-22 | Stop reason: HOSPADM

## 2021-06-22 RX ORDER — NITROGLYCERIN 0.4 MG/1
0.4 TABLET SUBLINGUAL EVERY 5 MIN PRN
Status: DISCONTINUED | OUTPATIENT
Start: 2021-06-22 | End: 2021-06-23 | Stop reason: HOSPADM

## 2021-06-22 RX ORDER — ACETAMINOPHEN 325 MG/1
650 TABLET ORAL EVERY 4 HOURS PRN
Status: DISCONTINUED | OUTPATIENT
Start: 2021-06-22 | End: 2021-06-23 | Stop reason: HOSPADM

## 2021-06-22 RX ORDER — PHENYLEPHRINE HCL IN 0.9% NACL 50MG/250ML
.1-6 PLASTIC BAG, INJECTION (ML) INTRAVENOUS CONTINUOUS
Status: DISCONTINUED | OUTPATIENT
Start: 2021-06-22 | End: 2021-06-22 | Stop reason: HOSPADM

## 2021-06-22 RX ORDER — SODIUM CHLORIDE 9 MG/ML
INJECTION, SOLUTION INTRAVENOUS CONTINUOUS
Status: ACTIVE | OUTPATIENT
Start: 2021-06-22 | End: 2021-06-22

## 2021-06-22 RX ORDER — SODIUM CHLORIDE 9 MG/ML
INJECTION, SOLUTION INTRAVENOUS CONTINUOUS
Status: DISCONTINUED | OUTPATIENT
Start: 2021-06-22 | End: 2021-06-22 | Stop reason: HOSPADM

## 2021-06-22 RX ORDER — PROTAMINE SULFATE 10 MG/ML
INJECTION, SOLUTION INTRAVENOUS PRN
Status: DISCONTINUED | OUTPATIENT
Start: 2021-06-22 | End: 2021-06-22

## 2021-06-22 RX ORDER — SODIUM CHLORIDE, SODIUM LACTATE, POTASSIUM CHLORIDE, CALCIUM CHLORIDE 600; 310; 30; 20 MG/100ML; MG/100ML; MG/100ML; MG/100ML
INJECTION, SOLUTION INTRAVENOUS CONTINUOUS PRN
Status: DISCONTINUED | OUTPATIENT
Start: 2021-06-22 | End: 2021-06-22

## 2021-06-22 RX ORDER — CEFAZOLIN SODIUM 2 G/100ML
2 INJECTION, SOLUTION INTRAVENOUS
Status: COMPLETED | OUTPATIENT
Start: 2021-06-22 | End: 2021-06-22

## 2021-06-22 RX ADMIN — HEPARIN SODIUM 16000 UNITS: 1000 INJECTION INTRAVENOUS; SUBCUTANEOUS at 08:20

## 2021-06-22 RX ADMIN — CEFAZOLIN SODIUM 2 G: 2 INJECTION, SOLUTION INTRAVENOUS at 07:45

## 2021-06-22 RX ADMIN — SODIUM CHLORIDE, SODIUM LACTATE, POTASSIUM CHLORIDE, CALCIUM CHLORIDE: 600; 310; 30; 20 INJECTION, SOLUTION INTRAVENOUS at 08:15

## 2021-06-22 RX ADMIN — DEXMEDETOMIDINE HYDROCHLORIDE 0.7 MCG/KG/HR: 100 INJECTION, SOLUTION INTRAVENOUS at 07:38

## 2021-06-22 RX ADMIN — ONDANSETRON 4 MG: 2 INJECTION INTRAMUSCULAR; INTRAVENOUS at 08:22

## 2021-06-22 RX ADMIN — PHENYLEPHRINE HYDROCHLORIDE 100 MCG: 10 INJECTION INTRAVENOUS at 09:09

## 2021-06-22 RX ADMIN — SODIUM CHLORIDE: 9 INJECTION, SOLUTION INTRAVENOUS at 12:21

## 2021-06-22 RX ADMIN — PHENYLEPHRINE HYDROCHLORIDE 50 MCG: 10 INJECTION INTRAVENOUS at 08:36

## 2021-06-22 RX ADMIN — SODIUM CHLORIDE: 9 INJECTION, SOLUTION INTRAVENOUS at 06:47

## 2021-06-22 RX ADMIN — PHENYLEPHRINE HYDROCHLORIDE 50 MCG: 10 INJECTION INTRAVENOUS at 09:24

## 2021-06-22 RX ADMIN — DEXAMETHASONE SODIUM PHOSPHATE 4 MG: 4 INJECTION, SOLUTION INTRA-ARTICULAR; INTRALESIONAL; INTRAMUSCULAR; INTRAVENOUS; SOFT TISSUE at 08:22

## 2021-06-22 RX ADMIN — PHENYLEPHRINE HYDROCHLORIDE 200 MCG: 10 INJECTION INTRAVENOUS at 08:42

## 2021-06-22 RX ADMIN — ALBUMIN HUMAN 250 ML: 0.05 INJECTION, SOLUTION INTRAVENOUS at 10:23

## 2021-06-22 RX ADMIN — PHENYLEPHRINE HYDROCHLORIDE 200 MCG: 10 INJECTION INTRAVENOUS at 08:53

## 2021-06-22 RX ADMIN — ALBUMIN HUMAN 250 ML: 0.05 INJECTION, SOLUTION INTRAVENOUS at 10:42

## 2021-06-22 RX ADMIN — PROTAMINE SULFATE 100 MG: 10 INJECTION, SOLUTION INTRAVENOUS at 09:16

## 2021-06-22 RX ADMIN — PHENYLEPHRINE HYDROCHLORIDE 100 MCG: 10 INJECTION INTRAVENOUS at 08:38

## 2021-06-22 RX ADMIN — PHENYLEPHRINE HYDROCHLORIDE 100 MCG: 10 INJECTION INTRAVENOUS at 09:39

## 2021-06-22 RX ADMIN — PHENYLEPHRINE HYDROCHLORIDE 50 MCG: 10 INJECTION INTRAVENOUS at 08:51

## 2021-06-22 RX ADMIN — DEXMEDETOMIDINE HYDROCHLORIDE: 100 INJECTION, SOLUTION INTRAVENOUS at 08:48

## 2021-06-22 RX ADMIN — ALBUMIN HUMAN 250 ML: 0.05 INJECTION, SOLUTION INTRAVENOUS at 10:06

## 2021-06-22 RX ADMIN — PHENYLEPHRINE HYDROCHLORIDE 50 MCG: 10 INJECTION INTRAVENOUS at 09:32

## 2021-06-22 ASSESSMENT — ENCOUNTER SYMPTOMS: DYSRHYTHMIAS: 1

## 2021-06-22 ASSESSMENT — VISUAL ACUITY
OU: NORMAL ACUITY

## 2021-06-22 ASSESSMENT — MIFFLIN-ST. JEOR: SCORE: 1551.33

## 2021-06-22 ASSESSMENT — LIFESTYLE VARIABLES: TOBACCO_USE: 0

## 2021-06-22 ASSESSMENT — COPD QUESTIONNAIRES: COPD: 0

## 2021-06-22 NOTE — ANESTHESIA POSTPROCEDURE EVALUATION
Patient: Mars Reyes    Procedure(s):  Transcatheter Aortic Valve Replacement    Diagnosis:Severe aortic stenosis  Diagnosis Additional Information: No value filed.    Anesthesia Type:  MAC    Note:  Disposition: Inpatient   Postop Pain Control: Uneventful            Sign Out: Well controlled pain   PONV:    Neuro/Psych: Uneventful            Sign Out: Acceptable/Baseline neuro status   Airway/Respiratory: Uneventful            Sign Out: Acceptable/Baseline resp. status   CV/Hemodynamics: Uneventful            Sign Out: Acceptable CV status   Other NRE: NONE   DID A NON-ROUTINE EVENT OCCUR? No    Event details/Postop Comments:  Patient had refractory  Hypotension post op. Echo (-) for effusion. Ultimately responded to 750 ml of albumin.            Last vitals:  Vitals:    06/22/21 1120 06/22/21 1140 06/22/21 1150   BP: 107/59 107/63 119/64   Pulse: 59 58 60   Resp: 10 8 9   Temp:      SpO2: 98% 97% 96%       Last vitals prior to Anesthesia Care Transfer:  CRNA VITALS  6/22/2021 0906 - 6/22/2021 1006      6/22/2021             Resp Rate (set):  10          Electronically Signed By: Bassem Deutsch MD  June 22, 2021  12:29 PM

## 2021-06-22 NOTE — ANESTHESIA CARE TRANSFER NOTE
Patient: Mars Reyes    Procedure(s):  Transcatheter Aortic Valve Replacement    Diagnosis: valvular disease  Diagnosis Additional Information: No value filed.    Anesthesia Type:   MAC     Note:    Oropharynx: oropharynx clear of all foreign objects  Level of Consciousness: awake  Oxygen Supplementation: nasal cannula  Level of Supplemental Oxygen (L/min / FiO2): 4  Independent Airway: airway patency satisfactory and stable  Dentition: dentition unchanged  Vital Signs Stable: post-procedure vital signs reviewed and stable  Report to RN Given: handoff report given  Patient transferred to: PACU  Comments: At end of procedure, spontaneous respirations, patient alert to voice, able to follow commands. Oxygen via nasal cannula at 4 liters per minute to PACU. Oxygen tubing connected to wall O2 in PACU, SpO2, NiBP, and EKG monitors and alarms on and functioning, Jeffrey Hugger warmer connected to patient gown, report on patient's clinical status given to PACU RN, RN questions answered.    /73  HR 61  RR 12  O2 98%      Handoff Report: Identifed the Patient, Identified the Reponsible Provider, Reviewed the pertinent medical history, Discussed the surgical course, Reviewed Intra-OP anesthesia mangement and issues during anesthesia, Set expectations for post-procedure period and Allowed opportunity for questions and acknowledgement of understanding      Vitals: (Last set prior to Anesthesia Care Transfer)  CRNA VITALS  6/22/2021 0906 - 6/22/2021 0946      6/22/2021             Resp Rate (set):  10        Electronically Signed By: Christine Marie Volp Hodgkins, CRNA, APRN CRNA  June 22, 2021  9:46 AM

## 2021-06-22 NOTE — OR NURSING
ALL OR STAFF WERE INTRA-CATHLAB;  ON STANDBY DURING THE WHOLE PROCEDURE OF THE TAVR CASE.MOLLY ARCHER RN, AND AMRIK BEE.     INITIAL SOFT COUNT DONE. TAVR WAS DONE PERCUTANEOUSLY.     POSITIONING DIRECTED AND VERIFIED BY THE SURGEON AND ANESTHESIA PROVIDER.     PREPPED BY: MOLLY ARCHER RN FROM NECK DOWN TO THE KNEES.     [facility] ON STANDBY; SET AT 40 COAG; GROUNDED ON THE LEFT LOWER EXTREMITY WITH GROUND PAD LOT NUMBER: [id number]     PREOP SKIN: NO SIGNIFICANT SKIN ISSUES NOTED    NO NEW SKIN ISSUES NOTED AT THE END OF THE CASE.     ENDORSED TO PACU NOD FOR CONTINUITY OF CARE BY THE CATHLAB RN.

## 2021-06-22 NOTE — PLAN OF CARE
SR. Off bedrest at 1545-sat up at that time, walked in the halls at 1830 with SBA, gaitbelt. Bilateral groin sites and R wrist all CDI. BP stable all afternoon. Neuros stable. Eating, voiding. In good spirits. Wife at bedside, supportive.

## 2021-06-22 NOTE — ANESTHESIA PREPROCEDURE EVALUATION
Anesthesia Pre-Procedure Evaluation    Patient: Mars Reyes   MRN: [id number] : 1940        Preoperative Diagnosis: valvular disease   Procedure : Procedure(s):  Transcatheter Aortic Valve Replacement     Past Medical History:   Diagnosis Date     Aortic stenosis     moderate     Atypical atrial flutter (H)     PVI and LA linear ablation with successful termination of AFL 10/18/2019     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, unspecified cause, unspecified laterality      Contact dermatitis and other eczema, due to unspecified cause      Diverticulosis of colon (without mention of hemorrhage)      DJD (degenerative joint disease)      Essential hypertension, benign     abstracted 02     Gout, unspecified      Hip joint replacement by other means 6/3/2009     MGUS (monoclonal gammopathy of unknown significance)      Mitral valve disease     mild MS, moderate-severe MR     PSVT (paroxysmal supraventricular tachycardia) (H)      Pulmonary HTN (H) 10/18/2011     Thrombocytopenia (H)       Past Surgical History:   Procedure Laterality Date     ANESTHESIA CARDIOVERSION N/A 3/21/2019    Procedure: ANESTHESIA CARDIOVERSION;  Surgeon: GENERIC ANESTHESIA PROVIDER;  Location: [facility] OR     ARTHROPLASTY REVISION HIP ANTERIOR Left 2019    Procedure: REVISION DIRECT ANTERIOR TOTAL LEFT HIP ARTHROPLASTY;  Surgeon: Luisito Hammonds MD;  Location: [facility] OR     BIOPSY  3/2017     BONE MARROW BIOPSY, BONE SPECIMEN, NEEDLE/TROCAR Right 2017    Procedure: BIOPSY BONE MARROW;  Surgeon: Romie Esquivel MD;  Location:  GI     COLONOSCOPY       CV HEART CATHETERIZATION WITH POSSIBLE INTERVENTION N/A 2020    Procedure: Coronary Angiogram;  Surgeon: Brian Simmons MD;  Location:  HEART CARDIAC CATH LAB     CV LEFT HEART CATH N/A 2020    Procedure: Left Heart Cath;  Surgeon: Brian Simmons MD;  Location:  HEART CARDIAC CATH LAB     CV RIGHT HEART CATH MEASUREMENTS RECORDED N/A 2020     Procedure: Right Heart Cath;  Surgeon: Brian Simmons MD;  Location:  HEART CARDIAC CATH LAB     EP 3D TACHYCARDIA MAPPING N/A 10/18/2019    Procedure: EP 3D Tachycardia Mapping;  Surgeon: Delfino Gee MD;  Location:  HEART CARDIAC CATH LAB     EP ABLATION FOCAL AFIB N/A 10/18/2019    Procedure: EP Ablation Focal AFIB;  Surgeon: Delfino Gee MD;  Location:  HEART CARDIAC CATH LAB     EYE SURGERY      Cataract, both eyes     ORTHOPEDIC SURGERY  2009    (L) total hip      Allergies   Allergen Reactions     Simvastatin      10/26/12: Arm pain - pt stopped it x 4-5 wks and arm pain didn't resolve sore started it       Social History     Tobacco Use     Smoking status: Never Smoker     Smokeless tobacco: Never Used   Substance Use Topics     Alcohol use: Yes     Alcohol/week: 0.0 standard drinks     Comment: Max 2 beers per day      Wt Readings from Last 1 Encounters:   05/27/21 84.8 kg (187 lb)        Anesthesia Evaluation   Pt has had prior anesthetic.     No history of anesthetic complications       ROS/MED HX  ENT/Pulmonary:    (-) tobacco use, asthma, COPD and sleep apnea   Neurologic:  - neg neurologic ROS     Cardiovascular: Comment: Non occlusive CAD    (+) Dyslipidemia hypertension--CAD ---dysrhythmias, a-flutter, Irregular Heartbeat/Palpitations, valvular problems/murmurs type: AS and MR pulmonary hypertension, Previous cardiac testing   Echo: Date: 5/21 Results:  Interpretation Summary     Calcified mitral apparatus.  There is mild mitral stenosis.  There is mild (1+) mitral regurgitation.  There is mild to moderate concentric left ventricular hypertrophy.  Severe valvular aortic stenosis. Has progressed compared with last year's  study. Matthew > 4 m/sec, mean gradient now > 50 mmHg.  There is mild (1+) aortic regurgitation.  The visual ejection fraction is estimated at 60-65%.  The left atrium is moderate to severely dilated.  Stress Test: Date: Results:    ECG Reviewed: Date: Results:    Cath: Date:  8/20 Results:    Left ventricular filling pressures are mildly elevated .    Reduced cardiac output level.    Left sided filling pressures are mildly elevated.    Moderately elevated pulmonary artery hypertension.    Right sided filling pressures are moderately elevated.    Prox LAD lesion is 30% stenosed.    Mid LAD lesion is 15% stenosed.    1st Mrg lesion is 20% stenosed.    Ost RCA lesion is 50% stenosed.    Mid RCA lesion is 25% stenosed.    Dist RCA lesion is 30% stenosed.    METS/Exercise Tolerance: 3 - Able to walk 1-2 blocks without stopping    Hematologic: Comments: MGUS      Musculoskeletal:   (+) arthritis,     GI/Hepatic:  - neg GI/hepatic ROS     Renal/Genitourinary:  - neg Renal ROS     Endo:  - neg endo ROS     Psychiatric/Substance Use:  - neg psychiatric ROS     Infectious Disease:       Malignancy:  - neg malignancy ROS     Other:            Physical Exam    Airway        Mallampati: III   TM distance: > 3 FB   Neck ROM: full   Mouth opening: > 3 cm    Respiratory Devices and Support         Dental  no notable dental history         Cardiovascular          Rhythm and rate: regular   (+) murmur       Pulmonary   pulmonary exam normal        breath sounds clear to auscultation           OUTSIDE LABS:  CBC:   Lab Results   Component Value Date    WBC 6.8 06/16/2021    WBC 7.9 11/16/2020    HGB 14.2 06/16/2021    HGB 15.1 11/16/2020    HCT 42.9 06/16/2021    HCT 44.8 11/16/2020    PLT 68 (L) 06/16/2021    PLT 78 (L) 11/16/2020     BMP:   Lab Results   Component Value Date     06/16/2021     11/16/2020    POTASSIUM 4.8 06/16/2021    POTASSIUM 5.2 11/16/2020    CHLORIDE 101 06/16/2021    CHLORIDE 102 11/16/2020    CO2 28 06/16/2021    CO2 31 11/16/2020    BUN 18 06/16/2021    BUN 15 11/16/2020    CR 1.02 06/16/2021    CR 0.96 11/16/2020    GLC 86 06/16/2021    GLC 79 11/16/2020     COAGS:   Lab Results   Component Value Date    PTT 26 08/25/2020    INR 1.01 06/16/2021     POC:   Lab Results    Component Value Date     (H) 09/10/2019     HEPATIC:   Lab Results   Component Value Date    ALBUMIN 4.0 06/16/2021    PROTTOTAL 8.0 06/16/2021    ALT 20 06/16/2021    AST 19 06/16/2021    ALKPHOS 92 06/16/2021    BILITOTAL 1.3 06/16/2021     OTHER:   Lab Results   Component Value Date    PH 7.41 08/25/2020    DIPAK 9.5 06/16/2021    MAG 2.1 03/21/2019    TSH 2.54 02/21/2017    SED 9 05/27/2021       Anesthesia Plan    ASA Status:  4   NPO Status:  NPO Appropriate    Anesthesia Type: MAC.              Consents    Anesthesia Plan(s) and associated risks, benefits, and realistic alternatives discussed. Questions answered and patient/representative(s) expressed understanding.     - Discussed with:  Patient      - Extended Intubation/Ventilatory Support Discussed: No.      - Patient is DNR/DNI Status: No    Use of blood products discussed: Yes.     - Discussed with: Patient.     - Consented: consented to blood products            Reason for refusal: other.     Postoperative Care    Pain management: Multi-modal analgesia.   PONV prophylaxis: Ondansetron (or other 5HT-3), Dexamethasone or Solumedrol     Comments:    Patient is counseled on the anesthesia plan and relevant anesthesia procedures  including all risks and benefits. All patient questions were answered.               Bassem Deutsch MD

## 2021-06-22 NOTE — CONSULTS
CARDIAC SURGERY CONSULT NOTE    Consult Reason: aortic valve stenosis    HPI: 80 M followed for his known aortic stenosis for some time. This had progressed into the severe range, but without symptoms, and surveillance was continued. He more recently developed exertional dyspnea and was referred for TAVR evaluation.    He affirms the history as noted.      A/P: Patient is a 80 year old male with symptomatic severe aortic stenosis, and other comorbid conditions as noted. He is likely low to intermediate risk for SAVR.    In the absence of anatomic contraindications, I think TAVR would be a good option for him, given his age, risk profile, and hematologic condition.    I discussed the very low incidence of surgical bailout, and the substantially increased risk of mortality or morbidity in the setting of emergency surgery. I explained complications to include bleeding, prolonged recovery, stroke, dialysis, prolonged ventilation, and loss of independent living situation. He expressed understanding and states that he DOES desire emergency open heart surgery if necessary.    Thank you for the opportunity to participate in the care of this patient.      Yaakov Neives MD         PMH:  Past Medical History:   Diagnosis Date     Aortic stenosis     moderate     Atypical atrial flutter (H)     PVI and LA linear ablation with successful termination of AFL 10/18/2019     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, unspecified cause, unspecified laterality      Contact dermatitis and other eczema, due to unspecified cause      Diverticulosis of colon (without mention of hemorrhage)      DJD (degenerative joint disease)      Essential hypertension, benign     abstracted 7/22/02     Gout, unspecified      Hip joint replacement by other means 6/3/2009     MGUS (monoclonal gammopathy of unknown significance)      Mitral valve disease     mild MS, moderate-severe MR     PSVT (paroxysmal supraventricular tachycardia) (H)       Pulmonary HTN (H) 10/18/2011     Thrombocytopenia (H)          PSH:  Past Surgical History:   Procedure Laterality Date     ANESTHESIA CARDIOVERSION N/A 3/21/2019    Procedure: ANESTHESIA CARDIOVERSION;  Surgeon: GENERIC ANESTHESIA PROVIDER;  Location: [facility] OR     ARTHROPLASTY REVISION HIP ANTERIOR Left 9/9/2019    Procedure: REVISION DIRECT ANTERIOR TOTAL LEFT HIP ARTHROPLASTY;  Surgeon: Luisito Hammonds MD;  Location:  OR     BIOPSY  3/2017     BONE MARROW BIOPSY, BONE SPECIMEN, NEEDLE/TROCAR Right 2/23/2017    Procedure: BIOPSY BONE MARROW;  Surgeon: Romie Esquivel MD;  Location:  GI     COLONOSCOPY  2010     CV HEART CATHETERIZATION WITH POSSIBLE INTERVENTION N/A 8/25/2020    Procedure: Coronary Angiogram;  Surgeon: Brian Simmons MD;  Location:  HEART CARDIAC CATH LAB     CV LEFT HEART CATH N/A 8/25/2020    Procedure: Left Heart Cath;  Surgeon: Brian Simmons MD;  Location:  HEART CARDIAC CATH LAB     CV RIGHT HEART CATH MEASUREMENTS RECORDED N/A 8/25/2020    Procedure: Right Heart Cath;  Surgeon: Brian Simmons MD;  Location:  HEART CARDIAC CATH LAB     EP 3D TACHYCARDIA MAPPING N/A 10/18/2019    Procedure: EP 3D Tachycardia Mapping;  Surgeon: Delfino Gee MD;  Location:  HEART CARDIAC CATH LAB     EP ABLATION FOCAL AFIB N/A 10/18/2019    Procedure: EP Ablation Focal AFIB;  Surgeon: Delfino Gee MD;  Location:  HEART CARDIAC CATH LAB     EYE SURGERY      Cataract, both eyes     ORTHOPEDIC SURGERY  2009    (L) total hip         FH:  family history includes Hypertension in his mother.      SH:  Denies Tobacco use      Allergies:  Allergies   Allergen Reactions     Simvastatin      10/26/12: Arm pain - pt stopped it x 4-5 wks and arm pain didn't resolve sore started it        Home Meds:  Medications Prior to Admission   Medication Sig Dispense Refill Last Dose     allopurinol (ZYLOPRIM) 100 MG tablet Take 100 mg by mouth every morning   6/22/2021 at 0500     amoxicillin (AMOXIL)  500 MG tablet Take 2,000 mg by mouth daily as needed (1 hour prior to dental procedures)   5/19/2021 at prn     apixaban ANTICOAGULANT (ELIQUIS ANTICOAGULANT) 5 MG tablet Take 1 tablet (5 mg) by mouth 2 times daily 180 tablet 3 6/19/2021 at 2000     aspirin (ASA) 325 MG EC tablet Take 325 mg by mouth See Admin Instructions Take 1 tablet night before surgery, and 1 tablet morning of surgery   6/22/2021 at 0500     carvedilol (COREG) 3.125 MG tablet Take 1 tablet (3.125 mg) by mouth 2 times daily (with meals) 180 tablet 1 6/22/2021 at 0500     lisinopril (ZESTRIL) 20 MG tablet Take 20 mg by mouth every evening   6/21/2021 at 2000     pravastatin (PRAVACHOL) 20 MG tablet Take 20 mg by mouth every evening   6/21/2021 at 2000       ROS: + as noted above    Physical Exam:  Pulse:  [66] 66  Resp:  [18] 18  BP: (132)/(69) 132/69  SpO2:  [97 %] 97 %    Gen: NAD, resting comfortably in bed  Lungs: non-labored breathing  CV: regular rhythm, normal rate on tele  Abd: non distended  Ext: cool  Neuro: AOx3    Labs:  ABG No lab results found in last 7 days.  CBC  Recent Labs   Lab 06/16/21  1415   WBC 6.8   HGB 14.2   PLT 68*     BMP  Recent Labs   Lab 06/22/21  0624 06/16/21  1415   NA  --  133   POTASSIUM 4.4 4.8   CHLORIDE  --  101   CO2  --  28   BUN  --  18   CR  --  1.02   GLC  --  86     LFT  Recent Labs   Lab 06/16/21  1415   AST 19   ALT 20   ALKPHOS 92   BILITOTAL 1.3   ALBUMIN 4.0   INR 1.01     PancreasNo lab results found in last 7 days.    Imaging:    ECHO    Interpretation Summary     Calcified mitral apparatus.  There is mild mitral stenosis.  There is mild (1+) mitral regurgitation.  There is mild to moderate concentric left ventricular hypertrophy.  Severe valvular aortic stenosis. Has progressed compared with last year's  study. Matthew > 4 m/sec, mean gradient now > 50 mmHg.  There is mild (1+) aortic regurgitation.  The visual ejection fraction is estimated at 60-65%.  The left atrium is moderate to severely  "dilated.  ______________________________________________________________________________  Left Ventricle  The left ventricle is normal in size. There is mild to moderate concentric  left ventricular hypertrophy. Left ventricular diastolic function is abnormal.  The visual ejection fraction is estimated at 60-65%.     Right Ventricle  The right ventricle is normal in structure, function and size.     Atria  The left atrium is moderate to severely dilated. Right atrial size is normal.     Mitral Valve  Calcified mitral apparatus. There is severe mitral annular calcification.  There is mild (1+) mitral regurgitation. There is mild mitral stenosis.     Tricuspid Valve  Normal tricuspid valve. Right ventricular systolic pressure is elevated,  consistent with moderate pulmonary hypertension.     Aortic Valve  This aortic regurgitation is central valvular. There is mild (1+) aortic  regurgitation. Severe valvular aortic stenosis.     Pulmonic Valve  Normal pulmonic valve. There is trace pulmonic valvular regurgitation.     Vessels  The aortic root is normal size. Normal size ascending aorta. The inferior vena  cava is normal.     Pericardium  There is no pericardial effusion.     Rhythm  Sinus rhythm was noted.        COR ANGIO    Diagnostic  Dominance: Right  Left Main   The vessel was visualized by selective angiography and is moderate in size. There was 0% vessel disease. 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 The lesion is calcified.   Mid LAD lesion is 15% stenosed.   Left Circumflex   First Obtuse Marginal Branch   1st Mrg lesion is 20% stenosed. small lcx system   Right Coronary Artery   Ost RCA lesion is 50% stenosed. The lesion is calcified. 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 The lesion is calcified.   Dist RCA lesion is 30% " stenosed.       TAVR CTA    FINDINGS:     1.  Trileaflet aortic valve. Aortic valve calcium score is 2836. The  LVOT is Moderately calcified. The ascending aorta is mildly calcified,  aortic arch is  mild calcified, the descending thoracic aorta is  mildly calcified. There is moderate mitral annular calcification.  2.  There are no adverse aortic root features, ie coronary heights are  adequate and there is no significant aortic root calcification.  3.  There are significant native coronary calcifications.   4.  The arch vessel branching pattern is normal.   5.  The suprarenal abdominal aorta is mild calcified; infrarenal  abdominal aorta is moderately calcified. The infrarenal abdominal  aorta is mildly ectatic and measures 24 x 22 cm with diffuse ulcerated  atherosclerotic plaques  6.  Widely patent origins of celiac trunk, superior mesenteric artery  and inferior mesenteric artery.  Single bilateral renal arteries with  widely patent origins.   7.  There is no acute aortic pathology, such as dissection, intramural  hematoma, or contained rupture.   8.  Large left renal cyst is noted. See separate radiology report.     MEASUREMENTS:   Representative dimensions of the thoracoabdominal aorta are as  follows:     1. AORTIC ANNULUS MEASUREMENTS:     1.  The average aortic annulus diameter is 24.2 mm, (long diameter is  29.1 mm and short diameter is 20.6 mm)  2.  Aortic annulus area is 4.62 cm2  3.  Aortic annulus perimeter is 80 mm  4.  Aortic root angle is 48 degrees  5.  Left main - Annulus distance: 18.9 mm, RCA - Annulus distance:  19.1 mm     2. LVOT MEASUREMENTS:     1.  The average LVOT diameter (measured 4 mm below annular plane) is  22.7 mm  2.  LVOT area is 4.07 cm2  3.  LVOT perimeter is 88.6 mm     3. AORTA MEASUREMENTS     1.  The aortic root at the sinuses of Valsalva (left cusp, right cusp,  non cusp): 35 mm x  34 mm x 33 mm  2.  The sinotubular junction:  30 mm x 29 mm  3.  Sinotubular junction height:  23.6 mm x 23.4 mm  4.  Proximal ascending aorta: 35 mm x 34 mm  5.  Distal abdominal aorta proximal to the bifurcation: 18.5 mm x 16.4  mm  6.  Innominate artery 3 cm from ostium: 11.7 mm x 10.9 mm  7.  Left common carotid artery 3 cm from ostium: 7 mm x 6.1 mm     4. ILIOFEMORAL MEASUREMENTS:     RIGHT:  1.  Right common iliac artery: 7.3 mm x 7.5 mm, proximal common iliac  artery reveals nonfocal, localized dissection.  2.  Right external iliac artery: 8 mm x 7.5 mm   3.  Right common femoral artery: 7.6 mm x 6.5 mm   4.  Tortuosity: mild   5.  Calcification: mild      LEFT:  1.  Left common iliac artery: 8.2 mm x  7.9 mm  2.  Left external iliac artery: 7.2 mm x 7.5 mm  3.  Left common femoral artery: 7.1 mm x 6 mm  4.  Tortuosity: Mild to moderate   5.  Calcification: mild

## 2021-06-22 NOTE — H&P
[facility]    History and Physical  Hospitalist       Date of Admission:  6/22/2021    Assessment & Plan   Mars Reyes is a 80 year old male with below PHMx admitted for post op mngt following elective TAVR for treatment of severe aortic stenosis.    Post op hypotension. - improving.  Systolic nadar 66. Improving after receiving 750ml albumin 5% and maintenance fluids. Conduction abnl noted on tele and two post op EKGs sinus with RSR' in V1, subtle ST depression inferior leads, and shortened AL interval and NSR with 1  AV block, lengthened AL, and new LBBB respectively. Preop heart cath showed non-obstructive CAD. No cp / difficulty breathing / palps. No bleeding issues.  - Limited TTE well seated and functioning valve, no pericardial effusion.  - Monitor.  - Hold ACE-I and beta blocker given ongoing hypotension in 90s systolic.  - Agree with NS 100ml/hr.    Severe symptomatic aortic stenosis s/p TAVR (6/22/2021).  TTE showing mean gradient of 53mmHg and a valve area of 0.83.  - PTA med regimen aside from ACE-I.  - Limited TTE shows well seated valve with normal function, mean gradient 7mmHg  and no evidence pericardial effusion.  - Cardiology following.    Atrial flutter s/p cardioversion (2019).  Pulmonary hypertension.  Moderately elevated filling pressures.  Moderate mitral regurgitation.  HTN / HLD.  - Hold PTA ACE inhibitor and beta-blocker given hypotension.  Check BMP in a.m. and resume it stable kidney function.  - Continue PTA statin, aspirin per cardiology service.  - Resume Eliquis when OK with CV surgery/cardiology.    MGUS (IgG kappa). Stable to knowledge.  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  Follows with [facility]/on[facility] Dr. Pino Mayer.  - Steroids for plt <30.    Gout. PTA allopurinol.    DVT Prophylaxis: Pneumatic Compression Devices  Code Status: Full Code    This patient was discussed with Dr. Pizano of the Hospitalist Service who agrees with current plans as  outlined above.    Disposition: Expected discharge in 1-2 days once TTE completed, works with cardiac rehab, and OK from cardiology perspective.    JoAnna K. Barthell, PA-C    Primary Care Physician   [facility]    Chief Complaint   TAVR    History is obtained from the patient and chart review.    History of Present Illness   Mars Reyes is a 80 year old male with PHMx chronic ITP, MGUS, moderate MR, pulmonary hypertension, HTN, hx A-flutter, and severe symptomatic aortic stenosis who presented for elective TAVR.     Patient tolerated procedure well.  Received phenylephrine drip been LR maintenance fluids intraoperatively for soft blood pressure.  Postoperatively patient with hypotension as low as 66 systolic.  Postop EKG showed NSR with first-degree AV block and new bundle branch block.  Notably his preop heart cath showed nonobstructive CAD.  Blood pressure is improving following treatment with 5 percent albumin total 750 mL.    Patient evaluated at bedside. Well-appearing laying flat in bed. Denies lightheadedness (hasn't been OOB yet), cp, difficulty breathing, palpitations, abd pain, n/v, or recent discharge. No bleeding issues on Eliquis which has been on hold since Saturday. Took allopurinol and Coreg this AM. Took ACE-I last evening as per usual. Reports normal PO intake. Endorses dry mouth and no urge to urinate.    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, unspecified cause, unspecified laterality      Contact dermatitis and other eczema, due to unspecified cause      Diverticulosis of colon (without mention of hemorrhage)      DJD (degenerative joint disease)      Essential hypertension, benign     abstracted 7/22/02     Gout, unspecified      Hip joint replacement by other means 6/3/2009      MGUS (monoclonal gammopathy of unknown significance)      Mitral valve disease     mild MS, moderate-severe MR     PSVT (paroxysmal supraventricular tachycardia) (H)      Pulmonary HTN (H) 10/18/2011     Thrombocytopenia (H)        PAST SURGICAL HISTORY  Past Surgical History:   Procedure Laterality Date     ANESTHESIA CARDIOVERSION N/A 3/21/2019    Procedure: ANESTHESIA CARDIOVERSION;  Surgeon: GENERIC ANESTHESIA PROVIDER;  Location: [facility] OR     ARTHROPLASTY REVISION HIP ANTERIOR Left 9/9/2019    Procedure: REVISION DIRECT ANTERIOR TOTAL LEFT HIP ARTHROPLASTY;  Surgeon: Luisito Hammonds MD;  Location:  OR     BIOPSY  3/2017     BONE MARROW BIOPSY, BONE SPECIMEN, NEEDLE/TROCAR Right 2/23/2017    Procedure: BIOPSY BONE MARROW;  Surgeon: Romie Esquivel MD;  Location:  GI     COLONOSCOPY  2010     CV HEART CATHETERIZATION WITH POSSIBLE INTERVENTION N/A 8/25/2020    Procedure: Coronary Angiogram;  Surgeon: Brian Simmons MD;  Location:  HEART CARDIAC CATH LAB     CV LEFT HEART CATH N/A 8/25/2020    Procedure: Left Heart Cath;  Surgeon: Brian Simmons MD;  Location:  HEART CARDIAC CATH LAB     CV RIGHT HEART CATH MEASUREMENTS RECORDED N/A 8/25/2020    Procedure: Right Heart Cath;  Surgeon: Brian Simmons MD;  Location:  HEART CARDIAC CATH LAB     EP 3D TACHYCARDIA MAPPING N/A 10/18/2019    Procedure: EP 3D Tachycardia Mapping;  Surgeon: Delfino Gee MD;  Location:  HEART CARDIAC CATH LAB     EP ABLATION FOCAL AFIB N/A 10/18/2019    Procedure: EP Ablation Focal AFIB;  Surgeon: Delfino Gee MD;  Location:  HEART CARDIAC CATH LAB     EYE SURGERY      Cataract, both eyes     ORTHOPEDIC SURGERY  2009    (L) total hip       HOME MEDICATIONS  Prior to Admission medications    Medication Sig Last Dose Taking? Auth Provider   allopurinol (ZYLOPRIM) 100 MG tablet Take 100 mg by mouth every morning 6/22/2021 at 0500 Yes Reported, Patient   amoxicillin (AMOXIL) 500 MG tablet Take 2,000 mg by mouth  "daily as needed (1 hour prior to dental procedures) 5/19/2021 at prn Yes Reported, Patient   apixaban ANTICOAGULANT (ELIQUIS ANTICOAGULANT) 5 MG tablet Take 1 tablet (5 mg) by mouth 2 times daily 6/19/2021 at 2000 Yes Alis Wong PA-C   aspirin (ASA) 325 MG EC tablet Take 325 mg by mouth See Admin Instructions Take 1 tablet night before surgery, and 1 tablet morning of surgery 6/22/2021 at 0500 Yes Reported, Patient   carvedilol (COREG) 3.125 MG tablet Take 1 tablet (3.125 mg) by mouth 2 times daily (with meals) 6/22/2021 at 0500 Yes Jolene Bose APRN CNP   lisinopril (ZESTRIL) 20 MG tablet Take 20 mg by mouth every evening 6/21/2021 at 2000 Yes Reported, Patient   pravastatin (PRAVACHOL) 20 MG tablet Take 20 mg by mouth every evening 6/21/2021 at 2000 Yes Reported, Patient       ALLERGIES  Allergies   Allergen Reactions     Simvastatin      10/26/12: Arm pain - pt stopped it x 4-5 wks and arm pain didn't resolve sore started it        SOCIAL HISTORY   reports that he has never smoked. He has never used smokeless tobacco. He reports current alcohol use. He reports that he does not use drugs.    FAMILY HISTORY  family history includes Hypertension in his mother.    REVIEW OF SYSTEMS  A 10 point ROS was negative other than the symptoms noted above in the HPI.    Physical Exam   Nursing Notes Reviewed.  BP 92/58   Pulse 61   Temp 96.8  F (36  C) (Temporal)   Resp 29   Ht 1.778 m (5' 10\")   Wt 83 kg (183 lb)   SpO2 97%   BMI 26.26 kg/m  General:  Appears stated age in no acute distress.  Skin:  Warm, dry. No rashes or lesions on exposed skin.  HEENT:  Normocephalic, atraumatic; EOMs grossly intact.  Neck:  Supple.  Chest:  Breath sounds CTA and no increased work of breathing.  Cardiovascular:  RRR, no rub or murmur. No peripheral edema. Right radial access site with TR band in place, no apparent ecchymosis.  Abdomen:  Soft, non-tender, non-distended.  Musculoskeletal:  Moves all four " extremities.  Neurological:  CN 2-12 grossly intact.    Data   Data reviewed today:  I personally reviewed the EKG tracing showing NSR with first-degree AV block and new LBBB.  Recent Labs   Lab 06/22/21  0624 06/16/21  1415   WBC  --  6.8   HGB  --  14.2   MCV  --  87   PLT  --  68*   INR  --  1.01   NA  --  133   POTASSIUM 4.4 4.8   CHLORIDE  --  101   CO2  --  28   BUN  --  18   CR  --  1.02   ANIONGAP  --  4   DIPAK  --  9.5   GLC  --  86   ALBUMIN  --  4.0   PROTTOTAL  --  8.0   BILITOTAL  --  1.3   ALKPHOS  --  92   ALT  --  20   AST  --  19       Imaging:  No results found for this or any previous visit (from the past 24 hour(s)).     no

## 2021-06-22 NOTE — PROGRESS NOTES
TAVR follow-up orders placed.    Carmen Mckenna, RN  Structural Heart Coordinator  [facility]

## 2021-06-22 NOTE — PROGRESS NOTES
Pt's belongings including clothes, shoes, cell phone, reading glasses and a gold wedding band placed in PACU.  Gold wedding band put in denture cup and labeled.  Consents completed on paper.

## 2021-06-22 NOTE — PROGRESS NOTES
Pt seen and evaluated after TAVR today. Doing well.  Mild groin tenderness. Othewise, no complains. Sinus rhythm on tele. BP stable.  Will monitor.    MD Rey  Cardiology

## 2021-06-23 ENCOUNTER — APPOINTMENT (OUTPATIENT)
Dept: CARDIOLOGY | Facility: CLINIC | Age: 81
DRG: 267 | End: 2021-06-23
Attending: STUDENT IN AN ORGANIZED HEALTH CARE EDUCATION/TRAINING PROGRAM
Payer: COMMERCIAL

## 2021-06-23 ENCOUNTER — APPOINTMENT (OUTPATIENT)
Dept: PHYSICAL THERAPY | Facility: CLINIC | Age: 81
DRG: 267 | End: 2021-06-23
Attending: STUDENT IN AN ORGANIZED HEALTH CARE EDUCATION/TRAINING PROGRAM
Payer: COMMERCIAL

## 2021-06-23 ENCOUNTER — APPOINTMENT (OUTPATIENT)
Dept: CARDIOLOGY | Facility: CLINIC | Age: 81
DRG: 267 | End: 2021-06-23
Attending: PHYSICIAN ASSISTANT
Payer: COMMERCIAL

## 2021-06-23 VITALS
TEMPERATURE: 97.7 F | WEIGHT: 183.4 LBS | HEART RATE: 70 BPM | DIASTOLIC BLOOD PRESSURE: 66 MMHG | SYSTOLIC BLOOD PRESSURE: 126 MMHG | OXYGEN SATURATION: 100 % | HEIGHT: 70 IN | BODY MASS INDEX: 26.26 KG/M2 | RESPIRATION RATE: 18 BRPM

## 2021-06-23 LAB
ANION GAP SERPL CALCULATED.3IONS-SCNC: 6 MMOL/L (ref 3–14)
BUN SERPL-MCNC: 16 MG/DL (ref 7–30)
CALCIUM SERPL-MCNC: 8.3 MG/DL (ref 8.5–10.1)
CHLORIDE SERPL-SCNC: 105 MMOL/L (ref 94–109)
CO2 SERPL-SCNC: 23 MMOL/L (ref 20–32)
CREAT SERPL-MCNC: 0.9 MG/DL (ref 0.66–1.25)
ERYTHROCYTE [DISTWIDTH] IN BLOOD BY AUTOMATED COUNT: 12.3 % (ref 10–15)
ERYTHROCYTE [DISTWIDTH] IN BLOOD BY AUTOMATED COUNT: 12.6 % (ref 10–15)
GFR SERPL CREATININE-BSD FRML MDRD: 81 ML/MIN/{1.73_M2}
GLUCOSE SERPL-MCNC: 123 MG/DL (ref 70–99)
HCT VFR BLD AUTO: 30.5 % (ref 40–53)
HCT VFR BLD AUTO: 32.9 % (ref 40–53)
HGB BLD-MCNC: 10.6 G/DL (ref 13.3–17.7)
HGB BLD-MCNC: 11.1 G/DL (ref 13.3–17.7)
MAGNESIUM SERPL-MCNC: 1.9 MG/DL (ref 1.6–2.3)
MCH RBC QN AUTO: 29.6 PG (ref 26.5–33)
MCH RBC QN AUTO: 30.1 PG (ref 26.5–33)
MCHC RBC AUTO-ENTMCNC: 33.7 G/DL (ref 31.5–36.5)
MCHC RBC AUTO-ENTMCNC: 34.8 G/DL (ref 31.5–36.5)
MCV RBC AUTO: 87 FL (ref 78–100)
MCV RBC AUTO: 88 FL (ref 78–100)
PHOSPHATE SERPL-MCNC: 3 MG/DL (ref 2.5–4.5)
PLATELET # BLD AUTO: 60 10E9/L (ref 150–450)
PLATELET # BLD AUTO: 63 10E9/L (ref 150–450)
POTASSIUM SERPL-SCNC: 4.2 MMOL/L (ref 3.4–5.3)
RBC # BLD AUTO: 3.52 10E12/L (ref 4.4–5.9)
RBC # BLD AUTO: 3.75 10E12/L (ref 4.4–5.9)
SODIUM SERPL-SCNC: 134 MMOL/L (ref 133–144)
WBC # BLD AUTO: 12.3 10E9/L (ref 4–11)
WBC # BLD AUTO: 12.3 10E9/L (ref 4–11)

## 2021-06-23 PROCEDURE — 36415 COLL VENOUS BLD VENIPUNCTURE: CPT | Performed by: HOSPITALIST

## 2021-06-23 PROCEDURE — 93005 ELECTROCARDIOGRAM TRACING: CPT

## 2021-06-23 PROCEDURE — 93325 DOPPLER ECHO COLOR FLOW MAPG: CPT

## 2021-06-23 PROCEDURE — 999N000096 CARDIAC MOBILE TELEMETRY MONITOR

## 2021-06-23 PROCEDURE — 93325 DOPPLER ECHO COLOR FLOW MAPG: CPT | Mod: 26 | Performed by: INTERNAL MEDICINE

## 2021-06-23 PROCEDURE — 99239 HOSP IP/OBS DSCHRG MGMT >30: CPT | Performed by: HOSPITALIST

## 2021-06-23 PROCEDURE — 80048 BASIC METABOLIC PNL TOTAL CA: CPT | Performed by: STUDENT IN AN ORGANIZED HEALTH CARE EDUCATION/TRAINING PROGRAM

## 2021-06-23 PROCEDURE — 93228 REMOTE 30 DAY ECG REV/REPORT: CPT | Performed by: INTERNAL MEDICINE

## 2021-06-23 PROCEDURE — 97110 THERAPEUTIC EXERCISES: CPT | Mod: GP | Performed by: PHYSICAL THERAPIST

## 2021-06-23 PROCEDURE — 93321 DOPPLER ECHO F-UP/LMTD STD: CPT | Mod: 26 | Performed by: INTERNAL MEDICINE

## 2021-06-23 PROCEDURE — 85027 COMPLETE CBC AUTOMATED: CPT | Performed by: HOSPITALIST

## 2021-06-23 PROCEDURE — 97161 PT EVAL LOW COMPLEX 20 MIN: CPT | Mod: GP | Performed by: PHYSICAL THERAPIST

## 2021-06-23 PROCEDURE — 85027 COMPLETE CBC AUTOMATED: CPT | Performed by: STUDENT IN AN ORGANIZED HEALTH CARE EDUCATION/TRAINING PROGRAM

## 2021-06-23 PROCEDURE — 36415 COLL VENOUS BLD VENIPUNCTURE: CPT | Performed by: STUDENT IN AN ORGANIZED HEALTH CARE EDUCATION/TRAINING PROGRAM

## 2021-06-23 PROCEDURE — 93308 TTE F-UP OR LMTD: CPT | Mod: 26 | Performed by: INTERNAL MEDICINE

## 2021-06-23 PROCEDURE — 83735 ASSAY OF MAGNESIUM: CPT | Performed by: STUDENT IN AN ORGANIZED HEALTH CARE EDUCATION/TRAINING PROGRAM

## 2021-06-23 PROCEDURE — 250N000013 HC RX MED GY IP 250 OP 250 PS 637: Performed by: STUDENT IN AN ORGANIZED HEALTH CARE EDUCATION/TRAINING PROGRAM

## 2021-06-23 PROCEDURE — 99233 SBSQ HOSP IP/OBS HIGH 50: CPT | Mod: 25 | Performed by: INTERNAL MEDICINE

## 2021-06-23 PROCEDURE — 97530 THERAPEUTIC ACTIVITIES: CPT | Mod: GP | Performed by: PHYSICAL THERAPIST

## 2021-06-23 PROCEDURE — 84100 ASSAY OF PHOSPHORUS: CPT | Performed by: STUDENT IN AN ORGANIZED HEALTH CARE EDUCATION/TRAINING PROGRAM

## 2021-06-23 RX ORDER — PRAVASTATIN SODIUM 20 MG
20 TABLET ORAL EVERY EVENING
Status: DISCONTINUED | OUTPATIENT
Start: 2021-06-23 | End: 2021-06-23 | Stop reason: HOSPADM

## 2021-06-23 RX ORDER — CARVEDILOL 3.12 MG/1
3.12 TABLET ORAL 2 TIMES DAILY WITH MEALS
Qty: 180 TABLET | Refills: 1 | COMMUNITY
Start: 2021-06-23 | End: 2021-06-30

## 2021-06-23 RX ORDER — LISINOPRIL 20 MG/1
20 TABLET ORAL EVERY EVENING
Status: DISCONTINUED | OUTPATIENT
Start: 2021-06-23 | End: 2021-06-23 | Stop reason: HOSPADM

## 2021-06-23 RX ORDER — ACETAMINOPHEN 325 MG/1
650 TABLET ORAL EVERY 4 HOURS PRN
Status: ON HOLD | COMMUNITY
Start: 2021-06-23 | End: 2022-06-22

## 2021-06-23 RX ORDER — ALLOPURINOL 100 MG/1
100 TABLET ORAL EVERY MORNING
Status: DISCONTINUED | OUTPATIENT
Start: 2021-06-24 | End: 2021-06-23 | Stop reason: HOSPADM

## 2021-06-23 RX ADMIN — ASPIRIN 81 MG: 81 TABLET, COATED ORAL at 08:26

## 2021-06-23 ASSESSMENT — MIFFLIN-ST. JEOR: SCORE: 1553.15

## 2021-06-23 NOTE — PROGRESS NOTES
Cardiology Progress Note    Date of Service (when I saw the patient): 06/23/2021       Interval History:   He feels well this morning.  He denies chest discomfort, shortness of breath, palpitations, lightheadedness, or bleeding issues.  He has no discomfort at the femoral access sites.    ----------------------------------------------------------------------------------------    Assessment:  Mars Reyes is a 80 year old male who was admitted on 6/22/2021 for elective transcatheter aortic valve replacement via femoral approach.    #Status post TAVR  -Procedure went well with no complications; received 29 mm Medtronic Pro plus valve; started on low-dose aspirin (this is new for him)  -Post deployment echocardiogram showed mean gradient of 4 mmHg with no AI.  LVEF was preserved at 65%  -ECG on 6/22 showed new left bundle branch block with QRS width of 138 ms, he also has evidence of first-degree AV block but this is not new  -ECG today (6/23) showed persistent left bundle branch block though QRS width seems to be slightly better 132 ms; low-dose carvedilol on hold  -Blood pressure and heart rate appear to be controlled; his blood pressure was significantly low post procedure requiring fluids; his PTA lisinopril is on hold  -BMP unremarkable; CBC showed a significant drop in hemoglobin from 14 to 10; platelet was slightly lower from 68 to 60-no active bleeding issues      # Acute Blood loss Anemia   -- Hgb drop from baseline of 14-15 to 10 this morning; no active bleeding issues  -- Start taking aspirin on 6/21  -- Apixaban on hold  -- Does not appear to be volume up    # Leukocytosis   -- no fever or symptoms of infection  -- continue to monitor    #Atrial flutter  --Previously rate controlled with carvedilol (this is on hold due to new left bundle branch block)  --CVA prophylaxis with apixaban 5 mg twice daily    #MGUS  --Follows with Dr. 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 Mayer   -- He will need MCOT for 30 -days prior to discharge for further monitoring of his heart rhythm      ----------------------------------------------------------------------------------------  Physical Exam   Temp: 97.5  F (36.4  C) Temp src: Oral BP: 116/71 Pulse: 72   Resp: 16 SpO2: 97 % O2 Device: None (Room air) Oxygen Delivery: 2 LPM  Vitals:    06/22/21 0621 06/23/21 0526   Weight: 83 kg (183 lb) 83.2 kg (183 lb 6.4 oz)     GEN:  NAD. Oxygen on room air.   HEENT: Mucous membranes moist.  NECK:  No JVD.  C/V:  Regular rate and rhythm, no murmur, rub or gallop.   RESP: CTA, mireya.  GI: Abdomen soft, nontender, nondistended.    EXTREM: No pitting LE edema.   NEURO: Alert and oriented, cooperative.   PSYCH: Normal affect.  SKIN: Warm and dry.   VASC: 2+ radial and dorsalis pedis pulses bilaterally.      Medications       aspirin  81 mg Oral Daily       Data   Reviewed.     Tele: NSR with [facility]    Angelina Cormier PA-C   6/23/2021  Pager: [phone]

## 2021-06-23 NOTE — PROGRESS NOTES
[facility]    Medicine Progress Note - Hospitalist Service       Date of Admission:  6/22/2021    Assessment & Plan         Mars Reyes is a 80 year old male with below PHMx admitted for post-operative management following elective TAVR for treatment of severe aortic stenosis.    Severe symptomatic aortic stenosis s/p TAVR on 6/22/2021  TTE showing mean gradient of 53mmHg and a valve area of 0.83.  - Limited TTE shows well seated valve with normal function, mean gradient 7mmHg  and no evidence pericardial effusion.  - Post-op management per cardiology.    Acute anemia  - Etiology unclear.  - Plan for recheck this afternoon. If still low plan for CT abdomen/pelvis to rule out retroperitoneal bleed.    Post op hypotension - resolved  Systolic nadar 66. Improved after receiving 750ml albumin 5% and maintenance fluids. Conduction abnormality noted on tele and two post op EKGs sinus with RSR' in V1, subtle ST depression inferior leads, and shortened KS interval and NSR with 1  AV block, lengthened KS, and new LBBB respectively. Preop heart cath showed non-obstructive CAD. No cp / difficulty breathing / palps. No bleeding issues.  - Limited TTE shows well seated and functioning valve, no pericardial effusion.  - Blood pressure slightly elevated this morning, continue to monitor.    Atrial flutter s/p cardioversion (2019)  Pulmonary hypertension - Moderately elevated filling pressures.  Moderate mitral regurgitation  Hypertension  Hyperlipidemia  - Held PTA ACE inhibitor and beta-blocker given hypotension. Consider restarting ACE inhibitor if blood pressure trends up.  - PTA beta-blocker still on hold due to new left bundle branch block.  - Continue PTA aspirin and pravastatin.  - PTA Eliquis on hold due to drop in hemoglobin today as noted above.    MGUS (IgG kappa) - Stable to knowledge.  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 Pino Mayer.  - Platelets  stable at 60 on 6/23/21.  - Steroids for plt <30.    Gout  - Continue PTA allopurinol.     Diet: Regular Diet Adult    DVT Prophylaxis: Pneumatic Compression Devices  Ramos Catheter: Not present  Central Lines: PRESENT     Code Status: Full Code      Disposition Plan   Expected discharge: defer to cardiology     The patient's care was discussed with the Bedside Nurse, Patient and Patient's Family.    Skyler Pizano MD  Hospitalist Service  [facility]  Securely message with the Vocera Web Console (learn more here)  Text page via [email or web address] Paging/Directory      Risk Factors Present on Admission               ______________________________________________________________________    Interval History   Mars Reyes feels OK today. No new complaints/concerns. Denies fevers, chest pain, shortness of breath, nausea, abdominal pain, lightheadedness. Worked with therapy today, states it went well.    Data reviewed today: I reviewed all medications, new labs and imaging results over the last 24 hours. I personally reviewed no images or EKG's today.    Physical Exam   Vital Signs: Temp: 98.2  F (36.8  C) Temp src: Oral BP: (!) 141/75 Pulse: 73   Resp: 16 SpO2: 97 % O2 Device: None (Room air)    Weight: 183 lbs 6.4 oz  Constitutional: awake, alert, cooperative, no apparent distress, sitting up in a chair  Respiratory: clear to auscultation bilaterally, no crackles or wheezing  Cardiovascular: regular rate and rhythm, normal S1 and S2, no murmur noted  GI: normal bowel sounds, soft, non-distended, non-tender  Skin: warm, dry  Musculoskeletal: no lower extremity pitting edema present  Neurologic: awake, alert, oriented to name, place and time, motor is 5 out of 5 bilaterally, sensory is intact    Data   Recent Labs   Lab 06/23/21  0525 06/22/21  0624   WBC 12.3*  --    HGB 10.6*  --    MCV 87  --    PLT 60*  --      --    POTASSIUM 4.2 4.4   CHLORIDE 105  --    CO2 23  --    BUN 16  --    CR 0.90   --    ANIONGAP 6  --    DIPAK 8.3*  --    *  --      Medications       [START ON 6/24/2021] allopurinol  100 mg Oral QAM     [Held by provider] apixaban ANTICOAGULANT  5 mg Oral BID     aspirin  81 mg Oral Daily     lisinopril  20 mg Oral QPM     pravastatin  20 mg Oral QPM

## 2021-06-23 NOTE — DISCHARGE INSTRUCTIONS
TAVR Discharge Instructions  You have just had your aortic valve replaced with a new biological tissue valve. You should feel better after your surgery, but complete recovery may take several weeks. Please follow these instructions carefully and please call the [facility] ([phone]) with any questions or concerns.      AFTER YOU GO HOME:    Drink extra fluids for 2 days.    You may resume your normal diet.    Do not smoke.    Do not drink alcohol.    Relax and take it easy.    Do not make any important or legal decisions.    FOR 1 WEEK AFTER TAVR:    Do not drive or operate machines at home or at work. No driving until you return for your 1 week follow-up appointment. You and the provider will discuss this at the appointment.     Refrain from sexual intercourse for 1 week.    You may shower the day after your procedure. Do NOT take a bath, or use a hot tub or pool for at least 1 week. Do NOT scrub the site. Do not use lotion or powder near the puncture site.    Do not lift more than 10 pounds.     Do not do any heavy activity such as exercise, lifting, or straining.  No housework, yard work, or any activities that make you sweat.    CARE OF WRIST AND GROIN SITES:    For 2 days, when you cough, sneeze, laugh or move your bowels, hold your hand over the puncture site and press firmly on/above the site.    Remove the bandage after 24 hours. If there is minor oozing, apply another bandage and remove it after 12 hours.    It is normal to have bruising or pea size lump at the site.    If you have a wrist site puncture: Do not use your hand or arm to support your weight (such as rising from a chair)or bend your wrist (such as lifting a garage door) for 1 week.    No stooping or squatting for 1 week.     BLEEDING:  If you start bleeding from the site in your wrist:    Sit down and press firmly on/above the site with your fingers for 10 minutes.     Once bleeding stops, keep your arm still  for 2 hours.     Call [facility] ([phone]) as soon as you can.  If you start bleeding from the site in your groin:    Lie down flat and press firmly on/above the site for 10 minutes.    Once bleeding stops lay flat for 2 hours.     Call [facility] ([phone]) as soon as you can.  Call 911 right away if you have heavy bleeding or bleeding that does not stop.    MEDICINES:    You will likely be started on an anti-platelet medication, such as Plavix. Please call the [facility] with any questions regarding this medication.    If you have stopped any medicines, check with your provider about when to restart them.    You will require lifetime prophylactic antibiotics for dental cleanings and dental work after your TAVR, please inquire with your provider prior to your scheduled cleanings.     FOLLOW-UP APPOINTMENTS:    Follow-up with a Structural Heart Nurse Practitioner at [facility] in 7-10 days after your procedure.    You will also follow-up at [facility] 1 month after your procedure which will include a visit with a nurse practitioner, an echocardiogram (Echo), and electrocardiogram (ECG), and lab work. We will also want to see you back in the clinic 1 year after your procedure.    Cardiac Rehab will contact you for follow up care. You can enroll at a site convenient to you.     CALL THE CLINIC IF:     You have increased pain or a large or growing hard lump around the site.    The site is red, swollen, hot, or tender.    Blood or fluid is draining from the site.    You have chills or a fever greater than 101 F (38 C).    Your arm or leg feels numb, cool, or changes color.    You have hives, a rash, or unusual itching.    New pain in the back or belly that you cannot control with Tylenol.    Any questions or concerns.    OTHER INSTRUCTIONS:    You will receive a card with your new valve information in the  mail, directly from the .     CONTACT INFORMATION:   [facility]:  [phone] (7 days a week)  Valve Coordinators (Sandra RN, Carmen RN, and Leonides MEEHAN) can be reached at:  [phone]

## 2021-06-23 NOTE — PROGRESS NOTES
06/23/21 1053   Quick Adds   Type of Visit Initial PT Evaluation   Living Environment   People in home spouse   Current Living Arrangements house   Home Accessibility stairs to enter home;stairs within home   Stair Railings, Main Entrance railing on right side (ascending)   Number of Stairs, Within Home, Primary 10   Transportation Anticipated car, drives self;family or friend will provide   Living Environment Comments lives in 2 story home   Self-Care   Usual Activity Tolerance moderate   Current Activity Tolerance moderate   Regular Exercise Yes   Activity/Exercise Type walking   Exercise Amount/Frequency 3-5 times/wk   Equipment Currently Used at Home none   Activity/Exercise/Self-Care Comment independent   Disability/Function   Walking or Climbing Stairs Difficulty no   Fall history within last six months no   Change in Functional Status Since Onset of Current Illness/Injury yes   General Information   Onset of Illness/Injury or Date of Surgery 06/22/21   Referring Physician Rey   Patient/Family Therapy Goals Statement (PT) To go home today   Pertinent History of Current Problem (include personal factors and/or comorbidities that impact the POC) 80 year old male who was admitted on 6/22/2021 for elective transcatheter aortic valve replacement via femoral approach.   Existing Precautions/Restrictions cardiac   Cognition   Orientation Status (Cognition) oriented x 4   Pain Assessment   Patient Currently in Pain No   Integumentary/Edema   Integumentary/Edema Comments radial and femoral site   Range of Motion (ROM)   ROM Comment WFL   Strength   Strength Comments WFL   Bed Mobility   Comment (Bed Mobility) independent   Transfers   Transfer Safety Comments independent   Gait/Stairs (Locomotion)   Comment (Gait/Stairs) independent with no AD   Balance   Balance Comments stable but antalgic due to hip pain   Sensory Examination   Sensory Perception Comments some baseline numbness   Coordination   Coordination no  deficits were identified   Clinical Impression   Criteria for Skilled Therapeutic Intervention yes, treatment indicated   PT Diagnosis (PT) decreased cardiopulmonary endurance   Influenced by the following impairments Decreased activity tolerance, s/p TAVR,    Functional limitations due to impairments below baseline for activity tolerance and need for cardiac rehab   Clinical Presentation Stable/Uncomplicated   Clinical Presentation Rationale clinical judgment   Clinical Decision Making (Complexity) low complexity   Therapy Frequency (PT) 2x/day   Predicted Duration of Therapy Intervention (days/wks) 1 day   Planned Therapy Interventions (PT) home exercise program;patient/family education;other (see comments)  (progressive exercise)   Risk & Benefits of therapy have been explained evaluation/treatment results reviewed;care plan/treatment goals reviewed;risks/benefits reviewed;current/potential barriers reviewed;participants voiced agreement with care plan;patient   PT Discharge Planning    PT Discharge Recommendation (DC Rec) home with outpatient cardiac rehab   PT Rationale for DC Rec Pt has the mobility and tolerance to be at home and will continue progressive monitored exercise and cardiac education in OP setting.   PT Brief overview of current status  Pt ambulated for 10 min on treadmill and 4 min in ding with no adverse symptoms, slight HTN /75 after mobility   Total Evaluation Time   Total Evaluation Time (Minutes) 5

## 2021-06-23 NOTE — PLAN OF CARE
PT/CR: Pt has not discharged due to waiting for bloodwork. CR will continue to follow while in hospital

## 2021-06-23 NOTE — CONSULTS
Care Management Initial Consult    General Information  Assessment completed with: Patient, Spouse or significant other, Mars  Type of CM/SW Visit: Initial Assessment    Primary Care Provider verified and updated as needed: Yes   Readmission within the last 30 days: planned readmission   Return Category: Planned Surgery     Advance Care Planning: Advance Care Planning Reviewed: concerns discussed          Communication Assessment  Patient's communication style: spoken language (English or Bilingual)    Hearing Difficulty or Deaf: no   Wear Glasses or Blind: yes    Cognitive  Cognitive/Neuro/Behavioral: WDL  Level of Consciousness: alert  Arousal Level: opens eyes spontaneously  Orientation: oriented x 4  Mood/Behavior: calm, cooperative  Best Language: 0 - No aphasia  Speech: clear    Living Environment:   People in home: spouse  Mars  Current living Arrangements: house      Able to return to prior arrangements:         Family/Social Support:  Care provided by: self  Provides care for: no one  Marital Status:   Wife  Aleja       Description of Support System: Supportive         Current Resources:   Patient receiving home care services: No     Community Resources: None  Equipment currently used at home: none  Supplies currently used at home: None    Employment/Financial:  Employment Status: retired        Financial Concerns: No concerns identified           Lifestyle & Psychosocial Needs:        Socioeconomic History     Marital status:      Spouse name: Not on file     Number of children: Not on file     Years of education: Not on file     Highest education level: Not on file     Tobacco Use     Smoking status: Never Smoker     Smokeless tobacco: Never Used   Substance and Sexual Activity     Alcohol use: Yes     Alcohol/week: 0.0 standard drinks     Comment: Max 2 beers per day     Drug use: No     Sexual activity: Yes     Partners: Female     Birth control/protection: None       Functional  Status:  Prior to admission patient needed assistance:              Mental Health Status:          Chemical Dependency Status:                Values/Beliefs:  Spiritual, Cultural Beliefs, Protestant Practices, Values that affect care: no               Additional Information:  Met with patient and spouse. They are hoping to go home today but they need to wait for a Hgb and Plts. If that is lower then before, he will need to go for a CT scan. Writer will follow for any discharge needs such as appointments.    Nelida Iglesias RN

## 2021-06-23 NOTE — PLAN OF CARE
VSS, not in pain at time of discharge. Pt states all belongings and paperwork are accounted for. No new meds to be filled. Discharge education complete including meds, follow up and all instructions. Post TAVR instructions gone over with pt and wife. No further questions on discharge information. MCOT education done by EKG staff. Family here to drive pt home.

## 2021-06-23 NOTE — DISCHARGE SUMMARY
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    Hospital Course   Mars Reyes was admitted on 6/22/2021.  The following problems were addressed during his hospitalization:     Severe symptomatic aortic stenosis s/p TAVR on 6/22/2021  TTE showing mean gradient of 53mmHg and a valve area of 0.83.  - Limited TTE shows well seated valve with normal function, mean gradient 7mmHg  and no evidence pericardial effusion.  - Post-op management per cardiology.  - Discharge home today.  - Follow-up with cardiology next week as directed.    Post op hypotension - resolved  Systolic nadar 66. Improved after receiving 750ml albumin 5% and maintenance fluids. Conduction abnormality noted on tele and two post op EKGs sinus with RSR' in V1, subtle ST depression inferior leads, and shortened CT interval and NSR with 1  AV block, lengthened CT, and new LBBB respectively. Preop heart cath showed non-obstructive CAD. No cp / difficulty breathing / palps. No bleeding issues.  - Limited TTE shows well seated and functioning valve, no pericardial effusion.  - Had a drop in hemoglobin as noted below.  - No further hypotension.    Atrial flutter s/p cardioversion (2019)  Pulmonary hypertension - Moderately elevated filling pressures.  Moderate mitral  regurgitation  Hypertension  Hyperlipidemia  - Held PTA ACE inhibitor and beta-blocker given hypotension following procedure.  - Restarted ACE inhibitor on 6/23/21.  - Continue to hold carvedilol until follow-up with cardiology in one week.  - Continue PTA aspirin (dose decreased to 81 mg per cardiology) and pravastatin.  - Plan to restart Eliquis this evening per cardiology recommendations.    Acute anemia  - Etiology unclear. Baseline hemoglobin about 14, hemoglobin down to 10.6 on 6/23/21.  - Hemoglobin stable/slightly improved at 11.1 upon recheck in the afternoon on 6/23/21.  - Plan to recheck hemoglobin at follow-up appointment with cardiology in one week.  - Also recommended that he follow-up with his hematologist, Dr. Mayer.    MGUS (IgG kappa) - Stable to knowledge.  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 Pino Mayer.  - Platelets stable at 60 on 6/23/21.  - Steroids for plt <30.    Gout  - Continue PTA allopurinol.    Skyler Pizano MD    Significant Results and Procedures   TAVR as noted above    Pending Results   None    Code Status   Full Code       Primary Care Physician   Pb Warren    Physical Exam   Temp: 97.7  F (36.5  C) Temp src: Oral BP: 126/66 Pulse: 70   Resp: 18 SpO2: 100 % O2 Device: None (Room air)    Vitals:    06/22/21 0621 06/23/21 0526   Weight: 83 kg (183 lb) 83.2 kg (183 lb 6.4 oz)     Vital Signs with Ranges  Temp:  [97.5  F (36.4  C)-98.3  F (36.8  C)] 97.7  F (36.5  C)  Pulse:  [64-79] 70  Resp:  [13-18] 18  BP: (102-141)/(55-75) 126/66  SpO2:  [94 %-100 %] 100 %  I/O last 3 completed shifts:  In: 465 [P.O.:465]  Out: 950 [Urine:950]    Constitutional: awake, alert, cooperative, no apparent distress, sitting up in a chair  Respiratory: clear to auscultation bilaterally, no crackles or wheezing  Cardiovascular: regular rate and rhythm, normal S1 and S2, no murmur noted  GI: normal bowel sounds, soft, non-distended, non-tender  Skin:  warm, dry  Musculoskeletal: no lower extremity pitting edema present  Neurologic: awake, alert, oriented to name, place and time, motor is 5 out of 5 bilaterally, sensory is intact    Discharge Disposition   Discharged to home  Condition at discharge: Stable    Consultations This Hospital Stay   CARDIAC REHAB IP CONSULT  CARE MANAGEMENT / SOCIAL WORK IP CONSULT  HOSPITALIST IP CONSULT    Time Spent on this Encounter   ISkyler MD, personally saw the patient today and spent greater than 30 minutes discharging this patient.    Discharge Orders      CARDIAC REHAB REFERRAL      Reason for your hospital stay    TAVR     Follow-up and recommended labs and tests     Follow up with cardiology as directed.  Follow up with primary care provider as previously arranged or sooner as needed.  Follow up with Dr. Mayer in 2-3 weeks for anemia/thrombocytopenia.     Activity    Your activity upon discharge: activity as directed by cardiac rehab     Discharge Instructions    Do not take carvedilol until cleared to resume it by your outpatient cardiologist.     Full Code     Diet    Follow this diet upon discharge: Regular Diet Adult     Discharge Medications   Current Discharge Medication List      START taking these medications    Details   acetaminophen (TYLENOL) 325 MG tablet Take 2 tablets (650 mg) by mouth every 4 hours as needed for mild pain or headaches  Qty:      Associated Diagnoses: S/P TAVR (transcatheter aortic valve replacement)         CONTINUE these medications which have CHANGED    Details   aspirin (ASA) 81 MG EC tablet Take 1 tablet (81 mg) by mouth daily  Qty:      Associated Diagnoses: S/P TAVR (transcatheter aortic valve replacement)      carvedilol (COREG) 3.125 MG tablet Take 1 tablet (3.125 mg) by mouth 2 times daily (with meals) --- DO NOT TAKE UNTIL YOU FOLLOW-UP WITH YOUR CARDIOLOGIST ---  Qty: 180 tablet, Refills: 1    Associated Diagnoses: Essential hypertension, benign         CONTINUE these  medications which have NOT CHANGED    Details   allopurinol (ZYLOPRIM) 100 MG tablet Take 100 mg by mouth every morning      amoxicillin (AMOXIL) 500 MG tablet Take 2,000 mg by mouth daily as needed (1 hour prior to dental procedures)      apixaban ANTICOAGULANT (ELIQUIS ANTICOAGULANT) 5 MG tablet Take 1 tablet (5 mg) by mouth 2 times daily  Qty: 180 tablet, Refills: 3    Associated Diagnoses: Typical atrial flutter (H)      lisinopril (ZESTRIL) 20 MG tablet Take 20 mg by mouth every evening      pravastatin (PRAVACHOL) 20 MG tablet Take 20 mg by mouth every evening           Allergies   Allergies   Allergen Reactions     Simvastatin      10/26/12: Arm pain - pt stopped it x 4-5 wks and arm pain didn't resolve sore started it      Data   Most Recent 3 CBC's:  Recent Labs   Lab Test 21  1523 21  0525 21  1415   WBC 12.3* 12.3* 6.8   HGB 11.1* 10.6* 14.2   MCV 88 87 87   PLT 63* 60* 68*      Most Recent 3 BMP's:  Recent Labs   Lab Test 21  0525 21  0624 21  1415 20  1227     --  133 135   POTASSIUM 4.2 4.4 4.8 5.2   CHLORIDE 105  --  101 102   CO2 23  --  28 31   BUN 16  --  18 15   CR 0.90  --  1.02 0.96   ANIONGAP 6  --  4 2*   DIPAK 8.3*  --  9.5 9.9   *  --  86 79     Results for orders placed or performed during the hospital encounter of 21   Echocardiogram Limited    Narrative    [id number]  [id number]  [id number]  [id number]^RUBY^ROJELIO^LIVIA     [facility]  Echocardiography Laboratory  [facility] [place] [address]     Name: DESEAN MARTINEZ  MRN: [id number]  : 1940  Study Date: 2021 10:25 AM  Age: 80 yrs  Gender: Male  Patient Location: [facility] [place]  Reason For Study: Abn EKG, S/P TAVR  Ordering Physician: ROJELIO BELTRAN  Referring Physician: VALENTINA SALCIDO  Performed By: Oxana Zapien     BSA: 2.0 m2  Height: 70 in  Weight: 183 lb  HR: 63  BP: 132/69  mmHg  ______________________________________________________________________________  Procedure  Limited Portable Echo Adult.  ______________________________________________________________________________  Interpretation Summary     Status post aortic valve replacement with a 29mm Medtronic Evolut Pro+  bioprosthetic valve in place. Device is well-seated. Normal function on  Doppler interrogation, Vmax 1.6 m/s, mean gradient 7 mmHg. There is no  valvular insufficiency or PVL.  There is no pericardial effusion.     Left ventricular systolic function is normal. The visual ejection fraction is  estimated at 65-70%.  Right ventricle is normal in structure, function and size.  Calcified mitral apparatus. There is mild mitral stenosis. There is mild (1+)  mitral regurgitation.  The right ventricular systolic pressure is approximated at 40mmHg plus the  right atrial pressure.  Dilation of the inferior vena cava is present with abnormal respiratory  variation in diameter.     This study was compared to a TTE from 5/20/21. The aortic valve has been  replaced.  ______________________________________________________________________________  Left Ventricle  The left ventricle is normal in size. There is mild concentric left  ventricular hypertrophy. Left ventricular systolic function is normal. The  visual ejection fraction is estimated at 65-70%.     Right Ventricle  The right ventricle is normal in structure, function and size.     Mitral Valve  There is severe mitral annular calcification. Calcified mitral apparatus.  There is mild (1+) mitral regurgitation. There is mild mitral stenosis. The  mean mitral valve gradient is 3mmHg.     Tricuspid Valve  There is trace to mild tricuspid regurgitation. The right ventricular systolic  pressure is approximated at 40mmHg plus the right atrial pressure.     Aortic Valve  There is a bioprosthetic aortic valve. Status post aortic valve replacement  with a 29mm Medtronic Evolut Pro+  bioprosthetic valve in place. Device is  well-seated. Normal function on Doppler interrogation, Vmax 1.6 m/s, mean  gradient 7 mmHg. There is no valvular insufficiency or PVL.     Pulmonic Valve  The pulmonic valve is not well visualized.     Vessels  Dilation of the inferior vena cava is present with abnormal respiratory  variation in diameter.     Pericardium  There is no pericardial effusion.     ______________________________________________________________________________  Doppler Measurements & Calculations  TR max saman: 317.3 cm/sec  TR max P.3 mmHg     ______________________________________________________________________________  Report approved by: Yolanda Orozco 2021 11:16 AM         Echo Limited    Narrative    [id number]  [id number]  [id number]  [id number]^YARI^ANAS     [facility]  Echocardiography Laboratory  [address] [place] [address]     Name: DESEAN MARTINEZ  MRN: [id number]  : 1940  Study Date: 2021 08:37 AM  Age: 80 yrs  Gender: Male  Patient Location: [facility]  Reason For Study: Aortic Valve Replacement  Ordering Physician: LINDSAY GREENBERG  Referring Physician: VALENTINA SALCIDO  Performed By: Steve Bain RDCS     BSA: 2.0 m2  Height: 70 in  Weight: 183 lb  BP: 116/71 mmHg  ______________________________________________________________________________  Procedure  Limited Portable Echo Adult.  ______________________________________________________________________________  Interpretation Summary     1. The left ventricle is normal in size. The visual ejection fraction is  estimated at 70%.  2. The right ventricle is normal in structure, function and size.  3. There is mild mitral stenosis. The mean mitral valve gradient is 4mmHg.  4. There is moderate (2+) tricuspid regurgitation. The right ventricular  systolic pressure is approximated at 48mmHg plus the right atrial pressure.  5. s/p TAVR with 29mm Medtronic Evolut, implanted 21. Mean 8mmHg,  Vmax  1.9m/s, DI 0.71. Mild AI. AI appears to be valvular on short axis views,  rather than PVL.     Echo from 21 showed EF 65%, MV mean 3mmHg, 1+ TR, TAVR with mean 6mmHg.  ______________________________________________________________________________  Left Ventricle  The left ventricle is normal in size. The visual ejection fraction is  estimated at 70%. Normal left ventricular wall motion.     Right Ventricle  The right ventricle is normal in structure, function and size.     Mitral Valve  There is moderate mitral annular calcification. The mitral valve leaflets  appear thickened, but open well. There is mild (1+) mitral regurgitation.  There is mild mitral stenosis. The mean mitral valve gradient is 4mmHg.     Tricuspid Valve  There is moderate (2+) tricuspid regurgitation. The right ventricular systolic  pressure is approximated at 48mmHg plus the right atrial pressure.     Aortic Valve  s/p TAVR with 29mm Medtronic Evolut, implanted 21. Mean 8mmHg, Vmax  1.9m/s, DI 0.71. Mild AI. AI appears to be valvular on short axis views,  rather than PVL.     Vessels  The inferior vena cava was normal in size with preserved respiratory  variability.     Pericardium  There is no pericardial effusion.     Rhythm  The rhythm was undetermined.     ______________________________________________________________________________  MMode/2D Measurements & Calculations  LVOT diam: 2.1 cm  LVOT area: 3.4 cm2     Doppler Measurements & Calculations  MV max PG: 15.2 mmHg  MV mean P.4 mmHg  MV V2 VTI: 53.4 cm  MVA(VTI): 1.7 cm2  Ao V2 max: 194.8 cm/sec  Ao max PG: 15.0 mmHg  Ao V2 mean: 129.6 cm/sec  Ao mean P.8 mmHg  Ao V2 VTI: 41.5 cm  LV(I,D): 2.1 cm2  LV(V,D): 2.4 cm2  LV V1 max P.6 mmHg  LV V1 max: 137.7 cm/sec  LV V1 VTI: 26.1 cm     SV(LVOT): 88.4 ml  SI(LVOT): 44.0 ml/m2  PA V2 max: 82.8 cm/sec  PA max P.7 mmHg  PA mean P.4 mmHg  PA V2 VTI: 15.9 cm  PI end-d saman: 158.8 cm/sec  TR max saman: 344.8  cm/sec  TR max P.5 mmHg  AV Matthew Ratio (DI): 0.71  LV Index (cm2/m2): 1.1     ______________________________________________________________________________  Report approved by: Yolanda Dennison 2021 10:50 AM         Cardiac Catheterization    Narrative    Successful transfemoral transcatheter aortic valve replacement with a 29mm   Medtronic Evolut Pro+ valve.

## 2021-06-23 NOTE — PLAN OF CARE
Patient alert, steady with SBA. Denies any symptoms. Neuro assessment wnl. Groin sites with old blood -none new- slight bruising on left. Right radial site wnl. VSS Tele SR BBB 1st degree AVB. Voiding without difficulty. Plt's remain low at 60, was 68 pre procedure.hgb 10.6 Ekg, echo this am.

## 2021-06-24 ENCOUNTER — TELEPHONE (OUTPATIENT)
Dept: CARDIOLOGY | Facility: CLINIC | Age: 81
End: 2021-06-24

## 2021-06-24 ENCOUNTER — TELEPHONE (OUTPATIENT)
Dept: INTERNAL MEDICINE | Facility: CLINIC | Age: 81
End: 2021-06-24

## 2021-06-24 LAB
INTERPRETATION ECG - MUSE: NORMAL
INTERPRETATION ECG - MUSE: NORMAL

## 2021-06-24 NOTE — TELEPHONE ENCOUNTER
TAVR POST-DISCHARGE CALL:    TAVR Date: 6/22/21  Concerns at discharge?  New LBBB post-valve deployment  New medications started? 81mg ASA started, Coreg on hold. MCOT monitor in place    Telephoned patient to follow-up post-hospitalization.   How are you feeling?:  Feeling well, slept well  Are you having any new symptoms since hospital stay?:  No  Do you have any questions about your medications?: No, pill box changed today  Do you have a dressing on your access site? Any concerns with bleeding, bruising, firmness?  No concerns. No bandage on sites, open to air.    Mars reported he did schedule appt with Dr. Mayer, scheduled 7/5/21    Reviewed follow-up information and confirmed our contact information.  Future Appointments   Date Time Provider Department Center   6/29/2021 11:30 AM RU LAB RULAB [facility] PSA CLIN   6/30/2021  3:00 PM [facility]   7/1/2021  1:00 PM RH MA LAB [facility]CRS [place] RID   7/5/2021  8:40 AM Pino Mayer MD [facility]   7/8/2021 10:00 AM RH CARDIAC REHAB 4 RHCR [place] RID   7/16/2021  8:30 AM [id number] RHCVCC RS[facility]   7/16/2021  9:30 AM RU LAB RULAB [facility] PSA CLIN   7/22/2021  1:00 PM [facility]     Carmen Mckenna RN  Structural Heart Coordinator  [facility]

## 2021-06-24 NOTE — PLAN OF CARE
Physical Therapy Discharge Summary    Reason for therapy discharge:    Discharged to home with outpatient therapy.    Progress towards therapy goal(s). See goals on Care Plan in [facility] electronic health record for goal details.  Goals met    Therapy recommendation(s):    Continued therapy is recommended.  Rationale/Recommendations:  Pt will continue cardiac rehab as OP.

## 2021-06-24 NOTE — TELEPHONE ENCOUNTER
MCOT baseline report received.    6/23/21 1901 SR with 1*AVB and PVC  HR 92    Carmen Mckenna, RN  [facility]

## 2021-06-28 ENCOUNTER — DOCUMENTATION ONLY (OUTPATIENT)
Dept: CARDIOLOGY | Facility: CLINIC | Age: 81
End: 2021-06-28

## 2021-06-28 NOTE — PROGRESS NOTES
MCOT reports received and reviewed.    6/24/21 No arrthymias detected    6/25/21 1102 SR with PSVT 17 beats   6/25/21 1220 2*AVB, type 1  HR 43    6/26/21 0506 2*AVB, type 1 and 2.4 sec pause  HR 45  6/26/21 1202 SB with atrial run  HR 46    6/27/21 0649 2*AVB, type 1  HR 46    Spoke with Mars who reports he is feeling well. Denies palpitations, lightheadedness/dizziness. He has been walking around his home, not participating in any strenuous activity. Will review strips with Dr. Adan.    Carmen Mckenna, RN  Structural Heart Coordinator  [facility]

## 2021-06-29 DIAGNOSIS — D47.2 MGUS (MONOCLONAL GAMMOPATHY OF UNKNOWN SIGNIFICANCE): Primary | ICD-10-CM

## 2021-06-29 DIAGNOSIS — D69.6 THROMBOCYTOPENIA (H): ICD-10-CM

## 2021-06-29 DIAGNOSIS — Z95.2 S/P TAVR (TRANSCATHETER AORTIC VALVE REPLACEMENT): ICD-10-CM

## 2021-06-29 LAB
ERYTHROCYTE [DISTWIDTH] IN BLOOD BY AUTOMATED COUNT: 12.4 % (ref 10–15)
HCT VFR BLD AUTO: 36.4 % (ref 40–53)
HGB BLD-MCNC: 12 G/DL (ref 13.3–17.7)
INTERPRETATION ECG - MUSE: NORMAL
INTERPRETATION ECG - MUSE: NORMAL
MCH RBC QN AUTO: 29.6 PG (ref 26.5–33)
MCHC RBC AUTO-ENTMCNC: 33 G/DL (ref 31.5–36.5)
MCV RBC AUTO: 90 FL (ref 78–100)
PLATELET # BLD AUTO: 70 10E9/L (ref 150–450)
RBC # BLD AUTO: 4.06 10E12/L (ref 4.4–5.9)
WBC # BLD AUTO: 6.3 10E9/L (ref 4–11)

## 2021-06-29 PROCEDURE — 85027 COMPLETE CBC AUTOMATED: CPT | Performed by: INTERNAL MEDICINE

## 2021-06-29 PROCEDURE — 36415 COLL VENOUS BLD VENIPUNCTURE: CPT | Performed by: INTERNAL MEDICINE

## 2021-06-30 ENCOUNTER — OFFICE VISIT (OUTPATIENT)
Dept: CARDIOLOGY | Facility: CLINIC | Age: 81
End: 2021-06-30
Attending: INTERNAL MEDICINE
Payer: COMMERCIAL

## 2021-06-30 VITALS
HEART RATE: 66 BPM | OXYGEN SATURATION: 97 % | BODY MASS INDEX: 26.23 KG/M2 | HEIGHT: 70 IN | SYSTOLIC BLOOD PRESSURE: 124 MMHG | DIASTOLIC BLOOD PRESSURE: 66 MMHG | WEIGHT: 183.2 LBS

## 2021-06-30 DIAGNOSIS — Z95.2 S/P TAVR (TRANSCATHETER AORTIC VALVE REPLACEMENT): Primary | ICD-10-CM

## 2021-06-30 DIAGNOSIS — I44.7 LBBB (LEFT BUNDLE BRANCH BLOCK): ICD-10-CM

## 2021-06-30 DIAGNOSIS — I48.92 ATRIAL FLUTTER, UNSPECIFIED TYPE (H): ICD-10-CM

## 2021-06-30 DIAGNOSIS — I05.9 MITRAL VALVE DISORDER: ICD-10-CM

## 2021-06-30 PROCEDURE — 99215 OFFICE O/P EST HI 40 MIN: CPT | Performed by: PHYSICIAN ASSISTANT

## 2021-06-30 ASSESSMENT — MIFFLIN-ST. JEOR: SCORE: 1552.24

## 2021-06-30 NOTE — LETTER
6/30/2021    Pb Warren MD  [address] [facility] [id number]    RE: Mars Reyes       Dear Colleague,    I had the pleasure of seeing Mars Reyes in the [facility].    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 Valve was ballooned three times due to significant PVL. No significant PVL at the end of procedure. He also developed a LBBB after deployment. He was noted to develop acute anemia POD#1, but a repeat hgb was stable so he was restarted on Eliquis and aspirin and discharged home. He did go home with an MCOT monitor, BB was not resumed. POD#1 TTE showed trace-mild PVL and a mean prosthetic AV gradient of 10mmHg.    He has been feeling well and denies any CP, significant ARGUETA, lightheadedness, pre-syncope or syncope. No concerns with his access sites.     MCOT transmissions from 6/25, 6/26 and 6/27 have shown second degree AVB type I with HR into the 40s, he did have one 2.4s pause. The episodes on the 25th and 26th happened at 5AM and 6AM respectively, Mars states he typically wakes for the day around 8AM. He has been set up to see Dr Lopez later this week.     CURRENT MEDICATIONS:   Current Outpatient Medications   Medication Sig Dispense Refill     acetaminophen (TYLENOL) 325 MG tablet Take 2 tablets (650 mg) by mouth every 4 hours as needed for mild pain or headaches       allopurinol (ZYLOPRIM) 100 MG tablet Take 100 mg by mouth every morning       amoxicillin (AMOXIL) 500 MG tablet Take 2,000 mg by mouth daily as needed (1 hour prior to dental procedures)        "apixaban ANTICOAGULANT (ELIQUIS ANTICOAGULANT) 5 MG tablet Take 1 tablet (5 mg) by mouth 2 times daily 180 tablet 3     aspirin (ASA) 81 MG EC tablet Take 1 tablet (81 mg) by mouth daily       lisinopril (ZESTRIL) 20 MG tablet Take 20 mg by mouth every evening       pravastatin (PRAVACHOL) 20 MG tablet Take 20 mg by mouth every evening       carvedilol (COREG) 3.125 MG tablet Take 1 tablet (3.125 mg) by mouth 2 times daily (with meals) --- DO NOT TAKE UNTIL YOU FOLLOW-UP WITH YOUR CARDIOLOGIST --- 180 tablet 1     ALLERGIES:  Allergies   Allergen Reactions     Simvastatin      10/26/12: Arm pain - pt stopped it x 4-5 wks and arm pain didn't resolve sore started it      ROS:  Skin:  Negative     Eyes:  Positive for glasses  ENT:  Negative    Respiratory:  Negative    Cardiovascular:  Negative    Gastroenterology: Negative    Genitourinary:  Negative    Musculoskeletal:  Negative    Neurologic:  Negative    Psychiatric:  Negative    Heme/Lymph/Imm:  Negative    Endocrine:  Negative      PHYSICAL EXAMINATION:    GENERAL:  The patient is a pleasant 80 year old who appears their stated age.  In no apparent distress.  VITAL SIGNS:  /66 (BP Location: Right arm, Patient Position: Sitting, Cuff Size: Adult Regular)   Pulse 66   Ht 1.778 m (5' 10\")   Wt 83.1 kg (183 lb 3.2 oz)   SpO2 97%   BMI 26.29 kg/m      RESPIRATORY:  Breathing is nonlabored.  Lungs are clear to auscultation bilaterally.   CARDIAC:  RRR, 1/6 ADRIAN  EXTREMITIES:  No LE edema  NEUROLOGIC:  Alert and oriented.    DATA REVIEWED:    Recent hospital records including hospital discharge summary    Lab Results   Component Value Date    WBC 6.3 06/29/2021     Lab Results   Component Value Date    RBC 4.06 06/29/2021     Lab Results   Component Value Date    HGB 12.0 06/29/2021     Lab Results   Component Value Date    HCT 36.4 06/29/2021     Lab Results   Component Value Date    MCV 90 06/29/2021     Lab Results   Component Value Date    MCH 29.6 " 06/29/2021     Lab Results   Component Value Date    MCHC 33.0 06/29/2021     Lab Results   Component Value Date    RDW 12.4 06/29/2021     Lab Results   Component Value Date    PLT 70 06/29/2021     ASSESSMENT AND PLAN:    80 year old M with paroxysmal SVT, 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 S/P TAVR. Appears to be doing well. No sx of HF.  -Continue ASA  -Cardiac rehab  -ABX for SBE prophylaxis prior to dental work  -1 month follow up echo and labs    2. New LBBB post TAVR.  -off his BB  -MCOT showing second degree AVB type I with HR into the 40s and one 2.4s pause. Will be seeing Dr Lopez in 2 days. Not symptomatic.  -No driving until he talks to Dr Lopez    3. AFL with prior ablation, but recurrent paroxysmal AFL  -Continue Eliquis    4. Acute anemia.  -hgb up to 12 today    5. ITP and MGUS (IgG kappa)  -plts stable today  -Has follow up with Dr Mayer 7/5    6. Mitral valve disease, stable  -Follow on echo    Follow up: Dr Lopez 7/2. With me 7/22/21 with echo and labs prior and EKG day of.  Of course, the patient was encouraged to contact us sooner with questions or concerns.    45 minutes spent on the date of the encounter doing chart review, review of test results, patient visit and documentation     Alexandra Rojas PA-C      Thank you for allowing me to participate in the care of your patient.      Sincerely,     Alexandra Rojas PA-C     [facility]  cc:   Hunter Adan MD  [address]  [place] [id number]

## 2021-06-30 NOTE — PROGRESS NOTES
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 Valve was ballooned three times due to significant PVL. No significant PVL at the end of procedure. He also developed a LBBB after deployment. He was noted to develop acute anemia POD#1, but a repeat hgb was stable so he was restarted on Eliquis and aspirin and discharged home. He did go home with an MCOT monitor, BB was not resumed. POD#1 TTE showed trace-mild PVL and a mean prosthetic AV gradient of 10mmHg.    He has been feeling well and denies any CP, significant ARGUETA, lightheadedness, pre-syncope or syncope. No concerns with his access sites.     MCOT transmissions from 6/25, 6/26 and 6/27 have shown second degree AVB type I with HR into the 40s, he did have one 2.4s pause. The episodes on the 25th and 26th happened at 5AM and 6AM respectively, Mars states he typically wakes for the day around 8AM. He has been set up to see Dr Lopez later this week.     CURRENT MEDICATIONS:   Current Outpatient Medications   Medication Sig Dispense Refill     acetaminophen (TYLENOL) 325 MG tablet Take 2 tablets (650 mg) by mouth every 4 hours as needed for mild pain or headaches       allopurinol (ZYLOPRIM) 100 MG tablet Take 100 mg by mouth every morning       amoxicillin (AMOXIL) 500 MG tablet Take 2,000 mg by mouth daily as needed (1 hour prior to dental procedures)       apixaban ANTICOAGULANT (ELIQUIS ANTICOAGULANT) 5 MG tablet Take 1 tablet (5 mg) by mouth 2 times daily 180 tablet 3     aspirin (ASA) 81 MG EC tablet Take 1 tablet (81 mg) by mouth daily       lisinopril (ZESTRIL) 20 MG tablet Take 20 mg by mouth  "every evening       pravastatin (PRAVACHOL) 20 MG tablet Take 20 mg by mouth every evening       carvedilol (COREG) 3.125 MG tablet Take 1 tablet (3.125 mg) by mouth 2 times daily (with meals) --- DO NOT TAKE UNTIL YOU FOLLOW-UP WITH YOUR CARDIOLOGIST --- 180 tablet 1     ALLERGIES:  Allergies   Allergen Reactions     Simvastatin      10/26/12: Arm pain - pt stopped it x 4-5 wks and arm pain didn't resolve sore started it      ROS:  Skin:  Negative     Eyes:  Positive for glasses  ENT:  Negative    Respiratory:  Negative    Cardiovascular:  Negative    Gastroenterology: Negative    Genitourinary:  Negative    Musculoskeletal:  Negative    Neurologic:  Negative    Psychiatric:  Negative    Heme/Lymph/Imm:  Negative    Endocrine:  Negative      PHYSICAL EXAMINATION:    GENERAL:  The patient is a pleasant 80 year old who appears their stated age.  In no apparent distress.  VITAL SIGNS:  /66 (BP Location: Right arm, Patient Position: Sitting, Cuff Size: Adult Regular)   Pulse 66   Ht 1.778 m (5' 10\")   Wt 83.1 kg (183 lb 3.2 oz)   SpO2 97%   BMI 26.29 kg/m      RESPIRATORY:  Breathing is nonlabored.  Lungs are clear to auscultation bilaterally.   CARDIAC:  RRR, 1/6 ADRIAN  EXTREMITIES:  No LE edema  NEUROLOGIC:  Alert and oriented.    DATA REVIEWED:    Recent hospital records including hospital discharge summary    Lab Results   Component Value Date    WBC 6.3 06/29/2021     Lab Results   Component Value Date    RBC 4.06 06/29/2021     Lab Results   Component Value Date    HGB 12.0 06/29/2021     Lab Results   Component Value Date    HCT 36.4 06/29/2021     Lab Results   Component Value Date    MCV 90 06/29/2021     Lab Results   Component Value Date    MCH 29.6 06/29/2021     Lab Results   Component Value Date    MCHC 33.0 06/29/2021     Lab Results   Component Value Date    RDW 12.4 06/29/2021     Lab Results   Component Value Date    PLT 70 06/29/2021     ASSESSMENT AND PLAN:    80 year old M with paroxysmal " SVT, 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 S/P TAVR. Appears to be doing well. No sx of HF.  -Continue ASA  -Cardiac rehab  -ABX for SBE prophylaxis prior to dental work  -1 month follow up echo and labs    2. New LBBB post TAVR.  -off his BB  -MCOT showing second degree AVB type I with HR into the 40s and one 2.4s pause. Will be seeing Dr Lopez in 2 days. Not symptomatic.  -No driving until he talks to Dr Lopez    3. AFL with prior ablation, but recurrent paroxysmal AFL  -Continue Eliquis    4. Acute anemia.  -hgb up to 12 today    5. ITP and MGUS (IgG kappa)  -plts stable today  -Has follow up with Dr Mayer 7/5    6. Mitral valve disease, stable  -Follow on echo    Follow up: Dr Lopez 7/2. With me 7/22/21 with echo and labs prior and EKG day of.  Of course, the patient was encouraged to contact us sooner with questions or concerns.    45 minutes spent on the date of the encounter doing chart review, review of test results, patient visit and documentation     Alexandra Rojas PA-C

## 2021-06-30 NOTE — PATIENT INSTRUCTIONS
Thank you for your [facility] visit today. Your provider has recommended the following:  Medication Changes:  No changes at this time  ABX prior to any dental cleaning/work  Recommendations:  Appt with Dr Lopez on Friday as planned  No driving until after you talk with him  Follow-up:  See me for cardiology follow up late July with echo and lab prior.    Reminder:  Please bring in your current medication list or your medication, over the counter supplements and vitamin bottles as we will review these at each office visit.

## 2021-06-30 NOTE — PROGRESS NOTES
MCOT strips reviewed with Dr. Adan and Dr. Lopez, recommend EP consult. Dr. Lopez said he would be available in clinic 7/2/21.     Spoke with nikki Crews scheduled 7/2/21-date, time and location reviewed with patient. He reports he is feeling well, no concerns.     Carmen Mckenna RN  [facility]

## 2021-07-01 ENCOUNTER — HOSPITAL ENCOUNTER (OUTPATIENT)
Facility: CLINIC | Age: 81
Setting detail: SPECIMEN
Discharge: HOME | End: 2021-07-01
Attending: INTERNAL MEDICINE | Admitting: INTERNAL MEDICINE
Payer: COMMERCIAL

## 2021-07-01 DIAGNOSIS — D47.2 MGUS (MONOCLONAL GAMMOPATHY OF UNKNOWN SIGNIFICANCE): ICD-10-CM

## 2021-07-01 LAB
ANION GAP SERPL CALCULATED.3IONS-SCNC: 2 MMOL/L (ref 3–14)
BASOPHILS # BLD AUTO: 0.1 10E9/L (ref 0–0.2)
BASOPHILS NFR BLD AUTO: 0.8 %
BUN SERPL-MCNC: 13 MG/DL (ref 7–30)
CALCIUM SERPL-MCNC: 9.3 MG/DL (ref 8.5–10.1)
CHLORIDE SERPL-SCNC: 103 MMOL/L (ref 94–109)
CO2 SERPL-SCNC: 28 MMOL/L (ref 20–32)
CREAT SERPL-MCNC: 1.1 MG/DL (ref 0.66–1.25)
DIFFERENTIAL METHOD BLD: ABNORMAL
EOSINOPHIL # BLD AUTO: 0.1 10E9/L (ref 0–0.7)
EOSINOPHIL NFR BLD AUTO: 1.4 %
ERYTHROCYTE [DISTWIDTH] IN BLOOD BY AUTOMATED COUNT: 12.4 % (ref 10–15)
GFR SERPL CREATININE-BSD FRML MDRD: 63 ML/MIN/{1.73_M2}
GLUCOSE SERPL-MCNC: 93 MG/DL (ref 70–99)
HCT VFR BLD AUTO: 36 % (ref 40–53)
HGB BLD-MCNC: 12 G/DL (ref 13.3–17.7)
IMM GRANULOCYTES # BLD: 0 10E9/L (ref 0–0.4)
IMM GRANULOCYTES NFR BLD: 0.4 %
LYMPHOCYTES # BLD AUTO: 1.1 10E9/L (ref 0.8–5.3)
LYMPHOCYTES NFR BLD AUTO: 13.8 %
MCH RBC QN AUTO: 29.1 PG (ref 26.5–33)
MCHC RBC AUTO-ENTMCNC: 33.3 G/DL (ref 31.5–36.5)
MCV RBC AUTO: 87 FL (ref 78–100)
MONOCYTES # BLD AUTO: 1 10E9/L (ref 0–1.3)
MONOCYTES NFR BLD AUTO: 12.7 %
NEUTROPHILS # BLD AUTO: 5.6 10E9/L (ref 1.6–8.3)
NEUTROPHILS NFR BLD AUTO: 70.9 %
NRBC # BLD AUTO: 0 10*3/UL
NRBC BLD AUTO-RTO: 0 /100
PLATELET # BLD AUTO: 81 10E9/L (ref 150–450)
POTASSIUM SERPL-SCNC: 4.8 MMOL/L (ref 3.4–5.3)
RBC # BLD AUTO: 4.12 10E12/L (ref 4.4–5.9)
SODIUM SERPL-SCNC: 133 MMOL/L (ref 133–144)
WBC # BLD AUTO: 7.8 10E9/L (ref 4–11)

## 2021-07-01 PROCEDURE — 36591 DRAW BLOOD OFF VENOUS DEVICE: CPT

## 2021-07-01 PROCEDURE — 82784 ASSAY IGA/IGD/IGG/IGM EACH: CPT | Performed by: INTERNAL MEDICINE

## 2021-07-01 PROCEDURE — 80048 BASIC METABOLIC PNL TOTAL CA: CPT | Performed by: INTERNAL MEDICINE

## 2021-07-01 PROCEDURE — 999N001036 HC STATISTIC TOTAL PROTEIN: Performed by: INTERNAL MEDICINE

## 2021-07-01 PROCEDURE — 84165 PROTEIN E-PHORESIS SERUM: CPT | Mod: 26 | Performed by: PATHOLOGY

## 2021-07-01 PROCEDURE — 85025 COMPLETE CBC W/AUTO DIFF WBC: CPT | Performed by: INTERNAL MEDICINE

## 2021-07-01 PROCEDURE — 83883 ASSAY NEPHELOMETRY NOT SPEC: CPT | Performed by: INTERNAL MEDICINE

## 2021-07-01 PROCEDURE — 84165 PROTEIN E-PHORESIS SERUM: CPT | Mod: TC | Performed by: INTERNAL MEDICINE

## 2021-07-01 PROCEDURE — 36415 COLL VENOUS BLD VENIPUNCTURE: CPT

## 2021-07-02 ENCOUNTER — TELEPHONE (OUTPATIENT)
Dept: CARDIOLOGY | Facility: CLINIC | Age: 81
End: 2021-07-02

## 2021-07-02 ENCOUNTER — OFFICE VISIT (OUTPATIENT)
Dept: CARDIOLOGY | Facility: CLINIC | Age: 81
End: 2021-07-02
Payer: COMMERCIAL

## 2021-07-02 VITALS
BODY MASS INDEX: 25.77 KG/M2 | SYSTOLIC BLOOD PRESSURE: 144 MMHG | WEIGHT: 180 LBS | DIASTOLIC BLOOD PRESSURE: 81 MMHG | HEART RATE: 67 BPM | HEIGHT: 70 IN

## 2021-07-02 DIAGNOSIS — I44.1 SECOND DEGREE AV BLOCK: Primary | ICD-10-CM

## 2021-07-02 DIAGNOSIS — I44.7 LBBB (LEFT BUNDLE BRANCH BLOCK): ICD-10-CM

## 2021-07-02 DIAGNOSIS — Z95.2 S/P TAVR (TRANSCATHETER AORTIC VALVE REPLACEMENT): ICD-10-CM

## 2021-07-02 LAB
ALBUMIN SERPL ELPH-MCNC: 4.1 G/DL (ref 3.7–5.1)
ALPHA1 GLOB SERPL ELPH-MCNC: 0.4 G/DL (ref 0.2–0.4)
ALPHA2 GLOB SERPL ELPH-MCNC: 0.8 G/DL (ref 0.5–0.9)
B-GLOBULIN SERPL ELPH-MCNC: 0.9 G/DL (ref 0.6–1)
GAMMA GLOB SERPL ELPH-MCNC: 1 G/DL (ref 0.7–1.6)
IGG SERPL-MCNC: 982 MG/DL (ref 610–1616)
KAPPA LC UR-MCNC: 2.43 MG/DL (ref 0.33–1.94)
KAPPA LC/LAMBDA SER: 1.49 {RATIO} (ref 0.26–1.65)
LAMBDA LC SERPL-MCNC: 1.63 MG/DL (ref 0.57–2.63)
M PROTEIN SERPL ELPH-MCNC: 0.1 G/DL
PROT PATTERN SERPL ELPH-IMP: ABNORMAL

## 2021-07-02 PROCEDURE — 99214 OFFICE O/P EST MOD 30 MIN: CPT | Performed by: INTERNAL MEDICINE

## 2021-07-02 ASSESSMENT — MIFFLIN-ST. JEOR: SCORE: 1537.72

## 2021-07-02 NOTE — PROGRESS NOTES
Service Date: 07/02/2021    HISTORY OF PRESENT ILLNESS:    It is my pleasure seeing Mr. Mars Reyes for evaluation of AV block.  This is a delightful 80-year-old gentleman who has been referred by Dr. Hunter Adan and GILBERT Ojeda for evaluation of AV conduction issues following TAVR.    Mr. Reyes has history of moderate mitral stenosis, mild to moderate MR and severe aortic stenosis.  He underent TAVR on 06/22/2021 (29 mm Medtronic Evolut valve).  The valve had to be ballooned 3 times because of significant perivalvular leak.  There was no significant perivalvular leak after the procedure, but the patient developed LBBB.  He also had evidence of second-degree AV block and an occasional dropped beat.  He was sent home with a cardiac monitor.    His cardiac monitor has shown episodes of second-degree AV block with heart rates down to 30 beats per minute at times.  Most events have happened in the early a.m. hours, when presumably asleep, though some have occurred during the day.    The patient has history of atypical atrial flutter and underwent catheter ablation by Dr. Gee in 10/2019.  Before the EP procedure, he had recurrence of this arrhythmia despite cardioversion.  The atrial flutter was found not to be CTI-dependent.  Left atrial mapping showed extensive scar.  The patient underwent pulmonary vein isolation which, according to Dr. Gee, did not require much ablation because of the significant preexisting scar.  Ablation in the left atrial roof was performed and terminated the arrhythmia.  The procedure was performed under local anesthesia.  The patient has been maintained on apixaban, but has not had documented atrial arrhythmia since then.    In coming in today, accompanied by his wife, Mr. Reyes stated he felt well.  He has recovered nicely after TAVR 10 days ago.  He has not had sudden onset dizziness, lightheadedness, syncope or near syncope.  He is modestly physically active.  He does not have chest  pain.    He lives with his wife in [place].  He has 3 sons.  He is a nonsmoker and does not drink alcohol on a regular basis.      PHYSICAL EXAMINATION:    VITAL SIGNS:  Blood pressure 144/81, heart rate 67 and regular.  Weight 81 kg, height 178 cm, BMI 25.8.  GENERAL:  He is an extremely pleasant gentleman in no distress.  HEENT:  Normocephalic.  NECK:  Supple, with a transmitted aortic murmur in the left carotid.  LUNGS:  Clear bilaterally.  No crackles or wheezes.  HEART:  Regular rhythm with a 2/6 systolic ejection murmur at the base, no rub.  ABDOMEN:  Soft, nontender.  Negative HJR.  EXTREMITIES:  No edema.  SKIN:  No rash.      DIAGNOSTIC STUDIES:    - Recent laboratory tests:  Sodium 133, potassium 4.8, creatinine 1.1, calcium 9.3, hematocrit 36%, white count 7800.  - Most recent 12-lead ECG from 06/23/2021 shows sinus rhythm with LBBB and mild first-degree AV conduction delay.  - Most recent echocardiogram from 06/22/2021 (post-TAVR) shows mild mitral stenosis, moderate TR, well-positioned aortic valve with mean gradient of 8 mmHg, mild AI.      IMPRESSION:  1.  LBBB and second-degree AV block post-TAVR.  Mr. Reyes is a delightful 80-year-old gentleman who had normal AV conduction before TAVR 10 days ago.  Following TAVR, he developed LBBB with periods of second-degree AV block.  Second-degree block occurs both during the early a.m. hours as well as during the day.  He has had no clear symptoms, but this a precarious situation as the underlying pathophysiology suggests that there has been significant injury to the left bundle and there is intermittent failure of conduction in the right bundle.  This is an indication for permanent pacemaker in the post-TAVR setting.    I discussed with the patient the rationale for pacemaker implantation and also discussed the alternative of conservatively managing and waiting to see if AV conduction problems improve with time.  I discussed that a major disadvantage of  waiting is that he would be at risk for fainting with possible adverse consequences.    I went over the technical aspects, risks and benefits of pacemaker implantation with the patient and his wife.  I explained there is an approximately 2% risk of serious complication.  We discussed the postop instructions as well as the need for long-term followup.    After a good discussion, the patient has consented to proceed.    RECOMMENDATIONS:   1.  Schedule dual chamber pacemaker implantation (Medtronic).  2.  Hold Eliquis on the a.m. of the procedure.    Thank you for involving me in the care of this delightful gentleman.  Please feel free to contact me with any questions.      Blanca Lopez MD, [facility]        cc:  Pb Warren MD  [address] [address]    D: 2021   T: 2021   MT: al    Name:     DESEAN MARTINEZ  MRN:      [id number]        Account:      [id number]   :      1940           Service Date: 2021       Document: [id number]

## 2021-07-02 NOTE — TELEPHONE ENCOUNTER
VM received from patient inquiring about what to do with wearable monitor as he is getting pacemaker next week.    I telephoned Mars and left detailed VM requesting Mars wear monitor until morning of PPM. He can take it off before arriving to the hospital, and upon his return home he can box it up and return it in the UPS bag included with MCOT device.     Carmen Mckenna RN  [facility]

## 2021-07-02 NOTE — PROGRESS NOTES
HPI and Plan:   See dictation [id number]    No orders of the defined types were placed in this encounter.      No orders of the defined types were placed in this encounter.      There are no discontinued medications.      Encounter Diagnosis   Name Primary?     Second degree AV block Yes       CURRENT MEDICATIONS:  Current Outpatient Medications   Medication Sig Dispense Refill     acetaminophen (TYLENOL) 325 MG tablet Take 2 tablets (650 mg) by mouth every 4 hours as needed for mild pain or headaches       allopurinol (ZYLOPRIM) 100 MG tablet Take 100 mg by mouth every morning       amoxicillin (AMOXIL) 500 MG tablet Take 2,000 mg by mouth daily as needed (1 hour prior to dental procedures)       apixaban ANTICOAGULANT (ELIQUIS ANTICOAGULANT) 5 MG tablet Take 1 tablet (5 mg) by mouth 2 times daily 180 tablet 3     aspirin (ASA) 81 MG EC tablet Take 1 tablet (81 mg) by mouth daily       lisinopril (ZESTRIL) 20 MG tablet Take 20 mg by mouth every evening       pravastatin (PRAVACHOL) 20 MG tablet Take 20 mg by mouth every evening         ALLERGIES     Allergies   Allergen Reactions     Simvastatin      10/26/12: Arm pain - pt stopped it x 4-5 wks and arm pain didn't resolve sore started it        PAST MEDICAL HISTORY:  Past Medical History:   Diagnosis Date     Aortic stenosis     moderate     Atypical atrial flutter (H)     PVI and LA linear ablation with successful termination of AFL 10/18/2019     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, unspecified cause, unspecified laterality      Contact dermatitis and other eczema, due to unspecified cause      Diverticulosis of colon (without mention of hemorrhage)      DJD (degenerative joint disease)      Essential hypertension, benign     abstracted 7/22/02     Gout, unspecified      Hip joint replacement by other means 6/3/2009     MGUS (monoclonal gammopathy of unknown significance)      Mitral valve disease     mild MS, moderate-severe MR     PSVT (paroxysmal  supraventricular tachycardia) (H)      Pulmonary HTN (H) 10/18/2011     Thrombocytopenia (H)        PAST SURGICAL HISTORY:  Past Surgical History:   Procedure Laterality Date     ANESTHESIA CARDIOVERSION N/A 3/21/2019    Procedure: ANESTHESIA CARDIOVERSION;  Surgeon: GENERIC ANESTHESIA PROVIDER;  Location: [facility] OR     ARTHROPLASTY REVISION HIP ANTERIOR Left 9/9/2019    Procedure: REVISION DIRECT ANTERIOR TOTAL LEFT HIP ARTHROPLASTY;  Surgeon: Luisito Hammonds MD;  Location:  OR     BIOPSY  3/2017     BONE MARROW BIOPSY, BONE SPECIMEN, NEEDLE/TROCAR Right 2/23/2017    Procedure: BIOPSY BONE MARROW;  Surgeon: Romie Esquivel MD;  Location:  GI     COLONOSCOPY  2010     CV HEART CATHETERIZATION WITH POSSIBLE INTERVENTION N/A 8/25/2020    Procedure: Coronary Angiogram;  Surgeon: Brian Simmons MD;  Location:  HEART CARDIAC CATH LAB     CV LEFT HEART CATH N/A 8/25/2020    Procedure: Left Heart Cath;  Surgeon: Brian Simmons MD;  Location:  HEART CARDIAC CATH LAB     CV RIGHT HEART CATH MEASUREMENTS RECORDED N/A 8/25/2020    Procedure: Right Heart Cath;  Surgeon: Brian Simmons MD;  Location:  HEART CARDIAC CATH LAB     CV TRANSCATHETER AORTIC VALVE REPLACEMENT N/A 6/22/2021    Procedure: Transcatheter Aortic Valve Replacement;  Surgeon: Hunter Adan MD;  Location:  HEART CARDIAC CATH LAB     EP 3D TACHYCARDIA MAPPING N/A 10/18/2019    Procedure: EP 3D Tachycardia Mapping;  Surgeon: Delfino Gee MD;  Location:  HEART CARDIAC CATH LAB     EP ABLATION FOCAL AFIB N/A 10/18/2019    Procedure: EP Ablation Focal AFIB;  Surgeon: Delfino Gee MD;  Location:  HEART CARDIAC CATH LAB     EYE SURGERY      Cataract, both eyes     ORTHOPEDIC SURGERY  2009    (L) total hip       FAMILY HISTORY:  Family History   Problem Relation Age of Onset     Hypertension Mother        SOCIAL HISTORY:  Social History     Socioeconomic History     Marital status:      Spouse name: None     Number of  children: None     Years of education: None     Highest education level: None   Occupational History     None   Social Needs     Financial resource strain: None     Food insecurity     Worry: None     Inability: None     Transportation needs     Medical: None     Non-medical: None   Tobacco Use     Smoking status: Never Smoker     Smokeless tobacco: Never Used   Substance and Sexual Activity     Alcohol use: Yes     Alcohol/week: 0.0 standard drinks     Comment: Max 2 beers per day     Drug use: No     Sexual activity: Yes     Partners: Female     Birth control/protection: None   Lifestyle     Physical activity     Days per week: None     Minutes per session: None     Stress: None   Relationships     Social connections     Talks on phone: None     Gets together: None     Attends [employer] service: None     Active member of club or organization: None     Attends meetings of clubs or organizations: None     Relationship status: None     Intimate partner violence     Fear of current or ex partner: None     Emotionally abused: None     Physically abused: None     Forced sexual activity: None   Other Topics Concern     Parent/sibling w/ CABG, MI or angioplasty before 65F 55M? No   Social History Narrative     None       Review of Systems:  Skin:  Negative       Eyes:  Positive for glasses    ENT:  Negative      Respiratory:  Negative       Cardiovascular:  Negative      Gastroenterology: Negative      Genitourinary:  Negative      Musculoskeletal:  Negative      Neurologic:  Negative      Psychiatric:  Negative      Heme/Lymph/Imm:  Negative      Endocrine:  Negative

## 2021-07-02 NOTE — LETTER
7/2/2021    Pb Warren MD  [address] [facility][1] [id number]    RE: Mars Reyes       Dear Colleague,    I had the pleasure of seeing Mars Reyes in the [facility].    HPI and Plan:   See dictation [id number]    No orders of the defined types were placed in this encounter.      No orders of the defined types were placed in this encounter.      There are no discontinued medications.      Encounter Diagnosis   Name Primary?     Second degree AV block Yes       CURRENT MEDICATIONS:  Current Outpatient Medications   Medication Sig Dispense Refill     acetaminophen (TYLENOL) 325 MG tablet Take 2 tablets (650 mg) by mouth every 4 hours as needed for mild pain or headaches       allopurinol (ZYLOPRIM) 100 MG tablet Take 100 mg by mouth every morning       amoxicillin (AMOXIL) 500 MG tablet Take 2,000 mg by mouth daily as needed (1 hour prior to dental procedures)       apixaban ANTICOAGULANT (ELIQUIS ANTICOAGULANT) 5 MG tablet Take 1 tablet (5 mg) by mouth 2 times daily 180 tablet 3     aspirin (ASA) 81 MG EC tablet Take 1 tablet (81 mg) by mouth daily       lisinopril (ZESTRIL) 20 MG tablet Take 20 mg by mouth every evening       pravastatin (PRAVACHOL) 20 MG tablet Take 20 mg by mouth every evening         ALLERGIES     Allergies   Allergen Reactions     Simvastatin      10/26/12: Arm pain - pt stopped it x 4-5 wks and arm pain didn't resolve sore started it        PAST MEDICAL HISTORY:  Past Medical History:   Diagnosis Date     Aortic stenosis     moderate     Atypical atrial flutter (H)     PVI and LA linear ablation with successful termination of AFL 10/18/2019     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, unspecified cause, unspecified laterality      Contact dermatitis and other eczema, due to unspecified cause      Diverticulosis of colon (without mention of hemorrhage)      DJD (degenerative joint disease)      Essential hypertension, benign      abstracted 7/22/02     Gout, unspecified      Hip joint replacement by other means 6/3/2009     MGUS (monoclonal gammopathy of unknown significance)      Mitral valve disease     mild MS, moderate-severe MR     PSVT (paroxysmal supraventricular tachycardia) (H)      Pulmonary HTN (H) 10/18/2011     Thrombocytopenia (H)        PAST SURGICAL HISTORY:  Past Surgical History:   Procedure Laterality Date     ANESTHESIA CARDIOVERSION N/A 3/21/2019    Procedure: ANESTHESIA CARDIOVERSION;  Surgeon: GENERIC ANESTHESIA PROVIDER;  Location: [facility] OR     ARTHROPLASTY REVISION HIP ANTERIOR Left 9/9/2019    Procedure: REVISION DIRECT ANTERIOR TOTAL LEFT HIP ARTHROPLASTY;  Surgeon: Luisito Hammonds MD;  Location:  OR     BIOPSY  3/2017     BONE MARROW BIOPSY, BONE SPECIMEN, NEEDLE/TROCAR Right 2/23/2017    Procedure: BIOPSY BONE MARROW;  Surgeon: Romie Esquivel MD;  Location:  GI     COLONOSCOPY  2010     CV HEART CATHETERIZATION WITH POSSIBLE INTERVENTION N/A 8/25/2020    Procedure: Coronary Angiogram;  Surgeon: Brian Simmons MD;  Location:  HEART CARDIAC CATH LAB     CV LEFT HEART CATH N/A 8/25/2020    Procedure: Left Heart Cath;  Surgeon: Brian Simmons MD;  Location:  HEART CARDIAC CATH LAB     CV RIGHT HEART CATH MEASUREMENTS RECORDED N/A 8/25/2020    Procedure: Right Heart Cath;  Surgeon: Brian Simmons MD;  Location:  HEART CARDIAC CATH LAB     CV TRANSCATHETER AORTIC VALVE REPLACEMENT N/A 6/22/2021    Procedure: Transcatheter Aortic Valve Replacement;  Surgeon: Hunter Adan MD;  Location:  HEART CARDIAC CATH LAB     EP 3D TACHYCARDIA MAPPING N/A 10/18/2019    Procedure: EP 3D Tachycardia Mapping;  Surgeon: Delfino Gee MD;  Location:  HEART CARDIAC CATH LAB     EP ABLATION FOCAL AFIB N/A 10/18/2019    Procedure: EP Ablation Focal AFIB;  Surgeon: Delfino Gee MD;  Location:  HEART CARDIAC CATH LAB     EYE SURGERY      Cataract, both eyes     ORTHOPEDIC SURGERY  2009    (L) total  hip       FAMILY HISTORY:  Family History   Problem Relation Age of Onset     Hypertension Mother        SOCIAL HISTORY:  Social History     Socioeconomic History     Marital status:      Spouse name: None     Number of children: None     Years of education: None     Highest education level: None   Occupational History     None   Social Needs     Financial resource strain: None     Food insecurity     Worry: None     Inability: None     Transportation needs     Medical: None     Non-medical: None   Tobacco Use     Smoking status: Never Smoker     Smokeless tobacco: Never Used   Substance and Sexual Activity     Alcohol use: Yes     Alcohol/week: 0.0 standard drinks     Comment: Max 2 beers per day     Drug use: No     Sexual activity: Yes     Partners: Female     Birth control/protection: None   Lifestyle     Physical activity     Days per week: None     Minutes per session: None     Stress: None   Relationships     Social connections     Talks on phone: None     Gets together: None     Attends Shinto service: None     Active member of club or organization: None     Attends meetings of clubs or organizations: None     Relationship status: None     Intimate partner violence     Fear of current or ex partner: None     Emotionally abused: None     Physically abused: None     Forced sexual activity: None   Other Topics Concern     Parent/sibling w/ CABG, MI or angioplasty before 65F 55M? No   Social History Narrative     None       Review of Systems:  Skin:  Negative       Eyes:  Positive for glasses    ENT:  Negative      Respiratory:  Negative       Cardiovascular:  Negative      Gastroenterology: Negative      Genitourinary:  Negative      Musculoskeletal:  Negative      Neurologic:  Negative      Psychiatric:  Negative      Heme/Lymph/Imm:  Negative      Endocrine:  Negative                        Thank you for allowing me to participate in the care of your patient.      Sincerely,     Blanca Lopez  MD     [facility]  cc:   Hunter Adan MD  [address]  [place] [address]

## 2021-07-05 ENCOUNTER — HOSPITAL ENCOUNTER (OUTPATIENT)
Dept: LAB | Facility: CLINIC | Age: 81
Discharge: HOME | End: 2021-07-05
Attending: INTERNAL MEDICINE | Admitting: INTERNAL MEDICINE
Payer: COMMERCIAL

## 2021-07-05 ENCOUNTER — VIRTUAL VISIT (OUTPATIENT)
Dept: ONCOLOGY | Facility: CLINIC | Age: 81
End: 2021-07-05
Attending: INTERNAL MEDICINE
Payer: COMMERCIAL

## 2021-07-05 DIAGNOSIS — I44.1 SECOND DEGREE AV BLOCK: ICD-10-CM

## 2021-07-05 DIAGNOSIS — D47.2 MGUS (MONOCLONAL GAMMOPATHY OF UNKNOWN SIGNIFICANCE): Primary | ICD-10-CM

## 2021-07-05 LAB
SARS-COV-2 RNA RESP QL NAA+PROBE: NORMAL
SPECIMEN SOURCE: NORMAL

## 2021-07-05 PROCEDURE — 99213 OFFICE O/P EST LOW 20 MIN: CPT | Mod: 95 | Performed by: INTERNAL MEDICINE

## 2021-07-05 PROCEDURE — U0005 INFEC AGEN DETEC AMPLI PROBE: HCPCS | Performed by: INTERNAL MEDICINE

## 2021-07-05 PROCEDURE — U0003 INFECTIOUS AGENT DETECTION BY NUCLEIC ACID (DNA OR RNA); SEVERE ACUTE RESPIRATORY SYNDROME CORONAVIRUS 2 (SARS-COV-2) (CORONAVIRUS DISEASE [COVID-19]), AMPLIFIED PROBE TECHNIQUE, MAKING USE OF HIGH THROUGHPUT TECHNOLOGIES AS DESCRIBED BY CMS-2020-01-R: HCPCS | Performed by: INTERNAL MEDICINE

## 2021-07-05 NOTE — LETTER
7/5/2021         RE: Mars Reyes  [address] [facility] [address]        Dear Colleague,    Thank you for referring your patient, Mars Reyes, to the [facility] [place]. Please see a copy of my visit note below.    Mars is a 80 year old who is being evaluated via a billable telephone visit.      What phone number would you like to be contacted at? [phone]  How would you like to obtain your AVS? MyChart  Phone call duration: 5 minutes    HEMATOLOGIC HISTORY:  Mr. Mars Reyes is a gentleman with chronic thrombocytopenia and MGUS.   1.  On 05/20/2009, platelets of 191. On 12/21/2011, platelets of 75.   2.  On 02/20/2017, platelets of 57 with hemoglobin of 13.9.   3  On 02/21/2017:    -Normal folate, LDH,TSH and vitamin B12 of 372.    -SPEP reveals M-spike of 0.1.   - Lupus negative.   4. US on 02/22/2017 reveals normal spleen size.  5.  Bone marrow biopsy on 02/23/2017 reveals normocellular bone marrow with 50% cellularity with trilineage hematopoiesis.  Adequate megakaryocyte.  Slight erythroid hypoplasia.  No increase in blasts. 5% to 10% plasma cells. -Cytogenetics and FISH for myeloma panel is normal.    6. Serum ROSA reveals monoclonal IgG immunoglobulin of kappa light chain type and monoclonal free immunoglobulin light chain of lambda chain type.     SUBJECTIVE:  Mr. Reyes is a 80-year-old gentleman with MGUS and chronic thrombocytopenia.  He has been doing well from it.     The patient had a TAVR procedure done on 06/22/2021.  He did well with it.  He is currently on anticoagulation.     No headache.  No dizziness.  No chest pain.  No shortness of breath.  No abdominal pain.  No nausea.  No vomiting.  Appetite is good.  No urinary complaint.  No bowel problem.  No bleeding.     All other review of systems negative.     PHYSICAL EXAMINATION:    He is alert, oriented x 3.  This is a telephone visit.     LABORATORY DATA:  CBC, BMP, SPEP, IgG level and free light chain  reviewed.     ASSESSMENT:    1.  A 80-year-old gentleman with monoclonal gammopathy of unknown significance.  MGUS is stable.  2.  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  3.  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     PLAN:     1.  Labs were all reviewed with the patient.  The patient has chronic thrombocytopenia.  It is remaining stable.  He is not having any bleeding or bruising complication. We will monitor it.  I discussed regarding MGUS.  I explained to him MGUS is stable.  Natural history of disease explained.  We will continue to monitor MGUS once a year with labs.  2.  The patient is mildly anemic.  This is from his recent TAVR procedure. It should improve.  3.  I will see him in 1 year's time with labs.  I advised the patient to see a physician sooner if he has any bone pain, recurrent infection, weight loss, worsening weakness, or any other concerns.       TELEPHONE VISIT TIME:  12 minutes.       This office note has been dictated.          Again, thank you for allowing me to participate in the care of your patient.        Sincerely,        Pino Mayer MD

## 2021-07-05 NOTE — LETTER
7/5/2021         RE: Mars Reyes  [address] [facility] [address]        Dear Colleague,    Thank you for referring your patient, Mars Reyes, to the [facility] [place]. Please see a copy of my visit note below.    Mars is a 80 year old who is being evaluated via a billable telephone visit.      What phone number would you like to be contacted at? [phone]  How would you like to obtain your AVS? MyChart  Phone call duration: 5 minutes    HEMATOLOGIC HISTORY:  Mr. Mars Reyes is a gentleman with chronic thrombocytopenia and MGUS.   1.  On 05/20/2009, platelets of 191. On 12/21/2011, platelets of 75.   2.  On 02/20/2017, platelets of 57 with hemoglobin of 13.9.   3  On 02/21/2017:    -Normal folate, LDH,TSH and vitamin B12 of 372.    -SPEP reveals M-spike of 0.1.   - Lupus negative.   4. US on 02/22/2017 reveals normal spleen size.  5.  Bone marrow biopsy on 02/23/2017 reveals normocellular bone marrow with 50% cellularity with trilineage hematopoiesis.  Adequate megakaryocyte.  Slight erythroid hypoplasia.  No increase in blasts. 5% to 10% plasma cells. -Cytogenetics and FISH for myeloma panel is normal.    6. Serum ROSA reveals monoclonal IgG immunoglobulin of kappa light chain type and monoclonal free immunoglobulin light chain of lambda chain type.     SUBJECTIVE:  Mr. Reyes is a 80-year-old gentleman with MGUS and chronic thrombocytopenia.  He has been doing well from it.     The patient had a TAVR procedure done on 06/22/2021.  He did well with it.  He is currently on anticoagulation.     No headache.  No dizziness.  No chest pain.  No shortness of breath.  No abdominal pain.  No nausea.  No vomiting.  Appetite is good.  No urinary complaint.  No bowel problem.  No bleeding.     All other review of systems negative.     PHYSICAL EXAMINATION:    He is alert, oriented x 3.  This is a telephone visit.     LABORATORY DATA:  CBC, BMP, SPEP, IgG level and free light chain  reviewed.     ASSESSMENT:    1.  A 80-year-old gentleman with monoclonal gammopathy of unknown significance.  MGUS is stable.  2.  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  3.  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     PLAN:     1.  Labs were all reviewed with the patient.  The patient has chronic thrombocytopenia.  It is remaining stable.  He is not having any bleeding or bruising complication. We will monitor it.  I discussed regarding MGUS.  I explained to him MGUS is stable.  Natural history of disease explained.  We will continue to monitor MGUS once a year with labs.  2.  The patient is mildly anemic.  This is from his recent TAVR procedure. It should improve.  3.  I will see him in 1 year's time with labs.  I advised the patient to see a physician sooner if he has any bone pain, recurrent infection, weight loss, worsening weakness, or any other concerns.       TELEPHONE VISIT TIME:  12 minutes.       This office note has been dictated.          Again, thank you for allowing me to participate in the care of your patient.        Sincerely,        Pino Mayer MD

## 2021-07-05 NOTE — PROGRESS NOTES
Mars is a 80 year old who is being evaluated via a billable telephone visit.      What phone number would you like to be contacted at? [phone]  How would you like to obtain your AVS? Edwin  Phone call duration: 5 minutes

## 2021-07-06 ENCOUNTER — DOCUMENTATION ONLY (OUTPATIENT)
Dept: CARDIOLOGY | Facility: CLINIC | Age: 81
End: 2021-07-06

## 2021-07-06 DIAGNOSIS — I44.1 SECOND DEGREE AV BLOCK: Primary | ICD-10-CM

## 2021-07-06 RX ORDER — LIDOCAINE 40 MG/G
CREAM TOPICAL
Status: CANCELLED | OUTPATIENT
Start: 2021-07-06

## 2021-07-06 RX ORDER — SODIUM CHLORIDE 450 MG/100ML
INJECTION, SOLUTION INTRAVENOUS CONTINUOUS
Status: CANCELLED | OUTPATIENT
Start: 2021-07-06

## 2021-07-06 RX ORDER — CEFAZOLIN SODIUM 2 G/100ML
2 INJECTION, SOLUTION INTRAVENOUS
Status: CANCELLED | OUTPATIENT
Start: 2021-07-06

## 2021-07-06 NOTE — PROGRESS NOTES
[facility] reports received and reviewed.    7/2/21 0640 HR 94   SR with 1*AVB   1148 HR 85  SR with 1*AVB with PVCs    7/3/21 and 7/4/21 No arrhythmias detected    Carmen Mckenna RN  [facility]

## 2021-07-06 NOTE — TELEPHONE ENCOUNTER
Returned VM from patient's wife inquiring if she can wait while patient gets PPM placed on Thursday. Left detailed VM stating she can wait in patient's room, as pre/post area has private rooms. Direct contact information provided.    Carmen Mckenna RN  [facility]

## 2021-07-06 NOTE — PROGRESS NOTES
Called patient with pre-procedure instructions for device implant:     Anticoagulation: Eliquis- hold morning of procedure.   Oral diabetes meds: N/A  Insulin: N/A  Diuretic: N/A  Contrast allergy: N/A  Pt informed to be NPO at midnight. Informed patient that he may take morning medications with a sip of water.       Instructed pt to shower the morning of the procedure, and then put on a clean shirt in order to help prevent infection.     Pt has post-procedure transportation and 24 hours monitoring set up.   Pt aware of no driving for 24 hours post procedure due to sedation.     COVID test scheduled: 7/5/21    Pt reminded to self-quarantine from the time of the COVID test to the procedure.    Pt aware of arrival time of 0830 and location. Pt verbalized understanding of instructions.

## 2021-07-07 NOTE — OP NOTE
Procedure Date: 06/22/2021    PREOPERATIVE DIAGNOSES:     1.  Symptomatic severe aortic valve stenosis.  2.  Atrial flutter, status post endovascular ablation.  3.  Gout.  4.  Diverticulosis.  5.  Hypertension.  6.  Monoclonal gammopathy of unknown significance.  7.  History of supraventricular tachycardia.  8.  Pulmonary hypertension.  9.  Chronic thrombocytopenia.    POSTOPERATIVE DIAGNOSES:    1.  Symptomatic severe aortic valve stenosis.  2.  Atrial flutter, status post endovascular ablation.  3.  Gout.  4.  Diverticulosis.  5.  Hypertension.  6.  Monoclonal gammopathy of unknown significance.  7.  History of supraventricular tachycardia.  8.  Pulmonary hypertension.  9.  Chronic thrombocytopenia.    PROCEDURE PERFORMED:  Left transfemoral implantation of a 29 mm Medtronic Evolut PRO + bioprosthesis.    INTERVENTIONAL CARDIOLOGISTS:  Hunter Adan MD; Joey Metcalf MD    STRUCTURAL HEART FELLOW:  Tami Goldman MD    CARDIAC SURGEON:  Yaakov Nieves MD    ANESTHESIA:  Monitored anesthesia care with local.    INDICATIONS FOR PROCEDURE:  The patient is a 80-year-old gentleman with known aortic stenosis that has recently progressed into the severe range.  He developed exertional dyspnea and was subsequently referred for TAVR evaluation.  Following discussion of risks and benefits, he has elected to proceed.    DESCRIPTION OF PROCEDURE:  The patient was brought to the cath lab and placed supine on the table.  Appropriate monitoring lines were placed and monitored anesthesia care was delivered.  The patient was sterilely prepped and draped in the usual fashion.  A time-out performed confirming patient's identity, procedure to be performed, and the administration of preoperative antibiotics.    A combination of ultrasound and fluoroscopy was utilized to obtain bilateral common femoral arterial access and right common femoral venous access.  This was exchanged for sheath access.  A temporary pacemaker was advanced  into the right ventricle.  A pigtail catheter was advanced from the right-sided arterial access into the aortic root for aortography.  The large bore sheath was then placed on the left over a stiff wire.  The patient was systemically heparinized.    Right radial access was obtained and exchanged for a sheath.  The sentinel device was advanced from this location with deployment of the proximal filter in the innominate artery and the distal filter in the left common carotid.    The native valve was then crossed with an AL1 catheter and a straight wire.  This was exchanged for a J-wire, followed by a pigtail, followed by a Safari.  The valve delivery system was advanced over the wire into position within the aortic root.  Under rapid pacing, the valve was partially deployed and upon cessation of pacing, the patient's native rhythm and hemodynamics recovered promptly.  When we were happy with the depth, an initial aortography revealed good valve position, so we elected to fully deploy the valve under rapid pacing.  Again, with cessation of pacing, the patient's hemodynamics recovered promptly.    Aortography and echocardiography revealed at least mild perivalvular insufficiency at this time and 2 subsequent post-dilations were undertaken under rapid pacing with elimination of the perivalvular insufficiency.    The valve delivery system was fully recaptured and withdrawn.  The sentinel device was recaptured and removed.  Protamine was administered to reverse the patient's heparinization.  The temporary pacemaker was removed.  The right-sided pigtail was withdrawn to the level of the iliac bifurcation.  The large bore sheath was removed over an access wire and the Perclose was tightened.  This appeared hemostatic, so the wire was removed and the Perclose was fully deployed.  Completion iliofemoral angiography revealed no extravasation or stenosis.  The right-sided arterial access was managed with an Angio-Seal.  The  right-sided venous access was managed with manual pressure.  The right-sided radial access was managed with a TR band.    At the end of the procedure, all counts were correct.  The patient was taken to the recovery area in stable condition.    Yaakov Nieves MD        D: 2021   T: 2021   MT: [id number]    Name:     DESEAN MARTINEZ  MRN:      [id number]        Account:        [id number]   :      1940           Procedure Date: 2021     Document: [id number]

## 2021-07-08 ENCOUNTER — DOCUMENTATION ONLY (OUTPATIENT)
Dept: CARDIOLOGY | Facility: CLINIC | Age: 81
End: 2021-07-08

## 2021-07-08 ENCOUNTER — HOSPITAL ENCOUNTER (OUTPATIENT)
Facility: CLINIC | Age: 81
Discharge: HOME | End: 2021-07-08
Admitting: INTERNAL MEDICINE
Payer: COMMERCIAL

## 2021-07-08 ENCOUNTER — APPOINTMENT (OUTPATIENT)
Dept: GENERAL RADIOLOGY | Facility: CLINIC | Age: 81
End: 2021-07-08
Attending: INTERNAL MEDICINE
Payer: COMMERCIAL

## 2021-07-08 VITALS
WEIGHT: 180.9 LBS | TEMPERATURE: 97.5 F | OXYGEN SATURATION: 95 % | SYSTOLIC BLOOD PRESSURE: 108 MMHG | HEART RATE: 60 BPM | DIASTOLIC BLOOD PRESSURE: 61 MMHG | HEIGHT: 70 IN | RESPIRATION RATE: 13 BRPM | BODY MASS INDEX: 25.9 KG/M2

## 2021-07-08 DIAGNOSIS — I44.1 SECOND DEGREE AV BLOCK: ICD-10-CM

## 2021-07-08 DIAGNOSIS — I44.1 SECOND DEGREE AV BLOCK: Primary | ICD-10-CM

## 2021-07-08 LAB
ANION GAP SERPL CALCULATED.3IONS-SCNC: 5 MMOL/L (ref 3–14)
BUN SERPL-MCNC: 13 MG/DL (ref 7–30)
CALCIUM SERPL-MCNC: 9.1 MG/DL (ref 8.5–10.1)
CHLORIDE SERPL-SCNC: 105 MMOL/L (ref 94–109)
CO2 SERPL-SCNC: 24 MMOL/L (ref 20–32)
CREAT SERPL-MCNC: 0.83 MG/DL (ref 0.66–1.25)
ERYTHROCYTE [DISTWIDTH] IN BLOOD BY AUTOMATED COUNT: 12.5 % (ref 10–15)
GFR SERPL CREATININE-BSD FRML MDRD: 83 ML/MIN/{1.73_M2}
GLUCOSE SERPL-MCNC: 89 MG/DL (ref 70–99)
HCT VFR BLD AUTO: 37.3 % (ref 40–53)
HGB BLD-MCNC: 12.4 G/DL (ref 13.3–17.7)
MCH RBC QN AUTO: 28.9 PG (ref 26.5–33)
MCHC RBC AUTO-ENTMCNC: 33.2 G/DL (ref 31.5–36.5)
MCV RBC AUTO: 87 FL (ref 78–100)
PLATELET # BLD AUTO: 56 10E9/L (ref 150–450)
POTASSIUM SERPL-SCNC: 4.3 MMOL/L (ref 3.4–5.3)
RBC # BLD AUTO: 4.29 10E12/L (ref 4.4–5.9)
SODIUM SERPL-SCNC: 134 MMOL/L (ref 133–144)
WBC # BLD AUTO: 7.3 10E9/L (ref 4–11)

## 2021-07-08 PROCEDURE — 999N000065 XR CHEST 2 VW

## 2021-07-08 PROCEDURE — C1898 LEAD, PMKR, OTHER THAN TRANS: HCPCS | Performed by: INTERNAL MEDICINE

## 2021-07-08 PROCEDURE — 272N000001 HC OR GENERAL SUPPLY STERILE: Performed by: INTERNAL MEDICINE

## 2021-07-08 PROCEDURE — C1785 PMKR, DUAL, RATE-RESP: HCPCS | Performed by: INTERNAL MEDICINE

## 2021-07-08 PROCEDURE — 85027 COMPLETE CBC AUTOMATED: CPT | Performed by: INTERNAL MEDICINE

## 2021-07-08 PROCEDURE — 99153 MOD SED SAME PHYS/QHP EA: CPT | Performed by: INTERNAL MEDICINE

## 2021-07-08 PROCEDURE — 258N000002 HC RX IP 258 OP 250: Performed by: INTERNAL MEDICINE

## 2021-07-08 PROCEDURE — 999N000184 HC STATISTIC TELEMETRY

## 2021-07-08 PROCEDURE — 99152 MOD SED SAME PHYS/QHP 5/>YRS: CPT | Performed by: INTERNAL MEDICINE

## 2021-07-08 PROCEDURE — C1894 INTRO/SHEATH, NON-LASER: HCPCS | Performed by: INTERNAL MEDICINE

## 2021-07-08 PROCEDURE — 80048 BASIC METABOLIC PNL TOTAL CA: CPT | Performed by: INTERNAL MEDICINE

## 2021-07-08 PROCEDURE — 36415 COLL VENOUS BLD VENIPUNCTURE: CPT

## 2021-07-08 PROCEDURE — 33208 INSRT HEART PM ATRIAL & VENT: CPT | Performed by: INTERNAL MEDICINE

## 2021-07-08 PROCEDURE — 999N000071 HC STATISTIC HEART CATH LAB OR EP LAB

## 2021-07-08 PROCEDURE — 96365 THER/PROPH/DIAG IV INF INIT: CPT

## 2021-07-08 PROCEDURE — 999N000054 HC STATISTIC EKG NON-CHARGEABLE

## 2021-07-08 PROCEDURE — 93005 ELECTROCARDIOGRAM TRACING: CPT

## 2021-07-08 PROCEDURE — 250N000009 HC RX 250: Performed by: INTERNAL MEDICINE

## 2021-07-08 PROCEDURE — 250N000011 HC RX IP 250 OP 636: Performed by: INTERNAL MEDICINE

## 2021-07-08 DEVICE — IMP LEAD PACING BIPOLAR CAPSUREFIX NOVUS 52CM 5076-52: Type: IMPLANTABLE DEVICE | Status: FUNCTIONAL

## 2021-07-08 DEVICE — IMP LEAD PACING BIPOLAR CAPSUREFIX NOVUS 45CM 5076-45: Type: IMPLANTABLE DEVICE | Status: FUNCTIONAL

## 2021-07-08 DEVICE — PACEMAKER AZURE MRI XT DR: Type: IMPLANTABLE DEVICE | Status: FUNCTIONAL

## 2021-07-08 RX ORDER — ACETAMINOPHEN 325 MG/1
650 TABLET ORAL EVERY 4 HOURS PRN
Status: DISCONTINUED | OUTPATIENT
Start: 2021-07-08 | End: 2021-07-08 | Stop reason: HOSPADM

## 2021-07-08 RX ORDER — FENTANYL CITRATE 50 UG/ML
INJECTION, SOLUTION INTRAMUSCULAR; INTRAVENOUS
Status: DISCONTINUED | OUTPATIENT
Start: 2021-07-08 | End: 2021-07-08 | Stop reason: HOSPADM

## 2021-07-08 RX ORDER — NALOXONE HYDROCHLORIDE 0.4 MG/ML
0.4 INJECTION, SOLUTION INTRAMUSCULAR; INTRAVENOUS; SUBCUTANEOUS
Status: DISCONTINUED | OUTPATIENT
Start: 2021-07-08 | End: 2021-07-08 | Stop reason: HOSPADM

## 2021-07-08 RX ORDER — SODIUM CHLORIDE 450 MG/100ML
INJECTION, SOLUTION INTRAVENOUS CONTINUOUS
Status: DISCONTINUED | OUTPATIENT
Start: 2021-07-08 | End: 2021-07-08 | Stop reason: HOSPADM

## 2021-07-08 RX ORDER — NALOXONE HYDROCHLORIDE 0.4 MG/ML
0.2 INJECTION, SOLUTION INTRAMUSCULAR; INTRAVENOUS; SUBCUTANEOUS
Status: DISCONTINUED | OUTPATIENT
Start: 2021-07-08 | End: 2021-07-08 | Stop reason: HOSPADM

## 2021-07-08 RX ORDER — LIDOCAINE 40 MG/G
CREAM TOPICAL
Status: DISCONTINUED | OUTPATIENT
Start: 2021-07-08 | End: 2021-07-08 | Stop reason: HOSPADM

## 2021-07-08 RX ORDER — CEFAZOLIN SODIUM 2 G/100ML
2 INJECTION, SOLUTION INTRAVENOUS
Status: COMPLETED | OUTPATIENT
Start: 2021-07-08 | End: 2021-07-08

## 2021-07-08 RX ADMIN — SODIUM CHLORIDE: 4.5 INJECTION, SOLUTION INTRAVENOUS at 09:16

## 2021-07-08 RX ADMIN — CEFAZOLIN SODIUM 2 G: 2 INJECTION, SOLUTION INTRAVENOUS at 10:25

## 2021-07-08 ASSESSMENT — MIFFLIN-ST. JEOR: SCORE: 1541.81

## 2021-07-08 NOTE — PROGRESS NOTES
Pt was sitting in wheelchair and became sweaty and faint.  Dr Lopez at bedside.  Put pt flat in bed and vitals taken and fluids given

## 2021-07-08 NOTE — DISCHARGE INSTRUCTIONS
Pacemaker Implant Discharge Instructions     After you go home:      Have an adult stay with you until tomorrow.    You may resume your normal diet.       For 24 hours - due to the sedation you received:    Relax and take it easy.    Do NOT make any important or legal decisions.    Do NOT drive or operate machines at home or at work.    Do NOT drink alcohol.    Care of Chest Incision:      Keep the bandage on for 4 days. You may remove the dressing on Monday afternoon. Change it only if it gets loose or soaked. If you need to change it, use 4x4-inch gauze and a large clear bandage.     If there is a pressure dressing (gauze & tape) - 24 hours after your procedure you may remove ONLY the top dressing - Friday afternoon. Leave the bottom dressing on.    Leave the strips of tape on. They will fall off on their own, or we will remove them at your first check-up.    Check your wound daily for signs of infection, such as increased redness, severe swelling or draining. Fever may also be a sign of infection. Call us if you see any of these signs.    If there are no signs of infection, you may shower after the bandage comes off in 4 days - on Monday. If you take a tub bath, keep the wound dry.    No soaking the incision (swimming pool, bathtub, hot tub) for 2 weeks.    You may have mild to medium pain for 3 to 5 days. Take Acetaminophen (Tylenol) or Ibuprofen (Advil) for the pain. If the pain persists or is severe, call us.    Activity:      For at least 2 weeks: Do not raise your elbow above your shoulder. You can begin to use your arm as it feels comfortable to you.    Do not use arm on implant side to lift more than 10 pounds for 2 weeks.    In 6 to 8 weeks: You may begin to golf, play tennis, swim and do similar activities.    No driving for one day & limit to necessary driving for one week.    Bleeding:      If you start bleeding from the incision site, sit down and press firmly on the site for 10 minutes.     Once  bleeding stops, call [facility] as soon as you can.       Call 911 right away if you have heavy bleeding or bleeding that does not stop.      Medicines:      Take your medications, including blood thinners, unless your provider tells you not to.    If you have stopped any medicines, check with your provider about when to restart them.    Follow Up Appointments:      Follow up with Device Clinic at [facility] of patient preference in 7-10 days.    Call the clinic if:      You have a large or growing hard lump around the site.    The site is red, swollen, hot or tender.    Blood or fluid is draining from the site.    You have chills or a fever greater than 101 F (38 C).    You feel dizzy or light-headed.    Questions or concerns    Telling others about your device:      Before you leave the hospital, you will receive a temporary ID card. A permanent card will be mailed to you about 6 to 8 weeks later. Always carry the ID card with you. It has important details about your device.    You may also get a Medical Alert bracelet or tag that says you have a pacemaker.  Go to [email or web address].     Always tell doctors, dentists and other care providers that you have a device implanted in you.    Let us know before you plan any surgeries. Your care team must take special steps to keep you safe during certain procedures. These steps will depend on the type of device you have. Your provider will need to see your ID card. They may need to call us for instructions.    Device Safety:      Please refer to device  s booklet for further information.        [facility]:    [phone] [facility] (7 days a week)

## 2021-07-08 NOTE — PROGRESS NOTES
Care Suites Post Procedure Note    Patient Information  Name: Mars Reyes  Age: 80 year old    Post Procedure  Time patient returned to Care Suites: 1200  Concerns/abnormal assessment: None at this time  If abnormal assessment, provider notified: N/A  Plan/Other: Monitor site, vitals and sedation    Marcos Peng RN

## 2021-07-08 NOTE — PROGRESS NOTES
Care Suites Admission Nursing Note    Patient Information  Name: Mars Reyes  Age: 80 year old  Reason for admission: Pacemaker  Care Suites arrival time: 0830    Visitor Information  Name: Angelia  Informed of visitor restrictions: Yes  1 visitor allowed per patient   Visitor must screen negative for COVID symptoms   Visitor must wear a mask  Waiting rooms closed to visitors    Patient Admission/Assessment   Pre-procedure assessment complete: Yes  If abnormal assessment/labs, provider notified: N/A  NPO: Yes  Medications held per instructions/orders: Yes  Consent: obtained  If applicable, pregnancy test status: deferred  Patient oriented to room: Yes  Education/questions answered: Yes  Plan/other: Prep for procedure.  IV fluids, labs, ABX, Skin Ptrep    Discharge Planning  Discharge name/phone number: Angelia  Overnight post sedation caregiver: Angelia  Discharge location: home    Marcos Peng RN

## 2021-07-08 NOTE — PROGRESS NOTES
PATIENT/VISITOR WELLNESS SCREENING    Step 1 Patient Screening    1. In the last month, have you been in contact with someone who was confirmed or suspected to have Coronavirus/COVID-19? No    2. Do you have the following symptoms?  Fever/Chills? No   Cough? No   Shortness of breath? No   New loss of taste or smell? No  Sore throat? No  Muscle or body aches? No  Headaches? No  Fatigue? No  Vomiting or diarrhea? No    Step 2 Visitor Screening    1. Name of Visitor (1 visitor per patient): Angelia    2. In the last month, have you been in contact with someone who was confirmed or suspected to have Coronavirus/COVID-19? No    3. Do you have the following symptoms?  Fever/Chills? No   Cough? No   Shortness of breath? No   Skin rash? No   Loss of taste or smell? No  Sore throat? No  Runny or stuffy nose? No  Muscle or body aches? No  Headaches? No  Fatigue? No  Vomiting or diarrhea? No    If the visitor has positive symptoms, notify supervisor/manger  Per policy, the visitor will need to leave the facility     Step 3 Refer to logic grid below for actions    NO SYMPTOM(S)    ACTIONS:  1. Standard rooming process  2. Provider to assess per normal protocol  3. Implement precautions as needed and per guidelines     POSITIVE SYMPTOM(S)  If positive for ANY of the following symptoms: fever, cough, shortness of breath, rash    ACTION:  1. Continue to have the patient wear a mask   2. Room patient as soon as possible  3. Don appropriate PPE when entering room  4. Provider evaluation

## 2021-07-08 NOTE — PROGRESS NOTES
1625 Report received from Marcos Peng RN.  1630 Pt A/O. Reports feeling much better. Color good. VSS. Drsg CDI to left chest. Small amt drng noted on bottom drsg. No hematoma noted. Area soft & flat. Pt reports level 2/10 soreness to left chest/shoulder site. Refuses pain med. Activity restrictions reinforced with left arm. Verbal understanding received. Pt's wife at bedside.   1645 OOB - steady on feet. Ambulated with assist of 1 in halls to bathroom with good bob.   1715 Discharge teaching & instructions given to both pt & wife w/ verbal understanding received. All questions & concerns addressed. Sling given to pt for night time use as pt reports sleeping with left arm up over head/shoulders.  1730 Pt discharged per w/c to private vehicle. All personal belongings taken w/ pt.

## 2021-07-08 NOTE — PROGRESS NOTES
Dictated.    Successful dual-chamber pacemaker implantation (Medtronic).    Programmed AAIR/DDDR 60/130 ppm.  No apparent complication.    EBL = 15 cc.      Plan:  - CXR and device interrogation this afternoon  - home later today, if all is well

## 2021-07-09 ENCOUNTER — TELEPHONE (OUTPATIENT)
Dept: CARDIOLOGY | Facility: CLINIC | Age: 81
End: 2021-07-09

## 2021-07-09 DIAGNOSIS — Z95.0 CARDIAC PACEMAKER IN SITU: ICD-10-CM

## 2021-07-09 DIAGNOSIS — I44.7 LBBB (LEFT BUNDLE BRANCH BLOCK): Primary | ICD-10-CM

## 2021-07-09 DIAGNOSIS — I44.1 SECOND DEGREE AV BLOCK: ICD-10-CM

## 2021-07-09 NOTE — TELEPHONE ENCOUNTER
Post device implant discharge phone call.    Reviewed the following:  No raising arm above shoulder on the side of implant for 3 weeks  Remove outer dressing 3 days after implant. May shower after outer dressing removed. Leave steri-strips in place, will be removed at 1 week device check  Limit driving for: 1 week (PPM)  Watch for redness, drainage, warmth, or fever. Call device clinic if any signs of infection.     1 week device check scheduled: 7/20/2021 at 1PM     Pt states understanding of all instructions.

## 2021-07-11 NOTE — PROGRESS NOTES
HEMATOLOGIC HISTORY:  Mr. Mars Reyes is a gentleman with chronic thrombocytopenia and MGUS.   1.  On 05/20/2009, platelets of 191. On 12/21/2011, platelets of 75.   2.  On 02/20/2017, platelets of 57 with hemoglobin of 13.9.   3  On 02/21/2017:    -Normal folate, LDH,TSH and vitamin B12 of 372.    -SPEP reveals M-spike of 0.1.   - Lupus negative.   4. US on 02/22/2017 reveals normal spleen size.  5.  Bone marrow biopsy on 02/23/2017 reveals normocellular bone marrow with 50% cellularity with trilineage hematopoiesis.  Adequate megakaryocyte.  Slight erythroid hypoplasia.  No increase in blasts. 5% to 10% plasma cells. -Cytogenetics and FISH for myeloma panel is normal.    6. Serum ROSA reveals monoclonal IgG immunoglobulin of kappa light chain type and monoclonal free immunoglobulin light chain of lambda chain type.     SUBJECTIVE:  Mr. Reyes is a 80-year-old gentleman with MGUS and chronic thrombocytopenia.  He has been doing well from it.     The patient had a TAVR procedure done on 06/22/2021.  He did well with it.  He is currently on anticoagulation.     No headache.  No dizziness.  No chest pain.  No shortness of breath.  No abdominal pain.  No nausea.  No vomiting.  Appetite is good.  No urinary complaint.  No bowel problem.  No bleeding.     All other review of systems negative.     PHYSICAL EXAMINATION:    He is alert, oriented x 3.  This is a telephone visit.     LABORATORY DATA:  CBC, BMP, SPEP, IgG level and free light chain reviewed.     ASSESSMENT:    1.  A 80-year-old gentleman with monoclonal gammopathy of unknown significance.  MGUS is stable.  2.  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  3.  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     PLAN:     1.  Labs were all reviewed with the patient.  The patient has chronic thrombocytopenia.  It is remaining stable.  He is not having any bleeding or bruising complication. We will monitor it.  I  discussed regarding MGUS.  I explained to him MGUS is stable.  Natural history of disease explained.  We will continue to monitor MGUS once a year with labs.  2.  The patient is mildly anemic.  This is from his recent TAVR procedure. It should improve.  3.  I will see him in 1 year's time with labs.  I advised the patient to see a physician sooner if he has any bone pain, recurrent infection, weight loss, worsening weakness, or any other concerns.       TELEPHONE VISIT TIME:  12 minutes.

## 2021-07-12 DIAGNOSIS — Z95.0 CARDIAC PACEMAKER IN SITU: Primary | ICD-10-CM

## 2021-07-12 LAB — INTERPRETATION ECG - MUSE: NORMAL

## 2021-07-13 ENCOUNTER — VIRTUAL VISIT (OUTPATIENT)
Dept: RHEUMATOLOGY | Facility: CLINIC | Age: 81
End: 2021-07-13
Payer: COMMERCIAL

## 2021-07-13 DIAGNOSIS — M79.642 BILATERAL HAND PAIN: Primary | ICD-10-CM

## 2021-07-13 DIAGNOSIS — M1A.9XX0 CHRONIC GOUT WITHOUT TOPHUS, UNSPECIFIED CAUSE, UNSPECIFIED SITE: ICD-10-CM

## 2021-07-13 DIAGNOSIS — M16.11 OSTEOARTHRITIS OF RIGHT HIP, UNSPECIFIED OSTEOARTHRITIS TYPE: ICD-10-CM

## 2021-07-13 DIAGNOSIS — Z96.642 HISTORY OF LEFT HIP REPLACEMENT: ICD-10-CM

## 2021-07-13 DIAGNOSIS — M79.641 BILATERAL HAND PAIN: Primary | ICD-10-CM

## 2021-07-13 PROCEDURE — 99205 OFFICE O/P NEW HI 60 MIN: CPT | Mod: 95 | Performed by: INTERNAL MEDICINE

## 2021-07-13 NOTE — PROGRESS NOTES
Mars Reyes who presents today with a chief complaint of  No chief complaint on file.      Joint Pains: Yes  Location: bl middle fingers   Onset: end of last year  Intensity:  3-4/10  AM Stiffness: none  Alleviating/Aggravating Factors: not move the fingers help. Holding or bending the fingers increase. Medications helpful?  Tolerating Meds: Yes  Other:      ROS:  Patient denies having: persistent dry eyes, dry mouth, recurrent oral ulcers, patchy alopecia, active rashes, photosensitivity, history of psoriasis, active chest pain, active shortness of breath, active cough, active dysuria, history of kidney stones, active abdominal pain, active diarrhea, history of hematochezia, active dysphagia, history of peptic ulcer disease, history of HIV, tuberculosis, hepatitis B or C, Lyme disease, seizure history, raynaud's, active documented fevers, recent infections, difficulty sleeping or chronic unrefreshing sleep, involuntary weight loss, loss of appetite, excessive fatigue, depression, anxiety,  recurrent sinus infections, history of inflammatory eye diseases (such as uveitis, scleritis, iritis, etc).       Information gathered by medical assistant incorporated into this note, was reviewed and discussed with the patient.    Problem List:  Patient Active Problem List   Diagnosis     Essential hypertension, benign     Anemia     Diverticulosis of large intestine     DJD (degenerative joint disease)     Aftercare following joint replacement     Hip joint replacement by other means     HYPERLIPIDEMIA LDL GOAL <130     Grief reaction     Pulmonary HTN (H)     Advanced directives, counseling/discussion     Non-rheumatic mitral valve stenosis     Thrombocytopenia (H)     MGUS (monoclonal gammopathy of unknown significance)     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, unspecified cause, unspecified laterality     Atrial flutter, unspecified type (H)     S/P revision of total hip     Typical atrial flutter (H)     Nonrheumatic  aortic valve stenosis     Status post coronary angiogram     Severe aortic stenosis     Diastolic dysfunction     Aortic stenosis, severe     Second degree AV block        PMH:   Past Medical History:   Diagnosis Date     Aortic stenosis     moderate     Atypical atrial flutter (H)     PVI and LA linear ablation with successful termination of AFL 10/18/2019     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, unspecified cause, unspecified laterality      Contact dermatitis and other eczema, due to unspecified cause      Diverticulosis of colon (without mention of hemorrhage)      DJD (degenerative joint disease)      Essential hypertension, benign     abstracted 7/22/02     Gout, unspecified      Hip joint replacement by other means 6/3/2009     MGUS (monoclonal gammopathy of unknown significance)      Mitral valve disease     mild MS, moderate-severe MR     PSVT (paroxysmal supraventricular tachycardia) (H)      Pulmonary HTN (H) 10/18/2011     Thrombocytopenia (H)        Surgical History:  Past Surgical History:   Procedure Laterality Date     ANESTHESIA CARDIOVERSION N/A 3/21/2019    Procedure: ANESTHESIA CARDIOVERSION;  Surgeon: GENERIC ANESTHESIA PROVIDER;  Location: [facility] OR     ARTHROPLASTY REVISION HIP ANTERIOR Left 9/9/2019    Procedure: REVISION DIRECT ANTERIOR TOTAL LEFT HIP ARTHROPLASTY;  Surgeon: Luisito Hammonds MD;  Location: [facility] OR     BIOPSY  3/2017     BONE MARROW BIOPSY, BONE SPECIMEN, NEEDLE/TROCAR Right 2/23/2017    Procedure: BIOPSY BONE MARROW;  Surgeon: Romie Esquivel MD;  Location:  GI     COLONOSCOPY  2010     CV HEART CATHETERIZATION WITH POSSIBLE INTERVENTION N/A 8/25/2020    Procedure: Coronary Angiogram;  Surgeon: Brian Simmons MD;  Location:  HEART CARDIAC CATH LAB     CV LEFT HEART CATH N/A 8/25/2020    Procedure: Left Heart Cath;  Surgeon: Brian Simmons MD;  Location:  HEART CARDIAC CATH LAB     CV RIGHT HEART CATH MEASUREMENTS RECORDED N/A 8/25/2020    Procedure:  Right Heart Cath;  Surgeon: Brian Simmons MD;  Location:  HEART CARDIAC CATH LAB     CV TRANSCATHETER AORTIC VALVE REPLACEMENT N/A 6/22/2021    Procedure: Transcatheter Aortic Valve Replacement;  Surgeon: Hunter Adan MD;  Location:  HEART CARDIAC CATH LAB     EP 3D TACHYCARDIA MAPPING N/A 10/18/2019    Procedure: EP 3D Tachycardia Mapping;  Surgeon: Delifno Gee MD;  Location:  HEART CARDIAC CATH LAB     EP ABLATION FOCAL AFIB N/A 10/18/2019    Procedure: EP Ablation Focal AFIB;  Surgeon: Delfino Gee MD;  Location:  HEART CARDIAC CATH LAB     EP PACEMAKER N/A 7/8/2021    Procedure: EP Pacemaker;  Surgeon: Blanca Lopez MD;  Location:  HEART CARDIAC CATH LAB     EYE SURGERY      Cataract, both eyes     ORTHOPEDIC SURGERY  2009    (L) total hip       Family History:  Family History   Problem Relation Age of Onset     Hypertension Mother        Social History:   reports that he has never smoked. He has never used smokeless tobacco. He reports current alcohol use. He reports that he does not use drugs.    Allergies:  Allergies   Allergen Reactions     Simvastatin      10/26/12: Arm pain - pt stopped it x 4-5 wks and arm pain didn't resolve sore started it         Current Medications:  Current Outpatient Medications   Medication Sig Dispense Refill     acetaminophen (TYLENOL) 325 MG tablet Take 2 tablets (650 mg) by mouth every 4 hours as needed for mild pain or headaches       allopurinol (ZYLOPRIM) 100 MG tablet Take 100 mg by mouth every morning       amoxicillin (AMOXIL) 500 MG tablet Take 2,000 mg by mouth daily as needed (1 hour prior to dental procedures)       apixaban ANTICOAGULANT (ELIQUIS ANTICOAGULANT) 5 MG tablet Take 1 tablet (5 mg) by mouth 2 times daily 180 tablet 3     aspirin (ASA) 81 MG EC tablet Take 1 tablet (81 mg) by mouth daily       lisinopril (ZESTRIL) 20 MG tablet Take 20 mg by mouth every evening       pravastatin (PRAVACHOL) 20 MG tablet Take 20 mg by mouth  every evening             Physical Exam:  Following up today via video visit, per Covid-19 pandemic requirements.    Verbal consent has been obtained for this service by care team member.    Video call start time: 2:52 PM, failed [employer]. 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    Video call end time: 3:24 PM    [employer] utilized for video call.    Phone number utilized: [phone]     Patient location for video visit: Home     Provider location for video visit:  Home (working remotely)        Summary/Assessment:      Pleasant 80-year-old male presents with pertinent past medical history of gout presents with chronic hand pains, involving third PIP joint, right greater than left.    Patient states that about 6 months ago he developed relatively sudden onset of right third PIP pain with swelling which has been persistent since, not much progression.  Denies any trauma to this digit.  Adds left third PIP also causing some discomfort with negative , to significantly less extent and no swelling.    On video exam noted to have some fullness involving the right third PIP joint, also noted patient has difficulty with flexion/extension of this joint.  Left third PIP joint does not appear to be swollen, very limited limitation noted when making a fist.    Patient states that he was diagnosed with gout about 30 years ago, affecting the first toe, has periodic flares, lately controlled with allopurinol 100 mg daily which he is on for a while.  States when experiencing flares usually self-limiting over a few days and does not need to hold beer intake for flare to subside.  Patient drinks about 1 beer daily.    Claims compliance with taking allopurinol regularly, not missed dosing.    Denies excessive red meat or shellfish intake.    Denies history of kidney stones or subcutaneous nodules.    Denies family history for connective tissue disease.    Denies  personal or family history for psoriasis.    Denies history of diabetes.    Denies tick bites.    Has history of heart disease, recently had valve replacement with pacemaker implant.  Is on AC medication.      Try Tylenol, has not helped his hand pain.    Has history of OA affecting hips.  Left hip replaced.    Noted to have negative/unremarkable: Rheumatoid factor, CCP antibody, MICHELLE, ESR    Last uric acid 2018 was 7.2..    X-rays of right hand show some signs of first CMC advanced DJD otherwise unremarkable.    Given the above, difficult to tell at this time as to what the primary source is contributing to his record in the left third PIP joint pains.  Given some fullness noted involving right third PIP joint appears to have inflammatory arthritis.  Gout contributing is a possibility due to history along with regular alcohol beverage intake however somewhat atypical in that presentation is persistent.  Evolving seronegative rheumatoid arthritis also consideration given symmetrical small joint involvement of PIPs.  Other connective tissue diseases also inferential.  Will obtain some labs and correlate clinically to screen for possible underlying etiologies.    Please see below for management plan.      Pertinent rheumatology/past medical history (please refer to above for more detailed history):      Inflammatory arthritis (right third PIP joint)    Bilateral third PIP pain (right greater than left)    Chronic gout (usually affecting first toe)    Right hip osteoarthritis (per patient)    Left hip replacement and revision (11 and 2 years ago respectively)    History of valve replacement, pacemaker implant (on AC medication)    MGUS      Rheumatology medications provided/suggested:          Pertinent medication from other providers or from otc (please refer to above for more detailed med list):    Allopurinol  Tylenol      Pertinent medications already tried:           Pertinent lab history:    Negative/unremarkable:  Rheumatoid factor, CCP antibody, MICHELLE, ESR      Pertinent imaging/test history:    X-rays of the right hand showed some signs of advanced DJD involving first CMC joint, otherwise unremarkable.    Other:    Marital status:       How many kids:  3    Type of work:  Retired, computer management     Drinking alcohol: yes, 1 beer per night    Tobacco use: no    Recreational drug use: no      Plan:      Had lengthy discussion with the patient, desires to try conservative approach first.  Suggested first trying to decrease beer intake and see if symptoms improve.    Suggested trying to obtain clearance from cardiologist if okay to utilize diclofenac gel over right third PIP joint daily-twice daily as needed.    If still symptomatic despite the above, can pursue lab orders.  Thereafter contact us for either brief prednisone taper versus image guided cortisone injection involving right third PIP joint.  We will also then consider increasing allopurinol from current dose of 100 mg daily to 200-200 mg daily.  Patient expressed understanding and agreement to the above plan, also noted on his AVS.    Will avoid oral NSAIDs.    Follow-up in about 2 months.      Procedure note:       Total time spent 62 minutes involved with patient care, includes placing orders, reviewing records and and formulating management plan.    Major side effect profile of medications provided/suggested were discussed with the patient.    This note was transcribed using Dragon voice recognition software as a result unintentional grammatical errors or word substitutions may have occurred. Please contact our Rheumatology department if you need any clarification or if you have any related inquiries.    Thank you for referring this patient to our clinic.      Jewel Felipe DO ....................  7/13/2021   9:14 AM

## 2021-07-14 ENCOUNTER — TELEPHONE (OUTPATIENT)
Dept: RHEUMATOLOGY | Facility: CLINIC | Age: 81
End: 2021-07-14

## 2021-07-16 ENCOUNTER — LAB (OUTPATIENT)
Dept: LAB | Facility: CLINIC | Age: 81
End: 2021-07-16
Payer: COMMERCIAL

## 2021-07-16 ENCOUNTER — HOSPITAL ENCOUNTER (OUTPATIENT)
Dept: CARDIOLOGY | Facility: CLINIC | Age: 81
Discharge: HOME | End: 2021-07-16
Attending: INTERNAL MEDICINE | Admitting: INTERNAL MEDICINE
Payer: COMMERCIAL

## 2021-07-16 ENCOUNTER — TELEPHONE (OUTPATIENT)
Dept: CARDIOLOGY | Facility: CLINIC | Age: 81
End: 2021-07-16

## 2021-07-16 DIAGNOSIS — Z95.2 S/P TAVR (TRANSCATHETER AORTIC VALVE REPLACEMENT): ICD-10-CM

## 2021-07-16 LAB
ANION GAP SERPL CALCULATED.3IONS-SCNC: 5 MMOL/L (ref 3–14)
BUN SERPL-MCNC: 11 MG/DL (ref 7–30)
CALCIUM SERPL-MCNC: 9.6 MG/DL (ref 8.5–10.1)
CHLORIDE BLD-SCNC: 100 MMOL/L (ref 94–109)
CO2 SERPL-SCNC: 27 MMOL/L (ref 20–32)
CREAT SERPL-MCNC: 0.88 MG/DL (ref 0.66–1.25)
ERYTHROCYTE [DISTWIDTH] IN BLOOD BY AUTOMATED COUNT: 12.5 % (ref 10–15)
GFR SERPL CREATININE-BSD FRML MDRD: 82 ML/MIN/1.73M2
GLUCOSE BLD-MCNC: 95 MG/DL (ref 70–99)
HCT VFR BLD AUTO: 37.2 % (ref 40–53)
HGB BLD-MCNC: 12.1 G/DL (ref 13.3–17.7)
LVEF ECHO: NORMAL
MCH RBC QN AUTO: 28.8 PG (ref 26.5–33)
MCHC RBC AUTO-ENTMCNC: 32.5 G/DL (ref 31.5–36.5)
MCV RBC AUTO: 89 FL (ref 78–100)
PLATELET # BLD AUTO: 54 10E3/UL (ref 150–450)
POTASSIUM BLD-SCNC: 4.4 MMOL/L (ref 3.4–5.3)
RBC # BLD AUTO: 4.2 10E6/UL (ref 4.4–5.9)
SODIUM SERPL-SCNC: 132 MMOL/L (ref 133–144)
WBC # BLD AUTO: 7.8 10E3/UL (ref 4–11)

## 2021-07-16 PROCEDURE — 93325 DOPPLER ECHO COLOR FLOW MAPG: CPT | Mod: 26 | Performed by: INTERNAL MEDICINE

## 2021-07-16 PROCEDURE — 85027 COMPLETE CBC AUTOMATED: CPT | Performed by: INTERNAL MEDICINE

## 2021-07-16 PROCEDURE — 93325 DOPPLER ECHO COLOR FLOW MAPG: CPT

## 2021-07-16 PROCEDURE — 93308 TTE F-UP OR LMTD: CPT | Mod: 26 | Performed by: INTERNAL MEDICINE

## 2021-07-16 PROCEDURE — 93321 DOPPLER ECHO F-UP/LMTD STD: CPT | Mod: 26 | Performed by: INTERNAL MEDICINE

## 2021-07-16 PROCEDURE — 36415 COLL VENOUS BLD VENIPUNCTURE: CPT | Performed by: INTERNAL MEDICINE

## 2021-07-16 PROCEDURE — 80048 BASIC METABOLIC PNL TOTAL CA: CPT | Performed by: INTERNAL MEDICINE

## 2021-07-16 NOTE — TELEPHONE ENCOUNTER
Reviewed photo documentation with Dr. Solorzano. Per Dr. Solorzano, patient should hold his eliquis until his scheduled appointment on 7/20/21. Will reassess his hematoma at that time and have an EP MD make recommendations for his eliquis going forward.   Called patient and updated him with plan. Patient states understanding and agreement.

## 2021-07-16 NOTE — TELEPHONE ENCOUNTER
Patient called today stating that he has swelling around his pacemaker site. He had a pacemaker implanted by Dr. Lopez on 7/8/21. He is on eliquis.   Patient states that the area over his device is firm. Will route to Dr. Solorzano in Dr. Lopez' absence.   He is scheduled for routine 1 week check on 7/20/21.

## 2021-07-20 ENCOUNTER — ANCILLARY PROCEDURE (OUTPATIENT)
Dept: CARDIOLOGY | Facility: CLINIC | Age: 81
End: 2021-07-20
Attending: INTERNAL MEDICINE
Payer: COMMERCIAL

## 2021-07-20 DIAGNOSIS — I44.1 SECOND DEGREE AV BLOCK: ICD-10-CM

## 2021-07-20 DIAGNOSIS — Z95.0 CARDIAC PACEMAKER IN SITU: ICD-10-CM

## 2021-07-20 PROCEDURE — 93280 PM DEVICE PROGR EVAL DUAL: CPT | Performed by: INTERNAL MEDICINE

## 2021-07-22 ENCOUNTER — OFFICE VISIT (OUTPATIENT)
Dept: CARDIOLOGY | Facility: CLINIC | Age: 81
End: 2021-07-22
Attending: INTERNAL MEDICINE
Payer: COMMERCIAL

## 2021-07-22 VITALS
OXYGEN SATURATION: 96 % | BODY MASS INDEX: 25.65 KG/M2 | HEART RATE: 74 BPM | SYSTOLIC BLOOD PRESSURE: 136 MMHG | WEIGHT: 179.2 LBS | DIASTOLIC BLOOD PRESSURE: 70 MMHG | HEIGHT: 70 IN

## 2021-07-22 DIAGNOSIS — Z95.2 S/P TAVR (TRANSCATHETER AORTIC VALVE REPLACEMENT): Primary | ICD-10-CM

## 2021-07-22 DIAGNOSIS — Z95.0 CARDIAC PACEMAKER IN SITU: ICD-10-CM

## 2021-07-22 PROCEDURE — 99215 OFFICE O/P EST HI 40 MIN: CPT | Performed by: PHYSICIAN ASSISTANT

## 2021-07-22 PROCEDURE — 93000 ELECTROCARDIOGRAM COMPLETE: CPT | Performed by: PHYSICIAN ASSISTANT

## 2021-07-22 ASSESSMENT — MIFFLIN-ST. JEOR: SCORE: 1534.1

## 2021-07-22 NOTE — PATIENT INSTRUCTIONS
Thank you for your [facility] visit today. Your provider has recommended the following:  Medication Changes:  When you restart your Eliquis stop the baby aspirin  Recommendations:  I will talk with Dr Adan about following up at 6 months vs follow up when you return to [place] around the 1 year post TAVR isabell  Follow-up:  Cardiology follow up in June 2022 for sure, will let you know about the above.    Reminder:  Please bring in your current medication list or your medication, over the counter supplements and vitamin bottles as we will review these at each office visit.

## 2021-07-22 NOTE — PROGRESS NOTES
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 Valve was ballooned three times due to significant PVL. No significant PVL at the end of procedure. He also developed a LBBB after deployment. Subsequent rhythm monitoring showed episodes of second-degree AV block with heart rates down to 30 beats per minute at times. Was seen by Dr Lopez and underwent dual-chamber pacemaker implantation 7/8. He developed a hematoma/extensive bruising after this and his AC is being held. 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    Doing well aside from the aforementioned bruising/hematoma. No CP, ARGUETA or LE edema.    CURRENT MEDICATIONS:   Current Outpatient Medications   Medication Sig Dispense Refill     acetaminophen (TYLENOL) 325 MG tablet Take 2 tablets (650 mg) by mouth every 4 hours as needed for mild pain or headaches       allopurinol (ZYLOPRIM) 100 MG tablet Take 100 mg by mouth every morning       amoxicillin (AMOXIL) 500 MG tablet Take 2,000 mg by mouth daily as needed (1 hour prior to dental procedures)       aspirin (ASA) 81 MG EC tablet Take 1 tablet (81 mg) by mouth daily       lisinopril (ZESTRIL) 20 MG tablet Take 20 mg by mouth every evening       pravastatin (PRAVACHOL) 20 MG tablet Take 20 mg by mouth every evening       apixaban ANTICOAGULANT (ELIQUIS ANTICOAGULANT) 5 MG tablet Take 1 tablet (5 mg) by mouth 2 times daily (Patient not taking: Reported on 7/22/2021) 180 tablet 3     ALLERGIES:  Allergies   Allergen Reactions     Simvastatin      10/26/12: Arm pain - pt  "stopped it x 4-5 wks and arm pain didn't resolve sore started it      ROS:  Skin:  Negative     Eyes:  Positive for glasses  ENT:  Negative    Respiratory:  Negative    Cardiovascular:  Negative    Gastroenterology: Negative    Genitourinary:  Negative    Musculoskeletal:  Negative    Neurologic:  Negative    Psychiatric:  Negative    Heme/Lymph/Imm:  Negative    Endocrine:  Negative      PHYSICAL EXAMINATION:    GENERAL:  The patient is a pleasant 80 year old who appears their stated age.  In no apparent distress.  VITAL SIGNS:  /70 (BP Location: Right arm, Patient Position: Sitting, Cuff Size: Adult Regular)   Pulse 74   Ht 1.778 m (5' 10\")   Wt 81.3 kg (179 lb 3.2 oz)   SpO2 96%   BMI 25.71 kg/m      RESPIRATORY:  Breathing is nonlabored.  Lungs are clear to auscultation bilaterally.   CARDIAC:  RRR, 1/6 ADRIAN  EXTREMITIES:  No LE edema  NEUROLOGIC:  Alert and oriented.    DATA REVIEWED:    TTE 7/16/21:  1. The left ventricle is normal in structure, function and size. The visual ejection fraction is estimated at 65%.  2. The right ventricle is normal in structure, function and size.  3. There is mild to moderate (1-2+) tricuspid regurgitation. The right ventricular systolic pressure is approximated at 38mmHg plus the right atrial pressure.  4. s/p TAVR with 29mm Medtronic Evolut Pro, implanted 6-22-21. Mean 7mmHg, Vmax 1.9m/s, DI 0.68. There is mild to moderate (1-2+) aortic regurgitation. On short axis views, AI appears to be valvular rather than PVL.     Echo from 6-23-21 showed EF 70%, 2+ TR, TAVR with mean 8mmHg, Vmax 1.9m/s, 1+AI.      Component      Latest Ref Rng & Units 7/16/2021   Sodium      133 - 144 mmol/L 132 (L)   Potassium      3.4 - 5.3 mmol/L 4.4   Chloride      94 - 109 mmol/L 100   Carbon Dioxide      20 - 32 mmol/L 27   Anion Gap      3 - 14 mmol/L 5   Urea Nitrogen      7 - 30 mg/dL 11   Creatinine      0.66 - 1.25 mg/dL 0.88   Calcium      8.5 - 10.1 mg/dL 9.6   Glucose      70 - 99 " mg/dL 95   GFR Estimate      >60 mL/min/1.73m2 82   WBC      4.0 - 11.0 10e3/uL 7.8   RBC Count      4.40 - 5.90 10e6/uL 4.20 (L)   Hemoglobin      13.3 - 17.7 g/dL 12.1 (L)   Hematocrit      40.0 - 53.0 % 37.2 (L)   MCV      78 - 100 fL 89   MCH      26.5 - 33.0 pg 28.8   MCHC      31.5 - 36.5 g/dL 32.5   RDW      10.0 - 15.0 % 12.5   Platelet Count      150 - 450 10e3/uL 54 (L)     EKG per my interpretation today shows SR with narrow QRS and 1 PVC    ASSESSMENT AND PLAN:    80 year old M with paroxysmal SVT, 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 S/P TAVR. Appears to be doing well. No sx of HF.  -Follow up echo shows 1-2+ (?central) AI. Recommended follow up echo at 6 month isabell, but [place]. Will d/w Dr Adan if he would like a repeat echo prior to the 1 year isabell.    -Currently on ASA, I recommended he discontinue it when he restarts the Eliquis.   -ABX for SBE prophylaxis prior to dental work    2. S/p dual chamber PPM secondary to LBBB and second-degree AV block post-TAVR  -off his BB. Ok to restart coreg (will have nursing let him know).  -developed a significant hematoma/bruising post procedure, Eliquis on hold, following with EP    3. AFL with prior ablation, but recurrent paroxysmal AFL  -Continue Eliquis (currently on hold due see above)    4. Acute post-op anemia.  -hgb stable at 12    5. ITP and MGUS (IgG kappa)  -plts down just a bit from baseline into the 50s recently    6. Mitral valve disease, stable  -Follow on echo    Follow up: June 2022 with echo, EKG and labs at that time (possibly sooner, will d/w Dr Adan).  Of course, the patient was encouraged to contact us sooner with questions or concerns.    45 minutes spent on the date of  the encounter doing chart review, review of test results, interpretation of test results, patient visit and documentation     Alexandra Rojas PA-C    ADDENDUM: D/W Dr Adan- we recommend he find some place local to have a repeat echo in Dec/[place], this can be done through a PCP and faxed back to us or can be done through a local cardiologist. 7/26/2021 Alexandra Rojas PA-C

## 2021-07-22 NOTE — LETTER
7/22/2021    Pb Warren MD  [address] [facility] [address]    RE: Mars Reyes       Dear Colleague,    I had the pleasure of seeing Mars Reyes in the [facility].    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 Valve was ballooned three times due to significant PVL. No significant PVL at the end of procedure. He also developed a LBBB after deployment. Subsequent rhythm monitoring showed episodes of second-degree AV block with heart rates down to 30 beats per minute at times. Was seen by Dr Lopez and underwent dual-chamber pacemaker implantation 7/8. He developed a hematoma/extensive bruising after this and his AC is being held. 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    Doing well aside from the aforementioned bruising/hematoma. No CP, ARGUETA or LE edema.    CURRENT MEDICATIONS:   Current Outpatient Medications   Medication Sig Dispense Refill     acetaminophen (TYLENOL) 325 MG tablet Take 2 tablets (650 mg) by mouth every 4 hours as needed for mild pain or headaches       allopurinol (ZYLOPRIM) 100 MG tablet Take 100 mg by mouth every morning       amoxicillin (AMOXIL) 500 MG tablet Take 2,000 mg by mouth daily as needed (1 hour prior to dental procedures)       aspirin (ASA) 81 MG EC tablet Take 1 tablet (81 mg) by mouth daily       lisinopril (ZESTRIL) 20 MG tablet Take 20 mg by mouth every evening       pravastatin (PRAVACHOL) 20 MG tablet Take 20 mg by mouth every evening       apixaban  "ANTICOAGULANT (ELIQUIS ANTICOAGULANT) 5 MG tablet Take 1 tablet (5 mg) by mouth 2 times daily (Patient not taking: Reported on 7/22/2021) 180 tablet 3     ALLERGIES:  Allergies   Allergen Reactions     Simvastatin      10/26/12: Arm pain - pt stopped it x 4-5 wks and arm pain didn't resolve sore started it      ROS:  Skin:  Negative     Eyes:  Positive for glasses  ENT:  Negative    Respiratory:  Negative    Cardiovascular:  Negative    Gastroenterology: Negative    Genitourinary:  Negative    Musculoskeletal:  Negative    Neurologic:  Negative    Psychiatric:  Negative    Heme/Lymph/Imm:  Negative    Endocrine:  Negative      PHYSICAL EXAMINATION:    GENERAL:  The patient is a pleasant 80 year old who appears their stated age.  In no apparent distress.  VITAL SIGNS:  /70 (BP Location: Right arm, Patient Position: Sitting, Cuff Size: Adult Regular)   Pulse 74   Ht 1.778 m (5' 10\")   Wt 81.3 kg (179 lb 3.2 oz)   SpO2 96%   BMI 25.71 kg/m      RESPIRATORY:  Breathing is nonlabored.  Lungs are clear to auscultation bilaterally.   CARDIAC:  RRR, 1/6 ADRIAN  EXTREMITIES:  No LE edema  NEUROLOGIC:  Alert and oriented.    DATA REVIEWED:    TTE 7/16/21:  1. The left ventricle is normal in structure, function and size. The visual ejection fraction is estimated at 65%.  2. The right ventricle is normal in structure, function and size.  3. There is mild to moderate (1-2+) tricuspid regurgitation. The right ventricular systolic pressure is approximated at 38mmHg plus the right atrial pressure.  4. s/p TAVR with 29mm Medtronic Evolut Pro, implanted 6-22-21. Mean 7mmHg, Vmax 1.9m/s, DI 0.68. There is mild to moderate (1-2+) aortic regurgitation. On short axis views, AI appears to be valvular rather than PVL.     Echo from 6-23-21 showed EF 70%, 2+ TR, TAVR with mean 8mmHg, Vmax 1.9m/s, 1+AI.      Component      Latest Ref Rng & Units 7/16/2021   Sodium      133 - 144 mmol/L 132 (L)   Potassium      3.4 - 5.3 mmol/L 4.4 "   Chloride      94 - 109 mmol/L 100   Carbon Dioxide      20 - 32 mmol/L 27   Anion Gap      3 - 14 mmol/L 5   Urea Nitrogen      7 - 30 mg/dL 11   Creatinine      0.66 - 1.25 mg/dL 0.88   Calcium      8.5 - 10.1 mg/dL 9.6   Glucose      70 - 99 mg/dL 95   GFR Estimate      >60 mL/min/1.73m2 82   WBC      4.0 - 11.0 10e3/uL 7.8   RBC Count      4.40 - 5.90 10e6/uL 4.20 (L)   Hemoglobin      13.3 - 17.7 g/dL 12.1 (L)   Hematocrit      40.0 - 53.0 % 37.2 (L)   MCV      78 - 100 fL 89   MCH      26.5 - 33.0 pg 28.8   MCHC      31.5 - 36.5 g/dL 32.5   RDW      10.0 - 15.0 % 12.5   Platelet Count      150 - 450 10e3/uL 54 (L)     EKG per my interpretation today shows SR with narrow QRS and 1 PVC    ASSESSMENT AND PLAN:    80 year old M with paroxysmal SVT, 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 S/P TAVR. Appears to be doing well. No sx of HF.  -Follow up echo shows 1-2+ (?central) AI. Recommended follow up echo at 6 month isabell, but [place]. Will d/w Dr Adan if he would like a repeat echo prior to the 1 year isabell.    -Currently on ASA, I recommended he discontinue it when he restarts the Eliquis.   -ABX for SBE prophylaxis prior to dental work    2. S/p dual chamber PPM secondary to LBBB and second-degree AV block post-TAVR  -off his BB. Ok to restart coreg (will have nursing let him know).  -developed a significant hematoma/bruising post procedure, Eliquis on hold, following with EP    3. AFL with prior ablation, but recurrent paroxysmal AFL  -Continue Eliquis (currently on hold due see above)    4. Acute post-op anemia.  -hgb stable at 12    5. ITP and MGUS (IgG kappa)  -plts down just a bit from baseline into the 50s recently    6. Mitral valve disease,  stable  -Follow on echo    Follow up: June 2022 with echo, EKG and labs at that time (possibly sooner, will d/w Dr Adan).  Of course, the patient was encouraged to contact us sooner with questions or concerns.    45 minutes spent on the date of the encounter doing chart review, review of test results, interpretation of test results, patient visit and documentation     Alexandra Rojas PA-C    ADDENDUM: D/W Dr Adan- we recommend he find some place local to have a repeat echo in Dec/[place], this can be done through a PCP and faxed back to us or can be done through a local cardiologist. 7/26/2021 Alexandra Rojas PA-C       Thank you for allowing me to participate in the care of your patient.      Sincerely,     Alexandra Rojas PA-C     [facility]  cc:   Hunter Adan MD  [address]  [place] [address]

## 2021-07-25 LAB
MDC_IDC_EPISODE_DTM: NORMAL
MDC_IDC_EPISODE_DTM: NORMAL
MDC_IDC_EPISODE_DURATION: 1 S
MDC_IDC_EPISODE_DURATION: 37 S
MDC_IDC_EPISODE_ID: 2
MDC_IDC_EPISODE_ID: 3
MDC_IDC_EPISODE_TYPE: NORMAL
MDC_IDC_EPISODE_TYPE: NORMAL
MDC_IDC_LEAD_IMPLANT_DT: NORMAL
MDC_IDC_LEAD_IMPLANT_DT: NORMAL
MDC_IDC_LEAD_LOCATION: NORMAL
MDC_IDC_LEAD_LOCATION: NORMAL
MDC_IDC_LEAD_LOCATION_DETAIL_1: NORMAL
MDC_IDC_LEAD_LOCATION_DETAIL_1: NORMAL
MDC_IDC_LEAD_MFG: NORMAL
MDC_IDC_LEAD_MFG: NORMAL
MDC_IDC_LEAD_MODEL: NORMAL
MDC_IDC_LEAD_MODEL: NORMAL
MDC_IDC_LEAD_POLARITY_TYPE: NORMAL
MDC_IDC_LEAD_POLARITY_TYPE: NORMAL
MDC_IDC_LEAD_SERIAL: NORMAL
MDC_IDC_LEAD_SERIAL: NORMAL
MDC_IDC_MSMT_BATTERY_DTM: NORMAL
MDC_IDC_MSMT_BATTERY_REMAINING_LONGEVITY: 178 MO
MDC_IDC_MSMT_BATTERY_RRT_TRIGGER: 2.62
MDC_IDC_MSMT_BATTERY_STATUS: NORMAL
MDC_IDC_MSMT_BATTERY_VOLTAGE: 3.23 V
MDC_IDC_MSMT_LEADCHNL_RA_IMPEDANCE_VALUE: 342 OHM
MDC_IDC_MSMT_LEADCHNL_RA_IMPEDANCE_VALUE: 399 OHM
MDC_IDC_MSMT_LEADCHNL_RA_PACING_THRESHOLD_AMPLITUDE: 0.5 V
MDC_IDC_MSMT_LEADCHNL_RA_PACING_THRESHOLD_PULSEWIDTH: 0.4 MS
MDC_IDC_MSMT_LEADCHNL_RA_SENSING_INTR_AMPL: 4.5 MV
MDC_IDC_MSMT_LEADCHNL_RA_SENSING_INTR_AMPL: 5.12 MV
MDC_IDC_MSMT_LEADCHNL_RV_IMPEDANCE_VALUE: 304 OHM
MDC_IDC_MSMT_LEADCHNL_RV_IMPEDANCE_VALUE: 380 OHM
MDC_IDC_MSMT_LEADCHNL_RV_PACING_THRESHOLD_AMPLITUDE: 0.75 V
MDC_IDC_MSMT_LEADCHNL_RV_PACING_THRESHOLD_PULSEWIDTH: 0.4 MS
MDC_IDC_MSMT_LEADCHNL_RV_SENSING_INTR_AMPL: 11.25 MV
MDC_IDC_MSMT_LEADCHNL_RV_SENSING_INTR_AMPL: 12.88 MV
MDC_IDC_PG_IMPLANT_DTM: NORMAL
MDC_IDC_PG_MFG: NORMAL
MDC_IDC_PG_MODEL: NORMAL
MDC_IDC_PG_SERIAL: NORMAL
MDC_IDC_PG_TYPE: NORMAL
MDC_IDC_SESS_CLINIC_NAME: NORMAL
MDC_IDC_SESS_DTM: NORMAL
MDC_IDC_SESS_TYPE: NORMAL
MDC_IDC_SET_BRADY_AT_MODE_SWITCH_RATE: 171 {BEATS}/MIN
MDC_IDC_SET_BRADY_HYSTRATE: NORMAL
MDC_IDC_SET_BRADY_LOWRATE: 60 {BEATS}/MIN
MDC_IDC_SET_BRADY_MAX_SENSOR_RATE: 130 {BEATS}/MIN
MDC_IDC_SET_BRADY_MAX_TRACKING_RATE: 130 {BEATS}/MIN
MDC_IDC_SET_BRADY_MODE: NORMAL
MDC_IDC_SET_BRADY_PAV_DELAY_LOW: 300 MS
MDC_IDC_SET_BRADY_SAV_DELAY_LOW: 300 MS
MDC_IDC_SET_LEADCHNL_RA_PACING_AMPLITUDE: 2 V
MDC_IDC_SET_LEADCHNL_RA_PACING_ANODE_ELECTRODE_1: NORMAL
MDC_IDC_SET_LEADCHNL_RA_PACING_ANODE_LOCATION_1: NORMAL
MDC_IDC_SET_LEADCHNL_RA_PACING_CAPTURE_MODE: NORMAL
MDC_IDC_SET_LEADCHNL_RA_PACING_CATHODE_ELECTRODE_1: NORMAL
MDC_IDC_SET_LEADCHNL_RA_PACING_CATHODE_LOCATION_1: NORMAL
MDC_IDC_SET_LEADCHNL_RA_PACING_POLARITY: NORMAL
MDC_IDC_SET_LEADCHNL_RA_PACING_PULSEWIDTH: 0.4 MS
MDC_IDC_SET_LEADCHNL_RA_SENSING_ANODE_ELECTRODE_1: NORMAL
MDC_IDC_SET_LEADCHNL_RA_SENSING_ANODE_LOCATION_1: NORMAL
MDC_IDC_SET_LEADCHNL_RA_SENSING_CATHODE_ELECTRODE_1: NORMAL
MDC_IDC_SET_LEADCHNL_RA_SENSING_CATHODE_LOCATION_1: NORMAL
MDC_IDC_SET_LEADCHNL_RA_SENSING_POLARITY: NORMAL
MDC_IDC_SET_LEADCHNL_RA_SENSING_SENSITIVITY: 0.3 MV
MDC_IDC_SET_LEADCHNL_RV_PACING_AMPLITUDE: 2 V
MDC_IDC_SET_LEADCHNL_RV_PACING_ANODE_ELECTRODE_1: NORMAL
MDC_IDC_SET_LEADCHNL_RV_PACING_ANODE_LOCATION_1: NORMAL
MDC_IDC_SET_LEADCHNL_RV_PACING_CAPTURE_MODE: NORMAL
MDC_IDC_SET_LEADCHNL_RV_PACING_CATHODE_ELECTRODE_1: NORMAL
MDC_IDC_SET_LEADCHNL_RV_PACING_CATHODE_LOCATION_1: NORMAL
MDC_IDC_SET_LEADCHNL_RV_PACING_POLARITY: NORMAL
MDC_IDC_SET_LEADCHNL_RV_PACING_PULSEWIDTH: 0.4 MS
MDC_IDC_SET_LEADCHNL_RV_SENSING_ANODE_ELECTRODE_1: NORMAL
MDC_IDC_SET_LEADCHNL_RV_SENSING_ANODE_LOCATION_1: NORMAL
MDC_IDC_SET_LEADCHNL_RV_SENSING_CATHODE_ELECTRODE_1: NORMAL
MDC_IDC_SET_LEADCHNL_RV_SENSING_CATHODE_LOCATION_1: NORMAL
MDC_IDC_SET_LEADCHNL_RV_SENSING_POLARITY: NORMAL
MDC_IDC_SET_LEADCHNL_RV_SENSING_SENSITIVITY: 0.9 MV
MDC_IDC_SET_ZONE_DETECTION_INTERVAL: 350 MS
MDC_IDC_SET_ZONE_DETECTION_INTERVAL: 400 MS
MDC_IDC_SET_ZONE_TYPE: NORMAL
MDC_IDC_STAT_AT_BURDEN_PERCENT: 0 %
MDC_IDC_STAT_AT_DTM_END: NORMAL
MDC_IDC_STAT_AT_DTM_START: NORMAL
MDC_IDC_STAT_BRADY_AP_VP_PERCENT: 1.54 %
MDC_IDC_STAT_BRADY_AP_VS_PERCENT: 6.53 %
MDC_IDC_STAT_BRADY_AS_VP_PERCENT: 6.95 %
MDC_IDC_STAT_BRADY_AS_VS_PERCENT: 84.97 %
MDC_IDC_STAT_BRADY_DTM_END: NORMAL
MDC_IDC_STAT_BRADY_DTM_START: NORMAL
MDC_IDC_STAT_BRADY_RA_PERCENT_PACED: 8.23 %
MDC_IDC_STAT_BRADY_RV_PERCENT_PACED: 8.5 %
MDC_IDC_STAT_EPISODE_RECENT_COUNT: 0
MDC_IDC_STAT_EPISODE_RECENT_COUNT: 1
MDC_IDC_STAT_EPISODE_RECENT_COUNT: 1
MDC_IDC_STAT_EPISODE_RECENT_COUNT_DTM_END: NORMAL
MDC_IDC_STAT_EPISODE_RECENT_COUNT_DTM_START: NORMAL
MDC_IDC_STAT_EPISODE_TOTAL_COUNT: 0
MDC_IDC_STAT_EPISODE_TOTAL_COUNT: 1
MDC_IDC_STAT_EPISODE_TOTAL_COUNT: 1
MDC_IDC_STAT_EPISODE_TOTAL_COUNT_DTM_END: NORMAL
MDC_IDC_STAT_EPISODE_TOTAL_COUNT_DTM_START: NORMAL
MDC_IDC_STAT_EPISODE_TYPE: NORMAL

## 2021-08-02 ENCOUNTER — TELEPHONE (OUTPATIENT)
Dept: CARDIOLOGY | Facility: CLINIC | Age: 81
End: 2021-08-02

## 2021-08-02 ENCOUNTER — ANCILLARY PROCEDURE (OUTPATIENT)
Dept: CARDIOLOGY | Facility: CLINIC | Age: 81
End: 2021-08-02
Attending: INTERNAL MEDICINE
Payer: COMMERCIAL

## 2021-08-02 DIAGNOSIS — I44.1 SECOND DEGREE AV BLOCK: ICD-10-CM

## 2021-08-02 DIAGNOSIS — Z95.0 CARDIAC PACEMAKER IN SITU: ICD-10-CM

## 2021-08-02 LAB
MDC_IDC_LEAD_IMPLANT_DT: NORMAL
MDC_IDC_LEAD_IMPLANT_DT: NORMAL
MDC_IDC_LEAD_LOCATION: NORMAL
MDC_IDC_LEAD_LOCATION: NORMAL
MDC_IDC_LEAD_LOCATION_DETAIL_1: NORMAL
MDC_IDC_LEAD_LOCATION_DETAIL_1: NORMAL
MDC_IDC_LEAD_MFG: NORMAL
MDC_IDC_LEAD_MFG: NORMAL
MDC_IDC_LEAD_MODEL: NORMAL
MDC_IDC_LEAD_MODEL: NORMAL
MDC_IDC_LEAD_POLARITY_TYPE: NORMAL
MDC_IDC_LEAD_POLARITY_TYPE: NORMAL
MDC_IDC_LEAD_SERIAL: NORMAL
MDC_IDC_LEAD_SERIAL: NORMAL
MDC_IDC_MSMT_CAP_CHARGE_TYPE: NORMAL
MDC_IDC_PG_IMPLANT_DTM: NORMAL
MDC_IDC_PG_MFG: NORMAL
MDC_IDC_PG_MODEL: NORMAL
MDC_IDC_PG_SERIAL: NORMAL
MDC_IDC_PG_TYPE: NORMAL
MDC_IDC_SESS_CLINIC_NAME: NORMAL
MDC_IDC_SESS_DTM: NORMAL
MDC_IDC_SESS_TYPE: NORMAL
MDC_IDC_SET_BRADY_AT_MODE_SWITCH_RATE: 171 {BEATS}/MIN
MDC_IDC_SET_BRADY_HYSTRATE: NORMAL
MDC_IDC_SET_BRADY_LOWRATE: 60 {BEATS}/MIN
MDC_IDC_SET_BRADY_MAX_SENSOR_RATE: 130 {BEATS}/MIN
MDC_IDC_SET_BRADY_MAX_TRACKING_RATE: 130 {BEATS}/MIN
MDC_IDC_SET_BRADY_MODE: NORMAL
MDC_IDC_SET_BRADY_PAV_DELAY_LOW: 300 MS
MDC_IDC_SET_BRADY_SAV_DELAY_LOW: 300 MS
MDC_IDC_SET_LEADCHNL_RA_PACING_AMPLITUDE: 2 V
MDC_IDC_SET_LEADCHNL_RA_PACING_ANODE_ELECTRODE_1: NORMAL
MDC_IDC_SET_LEADCHNL_RA_PACING_ANODE_LOCATION_1: NORMAL
MDC_IDC_SET_LEADCHNL_RA_PACING_CAPTURE_MODE: NORMAL
MDC_IDC_SET_LEADCHNL_RA_PACING_CATHODE_ELECTRODE_1: NORMAL
MDC_IDC_SET_LEADCHNL_RA_PACING_CATHODE_LOCATION_1: NORMAL
MDC_IDC_SET_LEADCHNL_RA_PACING_POLARITY: NORMAL
MDC_IDC_SET_LEADCHNL_RA_PACING_PULSEWIDTH: 0.4 MS
MDC_IDC_SET_LEADCHNL_RA_SENSING_ANODE_ELECTRODE_1: NORMAL
MDC_IDC_SET_LEADCHNL_RA_SENSING_ANODE_LOCATION_1: NORMAL
MDC_IDC_SET_LEADCHNL_RA_SENSING_CATHODE_ELECTRODE_1: NORMAL
MDC_IDC_SET_LEADCHNL_RA_SENSING_CATHODE_LOCATION_1: NORMAL
MDC_IDC_SET_LEADCHNL_RA_SENSING_POLARITY: NORMAL
MDC_IDC_SET_LEADCHNL_RA_SENSING_SENSITIVITY: 0.3 MV
MDC_IDC_SET_LEADCHNL_RV_PACING_AMPLITUDE: 2 V
MDC_IDC_SET_LEADCHNL_RV_PACING_ANODE_ELECTRODE_1: NORMAL
MDC_IDC_SET_LEADCHNL_RV_PACING_ANODE_LOCATION_1: NORMAL
MDC_IDC_SET_LEADCHNL_RV_PACING_CAPTURE_MODE: NORMAL
MDC_IDC_SET_LEADCHNL_RV_PACING_CATHODE_ELECTRODE_1: NORMAL
MDC_IDC_SET_LEADCHNL_RV_PACING_CATHODE_LOCATION_1: NORMAL
MDC_IDC_SET_LEADCHNL_RV_PACING_POLARITY: NORMAL
MDC_IDC_SET_LEADCHNL_RV_PACING_PULSEWIDTH: 0.4 MS
MDC_IDC_SET_LEADCHNL_RV_SENSING_ANODE_ELECTRODE_1: NORMAL
MDC_IDC_SET_LEADCHNL_RV_SENSING_ANODE_LOCATION_1: NORMAL
MDC_IDC_SET_LEADCHNL_RV_SENSING_CATHODE_ELECTRODE_1: NORMAL
MDC_IDC_SET_LEADCHNL_RV_SENSING_CATHODE_LOCATION_1: NORMAL
MDC_IDC_SET_LEADCHNL_RV_SENSING_POLARITY: NORMAL
MDC_IDC_SET_LEADCHNL_RV_SENSING_SENSITIVITY: 0.9 MV
MDC_IDC_SET_ZONE_DETECTION_INTERVAL: 350 MS
MDC_IDC_SET_ZONE_DETECTION_INTERVAL: 400 MS
MDC_IDC_SET_ZONE_TYPE: NORMAL

## 2021-08-02 NOTE — TELEPHONE ENCOUNTER
Seen in device clinic to recheck hematoma and bruising. Bruising has cleared. Hematoma is smaller since 7/20/21 and only slightly firm to the touch. No drainage. He has restarted eliquis. He has f/u PPM check on 9/7/21. He was instructed to call with redness, increase in size or firmness of hematoma. Yandy

## 2021-08-03 ENCOUNTER — TRANSFERRED RECORDS (OUTPATIENT)
Dept: HEALTH INFORMATION MANAGEMENT | Facility: CLINIC | Age: 81
End: 2021-08-03

## 2021-08-12 ENCOUNTER — LAB (OUTPATIENT)
Dept: LAB | Facility: CLINIC | Age: 81
End: 2021-08-12
Payer: COMMERCIAL

## 2021-08-12 DIAGNOSIS — M79.642 BILATERAL HAND PAIN: ICD-10-CM

## 2021-08-12 DIAGNOSIS — M79.641 BILATERAL HAND PAIN: ICD-10-CM

## 2021-08-12 DIAGNOSIS — M1A.9XX0 CHRONIC GOUT WITHOUT TOPHUS, UNSPECIFIED CAUSE, UNSPECIFIED SITE: ICD-10-CM

## 2021-08-12 LAB
CRP SERPL-MCNC: <2.9 MG/L (ref 0–8)
ERYTHROCYTE [SEDIMENTATION RATE] IN BLOOD BY WESTERGREN METHOD: 7 MM/HR (ref 0–20)
URATE SERPL-MCNC: 5.2 MG/DL (ref 3.5–7.2)

## 2021-08-12 PROCEDURE — 84550 ASSAY OF BLOOD/URIC ACID: CPT

## 2021-08-12 PROCEDURE — 36415 COLL VENOUS BLD VENIPUNCTURE: CPT

## 2021-08-12 PROCEDURE — 86618 LYME DISEASE ANTIBODY: CPT

## 2021-08-12 PROCEDURE — 85652 RBC SED RATE AUTOMATED: CPT

## 2021-08-12 PROCEDURE — 82164 ANGIOTENSIN I ENZYME TEST: CPT

## 2021-08-12 PROCEDURE — 86140 C-REACTIVE PROTEIN: CPT

## 2021-08-12 PROCEDURE — 86255 FLUORESCENT ANTIBODY SCREEN: CPT

## 2021-08-12 PROCEDURE — 81374 HLA I TYPING 1 ANTIGEN LR: CPT | Performed by: INTERNAL MEDICINE

## 2021-08-13 LAB
ANCA AB PATTERN SER IF-IMP: NORMAL
B BURGDOR IGG+IGM SER QL: 0.02
C-ANCA TITR SER IF: NORMAL {TITER}

## 2021-08-14 LAB — ACE SERPL-CCNC: <5 U/L

## 2021-08-18 LAB
B LOCUS: NORMAL
B27TEST METHOD: NORMAL

## 2021-09-04 ENCOUNTER — HEALTH MAINTENANCE LETTER (OUTPATIENT)
Age: 81
End: 2021-09-04

## 2021-09-07 ENCOUNTER — ANCILLARY PROCEDURE (OUTPATIENT)
Dept: CARDIOLOGY | Facility: CLINIC | Age: 81
End: 2021-09-07
Attending: INTERNAL MEDICINE
Payer: COMMERCIAL

## 2021-09-07 DIAGNOSIS — I44.1 SECOND DEGREE AV BLOCK: ICD-10-CM

## 2021-09-07 DIAGNOSIS — Z95.0 CARDIAC PACEMAKER IN SITU: ICD-10-CM

## 2021-09-07 LAB
MDC_IDC_LEAD_IMPLANT_DT: NORMAL
MDC_IDC_LEAD_IMPLANT_DT: NORMAL
MDC_IDC_LEAD_LOCATION: NORMAL
MDC_IDC_LEAD_LOCATION: NORMAL
MDC_IDC_LEAD_LOCATION_DETAIL_1: NORMAL
MDC_IDC_LEAD_LOCATION_DETAIL_1: NORMAL
MDC_IDC_LEAD_MFG: NORMAL
MDC_IDC_LEAD_MFG: NORMAL
MDC_IDC_LEAD_MODEL: NORMAL
MDC_IDC_LEAD_MODEL: NORMAL
MDC_IDC_LEAD_POLARITY_TYPE: NORMAL
MDC_IDC_LEAD_POLARITY_TYPE: NORMAL
MDC_IDC_LEAD_SERIAL: NORMAL
MDC_IDC_LEAD_SERIAL: NORMAL
MDC_IDC_MSMT_BATTERY_DTM: NORMAL
MDC_IDC_MSMT_BATTERY_REMAINING_LONGEVITY: 178 MO
MDC_IDC_MSMT_BATTERY_RRT_TRIGGER: 2.62
MDC_IDC_MSMT_BATTERY_STATUS: NORMAL
MDC_IDC_MSMT_BATTERY_VOLTAGE: 3.23 V
MDC_IDC_MSMT_LEADCHNL_RA_IMPEDANCE_VALUE: 342 OHM
MDC_IDC_MSMT_LEADCHNL_RA_IMPEDANCE_VALUE: 437 OHM
MDC_IDC_MSMT_LEADCHNL_RA_PACING_THRESHOLD_AMPLITUDE: 0.5 V
MDC_IDC_MSMT_LEADCHNL_RA_PACING_THRESHOLD_PULSEWIDTH: 0.4 MS
MDC_IDC_MSMT_LEADCHNL_RA_SENSING_INTR_AMPL: 4.75 MV
MDC_IDC_MSMT_LEADCHNL_RA_SENSING_INTR_AMPL: 5.88 MV
MDC_IDC_MSMT_LEADCHNL_RV_IMPEDANCE_VALUE: 285 OHM
MDC_IDC_MSMT_LEADCHNL_RV_IMPEDANCE_VALUE: 437 OHM
MDC_IDC_MSMT_LEADCHNL_RV_PACING_THRESHOLD_AMPLITUDE: 0.62 V
MDC_IDC_MSMT_LEADCHNL_RV_PACING_THRESHOLD_PULSEWIDTH: 0.4 MS
MDC_IDC_MSMT_LEADCHNL_RV_SENSING_INTR_AMPL: 8.88 MV
MDC_IDC_MSMT_LEADCHNL_RV_SENSING_INTR_AMPL: 9.75 MV
MDC_IDC_PG_IMPLANT_DTM: NORMAL
MDC_IDC_PG_MFG: NORMAL
MDC_IDC_PG_MODEL: NORMAL
MDC_IDC_PG_SERIAL: NORMAL
MDC_IDC_PG_TYPE: NORMAL
MDC_IDC_SESS_CLINIC_NAME: NORMAL
MDC_IDC_SESS_DTM: NORMAL
MDC_IDC_SESS_TYPE: NORMAL
MDC_IDC_SET_BRADY_AT_MODE_SWITCH_RATE: 171 {BEATS}/MIN
MDC_IDC_SET_BRADY_HYSTRATE: NORMAL
MDC_IDC_SET_BRADY_LOWRATE: 60 {BEATS}/MIN
MDC_IDC_SET_BRADY_MAX_SENSOR_RATE: 130 {BEATS}/MIN
MDC_IDC_SET_BRADY_MAX_TRACKING_RATE: 130 {BEATS}/MIN
MDC_IDC_SET_BRADY_MODE: NORMAL
MDC_IDC_SET_BRADY_PAV_DELAY_LOW: 300 MS
MDC_IDC_SET_BRADY_SAV_DELAY_LOW: 300 MS
MDC_IDC_SET_LEADCHNL_RA_PACING_AMPLITUDE: 1.5 V
MDC_IDC_SET_LEADCHNL_RA_PACING_ANODE_ELECTRODE_1: NORMAL
MDC_IDC_SET_LEADCHNL_RA_PACING_ANODE_LOCATION_1: NORMAL
MDC_IDC_SET_LEADCHNL_RA_PACING_CAPTURE_MODE: NORMAL
MDC_IDC_SET_LEADCHNL_RA_PACING_CATHODE_ELECTRODE_1: NORMAL
MDC_IDC_SET_LEADCHNL_RA_PACING_CATHODE_LOCATION_1: NORMAL
MDC_IDC_SET_LEADCHNL_RA_PACING_POLARITY: NORMAL
MDC_IDC_SET_LEADCHNL_RA_PACING_PULSEWIDTH: 0.4 MS
MDC_IDC_SET_LEADCHNL_RA_SENSING_ANODE_ELECTRODE_1: NORMAL
MDC_IDC_SET_LEADCHNL_RA_SENSING_ANODE_LOCATION_1: NORMAL
MDC_IDC_SET_LEADCHNL_RA_SENSING_CATHODE_ELECTRODE_1: NORMAL
MDC_IDC_SET_LEADCHNL_RA_SENSING_CATHODE_LOCATION_1: NORMAL
MDC_IDC_SET_LEADCHNL_RA_SENSING_POLARITY: NORMAL
MDC_IDC_SET_LEADCHNL_RA_SENSING_SENSITIVITY: 0.3 MV
MDC_IDC_SET_LEADCHNL_RV_PACING_AMPLITUDE: 2 V
MDC_IDC_SET_LEADCHNL_RV_PACING_ANODE_ELECTRODE_1: NORMAL
MDC_IDC_SET_LEADCHNL_RV_PACING_ANODE_LOCATION_1: NORMAL
MDC_IDC_SET_LEADCHNL_RV_PACING_CAPTURE_MODE: NORMAL
MDC_IDC_SET_LEADCHNL_RV_PACING_CATHODE_ELECTRODE_1: NORMAL
MDC_IDC_SET_LEADCHNL_RV_PACING_CATHODE_LOCATION_1: NORMAL
MDC_IDC_SET_LEADCHNL_RV_PACING_POLARITY: NORMAL
MDC_IDC_SET_LEADCHNL_RV_PACING_PULSEWIDTH: 0.4 MS
MDC_IDC_SET_LEADCHNL_RV_SENSING_ANODE_ELECTRODE_1: NORMAL
MDC_IDC_SET_LEADCHNL_RV_SENSING_ANODE_LOCATION_1: NORMAL
MDC_IDC_SET_LEADCHNL_RV_SENSING_CATHODE_ELECTRODE_1: NORMAL
MDC_IDC_SET_LEADCHNL_RV_SENSING_CATHODE_LOCATION_1: NORMAL
MDC_IDC_SET_LEADCHNL_RV_SENSING_POLARITY: NORMAL
MDC_IDC_SET_LEADCHNL_RV_SENSING_SENSITIVITY: 0.9 MV
MDC_IDC_SET_ZONE_DETECTION_INTERVAL: 350 MS
MDC_IDC_SET_ZONE_DETECTION_INTERVAL: 400 MS
MDC_IDC_SET_ZONE_TYPE: NORMAL
MDC_IDC_STAT_AT_BURDEN_PERCENT: 0 %
MDC_IDC_STAT_AT_DTM_END: NORMAL
MDC_IDC_STAT_AT_DTM_START: NORMAL
MDC_IDC_STAT_BRADY_AP_VP_PERCENT: 2.2 %
MDC_IDC_STAT_BRADY_AP_VS_PERCENT: 16.18 %
MDC_IDC_STAT_BRADY_AS_VP_PERCENT: 2.42 %
MDC_IDC_STAT_BRADY_AS_VS_PERCENT: 79.2 %
MDC_IDC_STAT_BRADY_DTM_END: NORMAL
MDC_IDC_STAT_BRADY_DTM_START: NORMAL
MDC_IDC_STAT_BRADY_RA_PERCENT_PACED: 19.67 %
MDC_IDC_STAT_BRADY_RV_PERCENT_PACED: 4.62 %
MDC_IDC_STAT_EPISODE_RECENT_COUNT: 0
MDC_IDC_STAT_EPISODE_RECENT_COUNT_DTM_END: NORMAL
MDC_IDC_STAT_EPISODE_RECENT_COUNT_DTM_START: NORMAL
MDC_IDC_STAT_EPISODE_TOTAL_COUNT: 0
MDC_IDC_STAT_EPISODE_TOTAL_COUNT: 1
MDC_IDC_STAT_EPISODE_TOTAL_COUNT: 1
MDC_IDC_STAT_EPISODE_TOTAL_COUNT_DTM_END: NORMAL
MDC_IDC_STAT_EPISODE_TOTAL_COUNT_DTM_START: NORMAL
MDC_IDC_STAT_EPISODE_TYPE: NORMAL

## 2021-09-07 PROCEDURE — 93280 PM DEVICE PROGR EVAL DUAL: CPT | Performed by: INTERNAL MEDICINE

## 2021-09-09 ENCOUNTER — CARE COORDINATION (OUTPATIENT)
Dept: CARDIOLOGY | Facility: CLINIC | Age: 81
End: 2021-09-09

## 2021-09-09 NOTE — PROGRESS NOTES
Messaged pt in MyChart recommendations/comments from GILBERT Gomez.     Betty Smith RN, BSN  09/09/21 at 2:10 PM

## 2021-09-09 NOTE — PROGRESS NOTES
Pt called, had device check earlier this week, cleared to do anything. [facility]. He forgot to ask one other question. He is turning 81 next month. He has been manny diving before and would like to do it one more time. He wanted to know if any issues with heart condition/PPM that it would be advised against manny diving. Told pt I would review with GILBERT Gomez and get back to him.     Betty Smith RN, BSN  09/09/21 at 9:45 AM     ADDENDUM 9/9/2021:   There is no contraindication due to the PPM or TAVR placements, but he is on Eliquis (at least I think he is back on it, it was held by EP post device placement due to a hematoma) and there would be a high bleeding risk should be be injured during his dive/landing due to being on the blood thinner. Alexandra Rojas PA-C

## 2021-09-27 ENCOUNTER — OFFICE VISIT (OUTPATIENT)
Dept: INTERNAL MEDICINE | Facility: CLINIC | Age: 81
End: 2021-09-27
Payer: COMMERCIAL

## 2021-09-27 VITALS
OXYGEN SATURATION: 98 % | TEMPERATURE: 98 F | DIASTOLIC BLOOD PRESSURE: 68 MMHG | WEIGHT: 177.1 LBS | BODY MASS INDEX: 25.41 KG/M2 | RESPIRATION RATE: 15 BRPM | HEART RATE: 79 BPM | SYSTOLIC BLOOD PRESSURE: 120 MMHG

## 2021-09-27 DIAGNOSIS — D47.2 MGUS (MONOCLONAL GAMMOPATHY OF UNKNOWN SIGNIFICANCE): ICD-10-CM

## 2021-09-27 DIAGNOSIS — I35.0 AORTIC STENOSIS, SEVERE: ICD-10-CM

## 2021-09-27 DIAGNOSIS — M25.549 ARTHRALGIA OF HAND, UNSPECIFIED LATERALITY: ICD-10-CM

## 2021-09-27 DIAGNOSIS — Z12.11 COLON CANCER SCREENING: ICD-10-CM

## 2021-09-27 DIAGNOSIS — I48.92 ATRIAL FLUTTER, UNSPECIFIED TYPE (H): ICD-10-CM

## 2021-09-27 DIAGNOSIS — Z00.00 ENCOUNTER FOR MEDICARE ANNUAL WELLNESS EXAM: Primary | ICD-10-CM

## 2021-09-27 DIAGNOSIS — E78.5 HYPERLIPIDEMIA LDL GOAL <130: ICD-10-CM

## 2021-09-27 DIAGNOSIS — I10 ESSENTIAL HYPERTENSION, BENIGN: ICD-10-CM

## 2021-09-27 PROCEDURE — 99397 PER PM REEVAL EST PAT 65+ YR: CPT | Performed by: INTERNAL MEDICINE

## 2021-09-27 NOTE — PROGRESS NOTES
"  SUBJECTIVE:   Mars Reyes is a 80 year old male who presents for Preventive Visit.    Patient has been advised of split billing requirements and indicates understanding: Yes  Are you in the first 12 months of your Medicare Part B coverage?  No    Physical Health:    In general, how would you rate your overall physical health? good    Outside of work, how many days during the week do you exercise? 6-7 days/week    Outside of work, approximately how many minutes a day do you exercise?15-30 minutes    If you drink alcohol do you typically have >3 drinks per day or >7 drinks per week? No    Do you usually eat at least 4 servings of fruit and vegetables a day, include whole grains & fiber and avoid regularly eating high fat or \"junk\" foods? Yes    Do you have any problems taking medications regularly?  No    Do you have any side effects from medications? none    Needs assistance for the following daily activities: no assistance needed    Which of the following safety concerns are present in your home?  none identified     Hearing impairment: No    In the past 6 months, have you been bothered by leaking of urine? no    Mental Health:    In general, how would you rate your overall mental or emotional health? good  PHQ-2 Score:      Do you feel safe in your environment? Yes    Have you ever done Advance Care Planning? (For example, a Health Directive, POLST, or a discussion with a medical provider or your loved ones about your wishes): Yes, advance care planning is on file.    Additional concerns to address?  YES    Fall risk:  Fallen 2 or more times in the past year?: No  Any fall with injury in the past year?: No  Cognitive Screenin) Repeat 3 items (Leader, Season, Table)    2) Clock draw: NORMAL  3) 3 item recall: Recalls 3 objects  Results: 3 items recalled: COGNITIVE IMPAIRMENT LESS LIKELY    Mini-CogTM Copyright S Giovanna. Licensed by the author for use in [facility]; reprinted with permission " (juan@[facility]). All rights reserved.      Do you have sleep apnea, excessive snoring or daytime drowsiness?: no      Reviewed and updated as needed this visit by clinical staff               Other concerns:  1. Continued right hand middle finger swelling. Seen by rheumatology who recommended cortisone injection, patient would like to discuss alteratives.   Reviewed and updated as needed this visit by Provider                Social History     Tobacco Use     Smoking status: Never Smoker     Smokeless tobacco: Never Used   Substance Use Topics     Alcohol use: Yes     Alcohol/week: 0.0 standard drinks     Comment: Max 2 beers per day                           Current providers sharing in care for this patient include:   Patient Care Team:  Pb Warren MD as PCP - General (Internal Medicine)  Pb Warren MD as Assigned PCP  Hunter Adan MD as Assigned Heart and Vascular Provider  Pino Mayer MD as Assigned Cancer Care Provider    The following health maintenance items are reviewed in Epic and correct as of today:  Health Maintenance   Topic Date Due     ZOSTER IMMUNIZATION (1 of 2) Never done     FALL RISK ASSESSMENT  03/20/2020     PHQ-2  01/01/2021     COLORECTAL CANCER SCREENING  07/20/2021     ANNUAL REVIEW OF HM ORDERS  07/23/2022     MEDICARE ANNUAL WELLNESS VISIT  09/27/2022     LIPID  07/14/2025     ADVANCE CARE PLANNING  09/27/2026     DTAP/TDAP/TD IMMUNIZATION (4 - Td or Tdap) 09/01/2030     INFLUENZA VACCINE  Completed     Pneumococcal Vaccine: 65+ Years  Completed     COVID-19 Vaccine  Completed     IPV IMMUNIZATION  Aged Out     MENINGITIS IMMUNIZATION  Aged Out     HEPATITIS B IMMUNIZATION  Aged Out     HEPATITIS C SCREENING  Discontinued       Immunization History   Administered Date(s) Administered     COVID-19,PF,Pfizer 02/25/2021, 03/28/2021     DTaP, Unspecified 09/01/2020     HEPA 02/20/1997, 05/20/2005     HepB 02/20/1997     Influenza (High Dose) 3 valent vaccine 10/20/2015,  "10/01/2016, 11/05/2018, 10/30/2019     Influenza (IIV3) PF 02/20/1997, 11/16/2005, 12/04/2006, 10/16/2007, 11/20/2008, 10/17/2010, 01/07/2013, 09/16/2013, 09/30/2017     Meningococcal (Menomune ) 02/20/1997     Pneumo Conj 13-V (2010&after) 02/10/2016     Pneumococcal 23 valent 12/04/2006     Poliovirus, inactivated (IPV) 02/20/1997     TD (ADULT, 7+) 02/20/1997     TDAP Vaccine (Adacel) 02/21/2008     Typhoid IM 02/20/1997     Yellow Fever 05/20/2005         ROS:    CONSTITUTIONAL: NEGATIVE for fever, chills, change in weight  EYES: NEGATIVE for vision changes or irritation  ENT/MOUTH: NEGATIVE for ear, mouth and throat problems  RESP: NEGATIVE for significant cough or SOB  CV: NEGATIVE for chest pain, palpitations or peripheral edema  GI: NEGATIVE for nausea, abdominal pain, heartburn, or change in bowel habits  : NEGATIVE for frequency, dysuria, or hematuria  MUSCULOSKELETAL: NEGATIVE for significant arthralgias or myalgia  NEURO: NEGATIVE for weakness, dizziness or paresthesias  HEME: NEGATIVE for bleeding problems  PSYCHIATRIC: NEGATIVE for changes in mood or affect    OBJECTIVE:   /68   Pulse 79   Temp 98  F (36.7  C) (Temporal)   Resp 15   Wt 80.3 kg (177 lb 1.6 oz)   SpO2 98%   BMI 25.41 kg/m   Estimated body mass index is 25.71 kg/m  as calculated from the following:    Height as of 7/22/21: 1.778 m (5' 10\").    Weight as of 7/22/21: 81.3 kg (179 lb 3.2 oz).     EXAM:   GENERAL: alert and no distress  EYES: Eyes grossly normal to inspection, PERRL and conjunctivae and sclerae normal  HENT: ear canals and TM's normal, nose and mouth without ulcers or lesions  NECK: no adenopathy, no asymmetry, masses, or scars and thyroid normal to palpation  Device in place left chest wall  RESP: lungs clear to auscultation - no rales, rhonchi or wheezes  CV: regular rate and rhythm, normal S1 S2, no S3 or S4, no click or rub, no peripheral edema and peripheral pulses strong  ABDOMEN: soft, nontender, no " hepatosplenomegaly, no masses and bowel sounds normal  MS: no gross musculoskeletal defects noted, no edema, mild swelling noted middle fingers hand.  Noted likely seborrheic keratosis mid low back right.  Uniform in color and symmetric in size contour and shape.  NEURO: No focal changes  PSYCH: mentation appears normal, affect normal/bright    LDL Cholesterol Calculated   Date Value Ref Range Status   07/14/2020 84 <100 mg/dL Final     Comment:     Desirable:       <100 mg/dl     Lab Results   Component Value Date    ALT 20 06/16/2021     Component      Latest Ref Rng & Units 6/16/2021 6/16/2021 6/23/2021 7/16/2021           2:15 PM  2:15 PM  5:25 AM  9:08 AM   Sodium      133 - 144 mmol/L  133     Potassium      3.4 - 5.3 mmol/L  4.8     Chloride      94 - 109 mmol/L  101     Carbon Dioxide      20 - 32 mmol/L  28     Anion Gap      3 - 14 mmol/L  4     Glucose      70 - 99 mg/dL  86     Urea Nitrogen      7 - 30 mg/dL  18     Creatinine      0.66 - 1.25 mg/dL  1.02     GFR Estimate      >60 mL/min/1.73m2  69     GFR Estimate If Black      >60 mL/min/1.73:m2  80     Calcium      8.5 - 10.1 mg/dL  9.5     Bilirubin Total      0.2 - 1.3 mg/dL  1.3     Albumin      3.4 - 5.0 g/dL  4.0     Protein Total      6.8 - 8.8 g/dL  8.0     Alkaline Phosphatase      40 - 150 U/L  92     ALT      0 - 70 U/L  20     AST      0 - 45 U/L  19     WBC      4.0 - 11.0 10e3/uL 6.8  12.3 (H) 7.8   RBC Count      4.40 - 5.90 10e6/uL 4.92  3.52 (L) 4.20 (L)   Hemoglobin      13.3 - 17.7 g/dL 14.2  10.6 (L) 12.1 (L)   Hematocrit      40.0 - 53.0 % 42.9  30.5 (L) 37.2 (L)   MCV      78 - 100 fL 87  87 89   MCH      26.5 - 33.0 pg 28.9  30.1 28.8   MCHC      31.5 - 36.5 g/dL 33.1  34.8 32.5   RDW      10.0 - 15.0 % 12.7  12.3 12.5   Platelet Count      150 - 450 10e3/uL 68 (L)  60 (L) 54 (L)     Component      Latest Ref Rng & Units 7/16/2021           9:08 AM   Sodium      133 - 144 mmol/L 132 (L)   Potassium      3.4 - 5.3 mmol/L 4.4    Chloride      94 - 109 mmol/L 100   Carbon Dioxide      20 - 32 mmol/L 27   Anion Gap      3 - 14 mmol/L 5   Glucose      70 - 99 mg/dL 95   Urea Nitrogen      7 - 30 mg/dL 11   Creatinine      0.66 - 1.25 mg/dL 0.88   GFR Estimate      >60 mL/min/1.73m2 82   GFR Estimate If Black      >60 mL/min/1.73:m2    Calcium      8.5 - 10.1 mg/dL 9.6   Bilirubin Total      0.2 - 1.3 mg/dL    Albumin      3.4 - 5.0 g/dL    Protein Total      6.8 - 8.8 g/dL    Alkaline Phosphatase      40 - 150 U/L    ALT      0 - 70 U/L    AST      0 - 45 U/L    WBC      4.0 - 11.0 10e3/uL    RBC Count      4.40 - 5.90 10e6/uL    Hemoglobin      13.3 - 17.7 g/dL    Hematocrit      40.0 - 53.0 %    MCV      78 - 100 fL    MCH      26.5 - 33.0 pg    MCHC      31.5 - 36.5 g/dL    RDW      10.0 - 15.0 %    Platelet Count      150 - 450 10e3/uL      PSA   Date Value Ref Range Status   07/14/2020 3.05 0 - 4 ug/L Final     Comment:     Assay Method:  Chemiluminescence using Siemens Vista analyzer         ASSESSMENT / PLAN:   (Z00.00) Encounter for Medicare annual wellness exam  (primary encounter diagnosis)  Comment: Discussed and recommended shingles vaccination as update.  Plan: Discussed with patient he declined PSA screening    (D47.2) MGUS (monoclonal gammopathy of unknown significance)  Comment: Stable and following routine CBC per hematology.  Plan:     (I48.92) Atrial flutter, unspecified type (H)  Comment: Rate controlled on therapy and currently anticoagulated  Plan: apixaban ANTICOAGULANT (ELIQUIS) 5 MG tablet            (I10) Essential hypertension, benign  Comment: Stable on therapy continue with current medical management  Plan:     (E78.5) Hyperlipidemia LDL goal <130  Comment:   LDL Cholesterol Calculated   Date Value Ref Range Status   07/14/2020 84 <100 mg/dL Final     Comment:     Desirable:       <100 mg/dl     Plan: Stable and continue with current medical management as ordered dietary change    (M25.549) Arthralgia of hand,  "unspecified laterality  Comment: Discussed issues of hand pain.  Patient has seen rheumatology.  Consider injection therapy.  Plan:     (I35.0) Aortic stenosis, severe  Comment: Status post TAVR.  Plan: Follow-up cardiology as directed    (Z12.11) Colon cancer screening  Comment: Advised and recommended FIT testing.  Plan: Fecal colorectal cancer screen (FIT)              Patient has been advised of split billing requirements and indicates understanding: Yes    COUNSELING:  Reviewed preventive health counseling, as reflected in patient instructions       Regular exercise       Healthy diet/nutrition    Estimated body mass index is 25.71 kg/m  as calculated from the following:    Height as of 7/22/21: 1.778 m (5' 10\").    Weight as of 7/22/21: 81.3 kg (179 lb 3.2 oz).      He reports that he has never smoked. He has never used smokeless tobacco.    Appropriate preventive services were discussed with this patient, including applicable screening as appropriate for cardiovascular disease, diabetes, osteopenia/osteoporosis, and glaucoma.  As appropriate for age/gender, discussed screening for colorectal cancer, prostate cancer, breast cancer, and cervical cancer. Checklist reviewing preventive services available has been given to the patient.    Reviewed patients plan of care and provided an AVS. The Basic Care Plan (routine screening as documented in Health Maintenance) for Mars meets the Care Plan requirement. This Care Plan has been established and reviewed with the Patient.    Counseling Resources:  ATP IV Guidelines  Pooled Cohorts Equation Calculator  Breast Cancer Risk Calculator  BRCA-Related Cancer Risk Assessment: FHS-7 Tool  FRAX Risk Assessment  ICSI Preventive Guidelines  Dietary Guidelines for Americans, 2010  [employer]'s MyPlate  ASA Prophylaxis  Lung CA Screening    Pb Warren MD  [employer]  "

## 2021-09-27 NOTE — PATIENT INSTRUCTIONS
Patient Education   Personalized Prevention Plan  You are due for the preventive services outlined below.  Your care team is available to assist you in scheduling these services.  If you have already completed any of these items, please share that information with your care team to update in your medical record.  Health Maintenance Due   Topic Date Due     Zoster (Shingles) Vaccine (1 of 2) Never done     FALL RISK ASSESSMENT  03/20/2020     PHQ-2  01/01/2021     Annual Wellness Visit  07/14/2021     Colorectal Cancer Screening  07/20/2021     Flu Vaccine (1) 09/01/2021

## 2021-10-06 PROCEDURE — 82274 ASSAY TEST FOR BLOOD FECAL: CPT | Performed by: INTERNAL MEDICINE

## 2021-10-09 LAB — HEMOCCULT STL QL IA: NEGATIVE

## 2021-12-13 ENCOUNTER — ANCILLARY PROCEDURE (OUTPATIENT)
Dept: CARDIOLOGY | Facility: CLINIC | Age: 81
End: 2021-12-13
Attending: INTERNAL MEDICINE
Payer: COMMERCIAL

## 2021-12-13 DIAGNOSIS — Z95.0 CARDIAC PACEMAKER IN SITU: ICD-10-CM

## 2021-12-13 PROCEDURE — 93296 REM INTERROG EVL PM/IDS: CPT | Performed by: INTERNAL MEDICINE

## 2021-12-13 PROCEDURE — 93294 REM INTERROG EVL PM/LDLS PM: CPT | Performed by: INTERNAL MEDICINE

## 2021-12-15 LAB
MDC_IDC_EPISODE_DTM: NORMAL
MDC_IDC_EPISODE_DURATION: 0 S
MDC_IDC_EPISODE_DURATION: 1 S
MDC_IDC_EPISODE_DURATION: 2 S
MDC_IDC_EPISODE_DURATION: 3 S
MDC_IDC_EPISODE_ID: 4
MDC_IDC_EPISODE_ID: 5
MDC_IDC_EPISODE_ID: 6
MDC_IDC_EPISODE_ID: 7
MDC_IDC_EPISODE_TYPE: NORMAL
MDC_IDC_LEAD_IMPLANT_DT: NORMAL
MDC_IDC_LEAD_IMPLANT_DT: NORMAL
MDC_IDC_LEAD_LOCATION: NORMAL
MDC_IDC_LEAD_LOCATION: NORMAL
MDC_IDC_LEAD_LOCATION_DETAIL_1: NORMAL
MDC_IDC_LEAD_LOCATION_DETAIL_1: NORMAL
MDC_IDC_LEAD_MFG: NORMAL
MDC_IDC_LEAD_MFG: NORMAL
MDC_IDC_LEAD_MODEL: NORMAL
MDC_IDC_LEAD_MODEL: NORMAL
MDC_IDC_LEAD_POLARITY_TYPE: NORMAL
MDC_IDC_LEAD_POLARITY_TYPE: NORMAL
MDC_IDC_LEAD_SERIAL: NORMAL
MDC_IDC_LEAD_SERIAL: NORMAL
MDC_IDC_MSMT_BATTERY_DTM: NORMAL
MDC_IDC_MSMT_BATTERY_REMAINING_LONGEVITY: 174 MO
MDC_IDC_MSMT_BATTERY_RRT_TRIGGER: 2.62
MDC_IDC_MSMT_BATTERY_STATUS: NORMAL
MDC_IDC_MSMT_BATTERY_VOLTAGE: 3.21 V
MDC_IDC_MSMT_LEADCHNL_RA_IMPEDANCE_VALUE: 361 OHM
MDC_IDC_MSMT_LEADCHNL_RA_IMPEDANCE_VALUE: 418 OHM
MDC_IDC_MSMT_LEADCHNL_RA_PACING_THRESHOLD_AMPLITUDE: 0.5 V
MDC_IDC_MSMT_LEADCHNL_RA_PACING_THRESHOLD_PULSEWIDTH: 0.4 MS
MDC_IDC_MSMT_LEADCHNL_RA_SENSING_INTR_AMPL: 5.38 MV
MDC_IDC_MSMT_LEADCHNL_RA_SENSING_INTR_AMPL: 5.38 MV
MDC_IDC_MSMT_LEADCHNL_RV_IMPEDANCE_VALUE: 304 OHM
MDC_IDC_MSMT_LEADCHNL_RV_IMPEDANCE_VALUE: 399 OHM
MDC_IDC_MSMT_LEADCHNL_RV_PACING_THRESHOLD_AMPLITUDE: 0.5 V
MDC_IDC_MSMT_LEADCHNL_RV_PACING_THRESHOLD_PULSEWIDTH: 0.4 MS
MDC_IDC_MSMT_LEADCHNL_RV_SENSING_INTR_AMPL: 12.62 MV
MDC_IDC_MSMT_LEADCHNL_RV_SENSING_INTR_AMPL: 12.62 MV
MDC_IDC_PG_IMPLANT_DTM: NORMAL
MDC_IDC_PG_MFG: NORMAL
MDC_IDC_PG_MODEL: NORMAL
MDC_IDC_PG_SERIAL: NORMAL
MDC_IDC_PG_TYPE: NORMAL
MDC_IDC_SESS_CLINIC_NAME: NORMAL
MDC_IDC_SESS_DTM: NORMAL
MDC_IDC_SESS_TYPE: NORMAL
MDC_IDC_SET_BRADY_AT_MODE_SWITCH_RATE: 171 {BEATS}/MIN
MDC_IDC_SET_BRADY_HYSTRATE: NORMAL
MDC_IDC_SET_BRADY_LOWRATE: 60 {BEATS}/MIN
MDC_IDC_SET_BRADY_MAX_SENSOR_RATE: 130 {BEATS}/MIN
MDC_IDC_SET_BRADY_MAX_TRACKING_RATE: 130 {BEATS}/MIN
MDC_IDC_SET_BRADY_MODE: NORMAL
MDC_IDC_SET_BRADY_PAV_DELAY_LOW: 300 MS
MDC_IDC_SET_BRADY_SAV_DELAY_LOW: 300 MS
MDC_IDC_SET_LEADCHNL_RA_PACING_AMPLITUDE: 1.5 V
MDC_IDC_SET_LEADCHNL_RA_PACING_ANODE_ELECTRODE_1: NORMAL
MDC_IDC_SET_LEADCHNL_RA_PACING_ANODE_LOCATION_1: NORMAL
MDC_IDC_SET_LEADCHNL_RA_PACING_CAPTURE_MODE: NORMAL
MDC_IDC_SET_LEADCHNL_RA_PACING_CATHODE_ELECTRODE_1: NORMAL
MDC_IDC_SET_LEADCHNL_RA_PACING_CATHODE_LOCATION_1: NORMAL
MDC_IDC_SET_LEADCHNL_RA_PACING_POLARITY: NORMAL
MDC_IDC_SET_LEADCHNL_RA_PACING_PULSEWIDTH: 0.4 MS
MDC_IDC_SET_LEADCHNL_RA_SENSING_ANODE_ELECTRODE_1: NORMAL
MDC_IDC_SET_LEADCHNL_RA_SENSING_ANODE_LOCATION_1: NORMAL
MDC_IDC_SET_LEADCHNL_RA_SENSING_CATHODE_ELECTRODE_1: NORMAL
MDC_IDC_SET_LEADCHNL_RA_SENSING_CATHODE_LOCATION_1: NORMAL
MDC_IDC_SET_LEADCHNL_RA_SENSING_POLARITY: NORMAL
MDC_IDC_SET_LEADCHNL_RA_SENSING_SENSITIVITY: 0.3 MV
MDC_IDC_SET_LEADCHNL_RV_PACING_AMPLITUDE: 2 V
MDC_IDC_SET_LEADCHNL_RV_PACING_ANODE_ELECTRODE_1: NORMAL
MDC_IDC_SET_LEADCHNL_RV_PACING_ANODE_LOCATION_1: NORMAL
MDC_IDC_SET_LEADCHNL_RV_PACING_CAPTURE_MODE: NORMAL
MDC_IDC_SET_LEADCHNL_RV_PACING_CATHODE_ELECTRODE_1: NORMAL
MDC_IDC_SET_LEADCHNL_RV_PACING_CATHODE_LOCATION_1: NORMAL
MDC_IDC_SET_LEADCHNL_RV_PACING_POLARITY: NORMAL
MDC_IDC_SET_LEADCHNL_RV_PACING_PULSEWIDTH: 0.4 MS
MDC_IDC_SET_LEADCHNL_RV_SENSING_ANODE_ELECTRODE_1: NORMAL
MDC_IDC_SET_LEADCHNL_RV_SENSING_ANODE_LOCATION_1: NORMAL
MDC_IDC_SET_LEADCHNL_RV_SENSING_CATHODE_ELECTRODE_1: NORMAL
MDC_IDC_SET_LEADCHNL_RV_SENSING_CATHODE_LOCATION_1: NORMAL
MDC_IDC_SET_LEADCHNL_RV_SENSING_POLARITY: NORMAL
MDC_IDC_SET_LEADCHNL_RV_SENSING_SENSITIVITY: 0.9 MV
MDC_IDC_SET_ZONE_DETECTION_INTERVAL: 350 MS
MDC_IDC_SET_ZONE_DETECTION_INTERVAL: 400 MS
MDC_IDC_SET_ZONE_TYPE: NORMAL
MDC_IDC_STAT_AT_BURDEN_PERCENT: 0 %
MDC_IDC_STAT_AT_DTM_END: NORMAL
MDC_IDC_STAT_AT_DTM_START: NORMAL
MDC_IDC_STAT_BRADY_AP_VP_PERCENT: 2.07 %
MDC_IDC_STAT_BRADY_AP_VS_PERCENT: 15.4 %
MDC_IDC_STAT_BRADY_AS_VP_PERCENT: 4.17 %
MDC_IDC_STAT_BRADY_AS_VS_PERCENT: 78.36 %
MDC_IDC_STAT_BRADY_DTM_END: NORMAL
MDC_IDC_STAT_BRADY_DTM_START: NORMAL
MDC_IDC_STAT_BRADY_RA_PERCENT_PACED: 19.24 %
MDC_IDC_STAT_BRADY_RV_PERCENT_PACED: 6.23 %
MDC_IDC_STAT_EPISODE_RECENT_COUNT: 0
MDC_IDC_STAT_EPISODE_RECENT_COUNT: 4
MDC_IDC_STAT_EPISODE_RECENT_COUNT_DTM_END: NORMAL
MDC_IDC_STAT_EPISODE_RECENT_COUNT_DTM_START: NORMAL
MDC_IDC_STAT_EPISODE_TOTAL_COUNT: 0
MDC_IDC_STAT_EPISODE_TOTAL_COUNT: 0
MDC_IDC_STAT_EPISODE_TOTAL_COUNT: 1
MDC_IDC_STAT_EPISODE_TOTAL_COUNT: 1
MDC_IDC_STAT_EPISODE_TOTAL_COUNT: 5
MDC_IDC_STAT_EPISODE_TOTAL_COUNT_DTM_END: NORMAL
MDC_IDC_STAT_EPISODE_TOTAL_COUNT_DTM_START: NORMAL
MDC_IDC_STAT_EPISODE_TYPE: NORMAL

## 2022-01-05 DIAGNOSIS — M10.9 GOUT, UNSPECIFIED CAUSE, UNSPECIFIED CHRONICITY, UNSPECIFIED SITE: ICD-10-CM

## 2022-01-06 DIAGNOSIS — I51.89 DIASTOLIC DYSFUNCTION: Primary | ICD-10-CM

## 2022-01-06 RX ORDER — LISINOPRIL 20 MG/1
20 TABLET ORAL EVERY EVENING
Qty: 90 TABLET | Refills: 1 | Status: SHIPPED | OUTPATIENT
Start: 2022-01-06 | End: 2022-06-13

## 2022-01-06 RX ORDER — ALLOPURINOL 100 MG/1
TABLET ORAL
Qty: 90 TABLET | Refills: 1 | Status: SHIPPED | OUTPATIENT
Start: 2022-01-06 | End: 2022-04-19

## 2022-03-14 ENCOUNTER — ANCILLARY PROCEDURE (OUTPATIENT)
Dept: CARDIOLOGY | Facility: CLINIC | Age: 82
End: 2022-03-14
Attending: INTERNAL MEDICINE
Payer: COMMERCIAL

## 2022-03-14 DIAGNOSIS — Z95.0 CARDIAC PACEMAKER IN SITU: ICD-10-CM

## 2022-03-14 LAB
MDC_IDC_EPISODE_DTM: NORMAL
MDC_IDC_EPISODE_DURATION: 1 S
MDC_IDC_EPISODE_ID: 11
MDC_IDC_EPISODE_TYPE: NORMAL
MDC_IDC_LEAD_IMPLANT_DT: NORMAL
MDC_IDC_LEAD_IMPLANT_DT: NORMAL
MDC_IDC_LEAD_LOCATION: NORMAL
MDC_IDC_LEAD_LOCATION: NORMAL
MDC_IDC_LEAD_LOCATION_DETAIL_1: NORMAL
MDC_IDC_LEAD_LOCATION_DETAIL_1: NORMAL
MDC_IDC_LEAD_MFG: NORMAL
MDC_IDC_LEAD_MFG: NORMAL
MDC_IDC_LEAD_MODEL: NORMAL
MDC_IDC_LEAD_MODEL: NORMAL
MDC_IDC_LEAD_POLARITY_TYPE: NORMAL
MDC_IDC_LEAD_POLARITY_TYPE: NORMAL
MDC_IDC_LEAD_SERIAL: NORMAL
MDC_IDC_LEAD_SERIAL: NORMAL
MDC_IDC_MSMT_BATTERY_DTM: NORMAL
MDC_IDC_MSMT_BATTERY_REMAINING_LONGEVITY: 170 MO
MDC_IDC_MSMT_BATTERY_RRT_TRIGGER: 2.62
MDC_IDC_MSMT_BATTERY_STATUS: NORMAL
MDC_IDC_MSMT_BATTERY_VOLTAGE: 3.18 V
MDC_IDC_MSMT_LEADCHNL_RA_IMPEDANCE_VALUE: 342 OHM
MDC_IDC_MSMT_LEADCHNL_RA_IMPEDANCE_VALUE: 399 OHM
MDC_IDC_MSMT_LEADCHNL_RA_PACING_THRESHOLD_AMPLITUDE: 0.5 V
MDC_IDC_MSMT_LEADCHNL_RA_PACING_THRESHOLD_PULSEWIDTH: 0.4 MS
MDC_IDC_MSMT_LEADCHNL_RA_SENSING_INTR_AMPL: 4.25 MV
MDC_IDC_MSMT_LEADCHNL_RA_SENSING_INTR_AMPL: 4.25 MV
MDC_IDC_MSMT_LEADCHNL_RV_IMPEDANCE_VALUE: 304 OHM
MDC_IDC_MSMT_LEADCHNL_RV_IMPEDANCE_VALUE: 380 OHM
MDC_IDC_MSMT_LEADCHNL_RV_PACING_THRESHOLD_AMPLITUDE: 0.62 V
MDC_IDC_MSMT_LEADCHNL_RV_PACING_THRESHOLD_PULSEWIDTH: 0.4 MS
MDC_IDC_MSMT_LEADCHNL_RV_SENSING_INTR_AMPL: 11 MV
MDC_IDC_MSMT_LEADCHNL_RV_SENSING_INTR_AMPL: 11 MV
MDC_IDC_PG_IMPLANT_DTM: NORMAL
MDC_IDC_PG_MFG: NORMAL
MDC_IDC_PG_MODEL: NORMAL
MDC_IDC_PG_SERIAL: NORMAL
MDC_IDC_PG_TYPE: NORMAL
MDC_IDC_SESS_CLINIC_NAME: NORMAL
MDC_IDC_SESS_DTM: NORMAL
MDC_IDC_SESS_TYPE: NORMAL
MDC_IDC_SET_BRADY_AT_MODE_SWITCH_RATE: 171 {BEATS}/MIN
MDC_IDC_SET_BRADY_HYSTRATE: NORMAL
MDC_IDC_SET_BRADY_LOWRATE: 60 {BEATS}/MIN
MDC_IDC_SET_BRADY_MAX_SENSOR_RATE: 130 {BEATS}/MIN
MDC_IDC_SET_BRADY_MAX_TRACKING_RATE: 130 {BEATS}/MIN
MDC_IDC_SET_BRADY_MODE: NORMAL
MDC_IDC_SET_BRADY_PAV_DELAY_LOW: 300 MS
MDC_IDC_SET_BRADY_SAV_DELAY_LOW: 300 MS
MDC_IDC_SET_LEADCHNL_RA_PACING_AMPLITUDE: 1.5 V
MDC_IDC_SET_LEADCHNL_RA_PACING_ANODE_ELECTRODE_1: NORMAL
MDC_IDC_SET_LEADCHNL_RA_PACING_ANODE_LOCATION_1: NORMAL
MDC_IDC_SET_LEADCHNL_RA_PACING_CAPTURE_MODE: NORMAL
MDC_IDC_SET_LEADCHNL_RA_PACING_CATHODE_ELECTRODE_1: NORMAL
MDC_IDC_SET_LEADCHNL_RA_PACING_CATHODE_LOCATION_1: NORMAL
MDC_IDC_SET_LEADCHNL_RA_PACING_POLARITY: NORMAL
MDC_IDC_SET_LEADCHNL_RA_PACING_PULSEWIDTH: 0.4 MS
MDC_IDC_SET_LEADCHNL_RA_SENSING_ANODE_ELECTRODE_1: NORMAL
MDC_IDC_SET_LEADCHNL_RA_SENSING_ANODE_LOCATION_1: NORMAL
MDC_IDC_SET_LEADCHNL_RA_SENSING_CATHODE_ELECTRODE_1: NORMAL
MDC_IDC_SET_LEADCHNL_RA_SENSING_CATHODE_LOCATION_1: NORMAL
MDC_IDC_SET_LEADCHNL_RA_SENSING_POLARITY: NORMAL
MDC_IDC_SET_LEADCHNL_RA_SENSING_SENSITIVITY: 0.3 MV
MDC_IDC_SET_LEADCHNL_RV_PACING_AMPLITUDE: 2 V
MDC_IDC_SET_LEADCHNL_RV_PACING_ANODE_ELECTRODE_1: NORMAL
MDC_IDC_SET_LEADCHNL_RV_PACING_ANODE_LOCATION_1: NORMAL
MDC_IDC_SET_LEADCHNL_RV_PACING_CAPTURE_MODE: NORMAL
MDC_IDC_SET_LEADCHNL_RV_PACING_CATHODE_ELECTRODE_1: NORMAL
MDC_IDC_SET_LEADCHNL_RV_PACING_CATHODE_LOCATION_1: NORMAL
MDC_IDC_SET_LEADCHNL_RV_PACING_POLARITY: NORMAL
MDC_IDC_SET_LEADCHNL_RV_PACING_PULSEWIDTH: 0.4 MS
MDC_IDC_SET_LEADCHNL_RV_SENSING_ANODE_ELECTRODE_1: NORMAL
MDC_IDC_SET_LEADCHNL_RV_SENSING_ANODE_LOCATION_1: NORMAL
MDC_IDC_SET_LEADCHNL_RV_SENSING_CATHODE_ELECTRODE_1: NORMAL
MDC_IDC_SET_LEADCHNL_RV_SENSING_CATHODE_LOCATION_1: NORMAL
MDC_IDC_SET_LEADCHNL_RV_SENSING_POLARITY: NORMAL
MDC_IDC_SET_LEADCHNL_RV_SENSING_SENSITIVITY: 0.9 MV
MDC_IDC_SET_ZONE_DETECTION_INTERVAL: 350 MS
MDC_IDC_SET_ZONE_DETECTION_INTERVAL: 400 MS
MDC_IDC_SET_ZONE_TYPE: NORMAL
MDC_IDC_STAT_AT_BURDEN_PERCENT: 0 %
MDC_IDC_STAT_AT_DTM_END: NORMAL
MDC_IDC_STAT_AT_DTM_START: NORMAL
MDC_IDC_STAT_BRADY_AP_VP_PERCENT: 3.35 %
MDC_IDC_STAT_BRADY_AP_VS_PERCENT: 5.06 %
MDC_IDC_STAT_BRADY_AS_VP_PERCENT: 49.53 %
MDC_IDC_STAT_BRADY_AS_VS_PERCENT: 42.06 %
MDC_IDC_STAT_BRADY_DTM_END: NORMAL
MDC_IDC_STAT_BRADY_DTM_START: NORMAL
MDC_IDC_STAT_BRADY_RA_PERCENT_PACED: 8.26 %
MDC_IDC_STAT_BRADY_RV_PERCENT_PACED: 52.88 %
MDC_IDC_STAT_EPISODE_RECENT_COUNT: 0
MDC_IDC_STAT_EPISODE_RECENT_COUNT: 1
MDC_IDC_STAT_EPISODE_RECENT_COUNT_DTM_END: NORMAL
MDC_IDC_STAT_EPISODE_RECENT_COUNT_DTM_START: NORMAL
MDC_IDC_STAT_EPISODE_TOTAL_COUNT: 0
MDC_IDC_STAT_EPISODE_TOTAL_COUNT: 0
MDC_IDC_STAT_EPISODE_TOTAL_COUNT: 1
MDC_IDC_STAT_EPISODE_TOTAL_COUNT: 1
MDC_IDC_STAT_EPISODE_TOTAL_COUNT: 9
MDC_IDC_STAT_EPISODE_TOTAL_COUNT_DTM_END: NORMAL
MDC_IDC_STAT_EPISODE_TOTAL_COUNT_DTM_START: NORMAL
MDC_IDC_STAT_EPISODE_TYPE: NORMAL

## 2022-03-14 PROCEDURE — 93294 REM INTERROG EVL PM/LDLS PM: CPT | Performed by: INTERNAL MEDICINE

## 2022-03-14 PROCEDURE — 93296 REM INTERROG EVL PM/IDS: CPT | Performed by: INTERNAL MEDICINE

## 2022-04-14 DIAGNOSIS — I48.92 ATRIAL FLUTTER, UNSPECIFIED TYPE (H): ICD-10-CM

## 2022-04-15 DIAGNOSIS — M10.9 GOUT, UNSPECIFIED CAUSE, UNSPECIFIED CHRONICITY, UNSPECIFIED SITE: ICD-10-CM

## 2022-04-15 RX ORDER — APIXABAN 5 MG/1
TABLET, FILM COATED ORAL
Qty: 180 TABLET | Refills: 1 | Status: SHIPPED | OUTPATIENT
Start: 2022-04-15 | End: 2022-08-03

## 2022-04-15 NOTE — TELEPHONE ENCOUNTER
Routing refill request to provider for review/approval because:  Failed protocol due to:    Normal Platelets on file in past 12 months    Patient is 18-79 years of age    Recent (6 mo) or future (30 days) visit within the authorizing provider's specialty     Portia Hernandez RN

## 2022-04-19 RX ORDER — ALLOPURINOL 100 MG/1
TABLET ORAL
Qty: 90 TABLET | Refills: 1 | Status: SHIPPED | OUTPATIENT
Start: 2022-04-19 | End: 2023-01-30

## 2022-05-04 DIAGNOSIS — E78.5 HYPERLIPIDEMIA LDL GOAL <130: ICD-10-CM

## 2022-05-04 NOTE — LETTER
[facility]AN DANIEL [facility]  [address] [address]  [phone]  May 9, 2022  Mars Reyes  [facility] [facility] [address]    Dear Mars,    I am contacting you regarding the refill request we received for you. After reviewing your chart it looks like you are overdue for Lipids /labs Please call [phone] or schedule a lab only appointment this through my chart to continue to receive refills. If you anticipate running out before your appointment let us know and we can send in a clary refill.       Thank you,     JUAN DANIEL [facility] nursing staff

## 2022-05-05 RX ORDER — PRAVASTATIN SODIUM 20 MG
20 TABLET ORAL DAILY
Qty: 90 TABLET | Refills: 0 | Status: SHIPPED | OUTPATIENT
Start: 2022-05-05 | End: 2022-07-15

## 2022-05-05 NOTE — TELEPHONE ENCOUNTER
Patient called, stated he has been out of medication for three days, requests refill as soon as possible please.

## 2022-05-05 NOTE — TELEPHONE ENCOUNTER
Routing refill request to provider for review/approval because:  Labs not current:    LDL Cholesterol Calculated   Date Value Ref Range Status   07/14/2020 84 <100 mg/dL Final     Comment:     Desirable:       <100 mg/dl     Portia Hernandez RN

## 2022-05-31 ENCOUNTER — TRANSFERRED RECORDS (OUTPATIENT)
Dept: HEALTH INFORMATION MANAGEMENT | Facility: CLINIC | Age: 82
End: 2022-05-31
Payer: COMMERCIAL

## 2022-06-03 ENCOUNTER — HOSPITAL ENCOUNTER (OUTPATIENT)
Facility: CLINIC | Age: 82
End: 2022-06-03
Attending: ORTHOPAEDIC SURGERY | Admitting: ORTHOPAEDIC SURGERY
Payer: COMMERCIAL

## 2022-06-03 ENCOUNTER — OFFICE VISIT (OUTPATIENT)
Dept: INTERNAL MEDICINE | Facility: CLINIC | Age: 82
End: 2022-06-03
Payer: COMMERCIAL

## 2022-06-03 VITALS
TEMPERATURE: 98 F | HEART RATE: 85 BPM | HEIGHT: 70 IN | BODY MASS INDEX: 25.91 KG/M2 | RESPIRATION RATE: 16 BRPM | DIASTOLIC BLOOD PRESSURE: 72 MMHG | OXYGEN SATURATION: 95 % | SYSTOLIC BLOOD PRESSURE: 124 MMHG | WEIGHT: 181 LBS

## 2022-06-03 DIAGNOSIS — Z01.818 PRE-OP EXAMINATION: Primary | ICD-10-CM

## 2022-06-03 DIAGNOSIS — D69.3 IMMUNE THROMBOCYTOPENIC PURPURA (H): ICD-10-CM

## 2022-06-03 DIAGNOSIS — I10 ESSENTIAL HYPERTENSION, BENIGN: ICD-10-CM

## 2022-06-03 DIAGNOSIS — I27.20 PULMONARY HYPERTENSION, UNSPECIFIED (H): ICD-10-CM

## 2022-06-03 DIAGNOSIS — I48.92 ATRIAL FLUTTER, UNSPECIFIED TYPE (H): ICD-10-CM

## 2022-06-03 LAB
ERYTHROCYTE [DISTWIDTH] IN BLOOD BY AUTOMATED COUNT: 12.4 % (ref 10–15)
HCT VFR BLD AUTO: 37.6 % (ref 40–53)
HGB BLD-MCNC: 12.7 G/DL (ref 13.3–17.7)
MCH RBC QN AUTO: 29.5 PG (ref 26.5–33)
MCHC RBC AUTO-ENTMCNC: 33.8 G/DL (ref 31.5–36.5)
MCV RBC AUTO: 87 FL (ref 78–100)
MRSA DNA SPEC QL NAA+PROBE: NEGATIVE
PLATELET # BLD AUTO: 197 10E3/UL (ref 150–450)
RBC # BLD AUTO: 4.3 10E6/UL (ref 4.4–5.9)
SA TARGET DNA: NEGATIVE
WBC # BLD AUTO: 7.5 10E3/UL (ref 4–11)

## 2022-06-03 PROCEDURE — 85027 COMPLETE CBC AUTOMATED: CPT | Performed by: INTERNAL MEDICINE

## 2022-06-03 PROCEDURE — 80053 COMPREHEN METABOLIC PANEL: CPT | Performed by: INTERNAL MEDICINE

## 2022-06-03 PROCEDURE — 87641 MR-STAPH DNA AMP PROBE: CPT | Performed by: INTERNAL MEDICINE

## 2022-06-03 PROCEDURE — 87640 STAPH A DNA AMP PROBE: CPT | Mod: 59 | Performed by: INTERNAL MEDICINE

## 2022-06-03 PROCEDURE — 36415 COLL VENOUS BLD VENIPUNCTURE: CPT | Performed by: INTERNAL MEDICINE

## 2022-06-03 PROCEDURE — 93000 ELECTROCARDIOGRAM COMPLETE: CPT | Performed by: INTERNAL MEDICINE

## 2022-06-03 PROCEDURE — 99214 OFFICE O/P EST MOD 30 MIN: CPT | Performed by: INTERNAL MEDICINE

## 2022-06-03 NOTE — TELEPHONE ENCOUNTER
Please give pt a call to schedule. Thank you      Next Appointment: About 2 Months       Tests:      As discussed, if still experiencing joint pain despite significantly cutting back on alcohol beverage intake and/or utilizing diclofenac gel (clearance provided by cardiology as noted above) then can pursue labs ordered.  Patient should contact us at a week thereafter to notify us that he pursue labs and also inform us if would like to try a prednisone taper or referral for image guided right third PIP joint cortisone injection.  We will then also consider increasing allopurinol.     
Eyeglasses

## 2022-06-03 NOTE — PROGRESS NOTES
[place] [address] [facility] [address]  Phone: [phone]  Primary Provider: Pb Warren  Pre-op Performing Provider: RYAN HUNG      PREOPERATIVE EVALUATION:  Today's date: 6/3/2022    Mars Reyes is a 81 year old male who presents for a preoperative evaluation.    Surgical Information:  Surgery/Procedure: total arthroplasty, right hip  Surgery Location: [facility]  Surgeon: Cassius  Surgery Date: 6/08/2022  Time of Surgery: 1110  Where patient plans to recover: At home with family  Fax number for surgical facility: Note does not need to be faxed, will be available electronically in Epic.    Type of Anesthesia Anticipated: Spinal    Assessment & Plan     The proposed surgical procedure is considered INTERMEDIATE risk.    Pre-op examination  Assess preop lab work   - Methicillin Resistant Staph Aureus PCR  - EKG 12-lead complete w/read - Clinics  - CBC with platelets  - Comprehensive metabolic panel (BMP + Alb, Alk Phos, ALT, AST, Total. Bili, TP)    Pulmonary hypertension, unspecified (H)  No symptoms of SOB, follow up with cardiology     Atrial flutter, unspecified type (H)  Controlled rate, assess EKG  Hold Eliquis for 3 days prior to surgery     Immune thrombocytopenic purpura (H)  Assess CBC    Essential hypertension, benign  Controlled on treatment        Implanted Device:   - Type of device: PM Patient advised to bring device information on day of surgery.      Risks and Recommendations:  The patient has the following additional risks and recommendations for perioperative complications:   - No identified additional risk factors other than previously addressed    Medication Instructions:   - apixaban (Eliquis), edoxaban (Savaysa), rivaroxaban (Xarelto): Neuraxial or regional block anticipated AND CrCL (>=) 50mL/min. HOLD 3 days before surgery.     RECOMMENDATION:  APPROVAL GIVEN to proceed with proposed procedure, without  further diagnostic evaluation.        Subjective     HPI related to upcoming procedure: planned for right SONIA for OA.   No acute complaints, no medication change or new medical conditions.  Has history of atrial fibrillation. On anticoagulation with Eliquis and rate control medications. Asymptonatic - no chest pains , palpitations,  no side effects from medications.  Has h/o HTN. on medical treatment. BP has been controlled. No side effects from medications. No CP, HA, dizziness. good compliance with medications and low salt diet.  Has H/O hyperlipidemia. On medical treatment and diet. No side effects. No muscle weakness, myalgias or upset stomach.           Preop Questions 6/3/2022   1. Have you ever had a heart attack or stroke? No   2. Have you ever had surgery on your heart or blood vessels, such as a stent placement, a coronary artery bypass, or surgery on an artery in your head, neck, heart, or legs? YES -    3. Do you have chest pain with activity? No   4. Do you have a history of  heart failure? No   5. Do you currently have a cold, bronchitis or symptoms of other infection? No   6. Do you have a cough, shortness of breath, or wheezing? No   7. Do you or anyone in your family have previous history of blood clots? No   8. Do you or does anyone in your family have a serious bleeding problem such as prolonged bleeding following surgeries or cuts? No   9. Have you ever had problems with anemia or been told to take iron pills? YES -    10. Have you had any abnormal blood loss such as black, tarry or bloody stools? No   11. Have you ever had a blood transfusion? No   12. Are you willing to have a blood transfusion if it is medically needed before, during, or after your surgery? Yes   13. Have you or any of your relatives ever had problems with anesthesia? No   14. Do you have sleep apnea, excessive snoring or daytime drowsiness? No   15. Do you have any artifical heart valves or other implanted medical devices  like a pacemaker, defibrillator, or continuous glucose monitor? YES -    15a. What type of device do you have? TAVR. Medtronic. Pacemaker   15b. Name of the clinic that manages your device:   [facility]   16. Do you have artificial joints? YES -    17. Are you allergic to latex? No       Health Care Directive:  Patient does not have a Health Care Directive or Living Will: Patient states has Advance Directive and will bring in a copy to clinic.    Preoperative Review of :   reviewed - no record of controlled substances prescribed.      Status of Chronic Conditions:  See problem list for active medical problems.  Problems all longstanding and stable, except as noted/documented.  See ROS for pertinent symptoms related to these conditions.      Review of Systems  CONSTITUTIONAL: NEGATIVE for fever, chills, change in weight  INTEGUMENTARY/SKIN: NEGATIVE for worrisome rashes, moles or lesions  EYES: NEGATIVE for vision changes or irritation  ENT/MOUTH: NEGATIVE for ear, mouth and throat problems  RESP: NEGATIVE for significant cough or SOB  CV: NEGATIVE for chest pain, palpitations or peripheral edema  GI: NEGATIVE for nausea, abdominal pain, heartburn, or change in bowel habits  : NEGATIVE for frequency, dysuria, or hematuria  MUSCULOSKELETAL: NEGATIVE for significant arthralgias or myalgia  NEURO: NEGATIVE for weakness, dizziness or paresthesias  ENDOCRINE: NEGATIVE for temperature intolerance, skin/hair changes  HEME: NEGATIVE for bleeding problems  PSYCHIATRIC: NEGATIVE for changes in mood or affect         Past Medical History:   Diagnosis Date     Aortic stenosis     moderate     Atypical atrial flutter (H)     PVI and LA linear ablation with successful termination of AFL 10/18/2019     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, unspecified cause, unspecified laterality      Contact dermatitis and other eczema, due to unspecified cause      Diverticulosis of colon (without mention of hemorrhage)       DJD (degenerative joint disease)      Essential hypertension, benign     abstracted 7/22/02     Gout, unspecified      Hip joint replacement by other means 06/03/2009     MGUS (monoclonal gammopathy of unknown significance)      Mitral valve disease     mild MS, moderate-severe MR     Pacemaker      PSVT (paroxysmal supraventricular tachycardia) (H)      Pulmonary HTN (H) 10/18/2011     Thrombocytopenia (H)      Past Surgical History:   Procedure Laterality Date     ANESTHESIA CARDIOVERSION N/A 3/21/2019    Procedure: ANESTHESIA CARDIOVERSION;  Surgeon: GENERIC ANESTHESIA PROVIDER;  Location: [facility] OR     ARTHROPLASTY REVISION HIP ANTERIOR Left 9/9/2019    Procedure: REVISION DIRECT ANTERIOR TOTAL LEFT HIP ARTHROPLASTY;  Surgeon: Luisito Hammonds MD;  Location:  OR     BIOPSY  3/2017     BONE MARROW BIOPSY, BONE SPECIMEN, NEEDLE/TROCAR Right 2/23/2017    Procedure: BIOPSY BONE MARROW;  Surgeon: Romie Esquivel MD;  Location:  GI     COLONOSCOPY  2010     CV HEART CATHETERIZATION WITH POSSIBLE INTERVENTION N/A 8/25/2020    Procedure: Coronary Angiogram;  Surgeon: Brian Simmons MD;  Location:  HEART CARDIAC CATH LAB     CV LEFT HEART CATH N/A 8/25/2020    Procedure: Left Heart Cath;  Surgeon: Brian Simmons MD;  Location:  HEART CARDIAC CATH LAB     CV RIGHT HEART CATH MEASUREMENTS RECORDED N/A 8/25/2020    Procedure: Right Heart Cath;  Surgeon: Brian Simmons MD;  Location:  HEART CARDIAC CATH LAB     CV TRANSCATHETER AORTIC VALVE REPLACEMENT N/A 6/22/2021    Procedure: Transcatheter Aortic Valve Replacement;  Surgeon: Hunter Adan MD;  Location:  HEART CARDIAC CATH LAB     EP 3D TACHYCARDIA MAPPING N/A 10/18/2019    Procedure: EP 3D Tachycardia Mapping;  Surgeon: Delfino Gee MD;  Location:  HEART CARDIAC CATH LAB     EP ABLATION FOCAL AFIB N/A 10/18/2019    Procedure: EP Ablation Focal AFIB;  Surgeon: Delfino Gee MD;  Location:  HEART CARDIAC CATH LAB     EP PACEMAKER N/A  "7/8/2021    Procedure: EP Pacemaker;  Surgeon: Blanca Lopez MD;  Location:  HEART CARDIAC CATH LAB     EYE SURGERY      Cataract, both eyes     ORTHOPEDIC SURGERY  2009    (L) total hip     Current Outpatient Medications   Medication Sig Dispense Refill     acetaminophen (TYLENOL) 325 MG tablet Take 2 tablets (650 mg) by mouth every 4 hours as needed for mild pain or headaches       allopurinol (ZYLOPRIM) 100 MG tablet TAKE 1 TABLET BY MOUTH EVERY ONCE DAILY 90 tablet 1     amoxicillin (AMOXIL) 500 MG tablet Take 2,000 mg by mouth daily as needed (1 hour prior to dental procedures)       ELIQUIS ANTICOAGULANT 5 MG tablet TAKE 1 TABLET BY MOUTH TWICE A  tablet 1     lisinopril (ZESTRIL) 20 MG tablet Take 1 tablet (20 mg) by mouth every evening 90 tablet 1     pravastatin (PRAVACHOL) 20 MG tablet Take 1 tablet (20 mg) by mouth daily 90 tablet 0       No Known Allergies     Social History     Tobacco Use     Smoking status: Never Smoker     Smokeless tobacco: Never Used   Substance Use Topics     Alcohol use: Yes     Alcohol/week: 0.0 standard drinks     Comment: Max 2 beers per day     Family History   Problem Relation Age of Onset     Hypertension Mother      History   Drug Use No         Objective     /72   Pulse 85   Temp 98  F (36.7  C) (Tympanic)   Resp 16   Ht 1.778 m (5' 10\")   Wt 82.1 kg (181 lb)   SpO2 95%   BMI 25.97 kg/m      Physical Exam    GENERAL APPEARANCE: healthy, alert and no distress     EYES: EOMI,  PERRL     HENT: ear canals and TM's normal and nose and mouth without ulcers or lesions     NECK: no adenopathy, no asymmetry, masses, or scars and thyroid normal to palpation     RESP: lungs clear to auscultation - no rales, rhonchi or wheezes     CV: regular rates and rhythm, normal S1 S2, no S3 or S4 and 2/6 systolic murmur, no click or rub     ABDOMEN:  soft, nontender, no HSM or masses and bowel sounds normal     MS: extremities normal- no gross deformities " noted, no evidence of inflammation in joints, FROM in all extremities.     SKIN: no suspicious lesions or rashes     NEURO: Normal strength and tone, sensory exam grossly normal, mentation intact and speech normal     PSYCH: mentation appears normal. and affect normal/bright     LYMPHATICS: No cervical adenopathy    Recent Labs   Lab Test 07/16/21  0908 07/08/21  0914 06/22/21  0624 06/16/21  1415 11/16/20  1227   HGB 12.1* 12.4*   < > 14.2 15.1   PLT 54* 56*   < > 68* 78*   INR  --   --   --  1.01 1.22*   * 134   < > 133 135   POTASSIUM 4.4 4.3   < > 4.8 5.2   CR 0.88 0.83   < > 1.02 0.96    < > = values in this interval not displayed.        Diagnostics:  Labs pending at this time.  Results will be reviewed when available.   EKG: sinus rhythm, old infarct    Revised Cardiac Risk Index (RCRI):  The patient has the following serious cardiovascular risks for perioperative complications:   - No serious cardiac risks = 0 points   Await lab work for assessment of platelet count     RCRI Interpretation: 0 points: Class I (very low risk - 0.4% complication rate)           Signed Electronically by: Pola Leiva MD  Copy of this evaluation report is provided to requesting physician.

## 2022-06-03 NOTE — PROVIDER NOTIFICATION
06/03/22 0903   Discharge Planning   Patient/Family Anticipates Transition to home with family   Living Arrangements   People in Home spouse   Type of Residence Private Residence   Number of Stairs, Within Home, Primary   (5)   Once home, are you able to live on one level? Yes   Bathroom Shower/Tub Tub/Shower unit   Equipment Currently Used at Home none   Support System   Support Systems Spouse/Significant Other;Children   Do you have someone available to stay with you one or two nights once you are home? Yes   Education   Patient attended total joint pre-op class/received pre-op teaching    (instructed pt to do online class if possible)

## 2022-06-03 NOTE — H&P (VIEW-ONLY)
[place] [address] [facility] [address]  Phone: [phone]  Primary Provider: Pb Warren  Pre-op Performing Provider: RYAN HUNG      PREOPERATIVE EVALUATION:  Today's date: 6/3/2022    Mars Reyes is a 81 year old male who presents for a preoperative evaluation.    Surgical Information:  Surgery/Procedure: total arthroplasty, right hip  Surgery Location: [facility]  Surgeon: Cassius  Surgery Date: 6/08/2022  Time of Surgery: 1110  Where patient plans to recover: At home with family  Fax number for surgical facility: Note does not need to be faxed, will be available electronically in Epic.    Type of Anesthesia Anticipated: Spinal    Assessment & Plan     The proposed surgical procedure is considered INTERMEDIATE risk.    Pre-op examination  Assess preop lab work   - Methicillin Resistant Staph Aureus PCR  - EKG 12-lead complete w/read - Clinics  - CBC with platelets  - Comprehensive metabolic panel (BMP + Alb, Alk Phos, ALT, AST, Total. Bili, TP)    Pulmonary hypertension, unspecified (H)  No symptoms of SOB, follow up with cardiology     Atrial flutter, unspecified type (H)  Controlled rate, assess EKG  Hold Eliquis for 3 days prior to surgery     Immune thrombocytopenic purpura (H)  Assess CBC    Essential hypertension, benign  Controlled on treatment        Implanted Device:   - Type of device: PM Patient advised to bring device information on day of surgery.      Risks and Recommendations:  The patient has the following additional risks and recommendations for perioperative complications:   - No identified additional risk factors other than previously addressed    Medication Instructions:   - apixaban (Eliquis), edoxaban (Savaysa), rivaroxaban (Xarelto): Neuraxial or regional block anticipated AND CrCL (>=) 50mL/min. HOLD 3 days before surgery.     RECOMMENDATION:  APPROVAL GIVEN to proceed with proposed procedure, without  further diagnostic evaluation.        Subjective     HPI related to upcoming procedure: planned for right SONIA for OA.   No acute complaints, no medication change or new medical conditions.  Has history of atrial fibrillation. On anticoagulation with Eliquis and rate control medications. Asymptonatic - no chest pains , palpitations,  no side effects from medications.  Has h/o HTN. on medical treatment. BP has been controlled. No side effects from medications. No CP, HA, dizziness. good compliance with medications and low salt diet.  Has H/O hyperlipidemia. On medical treatment and diet. No side effects. No muscle weakness, myalgias or upset stomach.           Preop Questions 6/3/2022   1. Have you ever had a heart attack or stroke? No   2. Have you ever had surgery on your heart or blood vessels, such as a stent placement, a coronary artery bypass, or surgery on an artery in your head, neck, heart, or legs? YES -    3. Do you have chest pain with activity? No   4. Do you have a history of  heart failure? No   5. Do you currently have a cold, bronchitis or symptoms of other infection? No   6. Do you have a cough, shortness of breath, or wheezing? No   7. Do you or anyone in your family have previous history of blood clots? No   8. Do you or does anyone in your family have a serious bleeding problem such as prolonged bleeding following surgeries or cuts? No   9. Have you ever had problems with anemia or been told to take iron pills? YES -    10. Have you had any abnormal blood loss such as black, tarry or bloody stools? No   11. Have you ever had a blood transfusion? No   12. Are you willing to have a blood transfusion if it is medically needed before, during, or after your surgery? Yes   13. Have you or any of your relatives ever had problems with anesthesia? No   14. Do you have sleep apnea, excessive snoring or daytime drowsiness? No   15. Do you have any artifical heart valves or other implanted medical devices  like a pacemaker, defibrillator, or continuous glucose monitor? YES -    15a. What type of device do you have? TAVR. Medtronic. Pacemaker   15b. Name of the clinic that manages your device:   [facility]   16. Do you have artificial joints? YES -    17. Are you allergic to latex? No       Health Care Directive:  Patient does not have a Health Care Directive or Living Will: Patient states has Advance Directive and will bring in a copy to clinic.    Preoperative Review of :   reviewed - no record of controlled substances prescribed.      Status of Chronic Conditions:  See problem list for active medical problems.  Problems all longstanding and stable, except as noted/documented.  See ROS for pertinent symptoms related to these conditions.      Review of Systems  CONSTITUTIONAL: NEGATIVE for fever, chills, change in weight  INTEGUMENTARY/SKIN: NEGATIVE for worrisome rashes, moles or lesions  EYES: NEGATIVE for vision changes or irritation  ENT/MOUTH: NEGATIVE for ear, mouth and throat problems  RESP: NEGATIVE for significant cough or SOB  CV: NEGATIVE for chest pain, palpitations or peripheral edema  GI: NEGATIVE for nausea, abdominal pain, heartburn, or change in bowel habits  : NEGATIVE for frequency, dysuria, or hematuria  MUSCULOSKELETAL: NEGATIVE for significant arthralgias or myalgia  NEURO: NEGATIVE for weakness, dizziness or paresthesias  ENDOCRINE: NEGATIVE for temperature intolerance, skin/hair changes  HEME: NEGATIVE for bleeding problems  PSYCHIATRIC: NEGATIVE for changes in mood or affect         Past Medical History:   Diagnosis Date     Aortic stenosis     moderate     Atypical atrial flutter (H)     PVI and LA linear ablation with successful termination of AFL 10/18/2019     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, unspecified cause, unspecified laterality      Contact dermatitis and other eczema, due to unspecified cause      Diverticulosis of colon (without mention of hemorrhage)       DJD (degenerative joint disease)      Essential hypertension, benign     abstracted 7/22/02     Gout, unspecified      Hip joint replacement by other means 06/03/2009     MGUS (monoclonal gammopathy of unknown significance)      Mitral valve disease     mild MS, moderate-severe MR     Pacemaker      PSVT (paroxysmal supraventricular tachycardia) (H)      Pulmonary HTN (H) 10/18/2011     Thrombocytopenia (H)      Past Surgical History:   Procedure Laterality Date     ANESTHESIA CARDIOVERSION N/A 3/21/2019    Procedure: ANESTHESIA CARDIOVERSION;  Surgeon: GENERIC ANESTHESIA PROVIDER;  Location: [facility] OR     ARTHROPLASTY REVISION HIP ANTERIOR Left 9/9/2019    Procedure: REVISION DIRECT ANTERIOR TOTAL LEFT HIP ARTHROPLASTY;  Surgeon: Luisito Hammonds MD;  Location:  OR     BIOPSY  3/2017     BONE MARROW BIOPSY, BONE SPECIMEN, NEEDLE/TROCAR Right 2/23/2017    Procedure: BIOPSY BONE MARROW;  Surgeon: Romie Esquivel MD;  Location:  GI     COLONOSCOPY  2010     CV HEART CATHETERIZATION WITH POSSIBLE INTERVENTION N/A 8/25/2020    Procedure: Coronary Angiogram;  Surgeon: Brian Simmons MD;  Location:  HEART CARDIAC CATH LAB     CV LEFT HEART CATH N/A 8/25/2020    Procedure: Left Heart Cath;  Surgeon: Brian Simmons MD;  Location:  HEART CARDIAC CATH LAB     CV RIGHT HEART CATH MEASUREMENTS RECORDED N/A 8/25/2020    Procedure: Right Heart Cath;  Surgeon: Brian Simmons MD;  Location:  HEART CARDIAC CATH LAB     CV TRANSCATHETER AORTIC VALVE REPLACEMENT N/A 6/22/2021    Procedure: Transcatheter Aortic Valve Replacement;  Surgeon: Hunter Adan MD;  Location:  HEART CARDIAC CATH LAB     EP 3D TACHYCARDIA MAPPING N/A 10/18/2019    Procedure: EP 3D Tachycardia Mapping;  Surgeon: Delfino Gee MD;  Location:  HEART CARDIAC CATH LAB     EP ABLATION FOCAL AFIB N/A 10/18/2019    Procedure: EP Ablation Focal AFIB;  Surgeon: Delfino Gee MD;  Location:  HEART CARDIAC CATH LAB     EP PACEMAKER N/A  "7/8/2021    Procedure: EP Pacemaker;  Surgeon: Blanca Lopez MD;  Location:  HEART CARDIAC CATH LAB     EYE SURGERY      Cataract, both eyes     ORTHOPEDIC SURGERY  2009    (L) total hip     Current Outpatient Medications   Medication Sig Dispense Refill     acetaminophen (TYLENOL) 325 MG tablet Take 2 tablets (650 mg) by mouth every 4 hours as needed for mild pain or headaches       allopurinol (ZYLOPRIM) 100 MG tablet TAKE 1 TABLET BY MOUTH EVERY ONCE DAILY 90 tablet 1     amoxicillin (AMOXIL) 500 MG tablet Take 2,000 mg by mouth daily as needed (1 hour prior to dental procedures)       ELIQUIS ANTICOAGULANT 5 MG tablet TAKE 1 TABLET BY MOUTH TWICE A  tablet 1     lisinopril (ZESTRIL) 20 MG tablet Take 1 tablet (20 mg) by mouth every evening 90 tablet 1     pravastatin (PRAVACHOL) 20 MG tablet Take 1 tablet (20 mg) by mouth daily 90 tablet 0       No Known Allergies     Social History     Tobacco Use     Smoking status: Never Smoker     Smokeless tobacco: Never Used   Substance Use Topics     Alcohol use: Yes     Alcohol/week: 0.0 standard drinks     Comment: Max 2 beers per day     Family History   Problem Relation Age of Onset     Hypertension Mother      History   Drug Use No         Objective     /72   Pulse 85   Temp 98  F (36.7  C) (Tympanic)   Resp 16   Ht 1.778 m (5' 10\")   Wt 82.1 kg (181 lb)   SpO2 95%   BMI 25.97 kg/m      Physical Exam    GENERAL APPEARANCE: healthy, alert and no distress     EYES: EOMI,  PERRL     HENT: ear canals and TM's normal and nose and mouth without ulcers or lesions     NECK: no adenopathy, no asymmetry, masses, or scars and thyroid normal to palpation     RESP: lungs clear to auscultation - no rales, rhonchi or wheezes     CV: regular rates and rhythm, normal S1 S2, no S3 or S4 and 2/6 systolic murmur, no click or rub     ABDOMEN:  soft, nontender, no HSM or masses and bowel sounds normal     MS: extremities normal- no gross deformities " noted, no evidence of inflammation in joints, FROM in all extremities.     SKIN: no suspicious lesions or rashes     NEURO: Normal strength and tone, sensory exam grossly normal, mentation intact and speech normal     PSYCH: mentation appears normal. and affect normal/bright     LYMPHATICS: No cervical adenopathy    Recent Labs   Lab Test 07/16/21  0908 07/08/21  0914 06/22/21  0624 06/16/21  1415 11/16/20  1227   HGB 12.1* 12.4*   < > 14.2 15.1   PLT 54* 56*   < > 68* 78*   INR  --   --   --  1.01 1.22*   * 134   < > 133 135   POTASSIUM 4.4 4.3   < > 4.8 5.2   CR 0.88 0.83   < > 1.02 0.96    < > = values in this interval not displayed.        Diagnostics:  Labs pending at this time.  Results will be reviewed when available.   EKG: sinus rhythm, old infarct    Revised Cardiac Risk Index (RCRI):  The patient has the following serious cardiovascular risks for perioperative complications:   - No serious cardiac risks = 0 points   Await lab work for assessment of platelet count     RCRI Interpretation: 0 points: Class I (very low risk - 0.4% complication rate)           Signed Electronically by: Pola Leiva MD  Copy of this evaluation report is provided to requesting physician.

## 2022-06-04 ENCOUNTER — LAB (OUTPATIENT)
Dept: LAB | Facility: CLINIC | Age: 82
End: 2022-06-04
Payer: COMMERCIAL

## 2022-06-04 DIAGNOSIS — Z20.822 ENCOUNTER FOR LABORATORY TESTING FOR COVID-19 VIRUS: ICD-10-CM

## 2022-06-04 LAB
ALBUMIN SERPL-MCNC: 3.8 G/DL (ref 3.4–5)
ALP SERPL-CCNC: 86 U/L (ref 40–150)
ALT SERPL W P-5'-P-CCNC: 24 U/L (ref 0–70)
ANION GAP SERPL CALCULATED.3IONS-SCNC: 3 MMOL/L (ref 3–14)
AST SERPL W P-5'-P-CCNC: 19 U/L (ref 0–45)
BILIRUB SERPL-MCNC: 0.7 MG/DL (ref 0.2–1.3)
BUN SERPL-MCNC: 22 MG/DL (ref 7–30)
CALCIUM SERPL-MCNC: 8.9 MG/DL (ref 8.5–10.1)
CHLORIDE BLD-SCNC: 106 MMOL/L (ref 94–109)
CO2 SERPL-SCNC: 27 MMOL/L (ref 20–32)
CREAT SERPL-MCNC: 0.91 MG/DL (ref 0.66–1.25)
GFR SERPL CREATININE-BSD FRML MDRD: 85 ML/MIN/1.73M2
GLUCOSE BLD-MCNC: 91 MG/DL (ref 70–99)
POTASSIUM BLD-SCNC: 4.6 MMOL/L (ref 3.4–5.3)
PROT SERPL-MCNC: 7.4 G/DL (ref 6.8–8.8)
SARS-COV-2 RNA RESP QL NAA+PROBE: POSITIVE
SODIUM SERPL-SCNC: 136 MMOL/L (ref 133–144)

## 2022-06-04 PROCEDURE — U0005 INFEC AGEN DETEC AMPLI PROBE: HCPCS

## 2022-06-06 ENCOUNTER — TELEPHONE (OUTPATIENT)
Dept: NURSING | Facility: CLINIC | Age: 82
End: 2022-06-06
Payer: COMMERCIAL

## 2022-06-06 NOTE — TELEPHONE ENCOUNTER
Coronavirus (COVID-19) Notification    Caller Name (Patient, parent, daughter/son, grandparent, etc)  Patient    Reason for call  Notify of Positive Coronavirus (COVID-19) lab results, assess symptoms,  review [facility] recommendations    Lab Result    Lab test:  2019-nCoV rRt-PCR or SARS-CoV-2 PCR    Oropharyngeal AND/OR nasopharyngeal swabs is POSITIVE for 2019-nCoV RNA/SARS-COV-2 PCR (COVID-19 virus)      Gather patient reported symptoms   Assessment   Current Symptoms at time of phone call, reported by patient: (if no symptoms, document: No symptoms] No sx   Date of symptom(s) onset (if applicable) N/A     If at time of call, Patients symptoms have worsened, the Patient should contact 911 or have someone drive them to Emergency Dept promptly:      If Patient calling 911, inform 911 personal that you have tested positive for the Coronavirus (COVID-19).  Place mask on and await 911 to arrive.    If Emergency Dept, If possible, please have another adult drive you to the Emergency Dept but you need to wear mask when in contact with other people.      Treatment Options:   Patient classified as COVID treatment eligible by Epic high risk algorithm: Yes  Is the patient symptomatic at the time of result notification? No    Review information with Patient    Your result was positive. This means you have COVID-19 (coronavirus).    How can I protect others?    These guidelines are for isolating before returning to work, school or .    If you DO have symptoms    Stay home and away from others     For at least 5 days after your symptoms started, AND    You are fever free for 24 hours (with no medicine that reduces fever), AND    Your other symptoms are better    Wear a mask for 10 full days anytime you are around others    If you DON'T have symptoms    Stay home and away from others for at least 5 days after your positive test    Wear a mask for 10 full days anytime you are around others    There may be  different guidelines for healthcare facilities.  Please check with the specific sites before arriving.    If you have been told by a doctor that you were severely ill with COVID-19 or are immunocompromised, you should isolate for at least 10 days.    You should not go back to work until you meet the guidelines above for ending your home isolation. You don't need to be retested for COVID-19 before going back to work--studies show that you won't spread the virus if it's been at least 10 days since your symptoms started (or 20 days, if you have a weak immune system).    Employers, schools, and daycares: This is an official notice for this person's medical guidelines for returning in-person.  They must meet the above guidelines before going back to work, school or  in person.    You will receive a positive COVID-19 letter via [employer] or the mail soon with additional self-care information (exception, no letters will be sent to presurgical/preprocedure patients).    Would you like me to review some of that information with you now?  No    If you were tested for an upcoming procedure, please contact your provider for next steps.    Elisabeth Quinteros

## 2022-06-13 DIAGNOSIS — I51.89 DIASTOLIC DYSFUNCTION: ICD-10-CM

## 2022-06-13 RX ORDER — LISINOPRIL 20 MG/1
20 TABLET ORAL EVERY EVENING
Qty: 90 TABLET | Refills: 0 | Status: ON HOLD | OUTPATIENT
Start: 2022-06-13 | End: 2022-06-25

## 2022-06-19 NOTE — PHARMACY-ADMISSION MEDICATION HISTORY
Admission medication history interview status for this patient is complete. See [facility] admission navigator for allergy information, prior to admission medications and immunization status.     Medication history completed by pre-admitting nurse Arcelia Dueñas     Prior to Admission medications    Medication Sig Last Dose Taking? Auth Provider Long Term End Date   acetaminophen (TYLENOL) 325 MG tablet Take 2 tablets (650 mg) by mouth every 4 hours as needed for mild pain or headaches  Yes Skyler Pizano MD     allopurinol (ZYLOPRIM) 100 MG tablet TAKE 1 TABLET BY MOUTH EVERY ONCE DAILY  Yes Pb Warren MD     amoxicillin (AMOXIL) 500 MG tablet Take 2,000 mg by mouth daily as needed (1 hour prior to dental procedures)  Yes Reported, Patient     ELIQUIS ANTICOAGULANT 5 MG tablet TAKE 1 TABLET BY MOUTH TWICE A DAY  Yes Pb Warren MD     lisinopril (ZESTRIL) 20 MG tablet Take 1 tablet (20 mg) by mouth every evening  Yes Blanca Lopez MD Yes    pravastatin (PRAVACHOL) 20 MG tablet Take 1 tablet (20 mg) by mouth daily  Yes Pb Warren MD Yes

## 2022-06-19 NOTE — PHARMACY-MEDICATION REGIMEN REVIEW
Admission medication history interview status for this patient is complete. See [facility] admission navigator for allergy information, prior to admission medications and immunization status.     Medication history completed by pre-admitting nurse Arcelia Dueñas     Prior to Admission medications    Medication Sig Last Dose Taking? Auth Provider Long Term End Date   acetaminophen (TYLENOL) 325 MG tablet Take 2 tablets (650 mg) by mouth every 4 hours as needed for mild pain or headaches  Yes Skyler Pizano MD     allopurinol (ZYLOPRIM) 100 MG tablet TAKE 1 TABLET BY MOUTH EVERY ONCE DAILY  Yes Pb Warren MD     amoxicillin (AMOXIL) 500 MG tablet Take 2,000 mg by mouth daily as needed (1 hour prior to dental procedures)  Yes Reported, Patient     ELIQUIS ANTICOAGULANT 5 MG tablet TAKE 1 TABLET BY MOUTH TWICE A DAY  Yes Pb Warren MD     lisinopril (ZESTRIL) 20 MG tablet Take 1 tablet (20 mg) by mouth every evening  Yes Blanca Lopez MD Yes    pravastatin (PRAVACHOL) 20 MG tablet Take 1 tablet (20 mg) by mouth daily  Yes Pb Warren MD Yes

## 2022-06-20 ENCOUNTER — ANCILLARY PROCEDURE (OUTPATIENT)
Dept: CARDIOLOGY | Facility: CLINIC | Age: 82
End: 2022-06-20
Attending: INTERNAL MEDICINE
Payer: COMMERCIAL

## 2022-06-20 ENCOUNTER — LAB (OUTPATIENT)
Dept: LAB | Facility: CLINIC | Age: 82
End: 2022-06-20
Attending: INTERNAL MEDICINE
Payer: COMMERCIAL

## 2022-06-20 ENCOUNTER — HOSPITAL ENCOUNTER (OUTPATIENT)
Dept: CARDIOLOGY | Facility: CLINIC | Age: 82
Discharge: HOME | End: 2022-06-20
Attending: INTERNAL MEDICINE | Admitting: INTERNAL MEDICINE
Payer: COMMERCIAL

## 2022-06-20 DIAGNOSIS — Z95.2 S/P TAVR (TRANSCATHETER AORTIC VALVE REPLACEMENT): ICD-10-CM

## 2022-06-20 DIAGNOSIS — Z95.0 CARDIAC PACEMAKER IN SITU: Primary | ICD-10-CM

## 2022-06-20 DIAGNOSIS — Z95.0 CARDIAC PACEMAKER IN SITU: ICD-10-CM

## 2022-06-20 DIAGNOSIS — I49.5 SICK SINUS SYNDROME (H): ICD-10-CM

## 2022-06-20 LAB
ANION GAP SERPL CALCULATED.3IONS-SCNC: 5 MMOL/L (ref 3–14)
BUN SERPL-MCNC: 16 MG/DL (ref 7–30)
CALCIUM SERPL-MCNC: 9.6 MG/DL (ref 8.5–10.1)
CHLORIDE BLD-SCNC: 102 MMOL/L (ref 94–109)
CO2 SERPL-SCNC: 27 MMOL/L (ref 20–32)
CREAT SERPL-MCNC: 1.02 MG/DL (ref 0.66–1.25)
ERYTHROCYTE [DISTWIDTH] IN BLOOD BY AUTOMATED COUNT: 12.7 % (ref 10–15)
GFR SERPL CREATININE-BSD FRML MDRD: 74 ML/MIN/1.73M2
GLUCOSE BLD-MCNC: 99 MG/DL (ref 70–99)
HCT VFR BLD AUTO: 40.4 % (ref 40–53)
HGB BLD-MCNC: 13.4 G/DL (ref 13.3–17.7)
LVEF ECHO: NORMAL
MCH RBC QN AUTO: 29.9 PG (ref 26.5–33)
MCHC RBC AUTO-ENTMCNC: 33.2 G/DL (ref 31.5–36.5)
MCV RBC AUTO: 90 FL (ref 78–100)
PLAT MORPH BLD: ABNORMAL
PLATELET # BLD AUTO: 82 10E3/UL (ref 150–450)
POTASSIUM BLD-SCNC: 4.8 MMOL/L (ref 3.4–5.3)
RBC # BLD AUTO: 4.48 10E6/UL (ref 4.4–5.9)
SODIUM SERPL-SCNC: 134 MMOL/L (ref 133–144)
WBC # BLD AUTO: 7.2 10E3/UL (ref 4–11)

## 2022-06-20 PROCEDURE — 36415 COLL VENOUS BLD VENIPUNCTURE: CPT | Performed by: INTERNAL MEDICINE

## 2022-06-20 PROCEDURE — 93294 REM INTERROG EVL PM/LDLS PM: CPT | Performed by: INTERNAL MEDICINE

## 2022-06-20 PROCEDURE — 85027 COMPLETE CBC AUTOMATED: CPT | Performed by: INTERNAL MEDICINE

## 2022-06-20 PROCEDURE — 93306 TTE W/DOPPLER COMPLETE: CPT

## 2022-06-20 PROCEDURE — 93296 REM INTERROG EVL PM/IDS: CPT | Performed by: INTERNAL MEDICINE

## 2022-06-20 PROCEDURE — 93306 TTE W/DOPPLER COMPLETE: CPT | Mod: 26 | Performed by: INTERNAL MEDICINE

## 2022-06-20 PROCEDURE — 80048 BASIC METABOLIC PNL TOTAL CA: CPT | Performed by: INTERNAL MEDICINE

## 2022-06-22 ENCOUNTER — APPOINTMENT (OUTPATIENT)
Dept: GENERAL RADIOLOGY | Facility: CLINIC | Age: 82
DRG: 470 | End: 2022-06-22
Attending: ORTHOPAEDIC SURGERY
Payer: COMMERCIAL

## 2022-06-22 ENCOUNTER — APPOINTMENT (OUTPATIENT)
Dept: GENERAL RADIOLOGY | Facility: CLINIC | Age: 82
DRG: 470 | End: 2022-06-22
Attending: PHYSICIAN ASSISTANT
Payer: COMMERCIAL

## 2022-06-22 ENCOUNTER — HOSPITAL ENCOUNTER (INPATIENT)
Facility: CLINIC | Age: 82
LOS: 1 days | Discharge: HOME | DRG: 470 | End: 2022-06-25
Attending: ORTHOPAEDIC SURGERY | Admitting: ORTHOPAEDIC SURGERY
Payer: COMMERCIAL

## 2022-06-22 ENCOUNTER — ANESTHESIA (OUTPATIENT)
Dept: SURGERY | Facility: CLINIC | Age: 82
DRG: 470 | End: 2022-06-22
Payer: COMMERCIAL

## 2022-06-22 ENCOUNTER — ANESTHESIA EVENT (OUTPATIENT)
Dept: SURGERY | Facility: CLINIC | Age: 82
DRG: 470 | End: 2022-06-22
Payer: COMMERCIAL

## 2022-06-22 DIAGNOSIS — Z96.641 STATUS POST TOTAL HIP REPLACEMENT, RIGHT: Primary | ICD-10-CM

## 2022-06-22 DIAGNOSIS — I51.89 DIASTOLIC DYSFUNCTION: ICD-10-CM

## 2022-06-22 DIAGNOSIS — Z96.641 STATUS POST TOTAL REPLACEMENT OF RIGHT HIP: ICD-10-CM

## 2022-06-22 PROBLEM — Z96.649 S/P TOTAL HIP ARTHROPLASTY: Status: ACTIVE | Noted: 2022-06-22

## 2022-06-22 LAB
ANION GAP SERPL CALCULATED.3IONS-SCNC: 8 MMOL/L (ref 3–14)
BUN SERPL-MCNC: 16 MG/DL (ref 7–30)
CALCIUM SERPL-MCNC: 8.5 MG/DL (ref 8.5–10.1)
CHLORIDE BLD-SCNC: 105 MMOL/L (ref 94–109)
CO2 SERPL-SCNC: 24 MMOL/L (ref 20–32)
CREAT SERPL-MCNC: 0.82 MG/DL (ref 0.66–1.25)
ERYTHROCYTE [DISTWIDTH] IN BLOOD BY AUTOMATED COUNT: 12.7 % (ref 10–15)
GFR SERPL CREATININE-BSD FRML MDRD: 89 ML/MIN/1.73M2
GLUCOSE BLD-MCNC: 159 MG/DL (ref 70–99)
HCT VFR BLD AUTO: 29.9 % (ref 40–53)
HGB BLD-MCNC: 9.7 G/DL (ref 13.3–17.7)
HGB BLD-MCNC: 9.9 G/DL (ref 13.3–17.7)
INR PPP: 1.26 (ref 0.85–1.15)
MCH RBC QN AUTO: 30 PG (ref 26.5–33)
MCHC RBC AUTO-ENTMCNC: 33.1 G/DL (ref 31.5–36.5)
MCV RBC AUTO: 91 FL (ref 78–100)
PLATELET # BLD AUTO: 62 10E3/UL (ref 150–450)
POTASSIUM BLD-SCNC: 4.2 MMOL/L (ref 3.4–5.3)
RBC # BLD AUTO: 3.3 10E6/UL (ref 4.4–5.9)
SODIUM SERPL-SCNC: 137 MMOL/L (ref 133–144)
WBC # BLD AUTO: 14.2 10E3/UL (ref 4–11)

## 2022-06-22 PROCEDURE — 85018 HEMOGLOBIN: CPT | Performed by: PHYSICIAN ASSISTANT

## 2022-06-22 PROCEDURE — 258N000003 HC RX IP 258 OP 636: Performed by: NURSE ANESTHETIST, CERTIFIED REGISTERED

## 2022-06-22 PROCEDURE — 258N000003 HC RX IP 258 OP 636: Performed by: ANESTHESIOLOGY

## 2022-06-22 PROCEDURE — 272N000001 HC OR GENERAL SUPPLY STERILE: Performed by: ORTHOPAEDIC SURGERY

## 2022-06-22 PROCEDURE — 250N000009 HC RX 250: Performed by: ORTHOPAEDIC SURGERY

## 2022-06-22 PROCEDURE — P9041 ALBUMIN (HUMAN),5%, 50ML: HCPCS | Performed by: NURSE ANESTHETIST, CERTIFIED REGISTERED

## 2022-06-22 PROCEDURE — 82310 ASSAY OF CALCIUM: CPT | Performed by: ANESTHESIOLOGY

## 2022-06-22 PROCEDURE — 250N000011 HC RX IP 250 OP 636: Performed by: PHYSICIAN ASSISTANT

## 2022-06-22 PROCEDURE — 0SR904Z REPLACEMENT OF RIGHT HIP JOINT WITH CERAMIC ON POLYETHYLENE SYNTHETIC SUBSTITUTE, OPEN APPROACH: ICD-10-PCS | Performed by: ORTHOPAEDIC SURGERY

## 2022-06-22 PROCEDURE — 999N000065 XR PELVIS AD HIP PORTABLE RIGHT 1 VIEW

## 2022-06-22 PROCEDURE — 370N000017 HC ANESTHESIA TECHNICAL FEE, PER MIN: Performed by: ORTHOPAEDIC SURGERY

## 2022-06-22 PROCEDURE — 36415 COLL VENOUS BLD VENIPUNCTURE: CPT | Performed by: PHYSICIAN ASSISTANT

## 2022-06-22 PROCEDURE — 85610 PROTHROMBIN TIME: CPT | Performed by: ANESTHESIOLOGY

## 2022-06-22 PROCEDURE — 36415 COLL VENOUS BLD VENIPUNCTURE: CPT | Performed by: ANESTHESIOLOGY

## 2022-06-22 PROCEDURE — 999N000141 HC STATISTIC PRE-PROCEDURE NURSING ASSESSMENT: Performed by: ORTHOPAEDIC SURGERY

## 2022-06-22 PROCEDURE — C1776 JOINT DEVICE (IMPLANTABLE): HCPCS | Performed by: ORTHOPAEDIC SURGERY

## 2022-06-22 PROCEDURE — 258N000003 HC RX IP 258 OP 636: Performed by: PHYSICIAN ASSISTANT

## 2022-06-22 PROCEDURE — 710N000009 HC RECOVERY PHASE 1, LEVEL 1, PER MIN: Performed by: ORTHOPAEDIC SURGERY

## 2022-06-22 PROCEDURE — 250N000011 HC RX IP 250 OP 636: Performed by: NURSE ANESTHETIST, CERTIFIED REGISTERED

## 2022-06-22 PROCEDURE — 85027 COMPLETE CBC AUTOMATED: CPT | Performed by: ANESTHESIOLOGY

## 2022-06-22 PROCEDURE — 258N000001 HC RX 258: Performed by: ORTHOPAEDIC SURGERY

## 2022-06-22 PROCEDURE — 250N000013 HC RX MED GY IP 250 OP 250 PS 637: Performed by: PHYSICIAN ASSISTANT

## 2022-06-22 PROCEDURE — 250N000009 HC RX 250: Performed by: NURSE ANESTHETIST, CERTIFIED REGISTERED

## 2022-06-22 PROCEDURE — 250N000011 HC RX IP 250 OP 636: Performed by: ORTHOPAEDIC SURGERY

## 2022-06-22 PROCEDURE — 360N000084 HC SURGERY LEVEL 4 W/ FLUORO, PER MIN: Performed by: ORTHOPAEDIC SURGERY

## 2022-06-22 PROCEDURE — 999N000179 XR SURGERY CARM FLUORO LESS THAN 5 MIN W STILLS: Mod: TC

## 2022-06-22 DEVICE — IMP HEAD FEMORAL STRK BIOLOX DELTA CERAMIC V40 36MM: Type: IMPLANTABLE DEVICE | Site: HIP | Status: FUNCTIONAL

## 2022-06-22 DEVICE — TRIDENT II PSL CLUSTERHOLE HA ACETABULAR SHELL 56F
Type: IMPLANTABLE DEVICE | Site: HIP | Status: FUNCTIONAL
Brand: TRIDENT II

## 2022-06-22 DEVICE — TRIDENT X3 0 DEGREE POLYETHYLENE INSERT
Type: IMPLANTABLE DEVICE | Site: HIP | Status: FUNCTIONAL
Brand: TRIDENT X3 INSERT

## 2022-06-22 DEVICE — HIP STEM - STANDARD OFFSET
Type: IMPLANTABLE DEVICE | Site: HIP | Status: FUNCTIONAL
Brand: INSIGNIA

## 2022-06-22 RX ORDER — PRAVASTATIN SODIUM 20 MG
20 TABLET ORAL DAILY
Status: DISCONTINUED | OUTPATIENT
Start: 2022-06-22 | End: 2022-06-25 | Stop reason: HOSPADM

## 2022-06-22 RX ORDER — AMOXICILLIN 250 MG
1 CAPSULE ORAL 2 TIMES DAILY
Status: DISCONTINUED | OUTPATIENT
Start: 2022-06-22 | End: 2022-06-25 | Stop reason: HOSPADM

## 2022-06-22 RX ORDER — EPHEDRINE SULFATE 50 MG/ML
10 INJECTION, SOLUTION INTRAVENOUS ONCE
Status: DISCONTINUED | OUTPATIENT
Start: 2022-06-22 | End: 2022-06-22 | Stop reason: HOSPADM

## 2022-06-22 RX ORDER — FENTANYL CITRATE 50 UG/ML
50 INJECTION, SOLUTION INTRAMUSCULAR; INTRAVENOUS
Status: DISCONTINUED | OUTPATIENT
Start: 2022-06-22 | End: 2022-06-22 | Stop reason: HOSPADM

## 2022-06-22 RX ORDER — ONDANSETRON 2 MG/ML
4 INJECTION INTRAMUSCULAR; INTRAVENOUS EVERY 6 HOURS PRN
Status: DISCONTINUED | OUTPATIENT
Start: 2022-06-22 | End: 2022-06-25 | Stop reason: HOSPADM

## 2022-06-22 RX ORDER — CEFAZOLIN SODIUM/WATER 2 G/20 ML
2 SYRINGE (ML) INTRAVENOUS
Status: DISCONTINUED | OUTPATIENT
Start: 2022-06-22 | End: 2022-06-22 | Stop reason: HOSPADM

## 2022-06-22 RX ORDER — FERROUS GLUCONATE 324(38)MG
324 TABLET ORAL
Status: DISCONTINUED | OUTPATIENT
Start: 2022-06-23 | End: 2022-06-25 | Stop reason: HOSPADM

## 2022-06-22 RX ORDER — AMOXICILLIN 250 MG
1-2 CAPSULE ORAL 2 TIMES DAILY
Qty: 30 TABLET | Refills: 0 | Status: SHIPPED | OUTPATIENT
Start: 2022-06-22 | End: 2022-07-12

## 2022-06-22 RX ORDER — EPHEDRINE SULFATE 50 MG/ML
INJECTION, SOLUTION INTRAMUSCULAR; INTRAVENOUS; SUBCUTANEOUS PRN
Status: DISCONTINUED | OUTPATIENT
Start: 2022-06-22 | End: 2022-06-22

## 2022-06-22 RX ORDER — NALOXONE HYDROCHLORIDE 0.4 MG/ML
0.2 INJECTION, SOLUTION INTRAMUSCULAR; INTRAVENOUS; SUBCUTANEOUS
Status: DISCONTINUED | OUTPATIENT
Start: 2022-06-22 | End: 2022-06-25 | Stop reason: HOSPADM

## 2022-06-22 RX ORDER — SODIUM CHLORIDE, SODIUM LACTATE, POTASSIUM CHLORIDE, CALCIUM CHLORIDE 600; 310; 30; 20 MG/100ML; MG/100ML; MG/100ML; MG/100ML
INJECTION, SOLUTION INTRAVENOUS CONTINUOUS
Status: DISCONTINUED | OUTPATIENT
Start: 2022-06-22 | End: 2022-06-22 | Stop reason: HOSPADM

## 2022-06-22 RX ORDER — TRANEXAMIC ACID 100 MG/ML
INJECTION, SOLUTION INTRAVENOUS PRN
Status: DISCONTINUED | OUTPATIENT
Start: 2022-06-22 | End: 2022-06-22 | Stop reason: HOSPADM

## 2022-06-22 RX ORDER — ACETAMINOPHEN 325 MG/1
650 TABLET ORAL EVERY 4 HOURS PRN
Qty: 100 TABLET | Refills: 0 | Status: SHIPPED | OUTPATIENT
Start: 2022-06-22

## 2022-06-22 RX ORDER — SODIUM CHLORIDE, SODIUM LACTATE, POTASSIUM CHLORIDE, CALCIUM CHLORIDE 600; 310; 30; 20 MG/100ML; MG/100ML; MG/100ML; MG/100ML
INJECTION, SOLUTION INTRAVENOUS CONTINUOUS PRN
Status: DISCONTINUED | OUTPATIENT
Start: 2022-06-22 | End: 2022-06-22

## 2022-06-22 RX ORDER — HYDROMORPHONE HCL IN WATER/PF 6 MG/30 ML
0.2 PATIENT CONTROLLED ANALGESIA SYRINGE INTRAVENOUS
Status: DISCONTINUED | OUTPATIENT
Start: 2022-06-22 | End: 2022-06-25 | Stop reason: HOSPADM

## 2022-06-22 RX ORDER — METOPROLOL TARTRATE 1 MG/ML
1-2 INJECTION, SOLUTION INTRAVENOUS EVERY 5 MIN PRN
Status: DISCONTINUED | OUTPATIENT
Start: 2022-06-22 | End: 2022-06-22 | Stop reason: HOSPADM

## 2022-06-22 RX ORDER — FENTANYL CITRATE 50 UG/ML
INJECTION, SOLUTION INTRAMUSCULAR; INTRAVENOUS PRN
Status: DISCONTINUED | OUTPATIENT
Start: 2022-06-22 | End: 2022-06-22

## 2022-06-22 RX ORDER — NALOXONE HYDROCHLORIDE 0.4 MG/ML
0.4 INJECTION, SOLUTION INTRAMUSCULAR; INTRAVENOUS; SUBCUTANEOUS
Status: DISCONTINUED | OUTPATIENT
Start: 2022-06-22 | End: 2022-06-25 | Stop reason: HOSPADM

## 2022-06-22 RX ORDER — ACETAMINOPHEN 325 MG/1
650 TABLET ORAL EVERY 4 HOURS PRN
Status: DISCONTINUED | OUTPATIENT
Start: 2022-06-25 | End: 2022-06-25 | Stop reason: HOSPADM

## 2022-06-22 RX ORDER — CEFAZOLIN SODIUM/WATER 2 G/20 ML
2 SYRINGE (ML) INTRAVENOUS SEE ADMIN INSTRUCTIONS
Status: DISCONTINUED | OUTPATIENT
Start: 2022-06-22 | End: 2022-06-22 | Stop reason: HOSPADM

## 2022-06-22 RX ORDER — ACETAMINOPHEN 325 MG/1
975 TABLET ORAL EVERY 8 HOURS
Status: COMPLETED | OUTPATIENT
Start: 2022-06-22 | End: 2022-06-25

## 2022-06-22 RX ORDER — ALBUMIN, HUMAN INJ 5% 5 %
SOLUTION INTRAVENOUS CONTINUOUS PRN
Status: DISCONTINUED | OUTPATIENT
Start: 2022-06-22 | End: 2022-06-22

## 2022-06-22 RX ORDER — LIDOCAINE HYDROCHLORIDE AND EPINEPHRINE 10; 10 MG/ML; UG/ML
INJECTION, SOLUTION INFILTRATION; PERINEURAL PRN
Status: DISCONTINUED | OUTPATIENT
Start: 2022-06-22 | End: 2022-06-22 | Stop reason: HOSPADM

## 2022-06-22 RX ORDER — ATROPINE SULFATE 0.4 MG/ML
AMPUL (ML) INJECTION PRN
Status: DISCONTINUED | OUTPATIENT
Start: 2022-06-22 | End: 2022-06-22

## 2022-06-22 RX ORDER — CEFAZOLIN SODIUM 2 G/100ML
2 INJECTION, SOLUTION INTRAVENOUS EVERY 8 HOURS
Status: COMPLETED | OUTPATIENT
Start: 2022-06-22 | End: 2022-06-23

## 2022-06-22 RX ORDER — ONDANSETRON 2 MG/ML
INJECTION INTRAMUSCULAR; INTRAVENOUS PRN
Status: DISCONTINUED | OUTPATIENT
Start: 2022-06-22 | End: 2022-06-22

## 2022-06-22 RX ORDER — OXYCODONE HYDROCHLORIDE 5 MG/1
5 TABLET ORAL EVERY 4 HOURS PRN
Status: DISCONTINUED | OUTPATIENT
Start: 2022-06-22 | End: 2022-06-22 | Stop reason: HOSPADM

## 2022-06-22 RX ORDER — HYDROMORPHONE HCL IN WATER/PF 6 MG/30 ML
0.4 PATIENT CONTROLLED ANALGESIA SYRINGE INTRAVENOUS
Status: DISCONTINUED | OUTPATIENT
Start: 2022-06-22 | End: 2022-06-25 | Stop reason: HOSPADM

## 2022-06-22 RX ORDER — LISINOPRIL 20 MG/1
20 TABLET ORAL DAILY
Status: DISCONTINUED | OUTPATIENT
Start: 2022-06-23 | End: 2022-06-25 | Stop reason: HOSPADM

## 2022-06-22 RX ORDER — LIDOCAINE 40 MG/G
CREAM TOPICAL
Status: DISCONTINUED | OUTPATIENT
Start: 2022-06-22 | End: 2022-06-25 | Stop reason: HOSPADM

## 2022-06-22 RX ORDER — HYDROXYZINE HYDROCHLORIDE 10 MG/1
10 TABLET, FILM COATED ORAL EVERY 6 HOURS PRN
Status: DISCONTINUED | OUTPATIENT
Start: 2022-06-22 | End: 2022-06-25 | Stop reason: HOSPADM

## 2022-06-22 RX ORDER — ONDANSETRON 4 MG/1
4 TABLET, ORALLY DISINTEGRATING ORAL EVERY 6 HOURS PRN
Status: DISCONTINUED | OUTPATIENT
Start: 2022-06-22 | End: 2022-06-25 | Stop reason: HOSPADM

## 2022-06-22 RX ORDER — PROCHLORPERAZINE MALEATE 5 MG
5 TABLET ORAL EVERY 6 HOURS PRN
Status: DISCONTINUED | OUTPATIENT
Start: 2022-06-22 | End: 2022-06-25 | Stop reason: HOSPADM

## 2022-06-22 RX ORDER — OXYCODONE HYDROCHLORIDE 5 MG/1
5 TABLET ORAL EVERY 4 HOURS PRN
Status: DISCONTINUED | OUTPATIENT
Start: 2022-06-22 | End: 2022-06-25 | Stop reason: HOSPADM

## 2022-06-22 RX ORDER — ONDANSETRON 2 MG/ML
4 INJECTION INTRAMUSCULAR; INTRAVENOUS EVERY 30 MIN PRN
Status: DISCONTINUED | OUTPATIENT
Start: 2022-06-22 | End: 2022-06-22 | Stop reason: HOSPADM

## 2022-06-22 RX ORDER — FERROUS GLUCONATE 324(38)MG
324 TABLET ORAL
Qty: 7 TABLET | Refills: 0 | Status: SHIPPED | OUTPATIENT
Start: 2022-06-23 | End: 2022-07-12

## 2022-06-22 RX ORDER — TRANEXAMIC ACID 650 MG/1
1950 TABLET ORAL ONCE
Status: COMPLETED | OUTPATIENT
Start: 2022-06-22 | End: 2022-06-22

## 2022-06-22 RX ORDER — PROPOFOL 10 MG/ML
INJECTION, EMULSION INTRAVENOUS PRN
Status: DISCONTINUED | OUTPATIENT
Start: 2022-06-22 | End: 2022-06-22

## 2022-06-22 RX ORDER — LIDOCAINE HYDROCHLORIDE 10 MG/ML
INJECTION, SOLUTION INFILTRATION; PERINEURAL PRN
Status: DISCONTINUED | OUTPATIENT
Start: 2022-06-22 | End: 2022-06-22

## 2022-06-22 RX ORDER — LIDOCAINE 40 MG/G
CREAM TOPICAL
Status: DISCONTINUED | OUTPATIENT
Start: 2022-06-22 | End: 2022-06-22 | Stop reason: HOSPADM

## 2022-06-22 RX ORDER — DEXAMETHASONE SODIUM PHOSPHATE 4 MG/ML
INJECTION, SOLUTION INTRA-ARTICULAR; INTRALESIONAL; INTRAMUSCULAR; INTRAVENOUS; SOFT TISSUE PRN
Status: DISCONTINUED | OUTPATIENT
Start: 2022-06-22 | End: 2022-06-22

## 2022-06-22 RX ORDER — MEPERIDINE HYDROCHLORIDE 25 MG/ML
12.5 INJECTION INTRAMUSCULAR; INTRAVENOUS; SUBCUTANEOUS
Status: DISCONTINUED | OUTPATIENT
Start: 2022-06-22 | End: 2022-06-22 | Stop reason: HOSPADM

## 2022-06-22 RX ORDER — HYDROXYZINE HYDROCHLORIDE 10 MG/1
10 TABLET, FILM COATED ORAL EVERY 6 HOURS PRN
Qty: 30 TABLET | Refills: 0 | Status: SHIPPED | OUTPATIENT
Start: 2022-06-22 | End: 2022-07-12

## 2022-06-22 RX ORDER — VANCOMYCIN HYDROCHLORIDE 1 G/20ML
INJECTION, POWDER, LYOPHILIZED, FOR SOLUTION INTRAVENOUS PRN
Status: DISCONTINUED | OUTPATIENT
Start: 2022-06-22 | End: 2022-06-22 | Stop reason: HOSPADM

## 2022-06-22 RX ORDER — HYDROMORPHONE HCL IN WATER/PF 6 MG/30 ML
0.4 PATIENT CONTROLLED ANALGESIA SYRINGE INTRAVENOUS EVERY 5 MIN PRN
Status: DISCONTINUED | OUTPATIENT
Start: 2022-06-22 | End: 2022-06-22 | Stop reason: HOSPADM

## 2022-06-22 RX ORDER — SODIUM CHLORIDE, SODIUM LACTATE, POTASSIUM CHLORIDE, CALCIUM CHLORIDE 600; 310; 30; 20 MG/100ML; MG/100ML; MG/100ML; MG/100ML
INJECTION, SOLUTION INTRAVENOUS CONTINUOUS
Status: DISCONTINUED | OUTPATIENT
Start: 2022-06-22 | End: 2022-06-23

## 2022-06-22 RX ORDER — ONDANSETRON 4 MG/1
4 TABLET, ORALLY DISINTEGRATING ORAL EVERY 30 MIN PRN
Status: DISCONTINUED | OUTPATIENT
Start: 2022-06-22 | End: 2022-06-22 | Stop reason: HOSPADM

## 2022-06-22 RX ORDER — FENTANYL CITRATE 50 UG/ML
50 INJECTION, SOLUTION INTRAMUSCULAR; INTRAVENOUS EVERY 5 MIN PRN
Status: DISCONTINUED | OUTPATIENT
Start: 2022-06-22 | End: 2022-06-22 | Stop reason: HOSPADM

## 2022-06-22 RX ORDER — BISACODYL 10 MG
10 SUPPOSITORY, RECTAL RECTAL DAILY PRN
Status: DISCONTINUED | OUTPATIENT
Start: 2022-06-22 | End: 2022-06-25 | Stop reason: HOSPADM

## 2022-06-22 RX ORDER — OXYCODONE HYDROCHLORIDE 10 MG/1
10 TABLET ORAL EVERY 4 HOURS PRN
Status: DISCONTINUED | OUTPATIENT
Start: 2022-06-22 | End: 2022-06-25 | Stop reason: HOSPADM

## 2022-06-22 RX ORDER — POLYETHYLENE GLYCOL 3350 17 G/17G
17 POWDER, FOR SOLUTION ORAL DAILY
Status: DISCONTINUED | OUTPATIENT
Start: 2022-06-23 | End: 2022-06-25 | Stop reason: HOSPADM

## 2022-06-22 RX ORDER — OXYCODONE HYDROCHLORIDE 5 MG/1
5-10 TABLET ORAL EVERY 4 HOURS PRN
Qty: 26 TABLET | Refills: 0 | Status: SHIPPED | OUTPATIENT
Start: 2022-06-22 | End: 2022-07-12

## 2022-06-22 RX ORDER — HYDRALAZINE HYDROCHLORIDE 20 MG/ML
2.5-5 INJECTION INTRAMUSCULAR; INTRAVENOUS EVERY 10 MIN PRN
Status: DISCONTINUED | OUTPATIENT
Start: 2022-06-22 | End: 2022-06-22 | Stop reason: HOSPADM

## 2022-06-22 RX ORDER — GLYCOPYRROLATE 0.2 MG/ML
INJECTION, SOLUTION INTRAMUSCULAR; INTRAVENOUS PRN
Status: DISCONTINUED | OUTPATIENT
Start: 2022-06-22 | End: 2022-06-22

## 2022-06-22 RX ADMIN — Medication 1 UNITS: at 13:17

## 2022-06-22 RX ADMIN — FENTANYL CITRATE 100 MCG: 50 INJECTION, SOLUTION INTRAMUSCULAR; INTRAVENOUS at 11:24

## 2022-06-22 RX ADMIN — Medication 2 G: at 11:20

## 2022-06-22 RX ADMIN — SODIUM CHLORIDE, POTASSIUM CHLORIDE, SODIUM LACTATE AND CALCIUM CHLORIDE: 600; 310; 30; 20 INJECTION, SOLUTION INTRAVENOUS at 13:22

## 2022-06-22 RX ADMIN — SODIUM CHLORIDE, POTASSIUM CHLORIDE, SODIUM LACTATE AND CALCIUM CHLORIDE: 600; 310; 30; 20 INJECTION, SOLUTION INTRAVENOUS at 11:58

## 2022-06-22 RX ADMIN — ONDANSETRON HYDROCHLORIDE 4 MG: 2 INJECTION, SOLUTION INTRAVENOUS at 13:39

## 2022-06-22 RX ADMIN — SODIUM CHLORIDE, POTASSIUM CHLORIDE, SODIUM LACTATE AND CALCIUM CHLORIDE: 600; 310; 30; 20 INJECTION, SOLUTION INTRAVENOUS at 10:18

## 2022-06-22 RX ADMIN — ALBUMIN HUMAN: 0.05 INJECTION, SOLUTION INTRAVENOUS at 13:23

## 2022-06-22 RX ADMIN — SODIUM CHLORIDE, POTASSIUM CHLORIDE, SODIUM LACTATE AND CALCIUM CHLORIDE: 600; 310; 30; 20 INJECTION, SOLUTION INTRAVENOUS at 10:58

## 2022-06-22 RX ADMIN — PHENYLEPHRINE HYDROCHLORIDE 100 MCG: 10 INJECTION INTRAVENOUS at 12:22

## 2022-06-22 RX ADMIN — PRAVASTATIN SODIUM 20 MG: 20 TABLET ORAL at 16:18

## 2022-06-22 RX ADMIN — PHENYLEPHRINE HYDROCHLORIDE 200 MCG: 10 INJECTION INTRAVENOUS at 11:42

## 2022-06-22 RX ADMIN — PHENYLEPHRINE HYDROCHLORIDE 200 MCG: 10 INJECTION INTRAVENOUS at 12:57

## 2022-06-22 RX ADMIN — FENTANYL CITRATE 150 MCG: 50 INJECTION, SOLUTION INTRAMUSCULAR; INTRAVENOUS at 11:48

## 2022-06-22 RX ADMIN — HYDROMORPHONE HYDROCHLORIDE 1 MG: 1 INJECTION, SOLUTION INTRAMUSCULAR; INTRAVENOUS; SUBCUTANEOUS at 12:27

## 2022-06-22 RX ADMIN — ATROPINE SULFATE 0.1 MG: 0.4 INJECTION, SOLUTION INTRAMUSCULAR; INTRAVENOUS; SUBCUTANEOUS at 13:07

## 2022-06-22 RX ADMIN — PHENYLEPHRINE HYDROCHLORIDE 200 MCG: 10 INJECTION INTRAVENOUS at 12:38

## 2022-06-22 RX ADMIN — Medication 10 MG: at 11:53

## 2022-06-22 RX ADMIN — Medication 10 MG: at 11:42

## 2022-06-22 RX ADMIN — PHENYLEPHRINE HYDROCHLORIDE 100 MCG: 10 INJECTION INTRAVENOUS at 11:24

## 2022-06-22 RX ADMIN — PHENYLEPHRINE HYDROCHLORIDE 150 MCG: 10 INJECTION INTRAVENOUS at 13:05

## 2022-06-22 RX ADMIN — PHENYLEPHRINE HYDROCHLORIDE 150 MCG: 10 INJECTION INTRAVENOUS at 13:10

## 2022-06-22 RX ADMIN — ACETAMINOPHEN 975 MG: 325 TABLET, FILM COATED ORAL at 23:03

## 2022-06-22 RX ADMIN — PHENYLEPHRINE HYDROCHLORIDE 200 MCG: 10 INJECTION INTRAVENOUS at 12:48

## 2022-06-22 RX ADMIN — SODIUM CHLORIDE, POTASSIUM CHLORIDE, SODIUM LACTATE AND CALCIUM CHLORIDE: 600; 310; 30; 20 INJECTION, SOLUTION INTRAVENOUS at 16:19

## 2022-06-22 RX ADMIN — LIDOCAINE HYDROCHLORIDE 50 MG: 10 INJECTION, SOLUTION INFILTRATION; PERINEURAL at 11:24

## 2022-06-22 RX ADMIN — PHENYLEPHRINE HYDROCHLORIDE 200 MCG: 10 INJECTION INTRAVENOUS at 11:36

## 2022-06-22 RX ADMIN — TRANEXAMIC ACID 1950 MG: 650 TABLET ORAL at 10:49

## 2022-06-22 RX ADMIN — PHENYLEPHRINE HYDROCHLORIDE 100 MCG: 10 INJECTION INTRAVENOUS at 13:07

## 2022-06-22 RX ADMIN — CEFAZOLIN SODIUM 2 G: 2 INJECTION, SOLUTION INTRAVENOUS at 20:18

## 2022-06-22 RX ADMIN — GLYCOPYRROLATE 0.2 MG: 0.2 INJECTION, SOLUTION INTRAMUSCULAR; INTRAVENOUS at 11:28

## 2022-06-22 RX ADMIN — PROPOFOL 150 MG: 10 INJECTION, EMULSION INTRAVENOUS at 11:24

## 2022-06-22 RX ADMIN — PHENYLEPHRINE HYDROCHLORIDE 200 MCG: 10 INJECTION INTRAVENOUS at 11:53

## 2022-06-22 RX ADMIN — DEXAMETHASONE SODIUM PHOSPHATE 8 MG: 4 INJECTION, SOLUTION INTRA-ARTICULAR; INTRALESIONAL; INTRAMUSCULAR; INTRAVENOUS; SOFT TISSUE at 11:24

## 2022-06-22 RX ADMIN — PHENYLEPHRINE HYDROCHLORIDE 300 MCG: 10 INJECTION INTRAVENOUS at 13:26

## 2022-06-22 RX ADMIN — PHENYLEPHRINE HYDROCHLORIDE 200 MCG: 10 INJECTION INTRAVENOUS at 11:27

## 2022-06-22 RX ADMIN — Medication 5 MG: at 12:22

## 2022-06-22 RX ADMIN — SENNOSIDES AND DOCUSATE SODIUM 1 TABLET: 50; 8.6 TABLET ORAL at 20:21

## 2022-06-22 RX ADMIN — ACETAMINOPHEN 975 MG: 325 TABLET, FILM COATED ORAL at 16:18

## 2022-06-22 ASSESSMENT — ENCOUNTER SYMPTOMS: DYSRHYTHMIAS: 1

## 2022-06-22 NOTE — OP NOTE
[facility]  Operative Note    Direct Anterior Approach Total Hip Arthroplasty       Mars Reyes MRN# [id number]   YOB: 1940  Procedure Date:  6/22/2022  Age: 81 year old       PREOPERATIVE DIAGNOSIS:  Degenerative arthritis, right hip.    POSTOPERATIVE DIAGNOSIS:  Degenerative arthritis, right hip.    PROCEDURE PERFORMED:    Direct anterior approach right total hip arthroplasty    SURGEON:  Luisito Hammonds M.D.    FIRST ASSISTANT:  Mike Richardson PA-C.  His assistance was critical for positioning, retraction during exposure and implantation as well as closure.  His assistance allowed me to operate efficiently, decreasing surgical time and risk.      ANESTHESIA:  General     EBL: 500    IMPLANTS: Walton Insignia size 7 stem 132 degree std offset stem, size 56 Tridennt II PSL cup with neutral X3 liner, and Biolox -2.5 mm 36mm head    FINDINGS: Severe OA hip     INDICATIONS:    Mars Reyes is a 81 year old-year-old male with arthritis of the hip.  Discussed both operative and nonoperative management.  Risks of surgery discussed included but not limited to bleeding, infection, damage to surrounding neurovascular, thigh numbness, leg length difference, dislocation, fracture, need for revision surgery, blood clots, pulmonary embolus, stroke, anesthetic complications and even death.  No guarantees given or implied. Patient thoughtfully acknowledges risks and wishes to proceed.  Consent signed.       DESCRIPTION OF PROCEDURE:  The patient was identified in the preoperative area per hospital policy, correct operative site marked, to the OR, Ancef and tranexamic acid given.  General anesthesia induced.  Transferred onto the [place] table.  Positioned appropriately with all bony prominences well padded, chlorhexidine prescrub, ChloraPrep, standard drape, timeout performed per WHO protocol, correctly identifying the patient, operative site, and procedure to be performed.  All in the room agreed.         Using bony landmarks, I prepared the incision for direct anterior approach, dissected through skin and subcutaneous tissue, identifying and incising fascia, tensor fascia frankie, retracted the muscle bluntly.  Used thyroid ligature to cauterize the circumflex vessels which were carefully identified.  Exposed the femoral neck, incised and tagged the capsule, performing medial release to lesser trochanter, and using a broach as a template, made a cut on the femoral neck approximately 15 mm from the lesser trochanter.  Femoral head removed with cork screw.  Exposed the acetabulum, removed the labrum, and performed superior release between posterior capsule and labrum.  Capsule released into piriformis fossa.  Under fluoroscopy, reamed up sequentially from a 49 to a size 56. Under fluoroscopy, impacted a 56 mm cup in approximately 20 degrees of anteversion and 40 degrees of abduction.  Did directly visualize good bleeding bone prior to this. The cup sat well, rocked the pelvis with movement.  A neutral liner was impacted to accommodate 36 mm head.       Turned attention to the femur, externally rotating, performing femoral releases, placed the hip in extension and adduction.  Used a box osteotome and a lateralizer, broached up sequentially, sinking just deep to neck cut first reducing and checking with a size 6 stem in place. This showed appropriate leg lengths and slight under fit of the stem. Calcar plane and broach to a 7.      Thoroughly irrigated the hip and placed the actual size 7 stem, std neck length.  Trialed and then selected a -2.5 36 mm Biolox ceramic head which was impacted after cleaning and drying the Brito taper.  This restored the leg lengths.   Stem filled canal.  It was stable to external pull with a bone hook and neutral and 90 degrees.  Performed a Rush Betadine lavage protocol, thoroughly irrigated hip, vancomycin deep, repaired the hip capsule. Repaired the tensor fascia frankie with 0 Vicryl, 0  Vicryl fatty layer, 2-0 Vicryl subcutaneous, 4-0 Monocryl subcuticular, Dermabond and sterile dressings applied.   Blood loss was 500.  Sponge and needle counts were correct. Transferred safely off the [place] table, stable to PACU.     POSTOPERATIVE PLAN:   1.  Weightbearing as tolerated. Consider a walker for the first 2.   2.  Anterior hip precautions.  No hyperextension or external rotation past neutral.  No abduction pillow necessary.   3.  Physical therapy.   4.  DVT prophylaxis with resuming Eliquis POD #2   5.  Ancef x 24 hours.   6.  PACU x-rays, AP pelvis, 2 views of the right hip.    7.  Follow up with Dr. Hammonds in 2 weeks for wound check. Keep dressing in place until then.  Okay to shower.  No submerging the wound.   9.  Follow up with Dr. Hammonds in 8 weeks with 2 views of the right hip.    Luisito Hammonds M.D.  [facility]  [facility]    Tuesday 7:30-5 PM  [address]   [address]  [place], [place]  [phone]    Or    Monday 1-5 PM  and Thursday 7:30-12:00 AM  [address] [place] [address]  [phone]

## 2022-06-22 NOTE — ANESTHESIA CARE TRANSFER NOTE
Patient: Mars Reyes    Procedure: Procedure(s):  Right direct anterior total hip arthroplasty       Diagnosis: Osteoarthritis [M19.90]  Diagnosis Additional Information: No value filed.    Anesthesia Type:   General     Note:      Level of Consciousness: awake  Oxygen Supplementation: face mask    Independent Airway: airway patency satisfactory and stable  Dentition: dentition unchanged  Vital Signs Stable: post-procedure vital signs reviewed and stable  Report to RN Given: handoff report given  Patient transferred to: PACU    Handoff Report: Identifed the Patient, Identified the Reponsible Provider, Reviewed the pertinent medical history, Discussed the surgical course, Reviewed Intra-OP anesthesia mangement and issues during anesthesia, Set expectations for post-procedure period and Allowed opportunity for questions and acknowledgement of understanding      Vitals:  Vitals Value Taken Time   BP     Temp     Pulse 100 06/22/22 1348   Resp 34 06/22/22 1348   SpO2 97 % 06/22/22 1348   Vitals shown include unvalidated device data.    Electronically Signed By: ALEXANDRA Suh CRNA  June 22, 2022  1:49 PM

## 2022-06-22 NOTE — ANESTHESIA POSTPROCEDURE EVALUATION
Patient: Mars Reyes    Procedure: Procedure(s):  Right direct anterior total hip arthroplasty       Anesthesia Type:  General    Note:  Disposition: Admission   Postop Pain Control: Uneventful            Sign Out: Well controlled pain   PONV: No   Neuro/Psych: Uneventful            Sign Out: Acceptable/Baseline neuro status   Airway/Respiratory: Uneventful            Sign Out: Acceptable/Baseline resp. status   CV/Hemodynamics: Uneventful            Sign Out: Acceptable CV status; No obvious hypovolemia; No obvious fluid overload   Other NRE: NONE   DID A NON-ROUTINE EVENT OCCUR? No           Last vitals:  Vitals Value Taken Time   /72 06/22/22 1447   Temp 97.7  F (36.5  C) 06/22/22 1350   Pulse 105 06/22/22 1450   Resp 18 06/22/22 1446   SpO2 96 % 06/22/22 1446   Vitals shown include unvalidated device data.    Electronically Signed By: Yaakov Carlos MD  June 22, 2022  4:29 PM

## 2022-06-22 NOTE — ANESTHESIA PREPROCEDURE EVALUATION
Anesthesia Pre-Procedure Evaluation    Patient: Mars Reyes   MRN: [id number] : 1940        Procedure : Procedure(s):  Right direct anterior total hip arthroplasty          Past Medical History:   Diagnosis Date     Aortic stenosis     moderate     Atypical atrial flutter (H)     PVI and LA linear ablation with successful termination of AFL 10/18/2019     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, unspecified cause, unspecified laterality      Contact dermatitis and other eczema, due to unspecified cause      Diverticulosis of colon (without mention of hemorrhage)      DJD (degenerative joint disease)      Essential hypertension, benign     abstracted 02     Gout, unspecified      Hip joint replacement by other means 2009     MGUS (monoclonal gammopathy of unknown significance)      Mitral valve disease     mild MS, moderate-severe MR     Pacemaker      PSVT (paroxysmal supraventricular tachycardia) (H)      Pulmonary HTN (H) 10/18/2011     Thrombocytopenia (H)       Past Surgical History:   Procedure Laterality Date     ANESTHESIA CARDIOVERSION N/A 3/21/2019    Procedure: ANESTHESIA CARDIOVERSION;  Surgeon: GENERIC ANESTHESIA PROVIDER;  Location: [facility] OR     ARTHROPLASTY REVISION HIP ANTERIOR Left 2019    Procedure: REVISION DIRECT ANTERIOR TOTAL LEFT HIP ARTHROPLASTY;  Surgeon: Luisito Hammonds MD;  Location: [facility] OR     BIOPSY  3/2017     BONE MARROW BIOPSY, BONE SPECIMEN, NEEDLE/TROCAR Right 2017    Procedure: BIOPSY BONE MARROW;  Surgeon: Romie Esquivel MD;  Location:  GI     COLONOSCOPY       CV HEART CATHETERIZATION WITH POSSIBLE INTERVENTION N/A 2020    Procedure: Coronary Angiogram;  Surgeon: Brian Simmons MD;  Location:  HEART CARDIAC CATH LAB     CV LEFT HEART CATH N/A 2020    Procedure: Left Heart Cath;  Surgeon: Brian Simmons MD;  Location:  HEART CARDIAC CATH LAB     CV RIGHT HEART CATH MEASUREMENTS RECORDED N/A 2020    Procedure:  Right Heart Cath;  Surgeon: Brian Simmons MD;  Location:  HEART CARDIAC CATH LAB     CV TRANSCATHETER AORTIC VALVE REPLACEMENT N/A 6/22/2021    Procedure: Transcatheter Aortic Valve Replacement;  Surgeon: Hunter Adan MD;  Location:  HEART CARDIAC CATH LAB     EP 3D TACHYCARDIA MAPPING N/A 10/18/2019    Procedure: EP 3D Tachycardia Mapping;  Surgeon: Delfino Gee MD;  Location:  HEART CARDIAC CATH LAB     EP ABLATION FOCAL AFIB N/A 10/18/2019    Procedure: EP Ablation Focal AFIB;  Surgeon: Delfino Gee MD;  Location:  HEART CARDIAC CATH LAB     EP PACEMAKER N/A 7/8/2021    Procedure: EP Pacemaker;  Surgeon: Blanca Lopez MD;  Location:  HEART CARDIAC CATH LAB     EYE SURGERY      Cataract, both eyes     ORTHOPEDIC SURGERY  2009    (L) total hip      No Known Allergies   Social History     Tobacco Use     Smoking status: Never Smoker     Smokeless tobacco: Never Used   Substance Use Topics     Alcohol use: Yes     Alcohol/week: 0.0 standard drinks     Comment: Max 2 beers per day      Wt Readings from Last 1 Encounters:   06/22/22 80.2 kg (176 lb 14.4 oz)        Anesthesia Evaluation   Pt has had prior anesthetic.         ROS/MED HX  ENT/Pulmonary:    (-) sleep apnea   Neurologic:       Cardiovascular:     (+) Dyslipidemia hypertension-----Taking blood thinners Pt has received instructions: pacemaker, dysrhythmias, a-flutter, valvular problems/murmurs s/p TAVR. pulmonary hypertension,     METS/Exercise Tolerance:     Hematologic:       Musculoskeletal:   (+) arthritis,     GI/Hepatic:    (-) GERD   Renal/Genitourinary:       Endo:       Psychiatric/Substance Use:       Infectious Disease:       Malignancy:       Other:            Physical Exam    Airway        Mallampati: II   TM distance: > 3 FB   Neck ROM: full     Respiratory Devices and Support         Dental           Cardiovascular          Rhythm and rate: regular and normal     Pulmonary           breath sounds clear to  auscultation           OUTSIDE LABS:  CBC:   Lab Results   Component Value Date    WBC 7.2 06/20/2022    WBC 7.5 06/03/2022    HGB 13.4 06/20/2022    HGB 12.7 (L) 06/03/2022    HCT 40.4 06/20/2022    HCT 37.6 (L) 06/03/2022    PLT 82 (L) 06/20/2022     06/03/2022     BMP:   Lab Results   Component Value Date     06/20/2022     06/03/2022    POTASSIUM 4.8 06/20/2022    POTASSIUM 4.6 06/03/2022    CHLORIDE 102 06/20/2022    CHLORIDE 106 06/03/2022    CO2 27 06/20/2022    CO2 27 06/03/2022    BUN 16 06/20/2022    BUN 22 06/03/2022    CR 1.02 06/20/2022    CR 0.91 06/03/2022    GLC 99 06/20/2022    GLC 91 06/03/2022     COAGS:   Lab Results   Component Value Date    PTT 26 08/25/2020    INR 1.01 06/16/2021     POC:   Lab Results   Component Value Date     (H) 09/10/2019     HEPATIC:   Lab Results   Component Value Date    ALBUMIN 3.8 06/03/2022    PROTTOTAL 7.4 06/03/2022    ALT 24 06/03/2022    AST 19 06/03/2022    ALKPHOS 86 06/03/2022    BILITOTAL 0.7 06/03/2022     OTHER:   Lab Results   Component Value Date    PH 7.41 08/25/2020    DIPAK 9.6 06/20/2022    PHOS 3.0 06/23/2021    MAG 1.9 06/23/2021    TSH 2.54 02/21/2017    CRP <2.9 08/12/2021    SED 7 08/12/2021       Anesthesia Plan    ASA Status:  3   NPO Status:  NPO Appropriate    Anesthesia Type: General.     - Airway: LMA   Induction: Intravenous.   Maintenance: Balanced.        Consents    Anesthesia Plan(s) and associated risks, benefits, and realistic alternatives discussed. Questions answered and patient/representative(s) expressed understanding.    - Discussed:     - Discussed with:  Patient         Postoperative Care    Pain management: IV analgesics, Oral pain medications, Multi-modal analgesia.   PONV prophylaxis: Ondansetron (or other 5HT-3), Dexamethasone or Solumedrol     Comments:                Yaakov Carlos MD

## 2022-06-22 NOTE — INTERVAL H&P NOTE
"I have reviewed the surgical (or preoperative) H&P that is linked to this encounter, and examined the patient. There are no significant changes    Clinical Conditions Present on Arrival:  Clinically Significant Risk Factors Present on Admission                 # Coagulation Defect: home medication list includes an anticoagulant medication  # Thrombocytopenia: Plts = 82 10e3/uL (Ref range: 150 - 450 10e3/uL) on admission, will monitor for bleeding  # Overweight: Estimated body mass index is 25.38 kg/m  as calculated from the following:    Height as of this encounter: 1.778 m (5' 10\").    Weight as of this encounter: 80.2 kg (176 lb 14.4 oz).       "

## 2022-06-23 ENCOUNTER — APPOINTMENT (OUTPATIENT)
Dept: PHYSICAL THERAPY | Facility: CLINIC | Age: 82
DRG: 470 | End: 2022-06-23
Attending: ORTHOPAEDIC SURGERY
Payer: COMMERCIAL

## 2022-06-23 LAB
ANION GAP SERPL CALCULATED.3IONS-SCNC: 7 MMOL/L (ref 3–14)
BUN SERPL-MCNC: 21 MG/DL (ref 7–30)
CALCIUM SERPL-MCNC: 8.2 MG/DL (ref 8.5–10.1)
CHLORIDE BLD-SCNC: 99 MMOL/L (ref 94–109)
CO2 SERPL-SCNC: 24 MMOL/L (ref 20–32)
CREAT SERPL-MCNC: 0.94 MG/DL (ref 0.66–1.25)
GFR SERPL CREATININE-BSD FRML MDRD: 82 ML/MIN/1.73M2
GLUCOSE BLD-MCNC: 163 MG/DL (ref 70–99)
HGB BLD-MCNC: 7.9 G/DL (ref 13.3–17.7)
HGB BLD-MCNC: 8.6 G/DL (ref 13.3–17.7)
HOLD SPECIMEN: NORMAL
POTASSIUM BLD-SCNC: 4.5 MMOL/L (ref 3.4–5.3)
SODIUM SERPL-SCNC: 130 MMOL/L (ref 133–144)

## 2022-06-23 PROCEDURE — 93010 ELECTROCARDIOGRAM REPORT: CPT | Performed by: INTERNAL MEDICINE

## 2022-06-23 PROCEDURE — 97530 THERAPEUTIC ACTIVITIES: CPT | Mod: GP | Performed by: PHYSICAL THERAPIST

## 2022-06-23 PROCEDURE — 250N000013 HC RX MED GY IP 250 OP 250 PS 637: Performed by: PHYSICIAN ASSISTANT

## 2022-06-23 PROCEDURE — 250N000013 HC RX MED GY IP 250 OP 250 PS 637: Performed by: ORTHOPAEDIC SURGERY

## 2022-06-23 PROCEDURE — 36415 COLL VENOUS BLD VENIPUNCTURE: CPT | Performed by: PHYSICIAN ASSISTANT

## 2022-06-23 PROCEDURE — 85018 HEMOGLOBIN: CPT | Performed by: PHYSICIAN ASSISTANT

## 2022-06-23 PROCEDURE — 250N000011 HC RX IP 250 OP 636: Performed by: PHYSICIAN ASSISTANT

## 2022-06-23 PROCEDURE — 82310 ASSAY OF CALCIUM: CPT | Performed by: PHYSICIAN ASSISTANT

## 2022-06-23 PROCEDURE — 97110 THERAPEUTIC EXERCISES: CPT | Mod: GP | Performed by: PHYSICAL THERAPIST

## 2022-06-23 PROCEDURE — 97116 GAIT TRAINING THERAPY: CPT | Mod: GP | Performed by: PHYSICAL THERAPIST

## 2022-06-23 PROCEDURE — 258N000003 HC RX IP 258 OP 636: Performed by: PHYSICIAN ASSISTANT

## 2022-06-23 PROCEDURE — 97162 PT EVAL MOD COMPLEX 30 MIN: CPT | Mod: GP | Performed by: PHYSICAL THERAPIST

## 2022-06-23 PROCEDURE — 99225 PR SUBSEQUENT OBSERVATION CARE,LEVEL II: CPT | Performed by: PHYSICIAN ASSISTANT

## 2022-06-23 RX ORDER — TAMSULOSIN HYDROCHLORIDE 0.4 MG/1
0.4 CAPSULE ORAL DAILY
Qty: 7 CAPSULE | Refills: 0 | Status: SHIPPED | OUTPATIENT
Start: 2022-06-23 | End: 2022-06-25

## 2022-06-23 RX ORDER — SODIUM CHLORIDE 9 MG/ML
INJECTION, SOLUTION INTRAVENOUS CONTINUOUS
Status: DISCONTINUED | OUTPATIENT
Start: 2022-06-23 | End: 2022-06-23

## 2022-06-23 RX ORDER — FAMOTIDINE 20 MG/1
20 TABLET, FILM COATED ORAL 2 TIMES DAILY
Status: DISCONTINUED | OUTPATIENT
Start: 2022-06-23 | End: 2022-06-25 | Stop reason: HOSPADM

## 2022-06-23 RX ORDER — MAGNESIUM HYDROXIDE/ALUMINUM HYDROXICE/SIMETHICONE 120; 1200; 1200 MG/30ML; MG/30ML; MG/30ML
30 SUSPENSION ORAL EVERY 4 HOURS PRN
Status: DISCONTINUED | OUTPATIENT
Start: 2022-06-23 | End: 2022-06-25 | Stop reason: HOSPADM

## 2022-06-23 RX ORDER — CALCIUM CARBONATE 500 MG/1
500 TABLET, CHEWABLE ORAL DAILY PRN
Status: DISCONTINUED | OUTPATIENT
Start: 2022-06-23 | End: 2022-06-25 | Stop reason: HOSPADM

## 2022-06-23 RX ORDER — FAMOTIDINE 20 MG/1
20 TABLET, FILM COATED ORAL 2 TIMES DAILY
Status: DISCONTINUED | OUTPATIENT
Start: 2022-06-23 | End: 2022-06-23

## 2022-06-23 RX ORDER — ALLOPURINOL 100 MG/1
100 TABLET ORAL EVERY MORNING
Status: DISCONTINUED | OUTPATIENT
Start: 2022-06-24 | End: 2022-06-25 | Stop reason: HOSPADM

## 2022-06-23 RX ORDER — TAMSULOSIN HYDROCHLORIDE 0.4 MG/1
0.4 CAPSULE ORAL DAILY
Status: DISCONTINUED | OUTPATIENT
Start: 2022-06-23 | End: 2022-06-23

## 2022-06-23 RX ADMIN — ACETAMINOPHEN 975 MG: 325 TABLET, FILM COATED ORAL at 15:59

## 2022-06-23 RX ADMIN — FAMOTIDINE 20 MG: 20 TABLET ORAL at 14:36

## 2022-06-23 RX ADMIN — TAMSULOSIN HYDROCHLORIDE 0.4 MG: 0.4 CAPSULE ORAL at 08:33

## 2022-06-23 RX ADMIN — ACETAMINOPHEN 975 MG: 325 TABLET, FILM COATED ORAL at 08:32

## 2022-06-23 RX ADMIN — SENNOSIDES AND DOCUSATE SODIUM 1 TABLET: 50; 8.6 TABLET ORAL at 20:16

## 2022-06-23 RX ADMIN — CEFAZOLIN SODIUM 2 G: 2 INJECTION, SOLUTION INTRAVENOUS at 02:46

## 2022-06-23 RX ADMIN — POLYETHYLENE GLYCOL 3350 17 G: 17 POWDER, FOR SOLUTION ORAL at 08:34

## 2022-06-23 RX ADMIN — FERROUS GLUCONATE 324 MG: 324 TABLET ORAL at 08:34

## 2022-06-23 RX ADMIN — SODIUM CHLORIDE: 9 INJECTION, SOLUTION INTRAVENOUS at 14:18

## 2022-06-23 RX ADMIN — PRAVASTATIN SODIUM 20 MG: 20 TABLET ORAL at 08:33

## 2022-06-23 RX ADMIN — LISINOPRIL 20 MG: 20 TABLET ORAL at 08:33

## 2022-06-23 RX ADMIN — SENNOSIDES AND DOCUSATE SODIUM 1 TABLET: 50; 8.6 TABLET ORAL at 08:33

## 2022-06-23 RX ADMIN — SODIUM CHLORIDE, POTASSIUM CHLORIDE, SODIUM LACTATE AND CALCIUM CHLORIDE: 600; 310; 30; 20 INJECTION, SOLUTION INTRAVENOUS at 14:09

## 2022-06-23 RX ADMIN — Medication 1 LOZENGE: at 03:05

## 2022-06-23 ASSESSMENT — COLUMBIA-SUICIDE SEVERITY RATING SCALE - C-SSRS
5. HAVE YOU STARTED TO WORK OUT OR WORKED OUT THE DETAILS OF HOW TO KILL YOURSELF? DO YOU INTEND TO CARRY OUT THIS PLAN?: NO
2. HAVE YOU ACTUALLY HAD ANY THOUGHTS OF KILLING YOURSELF IN THE PAST MONTH?: NO
1. IN THE PAST MONTH, HAVE YOU WISHED YOU WERE DEAD OR WISHED YOU COULD GO TO SLEEP AND NOT WAKE UP?: NO
3. HAVE YOU BEEN THINKING ABOUT HOW YOU MIGHT KILL YOURSELF?: NO
4. HAVE YOU HAD THESE THOUGHTS AND HAD SOME INTENTION OF ACTING ON THEM?: NO
6. HAVE YOU EVER DONE ANYTHING, STARTED TO DO ANYTHING, OR PREPARED TO DO ANYTHING TO END YOUR LIFE?: NO

## 2022-06-23 NOTE — CONSULTS
[facility] Hospitalist Consult     Mars Reyes MRN# [id number]   YOB: 1940 Age: 81 year old   Date of Admission: 6/22/2022  PCP is Pb Warren  Date of Service: 6/23/2022    Referring MD & Reason for Visit: I was asked by Luisito Hammonds MD, to manage chronic medical problems.  Internal Medicine Physician Assistant: Bernice Vasquez PA-C         Assessment and Plan:   Mars Reyes is a 81 year old male with a history of Pulmonary HTN, Atrial Flutter, ITP, HTN, PPM, Aortic Stenosis, Gout, MGUS, Mitral Valve Stenosis and Regurgitation, PVST, Right Hip Arthritis who was admitted s/p Right Total Hip Arthroplasty.  EBL 500ml.  Internal Medicine service was asked to see for management of chronic medical problems.    Right Hip Arthritis /status post Right Total Hip Arthroplasty - POD #1.     - will defer diet, activity, DVT ppx, and pain control to primary team.  - PT/OT consults    Syncope - pt with a brief LOC while working with PT.  Likely 2/2 orthostatic hypotension which is multifactorial due to up working with PT, anemia, had received home antihypertensives and started on Flomax which can cause orthostatic hypotension.  No hypoxia or chest pain.  - IVF hydration  - EKG and monitor on telemetry  - serial neuro exams  - check BMP and repeat hgb later today and in the morning    Hx SVT and Atrial Flutter  S/p PPM - pt with a hx of  paroxysmal SVT, Atrial Flutter s/p pulmonary vein isolation ablation in 2019 with known recurrent paroxysmal atrial flutter.  On Eliquis pta.  Not currently on a beta blocker.  - Eliquis has been resumed per surgery  - monitor on telemetry     Valvular Heart Disease  S/p TAVR - hx of moderate mitral stenosis, mild to moderate mitral regurgitation and severe aortic stenosis s/p TAVR with a 29mm Medtronic Evolut valve on 6/22/21.    Acute Blood Loss Anemia - hgb 8.6 down from baseline 13.4.  EBL 500ml.  - monitor serial hgb levels.  No indication for transfusion  at this time.  - continue Ferrous Gluconate    HTN - on Lisinopril pta.  Hold for now due to orthostatic hypotension; reassess on 6/24.    HLD - continue Pravastatin    Hx Gout - no acute flare.  Continue Allopurinol    Urinary Retention - improved after receiving Flomax this morning.  Discontinue Flomax due to concern it contributed to Orthostatic hypotension.  - monitor UOP and post void residuals    Hx ITP and MGUS - plt 62.  Monitor      Bernice Vasquez MS, PA-C  Internal Medicine Physician Assistant  [facility]  Pager: [phone]           Chief Complaint:   Syncope         HPI:   History is obtained from the patient and medical record. This patient is a 81 year old male with a history of Pulmonary HTN, Atrial Flutter, ITP, HTN, PPM, Aortic Stenosis, Gout, MGUS, Mitral Valve Stenosis and Regurgitation, PVST, Right Hip Arthritis who was admitted s/p Right Total Hip Arthroplasty.  EBL 500ml.  Internal Medicine service was asked to see for management of chronic medical problems.     Patient was seen today after a rapid response was called due to a syncopal event and hypotension after working with PT.  Pt reported patient became briefly unresponsive, leaning back in the wheel chair and had a brief episode of shaking.  Patient recalls working with PT.  Patient reports he felt dizzy, denied chest pain, shortness of breath, abdominal pain.  He had reflux earlier today.  Noted to be hypotensive 88/29 which improved to 119/72 after lying down.           Past Medical History:     Past Medical History:   Diagnosis Date     Aortic stenosis     moderate     Atypical atrial flutter (H)     PVI and LA linear ablation with successful termination of AFL 10/18/2019     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, unspecified cause, unspecified laterality      Contact dermatitis and other eczema, due to unspecified cause      Diverticulosis of colon (without mention of hemorrhage)      DJD (degenerative joint disease)       Essential hypertension, benign     abstracted 7/22/02     Gout, unspecified      Hip joint replacement by other means 06/03/2009     MGUS (monoclonal gammopathy of unknown significance)      Mitral valve disease     mild MS, moderate-severe MR     Pacemaker      PSVT (paroxysmal supraventricular tachycardia) (H)      Pulmonary HTN (H) 10/18/2011     Thrombocytopenia (H)           Past Surgical History:     Past Surgical History:   Procedure Laterality Date     ANESTHESIA CARDIOVERSION N/A 3/21/2019    Procedure: ANESTHESIA CARDIOVERSION;  Surgeon: GENERIC ANESTHESIA PROVIDER;  Location: [facility] OR     ARTHROPLASTY HIP ANTERIOR Right 6/22/2022    Procedure: Right direct anterior total hip arthroplasty;  Surgeon: Luisito Hammonds MD;  Location: [facility] OR     ARTHROPLASTY REVISION HIP ANTERIOR Left 9/9/2019    Procedure: REVISION DIRECT ANTERIOR TOTAL LEFT HIP ARTHROPLASTY;  Surgeon: Luisito Hammonds MD;  Location:  OR     BIOPSY  3/2017     BONE MARROW BIOPSY, BONE SPECIMEN, NEEDLE/TROCAR Right 2/23/2017    Procedure: BIOPSY BONE MARROW;  Surgeon: Romie Esquivel MD;  Location:  GI     COLONOSCOPY  2010     CV HEART CATHETERIZATION WITH POSSIBLE INTERVENTION N/A 8/25/2020    Procedure: Coronary Angiogram;  Surgeon: Brian Simmons MD;  Location:  HEART CARDIAC CATH LAB     CV LEFT HEART CATH N/A 8/25/2020    Procedure: Left Heart Cath;  Surgeon: Brian Simmons MD;  Location:  HEART CARDIAC CATH LAB     CV RIGHT HEART CATH MEASUREMENTS RECORDED N/A 8/25/2020    Procedure: Right Heart Cath;  Surgeon: Brian Simmons MD;  Location:  HEART CARDIAC CATH LAB     CV TRANSCATHETER AORTIC VALVE REPLACEMENT N/A 6/22/2021    Procedure: Transcatheter Aortic Valve Replacement;  Surgeon: Hunter Adan MD;  Location:  HEART CARDIAC CATH LAB     EP 3D TACHYCARDIA MAPPING N/A 10/18/2019    Procedure: EP 3D Tachycardia Mapping;  Surgeon: Delfino Gee MD;  Location:  HEART CARDIAC CATH LAB     EP  ABLATION FOCAL AFIB N/A 10/18/2019    Procedure: EP Ablation Focal AFIB;  Surgeon: Delfino Gee MD;  Location:  HEART CARDIAC CATH LAB     EP PACEMAKER N/A 7/8/2021    Procedure: EP Pacemaker;  Surgeon: Blanca Lopez MD;  Location:  HEART CARDIAC CATH LAB     EYE SURGERY      Cataract, both eyes     ORTHOPEDIC SURGERY  2009    (L) total hip          Social History:     Social History     Socioeconomic History     Marital status:      Spouse name: Not on file     Number of children: Not on file     Years of education: Not on file     Highest education level: Not on file   Occupational History     Not on file   Tobacco Use     Smoking status: Never Smoker     Smokeless tobacco: Never Used   Vaping Use     Vaping Use: Never used   Substance and Sexual Activity     Alcohol use: Yes     Alcohol/week: 0.0 standard drinks     Comment: Max 2 beers per day     Drug use: No     Sexual activity: Yes     Partners: Female     Birth control/protection: None   Other Topics Concern     Parent/sibling w/ CABG, MI or angioplasty before 65F 55M? No   Social History Narrative     Not on file     Social Determinants of Health     Financial Resource Strain: Not on file   Food Insecurity: Not on file   Transportation Needs: Not on file   Physical Activity: Not on file   Stress: Not on file   Social Connections: Not on file   Intimate Partner Violence: Not on file   Housing Stability: Not on file          Family History:     Family History   Problem Relation Age of Onset     Hypertension Mother           Allergies:    No Known Allergies       Medications:     Prior to Admission medications    Medication Sig Last Dose Taking? Auth Provider Long Term End Date   acetaminophen (TYLENOL) 325 MG tablet Take 2 tablets (650 mg) by mouth every 4 hours as needed for other (mild pain)  Yes Mike Richardson PA-C     allopurinol (ZYLOPRIM) 100 MG tablet TAKE 1 TABLET BY MOUTH EVERY ONCE DAILY 6/22/2022 at 0800 Yes Pb Warren  "MD IESHA     amoxicillin (AMOXIL) 500 MG tablet Take 2,000 mg by mouth daily as needed (1 hour prior to dental procedures) Unknown at Unknown time Yes Reported, Patient     ELIQUIS ANTICOAGULANT 5 MG tablet TAKE 1 TABLET BY MOUTH TWICE A DAY 6/19/2022 at 0800 Yes Pb Warren MD     ferrous gluconate (FERGON) 324 (38 Fe) MG tablet Take 1 tablet (324 mg) by mouth daily (with breakfast)  Yes Mike Richardson PA-C     hydrOXYzine (ATARAX) 10 MG tablet Take 1 tablet (10 mg) by mouth every 6 hours as needed for itching or anxiety (with pain, moderate pain)  Yes Mike Richardson PA-C     lisinopril (ZESTRIL) 20 MG tablet Take 1 tablet (20 mg) by mouth every evening 6/22/2022 at 0800 Yes Blanca Lopez MD Yes    oxyCODONE (ROXICODONE) 5 MG tablet Take 1-2 tablets (5-10 mg) by mouth every 4 hours as needed for moderate to severe pain  Yes Mike Richardson PA-C     pravastatin (PRAVACHOL) 20 MG tablet Take 1 tablet (20 mg) by mouth daily Past Week at Unknown time Yes Pb Warren MD Yes    senna-docusate (SENOKOT-S/PERICOLACE) 8.6-50 MG tablet Take 1-2 tablets by mouth 2 times daily Take while on oral narcotics to prevent or treat constipation.  Yes Mike Richardson PA-C     tamsulosin (FLOMAX) 0.4 MG capsule Take 1 capsule (0.4 mg) by mouth daily  Yes Luisito Hammonds MD            Review of Systems:   A complete ROS was performed and is negative other than what is stated in the HPI.       Physical Exam:   Blood pressure 97/45, pulse 79, temperature 97.6  F (36.4  C), temperature source Temporal, resp. rate 17, height 1.778 m (5' 10\"), weight 80.2 kg (176 lb 14.4 oz), SpO2 96 %.  General: Alert, interactive, NAD  HEENT: AT/NC  Chest/Resp: clear to auscultation bilaterally, no crackles or wheezes  Heart/CV: regular rate and rhythm, no murmur  Abdomen/GI: Soft, nontender, nondistended. +BS.  No HSM or masses, no rebound or guarding.  Extremities/MSK: No LE edema.  Right hip bandage c/d/o  Skin: Warm and " dry, no jaundice or rash  Neuro: Alert & oriented x 3, no focal deficits         Labs:     CMP  Recent Labs   Lab 06/22/22  1422 06/20/22  1557    134   POTASSIUM 4.2 4.8   CHLORIDE 105 102   CO2 24 27   ANIONGAP 8 5   * 99   BUN 16 16   CR 0.82 1.02   GFRESTIMATED 89 74   DIPAK 8.5 9.6     CBC  Recent Labs   Lab 06/23/22 0616 06/22/22 1958 06/22/22  1423 06/20/22  1557   WBC  --   --  14.2* 7.2   RBC  --   --  3.30* 4.48   HGB 8.6* 9.7* 9.9* 13.4   HCT  --   --  29.9* 40.4   MCV  --   --  91 90   MCH  --   --  30.0 29.9   MCHC  --   --  33.1 33.2   RDW  --   --  12.7 12.7   PLT  --   --  62* 82*     INR  Recent Labs   Lab 06/22/22  1423   INR 1.26*

## 2022-06-23 NOTE — PLAN OF CARE
PT:After ambulation and stairs. Pt sitting in W/C began to shake. Pt then burping and pushing self back in chair, antoher therapist was able to stabilize chair, pt became unresponsive during this and lifting his R UE up in air. Rapid response was called, pt was urgently brought back to room in W/C and sling lifted back into hospital bed. See assessment by RN and MD.

## 2022-06-23 NOTE — PLAN OF CARE
Goal Outcome Evaluation:    Patient vital signs are at baseline: Yes  Patient able to ambulate as they were prior to admission or with assist devices provided by therapies during their stay:  No,  Reason:  Low bp, passed out with PT, bolus 500 ml, now getting NS at 75mL/hr  Patient MUST void prior to discharge:  Yes  Patient able to tolerate oral intake:  Yes  Pain has adequate pain control using Oral analgesics:  Yes  Does patient have an identified :  Yes  Has goal D/C date and time been discussed with patient:  Yes    Patient has RRT at [address]84 due to syncope incident during PT. See RRT on flow sheet

## 2022-06-23 NOTE — PLAN OF CARE
Goal Outcome Evaluation:      Patient vital signs are at baseline: Yes  Patient able to ambulate as they were prior to admission or with assist devices provided by therapies during their stay:  Yes  Patient MUST void prior to discharge:  Yes  Patient able to tolerate oral intake:  Yes  Pain has adequate pain control using Oral analgesics:  Yes  Does patient have an identified :  Yes, wife  Has goal D/C date and time been discussed with patient:  Yes     Reviewed discharge instructions and medications with patient and wife. Questions answered. Patient discharged to home with wife, discharge instructions, medications (Iron, Atarax, Oxycodone, Senna, Flomax, Tylenol), and belongings.

## 2022-06-23 NOTE — PROGRESS NOTES
Ortho Daily Progress Note  Pain controlled.  Denies cp or sob.  Some difficulty urinating.  Hasn't been up walking yet.    Temp:  [96.8  F (36  C)-97.8  F (36.6  C)] 97.3  F (36.3  C)  Pulse:  [] 74  Resp:  [10-20] 16  BP: ()/(49-72) 126/56  SpO2:  [93 %-100 %] 98 %  Hemoglobin   Date Value Ref Range Status   06/22/2022 9.7 (L) 13.3 - 17.7 g/dL Final   07/08/2021 12.4 (L) 13.3 - 17.7 g/dL Final   ]    Alert, appropriate, and following commands  Breathing easily without wheeze  Dressing c/d/i, 5/5 df/pf/ehl, SILT, palpable dp pulse    X-rays - well aligned OSNIA, no apparent complications    A/P - 81 year old male s/p R DA SONIA on 6/22/22  1.  Weightbearing as tolerated. Consider a walker for the first 2.   2.  Anterior hip precautions.  No hyperextension or external rotation past neutral.  No abduction pillow necessary.   3.  Physical therapy.   4.  DVT prophylaxis with resuming Eliquis POD #2   5.  Ancef x 24 hours.   6.  PACU x-rays, AP pelvis, 2 views of the right hip.    7.  Follow up with Dr. Hammonds in 2 weeks for wound check. Keep dressing in place until then.  Okay to shower.  No submerging the wound.   9.  Follow up with Dr. Hammonds in 8 weeks with 2 views of the right hip.    Luisito Hammonds MD

## 2022-06-23 NOTE — PROGRESS NOTES
06/23/22 0928   Quick Adds   Quick Adds Certification   Type of Visit Initial PT Evaluation   Living Environment   People in Home spouse   Current Living Arrangements house   Home Accessibility stairs to enter home;stairs within home   Number of Stairs, Main Entrance 1   Stair Railings, Main Entrance railings safe and in good condition   Number of Stairs, Within Home, Primary five   Stair Railings, Within Home, Primary railings safe and in good condition   Transportation Anticipated family or friend will provide   Self-Care   Equipment Currently Used at Home walker, rolling;cane, straight   Fall history within last six months no   Activity/Exercise/Self-Care Comment Pt has a son and dtg in law that could potentially help out as well. Was not using walker or cane.   General Information   Onset of Illness/Injury or Date of Surgery 06/22/22   Referring Physician Dr. Hammonds   Patient/Family Therapy Goals Statement (PT) Pt hopeful to DC home today.   Pertinent History of Current Problem (include personal factors and/or comorbidities that impact the POC) Pt status post R SONIA   Existing Precautions/Restrictions no hip hyperextension;no hip ER   Weight-Bearing Status - RLE weight-bearing as tolerated   Cognition   Affect/Mental Status (Cognition) WNL   Orientation Status (Cognition) oriented x 4   Follows Commands (Cognition) WFL   Pain Assessment   Patient Currently in Pain Yes, see Vital Sign flowsheet   Posture    Posture Forward head position;Protracted shoulders   Range of Motion (ROM)   Range of Motion ROM deficits secondary to surgical procedure;ROM deficits secondary to pain;ROM deficits secondary to weakness   ROM Comment Pt able to perform flexion to 90 degrees   Strength (Manual Muscle Testing)   Strength Comments able to perform R SLR with CGA   Bed Mobility   Comment, (Bed Mobility) CGA   Transfers   Comment, (Transfers) CGA   Gait/Stairs (Locomotion)   Comment, (Gait/Stairs) CGA, FWW, 15 feet   Balance    Balance Comments no LOB, good wt bearing through surgical LE for stability   Clinical Impression   Criteria for Skilled Therapeutic Intervention Yes, treatment indicated   PT Diagnosis (PT) decreased functional mobility   Influenced by the following impairments decreased ROM, strength, pain   Functional limitations due to impairments decreased bed mob, transfers, ambulation, stairs   Clinical Presentation (PT Evaluation Complexity) Evolving/Changing   Clinical Presentation Rationale did have nuero or CV response following stairs this am, MD assessing at end of session   Clinical Decision Making (Complexity) moderate complexity   Risk & Benefits of therapy have been explained evaluation/treatment results reviewed;care plan/treatment goals reviewed;risks/benefits reviewed;current/potential barriers reviewed;participants voiced agreement with care plan;participants included;patient   PT Discharge Planning   PT Discharge Recommendation (DC Rec)   (defer to ortho)   PT Rationale for DC Rec Pt has good support at home, and likely to meet goals, but may need to stay due to medical concerns following am session- became unresponsive, shaking, leaning back in chair and hypotensive. See rapid response details.   PT Brief overview of current status Pt did well with mobility this am, able to perform supine to sit with cues, sit<>stand with SBA, ambulation with SBA, stairs with min A. But following this pt had a episode of unresponsiveness and shaking, extension of back.   Therapy Certification   Start of care date 06/23/22   Certification date from 06/23/22   Certification date to 06/24/22   Medical Diagnosis s/p SONIA   Total Evaluation Time   Total Evaluation Time (Minutes) 5   Physical Therapy Goals   PT Frequency 2x/day   PT Predicted Duration/Target Date for Goal Attainment 06/24/22   PT Goals Bed Mobility;Transfers;Gait;Stairs;Aerobic Activity   PT: Bed Mobility Independent;Supine to/from sit   PT: Transfers Sit to/from  stand;Bed to/from chair;Modified independent   PT: Gait Modified independent;Rolling walker   PT: Stairs 6 stairs;Minimal assist;Assistive device;Rail on right   PT: Perform aerobic activity with stable cardiovascular response 15 minutes;intermittent activity;ambulation

## 2022-06-23 NOTE — PLAN OF CARE
Shift: [id number]    Patient vital signs are at baseline: Yes  Patient able to ambulate as they were prior to admission or with assist devices provided by therapies during their stay:  No,  Reason:  Pt not OOB yet, just dangled at bedside   Patient MUST void prior to discharge:  Yes, due to void  Patient able to tolerate oral intake:  Yes  Pain has adequate pain control using Oral analgesics:  Yes  Does patient have an identified :  Yes  Has goal D/C date and time been discussed with patient:  Yes     Pt A/O x 4, VSS, on capno, onRA, CMS intact. Pt has pacemaker. Tolerating fluids. PIV saline locked. Pt denies pain throughout shift, scheduled Tylenol for pain. Voided once in AM during shift. Has not been OOB during shift. Dangled off side of bed during previous shift. Dressing CDI. Plan to discharge home with wife today.

## 2022-06-23 NOTE — PLAN OF CARE
Goal Outcome Evaluation:    Plan of Care Reviewed With: patient     Patient vital signs are at baseline: Yes  Patient able to ambulate as they were prior to admission or with assist devices provided by therapies during their stay:  NO patient did not walk yet, just dangled at bedside.  Patient MUST void prior to discharge:  No is due to void  Patient able to tolerate oral intake:  Yes  Pain has adequate pain control using Oral analgesics:  Yes  Does patient have an identified :  Yes  Has goal D/C date and time been discussed with patient:  Yes    Patient arrived on the unit at approximately 1520. A&ox4. Dangled at the bedside. Tylenol used for pain management. Is due to void, bladder scan was 104. CMS intact, dressing CDI. Plan is to discharge home tomorrow.

## 2022-06-24 ENCOUNTER — APPOINTMENT (OUTPATIENT)
Dept: SPEECH THERAPY | Facility: CLINIC | Age: 82
DRG: 470 | End: 2022-06-24
Attending: INTERNAL MEDICINE
Payer: COMMERCIAL

## 2022-06-24 ENCOUNTER — APPOINTMENT (OUTPATIENT)
Dept: PHYSICAL THERAPY | Facility: CLINIC | Age: 82
DRG: 470 | End: 2022-06-24
Attending: ORTHOPAEDIC SURGERY
Payer: COMMERCIAL

## 2022-06-24 ENCOUNTER — APPOINTMENT (OUTPATIENT)
Dept: OCCUPATIONAL THERAPY | Facility: CLINIC | Age: 82
DRG: 470 | End: 2022-06-24
Attending: PHYSICIAN ASSISTANT
Payer: COMMERCIAL

## 2022-06-24 PROBLEM — Z96.641 STATUS POST TOTAL HIP REPLACEMENT, RIGHT: Status: ACTIVE | Noted: 2022-06-24

## 2022-06-24 PROBLEM — Z96.641 STATUS POST TOTAL REPLACEMENT OF RIGHT HIP: Status: ACTIVE | Noted: 2022-06-24

## 2022-06-24 LAB
ABO/RH(D): NORMAL
ANTIBODY SCREEN: NEGATIVE
BLD PROD TYP BPU: NORMAL
BLOOD COMPONENT TYPE: NORMAL
CODING SYSTEM: NORMAL
CROSSMATCH: NORMAL
FASTING STATUS PATIENT QL REPORTED: NO
GLUCOSE BLD-MCNC: 95 MG/DL (ref 70–99)
HGB BLD-MCNC: 7.6 G/DL (ref 13.3–17.7)
HGB BLD-MCNC: 7.9 G/DL (ref 13.3–17.7)
ISSUE DATE AND TIME: NORMAL
MDC_IDC_EPISODE_DTM: NORMAL
MDC_IDC_EPISODE_DURATION: 1 S
MDC_IDC_EPISODE_ID: 12
MDC_IDC_EPISODE_TYPE: NORMAL
MDC_IDC_LEAD_IMPLANT_DT: NORMAL
MDC_IDC_LEAD_IMPLANT_DT: NORMAL
MDC_IDC_LEAD_LOCATION: NORMAL
MDC_IDC_LEAD_LOCATION: NORMAL
MDC_IDC_LEAD_LOCATION_DETAIL_1: NORMAL
MDC_IDC_LEAD_LOCATION_DETAIL_1: NORMAL
MDC_IDC_LEAD_MFG: NORMAL
MDC_IDC_LEAD_MFG: NORMAL
MDC_IDC_LEAD_MODEL: NORMAL
MDC_IDC_LEAD_MODEL: NORMAL
MDC_IDC_LEAD_POLARITY_TYPE: NORMAL
MDC_IDC_LEAD_POLARITY_TYPE: NORMAL
MDC_IDC_LEAD_SERIAL: NORMAL
MDC_IDC_LEAD_SERIAL: NORMAL
MDC_IDC_MSMT_BATTERY_DTM: NORMAL
MDC_IDC_MSMT_BATTERY_REMAINING_LONGEVITY: 166 MO
MDC_IDC_MSMT_BATTERY_RRT_TRIGGER: 2.62
MDC_IDC_MSMT_BATTERY_STATUS: NORMAL
MDC_IDC_MSMT_BATTERY_VOLTAGE: 3.15 V
MDC_IDC_MSMT_LEADCHNL_RA_IMPEDANCE_VALUE: 323 OHM
MDC_IDC_MSMT_LEADCHNL_RA_IMPEDANCE_VALUE: 380 OHM
MDC_IDC_MSMT_LEADCHNL_RA_PACING_THRESHOLD_AMPLITUDE: 0.62 V
MDC_IDC_MSMT_LEADCHNL_RA_PACING_THRESHOLD_PULSEWIDTH: 0.4 MS
MDC_IDC_MSMT_LEADCHNL_RA_SENSING_INTR_AMPL: 4.88 MV
MDC_IDC_MSMT_LEADCHNL_RA_SENSING_INTR_AMPL: 4.88 MV
MDC_IDC_MSMT_LEADCHNL_RV_IMPEDANCE_VALUE: 285 OHM
MDC_IDC_MSMT_LEADCHNL_RV_IMPEDANCE_VALUE: 361 OHM
MDC_IDC_MSMT_LEADCHNL_RV_PACING_THRESHOLD_AMPLITUDE: 0.62 V
MDC_IDC_MSMT_LEADCHNL_RV_PACING_THRESHOLD_PULSEWIDTH: 0.4 MS
MDC_IDC_MSMT_LEADCHNL_RV_SENSING_INTR_AMPL: 13.62 MV
MDC_IDC_MSMT_LEADCHNL_RV_SENSING_INTR_AMPL: 13.62 MV
MDC_IDC_PG_IMPLANT_DTM: NORMAL
MDC_IDC_PG_MFG: NORMAL
MDC_IDC_PG_MODEL: NORMAL
MDC_IDC_PG_SERIAL: NORMAL
MDC_IDC_PG_TYPE: NORMAL
MDC_IDC_SESS_CLINIC_NAME: NORMAL
MDC_IDC_SESS_DTM: NORMAL
MDC_IDC_SESS_TYPE: NORMAL
MDC_IDC_SET_BRADY_AT_MODE_SWITCH_RATE: 171 {BEATS}/MIN
MDC_IDC_SET_BRADY_HYSTRATE: NORMAL
MDC_IDC_SET_BRADY_LOWRATE: 60 {BEATS}/MIN
MDC_IDC_SET_BRADY_MAX_SENSOR_RATE: 130 {BEATS}/MIN
MDC_IDC_SET_BRADY_MAX_TRACKING_RATE: 130 {BEATS}/MIN
MDC_IDC_SET_BRADY_MODE: NORMAL
MDC_IDC_SET_BRADY_PAV_DELAY_LOW: 300 MS
MDC_IDC_SET_BRADY_SAV_DELAY_LOW: 300 MS
MDC_IDC_SET_LEADCHNL_RA_PACING_AMPLITUDE: 1.5 V
MDC_IDC_SET_LEADCHNL_RA_PACING_ANODE_ELECTRODE_1: NORMAL
MDC_IDC_SET_LEADCHNL_RA_PACING_ANODE_LOCATION_1: NORMAL
MDC_IDC_SET_LEADCHNL_RA_PACING_CAPTURE_MODE: NORMAL
MDC_IDC_SET_LEADCHNL_RA_PACING_CATHODE_ELECTRODE_1: NORMAL
MDC_IDC_SET_LEADCHNL_RA_PACING_CATHODE_LOCATION_1: NORMAL
MDC_IDC_SET_LEADCHNL_RA_PACING_POLARITY: NORMAL
MDC_IDC_SET_LEADCHNL_RA_PACING_PULSEWIDTH: 0.4 MS
MDC_IDC_SET_LEADCHNL_RA_SENSING_ANODE_ELECTRODE_1: NORMAL
MDC_IDC_SET_LEADCHNL_RA_SENSING_ANODE_LOCATION_1: NORMAL
MDC_IDC_SET_LEADCHNL_RA_SENSING_CATHODE_ELECTRODE_1: NORMAL
MDC_IDC_SET_LEADCHNL_RA_SENSING_CATHODE_LOCATION_1: NORMAL
MDC_IDC_SET_LEADCHNL_RA_SENSING_POLARITY: NORMAL
MDC_IDC_SET_LEADCHNL_RA_SENSING_SENSITIVITY: 0.3 MV
MDC_IDC_SET_LEADCHNL_RV_PACING_AMPLITUDE: 2 V
MDC_IDC_SET_LEADCHNL_RV_PACING_ANODE_ELECTRODE_1: NORMAL
MDC_IDC_SET_LEADCHNL_RV_PACING_ANODE_LOCATION_1: NORMAL
MDC_IDC_SET_LEADCHNL_RV_PACING_CAPTURE_MODE: NORMAL
MDC_IDC_SET_LEADCHNL_RV_PACING_CATHODE_ELECTRODE_1: NORMAL
MDC_IDC_SET_LEADCHNL_RV_PACING_CATHODE_LOCATION_1: NORMAL
MDC_IDC_SET_LEADCHNL_RV_PACING_POLARITY: NORMAL
MDC_IDC_SET_LEADCHNL_RV_PACING_PULSEWIDTH: 0.4 MS
MDC_IDC_SET_LEADCHNL_RV_SENSING_ANODE_ELECTRODE_1: NORMAL
MDC_IDC_SET_LEADCHNL_RV_SENSING_ANODE_LOCATION_1: NORMAL
MDC_IDC_SET_LEADCHNL_RV_SENSING_CATHODE_ELECTRODE_1: NORMAL
MDC_IDC_SET_LEADCHNL_RV_SENSING_CATHODE_LOCATION_1: NORMAL
MDC_IDC_SET_LEADCHNL_RV_SENSING_POLARITY: NORMAL
MDC_IDC_SET_LEADCHNL_RV_SENSING_SENSITIVITY: 0.9 MV
MDC_IDC_SET_ZONE_DETECTION_INTERVAL: 350 MS
MDC_IDC_SET_ZONE_DETECTION_INTERVAL: 400 MS
MDC_IDC_SET_ZONE_TYPE: NORMAL
MDC_IDC_STAT_AT_BURDEN_PERCENT: 0 %
MDC_IDC_STAT_AT_DTM_END: NORMAL
MDC_IDC_STAT_AT_DTM_START: NORMAL
MDC_IDC_STAT_BRADY_AP_VP_PERCENT: 2.45 %
MDC_IDC_STAT_BRADY_AP_VS_PERCENT: 7.79 %
MDC_IDC_STAT_BRADY_AS_VP_PERCENT: 13.21 %
MDC_IDC_STAT_BRADY_AS_VS_PERCENT: 76.54 %
MDC_IDC_STAT_BRADY_DTM_END: NORMAL
MDC_IDC_STAT_BRADY_DTM_START: NORMAL
MDC_IDC_STAT_BRADY_RA_PERCENT_PACED: 9.9 %
MDC_IDC_STAT_BRADY_RV_PERCENT_PACED: 15.66 %
MDC_IDC_STAT_EPISODE_RECENT_COUNT: 0
MDC_IDC_STAT_EPISODE_RECENT_COUNT: 1
MDC_IDC_STAT_EPISODE_RECENT_COUNT_DTM_END: NORMAL
MDC_IDC_STAT_EPISODE_RECENT_COUNT_DTM_START: NORMAL
MDC_IDC_STAT_EPISODE_TOTAL_COUNT: 0
MDC_IDC_STAT_EPISODE_TOTAL_COUNT: 0
MDC_IDC_STAT_EPISODE_TOTAL_COUNT: 1
MDC_IDC_STAT_EPISODE_TOTAL_COUNT: 1
MDC_IDC_STAT_EPISODE_TOTAL_COUNT: 10
MDC_IDC_STAT_EPISODE_TOTAL_COUNT_DTM_END: NORMAL
MDC_IDC_STAT_EPISODE_TOTAL_COUNT_DTM_START: NORMAL
MDC_IDC_STAT_EPISODE_TYPE: NORMAL
SODIUM SERPL-SCNC: 133 MMOL/L (ref 133–144)
SPECIMEN EXPIRATION DATE: NORMAL
UNIT ABO/RH: NORMAL
UNIT NUMBER: NORMAL
UNIT STATUS: NORMAL
UNIT TYPE ISBT: 6200

## 2022-06-24 PROCEDURE — 97165 OT EVAL LOW COMPLEX 30 MIN: CPT | Mod: GO

## 2022-06-24 PROCEDURE — 85018 HEMOGLOBIN: CPT | Performed by: PHYSICIAN ASSISTANT

## 2022-06-24 PROCEDURE — 86850 RBC ANTIBODY SCREEN: CPT | Performed by: INTERNAL MEDICINE

## 2022-06-24 PROCEDURE — 250N000013 HC RX MED GY IP 250 OP 250 PS 637: Performed by: PHYSICIAN ASSISTANT

## 2022-06-24 PROCEDURE — 97535 SELF CARE MNGMENT TRAINING: CPT | Mod: GO

## 2022-06-24 PROCEDURE — 85018 HEMOGLOBIN: CPT | Performed by: INTERNAL MEDICINE

## 2022-06-24 PROCEDURE — 86923 COMPATIBILITY TEST ELECTRIC: CPT | Performed by: INTERNAL MEDICINE

## 2022-06-24 PROCEDURE — 97116 GAIT TRAINING THERAPY: CPT | Mod: GP | Performed by: PHYSICAL THERAPIST

## 2022-06-24 PROCEDURE — 99233 SBSQ HOSP IP/OBS HIGH 50: CPT | Performed by: INTERNAL MEDICINE

## 2022-06-24 PROCEDURE — 82947 ASSAY GLUCOSE BLOOD QUANT: CPT | Performed by: ORTHOPAEDIC SURGERY

## 2022-06-24 PROCEDURE — 36415 COLL VENOUS BLD VENIPUNCTURE: CPT | Performed by: INTERNAL MEDICINE

## 2022-06-24 PROCEDURE — 86901 BLOOD TYPING SEROLOGIC RH(D): CPT | Performed by: INTERNAL MEDICINE

## 2022-06-24 PROCEDURE — 36415 COLL VENOUS BLD VENIPUNCTURE: CPT | Performed by: ORTHOPAEDIC SURGERY

## 2022-06-24 PROCEDURE — 120N000001 HC R&B MED SURG/OB

## 2022-06-24 PROCEDURE — 97530 THERAPEUTIC ACTIVITIES: CPT | Mod: GP | Performed by: PHYSICAL THERAPIST

## 2022-06-24 PROCEDURE — P9016 RBC LEUKOCYTES REDUCED: HCPCS | Performed by: INTERNAL MEDICINE

## 2022-06-24 PROCEDURE — 92610 EVALUATE SWALLOWING FUNCTION: CPT | Mod: GN

## 2022-06-24 PROCEDURE — 84295 ASSAY OF SERUM SODIUM: CPT | Performed by: PHYSICIAN ASSISTANT

## 2022-06-24 RX ADMIN — SENNOSIDES AND DOCUSATE SODIUM 1 TABLET: 50; 8.6 TABLET ORAL at 21:13

## 2022-06-24 RX ADMIN — PRAVASTATIN SODIUM 20 MG: 20 TABLET ORAL at 08:41

## 2022-06-24 RX ADMIN — ALLOPURINOL 100 MG: 100 TABLET ORAL at 08:42

## 2022-06-24 RX ADMIN — ACETAMINOPHEN 975 MG: 325 TABLET, FILM COATED ORAL at 08:41

## 2022-06-24 RX ADMIN — ACETAMINOPHEN 975 MG: 325 TABLET, FILM COATED ORAL at 16:31

## 2022-06-24 RX ADMIN — APIXABAN 5 MG: 5 TABLET, FILM COATED ORAL at 08:41

## 2022-06-24 RX ADMIN — APIXABAN 5 MG: 5 TABLET, FILM COATED ORAL at 21:13

## 2022-06-24 RX ADMIN — ACETAMINOPHEN 975 MG: 325 TABLET, FILM COATED ORAL at 00:40

## 2022-06-24 RX ADMIN — FERROUS GLUCONATE 324 MG: 324 TABLET ORAL at 08:41

## 2022-06-24 RX ADMIN — SENNOSIDES AND DOCUSATE SODIUM 1 TABLET: 50; 8.6 TABLET ORAL at 08:42

## 2022-06-24 RX ADMIN — FAMOTIDINE 20 MG: 20 TABLET ORAL at 08:41

## 2022-06-24 ASSESSMENT — ACTIVITIES OF DAILY LIVING (ADL)
ADLS_ACUITY_SCORE: 22

## 2022-06-24 NOTE — PROGRESS NOTES
Orthopedic Surgery  Mars Reyes  2022  Admit Date:  2022  POD 2 Days Post-Op  S/P Procedure(s):  Right direct anterior total hip arthroplasty    Patient resting comfortably in chair.    Pain controlled.  Tolerating oral intake.    Denies nausea or vomiting  States has food bolus feeling when eating or even drinking water  Low BP yesterday and today with PT/OT  Denies chest pain or shortness of breath  No events overnight.     Alert and orient to person, place, and time.  Vital Sign Ranges  Temperature Temp  Av.4  F (36.3  C)  Min: 97  F (36.1  C)  Max: 97.8  F (36.6  C)   Blood pressure Systolic (24hrs), Av , Min:88 , Max:119        Diastolic (24hrs), Av, Min:29, Max:60      Pulse Pulse  Av.4  Min: 61  Max: 90   Respirations Resp  Av  Min: 16  Max: 16   Pulse oximetry SpO2  Av.9 %  Min: 92 %  Max: 99 %       Dressing is clean, dry, and intact.   Minimal erythema of the surrounding skin.   Bilateral calves are soft, non-tender.  Bilateral lower extremity is NVI.  Sensation intact bilateral lower extremities  5/5 motor with resisted dorsi and plantar flexion bilaterally  +Dp pulse  Labs:  Recent Labs   Lab Test 22  1054 22  1422 22  1557   POTASSIUM 4.5 4.2 4.8     Recent Labs   Lab Test 22  0727 22  1754 22  0616   HGB 7.6* 7.9* 8.6*     Recent Labs   Lab Test 22  1423 21  1415 20  1227   INR 1.26* 1.01 1.22*     Recent Labs   Lab Test 22  1423 22  1557 22  1523   PLT 62* 82* 197       A/P  1. S/p primary anterior SONIA s/t DJD    Continue Eliquis for DVT prophylaxis.     Mobilize with PT/OT    WBAT with walker   Leave dressing intact   Speech therapy consult pending to eval dysphagia   Will watch BP, ? hgb related vs fluid vs medication.     Continue current pain regiment.    2. Disposition   Anticipate d/c to Home based on speech therapy eval and BP cooperating with activity. If ok may discharge home, if  not ok to stay until further improvement    Bernice Ayala PA-C

## 2022-06-24 NOTE — UTILIZATION REVIEW
"  Admission Status; Secondary Review Determination         Under the authority of the Utilization Management Committee, the utilization review process indicated a secondary review on the above patient.  The review outcome is based on review of the medical records, discussions with staff, and applying clinical experience noted on the date of the review.        (xxx)      Inpatient Status Appropriate - This patient's medical care is consistent with medical management for inpatient care and reasonable inpatient medical practice.      () Observation Status Appropriate - This patient does not meet hospital inpatient criteria and is placed in observation status. If this patient's primary payer is Medicare and was admitted as an inpatient, Condition Code 44 should be used and patient status changed to \"observation\".   () Admission Status NOT Appropriate - This patient's medical care is not consistent with medical management for Inpatient or Observation Status.          RATIONALE FOR DETERMINATION     Mars Reyes is a 81 year old male with a history of pulmonary HTN, Atrial Flutter, ITP, HTN, PPM, Aortic Stenosis, Gout, MGUS, Mitral Valve Stenosis and Regurgitation, PVST, Right Hip Arthritis who was admitted on 6/22/2022 following Right Total Hip Arthroplasty. The procedure went well.  However, on 6/23 he had an episode of hypotension and syncope after working with PT felt likely related to orthostatic hypotension.  EKG without acute changes.  BP improved with IVF.  He has developed mild dysphagia and worsening anemia (hgb today 7.6 with baseline 13.4).  He is receiving pRBC today.  Speech therapy consulted as well.  I spoke with Dr. James who states he will require at least 1-2 days further hospitalization.  IP status is appropriate.    The severity of illness, intensity of service provided, expected LOS and risk for adverse outcome make the care complex, high risk and appropriate for hospital admission.        The " information on this document is developed by the utilization review team in order for the business office to ensure compliance.  This only denotes the appropriateness of proper admission status and does not reflect the quality of care rendered.         The definitions of Inpatient Status and Observation Status used in making the determination above are those provided in the CMS Coverage Manual, Chapter 1 and Chapter 6, section 70.4.      Sincerely,     Anastasiia Ryan MD  Physician Advisor   Utilization Review/ Case Management  [facility].

## 2022-06-24 NOTE — PROVIDER NOTIFICATION
Paged Dr. James at [phone]:  Pt having difficulty swallowing food, feels as though it gets stuck in throat.  Please advise, thank you

## 2022-06-24 NOTE — PLAN OF CARE
Pt is alert and oriented x4, assist of 1 with gbw with transfers, denies pain, just little discomfort due to uncomfortable bed, scheduled Tylenol given, pt voided 500, 250, island dressing to R hip, intact with no drainage, bruising noted, /48, 114/57, Plan to discharge home today.

## 2022-06-24 NOTE — PLAN OF CARE
Day RN (0700-1930)    Patient vital signs are at baseline: No,  Reason:  BP's continued to run hypotensive.  Unit of blood given this shift, BP's improved with this.  Patient able to ambulate as they were prior to admission or with assist devices provided by therapies during their stay:  Yes  Patient MUST void prior to discharge:  Yes  Patient able to tolerate oral intake:  Yes  Pain has adequate pain control using Oral analgesics:  Yes  Does patient have an identified :  Yes  Has goal D/C date and time been discussed with patient:  Yes    Pt A/O x4.  VSS and afebrile - see above and in flowsheets regarding BP's.  Tele - SR in the 70's/80's.  Pain managed adequately with scheduled PO Tylenol.  CMS intact.  Dressing CDI.  Up A1 with walker and gait belt.  Voiding adequately.  Tolerating regular diet well.  Plan is home on discharge once hypotension spells and hmg improve (hmg was 7.6 and 7.9 today).  Will continue to monitor.

## 2022-06-24 NOTE — PROGRESS NOTES
[facility]  Hospitalist Progress Note  Lisa James MD 06/24/22    Reason for Stay (Diagnosis): R SONIA         Assessment and Plan:      Summary of Stay: Mars Reyes is a 81 year old male with a history of Pulmonary HTN, Atrial Flutter, ITP, HTN, PPM, Aortic Stenosis, Gout, MGUS, Mitral Valve Stenosis and Regurgitation, PVST, Right Hip Arthritis who was admitted 6/22/2022 s/p Right Total Hip Arthroplasty.  EBL 500ml.  Internal Medicine service was asked to see for management of chronic medical problems.    RRT called on 6/23 AM for syncope and hypotension.  Today has developed mild dysphagia and worsening anemia.    Problem List/Assessment and Plan:     Right Hip Arthritis /status post Right Total Hip Arthroplasty on 6/22:     - will defer diet, activity, DVT ppx, and pain control to primary team.  - PT/OT consults     Syncope/Hypotension - pt with a brief LOC while working with PT the morning of 6/23.  Likely 2/2 orthostatic hypotension which is multifactorial due to up working with PT, anemia, had received home antihypertensives and started on Flomax which can cause orthostatic hypotension.  EKG without acute ischemic changes.  No significant arrhythmias on telemetry.  With IVF he is feeling much better today.  Still slightly lightheaded when getting up, will receive PRBC transfusion today (see below)  - Continue to hold antihypertensives     Acute Blood Loss Anemia - Hgb 7.6 down from baseline 13.4.  EBL 500ml.  He is symptomatic, will transfuse 1 unit PRBC today.  - 1 unit PRBC today and repeat Hgb this afternoon and again in the morning  - continue Ferrous Gluconate    Dysphagia: Noted while eating breakfast today, felt that food was getting stuck. No prior issues.  Speech therapy consulted.     Hx SVT and Atrial Flutter  S/p PPM - pt with a hx of  paroxysmal SVT, Atrial Flutter s/p pulmonary vein isolation ablation in 2019 with known recurrent paroxysmal atrial flutter.  On Eliquis PTA.  Not  "currently on a beta blocker.  - Eliquis has been resumed per surgery  - monitor on telemetry      Valvular Heart Disease  S/p TAVR - hx of moderate mitral stenosis, mild to moderate mitral regurgitation and severe aortic stenosis s/p TAVR with a 29mm Medtronic Evolut valve on 6/22/21.       HTN - on Lisinopril PTA.  Hold for now due to orthostatic hypotension.     HLD - continue Pravastatin     Hx Gout - no acute flare.  Continue Allopurinol     Urinary Retention - improved after receiving Flomax the morning of 6/23.  Discontinue Flomax due to concern it contributed to Orthostatic hypotension.  - monitor UOP and post void residuals     Hx ITP and MGUS - plt 62.  Monitor    Diet: Advance Diet as Tolerated: Regular Diet Adult  Discharge Instruction - Regular Diet Adult    DVT Prophylaxis: DOAC  Ramos Catheter: Not present  Code Status: Full Code      Disposition Plan      Expected Discharge Date: 06/24/2022      Destination: home with family       Entered: Lisa James MD 06/24/2022, 10:16 AM       The patient's care was discussed with the Bedside Nurse and Patient.    Hospitalist Service  [facility]          Interval History (Subjective):      Patient was discussed with his bedside nurse.  He states he was feeling much better this morning but then while eating an omelette for breakfast he felt that it was getting stuck in his throat.  He has never had this happen before.  No choking or coughing.  He has not had chest pain or shortness of breath.  When he went from the bed to the chair this morning he did still feel lightheaded but less so compared to yesterday.  Pain is currently well controlled.  Discussed the plan for the day including blood transfusion which she is agreeable to.                  Physical Exam:      Last Vital Signs:  BP 92/49   Pulse 87   Temp 97.8  F (36.6  C) (Temporal)   Resp 16   Ht 1.778 m (5' 10\")   Wt 80.2 kg (176 lb 14.4 oz)   SpO2 96%   BMI 25.38 kg/m  "     General: Alert, awake, no acute distress.  HEENT: Normocephalic and atraumatic, eyes anicteric and without scleral injection, EOMI, face symmetric, MMM.  Cardiac: RRR, normal S1, S2. No m/g/r, no LE edema.  Pulmonary: Normal chest rise, normal work of breathing.  Lungs CTAB without crackles or wheezing.  Abdomen: soft, non-tender, non-distended.  Normoactive bowel sounds, no guarding or rebound tenderness.  Extremities: dressing in place over right hip is c/d/i.  Warm, well perfused.  Skin: no rashes or lesions.  Warm and Dry.  Neuro: No focal deficits.  Speech clear.  Coordination and strength grossly normal.  Psych: Alert and oriented x3. Appropriate affect.         Medications:      All current medications were reviewed with changes reflected in problem list.         Data:      All new lab and imaging data was reviewed.   Labs:  Recent Labs   Lab 06/24/22  0727 06/23/22  1054 06/22/22  1422 06/20/22  1557    130* 137 134   POTASSIUM  --  4.5 4.2 4.8   CHLORIDE  --  99 105 102   CO2  --  24 24 27   ANIONGAP  --  7 8 5   GLC 95 163* 159* 99   BUN  --  21 16 16   CR  --  0.94 0.82 1.02   GFRESTIMATED  --  82 89 74   DIPAK  --  8.2* 8.5 9.6     Recent Labs   Lab 06/24/22  0727 06/23/22  1754 06/23/22  0616 06/22/22  1958 06/22/22  1423 06/20/22  1557   WBC  --   --   --   --  14.2* 7.2   HGB 7.6* 7.9* 8.6*   < > 9.9* 13.4   HCT  --   --   --   --  29.9* 40.4   MCV  --   --   --   --  91 90   PLT  --   --   --   --  62* 82*    < > = values in this interval not displayed.      Imaging:   No results found for this or any previous visit (from the past 24 hour(s)).    Lisa James MD

## 2022-06-24 NOTE — PROGRESS NOTES
06/24/22 0855   Quick Adds   Type of Visit Initial Occupational Therapy Evaluation   Living Environment   People in Home spouse   Current Living Arrangements house   Living Environment Comments Reports he will have 24 hr assist and has two sons nearby who can also assist   Self-Care   Usual Activity Tolerance good   Current Activity Tolerance fair   Equipment Currently Used at Home walker, rolling;cane, straight  (reacher. Tub/shower with door. ADA compliant toilet with vanity on Left. Long shoe horn)   Fall history within last six months no   Activity/Exercise/Self-Care Comment At baseline is independent in self-cares without gait aid   General Information   Onset of Illness/Injury or Date of Surgery 06/22/22   Referring Physician Mike Richardson PA-C   Patient/Family Therapy Goal Statement (OT) Improve BP   Additional Occupational Profile Info/Pertinent History of Current Problem 81 year old male with a history of Pulmonary HTN, Atrial Flutter, ITP, HTN, PPM, Aortic Stenosis, Gout, MGUS, Mitral Valve Stenosis and Regurgitation, PVST, Right Hip Arthritis who was admitted s/p Right Total Hip Arthroplasty   Existing Precautions/Restrictions fall;no hip ER;no hip hyperextension   Cognitive Status Examination   Affect/Mental Status (Cognitive) WFL   Follows Commands follows multi-step commands   Visual Perception   Impact of Vision Impairment on Function (Vision) Baseline vision impairments   Sensory   Sensory Comments Pt denies BUE/BLE numbness or tingling   Pain Assessment   Patient Currently in Pain Yes, see Vital Sign flowsheet   Transfers   Transfers toilet transfer;sit-stand transfer   Sit-Stand Transfer   Sit-Stand [place] (Transfers) contact guard   Toilet Transfer   Type (Toilet Transfer) stand-sit;sit-stand   [place] Level (Toilet Transfer) contact guard   Activities of Daily Living   BADL Assessment/Intervention lower body dressing;toileting;bathing   Bathing Assessment/Intervention    [place] Level (Bathing) moderate assist (50% patient effort)   Comment, (Bathing) per clinical judgement   Lower Body Dressing Assessment/Training   [place] Level (Lower Body Dressing) moderate assist (50% patient effort)   Toileting   [place] Level (Toileting) moderate assist (50% patient effort)   Clinical Impression   Criteria for Skilled Therapeutic Interventions Met (OT) Yes, treatment indicated   OT Diagnosis Decline function   OT Problem List-Impairments impacting ADL activity tolerance impaired;pain;post-surgical precautions;mobility   Assessment of Occupational Performance 5 or more Performance Deficits   Identified Performance Deficits LB dressing, toileting. bathing, functional mobility, strenuous IADLs   Planned Therapy Interventions (OT) ADL retraining;transfer training;home program guidelines;progressive activity/exercise   Clinical Decision Making Complexity (OT) low complexity   Anticipated Equipment Needs Upon Discharge (OT)   (nonslip tub mat. long handled bath sponge)   Risk & Benefits of therapy have been explained evaluation/treatment results reviewed;care plan/treatment goals reviewed;risks/benefits reviewed;current/potential barriers reviewed;participants voiced agreement with care plan;participants included;patient   OT Discharge Planning   OT Rationale for DC Rec Defer to ortho. Pt unable to ambulate within room during session due to symptomatic orthostatic hypotension. Anticipate that with further medical management of BP, at discharge pt will require Sergio for bathing, supervision for toileting/dressing/grooming, and assist with strenuous IADLs. Pt reports he can have this assist at discharge   Therapy Certification   Start of Care Date 06/24/22   Certification date from 06/24/22   Certification date to 06/25/22   Medical Diagnosis Decline function post SONIA   Total Evaluation Time (Minutes)   Total Evaluation Time (Minutes) 8   OT Goals   Therapy Frequency (OT) One time  eval and treatment   OT Predicted Duration/Target Date for Goal Attainment 06/25/22   OT Goals Lower Body Dressing;Toilet Transfer/Toileting;OT Goal 1;OT Goal 2;OT Goal 3   OT: Lower Body Dressing Supervision/stand-by assist;within precautions   OT: Toilet Transfer/Toileting Supervision/stand-by assist;toilet transfer;using adaptive equipment   OT: Goal 1 Pt will verbalize understanding of recommended tub/shower transfer technique and car transfer technique within precautions   OT: Goal 2 Pt will verbalize understanding of fall prevention strategies, how to progress activity tolerance, pain management strategies   OT: Goal 3 Pt will verbalize understanding of walker safety principles to use during self-care tasks to enhance independence

## 2022-06-24 NOTE — PLAN OF CARE
[facility]      OUTPATIENT PHYSICAL THERAPY EVALUATION  PLAN OF TREATMENT FOR OUTPATIENT REHABILITATION  (COMPLETE FOR INITIAL CLAIMS ONLY)  Patient's Last Name, First Name, M.I.  YOB: 1940  EricMars MARIN                        Provider's Name  [facility] Medical Record No.  [id number]                               Onset Date:  06/22/22   Start of Care Date:  06/23/22      Type:     _X_PT   ___OT   ___SLP Medical Diagnosis:  s/p SONIA                        PT Diagnosis:  decreased functional mobility   Visits from SOC:  1   _________________________________________________________________________________  Plan of Treatment/Functional Goals    Planned Interventions:       Goals: See Physical Therapy Goals on Care Plan in [facility] electronic health record.    Therapy Frequency: 2x/day  Predicted Duration of Therapy Intervention: 06/24/22  _________________________________________________________________________________    I CERTIFY THE NEED FOR THESE SERVICES FURNISHED UNDER        THIS PLAN OF TREATMENT AND WHILE UNDER MY CARE     (Physician co-signature of this document indicates review and certification of the therapy plan).              Certification date from: 06/23/22, Certification date to: 06/24/22    Referring Physician: Dr. Hammonds            Initial Assessment        See Physical Therapy evaluation dated 06/23/22 in Epic electronic health record.

## 2022-06-24 NOTE — PLAN OF CARE
Goal Outcome Evaluation:    Plan of Care Reviewed With: patient 3pm-11pm RN    Patient VS are at baseline: Yes  Patient able to ambulate as they were prior to admission or with assist devices provided by therapy during their stay: Yes walked only short distance due to syncope episode this AM.  Patient must void prior to discharge: Yes  Patient able to tolerate oral intake: Yes  Patient has adequate pain control using oral analgesics: Yes    CMS intact, dressing on right hip CDI. Plan is to possibly discharge home tomorrow.

## 2022-06-24 NOTE — PROGRESS NOTES
Occupational Therapy Discharge Summary    Reason for therapy discharge:    All goals and outcomes met, no further needs identified.    Progress towards therapy goal(s). See goals on Care Plan in [facility] electronic health record for goal details.  Goals met    Therapy recommendation(s):    Defer to ortho. Pt unable to ambulate within room during session due to symptomatic orthostatic hypotension. Anticipate that with further medical management of BP, at discharge pt will require Sergio for bathing, supervision for toileting/dressing/grooming, and assist with strenuous IADLs. Pt reports he can have this assist at discharge

## 2022-06-24 NOTE — PROGRESS NOTES
"Clinical Swallow Evaluation (CSE):     06/24/22 0955   General Information   Onset of Illness/Injury or Date of Surgery 06/22/22   Referring Physician Dr. James   Patient/Family Therapy Goal Statement (SLP) To fix swallowing   Pertinent History of Current Problem   Per provider \"Mars Reyes is a 81 year old male with a history of Pulmonary HTN, Atrial Flutter, ITP, HTN, PPM, Aortic Stenosis, Gout, MGUS, Mitral Valve Stenosis and Regurgitation, PVST, Right Hip Arthritis who was admitted 6/22/2022 s/p Right Total Hip Arthroplasty.  RRT called on 6/23 AM for syncope and hypotension.  Today has developed mild dysphagia and worsening anemia.\" SLP consulted for dysphagia assessment.     General Observations   Pt alert, pleasant, upright in chair for evaluation. MD in room during evaluation, pt with steady hemoglobin drop and planned blood transfusion today.     Past History of Dysphagia   None per EMR or pt report. He reports acute onset dysphagia since surgery, and feels it could be related to endotracheal intubation. He reports ongoing mild sore throat following. Symptoms with breakfast: omelette getting stuck in throat, was eating in bed.     Pain Assessment   Patient Currently in Pain No   Type of Evaluation   Type of Evaluation Swallow Evaluation   Oral Motor   Oral Musculature generally intact   Structural Abnormalities none present   Mucosal Quality good   Dentition (Oral Motor)   Dentition (Oral Motor) natural dentition;adequate dentition   Facial Symmetry (Oral Motor)   Facial Symmetry (Oral Motor) WNL   Lip Function (Oral Motor)   Lip Range of Motion (Oral Motor) WNL   Tongue Function (Oral Motor)   Tongue ROM (Oral Motor) WNL   Jaw Function (Oral Motor)   Jaw Function (Oral Motor) WNL   Cough/Swallow/Gag Reflex (Oral Motor)   Soft Palate/Velum (Oral Motor) WNL   Volitional Throat Clear/Cough (Oral Motor) WNL   Volitional Swallow (Oral Motor) WNL   Vocal Quality/Secretion Management (Oral Motor)   Vocal Quality " (Oral Motor) WNL   Secretion Management (Oral Motor) WNL   General Swallowing Observations   Respiratory Support (General Swallowing Observations) none   Current Diet/Method of Nutritional Intake (General Swallowing Observations, NIS) regular diet;thin liquids (level 0)   Swallowing Evaluation Clinical swallow evaluation   Clinical Swallow Evaluation   Feeding Assistance no assistance needed   Clinical Swallow Evaluation Textures Trialed thin liquids;solid foods;pureed   Clinical Swallow Eval: Thin Liquid Texture Trial   Mode of Presentation, Thin Liquids straw;self-fed   Volume of Liquid or Food Presented ~ 3 oz   Oral Phase of Swallow WFL   Pharyngeal Phase of Swallow intact   Diagnostic Statement Pt reports mild increased effort to swallow compared to solids, specifically after solid intake, can feel slow movement of liquids down mid-chest level   Clinical Swallow Evaluation: Puree Solid Texture Trial   Mode of Presentation, Puree spoon;self-fed   Volume of Puree Presented x6 bites   Oral Phase, Puree WFL   Pharyngeal Phase, Puree intact   Diagnostic Statement Pt denies any difficulty, globus sensation or aspiration sensation   Clinical Swallow Evaluation: Solid Food Texture Trial   Mode of Presentation self-fed   Volume Presented 1 maribel cracker   Oral Phase WFL   Pharyngeal Phase intact   Diagnostic Statement Pt denies any difficulty, globus sensation or aspiration sensation   Esophageal Phase of Swallow   Patient reports or presents with symptoms of esophageal dysphagia Yes   Esophageal comments ? globus sensation, feeling of slow bolus transfer could be esophageal; No hx GERD per pt   Swallowing Recommendations   Diet Consistency Recommendations regular diet;thin liquids (level 0)  (self-selecting softer/moister, small single bites)   Supervision Level for Intake patient independent   Mode of Delivery Recommendations bolus size, small;food moistened;slow rate of intake   Swallowing Maneuver Recommendations  alternate food and liquid intake;extra swallow   Monitoring/Assistance Required (Eating/Swallowing) stop eating activities when fatigue is present;monitor for cough or change in vocal quality with intake   Recommended Feeding/Eating Techniques (Swallow Eval) maintain upright sitting position for eating;maintain upright posture during/after eating for 30 minutes  (up to chair for meals)   Medication Administration Recommendations, Swallowing (SLP) whole as tolerated   Instrumental Assessment Recommendations VFSS (videofluoroscopic swallowing study)  (consider as IP vs OP pending clinical progression)   General Therapy Interventions   Planned Therapy Interventions Dysphagia Treatment   Dysphagia treatment Instruction of safe swallow strategies   Clinical Impression   Criteria for Skilled Therapeutic Interventions Met (SLP Eval) Yes, treatment indicated   SLP Diagnosis suspected pharyngeal vs esophageal dysphagia   Risks & Benefits of therapy have been explained evaluation/treatment results reviewed;care plan/treatment goals reviewed;participants voiced agreement with care plan;participants included;patient   Clinical Impression Comments   Clinical swallow evaluation completed - suspected pharyngeal vs esophageal dysphagia identified. Cranial nerves for speech/swallowing WFL. Mastication, oral clearance, pharyngeal swallow timing, laryngeal elevation all appear WFL across thin, puree and regular solid trials. No overt clinical signs/sx aspiration noted. Pt's primary symptoms include: sticking sensation, feeling of slow bolus transfer pointing to mid-sternal/chest level (specifically with thin liquids following solids) and occassional increased effort to swallow. Pt stated being up to chair (vs in bed with breakfast, where he has difficulty with omlette) was beneficial and reports his swallowing felt better during my evaluation in chair. Pt comfortable with and clinically appropriate to continue regular solids (vs  softer/moister options when educated on same) with thin liquids, self-selecting softer/moister items, small single bites, alternate between solids/liquids consistently, consider smaller more frequent meals when upright to chair. Anticipate improvements in swallow with time post-extubation and ? potentially post blood transfusion/improvements in medical status as pt with no PMHx or symptoms of dysphagia pre-admission. Per MD, potential d/c home tomorrow if stable -  Placed OP VFSS orders for pt to pursue if dysphagia symptoms persists- he is in agreement with POC.     SLP Discharge Planning   SLP Discharge Recommendation home with outpatient speech therapy   SLP Rationale for DC Rec Placed OP VFSS orders for pt to pursue if dysphagia symptoms persists- pt aware and in agreement   SLP Brief overview of current status    Pt comfortable with and clinically appropriate to continue regular solids (vs softer/moister options when educated on same) with thin liquids, self-selecting softer/moister items, small single bites, alternate between solids/liquids consistently, consider smaller more frequent meals when upright to chair. Anticipate improvements in swallow with time post-extubation and ? potentially post blood transfusion/improvements in medical status as pt with no PMHx or symptoms of dysphagia pre-admission.     Therapy Certification   Start of Care Date 06/24/22   Certification date from 06/24/22   Certification date to 07/01/22   Medical Diagnosis Right Hip Arthritis /status post Right Total Hip Arthroplasty; Syncope/Hypotension; Acute Blood Loss Anemia; Dysphagia    Total Evaluation Time   Total Evaluation Time (Minutes) 25   SLP Goals   Therapy Frequency (SLP Eval) 5 times/wk   SLP Predicted Duration/Target Date for Goal Attainment 07/01/22   SLP Goals Swallow   SLP: Safely tolerate diet without signs/symptoms of aspiration Regular diet;Thin liquids;With use of swallow precautions;Independently

## 2022-06-24 NOTE — PLAN OF CARE
[facility]      OUTPATIENT OCCUPATIONAL THERAPY  EVALUATION  PLAN OF TREATMENT FOR OUTPATIENT REHABILITATION  (COMPLETE FOR INITIAL CLAIMS ONLY)  Patient's Last Name, First Name, M.I.  YOB: 1940  Mars Reyes                          Provider's Name  [facility] Medical Record No.  [id number]                               Onset Date:  06/22/22   Start of Care Date:  06/24/22     Type:     ___PT   _X_OT   ___SLP Medical Diagnosis:  Decline function post SONIA                        OT Diagnosis:  Decline function   Visits from SOC:  1   _________________________________________________________________________________  Plan of Treatment/Functional Goals    Planned Interventions: ADL retraining, transfer training, home program guidelines, progressive activity/exercise   Goals: See Occupational Therapy Goals on Care Plan in [facility] electronic health record.    Therapy Frequency: One time eval and treatment  Predicted Duration of Therapy Intervention: 06/25/22  _________________________________________________________________________________    I CERTIFY THE NEED FOR THESE SERVICES FURNISHED UNDER        THIS PLAN OF TREATMENT AND WHILE UNDER MY CARE     (Physician co-signature of this document indicates review and certification of the therapy plan).              Certification date from: 06/24/22, Certification date to: 06/25/22    Referring Physician: Mike Richardson PA-C            Initial Assessment        See Occupational Therapy evaluation dated 06/24/22 in Epic electronic health record.

## 2022-06-24 NOTE — PROGRESS NOTES
[facility]      OUTPATIENT SPEECH LANGUAGE PATHOLOGY EVALUATION  PLAN OF TREATMENT FOR OUTPATIENT REHABILITATION  (COMPLETE FOR INITIAL CLAIMS ONLY)  Patient's Last Name, First Name, M.I.  YOB: 1940  Mars Reyes                        Provider's Name  [facility] Medical Record No.  [id number]                               Onset Date:  06/22/22  Start of Care Date: 06/24/22    Type:     ___PT   ___OT   _X_SLP Medical Diagnosis: Right Hip Arthritis /status post Right Total Hip Arthroplasty; Syncope/Hypotension; Acute Blood Loss Anemia; Dysphagia          SLP Diagnosis:  suspected pharyngeal vs esophageal dysphagia  Visits from SOC:  1   ________________________________________________________________  Plan of Treatment/Functional Goals    Planned Interventions:   Dysphagia Treatment       Instruction of safe swallow strategies        Goals: See Speech Language Pathology Goals on Care Plan in [facility] electronic health record.    Therapy Frequency:5 times/wk   Predicted Duration of Therapy Intervention: 07/01/22  ________________________________________________________________________________    I CERTIFY THE NEED FOR THESE SERVICES FURNISHED UNDER        THIS PLAN OF TREATMENT AND WHILE UNDER MY CARE     (Physician co-signature of this document indicates review and certification of the therapy plan).              Certification date from: 06/24/22 Certification date to: 07/01/22    Referring Physician: Dr. James           Initial Assessment        See Speech Language Pathology documentation in Epic electronic health record, evaluation dated  06/24/22

## 2022-06-25 ENCOUNTER — APPOINTMENT (OUTPATIENT)
Dept: SPEECH THERAPY | Facility: CLINIC | Age: 82
DRG: 470 | End: 2022-06-25
Attending: ORTHOPAEDIC SURGERY
Payer: COMMERCIAL

## 2022-06-25 ENCOUNTER — APPOINTMENT (OUTPATIENT)
Dept: PHYSICAL THERAPY | Facility: CLINIC | Age: 82
DRG: 470 | End: 2022-06-25
Attending: ORTHOPAEDIC SURGERY
Payer: COMMERCIAL

## 2022-06-25 VITALS
HEIGHT: 70 IN | HEART RATE: 79 BPM | SYSTOLIC BLOOD PRESSURE: 97 MMHG | OXYGEN SATURATION: 96 % | WEIGHT: 176.9 LBS | BODY MASS INDEX: 25.33 KG/M2 | TEMPERATURE: 97.4 F | RESPIRATION RATE: 14 BRPM | DIASTOLIC BLOOD PRESSURE: 44 MMHG

## 2022-06-25 LAB
ANION GAP SERPL CALCULATED.3IONS-SCNC: 5 MMOL/L (ref 3–14)
BUN SERPL-MCNC: 13 MG/DL (ref 7–30)
CALCIUM SERPL-MCNC: 8.2 MG/DL (ref 8.5–10.1)
CHLORIDE BLD-SCNC: 102 MMOL/L (ref 94–109)
CO2 SERPL-SCNC: 26 MMOL/L (ref 20–32)
CREAT SERPL-MCNC: 0.8 MG/DL (ref 0.66–1.25)
GFR SERPL CREATININE-BSD FRML MDRD: 89 ML/MIN/1.73M2
GLUCOSE BLD-MCNC: 92 MG/DL (ref 70–99)
HGB BLD-MCNC: 8.5 G/DL (ref 13.3–17.7)
POTASSIUM BLD-SCNC: 4.4 MMOL/L (ref 3.4–5.3)
SODIUM SERPL-SCNC: 133 MMOL/L (ref 133–144)

## 2022-06-25 PROCEDURE — 85018 HEMOGLOBIN: CPT | Performed by: INTERNAL MEDICINE

## 2022-06-25 PROCEDURE — 97530 THERAPEUTIC ACTIVITIES: CPT | Mod: GP | Performed by: PHYSICAL THERAPIST

## 2022-06-25 PROCEDURE — 250N000013 HC RX MED GY IP 250 OP 250 PS 637: Performed by: PHYSICIAN ASSISTANT

## 2022-06-25 PROCEDURE — 80048 BASIC METABOLIC PNL TOTAL CA: CPT | Performed by: INTERNAL MEDICINE

## 2022-06-25 PROCEDURE — 36415 COLL VENOUS BLD VENIPUNCTURE: CPT | Performed by: INTERNAL MEDICINE

## 2022-06-25 PROCEDURE — 92526 ORAL FUNCTION THERAPY: CPT | Mod: GN | Performed by: SPEECH-LANGUAGE PATHOLOGIST

## 2022-06-25 PROCEDURE — 99232 SBSQ HOSP IP/OBS MODERATE 35: CPT | Performed by: INTERNAL MEDICINE

## 2022-06-25 PROCEDURE — 97116 GAIT TRAINING THERAPY: CPT | Mod: GP | Performed by: PHYSICAL THERAPIST

## 2022-06-25 RX ORDER — LISINOPRIL 20 MG/1
TABLET ORAL
Qty: 90 TABLET | Refills: 0 | Status: SHIPPED | OUTPATIENT
Start: 2022-06-25 | End: 2022-07-12

## 2022-06-25 RX ADMIN — ALLOPURINOL 100 MG: 100 TABLET ORAL at 07:54

## 2022-06-25 RX ADMIN — ACETAMINOPHEN 975 MG: 325 TABLET, FILM COATED ORAL at 00:03

## 2022-06-25 RX ADMIN — SENNOSIDES AND DOCUSATE SODIUM 1 TABLET: 50; 8.6 TABLET ORAL at 07:54

## 2022-06-25 RX ADMIN — APIXABAN 5 MG: 5 TABLET, FILM COATED ORAL at 07:55

## 2022-06-25 RX ADMIN — FERROUS GLUCONATE 324 MG: 324 TABLET ORAL at 07:54

## 2022-06-25 RX ADMIN — FAMOTIDINE 20 MG: 20 TABLET ORAL at 07:55

## 2022-06-25 RX ADMIN — FAMOTIDINE 20 MG: 20 TABLET ORAL at 00:03

## 2022-06-25 RX ADMIN — PRAVASTATIN SODIUM 20 MG: 20 TABLET ORAL at 07:55

## 2022-06-25 RX ADMIN — ACETAMINOPHEN 975 MG: 325 TABLET, FILM COATED ORAL at 07:53

## 2022-06-25 RX ADMIN — POLYETHYLENE GLYCOL 3350 17 G: 17 POWDER, FOR SOLUTION ORAL at 07:53

## 2022-06-25 ASSESSMENT — ACTIVITIES OF DAILY LIVING (ADL)
ADLS_ACUITY_SCORE: 22
ADLS_ACUITY_SCORE: 22
ADLS_ACUITY_SCORE: 23
ADLS_ACUITY_SCORE: 22
ADLS_ACUITY_SCORE: 22
ADLS_ACUITY_SCORE: 23
ADLS_ACUITY_SCORE: 23

## 2022-06-25 NOTE — PROGRESS NOTES
"Orthopedic Surgery  Mars Reyes  6/25/2022  Admit Date:  6/22/2022  POD 3 Days Post-Op  S/P Procedure(s):  Right direct anterior total hip arthroplasty    Patient resting comfortably in bed  Pain controlled.  Tolerating oral intake.    Denies nausea or vomiting  Felt better after getting the blood yesterday.  No other episodes of feeling lightheaded.  Denies chest pain or shortness of breath  No events overnight.     Alert and orient to person, place, and time.  Vital Sign Ranges  /61 (BP Location: Right arm)   Pulse 86   Temp 97.3  F (36.3  C) (Temporal)   Resp 12   Ht 1.778 m (5' 10\")   Wt 80.2 kg (176 lb 14.4 oz)   SpO2 95%   BMI 25.38 kg/m      Dressing is clean, dry, and intact.   Minimal erythema of the surrounding skin.   Bilateral calves are soft, non-tender.  Bilateral lower extremity is NVI.  Sensation intact bilateral lower extremities  5/5 motor with resisted dorsi and plantar flexion bilaterally  +Dp pulse    Labs:  Hemoglobin   Date Value Ref Range Status   06/25/2022 8.5 (L) 13.3 - 17.7 g/dL Final   07/08/2021 12.4 (L) 13.3 - 17.7 g/dL Final       A/P  1. S/p primary anterior SONIA s/t DJD    Continue Eliquis for DVT prophylaxis.     Mobilize with PT/OT    WBAT with walker   Leave dressing intact   Speech therapy consult pending to eval dysphagia - hasn't had breakfast this am but will order some food and see how it goes   Will watch BP-appreciate medicine assistance   Continue current pain regiment-he's avoiding narcotics, encouraged continuing this is he can.    2. Disposition   Anticipate d/c to Home per medicine, likely today     Kjerstin Foss PA-C [facility]    "

## 2022-06-25 NOTE — PLAN OF CARE
Patient vital signs are at baseline: Yes  Patient able to ambulate as they were prior to admission or with assist devices provided by therapies during their stay:  Yes  Patient MUST void prior to discharge:  Yes  Patient able to tolerate oral intake:  Yes  Pain has adequate pain control using Oral analgesics:  Yes  Does patient have an identified :  Yes  Has goal D/C date and time been discussed with patient:  Yes    Pt is alert and oriented x4, assist of 1 for transfers, pain controled with scheduled Tylenol, pt walker to bathroom few times, good urine output, Plan to discharge home today.

## 2022-06-25 NOTE — PROGRESS NOTES
Speech Language Therapy Discharge Summary    Reason for therapy discharge:    Discharged to home.    Progress towards therapy goal(s). See goals on Care Plan in [facility] electronic health record for goal details.  Goals met    Therapy recommendation(s):    No further therapy is recommended.  However if pt has dysphagia symptoms at home he should pursue OP VFSS/ OP SLP.  He has been educated about recommendations.

## 2022-06-25 NOTE — PROGRESS NOTES
[facility]  Hospitalist Progress Note  Lisa James MD 06/25/22     Reason for Stay (Diagnosis): R SONIA         Assessment and Plan:      Summary of Stay: Mars Reyes is a 81 year old male with a history of Pulmonary HTN, Atrial Flutter, ITP, HTN, PPM, Aortic Stenosis, Gout, MGUS, Mitral Valve Stenosis and Regurgitation, PVST, Right Hip Arthritis who was admitted 6/22/2022 s/p Right Total Hip Arthroplasty.  EBL 500ml.  Internal Medicine service was asked to see for management of chronic medical problems.    RRT called on 6/23 AM for syncope and hypotension.  On 6/24 has developed mild dysphagia and worsening anemia, s/p PRBC transfusion.  Significantly improved today and ok to discharge.    Problem List/Assessment and Plan:     Right Hip Arthritis /status post Right Total Hip Arthroplasty on 6/22:     - will defer diet, activity, DVT ppx, and pain control to primary team.  - PT/OT consults     Syncope/Hypotension - pt with a brief LOC while working with PT the morning of 6/23.  Likely 2/2 orthostatic hypotension which is multifactorial due to up working with PT, anemia, had received home antihypertensives and started on Flomax which can cause orthostatic hypotension.  EKG without acute ischemic changes.  No significant arrhythmias on telemetry. He was treated with IVF which were discontinued on 6/24. Also received PRBC transfusion as below.  Today feeling much better, still has orthostatic hypotension with ambulation but no significant dizziness and passed PT.  I have instructed him to HOLD his Lisinopril and follow up with his PCP next week for BP check and to determine if this can be resumed.     Acute Blood Loss Anemia - Hgb 7.6 down from baseline 13.4.  EBL 500ml.  He is symptomatic, transfused 1 unit PRBC on 6/24, Hgb on discharge was 8.5.  - continue Ferrous Gluconate    Dysphagia: Noted while eating breakfast 6/23, felt that food was getting stuck. No prior issues.  Speech therapy consulted.  "Today doing much better.     Hx SVT and Atrial Flutter  S/p PPM - pt with a hx of  paroxysmal SVT, Atrial Flutter s/p pulmonary vein isolation ablation in 2019 with known recurrent paroxysmal atrial flutter.  On Eliquis PTA.  Not currently on a beta blocker.  - Eliquis has been resumed per surgery  - monitor on telemetry      Valvular Heart Disease  S/p TAVR - hx of moderate mitral stenosis, mild to moderate mitral regurgitation and severe aortic stenosis s/p TAVR with a 29mm Medtronic Evolut valve on 6/22/21.     HTN - on Lisinopril PTA.  Hold for now due to orthostatic hypotension as above, also hold upon discharge until follow up with PCP.     HLD - continue Pravastatin     Hx Gout - no acute flare.  Continue Allopurinol     Urinary Retention - Resolved - improved after receiving Flomax the morning of 6/23.  Discontinue Flomax due to concern it contributed to Orthostatic hypotension.     Hx ITP and MGUS - plt 62.  Monitor    Diet: Advance Diet as Tolerated: Regular Diet Adult  Discharge Instruction - Regular Diet Adult    DVT Prophylaxis: DOAC  Ramos Catheter: Not present  Code Status: Full Code      Disposition Plan      Expected Discharge Date: 06/25/2022      Destination: home with family       Entered: Lisa James MD 06/25/2022, 10:40 AM       The patient's care was discussed with the Bedside Nurse and Patient.    Hospitalist Service  [facility]          Interval History (Subjective):      Patient is feeling much better today compared to yesterday.  No more dysphagia.  Still had BP drop when working with PT but asymptomatic from this.  No CP or SOB.  Pain well controlled.                  Physical Exam:      Last Vital Signs:  BP 97/44   Pulse 79   Temp 97.4  F (36.3  C) (Temporal)   Resp 14   Ht 1.778 m (5' 10\")   Wt 80.2 kg (176 lb 14.4 oz)   SpO2 96%   BMI 25.38 kg/m      General: Alert, awake, no acute distress.  HEENT: Normocephalic and atraumatic, eyes anicteric and " without scleral injection, EOMI, face symmetric, MMM.  Cardiac: RRR, normal S1, S2. No m/g/r, no LE edema.  Pulmonary: Normal chest rise, normal work of breathing.  Lungs CTAB without crackles or wheezing.  Abdomen: soft, non-tender, non-distended.  Normoactive bowel sounds, no guarding or rebound tenderness.  Extremities: dressing in place over right hip is c/d/i.  Warm, well perfused.  Skin: no rashes or lesions.  Warm and Dry.  Neuro: No focal deficits.  Speech clear.  Coordination and strength grossly normal.  Psych: Alert and oriented x3. Appropriate affect.         Medications:      All current medications were reviewed with changes reflected in problem list.         Data:      All new lab and imaging data was reviewed.   Labs:  Recent Labs   Lab 06/25/22  0631 06/24/22  0727 06/23/22  1054 06/22/22  1422    133 130* 137   POTASSIUM 4.4  --  4.5 4.2   CHLORIDE 102  --  99 105   CO2 26  --  24 24   ANIONGAP 5  --  7 8   GLC 92 95 163* 159*   BUN 13  --  21 16   CR 0.80  --  0.94 0.82   GFRESTIMATED 89  --  82 89   DIPAK 8.2*  --  8.2* 8.5     Recent Labs   Lab 06/25/22  0631 06/24/22  1039 06/24/22  0727 06/22/22  1958 06/22/22  1423 06/20/22  1557   WBC  --   --   --   --  14.2* 7.2   HGB 8.5* 7.9* 7.6*   < > 9.9* 13.4   HCT  --   --   --   --  29.9* 40.4   MCV  --   --   --   --  91 90   PLT  --   --   --   --  62* 82*    < > = values in this interval not displayed.      Imaging:   No results found for this or any previous visit (from the past 24 hour(s)).    Lisa James MD

## 2022-06-25 NOTE — PLAN OF CARE
Physical Therapy Discharge Summary    Reason for therapy discharge:    Discharged to home with outpatient therapy.    Progress towards therapy goal(s). See goals on Care Plan in [facility] electronic health record for goal details.  Goals met, Did become hypotensive following stairs this am, but asymptomatic, 147/54 to 97/44. MD paged by RN.    Therapy recommendation(s):    Continued therapy is recommended.  Rationale/Recommendations: Pt is below baseline for functional mobility, ROM and strength. Pt would benefit from continued skilled therapy to address these deficits.

## 2022-06-25 NOTE — DISCHARGE INSTRUCTIONS
For now please do not take your Lisinopril given your lower blood pressure during your hospital stay.  Follow-up with [facility] clinic next week for blood pressure check.  At that time pressure whether or not you should resume your lisinopril.

## 2022-06-25 NOTE — PLAN OF CARE
Patient discharged to home.  Reviewed discharge instructions and medications with patient and wife. Questions answered. Patient discharged to home with wife, discharge instructions, medications (Eliquis, Oxycodone, Ferrous Gluconate, Atarax, and Senna), and belongings.  Adequate for discharge.     Day Shift RN ([phone])    Patient vital signs are at baseline: Yes  Patient able to ambulate as they were prior to admission or with assist devices provided by therapies during their stay:  Yes  Patient MUST void prior to discharge:  Yes  Patient able to tolerate oral intake:  Yes  Pain has adequate pain control using Oral analgesics:  Yes  Does patient have an identified :  Yes  Has goal D/C date and time been discussed with patient:  Yes      Pt A/O x 4.  VSS and afebrile.  Pain managed adequately with tylenol. CMS intact.  Dressing CDI.  Up Assist x 1 with walker and gait belt. Voiding adequately.  Patient had complaints of swallowing difficulties; however, is tolerating softer foods, regular diet. Speech therapy saw and discharged on a outpatient basis.   Plan is discharge to home.

## 2022-06-27 LAB
ATRIAL RATE - MUSE: 85 BPM
DIASTOLIC BLOOD PRESSURE - MUSE: NORMAL MMHG
INTERPRETATION ECG - MUSE: NORMAL
P AXIS - MUSE: 64 DEGREES
PR INTERVAL - MUSE: 250 MS
QRS DURATION - MUSE: 96 MS
QT - MUSE: 404 MS
QTC - MUSE: 480 MS
R AXIS - MUSE: 1 DEGREES
SYSTOLIC BLOOD PRESSURE - MUSE: NORMAL MMHG
T AXIS - MUSE: -23 DEGREES
VENTRICULAR RATE- MUSE: 85 BPM

## 2022-06-29 ENCOUNTER — MYC MEDICAL ADVICE (OUTPATIENT)
Dept: INTERNAL MEDICINE | Facility: CLINIC | Age: 82
End: 2022-06-29

## 2022-06-29 ENCOUNTER — PATIENT OUTREACH (OUTPATIENT)
Dept: INTERNAL MEDICINE | Facility: CLINIC | Age: 82
End: 2022-06-29

## 2022-06-29 NOTE — TELEPHONE ENCOUNTER
"  ED for acute condition Discharge Protocol    \"Hi, my name is Raymundo Moss RN, a registered nurse, and I am calling from [facility].  I am calling to follow up and see how things are going for you after your recent emergency visit.\"    Tell me how you are doing now that you are home?\" doing fine has hip replaced in past      Discharge Instructions    \"Let's review your discharge instructions.  What is/are the follow-up recommendations?  Pt. Response: see pcp for BP    \"Has an appointment with your primary care provider been scheduled?\"  No (needed - schedule appointment and remind to bring meds)    Medications    \"Tell me what changed about your medicines when you discharged?\"    They stopped BP medication questions about restarting, virtual visit scheduled    \"What questions do you have about your medications?\"   None        Call Summary    \"What questions or concerns do you have about your recent visit and your follow-up care?\"     none    \"If you have questions or things don't continue to improve, we encourage you contact us through the main clinic number (give number).  Even if the clinic is not open, triage nurses are available 24/7 to help you.     We would like you to know that our clinic has extended hours (provide information).  We also have urgent care (provide details on closest location and hours/contact info)\"    \"Thank you for your time and take care!\"                "

## 2022-06-29 NOTE — TELEPHONE ENCOUNTER
"Prior to calling patient please see mychart from today    Should patient stay off until hospital follow up visit?     \"After my recent (6/20) hip replacement surgery, I was instructed to stop taking my Lisinopril for BP (due to low BP episode)  I was instructed to contact you before restarting.  When should I restart?\"    Raymundo Moss RN    "

## 2022-06-29 NOTE — TELEPHONE ENCOUNTER
What type of discharge? Emergency Department  Risk of Hospital admission or ED visit: 20%  Is a TCM episode required? Yes  When should the patient follow up with PCP? within 30 days of discharge.    Kimberly Quiñones RN  [facility]

## 2022-06-29 NOTE — TELEPHONE ENCOUNTER
Patient has not been seen in clinic since September 2021. Difficult to make recommendation in regards to reinitiation of blood pressure medication if we do not have any blood pressure results to review accordingly.    May need to make follow-up clinic visit to recheck blood pressure.

## 2022-06-30 ENCOUNTER — VIRTUAL VISIT (OUTPATIENT)
Dept: INTERNAL MEDICINE | Facility: CLINIC | Age: 82
End: 2022-06-30
Payer: COMMERCIAL

## 2022-06-30 DIAGNOSIS — I10 ESSENTIAL HYPERTENSION, BENIGN: Primary | ICD-10-CM

## 2022-06-30 DIAGNOSIS — Z96.641 STATUS POST RIGHT HIP REPLACEMENT: ICD-10-CM

## 2022-06-30 PROCEDURE — 99214 OFFICE O/P EST MOD 30 MIN: CPT | Mod: 95 | Performed by: NURSE PRACTITIONER

## 2022-06-30 RX ORDER — LISINOPRIL 5 MG/1
5 TABLET ORAL DAILY
Qty: 30 TABLET | Refills: 0 | Status: SHIPPED | OUTPATIENT
Start: 2022-06-30 | End: 2022-07-12

## 2022-06-30 NOTE — PATIENT INSTRUCTIONS
Mars,  Here's a recap of what he had discussed:    -Take the extra strength Tylenol 1000 mg 3 times a day, scheduled  -It is okay to take oxycodone 1/2 to 1 tablet for pain.  Oxycodone can cause constipation please use a stool softener as needed.  -I would recommend holding off on the hydroxyzine for pain as this can cause increased drowsiness, dizziness, dry mouth, difficulty with urination or constipation.  It actually really does not sound like you even need to take this for pain control.    I am wondering if maybe your blood pressure is just a little bit higher because you have not been treating your pain adequately.  Untreated pain can cause higher blood pressure.    -For your high blood pressure, please treat your pain first and monitor your blood pressure readings.  -If your blood pressure readings are under 140 persistently with good pain control then hold off on your lisinopril for now.  -If you feel that your pain is adequately controlled and your blood pressure is over 140 persistently, then it is okay to start the low-dose lisinopril, 5 mg daily  -Please send me a [employer] message early next week with your blood pressure readings and feel free to reach out with any questions or concerns

## 2022-06-30 NOTE — PROGRESS NOTES
"Mars is a 81 year old who is being evaluated via a billable video visit.      How would you like to obtain your AVS? MyChart  If the video visit is dropped, the invitation should be resent by: Text to cell phone: [phone]  Will anyone else be joining your video visit? No      Assessment & Plan     Essential hypertension, benign  - Hx of HTN maintained on lisinopril 40 mg/day which is on hold in the context of postop hypotension.  Agree likely due to post op blood loss anemia, resumption of PTA lisinopril, and addition of Flomax  - Patient is feeling better- however, it does sound like his post op pain is not well controlled, which may be contributing to his higher BP's vs he hx of HTN  - For now, advised patient to treat pain- See plan below in pt instructions  - Continue to monitor BP daily.  If BP > 140 and pain well controlled, start low dose lisinopril 5 mg/day.  If BP < 140, then continue to hold lisinopril and do not start low dose  - Update me with BP readings early next week  - Strict return precautions reviewed; patient agrees to plan  - lisinopril (ZESTRIL) 5 MG tablet  Dispense: 30 tablet; Refill: 0    Status post right hip replacement  - See patient instruction re: pain management recommendations  - Following with Ortho      BMI:   Estimated body mass index is 25.38 kg/m  as calculated from the following:    Height as of 6/22/22: 1.778 m (5' 10\").    Weight as of 6/22/22: 80.2 kg (176 lb 14.4 oz).       Mars,  Here's a recap of what he had discussed:    -Take the extra strength Tylenol 1000 mg 3 times a day, scheduled  -It is okay to take oxycodone 1/2 to 1 tablet for pain.  Oxycodone can cause constipation please use a stool softener as needed.  -I would recommend holding off on the hydroxyzine for pain as this can cause increased drowsiness, dizziness, dry mouth, difficulty with urination or constipation.  It actually really does not sound like you even need to take this for pain control.    I am " wondering if maybe your blood pressure is just a little bit higher because you have not been treating your pain adequately.  Untreated pain can cause higher blood pressure.    -For your high blood pressure, please treat your pain first and monitor your blood pressure readings.  -If your blood pressure readings are under 140 persistently with good pain control then hold off on your lisinopril for now.  -If you feel that your pain is adequately controlled and your blood pressure is over 140 persistently, then it is okay to start the low-dose lisinopril, 5 mg daily  -Please send me a [employer] message early next week with your blood pressure readings and feel free to reach out with any questions or concerns    Return for If symptoms worsen/fail to improve.    ALEXANDRA Ron CNP  [facility] Center[facility]ID[facility] Crews is a 81 year old accompanied by his spouse., presenting for the following health issues:  Hypertension    Answers for HPI/ROS submitted by the patient on 6/30/2022  What is the reason for your visit today? : Post op blood pressure  How many servings of fruits and vegetables do you eat daily?: 2-3  On average, how many sweetened beverages do you drink each day (Examples: soda, juice, sweet tea, etc.  Do NOT count diet or artificially sweetened beverages)?: 2  How many minutes a day do you exercise enough to make your heart beat faster?: 20 to 29  How many days a week do you exercise enough to make your heart beat faster?: 5  How many days per week do you miss taking your medication?: 0    Hypertension Follow-up      Do you check your blood pressure regularly outside of the clinic? Yes     Are you following a low salt diet? No    Are your blood pressures ever more than 140 on the top number (systolic) OR more   than 90 on the bottom number (diastolic), for example 140/90? Yes      How many servings of fruits and vegetables do you eat daily?  2-3    On average, how many  sweetened beverages do you drink each day (Examples: soda, juice, sweet tea, etc.  Do NOT count diet or artificially sweetened beverages)?   0    How many days per week do you exercise enough to make your heart beat faster? Not since hip surgery    How many minutes a day do you exercise enough to make your heart beat faster? Not since hip surgery    How many days per week do you miss taking your medication? 0    Patient with a PMH that is significant for DJD, s/p joint replacement, HTN, valvular heart disease- s/p TAVR, Hx of  SVT and atrial flutter s/p PPM, HLD,is seen via a virtual visit today for follow-up of high blood pressure.  Patient was admitted for a right total hip arthroplasty on 6/22/2022.  On the morning of 6/23 an RRT was called for syncope and hypotension as well as dysphagia and worsening anemia.  Per chart review, patient had a brief LOC while working with PT the morning of 6/23 which was felt to be due to orthostatic hypotension, anemia (post op blood loss with Hgb 7.6), and receiving his home antihypertensive (Lisinopril 40 mg) as well as starting Flomax.  His work-up was unrevealing including an EKG;  telemetry showed no arrhythmias.  He was given IV fluids and PRBC transfusion (hgb post transfusion 8.5).  His lisinopril 20 mg was held.  He was started on ferrous gluconate.  Also of note history of atrial flutter, SVT, s/p PPM- On Eliquis, which was started post op.      Patient has been checking his blood pressures at home, and reports that 3 days ago his systolic blood pressure was 167.  Yesterday his blood pressure readings were 132 systolic and 141/104.  Today, his blood pressure readings have been 141/75, 141/74, 141/69.  Patient reports that today has been a tough day in regard to his pain and admits that he should have taken something more for his hip pain today.  He has been only been taking Tylenol 650 mg twice daily.  He has not been taking any of the oxycodone and has not taken any  since he returned home.  He was also prescribed hydroxyzine and has some general questions about this.    Other than his post op pain, he reports feeling okay.  He has no dizziness, no presyncope symptoms.  No chest pain, no shortness of breath.  He is wondering if he should resume his lisinopril 20 mg daily.    Review of Systems   CONSTITUTIONAL:NEGATIVE for fever, chills, change in weight  EYES: NEGATIVE for vision changes or irritation  RESP:NEGATIVE for significant cough or SOB  CV: NEGATIVE for chest pain, palpitations or peripheral edema  GI: NEGATIVE for nausea, abdominal pain, heartburn, or change in bowel habits  MUSCULOSKELETAL: POSITIVE  for postop hip pain  NEUROLOGIC:  No dizziness      Objective           Vitals:  No vitals were obtained today due to virtual visit.    Physical Exam   GENERAL: Healthy, alert and no distress  EYES: Eyes grossly normal to inspection.  No discharge or erythema, or obvious scleral/conjunctival abnormalities.  RESP: No audible wheeze, cough, or visible cyanosis.  No visible retractions or increased work of breathing.    SKIN: Visible skin clear. No significant rash, abnormal pigmentation or lesions. Skin tone appears pink  NEURO: Cranial nerves grossly intact.  Mentation and speech appropriate for age.  PSYCH: Mentation appears normal, affect normal/bright, judgement and insight intact, normal speech and appearance well-groomed.    -Labs, VS, hospital IM summary noted        Video-Visit Details    Video Start Time: 5:39    Type of service:  Video Visit    Video End Time:5:55    Originating Location (pt. Location): Home    Distant Location (provider location):  [facility]     Platform used for Video Visit: [employer] Norman

## 2022-07-01 NOTE — DISCHARGE SUMMARY
[facility]    Discharge Summary  Orthopedics    Date of Admission:  6/22/2022  Date of Discharge:  6/25/2022  1:13 PM  Discharging Provider: Kjerstin L Foss, PA-C    Discharge Diagnoses   Active Problems:    S/P total hip arthroplasty    Status post total replacement of right hip    Status post total hip replacement, right      Procedure/Surgery Information   Procedure: Procedure(s):  Right direct anterior total hip arthroplasty   Surgeon(s): Surgeon(s) and Role:     * Luisito Hammonds MD - Primary     * Mike Richardson PA-C - Assisting   Specimens: * No specimens in log *   Non-operative procedures None performed     History of Present Illness   Mars Reyes is a 81 year old male who presented with hx of Degenerative arthritis, right hip    Hospital Course   Mars Reyes was admitted on 6/22/2022.  The following problems were addressed during his hospitalization:  Active Problems:    S/P total hip arthroplasty    Status post total replacement of right hip    Status post total hip replacement, right  RRT called on 6/23 AM for syncope and hypotension.  On 6/24 developed mild dysphagia and worsening anemia, s/p PRBC transfusion, 1 unit. Seen and examined by speech therapy with no issues. Significantly improved on day of discharge and ok to discharge.    Post-operative pain control: included Hydromorphone (dilaudid) IV, oxycodone and tylenol and will be Oxycodone and tylenol on discharge.     Medications discontinued or adjusted during this hospitalization: New narcotics initiated: oxycodone and tylenol     Antibiotics prescribed at discharge: None prescribed     Imaging study follow up needs:   -follow up xray      Kjerstin L Foss, PA-C    Discharge Disposition   Discharged to home   Condition at discharge: Stable    Pending Results   Final pathology results: No pathology submitted  These results will be followed up by   Unresulted Labs Ordered in the Past 30 Days of this Admission     No  "orders found from 5/23/2022 to 6/23/2022.          Primary Care Physician   Pb Warren        Consultations This Hospital Stay   PHYSICAL THERAPY ADULT IP CONSULT  OCCUPATIONAL THERAPY ADULT IP CONSULT  HOSPITALIST IP CONSULT  SPEECH LANGUAGE PATH ADULT IP CONSULT    Time Spent on this Encounter   I have spent less than 30 minutes on this discharge.    Discharge Orders      Speech Therapy Referral      Reason for your hospital stay    Status post right direct anterior total hip arthroplasty     When to call - Contact Surgeon Team    You may experience symptoms that require follow-up before your scheduled appointment. Refer to the \"Stoplight Tool\" for instructions on when to contact your Surgeon Team if you are concerned about pain control, blood clots, constipation, or if you are unable to urinate.     When to call - Reach out to Urgent Care    If you are not able to reach your Surgeon Team and you need immediate care, go to the Orthopedic Walk-in Clinic or Urgent Care at your Surgeon's office.  Do NOT go to the Emergency Room unless you have shortness of breath, chest pain, or other signs of a medical emergency.     When to call - Reasons to Call 911    Call 911 immediately if you experience sudden-onset chest pain, arm weakness/numbness, slurred speech, or shortness of breath     Discharge Instruction - Breathing exercises    Perform breathing exercises using your Incentive Spirometer 10 times per hour while awake for 2 weeks.     Symptoms - Fever Management    A low grade fever can be expected after surgery.  Use acetaminophen (TYLENOL) as needed for fever management.  Contact your Surgeon Team if you have a fever greater than 101.5 F, chills, and/or night sweats.     Symptoms - Constipation management    Constipation (hard, dry bowel movements) is expected after surgery due to the combination of being less active, the anesthetic, and the opioid pain medication.  You can do the following to help reduce " constipation:  ~  FLUIDS:  Drink clear liquids (water or Gatorade), or juice (apple/prune).  ~  DIET:  Eat a fiber rich diet.    ~  ACTIVITY:  Get up and move around several times a day.  Increase your activity as you are able.  MEDICATIONS:  Reduce the risk of constipation by starting medications before you are constipated.  You can take Miralax   (1 packet as directed) and/or a stool softener (Senokot 1-2 tablets 1-2 times a day).  If you already have constipation and these medications are not working, you can get magnesium citrate and use as directed.  If you continue to have constipation you can try an over the counter suppository or enema.  Call your Surgeon Team if it has been greater than 3 days since your last bowel movement.     Symptoms - Reduced Urine Output    Changes in the amount of fluids you drank before and after surgery may result in problems urinating.  It is important to stay well-hydrated after surgery and drink plenty of water. If it has been greater than 8 hours since you have urinated despite drinking plenty of water, call your Surgeon Team.     Activity - Exercises to prevent blood clots    Unless otherwise directed by your Surgeon team, perform the following exercises at least three times per day for the first four weeks after surgery to prevent blood clots in your legs: 1) Point and flex your feet (Ankle Pumps), 2) Move your ankle around in big circles, 3) Wiggle your toes, 4) Walk, even for short distances, several times a day, will help decrease the risk of blood clots.     Comfort and Pain Management - Pain after Surgery    Pain after surgery is normal and expected.  You will have some amount of pain for several weeks after surgery.  Your pain will improve with time.  There are several things you can do to help reduce your pain including: rest, ice, elevation, and using pain medications as needed. Contact your Surgeon Team if you have pain that persists or worsens after surgery despite  rest, ice, elevation, and taking your medication(s) as prescribed. Contact your Surgeon Team if you have new numbness, tingling, or weakness in your operative extremity.     Comfort and Pain Management - Swelling after Surgery    Swelling and/or bruising of the surgical extremity is common and may persist for several months after surgery. In addition to frequent icing and elevation, gentle compressive support with an ACE wrap or tubigrip may help with swelling. Apply compression regularly, removing at least twice daily to perform skin checks. Contact your Surgeon Team if your swelling increases and is NOT associated with an increase in your activity level, or if your swelling increases and is associated with redness and pain.     Comfort and Pain Management - Cold therapy    Ice can be used to control swelling and discomfort after surgery. Place a thin towel over your operative site and apply the ice pack overtop. Leave ice pack in place for 20 minutes, then remove for 20 minutes. Repeat this 20 minutes on/20 minutes off routine as often as tolerated.     Medication Instructions - Acetaminophen (TYLENOL) Instructions    You were discharged with acetaminophen (TYLENOL) for pain management after surgery. Acetaminophen most effectively manages pain symptoms when it is taken on a schedule without missing doses (every four, six, or eight hours). Your Provider will prescribe a safe daily dose between 3000 - 4000 mg.  Do NOT exceed this daily dose. Most patients use acetaminophen for pain control for the first four weeks after surgery.  You can wean from this medication as your pain decreases.     Medication Instructions - NSAID Instructions    You were discharged with an anti-inflammatory medication for pain management to use in combination with acetaminophen (TYLENOL) and the narcotic pain medication.  Take this medication exactly as directed.  You should only take one anti-inflammatory at a time.  Some common  "anti-inflammatories include: ibuprofen (ADVIL, MOTRIN), naproxen (ALEVE, NAPROSYN), celecoxib (CELEBREX), meloxicam (MOBIC), ketorolac (TORADOL).  Take this medication with food and water.     Medication Instructions - Opioids - Tapering Instructions    In the first three days following surgery, your symptoms may warrant use of the narcotic pain medication every four to six hours as prescribed. This is normal. As your pain symptoms improve, focus your efforts on decreasing (tapering) use of narcotic medications. The most successful tapering strategy is to first, decrease the number of tablets you take every 4-6 hours to the minimum prescribed. Then, increase the amount of time between doses.  For example:  First, taper to   or 1 tablet every 4-6 hours.  Then, taper to   or 1 tablet every 6-8 hours.  Then, taper to   or 1 tablet every 8-10 hours.  Then, taper to   or 1 tablet every 10-12 hours.  Then, taper to   or 1 tablet at bedtime.  The bedtime dose can help with comfort during sleep and is typically the last dose to be discontinued after surgery.     Follow Up Care    Follow-up with Dr. Hammonds in 2 weeks for wound check.     Medication instructions - Anticoagulation - other    Resume your Eliquis. This is given to help minimize your risk of blood clot     Comfort and Pain Management - LOWER Extremity Elevation    Swelling is expected for several months after surgery. This type of swelling is usually associated with gravity and activity, and can be improved with elevation.   The best way to do this is to get your \"toes above your nose\" by laying down and placing several pillows lengthwise under your calf and heel. When elevating your leg keep your knee completely straight. Perform this elevation as often as possible especially for the first two weeks after surgery.     Medication Instructions - Opioid Instructions (Greater than or equal to 65 years)    You were discharged with an opioid medication " "(hydromorphone, oxycodone, hydrocodone, or tramadol). This medication should only be taken for breakthrough pain that is not controlled with acetaminophen (TYLENOL). If you rate your pain less than 3 you do not need this medication.  Pain rating 0-3:  You do not need this medication  Pain rating 4-6:  Take 1/2 tablet every 4-6 hours as needed  Pain rating 7-10:  Take 1 tablet every 4-6 hours as needed  Do not exceed 4 tablets per day     Activity - Total Hip Arthroplasty    Refer to the [facility] \"Your Guide to Total Joint Replacement\" for recommendations on activities and Exercises.     Return to Driving    Return to driving - Driving is NOT permitted until directed by your provider. Under no circumstance are you permitted to drive while using narcotic pain medications.     Dressing / Wound Care - Wound    You have a clean dressing on your surgical wound. Dressing change instructions as follows: Okay to remove Ace bandage 4 days after surgery. Leave clear dressing on until your post-operative follow-up appointment. Okay to shower. Let water run off clear, waterproof dressing. Avoid soaking the incision area in water for 6 weeks after surgery.      Contact your Surgeon Team if you have increased redness, warmth around the surgical wound, and/or drainage from the surgical wound.     Dressing / Wound Care - NO Tub Bathing    Tub bathing, swimming, or any other activities that will cause your incision to be submerged in water should be avoided until allowed by your Surgeon.     No external rotation    No pivoting on your operative leg.     No hyperextension    No kicking-back or lunging with operative leg.     Weight bearing as tolerated    Weight bearing as tolerated on your operative extremity.     No VTE Prophylaxis    Resume PTA Connie     Crutches DME    DME Documentation: Describe the reason for need to support medical necessity: Impaired gait status post hip surgery. I, the undersigned, certify that the " above prescribed supplies are medically necessary for this patient and is both reasonable and necessary in reference to accepted standards of medical practice in the treatment of this patient's condition and is not prescribed as a convenience.     Cane DME    DME Documentation: Describe the reason for need to support medical necessity: Impaired gait status post hip surgery. I, the undersigned, certify that the above prescribed supplies are medically necessary for this patient and is both reasonable and necessary in reference to accepted standards of medical practice in the treatment of this patient's condition and is not prescribed as a convenience.     Walker DME    : DME Documentation: Describe the reason for need to support medical necessity: Impaired gait status post hip surgery. I, the undersigned, certify that the above prescribed supplies are medically necessary for this patient and is both reasonable and necessary in reference to accepted standards of medical practice in the treatment of this patient's condition and is not prescribed as a convenience.     Discharge Instruction - Regular Diet Adult    Return to your pre-surgery diet unless instructed otherwise     Discharge Medications   Discharge Medication List as of 6/25/2022 12:01 PM      START taking these medications    Details   ferrous gluconate (FERGON) 324 (38 Fe) MG tablet Take 1 tablet (324 mg) by mouth daily (with breakfast), Disp-7 tablet, R-0, E-Prescribe      hydrOXYzine (ATARAX) 10 MG tablet Take 1 tablet (10 mg) by mouth every 6 hours as needed for itching or anxiety (with pain, moderate pain), Disp-30 tablet, R-0, E-Prescribe      oxyCODONE (ROXICODONE) 5 MG tablet Take 1-2 tablets (5-10 mg) by mouth every 4 hours as needed for moderate to severe pain, Disp-26 tablet, R-0, E-Prescribe      senna-docusate (SENOKOT-S/PERICOLACE) 8.6-50 MG tablet Take 1-2 tablets by mouth 2 times daily Take while on oral narcotics to prevent or treat  constipation., Disp-30 tablet, R-0, E-PrescribeWhile taking narcotics      tamsulosin (FLOMAX) 0.4 MG capsule Take 1 capsule (0.4 mg) by mouth daily, Disp-7 capsule, R-0, Local Print         CONTINUE these medications which have CHANGED    Details   acetaminophen (TYLENOL) 325 MG tablet Take 2 tablets (650 mg) by mouth every 4 hours as needed for other (mild pain), Disp-100 tablet, R-0, E-Prescribe      lisinopril (ZESTRIL) 20 MG tablet Do not restart this until you are instructed to by your primary care doctor., Disp-90 tablet, R-0, E-Prescribe         CONTINUE these medications which have NOT CHANGED    Details   allopurinol (ZYLOPRIM) 100 MG tablet TAKE 1 TABLET BY MOUTH EVERY ONCE DAILY, Disp-90 tablet, R-1, E-Prescribe      amoxicillin (AMOXIL) 500 MG tablet Take 2,000 mg by mouth daily as needed (1 hour prior to dental procedures), Historical      ELIQUIS ANTICOAGULANT 5 MG tablet TAKE 1 TABLET BY MOUTH TWICE A DAY, Disp-180 tablet, R-1, E-Prescribe      pravastatin (PRAVACHOL) 20 MG tablet Take 1 tablet (20 mg) by mouth daily, Disp-90 tablet, R-0, E-Prescribe           Allergies   No Known Allergies  Data   Most Recent 3 CBC's:Recent Labs   Lab Test 06/25/22  0631 06/24/22  1039 06/24/22  0727 06/22/22  1958 06/22/22  1423 06/20/22  1557 06/03/22  1523   WBC  --   --   --   --  14.2* 7.2 7.5   HGB 8.5* 7.9* 7.6*   < > 9.9* 13.4 12.7*   MCV  --   --   --   --  91 90 87   PLT  --   --   --   --  62* 82* 197    < > = values in this interval not displayed.      Most Recent 3 BMP's:  Recent Labs   Lab Test 06/25/22  0631 06/24/22  0727 06/23/22  1054 06/22/22  1422    133 130* 137   POTASSIUM 4.4  --  4.5 4.2   CHLORIDE 102  --  99 105   CO2 26  --  24 24   BUN 13  --  21 16   CR 0.80  --  0.94 0.82   ANIONGAP 5  --  7 8   DIPAK 8.2*  --  8.2* 8.5   GLC 92 95 163* 159*     Most Recent 2 LFT's:  Recent Labs   Lab Test 06/03/22  1523 06/16/21  1415   AST 19 19   ALT 24 20   ALKPHOS 86 92   BILITOTAL 0.7 1.3      Most Recent INR's and Anticoagulation Dosing History:  Anticoagulation Dose History     Recent Dosing and Labs Latest Ref Rng & Units 10/18/2019 8/25/2020 11/16/2020 6/16/2021 6/22/2022    INR 0.85 - 1.15 1.35(H) 0.96 1.22(H) 1.01 1.26(H)        Most Recent 3 Troponin's:No lab results found.  Most Recent Cholesterol Panel:  Recent Labs   Lab Test 07/14/20  1408   CHOL 149   LDL 84   HDL 44   TRIG 107     Most Recent 6 Bacteria Isolates From Any Culture (See EPIC Reports for Culture Details):  Recent Labs   Lab Test 09/09/19  0950 09/09/19  0917 09/09/19  0846 09/09/19  0832   CULT No anaerobes isolated  No growth No anaerobes isolated  No growth No anaerobes isolated  No growth No anaerobes isolated  No growth     Most Recent TSH, T4 and A1c Labs:  Recent Labs   Lab Test 02/21/17  1539   TSH 2.54

## 2022-07-05 ENCOUNTER — TRANSFERRED RECORDS (OUTPATIENT)
Dept: HEALTH INFORMATION MANAGEMENT | Facility: CLINIC | Age: 82
End: 2022-07-05

## 2022-07-11 ENCOUNTER — LAB (OUTPATIENT)
Dept: ONCOLOGY | Facility: CLINIC | Age: 82
End: 2022-07-11
Attending: INTERNAL MEDICINE
Payer: COMMERCIAL

## 2022-07-11 DIAGNOSIS — D47.2 MGUS (MONOCLONAL GAMMOPATHY OF UNKNOWN SIGNIFICANCE): ICD-10-CM

## 2022-07-11 LAB
ANION GAP SERPL CALCULATED.3IONS-SCNC: 4 MMOL/L (ref 3–14)
BUN SERPL-MCNC: 14 MG/DL (ref 7–30)
CALCIUM SERPL-MCNC: 9.3 MG/DL (ref 8.5–10.1)
CHLORIDE BLD-SCNC: 99 MMOL/L (ref 94–109)
CO2 SERPL-SCNC: 25 MMOL/L (ref 20–32)
CREAT SERPL-MCNC: 0.86 MG/DL (ref 0.66–1.25)
ERYTHROCYTE [DISTWIDTH] IN BLOOD BY AUTOMATED COUNT: 13.4 % (ref 10–15)
GFR SERPL CREATININE-BSD FRML MDRD: 88 ML/MIN/1.73M2
GLUCOSE BLD-MCNC: 104 MG/DL (ref 70–99)
HCT VFR BLD AUTO: 31.6 % (ref 40–53)
HGB BLD-MCNC: 10.3 G/DL (ref 13.3–17.7)
MCH RBC QN AUTO: 29.3 PG (ref 26.5–33)
MCHC RBC AUTO-ENTMCNC: 32.6 G/DL (ref 31.5–36.5)
MCV RBC AUTO: 90 FL (ref 78–100)
PLATELET # BLD AUTO: 125 10E3/UL (ref 150–450)
POTASSIUM BLD-SCNC: 4.4 MMOL/L (ref 3.4–5.3)
RBC # BLD AUTO: 3.51 10E6/UL (ref 4.4–5.9)
SODIUM SERPL-SCNC: 128 MMOL/L (ref 133–144)
TOTAL PROTEIN SERUM FOR ELP: 6.9 G/DL (ref 6.4–8.3)
WBC # BLD AUTO: 6.8 10E3/UL (ref 4–11)

## 2022-07-11 PROCEDURE — 84155 ASSAY OF PROTEIN SERUM: CPT | Performed by: INTERNAL MEDICINE

## 2022-07-11 PROCEDURE — 80048 BASIC METABOLIC PNL TOTAL CA: CPT | Performed by: INTERNAL MEDICINE

## 2022-07-11 PROCEDURE — 82784 ASSAY IGA/IGD/IGG/IGM EACH: CPT | Performed by: INTERNAL MEDICINE

## 2022-07-11 PROCEDURE — 36415 COLL VENOUS BLD VENIPUNCTURE: CPT

## 2022-07-11 PROCEDURE — 83521 IG LIGHT CHAINS FREE EACH: CPT | Performed by: INTERNAL MEDICINE

## 2022-07-11 PROCEDURE — 85014 HEMATOCRIT: CPT | Performed by: INTERNAL MEDICINE

## 2022-07-11 PROCEDURE — 84165 PROTEIN E-PHORESIS SERUM: CPT | Mod: TC | Performed by: PATHOLOGY

## 2022-07-12 ENCOUNTER — TELEPHONE (OUTPATIENT)
Dept: CARDIOLOGY | Facility: CLINIC | Age: 82
End: 2022-07-12

## 2022-07-12 ENCOUNTER — OFFICE VISIT (OUTPATIENT)
Dept: INTERNAL MEDICINE | Facility: CLINIC | Age: 82
End: 2022-07-12
Payer: COMMERCIAL

## 2022-07-12 VITALS
OXYGEN SATURATION: 99 % | BODY MASS INDEX: 24.62 KG/M2 | SYSTOLIC BLOOD PRESSURE: 124 MMHG | WEIGHT: 171.6 LBS | TEMPERATURE: 97.9 F | HEART RATE: 82 BPM | DIASTOLIC BLOOD PRESSURE: 76 MMHG | RESPIRATION RATE: 16 BRPM

## 2022-07-12 DIAGNOSIS — I10 ESSENTIAL HYPERTENSION, BENIGN: Primary | ICD-10-CM

## 2022-07-12 DIAGNOSIS — Z96.641 STATUS POST TOTAL REPLACEMENT OF RIGHT HIP: ICD-10-CM

## 2022-07-12 DIAGNOSIS — Z95.0 CARDIAC PACEMAKER IN SITU: Primary | ICD-10-CM

## 2022-07-12 LAB
ALBUMIN SERPL ELPH-MCNC: 4 G/DL (ref 3.7–5.1)
ALPHA1 GLOB SERPL ELPH-MCNC: 0.4 G/DL (ref 0.2–0.4)
ALPHA2 GLOB SERPL ELPH-MCNC: 0.8 G/DL (ref 0.5–0.9)
B-GLOBULIN SERPL ELPH-MCNC: 0.8 G/DL (ref 0.6–1)
GAMMA GLOB SERPL ELPH-MCNC: 0.9 G/DL (ref 0.7–1.6)
IGG SERPL-MCNC: 927 MG/DL (ref 610–1616)
KAPPA LC FREE SER-MCNC: 2.37 MG/DL (ref 0.33–1.94)
KAPPA LC FREE/LAMBDA FREE SER NEPH: 1.5 {RATIO} (ref 0.26–1.65)
LAMBDA LC FREE SERPL-MCNC: 1.58 MG/DL (ref 0.57–2.63)
M PROTEIN SERPL ELPH-MCNC: 0.1 G/DL
PROT PATTERN SERPL ELPH-IMP: ABNORMAL

## 2022-07-12 PROCEDURE — 84165 PROTEIN E-PHORESIS SERUM: CPT | Mod: 26 | Performed by: PATHOLOGY

## 2022-07-12 PROCEDURE — 99213 OFFICE O/P EST LOW 20 MIN: CPT | Performed by: PHYSICIAN ASSISTANT

## 2022-07-12 RX ORDER — LISINOPRIL 5 MG/1
5 TABLET ORAL DAILY
Qty: 90 TABLET | Refills: 2 | Status: SHIPPED | OUTPATIENT
Start: 2022-07-12 | End: 2023-02-09

## 2022-07-12 NOTE — PATIENT INSTRUCTIONS
You can try hydroxyzine for the night time pain that you are having.  Continue with PT home exercises and walking for the hip rehab    Fasting labs for lipid ( order in place ) call and schedule

## 2022-07-12 NOTE — PROGRESS NOTES
"  Assessment & Plan     Essential hypertension, benign  Refill done on lisinopril well controlled at lower dosing of 5 mg daily   - lisinopril (ZESTRIL) 5 MG tablet; Take 1 tablet (5 mg) by mouth daily    Status post total replacement of right hip  Seeing ortho slowly improving                BMI:   Estimated body mass index is 24.62 kg/m  as calculated from the following:    Height as of 6/22/22: 1.778 m (5' 10\").    Weight as of this encounter: 77.8 kg (171 lb 9.6 oz).           Return in about 3 months (around 10/12/2022) for Routine Visit, regular primary provider.    Alexandra Cuba PA-C  [facility]    Amara Crews is a 81 year old accompanied by his spouse, presenting for the following health issues:  Hospital F/U      History of Present Illness       Reason for visit:  Post op blood pressure    He eats 2-3 servings of fruits and vegetables daily.He consumes 2 sweetened beverage(s) daily.He exercises with enough effort to increase his heart rate 20 to 29 minutes per day.  He exercises with enough effort to increase his heart rate 5 days per week.   He is taking medications regularly.         Hospital Follow-up Visit:    Hospital/Nursing Home/IP Rehab Facility: [facility]  Date of Admission: 06/22/2022  Date of Discharge: 06/25/2022  Reason(s) for Admission: Post hip replacement    Was your hospitalization related to COVID-19? No   Problems taking medications regularly:  None  Medication changes since discharge: None  Problems adhering to non-medication therapy:  None    Summary of hospitalization:  [facility] discharge summary reviewed  Diagnostic Tests/Treatments reviewed.  Follow up needed: none  Other Healthcare Providers Involved in Patient s Care:         orthopedics  Update since discharge: improved.       Post Medication Reconciliation Status:        Plan of care communicated with patient             Seeing orthopedics " on follow up   With some pain at the knee and shin area. Ortho was not concerned, likely healing yet.  Needs refill on new dosing of lisinopril  Has been doing well with normal bp measurements at home         Review of Systems   Constitutional, HEENT, cardiovascular, pulmonary, gi and gu systems are negative, except as otherwise noted.      Objective    /76   Pulse 82   Temp 97.9  F (36.6  C) (Tympanic)   Resp 16   Wt 77.8 kg (171 lb 9.6 oz)   SpO2 99%   BMI 24.62 kg/m    Body mass index is 24.62 kg/m .  Physical Exam   GENERAL: healthy, alert and no distress  RESP: lungs clear to auscultation - no rales, rhonchi or wheezes  CV: regular rates and rhythm  MS: no gross musculoskeletal defects noted, no edema                    .  ..

## 2022-07-12 NOTE — TELEPHONE ENCOUNTER
[facility] Call Center    Phone Message    May a detailed message be left on voicemail: yes     Reason for Call: Other: Please extend the order for the patients in-person device check. Call patient back to coordinate further, thank you.     Action Taken: Message routed to:  Other: Cardiology    Travel Screening: Not Applicable

## 2022-07-12 NOTE — TELEPHONE ENCOUNTER
Received call from patient requesting to schedule his device check in 3 months in [place].  Pt scheduled for 9/7/22.     Pt also asked when he was due for provider follow-up.  Patient was due to see Jolene Bose CNP in 6/2022.  Message sent to scheduling to reach out to patient to schedule.    ZORAN Bradley

## 2022-07-13 ENCOUNTER — VIRTUAL VISIT (OUTPATIENT)
Dept: ONCOLOGY | Facility: CLINIC | Age: 82
End: 2022-07-13
Attending: INTERNAL MEDICINE
Payer: COMMERCIAL

## 2022-07-13 DIAGNOSIS — D47.2 MGUS (MONOCLONAL GAMMOPATHY OF UNKNOWN SIGNIFICANCE): Primary | ICD-10-CM

## 2022-07-13 PROCEDURE — 99213 OFFICE O/P EST LOW 20 MIN: CPT | Mod: 95 | Performed by: INTERNAL MEDICINE

## 2022-07-13 NOTE — PROGRESS NOTES
Leonora is a 81 year old who is being evaluated via a billable video visit.      How would you like to obtain your AVS? MyChart  If the video visit is dropped, the invitation should be resent by: Send to e-mail at: [email or web address].  Will anyone else be joining your video visit? Monica GUZMAN

## 2022-07-13 NOTE — LETTER
7/13/2022         RE: Leonora Reyes  [address] [id number]        Dear Colleague,    Thank you for referring your patient, Leonora Reyes, to the [facility] [place]. Please see a copy of my visit note below.    Leonora is a 81 year old who is being evaluated via a billable video visit.      How would you like to obtain your AVS? MyChart  If the video visit is dropped, the invitation should be resent by: Send to e-mail at: [email or web address].  Will anyone else be joining your video visit? No     Alma GUZMAN              HEMATOLOGIC HISTORY:  Mr. Leonora Reyes is a gentleman with chronic thrombocytopenia and MGUS.   1.  On 05/20/2009, platelets of 191. On 12/21/2011, platelets of 75.   2.  On 02/20/2017, platelets of 57 with hemoglobin of 13.9.   3  On 02/21/2017:    -Normal folate, LDH,TSH and vitamin B12 of 372.    -SPEP reveals M-spike of 0.1.   - Lupus negative.   4. US on 02/22/2017 reveals normal spleen size.  5.  Bone marrow biopsy on 02/23/2017 reveals normocellular bone marrow with 50% cellularity with trilineage hematopoiesis.  Adequate megakaryocyte.  Slight erythroid hypoplasia.  No increase in blasts. 5% to 10% plasma cells. -Cytogenetics and FISH for myeloma panel is normal.    6. Serum ROSA reveals monoclonal IgG immunoglobulin of kappa light chain type and monoclonal free immunoglobulin light chain of lambda chain type.     SUBJECTIVE:  Mr. Reyes is an 81-year-old gentleman with MGUS and chronic thrombocytopenia.  He has been doing well.  The patient had right hip arthroplasty on 06/22/2022.  He received 1 unit of blood transfusion.     Overall he is doing good.  No headache.  No dizziness.  No chest pain.  No shortness of breath.  No nausea or vomiting.  No bleeding.  No fever or chills.  All other review of systems negative.     PHYSICAL EXAMINATION:    GENERAL:  He is alert and oriented x 3.  Not in distress.   Rest of the systems not examined as this is a video  visit.     LABORATORY:  CBC, BMP, SPEP, free light chain and IgG level reviewed.     ASSESSMENT:  1.  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  MGUS is stable.  2.  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  3.  Anemia secondary to recent hip replacement.     PLAN:  1.  The patient clinically is doing well.  Labs were all reviewed with him.       Discussed regarding MGUS.  MGUS is stable. Natural history of disease discussed. We will see him once a year.  Unlikely it will progress to myeloma in his lifetime.     The patient's thrombocytopenia is stable.  In fact, his platelet is better.  Not having any bleeding complication.  He will have CBC checked every 6 months.      The patient has some anemia.  He did receive blood transfusion after hip replacement.  We will check CBC in 6 month.      I explained to the patient that at this time all he needs is monitoring from our side.  No intervention is needed.     2.  I will see him in 1 year with labs.  In between he will have CBC checked.  I advised him to call us with any questions or concerns.     Total visit time of 20 minutes.  Time was spent in today's visit, review of chart/investigations today and documentation.    This office note has been dictated.        Again, thank you for allowing me to participate in the care of your patient.        Sincerely,        Pino Mayer MD

## 2022-07-13 NOTE — LETTER
7/13/2022         RE: Leonora Reyes  [address] [facility] [address]        Dear Colleague,    Thank you for referring your patient, Leonora Reyes, to the [facility] [place]. Please see a copy of my visit note below.    Leonora is a 81 year old who is being evaluated via a billable video visit.      How would you like to obtain your AVS? MyChart  If the video visit is dropped, the invitation should be resent by: Send to e-mail at: [email or web address]  Will anyone else be joining your video visit? No     Alma GUZMAN              HEMATOLOGIC HISTORY:  Mr. Leonora Reyes is a gentleman with chronic thrombocytopenia and MGUS.   1.  On 05/20/2009, platelets of 191. On 12/21/2011, platelets of 75.   2.  On 02/20/2017, platelets of 57 with hemoglobin of 13.9.   3  On 02/21/2017:    -Normal folate, LDH,TSH and vitamin B12 of 372.    -SPEP reveals M-spike of 0.1.   - Lupus negative.   4. US on 02/22/2017 reveals normal spleen size.  5.  Bone marrow biopsy on 02/23/2017 reveals normocellular bone marrow with 50% cellularity with trilineage hematopoiesis.  Adequate megakaryocyte.  Slight erythroid hypoplasia.  No increase in blasts. 5% to 10% plasma cells. -Cytogenetics and FISH for myeloma panel is normal.    6. Serum ROSA reveals monoclonal IgG immunoglobulin of kappa light chain type and monoclonal free immunoglobulin light chain of lambda chain type.     SUBJECTIVE:  Mr. Reyes is an 81-year-old gentleman with MGUS and chronic thrombocytopenia.  He has been doing well.  The patient had right hip arthroplasty on 06/22/2022.  He received 1 unit of blood transfusion.     Overall he is doing good.  No headache.  No dizziness.  No chest pain.  No shortness of breath.  No nausea or vomiting.  No bleeding.  No fever or chills.  All other review of systems negative.     PHYSICAL EXAMINATION:    GENERAL:  He is alert and oriented x 3.  Not in distress.   Rest of the systems not examined as this is a video  visit.     LABORATORY:  CBC, BMP, SPEP, free light chain and IgG level reviewed.     ASSESSMENT:  1.  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  MGUS is stable.  2.  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  3.  Anemia secondary to recent hip replacement.     PLAN:  1.  The patient clinically is doing well.  Labs were all reviewed with him.       Discussed regarding MGUS.  MGUS is stable. Natural history of disease discussed. We will see him once a year.  Unlikely it will progress to myeloma in his lifetime.     The patient's thrombocytopenia is stable.  In fact, his platelet is better.  Not having any bleeding complication.  He will have CBC checked every 6 months.      The patient has some anemia.  He did receive blood transfusion after hip replacement.  We will check CBC in 6 month.      I explained to the patient that at this time all he needs is monitoring from our side.  No intervention is needed.     2.  I will see him in 1 year with labs.  In between he will have CBC checked.  I advised him to call us with any questions or concerns.     Total visit time of 20 minutes.  Time was spent in today's visit, review of chart/investigations today and documentation.    This office note has been dictated.        Again, thank you for allowing me to participate in the care of your patient.        Sincerely,        Pino Mayer MD

## 2022-07-14 ENCOUNTER — TELEPHONE (OUTPATIENT)
Dept: ONCOLOGY | Facility: CLINIC | Age: 82
End: 2022-07-14

## 2022-07-14 NOTE — TELEPHONE ENCOUNTER
Spoke with the patient and he stated [place] 6 [place] (for the follow up lab appt) so I gave him the [facility] scheduling dept number and asked him to call and give them the fax number for the lab orders. We did get his 1 yr follow up and labs scheduled. PO    Confirmed with Dr Moreno Plascencia's nurse that giving the scheduling # is correct. PO

## 2022-07-15 DIAGNOSIS — E78.5 HYPERLIPIDEMIA LDL GOAL <130: ICD-10-CM

## 2022-07-15 RX ORDER — PRAVASTATIN SODIUM 20 MG
TABLET ORAL
Qty: 90 TABLET | Refills: 0 | Status: SHIPPED | OUTPATIENT
Start: 2022-07-15 | End: 2022-08-03

## 2022-07-15 NOTE — LETTER
[facility]  [address] [id number] [phone]            Mars Reyes  [facility] [facility] [address]        July 21, 2022    Dear Mars,    While refilling your prescription today, we noticed that you are due to have labs drawn.  We will refill your prescription for 30 days, but a follow-up appointment must be made before any additional refills can be approved.     Taking care of your health is important to us and we look forward to seeing you in the near future.  Please call us at [phone] or [phone] (or use [facility]) to schedule an appointment.     Please disregard this notice if you have already made an appointment.    Sincerely,        [facility]

## 2022-07-24 NOTE — PROGRESS NOTES
HEMATOLOGIC HISTORY:  Mr. Mars Reyes is a gentleman with chronic thrombocytopenia and MGUS.   1.  On 05/20/2009, platelets of 191. On 12/21/2011, platelets of 75.   2.  On 02/20/2017, platelets of 57 with hemoglobin of 13.9.   3  On 02/21/2017:    -Normal folate, LDH,TSH and vitamin B12 of 372.    -SPEP reveals M-spike of 0.1.   - Lupus negative.   4. US on 02/22/2017 reveals normal spleen size.  5.  Bone marrow biopsy on 02/23/2017 reveals normocellular bone marrow with 50% cellularity with trilineage hematopoiesis.  Adequate megakaryocyte.  Slight erythroid hypoplasia.  No increase in blasts. 5% to 10% plasma cells. -Cytogenetics and FISH for myeloma panel is normal.    6. Serum ROSA reveals monoclonal IgG immunoglobulin of kappa light chain type and monoclonal free immunoglobulin light chain of lambda chain type.     SUBJECTIVE:  Mr. Reyes is an 81-year-old gentleman with MGUS and chronic thrombocytopenia.  He has been doing well.  The patient had right hip arthroplasty on 06/22/2022.  He received 1 unit of blood transfusion.     Overall he is doing good.  No headache.  No dizziness.  No chest pain.  No shortness of breath.  No nausea or vomiting.  No bleeding.  No fever or chills.  All other review of systems negative.     PHYSICAL EXAMINATION:    GENERAL:  He is alert and oriented x 3.  Not in distress.   Rest of the systems not examined as this is a video visit.     LABORATORY:  CBC, BMP, SPEP, free light chain and IgG level reviewed.     ASSESSMENT:  1.  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  MGUS is stable.  2.  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  3.  Anemia secondary to recent hip replacement.     PLAN:  1.  The patient clinically is doing well.  Labs were all reviewed with him.       Discussed regarding MGUS.  MGUS is stable. Natural history of disease discussed. We will see him once a year.  Unlikely it will progress to  myeloma in his lifetime.     The patient's thrombocytopenia is stable.  In fact, his platelet is better.  Not having any bleeding complication.  He will have CBC checked every 6 months.      The patient has some anemia.  He did receive blood transfusion after hip replacement.  We will check CBC in 6 month.      I explained to the patient that at this time all he needs is monitoring from our side.  No intervention is needed.     2.  I will see him in 1 year with labs.  In between he will have CBC checked.  I advised him to call us with any questions or concerns.     Total visit time of 20 minutes.  Time was spent in today's visit, review of chart/investigations today and documentation.

## 2022-07-28 ENCOUNTER — LAB (OUTPATIENT)
Dept: LAB | Facility: CLINIC | Age: 82
End: 2022-07-28
Payer: COMMERCIAL

## 2022-07-28 DIAGNOSIS — E78.5 HYPERLIPIDEMIA LDL GOAL <130: ICD-10-CM

## 2022-07-28 PROCEDURE — 80076 HEPATIC FUNCTION PANEL: CPT

## 2022-07-28 PROCEDURE — 80061 LIPID PANEL: CPT

## 2022-07-28 PROCEDURE — 36415 COLL VENOUS BLD VENIPUNCTURE: CPT

## 2022-07-29 LAB
ALBUMIN SERPL-MCNC: 3.9 G/DL (ref 3.4–5)
ALP SERPL-CCNC: 115 U/L (ref 40–150)
ALT SERPL W P-5'-P-CCNC: 15 U/L (ref 0–70)
AST SERPL W P-5'-P-CCNC: 14 U/L (ref 0–45)
BILIRUB DIRECT SERPL-MCNC: 0.2 MG/DL (ref 0–0.2)
BILIRUB SERPL-MCNC: 0.8 MG/DL (ref 0.2–1.3)
CHOLEST SERPL-MCNC: 120 MG/DL
FASTING STATUS PATIENT QL REPORTED: YES
HDLC SERPL-MCNC: 44 MG/DL
LDLC SERPL CALC-MCNC: 57 MG/DL
NONHDLC SERPL-MCNC: 76 MG/DL
PROT SERPL-MCNC: 7.3 G/DL (ref 6.8–8.8)
TRIGL SERPL-MCNC: 95 MG/DL

## 2022-08-03 ENCOUNTER — OFFICE VISIT (OUTPATIENT)
Dept: CARDIOLOGY | Facility: CLINIC | Age: 82
End: 2022-08-03
Payer: COMMERCIAL

## 2022-08-03 VITALS
BODY MASS INDEX: 25.2 KG/M2 | HEART RATE: 84 BPM | OXYGEN SATURATION: 97 % | SYSTOLIC BLOOD PRESSURE: 130 MMHG | DIASTOLIC BLOOD PRESSURE: 56 MMHG | HEIGHT: 70 IN | WEIGHT: 176 LBS

## 2022-08-03 DIAGNOSIS — E78.5 HYPERLIPIDEMIA LDL GOAL <130: ICD-10-CM

## 2022-08-03 DIAGNOSIS — Z95.0 CARDIAC PACEMAKER IN SITU: ICD-10-CM

## 2022-08-03 DIAGNOSIS — I10 ESSENTIAL HYPERTENSION, BENIGN: ICD-10-CM

## 2022-08-03 DIAGNOSIS — I35.0 SEVERE AORTIC STENOSIS: Primary | ICD-10-CM

## 2022-08-03 DIAGNOSIS — I48.92 ATRIAL FLUTTER, UNSPECIFIED TYPE (H): ICD-10-CM

## 2022-08-03 DIAGNOSIS — I44.7 LBBB (LEFT BUNDLE BRANCH BLOCK): ICD-10-CM

## 2022-08-03 PROCEDURE — 99214 OFFICE O/P EST MOD 30 MIN: CPT | Performed by: NURSE PRACTITIONER

## 2022-08-03 RX ORDER — PRAVASTATIN SODIUM 20 MG
20 TABLET ORAL DAILY
Qty: 90 TABLET | Refills: 3
Start: 2022-08-03 | End: 2022-10-25

## 2022-08-03 NOTE — LETTER
8/3/2022    Pb Warren MD  [address] [facility] [address]    RE: Mars Reyes       Dear Colleague,     I had the pleasure of seeing Mars Reyes in the [facility].  Cardiology Clinic Progress Note  Mars Reyes MRN# [id number]   YOB: 1940 Age: 81 year old     Reason For Visit:  1 year post TAVR follow-up   Primary Cardiologist:   Dr. Iglesias          History of Presenting Illness:      Mars Reyes is a pleasant 81 year old patient who carries a past medical history significant for paroxysmal SVT, atrial flutter status post ablation in 2019 with known recurrent paroxysmal atrial flutter, MGUS, thrombocytopenia, moderate mitral stenosis, mild to moderate mitral regurgitation, and severe aortic stenosis status post TAVR using a 29 mm Medtronic evolute pro valve on 6/22/2021.  The valve was ballooned 3 times due to significant perivalvular leak with no significant perialvular leak post procedure.  He did develop a left bundle branch block and outpatient monitor showed second-degree AV block.  He was evaluated by EP who felt there was a significant injury to the left bundle and intermittent failure of conduction in the right bundle.  He subsequently underwent permanent pacemaker implant.     He returns to the office today accompanied by his wife for a 1 year post TAVR  follow-up. He is feeling well on a cardiac standpoint, denies chest pain, shortness of breath, PND, orthopnea, presyncope, syncope, edema, heart racing, or palpitations. He has felt so well he skydived this year.  NYHA class I     On 6/22/2022, he underwent elective right hip replacement. His admission was complicated by an episode of syncope and hypotension in the setting of anemia requiring transfusion.  His PTA lisinopril was reduced to 5 mg daily.    Echocardiogram on 6/20/2022 showed a bioprosthetic aortic valve with a mean gradient of 5 mmHg, trace aortic regurgitation moderately dilated left  atrium, normal ejection fraction estimated at 55 to 60%, grade 2 diastolic dysfunction.     Last device interrogation showed a 9.9% atrial pacing and 15.7% ventricular pacing, 1 episode of ventricular arrhythmia lasting 6 seconds.    Blood pressure today well controlled at 130/56  Last BMP showed sodium 128, potassium 4.4, BUN 14, creatinine 0.86, GFR 88  CBC showed hemoglobin of 10.3 and platelet 125  Lipid panel is excellent with a total cholesterol of 120, HDL 44, LDL 57, triglycerides 95  BMI 25.25    He works out regularly  Follows a heart healthy diet  Remains compliant with all medications.    KCCQ  Q1A 5  Q1B 5  Q1C 4  Q2 3  Q3 6  Q4 7  Q5 5  Q6 5  Q7 4  Q8A 6  Q8B 6  Q8C 6                     Assessment and Plan:       1.  Severe aortic stenosis - status post TAVR using a 29 mm Medtronic evolute pro valve on 6/22/2021   Echocardiogram on 6/20/2022 showed a bioprosthetic aortic valve with a mean gradient of 5 mmHg, trace aortic regurgitation moderately dilated left atrium, normal ejection fraction estimated at 55 to 60%, grade 2 diastolic dysfunction.  Follow-up echocardiogram in 1 year.     2.  Left bundle branch block/second-degree AV block/pacemaker implant - Last device interrogation showed a 9.9% atrial pacing and 15.7% ventricular pacing, 1 episode of ventricular arrhythmia lasting 6 seconds.  Follow-up with device clinic as scheduled.    3.  Paroxysmal atrial flutter -continue Eliquis for stroke prevention.  4.  MGUS/thrombocytopenia -platelets stable at 125  5. Mitral regurgitation/stenosis - Moderate per echo.   5. Hypertension - Well controlled on low dose lisinopril            Thank you for allowing me to participate in this delightful patient's care. I have recommended he follow up in 1 year with Dr. Iglesias with echo prior .       Jolene Bose, ALEXANDRA CNP         Review of Systems:     Review of Systems:  Skin:        Eyes:       ENT:       Respiratory:  Negative    Cardiovascular:  Negative     Gastroenterology:      Genitourinary:       Musculoskeletal:       Neurologic:       Psychiatric:       Heme/Lymph/Imm:       Endocrine:                   Physical Exam:     GEN:  In general, this is a well nourished male in no acute distress.  HEENT:  Pupils equal, round. Sclerae nonicteric. Clear oropharynx. Mucous membranes moist.  NECK: Supple, no masses appreciated. Trachea midline.  No JVD   C/V:  Regular rate and rhythm, soft systolic murmur, rub or gallop. No S3 or RV heave.   RESP: Respirations are unlabored. No use of accessory muscles. Clear to auscultation bilaterally without wheezing, rales, or rhonchi.  GI: Abdomen soft, nontender, nondistended. No HSM appreciated.   EXTREM: No LE edema. No cyanosis or clubbing.  NEURO: Alert and oriented, cooperative. No obvious focal deficits.   PSYCH: Normal affect.  SKIN: Warm and dry. No rashes or petechiae appreciated.          Past Medical History:     Past Medical History:   Diagnosis Date     Aortic stenosis     moderate     Atypical atrial flutter (H)     PVI and LA linear ablation with successful termination of AFL 10/18/2019     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, unspecified cause, unspecified laterality      Contact dermatitis and other eczema, due to unspecified cause      Diverticulosis of colon (without mention of hemorrhage)      DJD (degenerative joint disease)      Essential hypertension, benign     abstracted 7/22/02     Gout, unspecified      Hip joint replacement by other means 06/03/2009     MGUS (monoclonal gammopathy of unknown significance)      Mitral valve disease     mild MS, moderate-severe MR     Pacemaker      PSVT (paroxysmal supraventricular tachycardia) (H)      Pulmonary HTN (H) 10/18/2011     Thrombocytopenia (H)               Past Surgical History:     Past Surgical History:   Procedure Laterality Date     ANESTHESIA CARDIOVERSION N/A 3/21/2019    Procedure: ANESTHESIA CARDIOVERSION;  Surgeon: GENERIC ANESTHESIA PROVIDER;   Location: [facility] OR     ARTHROPLASTY HIP ANTERIOR Right 6/22/2022    Procedure: Right direct anterior total hip arthroplasty;  Surgeon: Luisito Hammonds MD;  Location: [facility] OR     ARTHROPLASTY REVISION HIP ANTERIOR Left 9/9/2019    Procedure: REVISION DIRECT ANTERIOR TOTAL LEFT HIP ARTHROPLASTY;  Surgeon: Luisito Hammonds MD;  Location:  OR     BIOPSY  3/2017     BONE MARROW BIOPSY, BONE SPECIMEN, NEEDLE/TROCAR Right 2/23/2017    Procedure: BIOPSY BONE MARROW;  Surgeon: Romie Esquivel MD;  Location:  GI     COLONOSCOPY  2010     CV HEART CATHETERIZATION WITH POSSIBLE INTERVENTION N/A 8/25/2020    Procedure: Coronary Angiogram;  Surgeon: Brian Simmons MD;  Location:  HEART CARDIAC CATH LAB     CV LEFT HEART CATH N/A 8/25/2020    Procedure: Left Heart Cath;  Surgeon: Brian Simmons MD;  Location:  HEART CARDIAC CATH LAB     CV RIGHT HEART CATH MEASUREMENTS RECORDED N/A 8/25/2020    Procedure: Right Heart Cath;  Surgeon: Brian Simmons MD;  Location:  HEART CARDIAC CATH LAB     CV TRANSCATHETER AORTIC VALVE REPLACEMENT N/A 6/22/2021    Procedure: Transcatheter Aortic Valve Replacement;  Surgeon: Hunter Adan MD;  Location:  HEART CARDIAC CATH LAB     EP 3D TACHYCARDIA MAPPING N/A 10/18/2019    Procedure: EP 3D Tachycardia Mapping;  Surgeon: Delfino Gee MD;  Location:  HEART CARDIAC CATH LAB     EP ABLATION FOCAL AFIB N/A 10/18/2019    Procedure: EP Ablation Focal AFIB;  Surgeon: Delfino Gee MD;  Location:  HEART CARDIAC CATH LAB     EP PACEMAKER N/A 7/8/2021    Procedure: EP Pacemaker;  Surgeon: Blanca Lopez MD;  Location:  HEART CARDIAC CATH LAB     EYE SURGERY      Cataract, both eyes     ORTHOPEDIC SURGERY  2009    (L) total hip              Allergies:   Patient has no known allergies.       Data:   All laboratory data reviewed:    LAST CHOLESTEROL:  Lab Results   Component Value Date    CHOL 120 07/28/2022    CHOL 149 07/14/2020     Lab Results    Component Value Date    HDL 44 07/28/2022    HDL 44 07/14/2020     Lab Results   Component Value Date    LDL 57 07/28/2022    LDL 84 07/14/2020     Lab Results   Component Value Date    TRIG 95 07/28/2022    TRIG 107 07/14/2020     Lab Results   Component Value Date    CHOLHDLRATIO 3.4 02/05/2015       LAST BMP:  Lab Results   Component Value Date     07/11/2022     07/08/2021      Lab Results   Component Value Date    POTASSIUM 4.4 07/11/2022    POTASSIUM 4.3 07/08/2021     Lab Results   Component Value Date    CHLORIDE 99 07/11/2022    CHLORIDE 105 07/08/2021     Lab Results   Component Value Date    DIPAK 9.3 07/11/2022    DIPAK 9.1 07/08/2021     Lab Results   Component Value Date    CO2 25 07/11/2022    CO2 24 07/08/2021     Lab Results   Component Value Date    BUN 14 07/11/2022    BUN 13 07/08/2021     Lab Results   Component Value Date    CR 0.86 07/11/2022    CR 0.83 07/08/2021     Lab Results   Component Value Date     07/11/2022    GLC 89 07/08/2021       LAST CBC:  Lab Results   Component Value Date    WBC 6.8 07/11/2022    WBC 7.3 07/08/2021     Lab Results   Component Value Date    RBC 3.51 07/11/2022    RBC 4.29 07/08/2021     Lab Results   Component Value Date    HGB 10.3 07/11/2022    HGB 12.4 07/08/2021     Lab Results   Component Value Date    HCT 31.6 07/11/2022    HCT 37.3 07/08/2021     Lab Results   Component Value Date    MCV 90 07/11/2022    MCV 87 07/08/2021     Lab Results   Component Value Date    MCH 29.3 07/11/2022    MCH 28.9 07/08/2021     Lab Results   Component Value Date    MCHC 32.6 07/11/2022    MCHC 33.2 07/08/2021     Lab Results   Component Value Date    RDW 13.4 07/11/2022    RDW 12.5 07/08/2021     Lab Results   Component Value Date     07/11/2022    PLT 56 07/08/2021       Thank you for allowing me to participate in the care of your patient.      Sincerely,     ALEXANDRA Guo CNP     [facility]  Heart Care  cc:   No referring provider defined for this encounter.

## 2022-08-03 NOTE — PATIENT INSTRUCTIONS
Thanks for participating in a office visit with the [facility] clinic today.    Doing well on a cardiac standpoint   Reviewed results of recent echocardiogram - stable  Blood pressures well controlled   Continue on current medical therapy    Follow up in 1 year with Dr. Iglesias with echo prior.     Please call my nurse at  [phone] with any questions or concerns.    Scheduling phone number: [phone]  Reminder: Please bring in all current medications, over the counter supplements and vitamin bottles to your next appointment.

## 2022-08-03 NOTE — PROGRESS NOTES
Cardiology Clinic Progress Note  Mars Reyes MRN# [id number]   YOB: 1940 Age: 81 year old     Reason For Visit:  1 year post TAVR follow-up   Primary Cardiologist:   Dr. Iglesias          History of Presenting Illness:      Mars Reyes is a pleasant 81 year old patient who carries a past medical history significant for paroxysmal SVT, atrial flutter status post ablation in 2019 with known recurrent paroxysmal atrial flutter, MGUS, thrombocytopenia, moderate mitral stenosis, mild to moderate mitral regurgitation, and severe aortic stenosis status post TAVR using a 29 mm Medtronic evolute pro valve on 6/22/2021.  The valve was ballooned 3 times due to significant perivalvular leak with no significant perialvular leak post procedure.  He did develop a left bundle branch block and outpatient monitor showed second-degree AV block.  He was evaluated by EP who felt there was a significant injury to the left bundle and intermittent failure of conduction in the right bundle.  He subsequently underwent permanent pacemaker implant.     He returns to the office today accompanied by his wife for a 1 year post TAVR  follow-up. He is feeling well on a cardiac standpoint, denies chest pain, shortness of breath, PND, orthopnea, presyncope, syncope, edema, heart racing, or palpitations. He has felt so well he skydived this year.  NYHA class I     On 6/22/2022, he underwent elective right hip replacement. His admission was complicated by an episode of syncope and hypotension in the setting of anemia requiring transfusion.  His PTA lisinopril was reduced to 5 mg daily.    Echocardiogram on 6/20/2022 showed a bioprosthetic aortic valve with a mean gradient of 5 mmHg, trace aortic regurgitation moderately dilated left atrium, normal ejection fraction estimated at 55 to 60%, grade 2 diastolic dysfunction.     Last device interrogation showed a 9.9% atrial pacing and 15.7% ventricular pacing, 1 episode of ventricular  arrhythmia lasting 6 seconds.    Blood pressure today well controlled at 130/56  Last BMP showed sodium 128, potassium 4.4, BUN 14, creatinine 0.86, GFR 88  CBC showed hemoglobin of 10.3 and platelet 125  Lipid panel is excellent with a total cholesterol of 120, HDL 44, LDL 57, triglycerides 95  BMI 25.25    He works out regularly  Follows a heart healthy diet  Remains compliant with all medications.    KCCQ  Q1A 5  Q1B 5  Q1C 4  Q2 3  Q3 6  Q4 7  Q5 5  Q6 5  Q7 4  Q8A 6  Q8B 6  Q8C 6                     Assessment and Plan:       1.  Severe aortic stenosis - status post TAVR using a 29 mm Medtronic evolute pro valve on 6/22/2021   Echocardiogram on 6/20/2022 showed a bioprosthetic aortic valve with a mean gradient of 5 mmHg, trace aortic regurgitation moderately dilated left atrium, normal ejection fraction estimated at 55 to 60%, grade 2 diastolic dysfunction.  Follow-up echocardiogram in 1 year.     2.  Left bundle branch block/second-degree AV block/pacemaker implant - Last device interrogation showed a 9.9% atrial pacing and 15.7% ventricular pacing, 1 episode of ventricular arrhythmia lasting 6 seconds.  Follow-up with device clinic as scheduled.    3.  Paroxysmal atrial flutter -continue Eliquis for stroke prevention.  4.  MGUS/thrombocytopenia -platelets stable at 125  5. Mitral regurgitation/stenosis - Moderate per echo.   5. Hypertension - Well controlled on low dose lisinopril            Thank you for allowing me to participate in this delightful patient's care. I have recommended he follow up in 1 year with Dr. Iglesias with echo prior .       ALEXANDRA Guo CNP         Review of Systems:     Review of Systems:  Skin:        Eyes:       ENT:       Respiratory:  Negative    Cardiovascular:  Negative    Gastroenterology:      Genitourinary:       Musculoskeletal:       Neurologic:       Psychiatric:       Heme/Lymph/Imm:       Endocrine:                   Physical Exam:     GEN:  In general, this is  a well nourished male in no acute distress.  HEENT:  Pupils equal, round. Sclerae nonicteric. Clear oropharynx. Mucous membranes moist.  NECK: Supple, no masses appreciated. Trachea midline.  No JVD   C/V:  Regular rate and rhythm, soft systolic murmur, rub or gallop. No S3 or RV heave.   RESP: Respirations are unlabored. No use of accessory muscles. Clear to auscultation bilaterally without wheezing, rales, or rhonchi.  GI: Abdomen soft, nontender, nondistended. No HSM appreciated.   EXTREM: No LE edema. No cyanosis or clubbing.  NEURO: Alert and oriented, cooperative. No obvious focal deficits.   PSYCH: Normal affect.  SKIN: Warm and dry. No rashes or petechiae appreciated.          Past Medical History:     Past Medical History:   Diagnosis Date     Aortic stenosis     moderate     Atypical atrial flutter (H)     PVI and LA linear ablation with successful termination of AFL 10/18/2019     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, unspecified cause, unspecified laterality      Contact dermatitis and other eczema, due to unspecified cause      Diverticulosis of colon (without mention of hemorrhage)      DJD (degenerative joint disease)      Essential hypertension, benign     abstracted 7/22/02     Gout, unspecified      Hip joint replacement by other means 06/03/2009     MGUS (monoclonal gammopathy of unknown significance)      Mitral valve disease     mild MS, moderate-severe MR     Pacemaker      PSVT (paroxysmal supraventricular tachycardia) (H)      Pulmonary HTN (H) 10/18/2011     Thrombocytopenia (H)               Past Surgical History:     Past Surgical History:   Procedure Laterality Date     ANESTHESIA CARDIOVERSION N/A 3/21/2019    Procedure: ANESTHESIA CARDIOVERSION;  Surgeon: GENERIC ANESTHESIA PROVIDER;  Location: [facility] OR     ARTHROPLASTY HIP ANTERIOR Right 6/22/2022    Procedure: Right direct anterior total hip arthroplasty;  Surgeon: Luisito Hammonds MD;  Location: [facility] OR     ARTHROPLASTY REVISION  HIP ANTERIOR Left 9/9/2019    Procedure: REVISION DIRECT ANTERIOR TOTAL LEFT HIP ARTHROPLASTY;  Surgeon: Luisito Hammonds MD;  Location:  OR     BIOPSY  3/2017     BONE MARROW BIOPSY, BONE SPECIMEN, NEEDLE/TROCAR Right 2/23/2017    Procedure: BIOPSY BONE MARROW;  Surgeon: Romie Esquivel MD;  Location:  GI     COLONOSCOPY  2010     CV HEART CATHETERIZATION WITH POSSIBLE INTERVENTION N/A 8/25/2020    Procedure: Coronary Angiogram;  Surgeon: Brian Simmons MD;  Location:  HEART CARDIAC CATH LAB     CV LEFT HEART CATH N/A 8/25/2020    Procedure: Left Heart Cath;  Surgeon: Brian Simmons MD;  Location:  HEART CARDIAC CATH LAB     CV RIGHT HEART CATH MEASUREMENTS RECORDED N/A 8/25/2020    Procedure: Right Heart Cath;  Surgeon: Brian Simmons MD;  Location:  HEART CARDIAC CATH LAB     CV TRANSCATHETER AORTIC VALVE REPLACEMENT N/A 6/22/2021    Procedure: Transcatheter Aortic Valve Replacement;  Surgeon: Hunter Adan MD;  Location:  HEART CARDIAC CATH LAB     EP 3D TACHYCARDIA MAPPING N/A 10/18/2019    Procedure: EP 3D Tachycardia Mapping;  Surgeon: Delfino Gee MD;  Location:  HEART CARDIAC CATH LAB     EP ABLATION FOCAL AFIB N/A 10/18/2019    Procedure: EP Ablation Focal AFIB;  Surgeon: Delfino Gee MD;  Location:  HEART CARDIAC CATH LAB     EP PACEMAKER N/A 7/8/2021    Procedure: EP Pacemaker;  Surgeon: Blanca Lopez MD;  Location:  HEART CARDIAC CATH LAB     EYE SURGERY      Cataract, both eyes     ORTHOPEDIC SURGERY  2009    (L) total hip              Allergies:   Patient has no known allergies.       Data:   All laboratory data reviewed:    LAST CHOLESTEROL:  Lab Results   Component Value Date    CHOL 120 07/28/2022    CHOL 149 07/14/2020     Lab Results   Component Value Date    HDL 44 07/28/2022    HDL 44 07/14/2020     Lab Results   Component Value Date    LDL 57 07/28/2022    LDL 84 07/14/2020     Lab Results   Component Value Date    TRIG 95 07/28/2022     TRIG 107 07/14/2020     Lab Results   Component Value Date    CHOLHDLRATIO 3.4 02/05/2015       LAST BMP:  Lab Results   Component Value Date     07/11/2022     07/08/2021      Lab Results   Component Value Date    POTASSIUM 4.4 07/11/2022    POTASSIUM 4.3 07/08/2021     Lab Results   Component Value Date    CHLORIDE 99 07/11/2022    CHLORIDE 105 07/08/2021     Lab Results   Component Value Date    DIPAK 9.3 07/11/2022    DIPAK 9.1 07/08/2021     Lab Results   Component Value Date    CO2 25 07/11/2022    CO2 24 07/08/2021     Lab Results   Component Value Date    BUN 14 07/11/2022    BUN 13 07/08/2021     Lab Results   Component Value Date    CR 0.86 07/11/2022    CR 0.83 07/08/2021     Lab Results   Component Value Date     07/11/2022    GLC 89 07/08/2021       LAST CBC:  Lab Results   Component Value Date    WBC 6.8 07/11/2022    WBC 7.3 07/08/2021     Lab Results   Component Value Date    RBC 3.51 07/11/2022    RBC 4.29 07/08/2021     Lab Results   Component Value Date    HGB 10.3 07/11/2022    HGB 12.4 07/08/2021     Lab Results   Component Value Date    HCT 31.6 07/11/2022    HCT 37.3 07/08/2021     Lab Results   Component Value Date    MCV 90 07/11/2022    MCV 87 07/08/2021     Lab Results   Component Value Date    MCH 29.3 07/11/2022    MCH 28.9 07/08/2021     Lab Results   Component Value Date    MCHC 32.6 07/11/2022    MCHC 33.2 07/08/2021     Lab Results   Component Value Date    RDW 13.4 07/11/2022    RDW 12.5 07/08/2021     Lab Results   Component Value Date     07/11/2022    PLT 56 07/08/2021

## 2022-08-19 ENCOUNTER — TRANSFERRED RECORDS (OUTPATIENT)
Dept: HEALTH INFORMATION MANAGEMENT | Facility: CLINIC | Age: 82
End: 2022-08-19

## 2022-09-07 NOTE — PROVIDER NOTIFICATION
Caller: Jessica Terry    Relationship: Self    Best call back number:[phone]    What is the best time to reach you: Any    Who are you requesting to speak with (clinical staff, provider,  specific staff member): Clinical staff        What was the call regarding: The patient states that she would like a phone call back from the clinical staff with an update regarding her message yesterday.     Do you require a callback: Yes         Dr. Deutsch and Dr. Goldman at bedside to evaluate rhythm. 12 lead EKG ordered.     250 mL albumin ordered for low blood pressure.

## 2022-10-20 DIAGNOSIS — I48.92 ATRIAL FLUTTER, UNSPECIFIED TYPE (H): ICD-10-CM

## 2022-10-21 DIAGNOSIS — E78.5 HYPERLIPIDEMIA LDL GOAL <130: ICD-10-CM

## 2022-10-24 RX ORDER — APIXABAN 5 MG/1
TABLET, FILM COATED ORAL
Qty: 180 TABLET | Refills: 3 | Status: SHIPPED | OUTPATIENT
Start: 2022-10-24 | End: 2023-10-31

## 2022-10-24 NOTE — TELEPHONE ENCOUNTER
Routing refill request to provider for review/approval because:   Direct Oral Anticoagulant Agents Failed 10/20/2022 12:38 AM   Protocol Details  Normal Platelets on file in past 12 months    Patient is 18-79 years of age

## 2022-10-25 RX ORDER — PRAVASTATIN SODIUM 20 MG
TABLET ORAL
Qty: 90 TABLET | Refills: 3 | Status: SHIPPED | OUTPATIENT
Start: 2022-10-25 | End: 2023-10-31

## 2022-10-25 NOTE — TELEPHONE ENCOUNTER
Routing refill request to provider for review/approval because:  Routing for review last filled by cardio    Raymundo Moss, RN  [facility] Triage Nurse

## 2022-11-19 ENCOUNTER — MYC MEDICAL ADVICE (OUTPATIENT)
Dept: INTERNAL MEDICINE | Facility: CLINIC | Age: 82
End: 2022-11-19

## 2022-12-04 ENCOUNTER — HEALTH MAINTENANCE LETTER (OUTPATIENT)
Age: 82
End: 2022-12-04

## 2022-12-07 ENCOUNTER — ANCILLARY PROCEDURE (OUTPATIENT)
Dept: CARDIOLOGY | Facility: CLINIC | Age: 82
End: 2022-12-07
Attending: INTERNAL MEDICINE
Payer: COMMERCIAL

## 2022-12-07 DIAGNOSIS — I49.5 SICK SINUS SYNDROME (H): ICD-10-CM

## 2022-12-07 DIAGNOSIS — Z95.0 CARDIAC PACEMAKER IN SITU: ICD-10-CM

## 2022-12-07 PROCEDURE — 93296 REM INTERROG EVL PM/IDS: CPT | Performed by: INTERNAL MEDICINE

## 2022-12-07 PROCEDURE — 93294 REM INTERROG EVL PM/LDLS PM: CPT | Performed by: INTERNAL MEDICINE

## 2022-12-09 LAB
MDC_IDC_EPISODE_DTM: NORMAL
MDC_IDC_EPISODE_DURATION: 0 S
MDC_IDC_EPISODE_DURATION: 0 S
MDC_IDC_EPISODE_DURATION: 1 S
MDC_IDC_EPISODE_DURATION: 2 S
MDC_IDC_EPISODE_DURATION: 2 S
MDC_IDC_EPISODE_ID: 16
MDC_IDC_EPISODE_ID: 17
MDC_IDC_EPISODE_ID: 18
MDC_IDC_EPISODE_ID: 19
MDC_IDC_EPISODE_ID: 20
MDC_IDC_EPISODE_TYPE: NORMAL
MDC_IDC_LEAD_IMPLANT_DT: NORMAL
MDC_IDC_LEAD_IMPLANT_DT: NORMAL
MDC_IDC_LEAD_LOCATION: NORMAL
MDC_IDC_LEAD_LOCATION: NORMAL
MDC_IDC_LEAD_LOCATION_DETAIL_1: NORMAL
MDC_IDC_LEAD_LOCATION_DETAIL_1: NORMAL
MDC_IDC_LEAD_MFG: NORMAL
MDC_IDC_LEAD_MFG: NORMAL
MDC_IDC_LEAD_MODEL: NORMAL
MDC_IDC_LEAD_MODEL: NORMAL
MDC_IDC_LEAD_POLARITY_TYPE: NORMAL
MDC_IDC_LEAD_POLARITY_TYPE: NORMAL
MDC_IDC_LEAD_SERIAL: NORMAL
MDC_IDC_LEAD_SERIAL: NORMAL
MDC_IDC_MSMT_BATTERY_DTM: NORMAL
MDC_IDC_MSMT_BATTERY_REMAINING_LONGEVITY: 158 MO
MDC_IDC_MSMT_BATTERY_RRT_TRIGGER: 2.62
MDC_IDC_MSMT_BATTERY_STATUS: NORMAL
MDC_IDC_MSMT_BATTERY_VOLTAGE: 3.06 V
MDC_IDC_MSMT_LEADCHNL_RA_IMPEDANCE_VALUE: 323 OHM
MDC_IDC_MSMT_LEADCHNL_RA_IMPEDANCE_VALUE: 380 OHM
MDC_IDC_MSMT_LEADCHNL_RA_PACING_THRESHOLD_AMPLITUDE: 0.5 V
MDC_IDC_MSMT_LEADCHNL_RA_PACING_THRESHOLD_PULSEWIDTH: 0.4 MS
MDC_IDC_MSMT_LEADCHNL_RA_SENSING_INTR_AMPL: 3 MV
MDC_IDC_MSMT_LEADCHNL_RA_SENSING_INTR_AMPL: 3 MV
MDC_IDC_MSMT_LEADCHNL_RV_IMPEDANCE_VALUE: 304 OHM
MDC_IDC_MSMT_LEADCHNL_RV_IMPEDANCE_VALUE: 380 OHM
MDC_IDC_MSMT_LEADCHNL_RV_PACING_THRESHOLD_AMPLITUDE: 0.62 V
MDC_IDC_MSMT_LEADCHNL_RV_PACING_THRESHOLD_PULSEWIDTH: 0.4 MS
MDC_IDC_MSMT_LEADCHNL_RV_SENSING_INTR_AMPL: 9.25 MV
MDC_IDC_MSMT_LEADCHNL_RV_SENSING_INTR_AMPL: 9.25 MV
MDC_IDC_PG_IMPLANT_DTM: NORMAL
MDC_IDC_PG_MFG: NORMAL
MDC_IDC_PG_MODEL: NORMAL
MDC_IDC_PG_SERIAL: NORMAL
MDC_IDC_PG_TYPE: NORMAL
MDC_IDC_SESS_CLINIC_NAME: NORMAL
MDC_IDC_SESS_DTM: NORMAL
MDC_IDC_SESS_TYPE: NORMAL
MDC_IDC_SET_BRADY_AT_MODE_SWITCH_RATE: 171 {BEATS}/MIN
MDC_IDC_SET_BRADY_HYSTRATE: NORMAL
MDC_IDC_SET_BRADY_LOWRATE: 60 {BEATS}/MIN
MDC_IDC_SET_BRADY_MAX_SENSOR_RATE: 130 {BEATS}/MIN
MDC_IDC_SET_BRADY_MAX_TRACKING_RATE: 130 {BEATS}/MIN
MDC_IDC_SET_BRADY_MODE: NORMAL
MDC_IDC_SET_BRADY_PAV_DELAY_LOW: 300 MS
MDC_IDC_SET_BRADY_SAV_DELAY_LOW: 300 MS
MDC_IDC_SET_LEADCHNL_RA_PACING_AMPLITUDE: 1.5 V
MDC_IDC_SET_LEADCHNL_RA_PACING_ANODE_ELECTRODE_1: NORMAL
MDC_IDC_SET_LEADCHNL_RA_PACING_ANODE_LOCATION_1: NORMAL
MDC_IDC_SET_LEADCHNL_RA_PACING_CAPTURE_MODE: NORMAL
MDC_IDC_SET_LEADCHNL_RA_PACING_CATHODE_ELECTRODE_1: NORMAL
MDC_IDC_SET_LEADCHNL_RA_PACING_CATHODE_LOCATION_1: NORMAL
MDC_IDC_SET_LEADCHNL_RA_PACING_POLARITY: NORMAL
MDC_IDC_SET_LEADCHNL_RA_PACING_PULSEWIDTH: 0.4 MS
MDC_IDC_SET_LEADCHNL_RA_SENSING_ANODE_ELECTRODE_1: NORMAL
MDC_IDC_SET_LEADCHNL_RA_SENSING_ANODE_LOCATION_1: NORMAL
MDC_IDC_SET_LEADCHNL_RA_SENSING_CATHODE_ELECTRODE_1: NORMAL
MDC_IDC_SET_LEADCHNL_RA_SENSING_CATHODE_LOCATION_1: NORMAL
MDC_IDC_SET_LEADCHNL_RA_SENSING_POLARITY: NORMAL
MDC_IDC_SET_LEADCHNL_RA_SENSING_SENSITIVITY: 0.3 MV
MDC_IDC_SET_LEADCHNL_RV_PACING_AMPLITUDE: 2 V
MDC_IDC_SET_LEADCHNL_RV_PACING_ANODE_ELECTRODE_1: NORMAL
MDC_IDC_SET_LEADCHNL_RV_PACING_ANODE_LOCATION_1: NORMAL
MDC_IDC_SET_LEADCHNL_RV_PACING_CAPTURE_MODE: NORMAL
MDC_IDC_SET_LEADCHNL_RV_PACING_CATHODE_ELECTRODE_1: NORMAL
MDC_IDC_SET_LEADCHNL_RV_PACING_CATHODE_LOCATION_1: NORMAL
MDC_IDC_SET_LEADCHNL_RV_PACING_POLARITY: NORMAL
MDC_IDC_SET_LEADCHNL_RV_PACING_PULSEWIDTH: 0.4 MS
MDC_IDC_SET_LEADCHNL_RV_SENSING_ANODE_ELECTRODE_1: NORMAL
MDC_IDC_SET_LEADCHNL_RV_SENSING_ANODE_LOCATION_1: NORMAL
MDC_IDC_SET_LEADCHNL_RV_SENSING_CATHODE_ELECTRODE_1: NORMAL
MDC_IDC_SET_LEADCHNL_RV_SENSING_CATHODE_LOCATION_1: NORMAL
MDC_IDC_SET_LEADCHNL_RV_SENSING_POLARITY: NORMAL
MDC_IDC_SET_LEADCHNL_RV_SENSING_SENSITIVITY: 0.9 MV
MDC_IDC_SET_ZONE_DETECTION_INTERVAL: 350 MS
MDC_IDC_SET_ZONE_DETECTION_INTERVAL: 400 MS
MDC_IDC_SET_ZONE_TYPE: NORMAL
MDC_IDC_STAT_AT_BURDEN_PERCENT: 0 %
MDC_IDC_STAT_AT_DTM_END: NORMAL
MDC_IDC_STAT_AT_DTM_START: NORMAL
MDC_IDC_STAT_BRADY_AP_VP_PERCENT: 4.47 %
MDC_IDC_STAT_BRADY_AP_VS_PERCENT: 7.17 %
MDC_IDC_STAT_BRADY_AS_VP_PERCENT: 32.05 %
MDC_IDC_STAT_BRADY_AS_VS_PERCENT: 56.31 %
MDC_IDC_STAT_BRADY_DTM_END: NORMAL
MDC_IDC_STAT_BRADY_DTM_START: NORMAL
MDC_IDC_STAT_BRADY_RA_PERCENT_PACED: 11.3 %
MDC_IDC_STAT_BRADY_RV_PERCENT_PACED: 36.52 %
MDC_IDC_STAT_EPISODE_RECENT_COUNT: 0
MDC_IDC_STAT_EPISODE_RECENT_COUNT: 5
MDC_IDC_STAT_EPISODE_RECENT_COUNT_DTM_END: NORMAL
MDC_IDC_STAT_EPISODE_RECENT_COUNT_DTM_START: NORMAL
MDC_IDC_STAT_EPISODE_TOTAL_COUNT: 0
MDC_IDC_STAT_EPISODE_TOTAL_COUNT: 0
MDC_IDC_STAT_EPISODE_TOTAL_COUNT: 1
MDC_IDC_STAT_EPISODE_TOTAL_COUNT: 1
MDC_IDC_STAT_EPISODE_TOTAL_COUNT: 18
MDC_IDC_STAT_EPISODE_TOTAL_COUNT_DTM_END: NORMAL
MDC_IDC_STAT_EPISODE_TOTAL_COUNT_DTM_START: NORMAL
MDC_IDC_STAT_EPISODE_TYPE: NORMAL

## 2023-01-28 DIAGNOSIS — M10.9 GOUT, UNSPECIFIED CAUSE, UNSPECIFIED CHRONICITY, UNSPECIFIED SITE: ICD-10-CM

## 2023-01-30 RX ORDER — ALLOPURINOL 100 MG/1
TABLET ORAL
Qty: 90 TABLET | Refills: 1 | Status: SHIPPED | OUTPATIENT
Start: 2023-01-30 | End: 2023-07-24

## 2023-02-09 DIAGNOSIS — I10 ESSENTIAL HYPERTENSION, BENIGN: ICD-10-CM

## 2023-02-09 NOTE — TELEPHONE ENCOUNTER
Patient reports Lisinopril dosage previously increased to 20 mg daily per 11/19/22 [facility] encounter.    Mars Reyes  to [facility] (supporting Pb Warren MD)    WB    2:17 PM  My BP readings are stable as previously indicated after changing to 20mg Lisinopril per your recommendation.  I now need a refill authorization at [facility] [address] [address], [place], [place].      Script updated to reflect patient's current dosage, routing to PCP for review.

## 2023-02-10 RX ORDER — LISINOPRIL 20 MG/1
20 TABLET ORAL DAILY
Qty: 90 TABLET | Refills: 1 | Status: SHIPPED | OUTPATIENT
Start: 2023-02-10 | End: 2023-08-03

## 2023-02-23 ASSESSMENT — ENCOUNTER SYMPTOMS
HEADACHES: 0
CHILLS: 0
WEAKNESS: 0
SORE THROAT: 0
DIARRHEA: 0
SHORTNESS OF BREATH: 0
ABDOMINAL PAIN: 0
DYSURIA: 0
NAUSEA: 0
PARESTHESIAS: 0
ARTHRALGIAS: 1
FEVER: 0
NERVOUS/ANXIOUS: 0
EYE PAIN: 0
HEMATOCHEZIA: 0
HEARTBURN: 0
CONSTIPATION: 0
COUGH: 0
PALPITATIONS: 0
FREQUENCY: 0
MYALGIAS: 1
HEMATURIA: 0
JOINT SWELLING: 0
DIZZINESS: 0

## 2023-02-23 ASSESSMENT — ACTIVITIES OF DAILY LIVING (ADL): CURRENT_FUNCTION: NO ASSISTANCE NEEDED

## 2023-02-28 ENCOUNTER — OFFICE VISIT (OUTPATIENT)
Dept: INTERNAL MEDICINE | Facility: CLINIC | Age: 83
End: 2023-02-28
Payer: COMMERCIAL

## 2023-02-28 VITALS
TEMPERATURE: 97.9 F | RESPIRATION RATE: 16 BRPM | BODY MASS INDEX: 25.78 KG/M2 | HEIGHT: 70 IN | OXYGEN SATURATION: 95 % | HEART RATE: 68 BPM | SYSTOLIC BLOOD PRESSURE: 136 MMHG | WEIGHT: 180.1 LBS | DIASTOLIC BLOOD PRESSURE: 64 MMHG

## 2023-02-28 DIAGNOSIS — Z12.11 SCREEN FOR COLON CANCER: ICD-10-CM

## 2023-02-28 DIAGNOSIS — D47.2 MGUS (MONOCLONAL GAMMOPATHY OF UNKNOWN SIGNIFICANCE): ICD-10-CM

## 2023-02-28 DIAGNOSIS — M1A.9XX0 CHRONIC GOUT INVOLVING TOE WITHOUT TOPHUS, UNSPECIFIED CAUSE, UNSPECIFIED LATERALITY: ICD-10-CM

## 2023-02-28 DIAGNOSIS — I48.92 ATRIAL FLUTTER, UNSPECIFIED TYPE (H): ICD-10-CM

## 2023-02-28 DIAGNOSIS — I10 ESSENTIAL HYPERTENSION, BENIGN: ICD-10-CM

## 2023-02-28 DIAGNOSIS — E78.5 HYPERLIPIDEMIA LDL GOAL <130: ICD-10-CM

## 2023-02-28 DIAGNOSIS — Z00.00 ENCOUNTER FOR SUBSEQUENT ANNUAL WELLNESS VISIT IN MEDICARE PATIENT: Primary | ICD-10-CM

## 2023-02-28 LAB
ALBUMIN SERPL BCG-MCNC: 4.3 G/DL (ref 3.5–5.2)
ALP SERPL-CCNC: 87 U/L (ref 40–129)
ALT SERPL W P-5'-P-CCNC: 12 U/L (ref 10–50)
ANION GAP SERPL CALCULATED.3IONS-SCNC: 12 MMOL/L (ref 7–15)
AST SERPL W P-5'-P-CCNC: 23 U/L (ref 10–50)
BILIRUB SERPL-MCNC: 0.8 MG/DL
BUN SERPL-MCNC: 16.1 MG/DL (ref 8–23)
CALCIUM SERPL-MCNC: 9.4 MG/DL (ref 8.8–10.2)
CHLORIDE SERPL-SCNC: 100 MMOL/L (ref 98–107)
CHOLEST SERPL-MCNC: 147 MG/DL
CREAT SERPL-MCNC: 0.93 MG/DL (ref 0.67–1.17)
DEPRECATED HCO3 PLAS-SCNC: 23 MMOL/L (ref 22–29)
ERYTHROCYTE [DISTWIDTH] IN BLOOD BY AUTOMATED COUNT: 13.1 % (ref 10–15)
GFR SERPL CREATININE-BSD FRML MDRD: 82 ML/MIN/1.73M2
GLUCOSE SERPL-MCNC: 96 MG/DL (ref 70–99)
HCT VFR BLD AUTO: 41.1 % (ref 40–53)
HDLC SERPL-MCNC: 42 MG/DL
HGB BLD-MCNC: 13.7 G/DL (ref 13.3–17.7)
LDLC SERPL CALC-MCNC: 79 MG/DL
MCH RBC QN AUTO: 29 PG (ref 26.5–33)
MCHC RBC AUTO-ENTMCNC: 33.3 G/DL (ref 31.5–36.5)
MCV RBC AUTO: 87 FL (ref 78–100)
NONHDLC SERPL-MCNC: 105 MG/DL
PLATELET # BLD AUTO: 69 10E3/UL (ref 150–450)
POTASSIUM SERPL-SCNC: 4.4 MMOL/L (ref 3.4–5.3)
PROT SERPL-MCNC: 7.1 G/DL (ref 6.4–8.3)
RBC # BLD AUTO: 4.73 10E6/UL (ref 4.4–5.9)
SODIUM SERPL-SCNC: 135 MMOL/L (ref 136–145)
TRIGL SERPL-MCNC: 131 MG/DL
URATE SERPL-MCNC: 5.9 MG/DL (ref 3.4–7)
WBC # BLD AUTO: 7.4 10E3/UL (ref 4–11)

## 2023-02-28 PROCEDURE — G0439 PPPS, SUBSEQ VISIT: HCPCS | Performed by: INTERNAL MEDICINE

## 2023-02-28 PROCEDURE — 99214 OFFICE O/P EST MOD 30 MIN: CPT | Mod: 25 | Performed by: INTERNAL MEDICINE

## 2023-02-28 PROCEDURE — 85027 COMPLETE CBC AUTOMATED: CPT | Performed by: INTERNAL MEDICINE

## 2023-02-28 PROCEDURE — 36415 COLL VENOUS BLD VENIPUNCTURE: CPT | Performed by: INTERNAL MEDICINE

## 2023-02-28 PROCEDURE — 84550 ASSAY OF BLOOD/URIC ACID: CPT | Performed by: INTERNAL MEDICINE

## 2023-02-28 PROCEDURE — 80053 COMPREHEN METABOLIC PANEL: CPT | Performed by: INTERNAL MEDICINE

## 2023-02-28 PROCEDURE — 80061 LIPID PANEL: CPT | Performed by: INTERNAL MEDICINE

## 2023-02-28 ASSESSMENT — ENCOUNTER SYMPTOMS
PARESTHESIAS: 0
CHILLS: 0
PALPITATIONS: 0
ABDOMINAL PAIN: 0
NAUSEA: 0
ARTHRALGIAS: 1
DIARRHEA: 0
HEMATURIA: 0
HEADACHES: 0
FREQUENCY: 0
JOINT SWELLING: 0
COUGH: 0
MYALGIAS: 1
DYSURIA: 0
EYE PAIN: 0
SHORTNESS OF BREATH: 0
HEARTBURN: 0
HEMATOCHEZIA: 0
FEVER: 0
DIZZINESS: 0
SORE THROAT: 0
WEAKNESS: 0
NERVOUS/ANXIOUS: 0
CONSTIPATION: 0

## 2023-02-28 ASSESSMENT — ACTIVITIES OF DAILY LIVING (ADL): CURRENT_FUNCTION: NO ASSISTANCE NEEDED

## 2023-02-28 NOTE — PROGRESS NOTES
"SUBJECTIVE:   Mars is a 82 year old who presents for Preventive Visit.  Patient has been advised of split billing requirements and indicates understanding: Yes     Are you in the first 12 months of your Medicare coverage?  No    Healthy Habits:     In general, how would you rate your overall health?  Good    Frequency of exercise:  4-5 days/week    Duration of exercise:  30-45 minutes    Do you usually eat at least 4 servings of fruit and vegetables a day, include whole grains    & fiber and avoid regularly eating high fat or \"junk\" foods?  Yes    Taking medications regularly:  Yes    Medication side effects:  None and No muscle aches    Ability to successfully perform activities of daily living:  No assistance needed    Home Safety:  No safety concerns identified    Hearing Impairment:  No hearing concerns    In the past 6 months, have you been bothered by leaking of urine?  No    In general, how would you rate your overall mental or emotional health?  Excellent      PHQ-2 Total Score: 0    Additional concerns today:  Yes      Have you ever done Advance Care Planning? (For example, a Health Directive, POLST, or a discussion with a medical provider or your loved ones about your wishes): Yes, advance care planning is on file.       Fall risk:  low    Cognitive Screening   1) Repeat 3 items (Leader, Season, Table)    2) Clock draw: NORMAL  3) 3 item recall: Recalls 1 object   Results: NORMAL clock, 1-2 items recalled: COGNITIVE IMPAIRMENT LESS LIKELY    Mini-CogTM Copyright JAIME Heredia. Licensed by the author for use in [facility]; reprinted with permission (juan@.[facility]). All rights reserved.      Do you have sleep apnea, excessive snoring or daytime drowsiness?: no    Reviewed and updated as needed this visit by clinical staff   Tobacco  Allergies  Meds              Reviewed and updated as needed this visit by Provider                 Social History     Tobacco Use     Smoking status: Never     " Smokeless tobacco: Never   Substance Use Topics     Alcohol use: Yes     Comment: Max 2 beers per day         Alcohol Use 2/23/2023   Prescreen: >3 drinks/day or >7 drinks/week? No   Prescreen: >3 drinks/day or >7 drinks/week? -     Current Outpatient Medications   Medication     acetaminophen (TYLENOL) 325 MG tablet     allopurinol (ZYLOPRIM) 100 MG tablet     amoxicillin (AMOXIL) 500 MG tablet     ELIQUIS ANTICOAGULANT 5 MG tablet     lisinopril (ZESTRIL) 20 MG tablet     pravastatin (PRAVACHOL) 20 MG tablet     No current facility-administered medications for this visit.         PROBLEMS TO ADD ON:  1. Chronic lower back pain   2. Discuss heart status   3. Sore on scalp x 6 months     Current providers sharing in care for this patient include:   Patient Care Team:  Pb Warren MD as PCP - General (Internal Medicine)  Pino Mayer MD as Assigned Cancer Care Provider  Jolene Bose APRN CNP as Assigned Heart and Vascular Provider  Pb Warren MD as Assigned PCP    The following health maintenance items are reviewed in Epic and correct as of today:  Health Maintenance   Topic Date Due     ZOSTER IMMUNIZATION (1 of 2) Never done     COLORECTAL CANCER SCREENING  10/06/2022     ANNUAL REVIEW OF HM ORDERS  06/30/2023     MEDICARE ANNUAL WELLNESS VISIT  02/28/2024     FALL RISK ASSESSMENT  02/28/2024     ADVANCE CARE PLANNING  02/28/2028     DTAP/TDAP/TD IMMUNIZATION (5 - Td or Tdap) 11/03/2030     PHQ-2 (once per calendar year)  Completed     INFLUENZA VACCINE  Completed     Pneumococcal Vaccine: 65+ Years  Completed     COVID-19 Vaccine  Completed     IPV IMMUNIZATION  Aged Out     MENINGITIS IMMUNIZATION  Aged Out       Immunization History   Administered Date(s) Administered     COVID-19 Vaccine 12+ (Pfizer) 02/25/2021, 03/28/2021     COVID-19 Vaccine Bivalent Booster 12+ (Pfizer) 10/17/2022     DTaP, Unspecified 09/01/2020     HEPA 02/20/1997, 05/20/2005     HepB 02/20/1997     Influenza (High  "Dose) 3 valent vaccine 10/20/2015, 10/01/2016, 10/13/2016, 11/05/2018, 10/30/2019, 09/16/2021     Influenza (IIV3) PF 02/20/1997, 11/16/2005, 12/04/2006, 10/16/2007, 11/20/2008, 10/17/2010, 01/07/2013, 09/16/2013, 01/01/2016, 03/10/2016, 09/30/2017     Influenza Vaccine 65+ (Fluzone HD) 08/18/2020, 09/16/2021, 10/17/2022     Meningococcal (Menomune ) 02/20/1997     Pneumo Conj 13-V (2010&after) 02/10/2016     Pneumococcal 23 valent 12/04/2006     Poliovirus, inactivated (IPV) 02/20/1997     TD (ADULT, 7+) 02/20/1997     TDAP Vaccine (Adacel) 02/21/2008     Tdap (Adacel,Boostrix) 11/03/2020     Typhoid IM 02/20/1997     Yellow Fever 05/20/2005         Review of Systems   Constitutional: Negative for chills and fever.   HENT: Negative for congestion, ear pain, hearing loss and sore throat.    Eyes: Positive for visual disturbance. Negative for pain.   Respiratory: Negative for cough and shortness of breath.    Cardiovascular: Negative for chest pain, palpitations and peripheral edema.   Gastrointestinal: Negative for abdominal pain, constipation, diarrhea, heartburn, hematochezia and nausea.   Genitourinary: Positive for impotence. Negative for dysuria, frequency, genital sores, hematuria, penile discharge and urgency.   Musculoskeletal: Positive for arthralgias and myalgias. Negative for joint swelling.   Skin: Negative for rash.   Neurological: Negative for dizziness, weakness, headaches and paresthesias.   Psychiatric/Behavioral: Negative for mood changes. The patient is not nervous/anxious.        OBJECTIVE:   /64   Pulse 68   Temp 97.9  F (36.6  C) (Temporal)   Resp 16   Ht 1.778 m (5' 10\")   Wt 81.7 kg (180 lb 1.6 oz)   SpO2 95%   BMI 25.84 kg/m   Estimated body mass index is 25.84 kg/m  as calculated from the following:    Height as of this encounter: 1.778 m (5' 10\").    Weight as of this encounter: 81.7 kg (180 lb 1.6 oz).     Physical Exam  GENERAL: alert and no distress  EYES: Eyes grossly " normal to inspection, PERRL and conjunctivae and sclerae normal  HENT: ear canals and TM's normal, nose and mouth without ulcers or lesions  NECK: no adenopathy, no asymmetry, masses, or scars and thyroid normal to palpation  RESP: lungs clear to auscultation - no rales, rhonchi or wheezes  CV: regular rate and rhythm, normal S1 S2, no S3 or S4, no murmur, click or rub,  ABDOMEN: soft, nontender, no hepatosplenomegaly, no masses and bowel sounds normal  MS: no gross musculoskeletal defects noted  Sun damage noted to crown of head  NEURO: No focal change  PSYCH: mentation appears normal, affect normal/bright    PSA   Date Value Ref Range Status   07/14/2020 3.05 0 - 4 ug/L Final     Comment:     Assay Method:  Chemiluminescence using Siemens Vista analyzer       ASSESSMENT / PLAN:   (Z00.00) Encounter for subsequent annual wellness visit in Medicare patient  (primary encounter diagnosis)  Comment: Advised updated shingles vaccination  Plan: CBC with platelets, Comprehensive metabolic         panel, Lipid Profile            (I10) Essential hypertension, benign  Comment: Stable on therapy continue with current medical management.  Plan: Comprehensive metabolic panel            (E78.5) Hyperlipidemia LDL goal <130  Comment: Labs ordered as fasting per routine  Plan: Lipid Profile            (I48.92) Atrial flutter, unspecified type (H)  Comment: Stable without any active complaints.  Continue with follow-up as directed per cardiology  Plan:     (D47.2) MGUS (monoclonal gammopathy of unknown significance)  Comment: Stable, following up with hematology as directed.  Recheck CBC  Plan: CBC with platelets            (M1A.9XX0) 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, unspecified cause, unspecified laterality  Comment: No evidence of recurrence.  Check uric acid level, continue with allopurinol.  Discussed dietary  Plan: Uric acid            (Z12.11) Screen for colon cancer  Comment: Prior colonoscopy reviewed.  Suggest  "fit test  Plan: Fecal colorectal cancer screen FIT              Patient has been advised of split billing requirements and indicates understanding: Yes      COUNSELING:  Reviewed preventive health counseling, as reflected in patient instructions       Regular exercise       Healthy diet/nutrition      BMI:   Estimated body mass index is 25.84 kg/m  as calculated from the following:    Height as of this encounter: 1.778 m (5' 10\").    Weight as of this encounter: 81.7 kg (180 lb 1.6 oz).       He reports that he has never smoked. He has never used smokeless tobacco.      Appropriate preventive services were discussed with this patient, including applicable screening as appropriate for cardiovascular disease, diabetes, osteopenia/osteoporosis, and glaucoma.  As appropriate for age/gender, discussed screening for colorectal cancer, prostate cancer, breast cancer, and cervical cancer. Checklist reviewing preventive services available has been given to the patient.    Reviewed patients plan of care and provided an AVS. The Basic Care Plan (routine screening as documented in Health Maintenance) for Mars meets the Care Plan requirement. This Care Plan has been established and reviewed with the Patient.      Pb Warren MD  [facility]    "

## 2023-03-04 PROCEDURE — 82274 ASSAY TEST FOR BLOOD FECAL: CPT | Performed by: INTERNAL MEDICINE

## 2023-03-06 LAB — HEMOCCULT STL QL IA: NEGATIVE

## 2023-03-15 ENCOUNTER — ANCILLARY PROCEDURE (OUTPATIENT)
Dept: CARDIOLOGY | Facility: CLINIC | Age: 83
End: 2023-03-15
Attending: INTERNAL MEDICINE
Payer: COMMERCIAL

## 2023-03-15 DIAGNOSIS — Z95.0 CARDIAC PACEMAKER IN SITU: ICD-10-CM

## 2023-03-15 DIAGNOSIS — I49.5 SICK SINUS SYNDROME (H): ICD-10-CM

## 2023-03-15 PROCEDURE — 93296 REM INTERROG EVL PM/IDS: CPT | Performed by: INTERNAL MEDICINE

## 2023-03-15 PROCEDURE — 93294 REM INTERROG EVL PM/LDLS PM: CPT | Performed by: INTERNAL MEDICINE

## 2023-03-20 LAB
MDC_IDC_EPISODE_DTM: NORMAL
MDC_IDC_EPISODE_DTM: NORMAL
MDC_IDC_EPISODE_DURATION: 0 S
MDC_IDC_EPISODE_DURATION: 1 S
MDC_IDC_EPISODE_ID: 21
MDC_IDC_EPISODE_ID: 22
MDC_IDC_EPISODE_TYPE: NORMAL
MDC_IDC_EPISODE_TYPE: NORMAL
MDC_IDC_LEAD_IMPLANT_DT: NORMAL
MDC_IDC_LEAD_IMPLANT_DT: NORMAL
MDC_IDC_LEAD_LOCATION: NORMAL
MDC_IDC_LEAD_LOCATION: NORMAL
MDC_IDC_LEAD_LOCATION_DETAIL_1: NORMAL
MDC_IDC_LEAD_LOCATION_DETAIL_1: NORMAL
MDC_IDC_LEAD_MFG: NORMAL
MDC_IDC_LEAD_MFG: NORMAL
MDC_IDC_LEAD_MODEL: NORMAL
MDC_IDC_LEAD_MODEL: NORMAL
MDC_IDC_LEAD_POLARITY_TYPE: NORMAL
MDC_IDC_LEAD_POLARITY_TYPE: NORMAL
MDC_IDC_LEAD_SERIAL: NORMAL
MDC_IDC_LEAD_SERIAL: NORMAL
MDC_IDC_MSMT_BATTERY_DTM: NORMAL
MDC_IDC_MSMT_BATTERY_REMAINING_LONGEVITY: 153 MO
MDC_IDC_MSMT_BATTERY_RRT_TRIGGER: 2.62
MDC_IDC_MSMT_BATTERY_STATUS: NORMAL
MDC_IDC_MSMT_BATTERY_VOLTAGE: 3.04 V
MDC_IDC_MSMT_LEADCHNL_RA_IMPEDANCE_VALUE: 323 OHM
MDC_IDC_MSMT_LEADCHNL_RA_IMPEDANCE_VALUE: 380 OHM
MDC_IDC_MSMT_LEADCHNL_RA_PACING_THRESHOLD_AMPLITUDE: 0.5 V
MDC_IDC_MSMT_LEADCHNL_RA_PACING_THRESHOLD_PULSEWIDTH: 0.4 MS
MDC_IDC_MSMT_LEADCHNL_RA_SENSING_INTR_AMPL: 3.88 MV
MDC_IDC_MSMT_LEADCHNL_RA_SENSING_INTR_AMPL: 3.88 MV
MDC_IDC_MSMT_LEADCHNL_RV_IMPEDANCE_VALUE: 285 OHM
MDC_IDC_MSMT_LEADCHNL_RV_IMPEDANCE_VALUE: 380 OHM
MDC_IDC_MSMT_LEADCHNL_RV_PACING_THRESHOLD_AMPLITUDE: 0.62 V
MDC_IDC_MSMT_LEADCHNL_RV_PACING_THRESHOLD_PULSEWIDTH: 0.4 MS
MDC_IDC_MSMT_LEADCHNL_RV_SENSING_INTR_AMPL: 7 MV
MDC_IDC_MSMT_LEADCHNL_RV_SENSING_INTR_AMPL: 7 MV
MDC_IDC_PG_IMPLANT_DTM: NORMAL
MDC_IDC_PG_MFG: NORMAL
MDC_IDC_PG_MODEL: NORMAL
MDC_IDC_PG_SERIAL: NORMAL
MDC_IDC_PG_TYPE: NORMAL
MDC_IDC_SESS_CLINIC_NAME: NORMAL
MDC_IDC_SESS_DTM: NORMAL
MDC_IDC_SESS_TYPE: NORMAL
MDC_IDC_SET_BRADY_AT_MODE_SWITCH_RATE: 171 {BEATS}/MIN
MDC_IDC_SET_BRADY_HYSTRATE: NORMAL
MDC_IDC_SET_BRADY_LOWRATE: 60 {BEATS}/MIN
MDC_IDC_SET_BRADY_MAX_SENSOR_RATE: 130 {BEATS}/MIN
MDC_IDC_SET_BRADY_MAX_TRACKING_RATE: 130 {BEATS}/MIN
MDC_IDC_SET_BRADY_MODE: NORMAL
MDC_IDC_SET_BRADY_PAV_DELAY_LOW: 300 MS
MDC_IDC_SET_BRADY_SAV_DELAY_LOW: 300 MS
MDC_IDC_SET_LEADCHNL_RA_PACING_AMPLITUDE: 1.5 V
MDC_IDC_SET_LEADCHNL_RA_PACING_ANODE_ELECTRODE_1: NORMAL
MDC_IDC_SET_LEADCHNL_RA_PACING_ANODE_LOCATION_1: NORMAL
MDC_IDC_SET_LEADCHNL_RA_PACING_CAPTURE_MODE: NORMAL
MDC_IDC_SET_LEADCHNL_RA_PACING_CATHODE_ELECTRODE_1: NORMAL
MDC_IDC_SET_LEADCHNL_RA_PACING_CATHODE_LOCATION_1: NORMAL
MDC_IDC_SET_LEADCHNL_RA_PACING_POLARITY: NORMAL
MDC_IDC_SET_LEADCHNL_RA_PACING_PULSEWIDTH: 0.4 MS
MDC_IDC_SET_LEADCHNL_RA_SENSING_ANODE_ELECTRODE_1: NORMAL
MDC_IDC_SET_LEADCHNL_RA_SENSING_ANODE_LOCATION_1: NORMAL
MDC_IDC_SET_LEADCHNL_RA_SENSING_CATHODE_ELECTRODE_1: NORMAL
MDC_IDC_SET_LEADCHNL_RA_SENSING_CATHODE_LOCATION_1: NORMAL
MDC_IDC_SET_LEADCHNL_RA_SENSING_POLARITY: NORMAL
MDC_IDC_SET_LEADCHNL_RA_SENSING_SENSITIVITY: 0.3 MV
MDC_IDC_SET_LEADCHNL_RV_PACING_AMPLITUDE: 2 V
MDC_IDC_SET_LEADCHNL_RV_PACING_ANODE_ELECTRODE_1: NORMAL
MDC_IDC_SET_LEADCHNL_RV_PACING_ANODE_LOCATION_1: NORMAL
MDC_IDC_SET_LEADCHNL_RV_PACING_CAPTURE_MODE: NORMAL
MDC_IDC_SET_LEADCHNL_RV_PACING_CATHODE_ELECTRODE_1: NORMAL
MDC_IDC_SET_LEADCHNL_RV_PACING_CATHODE_LOCATION_1: NORMAL
MDC_IDC_SET_LEADCHNL_RV_PACING_POLARITY: NORMAL
MDC_IDC_SET_LEADCHNL_RV_PACING_PULSEWIDTH: 0.4 MS
MDC_IDC_SET_LEADCHNL_RV_SENSING_ANODE_ELECTRODE_1: NORMAL
MDC_IDC_SET_LEADCHNL_RV_SENSING_ANODE_LOCATION_1: NORMAL
MDC_IDC_SET_LEADCHNL_RV_SENSING_CATHODE_ELECTRODE_1: NORMAL
MDC_IDC_SET_LEADCHNL_RV_SENSING_CATHODE_LOCATION_1: NORMAL
MDC_IDC_SET_LEADCHNL_RV_SENSING_POLARITY: NORMAL
MDC_IDC_SET_LEADCHNL_RV_SENSING_SENSITIVITY: 0.9 MV
MDC_IDC_SET_ZONE_DETECTION_INTERVAL: 350 MS
MDC_IDC_SET_ZONE_DETECTION_INTERVAL: 400 MS
MDC_IDC_SET_ZONE_TYPE: NORMAL
MDC_IDC_STAT_AT_BURDEN_PERCENT: 0 %
MDC_IDC_STAT_AT_DTM_END: NORMAL
MDC_IDC_STAT_AT_DTM_START: NORMAL
MDC_IDC_STAT_BRADY_AP_VP_PERCENT: 7.75 %
MDC_IDC_STAT_BRADY_AP_VS_PERCENT: 9.6 %
MDC_IDC_STAT_BRADY_AS_VP_PERCENT: 26.96 %
MDC_IDC_STAT_BRADY_AS_VS_PERCENT: 55.69 %
MDC_IDC_STAT_BRADY_DTM_END: NORMAL
MDC_IDC_STAT_BRADY_DTM_START: NORMAL
MDC_IDC_STAT_BRADY_RA_PERCENT_PACED: 16.98 %
MDC_IDC_STAT_BRADY_RV_PERCENT_PACED: 34.71 %
MDC_IDC_STAT_EPISODE_RECENT_COUNT: 0
MDC_IDC_STAT_EPISODE_RECENT_COUNT: 2
MDC_IDC_STAT_EPISODE_RECENT_COUNT_DTM_END: NORMAL
MDC_IDC_STAT_EPISODE_RECENT_COUNT_DTM_START: NORMAL
MDC_IDC_STAT_EPISODE_TOTAL_COUNT: 0
MDC_IDC_STAT_EPISODE_TOTAL_COUNT: 0
MDC_IDC_STAT_EPISODE_TOTAL_COUNT: 1
MDC_IDC_STAT_EPISODE_TOTAL_COUNT: 1
MDC_IDC_STAT_EPISODE_TOTAL_COUNT: 20
MDC_IDC_STAT_EPISODE_TOTAL_COUNT_DTM_END: NORMAL
MDC_IDC_STAT_EPISODE_TOTAL_COUNT_DTM_START: NORMAL
MDC_IDC_STAT_EPISODE_TYPE: NORMAL

## 2023-04-26 ENCOUNTER — TELEPHONE (OUTPATIENT)
Dept: CARDIOLOGY | Facility: CLINIC | Age: 83
End: 2023-04-26

## 2023-04-26 ENCOUNTER — DOCUMENTATION ONLY (OUTPATIENT)
Dept: CARDIOLOGY | Facility: CLINIC | Age: 83
End: 2023-04-26

## 2023-04-26 NOTE — TELEPHONE ENCOUNTER
Pt called stating he had an episode of chest pain on Sunday and ended up going to the ER. Pt felt the CP on left side of chest. Radiated across chest up to shoulder. R/o for ACS and sent home. Pt is currently in [place] and returns in 2 weeks. Pt just wanted to let his providers know.     Pt also states he received a message from [employer] stating his conrad on his phone is not connected to PPM. Message states he hasn't had conrad connected for the last 4 days. Pt requesting help with this.     Spoke with device nurse and they advised pt should call device nurses to troubleshoot the conrad. Since pt had sxs over the weekend and last night advising a courtesy PPM check. Routing FYI to device nurses and last provider to see pt. ZORAN Rosales

## 2023-04-26 NOTE — TELEPHONE ENCOUNTER
"Jolene Bose, APRN CNP  You       \"Reviewed records of ER evaluation.   Negative for ACS.   Will defer PPM evaluation to the device clinic.   Recommend patient follow up with cardiology upon his return.     Thanks   Jolene \"       "

## 2023-04-27 NOTE — TELEPHONE ENCOUNTER
Message was routed to the Device Techs who were able to get home monitor reconnected.  Remote device check scheduled for 6/21/23.      ZORAN Bradley

## 2023-05-01 NOTE — TELEPHONE ENCOUNTER
Request received from ZORAN Yusuf, to see if patient had had a courtesy check completed when monitor was reconnected.  Courtesy check was completed on 4/26/23 with documentation in the [employer] system as follows:     Courtesy check: pt called in with reports of pain near device site (starts in shoulder then progresses across chest). [place] was see in [place] in [place]. Full cardiac workup was completed, nothing was found. Pt wanted to make sure things on device looked ok.    Lead measurements stable  2 ventricular high rates for SVT noted in March.  Notified pt of results, he has already contacted Dr Iglesias' team with regards to symptoms.     ROSA Kamara    Will route update to ZORAN Yusuf.      ZORAN Bradley

## 2023-05-10 ENCOUNTER — DOCUMENTATION ONLY (OUTPATIENT)
Dept: CARDIOLOGY | Facility: CLINIC | Age: 83
End: 2023-05-10
Payer: COMMERCIAL

## 2023-05-17 NOTE — PROGRESS NOTES
[facility] received from [facility] after patient presented to their ED for chest pain. PPM interrogated, results as below:    Presenting Rhythm: AP/AS-VS 60-80s w/ PACs  Battery Status: estimated 12.6 years remaining until RRT  Leads: stable    Mode: AAIR-DDDR     AP: 18.9%;   : 55.6%    Episodes: 2 HVR episodes logged on 3/23 and 3/25. EGMs show 1:1 AV conduction w/ no morphology change for SVT. No recent episodes logged.   Heart Rate: stable with excellent variability from 60-100s per histogram      ZORAN Boggs

## 2023-05-24 ENCOUNTER — HOSPITAL ENCOUNTER (OUTPATIENT)
Dept: CARDIOLOGY | Facility: CLINIC | Age: 83
Discharge: HOME | End: 2023-05-24
Attending: NURSE PRACTITIONER | Admitting: NURSE PRACTITIONER
Payer: COMMERCIAL

## 2023-05-24 DIAGNOSIS — I35.0 SEVERE AORTIC STENOSIS: ICD-10-CM

## 2023-05-24 LAB — LVEF ECHO: NORMAL

## 2023-05-24 PROCEDURE — 93306 TTE W/DOPPLER COMPLETE: CPT

## 2023-05-24 PROCEDURE — 93306 TTE W/DOPPLER COMPLETE: CPT | Mod: 26 | Performed by: INTERNAL MEDICINE

## 2023-05-31 NOTE — PROGRESS NOTES
CARDIOLOGY VISIT    REASON FOR VISIT: TAVR, flutter, pacemaker    SUBJECTIVE:  82-year-old male seen for TAVR, paroxysmal SVT, history of atrial flutter, pacemaker.    2021 he underwent TAVR with 29 mm Medtronic evolute pro valve.  Postop he required pacemaker implantation.    Echo 2022 showed EF 60%, TAVR with mean 5 mmHg, V-max 1.3 m/s, trace PVL.    Device check March 2023 showed 17% a paced, 35% V paced, mode switch less than 1% of the time, battery 13 years.    Echo May 2023 showed EF 60%, TAVR with mean 5 mmHg, no pericardial effusion    Overall he has been doing well.  He goes to the gym and feels quite well.  Several weeks ago while in [place] he presented with a few weeks of fairly constant chest pain.  He had a large pericardial effusion requiring pericardiocentesis, 400 cc removed.  In review of records, he had extensive evaluation and no clear etiology was found.  There is no evidence of infection or malignancy.  This was deemed idiopathic.    He now feels a little fatigued, but back to normal otherwise.  He has been off his Eliquis the past few weeks.    MEDICATIONS:  Current Outpatient Medications   Medication     acetaminophen (TYLENOL) 325 MG tablet     allopurinol (ZYLOPRIM) 100 MG tablet     amoxicillin (AMOXIL) 500 MG tablet     ELIQUIS ANTICOAGULANT 5 MG tablet     lisinopril (ZESTRIL) 20 MG tablet     pravastatin (PRAVACHOL) 20 MG tablet     No current facility-administered medications for this visit.       ALLERGIES:  No Known Allergies    REVIEW OF SYSTEMS:  Constitutional:  No weight loss, fever, chills  HEENT:  Eyes:  No visual loss, blurred vision, double vision or yellow sclerae. No hearing loss, sneezing, congestion, runny nose or sore throat.  Skin:  No rash or itching.  Cardiovascular: per HPI  Respiratory: per HPI  GI:  No anorexia, nausea, vomiting or diarrhea. No abdominal pain or blood.  :  No dysurea, hematuria  Neurologic:  No headache, paralysis, ataxia, numbness or tingling  "in the extremities. No change in bowel or bladder control.  Musculoskeletal:  No muscle pain  Hematologic:  No bleeding or bruising.  Lymphatics:  No enlarged nodes. No history of splenectomy.  Endocrine:  No reports of sweating, cold or heat intolerance. No polyuria or polydipsia.  Allergies:  No history of asthma, hives, eczema or rhinitis.    PHYSICAL EXAM:  BP (!) 154/72 (BP Location: Right arm, Patient Position: Sitting, Cuff Size: Adult Regular)   Pulse 65   Ht 1.778 m (5' 10\")   Wt 82.6 kg (182 lb)   SpO2 98%   BMI 26.11 kg/m      Constitutional: awake, alert, no distress  Eyes: PERRL, sclera nonicteric  ENT: trachea midline  Respiratory: Lungs clear  Cardiovascular: Regular rate and rhythm, 2/6 systolic murmur  GI: nondistended, nontender, bowel sounds present  Lymph/Hematologic: no lymphadenopathy  Skin: dry, no rash  Musculoskeletal: good muscle tone, strength 5/5 in upper and lower extremities  Neurologic: no focal deficits  Neuropsychiatric: appropriate affact    DATA:  Lab: February 2023: Creatinine 0.9  Recent Labs   Lab Test 02/28/23  1026 07/28/22  0930   CHOL 147 120   HDL 42 44   LDL 79 57   TRIG 131 95     ASSESSMENT:  82-year-old male seen for TAVR, atrial flutter, and pacemaker.  We mostly talked about the recent pericardial effusion.  There is no clear etiology.  It sounds like he may have had a pericarditis based on the nature of his chest pain symptoms.  He is now symptom-free.  Echo a few days ago showed no reaccumulation of the pericardial effusion.  He will restart the Eliquis, since it seems like this was not a hemorrhagic effusion.    RECOMMENDATIONS:  1.  Recent pericardial effusion, suspect pericarditis  -Limited echo in about 4 weeks    2.  TAVR  -Annual echoes    3.  Atrial flutter  -Resume Eliquis    4.  Pacemaker  -Continue routine device checks    Follow-up in about 4 months.  He will be going back to [place] around October.    Brandt Iglesias MD  Cardiology - [facility] " Heart  Pager:  [phone]  Text Page  June 2, 2023

## 2023-06-02 ENCOUNTER — OFFICE VISIT (OUTPATIENT)
Dept: CARDIOLOGY | Facility: CLINIC | Age: 83
End: 2023-06-02
Payer: COMMERCIAL

## 2023-06-02 VITALS
SYSTOLIC BLOOD PRESSURE: 154 MMHG | OXYGEN SATURATION: 98 % | HEART RATE: 65 BPM | BODY MASS INDEX: 26.05 KG/M2 | WEIGHT: 182 LBS | HEIGHT: 70 IN | DIASTOLIC BLOOD PRESSURE: 72 MMHG

## 2023-06-02 DIAGNOSIS — I31.39 PERICARDIAL EFFUSION: ICD-10-CM

## 2023-06-02 DIAGNOSIS — Z95.2 S/P TAVR (TRANSCATHETER AORTIC VALVE REPLACEMENT): Primary | ICD-10-CM

## 2023-06-02 PROCEDURE — 99214 OFFICE O/P EST MOD 30 MIN: CPT | Performed by: INTERNAL MEDICINE

## 2023-06-02 NOTE — LETTER
6/2/2023    Pb Warren MD  [address] [facility] [address]    RE: Mars Reyes       Dear Colleague,     I had the pleasure of seeing Mars Reyes in the [facility].  CARDIOLOGY VISIT    REASON FOR VISIT: TAVR, flutter, pacemaker    SUBJECTIVE:  82-year-old male seen for TAVR, paroxysmal SVT, history of atrial flutter, pacemaker.    2021 he underwent TAVR with 29 mm Medtronic evolute pro valve.  Postop he required pacemaker implantation.    Echo 2022 showed EF 60%, TAVR with mean 5 mmHg, V-max 1.3 m/s, trace PVL.    Device check March 2023 showed 17% a paced, 35% V paced, mode switch less than 1% of the time, battery 13 years.    Echo May 2023 showed EF 60%, TAVR with mean 5 mmHg, no pericardial effusion    Overall he has been doing well.  He goes to the gym and feels quite well.  Several weeks ago while in [place] he presented with a few weeks of fairly constant chest pain.  He had a large pericardial effusion requiring pericardiocentesis, 400 cc removed.  In review of records, he had extensive evaluation and no clear etiology was found.  There is no evidence of infection or malignancy.  This was deemed idiopathic.    He now feels a little fatigued, but back to normal otherwise.  He has been off his Eliquis the past few weeks.    MEDICATIONS:  Current Outpatient Medications   Medication    acetaminophen (TYLENOL) 325 MG tablet    allopurinol (ZYLOPRIM) 100 MG tablet    amoxicillin (AMOXIL) 500 MG tablet    ELIQUIS ANTICOAGULANT 5 MG tablet    lisinopril (ZESTRIL) 20 MG tablet    pravastatin (PRAVACHOL) 20 MG tablet     No current facility-administered medications for this visit.       ALLERGIES:  No Known Allergies    REVIEW OF SYSTEMS:  Constitutional:  No weight loss, fever, chills  HEENT:  Eyes:  No visual loss, blurred vision, double vision or yellow sclerae. No hearing loss, sneezing, congestion, runny nose or sore throat.  Skin:  No rash or itching.  Cardiovascular: per  "HPI  Respiratory: per HPI  GI:  No anorexia, nausea, vomiting or diarrhea. No abdominal pain or blood.  :  No dysurea, hematuria  Neurologic:  No headache, paralysis, ataxia, numbness or tingling in the extremities. No change in bowel or bladder control.  Musculoskeletal:  No muscle pain  Hematologic:  No bleeding or bruising.  Lymphatics:  No enlarged nodes. No history of splenectomy.  Endocrine:  No reports of sweating, cold or heat intolerance. No polyuria or polydipsia.  Allergies:  No history of asthma, hives, eczema or rhinitis.    PHYSICAL EXAM:  BP (!) 154/72 (BP Location: Right arm, Patient Position: Sitting, Cuff Size: Adult Regular)   Pulse 65   Ht 1.778 m (5' 10\")   Wt 82.6 kg (182 lb)   SpO2 98%   BMI 26.11 kg/m      Constitutional: awake, alert, no distress  Eyes: PERRL, sclera nonicteric  ENT: trachea midline  Respiratory: Lungs clear  Cardiovascular: Regular rate and rhythm, 2/6 systolic murmur  GI: nondistended, nontender, bowel sounds present  Lymph/Hematologic: no lymphadenopathy  Skin: dry, no rash  Musculoskeletal: good muscle tone, strength 5/5 in upper and lower extremities  Neurologic: no focal deficits  Neuropsychiatric: appropriate affact    DATA:  Lab: February 2023: Creatinine 0.9  Recent Labs   Lab Test 02/28/23  1026 07/28/22  0930   CHOL 147 120   HDL 42 44   LDL 79 57   TRIG 131 95     ASSESSMENT:  82-year-old male seen for TAVR, atrial flutter, and pacemaker.  We mostly talked about the recent pericardial effusion.  There is no clear etiology.  It sounds like he may have had a pericarditis based on the nature of his chest pain symptoms.  He is now symptom-free.  Echo a few days ago showed no reaccumulation of the pericardial effusion.  He will restart the Eliquis, since it seems like this was not a hemorrhagic effusion.    RECOMMENDATIONS:  1.  Recent pericardial effusion, suspect pericarditis  -Limited echo in about 4 weeks    2.  TAVR  -Annual echoes    3.  Atrial " flutter  -Resume Eliquis    4.  Pacemaker  -Continue routine device checks    Follow-up in about 4 months.  He will be going back to [place] around October.    Brandt Iglesias MD  Cardiology - [facility]  Pager:  [phone]  Text Page  June 2, 2023          Thank you for allowing me to participate in the care of your patient.      Sincerely,     Brandt Iglesias MD     [facility]  cc:   No referring provider defined for this encounter.

## 2023-06-05 ENCOUNTER — TRANSFERRED RECORDS (OUTPATIENT)
Dept: HEALTH INFORMATION MANAGEMENT | Facility: CLINIC | Age: 83
End: 2023-06-05
Payer: COMMERCIAL

## 2023-06-07 NOTE — PROGRESS NOTES
[facility] received from [facility] after patient presented with generalized weakness, nausea and near-syncope found to have moderate to large pericardial effusion, elevated LFTts with GB wall thickening, hypotension repsonive to IVF and new normocytic anemia. Pacemaker interrogated, results as below:    Presenting Rhythm: AS-VS 70-80s w/ PACs  Battery Status: estimated 12.6 years remaining until RRT  Leads: stable    Mode: AAIR-DDDR     AP: 18.7%    : 26.3%    Episodes: 5.9% AF burden since 5/6/23. Patient has a known history of pAF.  Heart Rate: excellent variability from 60-100s per histogram    JORGE MEEHAN

## 2023-06-12 ENCOUNTER — DOCUMENTATION ONLY (OUTPATIENT)
Dept: CARDIOLOGY | Facility: CLINIC | Age: 83
End: 2023-06-12
Payer: COMMERCIAL

## 2023-06-12 NOTE — PROGRESS NOTES
Received MRI Cardiology Clearance form from [facility] MRI department.  Form completed, signed by MD, and faxed back to MRI at [phone]. Device is compatible.  JORGE MEEHAN

## 2023-06-20 ENCOUNTER — HOSPITAL ENCOUNTER (OUTPATIENT)
Facility: CLINIC | Age: 83
Discharge: HOME | End: 2023-06-20
Attending: ORTHOPAEDIC SURGERY | Admitting: ORTHOPAEDIC SURGERY
Payer: COMMERCIAL

## 2023-06-20 ENCOUNTER — HOSPITAL ENCOUNTER (OUTPATIENT)
Dept: MRI IMAGING | Facility: CLINIC | Age: 83
Discharge: HOME | End: 2023-06-20
Attending: ORTHOPAEDIC SURGERY
Payer: COMMERCIAL

## 2023-06-20 VITALS — OXYGEN SATURATION: 96 % | HEART RATE: 117 BPM

## 2023-06-20 DIAGNOSIS — M54.50 LOW BACK PAIN: ICD-10-CM

## 2023-06-20 PROCEDURE — 999N000154 HC STATISTIC RADIOLOGY XRAY, US, CT, MAR, NM

## 2023-06-20 PROCEDURE — 72148 MRI LUMBAR SPINE W/O DYE: CPT

## 2023-06-20 ASSESSMENT — ACTIVITIES OF DAILY LIVING (ADL): ADLS_ACUITY_SCORE: 35

## 2023-06-21 ENCOUNTER — ANCILLARY PROCEDURE (OUTPATIENT)
Dept: CARDIOLOGY | Facility: CLINIC | Age: 83
End: 2023-06-21
Attending: INTERNAL MEDICINE
Payer: COMMERCIAL

## 2023-06-21 DIAGNOSIS — I49.5 SICK SINUS SYNDROME (H): ICD-10-CM

## 2023-06-21 DIAGNOSIS — Z95.0 CARDIAC PACEMAKER IN SITU: ICD-10-CM

## 2023-06-21 PROCEDURE — 93296 REM INTERROG EVL PM/IDS: CPT | Performed by: INTERNAL MEDICINE

## 2023-06-21 PROCEDURE — 93294 REM INTERROG EVL PM/LDLS PM: CPT | Performed by: INTERNAL MEDICINE

## 2023-06-22 ENCOUNTER — TRANSFERRED RECORDS (OUTPATIENT)
Dept: HEALTH INFORMATION MANAGEMENT | Facility: CLINIC | Age: 83
End: 2023-06-22
Payer: COMMERCIAL

## 2023-06-28 LAB
MDC_IDC_LEAD_IMPLANT_DT: NORMAL
MDC_IDC_LEAD_IMPLANT_DT: NORMAL
MDC_IDC_LEAD_LOCATION: NORMAL
MDC_IDC_LEAD_LOCATION: NORMAL
MDC_IDC_LEAD_LOCATION_DETAIL_1: NORMAL
MDC_IDC_LEAD_LOCATION_DETAIL_1: NORMAL
MDC_IDC_LEAD_MFG: NORMAL
MDC_IDC_LEAD_MFG: NORMAL
MDC_IDC_LEAD_MODEL: NORMAL
MDC_IDC_LEAD_MODEL: NORMAL
MDC_IDC_LEAD_POLARITY_TYPE: NORMAL
MDC_IDC_LEAD_POLARITY_TYPE: NORMAL
MDC_IDC_LEAD_SERIAL: NORMAL
MDC_IDC_LEAD_SERIAL: NORMAL
MDC_IDC_MSMT_BATTERY_DTM: NORMAL
MDC_IDC_MSMT_BATTERY_REMAINING_LONGEVITY: 147 MO
MDC_IDC_MSMT_BATTERY_RRT_TRIGGER: 2.62
MDC_IDC_MSMT_BATTERY_STATUS: NORMAL
MDC_IDC_MSMT_BATTERY_VOLTAGE: 3.02 V
MDC_IDC_MSMT_LEADCHNL_RA_IMPEDANCE_VALUE: 323 OHM
MDC_IDC_MSMT_LEADCHNL_RA_IMPEDANCE_VALUE: 380 OHM
MDC_IDC_MSMT_LEADCHNL_RA_PACING_THRESHOLD_AMPLITUDE: 0.5 V
MDC_IDC_MSMT_LEADCHNL_RA_PACING_THRESHOLD_PULSEWIDTH: 0.4 MS
MDC_IDC_MSMT_LEADCHNL_RA_SENSING_INTR_AMPL: 2.88 MV
MDC_IDC_MSMT_LEADCHNL_RA_SENSING_INTR_AMPL: 2.88 MV
MDC_IDC_MSMT_LEADCHNL_RV_IMPEDANCE_VALUE: 285 OHM
MDC_IDC_MSMT_LEADCHNL_RV_IMPEDANCE_VALUE: 361 OHM
MDC_IDC_MSMT_LEADCHNL_RV_PACING_THRESHOLD_AMPLITUDE: 0.62 V
MDC_IDC_MSMT_LEADCHNL_RV_PACING_THRESHOLD_PULSEWIDTH: 0.4 MS
MDC_IDC_MSMT_LEADCHNL_RV_SENSING_INTR_AMPL: 10.75 MV
MDC_IDC_MSMT_LEADCHNL_RV_SENSING_INTR_AMPL: 10.75 MV
MDC_IDC_PG_IMPLANT_DTM: NORMAL
MDC_IDC_PG_MFG: NORMAL
MDC_IDC_PG_MODEL: NORMAL
MDC_IDC_PG_SERIAL: NORMAL
MDC_IDC_PG_TYPE: NORMAL
MDC_IDC_SESS_CLINIC_NAME: NORMAL
MDC_IDC_SESS_DTM: NORMAL
MDC_IDC_SESS_TYPE: NORMAL
MDC_IDC_SET_BRADY_AT_MODE_SWITCH_RATE: 171 {BEATS}/MIN
MDC_IDC_SET_BRADY_HYSTRATE: NORMAL
MDC_IDC_SET_BRADY_LOWRATE: 60 {BEATS}/MIN
MDC_IDC_SET_BRADY_MAX_SENSOR_RATE: 130 {BEATS}/MIN
MDC_IDC_SET_BRADY_MAX_TRACKING_RATE: 130 {BEATS}/MIN
MDC_IDC_SET_BRADY_MODE: NORMAL
MDC_IDC_SET_BRADY_PAV_DELAY_LOW: 300 MS
MDC_IDC_SET_BRADY_SAV_DELAY_LOW: 300 MS
MDC_IDC_SET_LEADCHNL_RA_PACING_AMPLITUDE: 1.75 V
MDC_IDC_SET_LEADCHNL_RA_PACING_ANODE_ELECTRODE_1: NORMAL
MDC_IDC_SET_LEADCHNL_RA_PACING_ANODE_LOCATION_1: NORMAL
MDC_IDC_SET_LEADCHNL_RA_PACING_CAPTURE_MODE: NORMAL
MDC_IDC_SET_LEADCHNL_RA_PACING_CATHODE_ELECTRODE_1: NORMAL
MDC_IDC_SET_LEADCHNL_RA_PACING_CATHODE_LOCATION_1: NORMAL
MDC_IDC_SET_LEADCHNL_RA_PACING_POLARITY: NORMAL
MDC_IDC_SET_LEADCHNL_RA_PACING_PULSEWIDTH: 0.4 MS
MDC_IDC_SET_LEADCHNL_RA_SENSING_ANODE_ELECTRODE_1: NORMAL
MDC_IDC_SET_LEADCHNL_RA_SENSING_ANODE_LOCATION_1: NORMAL
MDC_IDC_SET_LEADCHNL_RA_SENSING_CATHODE_ELECTRODE_1: NORMAL
MDC_IDC_SET_LEADCHNL_RA_SENSING_CATHODE_LOCATION_1: NORMAL
MDC_IDC_SET_LEADCHNL_RA_SENSING_POLARITY: NORMAL
MDC_IDC_SET_LEADCHNL_RA_SENSING_SENSITIVITY: 0.3 MV
MDC_IDC_SET_LEADCHNL_RV_PACING_AMPLITUDE: 2 V
MDC_IDC_SET_LEADCHNL_RV_PACING_ANODE_ELECTRODE_1: NORMAL
MDC_IDC_SET_LEADCHNL_RV_PACING_ANODE_LOCATION_1: NORMAL
MDC_IDC_SET_LEADCHNL_RV_PACING_CAPTURE_MODE: NORMAL
MDC_IDC_SET_LEADCHNL_RV_PACING_CATHODE_ELECTRODE_1: NORMAL
MDC_IDC_SET_LEADCHNL_RV_PACING_CATHODE_LOCATION_1: NORMAL
MDC_IDC_SET_LEADCHNL_RV_PACING_POLARITY: NORMAL
MDC_IDC_SET_LEADCHNL_RV_PACING_PULSEWIDTH: 0.4 MS
MDC_IDC_SET_LEADCHNL_RV_SENSING_ANODE_ELECTRODE_1: NORMAL
MDC_IDC_SET_LEADCHNL_RV_SENSING_ANODE_LOCATION_1: NORMAL
MDC_IDC_SET_LEADCHNL_RV_SENSING_CATHODE_ELECTRODE_1: NORMAL
MDC_IDC_SET_LEADCHNL_RV_SENSING_CATHODE_LOCATION_1: NORMAL
MDC_IDC_SET_LEADCHNL_RV_SENSING_POLARITY: NORMAL
MDC_IDC_SET_LEADCHNL_RV_SENSING_SENSITIVITY: 0.9 MV
MDC_IDC_SET_ZONE_DETECTION_INTERVAL: 350 MS
MDC_IDC_SET_ZONE_DETECTION_INTERVAL: 400 MS
MDC_IDC_SET_ZONE_TYPE: NORMAL
MDC_IDC_STAT_AT_BURDEN_PERCENT: 0 %
MDC_IDC_STAT_AT_DTM_END: NORMAL
MDC_IDC_STAT_AT_DTM_START: NORMAL
MDC_IDC_STAT_BRADY_AP_VP_PERCENT: 3.28 %
MDC_IDC_STAT_BRADY_AP_VS_PERCENT: 1.9 %
MDC_IDC_STAT_BRADY_AS_VP_PERCENT: 72.37 %
MDC_IDC_STAT_BRADY_AS_VS_PERCENT: 22.45 %
MDC_IDC_STAT_BRADY_DTM_END: NORMAL
MDC_IDC_STAT_BRADY_DTM_START: NORMAL
MDC_IDC_STAT_BRADY_RA_PERCENT_PACED: 5.32 %
MDC_IDC_STAT_BRADY_RV_PERCENT_PACED: 75.65 %
MDC_IDC_STAT_EPISODE_RECENT_COUNT: 0
MDC_IDC_STAT_EPISODE_RECENT_COUNT_DTM_END: NORMAL
MDC_IDC_STAT_EPISODE_RECENT_COUNT_DTM_START: NORMAL
MDC_IDC_STAT_EPISODE_TOTAL_COUNT: 0
MDC_IDC_STAT_EPISODE_TOTAL_COUNT: 0
MDC_IDC_STAT_EPISODE_TOTAL_COUNT: 1
MDC_IDC_STAT_EPISODE_TOTAL_COUNT: 25
MDC_IDC_STAT_EPISODE_TOTAL_COUNT: 28
MDC_IDC_STAT_EPISODE_TOTAL_COUNT_DTM_END: NORMAL
MDC_IDC_STAT_EPISODE_TOTAL_COUNT_DTM_START: NORMAL
MDC_IDC_STAT_EPISODE_TYPE: NORMAL

## 2023-07-03 ENCOUNTER — HOSPITAL ENCOUNTER (OUTPATIENT)
Dept: CARDIOLOGY | Facility: CLINIC | Age: 83
Discharge: HOME | End: 2023-07-03
Attending: INTERNAL MEDICINE | Admitting: INTERNAL MEDICINE
Payer: COMMERCIAL

## 2023-07-03 DIAGNOSIS — I31.39 PERICARDIAL EFFUSION: ICD-10-CM

## 2023-07-03 DIAGNOSIS — Z95.2 S/P TAVR (TRANSCATHETER AORTIC VALVE REPLACEMENT): ICD-10-CM

## 2023-07-03 LAB — LVEF ECHO: NORMAL

## 2023-07-03 PROCEDURE — 93325 DOPPLER ECHO COLOR FLOW MAPG: CPT

## 2023-07-03 PROCEDURE — 93321 DOPPLER ECHO F-UP/LMTD STD: CPT

## 2023-07-03 PROCEDURE — 93308 TTE F-UP OR LMTD: CPT | Mod: 26 | Performed by: INTERNAL MEDICINE

## 2023-07-03 PROCEDURE — 93325 DOPPLER ECHO COLOR FLOW MAPG: CPT | Mod: 26 | Performed by: INTERNAL MEDICINE

## 2023-07-03 PROCEDURE — 93321 DOPPLER ECHO F-UP/LMTD STD: CPT | Mod: 26 | Performed by: INTERNAL MEDICINE

## 2023-07-22 DIAGNOSIS — M10.9 GOUT, UNSPECIFIED CAUSE, UNSPECIFIED CHRONICITY, UNSPECIFIED SITE: ICD-10-CM

## 2023-07-24 RX ORDER — ALLOPURINOL 100 MG/1
TABLET ORAL
Qty: 90 TABLET | Refills: 1 | Status: SHIPPED | OUTPATIENT
Start: 2023-07-24 | End: 2024-02-07

## 2023-07-26 ENCOUNTER — TELEPHONE (OUTPATIENT)
Dept: ONCOLOGY | Facility: CLINIC | Age: 83
End: 2023-07-26

## 2023-07-26 ENCOUNTER — DOCUMENTATION ONLY (OUTPATIENT)
Dept: ONCOLOGY | Facility: CLINIC | Age: 83
End: 2023-07-26

## 2023-07-26 ENCOUNTER — LAB (OUTPATIENT)
Dept: ONCOLOGY | Facility: CLINIC | Age: 83
End: 2023-07-26
Attending: INTERNAL MEDICINE
Payer: COMMERCIAL

## 2023-07-26 DIAGNOSIS — D69.6 THROMBOCYTOPENIA (H): ICD-10-CM

## 2023-07-26 DIAGNOSIS — D47.2 MGUS (MONOCLONAL GAMMOPATHY OF UNKNOWN SIGNIFICANCE): ICD-10-CM

## 2023-07-26 DIAGNOSIS — D47.2 MGUS (MONOCLONAL GAMMOPATHY OF UNKNOWN SIGNIFICANCE): Primary | ICD-10-CM

## 2023-07-26 LAB
ANION GAP SERPL CALCULATED.3IONS-SCNC: 10 MMOL/L (ref 7–15)
BUN SERPL-MCNC: 17.9 MG/DL (ref 8–23)
CALCIUM SERPL-MCNC: 9.5 MG/DL (ref 8.8–10.2)
CHLORIDE SERPL-SCNC: 96 MMOL/L (ref 98–107)
CREAT SERPL-MCNC: 0.92 MG/DL (ref 0.67–1.17)
DEPRECATED HCO3 PLAS-SCNC: 26 MMOL/L (ref 22–29)
ERYTHROCYTE [DISTWIDTH] IN BLOOD BY AUTOMATED COUNT: 13.5 % (ref 10–15)
GFR SERPL CREATININE-BSD FRML MDRD: 84 ML/MIN/1.73M2
GLUCOSE SERPL-MCNC: 130 MG/DL (ref 70–99)
HCT VFR BLD AUTO: 41.3 % (ref 40–53)
HGB BLD-MCNC: 13.3 G/DL (ref 13.3–17.7)
MCH RBC QN AUTO: 27.7 PG (ref 26.5–33)
MCHC RBC AUTO-ENTMCNC: 32.2 G/DL (ref 31.5–36.5)
MCV RBC AUTO: 86 FL (ref 78–100)
PLATELET # BLD AUTO: 48 10E3/UL (ref 150–450)
POTASSIUM SERPL-SCNC: 4.8 MMOL/L (ref 3.4–5.3)
RBC # BLD AUTO: 4.81 10E6/UL (ref 4.4–5.9)
SODIUM SERPL-SCNC: 132 MMOL/L (ref 136–145)
TOTAL PROTEIN SERUM FOR ELP: 7.3 G/DL (ref 6.4–8.3)
WBC # BLD AUTO: 6.3 10E3/UL (ref 4–11)

## 2023-07-26 PROCEDURE — 85027 COMPLETE CBC AUTOMATED: CPT | Performed by: INTERNAL MEDICINE

## 2023-07-26 PROCEDURE — 84155 ASSAY OF PROTEIN SERUM: CPT | Performed by: INTERNAL MEDICINE

## 2023-07-26 PROCEDURE — 84165 PROTEIN E-PHORESIS SERUM: CPT | Mod: TC | Performed by: PATHOLOGY

## 2023-07-26 PROCEDURE — 82784 ASSAY IGA/IGD/IGG/IGM EACH: CPT | Performed by: INTERNAL MEDICINE

## 2023-07-26 PROCEDURE — 80048 BASIC METABOLIC PNL TOTAL CA: CPT | Performed by: INTERNAL MEDICINE

## 2023-07-26 PROCEDURE — 83521 IG LIGHT CHAINS FREE EACH: CPT | Performed by: INTERNAL MEDICINE

## 2023-07-26 PROCEDURE — 84165 PROTEIN E-PHORESIS SERUM: CPT | Mod: 26

## 2023-07-26 PROCEDURE — 36415 COLL VENOUS BLD VENIPUNCTURE: CPT

## 2023-07-26 NOTE — TELEPHONE ENCOUNTER
Critical Lab Report    Situation:   Lab is reporting the following lab values: Platelets today 48    Background/ Assessment:   Treating Provider:   Dr. Mayer    Date of last office visit: 7/13/22    Recent Labs: CBC RESULTS:   Recent Labs   Lab Test 07/26/23  1041   WBC 6.3   RBC 4.81   HGB 13.3   HCT 41.3   MCV 86   MCH 27.7   MCHC 32.2   RDW 13.5   PLT 48*     Per chart review, previous platelet from outside lab on 5/13/23 was 125. Patient is scheduled for follow up on 8/2/23.    Recommendations:   Care team notification: Routing to care team for further review and recommendations.    Starla Petersen, RN, BSN, PHN, OCN  [facility]

## 2023-07-26 NOTE — PROGRESS NOTES
I was informed by triage nurse that platelet count today is 48  Patient follows with Dr. Mayer for chronic thrombocytopenia   platelet count in the 125-54 range  Patient is scheduled to see Dr. Mayer on August 2    Reviewed with  in Dr. Mayer's  absence  Per Dr. 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 Okay to wait until August 2nd to start treatment. Only we would confirm to ensure that it is accurate     I Advised the nurse to call patient and inform of him of  the platelet count above and plan for repeat of blood draw on Friday  Advised nurse to inform patient to monitor closely for bleeding bruising fall or injury.  In the event of bleeding bruising fall injury especially head injury call 911 or go to ER immediately    ALEXANDRA Petersen CNP

## 2023-07-26 NOTE — PROGRESS NOTES
Medical Assistant Note:  Mars Reyes presents today for blood draw.    Patient seen by provider today: No.   present during visit today: Not Applicable.    Concerns: No Concerns.    Procedure:  Lab draw site: left arm, Needle type: butterfly, Gauge: 23.    Post Assessment:  Labs drawn without difficulty: Yes.    Discharge Plan:  Departure Mode: Ambulatory.    Face to Face Time: 10.    Zeke Ray CMA

## 2023-07-26 NOTE — TELEPHONE ENCOUNTER
Writer called patient to inform him of the message below, left a message to call back.    Starla Petersen, RN, BSN, PHN  [facility]     Bird Lozano APRN CNP  You; Pino Mayer MD; Thais Saxena RN24 minutes ago (12:58 PM)     GK    Please schedule for CBC recheck and peripheral blood smear on Friday  Also call patient and let him know the platelet count is 48  and in the event of bleeding bruising fall or injury has to go to the ER

## 2023-07-26 NOTE — TELEPHONE ENCOUNTER
Pt returned triage phone call.  Reviewed Bird's recommendations with patient as stated below:       Bird Lozano, ALEXANDRA CNP  You; Pino Mayer MD; Thais Saxena RN24 minutes ago (12:58 PM)      GK    Please schedule for CBC recheck and peripheral blood smear on Friday  Also call patient and let him know the platelet count is 48  and in the event of bleeding bruising fall or injury has to go to the ER     Pt voiced understanding instructions and is willing to come in on Friday 7/28 for repeat blood draw.  Transferred patient to [facility] Scheduling to find a time that works best for him to come in for blood draw on 7/28.  No further questions at this time.  Pt aware he would need to go to ED in event of bleeding, bruising, fall or injury.    Sandi Sifuentes, RN, BSN    Triage Nurse Advisor  [facility]/[place]/Amira Pruitt  [phone]     20-Mar-2018 12:25

## 2023-07-27 LAB
ALBUMIN SERPL ELPH-MCNC: 4.1 G/DL (ref 3.7–5.1)
ALPHA1 GLOB SERPL ELPH-MCNC: 0.3 G/DL (ref 0.2–0.4)
ALPHA2 GLOB SERPL ELPH-MCNC: 0.7 G/DL (ref 0.5–0.9)
B-GLOBULIN SERPL ELPH-MCNC: 0.9 G/DL (ref 0.6–1)
GAMMA GLOB SERPL ELPH-MCNC: 1.1 G/DL (ref 0.7–1.6)
IGG SERPL-MCNC: 1178 MG/DL (ref 610–1616)
KAPPA LC FREE SER-MCNC: 2.96 MG/DL (ref 0.33–1.94)
KAPPA LC FREE/LAMBDA FREE SER NEPH: 1.4 {RATIO} (ref 0.26–1.65)
LAMBDA LC FREE SERPL-MCNC: 2.11 MG/DL (ref 0.57–2.63)
M PROTEIN SERPL ELPH-MCNC: 0.1 G/DL
PROT PATTERN SERPL ELPH-IMP: ABNORMAL

## 2023-07-28 ENCOUNTER — ONCOLOGY VISIT (OUTPATIENT)
Dept: ONCOLOGY | Facility: CLINIC | Age: 83
End: 2023-07-28
Attending: PHYSICIAN ASSISTANT
Payer: COMMERCIAL

## 2023-07-28 ENCOUNTER — LAB (OUTPATIENT)
Dept: ONCOLOGY | Facility: CLINIC | Age: 83
End: 2023-07-28
Attending: INTERNAL MEDICINE
Payer: COMMERCIAL

## 2023-07-28 VITALS
HEIGHT: 70 IN | OXYGEN SATURATION: 98 % | WEIGHT: 180 LBS | BODY MASS INDEX: 25.77 KG/M2 | SYSTOLIC BLOOD PRESSURE: 118 MMHG | HEART RATE: 79 BPM | RESPIRATION RATE: 14 BRPM | DIASTOLIC BLOOD PRESSURE: 74 MMHG | TEMPERATURE: 97.8 F

## 2023-07-28 DIAGNOSIS — D47.2 MGUS (MONOCLONAL GAMMOPATHY OF UNKNOWN SIGNIFICANCE): Primary | ICD-10-CM

## 2023-07-28 DIAGNOSIS — D69.6 THROMBOCYTOPENIA (H): ICD-10-CM

## 2023-07-28 DIAGNOSIS — D47.2 MGUS (MONOCLONAL GAMMOPATHY OF UNKNOWN SIGNIFICANCE): ICD-10-CM

## 2023-07-28 LAB
BASOPHILS # BLD AUTO: 0.1 10E3/UL (ref 0–0.2)
BASOPHILS NFR BLD AUTO: 1 %
EOSINOPHIL # BLD AUTO: 0.1 10E3/UL (ref 0–0.7)
EOSINOPHIL NFR BLD AUTO: 1 %
ERYTHROCYTE [DISTWIDTH] IN BLOOD BY AUTOMATED COUNT: 13.6 % (ref 10–15)
HCT VFR BLD AUTO: 39.3 % (ref 40–53)
HGB BLD-MCNC: 12.7 G/DL (ref 13.3–17.7)
IMM GRANULOCYTES # BLD: 0 10E3/UL
IMM GRANULOCYTES NFR BLD: 1 %
LYMPHOCYTES # BLD AUTO: 1.1 10E3/UL (ref 0.8–5.3)
LYMPHOCYTES NFR BLD AUTO: 18 %
MCH RBC QN AUTO: 27.7 PG (ref 26.5–33)
MCHC RBC AUTO-ENTMCNC: 32.3 G/DL (ref 31.5–36.5)
MCV RBC AUTO: 86 FL (ref 78–100)
MONOCYTES # BLD AUTO: 0.6 10E3/UL (ref 0–1.3)
MONOCYTES NFR BLD AUTO: 10 %
NEUTROPHILS # BLD AUTO: 4.4 10E3/UL (ref 1.6–8.3)
NEUTROPHILS NFR BLD AUTO: 69 %
NRBC # BLD AUTO: 0 10E3/UL
NRBC BLD AUTO-RTO: 0 /100
PATH REPORT.COMMENTS IMP SPEC: NORMAL
PATH REPORT.FINAL DX SPEC: NORMAL
PATH REPORT.MICROSCOPIC SPEC OTHER STN: NORMAL
PATH REPORT.MICROSCOPIC SPEC OTHER STN: NORMAL
PATH REPORT.RELEVANT HX SPEC: NORMAL
PLATELET # BLD AUTO: 63 10E3/UL (ref 150–450)
RBC # BLD AUTO: 4.58 10E6/UL (ref 4.4–5.9)
RETICS # AUTO: 0.06 10E6/UL (ref 0.03–0.1)
RETICS/RBC NFR AUTO: 1.3 % (ref 0.5–2)
WBC # BLD AUTO: 6.3 10E3/UL (ref 4–11)

## 2023-07-28 PROCEDURE — 36415 COLL VENOUS BLD VENIPUNCTURE: CPT

## 2023-07-28 PROCEDURE — 85025 COMPLETE CBC W/AUTO DIFF WBC: CPT | Performed by: NURSE PRACTITIONER

## 2023-07-28 PROCEDURE — G0463 HOSPITAL OUTPT CLINIC VISIT: HCPCS | Performed by: PHYSICIAN ASSISTANT

## 2023-07-28 PROCEDURE — 99214 OFFICE O/P EST MOD 30 MIN: CPT | Performed by: PHYSICIAN ASSISTANT

## 2023-07-28 PROCEDURE — 85045 AUTOMATED RETICULOCYTE COUNT: CPT | Performed by: NURSE PRACTITIONER

## 2023-07-28 PROCEDURE — 99207 BLOOD MORPHOLOGY PATHOLOGIST REVIEW: CPT | Performed by: PATHOLOGY

## 2023-07-28 ASSESSMENT — PAIN SCALES - GENERAL: PAINLEVEL: NO PAIN (0)

## 2023-07-28 NOTE — PROGRESS NOTES
Medical Assistant Note:  Mars Reyes presents today for blood draw.    Patient seen by provider today: Yes: Claire.   present during visit today: Not Applicable.    Concerns: No Concerns.    Procedure:  Lab draw site: left antecub, Needle type: butterfly, Gauge: 23.    Post Assessment:  Labs drawn without difficulty: Yes.    Discharge Plan:  Departure Mode: Ambulatory.    Face to Face Time: 10.    Estella Davidson

## 2023-07-28 NOTE — NURSING NOTE
"Oncology Rooming Note    July 28, 2023 10:34 AM   Mars Reyes is a 82 year old male who presents for:    Chief Complaint   Patient presents with    Oncology Clinic Visit     MGUS     Initial Vitals: Ht 1.778 m (5' 10\")   Wt 81.6 kg (180 lb)   BMI 25.83 kg/m   Estimated body mass index is 25.83 kg/m  as calculated from the following:    Height as of this encounter: 1.778 m (5' 10\").    Weight as of this encounter: 81.6 kg (180 lb). Body surface area is 2.01 meters squared.  Data Unavailable Comment: Data Unavailable   No LMP for male patient.  Allergies reviewed: Yes  Medications reviewed: Yes    Medications: Medication refills not needed today.  Pharmacy name entered into [employer]:    [place] [id number] [address]  [facility] [address] [place], [place] - [address] [facility] [address]  [id number] [address]     Clinical concerns: f/u       Zeke Ray CMA              "

## 2023-07-28 NOTE — LETTER
7/28/2023         RE: Mars Reyes  [address] [facility] [address]        Dear Colleague,    Thank you for referring your patient, Mars Reyes, to the [facility]. Please see a copy of my visit note below.    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 Below is his treatment summary:     1.  On 05/20/2009, platelets of 191. On 12/21/2011, platelets of 75.   2.  On 02/20/2017, platelets of 57 with hemoglobin of 13.9.   3  On 02/21/2017:    -Normal folate, LDH,TSH and vitamin B12 of 372.    -SPEP reveals M-spike of 0.1.   - Lupus negative.   4. US on 02/22/2017 reveals normal spleen size.  5.  Bone marrow biopsy on 02/23/2017 reveals normocellular bone marrow with 50% cellularity with trilineage hematopoiesis.  Adequate megakaryocyte.  Slight erythroid hypoplasia.  No increase in blasts. 5% to 10% plasma cells. -Cytogenetics and FISH for myeloma panel is normal.    6. Serum ROSA reveals monoclonal IgG immunoglobulin of kappa light chain type and monoclonal free immunoglobulin light chain of lambda chain type.     He has been following up with Dr. Mayer and basis.  He was improved labs yesterday, 7/27/2023 and noted to have a platelet level of 48 K.  He is here as an add-on for physical assessment, repeat CBC and peripheral smear.    Interval History: Mars is here today unaccompanied and doing well.  He has been feeling pretty good lately and has been active.  No blood in the stool, urine, mucosal bleeding.  He continues on Eliquis for valve replacement.  No other new complaints.    Review of Systems: See interval hx. Denies fevers, chills, HA, changes in vision, cough, sore throat, CP, SOB, abdominal pain, N/V, changes in urination, bleeding, bruising.     PMHx and Social Hx reviewed per EPIC.      Medications:  Current Outpatient  "Medications   Medication Sig Dispense Refill     acetaminophen (TYLENOL) 325 MG tablet Take 2 tablets (650 mg) by mouth every 4 hours as needed for other (mild pain) 100 tablet 0     allopurinol (ZYLOPRIM) 100 MG tablet TAKE 1 TABLET BY MOUTH EVERY ONCE DAILY 90 tablet 1     amoxicillin (AMOXIL) 500 MG tablet Take 2,000 mg by mouth daily as needed (1 hour prior to dental procedures)       ELIQUIS ANTICOAGULANT 5 MG tablet TAKE 1 TABLET BY MOUTH TWICE A DAY (Patient not taking: Reported on 6/2/2023) 180 tablet 3     lisinopril (ZESTRIL) 20 MG tablet Take 1 tablet (20 mg) by mouth daily 90 tablet 1     pravastatin (PRAVACHOL) 20 MG tablet TAKE 1 TABLET BY MOUTH EVERY DAY 90 tablet 3       No Known Allergies      EXAM:    /74   Pulse 79   Temp 97.8  F (36.6  C) (Oral)   Resp 14   Ht 1.778 m (5' 10\")   Wt 81.6 kg (180 lb)   SpO2 98%   BMI 25.83 kg/m      GENERAL:  Male, in no acute distress.  Alert and oriented x3.   HEENT:  Normocephalic, atraumatic.  PERRL, oropharynx clear with no sores or thrush.   LYMPH NODES:  No palpable pre/post-auricular, cervical, axillary lymphadenopathy appreciated.  CV:  RRR, No murmurs, gallops, or rubs.   LUNGS:  Clear to auscultation bilaterally.   ABDOMEN:  Soft, nontender and nondistended.  Bowel sounds heard x4.  No apparent hepatosplenomegaly.   EXTREMITIES:  No clubbing, cyanosis, or edema.   SKIN: No rash  PSYCH: Calm and cooperative       Labs:    07/28/23 10:11   WBC 6.3   Hemoglobin 12.7 (L)   Hematocrit 39.3 (L)   Platelet Count 63 (L)   RBC Count 4.58   MCV 86   MCH 27.7   MCHC 32.3   RDW 13.6   % Neutrophils 69   % Lymphocytes 18   % Monocytes 10   % Eosinophils 1   % Basophils 1   Absolute Basophils 0.1   Absolute Eosinophils 0.1   Absolute Immature Granulocytes 0.0   Absolute Lymphocytes 1.1   Absolute Monocytes 0.6   % Immature Granulocytes 1   Absolute Neutrophils 4.4   Absolute NRBCs 0.0   NRBCs per 100 WBC 0   % Retic 1.3   Absolute Retic 0.059   BLOOD " MORPHOLOGY PATHOLOGIST REVIEW Rpt         Imaging: n/a    Impression/Plan: Mars Reyes is a 82 year old male with MGUS and thrombocytopenia, currently on surveillance.    MGUS: M spike stable at 0.1.  [place] free light chains have been elevated, but stable.  Ratio was normal.     Thrombocytopenia: Overall, has been stable over the last year, dropped to 48 K yesterday and repeat today 63K.  Peripheral smear is pending.    Anemia: Had dropped after hip replacement, then normalized.  Slightly dipped to 12.7 yesterday.  We will monitor.    He is scheduled to follow-up with Dr. Mayer next week to discuss results in detail, including peripheral smear.    Chart documentation with Dragon Voice recognition Software. Although reviewed after completion, some words and grammatical errors may remain.    30 minutes spent on the date of the encounter doing chart review, review of test results, interpretation of tests, patient visit, and documentation     Claire Rivas PA-C  Hematology/Oncology  [facility]                  Again, thank you for allowing me to participate in the care of your patient.        Sincerely,        Claire Rivas PA-C

## 2023-07-28 NOTE — PROGRESS NOTES
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 Below is his treatment summary:     1.  On 05/20/2009, platelets of 191. On 12/21/2011, platelets of 75.   2.  On 02/20/2017, platelets of 57 with hemoglobin of 13.9.   3  On 02/21/2017:    -Normal folate, LDH,TSH and vitamin B12 of 372.    -SPEP reveals M-spike of 0.1.   - Lupus negative.   4. US on 02/22/2017 reveals normal spleen size.  5.  Bone marrow biopsy on 02/23/2017 reveals normocellular bone marrow with 50% cellularity with trilineage hematopoiesis.  Adequate megakaryocyte.  Slight erythroid hypoplasia.  No increase in blasts. 5% to 10% plasma cells. -Cytogenetics and FISH for myeloma panel is normal.    6. Serum ROSA reveals monoclonal IgG immunoglobulin of kappa light chain type and monoclonal free immunoglobulin light chain of lambda chain type.     He has been following up with Dr. Mayer and basis.  He was improved labs yesterday, 7/27/2023 and noted to have a platelet level of 48 K.  He is here as an add-on for physical assessment, repeat CBC and peripheral smear.    Interval History: Mars is here today unaccompanied and doing well.  He has been feeling pretty good lately and has been active.  No blood in the stool, urine, mucosal bleeding.  He continues on Eliquis for valve replacement.  No other new complaints.    Review of Systems: See interval hx. Denies fevers, chills, HA, changes in vision, cough, sore throat, CP, SOB, abdominal pain, N/V, changes in urination, bleeding, bruising.     PMHx and Social Hx reviewed per EPIC.      Medications:  Current Outpatient Medications   Medication Sig Dispense Refill    acetaminophen (TYLENOL) 325 MG tablet Take 2 tablets (650 mg) by mouth every 4 hours as needed for other (mild pain) 100 tablet 0    allopurinol (ZYLOPRIM) 100 MG tablet TAKE 1 TABLET BY MOUTH EVERY ONCE  "DAILY 90 tablet 1    amoxicillin (AMOXIL) 500 MG tablet Take 2,000 mg by mouth daily as needed (1 hour prior to dental procedures)      ELIQUIS ANTICOAGULANT 5 MG tablet TAKE 1 TABLET BY MOUTH TWICE A DAY (Patient not taking: Reported on 6/2/2023) 180 tablet 3    lisinopril (ZESTRIL) 20 MG tablet Take 1 tablet (20 mg) by mouth daily 90 tablet 1    pravastatin (PRAVACHOL) 20 MG tablet TAKE 1 TABLET BY MOUTH EVERY DAY 90 tablet 3       No Known Allergies      EXAM:    /74   Pulse 79   Temp 97.8  F (36.6  C) (Oral)   Resp 14   Ht 1.778 m (5' 10\")   Wt 81.6 kg (180 lb)   SpO2 98%   BMI 25.83 kg/m      GENERAL:  Male, in no acute distress.  Alert and oriented x3.   HEENT:  Normocephalic, atraumatic.  PERRL, oropharynx clear with no sores or thrush.   LYMPH NODES:  No palpable pre/post-auricular, cervical, axillary lymphadenopathy appreciated.  CV:  RRR, No murmurs, gallops, or rubs.   LUNGS:  Clear to auscultation bilaterally.   ABDOMEN:  Soft, nontender and nondistended.  Bowel sounds heard x4.  No apparent hepatosplenomegaly.   EXTREMITIES:  No clubbing, cyanosis, or edema.   SKIN: No rash  PSYCH: Calm and cooperative       Labs:    07/28/23 10:11   WBC 6.3   Hemoglobin 12.7 (L)   Hematocrit 39.3 (L)   Platelet Count 63 (L)   RBC Count 4.58   MCV 86   MCH 27.7   MCHC 32.3   RDW 13.6   % Neutrophils 69   % Lymphocytes 18   % Monocytes 10   % Eosinophils 1   % Basophils 1   Absolute Basophils 0.1   Absolute Eosinophils 0.1   Absolute Immature Granulocytes 0.0   Absolute Lymphocytes 1.1   Absolute Monocytes 0.6   % Immature Granulocytes 1   Absolute Neutrophils 4.4   Absolute NRBCs 0.0   NRBCs per 100 WBC 0   % Retic 1.3   Absolute Retic 0.059   BLOOD MORPHOLOGY PATHOLOGIST REVIEW Rpt         Imaging: n/a    Impression/Plan: Mars Reyes is a 82 year old male with MGUS and thrombocytopenia, currently on surveillance.    MGUS: M spike stable at 0.1.  [place] free light chains have been elevated, but stable.  " Ratio was normal.     Thrombocytopenia: Overall, has been stable over the last year, dropped to 48 K yesterday and repeat today 63K.  Peripheral smear is pending.    Anemia: Had dropped after hip replacement, then normalized.  Slightly dipped to 12.7 yesterday.  We will monitor.    He is scheduled to follow-up with Dr. Mayer next week to discuss results in detail, including peripheral smear.    Chart documentation with Dragon Voice recognition Software. Although reviewed after completion, some words and grammatical errors may remain.    30 minutes spent on the date of the encounter doing chart review, review of test results, interpretation of tests, patient visit, and documentation     Claire Rivas PA-C  Hematology/Oncology  [facility]

## 2023-08-02 ENCOUNTER — VIRTUAL VISIT (OUTPATIENT)
Dept: ONCOLOGY | Facility: CLINIC | Age: 83
End: 2023-08-02
Attending: INTERNAL MEDICINE
Payer: COMMERCIAL

## 2023-08-02 DIAGNOSIS — D47.2 MGUS (MONOCLONAL GAMMOPATHY OF UNKNOWN SIGNIFICANCE): Primary | ICD-10-CM

## 2023-08-02 DIAGNOSIS — D69.6 THROMBOCYTOPENIA (H): ICD-10-CM

## 2023-08-02 PROCEDURE — 99213 OFFICE O/P EST LOW 20 MIN: CPT | Mod: 95 | Performed by: INTERNAL MEDICINE

## 2023-08-02 NOTE — NURSING NOTE
Is the patient currently in the state of [place]? YES    Visit mode:VIDEO    If the visit is dropped, the patient can be reconnected by: VIDEO VISIT: Send to e-mail at: [email or web address].    Will anyone else be joining the visit? NO      How would you like to obtain your AVS? MyChart    Are changes needed to the allergy or medication list? NO    Reason for visit: RECHECK

## 2023-08-02 NOTE — LETTER
8/2/2023         RE: Mars Reyes  [address] [address]        Dear Colleague,    Thank you for referring your patient, Mars Reyes, to the [facility]ER [facility]. Please see a copy of my visit note below.    HEMATOLOGIC HISTORY:  Mr. Mars Reyes is a gentleman with chronic thrombocytopenia and MGUS.   1.  On 05/20/2009, platelets of 191. On 12/21/2011, platelets of 75.   2.  On 02/20/2017, platelets of 57 with hemoglobin of 13.9.   3  On 02/21/2017:    -Normal folate, LDH,TSH and vitamin B12 of 372.    -SPEP reveals M-spike of 0.1.   - Lupus negative.   4. US on 02/22/2017 reveals normal spleen size.  5.  Bone marrow biopsy on 02/23/2017 reveals normocellular bone marrow with 50% cellularity with trilineage hematopoiesis.  Adequate megakaryocyte.  Slight erythroid hypoplasia.  No increase in blasts. 5% to 10% plasma cells. -Cytogenetics and FISH for myeloma panel is normal.    6. Serum ROSA reveals monoclonal IgG immunoglobulin of kappa light chain type and monoclonal free immunoglobulin light chain of lambda chain type.      SUBJECTIVE:  Mr. Reyes is an 82-year-old gentleman with MGUS and chronic thrombocytopenia. It has been stable.       Overall he is doing good.  No headache.  No dizziness.  No chest pain.  No shortness of breath.  No nausea or vomiting.  No bleeding.  No fever or night sweats.  All other review of systems negative.     PHYSICAL EXAMINATION:    He is alert and oriented x 3.  Not in distress.   Rest of the systems not examined as this is a video visit.     LABORATORY:  CBC, BMP, SPEP, free light chain and IgG level reviewed.     ASSESSMENT:  1.  An 82-year-old gentleman with monoclonal gammopathy of undetermined significance (MGUS). MGUS is stable.  2.  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 Thrombocytopenia is stable.     PLAN:  1. Patient is doing well.  Labs were reviewed with him.       Discussed regarding MGUS.  MGUS is stable.  We will monitor it once a year. Unlikely it will progress to myeloma in his lifetime.     He has chronic thrombocytopenia. It is stable. He is not having any bleeding complication.  He will have CBC checked every 6 months.       Patient has mild anemia. It is improving. It will be monitored.      2.  I will see him in 1 year with labs.  In between he will have CBC checked.  I advised him to call us with any questions or concerns.     Total visit time of 20 minutes.  Time was spent in today's visit, review of chart/investigations today and documentation.       Virtual Visit Details    Type of service:  Video Visit     Originating Location (pt. Location): Home    Distant Location (provider location):  On-site  Platform used for Video Visit: Ramon      Again, thank you for allowing me to participate in the care of your patient.        Sincerely,        Pino Mayer MD

## 2023-08-02 NOTE — PROGRESS NOTES
HEMATOLOGIC HISTORY:  Mr. Mars Reyes is a gentleman with chronic thrombocytopenia and MGUS.   1.  On 05/20/2009, platelets of 191. On 12/21/2011, platelets of 75.   2.  On 02/20/2017, platelets of 57 with hemoglobin of 13.9.   3  On 02/21/2017:    -Normal folate, LDH,TSH and vitamin B12 of 372.    -SPEP reveals M-spike of 0.1.   - Lupus negative.   4. US on 02/22/2017 reveals normal spleen size.  5.  Bone marrow biopsy on 02/23/2017 reveals normocellular bone marrow with 50% cellularity with trilineage hematopoiesis.  Adequate megakaryocyte.  Slight erythroid hypoplasia.  No increase in blasts. 5% to 10% plasma cells. -Cytogenetics and FISH for myeloma panel is normal.    6. Serum ROSA reveals monoclonal IgG immunoglobulin of kappa light chain type and monoclonal free immunoglobulin light chain of lambda chain type.      SUBJECTIVE:  Mr. Reyes is an 82-year-old gentleman with MGUS and chronic thrombocytopenia. It has been stable.       Overall he is doing good.  No headache.  No dizziness.  No chest pain.  No shortness of breath.  No nausea or vomiting.  No bleeding.  No fever or night sweats.  All other review of systems negative.     PHYSICAL EXAMINATION:    He is alert and oriented x 3.  Not in distress.   Rest of the systems not examined as this is a video visit.     LABORATORY:  CBC, BMP, SPEP, free light chain and IgG level reviewed.     ASSESSMENT:  1.  An 82-year-old gentleman with monoclonal gammopathy of undetermined significance (MGUS). MGUS is stable.  2.  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 Thrombocytopenia is stable.     PLAN:  1. Patient is doing well.  Labs were reviewed with him.       Discussed regarding MGUS.  MGUS is stable. We will monitor it once a year. Unlikely it will progress to myeloma in his lifetime.     He has chronic thrombocytopenia. It is stable. He is not having any bleeding complication.  He will have CBC checked every 6 months.       Patient has mild  anemia. It is improving. It will be monitored.      2.  I will see him in 1 year with labs.  In between he will have CBC checked.  I advised him to call us with any questions or concerns.     Total visit time of 20 minutes.  Time was spent in today's visit, review of chart/investigations today and documentation.       Virtual Visit Details    Type of service:  Video Visit     Originating Location (pt. Location): Home    Distant Location (provider location):  On-site  Platform used for Video Visit: Ramon

## 2023-08-02 NOTE — LETTER
8/2/2023         RE: Mars Reyes  [address] [address]        Dear Colleague,    Thank you for referring your patient, Mars Reyes, to the [facility]. Please see a copy of my visit note below.    HEMATOLOGIC HISTORY:  Mr. Mars Reyes is a gentleman with chronic thrombocytopenia and MGUS.   1.  On 05/20/2009, platelets of 191. On 12/21/2011, platelets of 75.   2.  On 02/20/2017, platelets of 57 with hemoglobin of 13.9.   3  On 02/21/2017:    -Normal folate, LDH,TSH and vitamin B12 of 372.    -SPEP reveals M-spike of 0.1.   - Lupus negative.   4. US on 02/22/2017 reveals normal spleen size.  5.  Bone marrow biopsy on 02/23/2017 reveals normocellular bone marrow with 50% cellularity with trilineage hematopoiesis.  Adequate megakaryocyte.  Slight erythroid hypoplasia.  No increase in blasts. 5% to 10% plasma cells. -Cytogenetics and FISH for myeloma panel is normal.    6. Serum ROSA reveals monoclonal IgG immunoglobulin of kappa light chain type and monoclonal free immunoglobulin light chain of lambda chain type.      SUBJECTIVE:  Mr. Reyes is an 82-year-old gentleman with MGUS and chronic thrombocytopenia. It has been stable.       Overall he is doing good.  No headache.  No dizziness.  No chest pain.  No shortness of breath.  No nausea or vomiting.  No bleeding.  No fever or night sweats.  All other review of systems negative.     PHYSICAL EXAMINATION:    He is alert and oriented x 3.  Not in distress.   Rest of the systems not examined as this is a video visit.     LABORATORY:  CBC, BMP, SPEP, free light chain and IgG level reviewed.     ASSESSMENT:  1.  An 82-year-old gentleman with monoclonal gammopathy of undetermined significance (MGUS). MGUS is stable.  2.  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 Thrombocytopenia is stable.     PLAN:  1. Patient is doing well.  Labs were reviewed with him.       Discussed regarding MGUS.  MGUS is stable.  We will monitor it once a year. Unlikely it will progress to myeloma in his lifetime.     He has chronic thrombocytopenia. It is stable. He is not having any bleeding complication.  He will have CBC checked every 6 months.       Patient has mild anemia. It is improving. It will be monitored.      2.  I will see him in 1 year with labs.  In between he will have CBC checked.  I advised him to call us with any questions or concerns.     Total visit time of 20 minutes.  Time was spent in today's visit, review of chart/investigations today and documentation.       Virtual Visit Details    Type of service:  Video Visit     Originating Location (pt. Location): Home    Distant Location (provider location):  On-site  Platform used for Video Visit: Ramon      Again, thank you for allowing me to participate in the care of your patient.        Sincerely,        Pino Mayer MD

## 2023-08-03 DIAGNOSIS — I10 ESSENTIAL HYPERTENSION, BENIGN: ICD-10-CM

## 2023-08-03 RX ORDER — LISINOPRIL 20 MG/1
20 TABLET ORAL DAILY
Qty: 90 TABLET | Refills: 1 | Status: SHIPPED | OUTPATIENT
Start: 2023-08-03 | End: 2024-02-07

## 2023-08-17 ENCOUNTER — TRANSFERRED RECORDS (OUTPATIENT)
Dept: HEALTH INFORMATION MANAGEMENT | Facility: CLINIC | Age: 83
End: 2023-08-17
Payer: COMMERCIAL

## 2023-09-12 ENCOUNTER — TRANSFERRED RECORDS (OUTPATIENT)
Dept: HEALTH INFORMATION MANAGEMENT | Facility: CLINIC | Age: 83
End: 2023-09-12
Payer: COMMERCIAL

## 2023-09-28 ENCOUNTER — TRANSFERRED RECORDS (OUTPATIENT)
Dept: HEALTH INFORMATION MANAGEMENT | Facility: CLINIC | Age: 83
End: 2023-09-28
Payer: COMMERCIAL

## 2023-10-02 ENCOUNTER — OFFICE VISIT (OUTPATIENT)
Dept: CARDIOLOGY | Facility: CLINIC | Age: 83
End: 2023-10-02
Attending: INTERNAL MEDICINE
Payer: COMMERCIAL

## 2023-10-02 VITALS
WEIGHT: 174.6 LBS | BODY MASS INDEX: 25 KG/M2 | DIASTOLIC BLOOD PRESSURE: 66 MMHG | OXYGEN SATURATION: 98 % | HEIGHT: 70 IN | SYSTOLIC BLOOD PRESSURE: 144 MMHG | HEART RATE: 96 BPM

## 2023-10-02 DIAGNOSIS — I35.0 SEVERE AORTIC STENOSIS: ICD-10-CM

## 2023-10-02 DIAGNOSIS — E78.5 HYPERLIPIDEMIA LDL GOAL <130: ICD-10-CM

## 2023-10-02 DIAGNOSIS — I48.92 ATRIAL FLUTTER, UNSPECIFIED TYPE (H): ICD-10-CM

## 2023-10-02 DIAGNOSIS — Z95.0 CARDIAC PACEMAKER IN SITU: ICD-10-CM

## 2023-10-02 DIAGNOSIS — I31.39 PERICARDIAL EFFUSION: Primary | ICD-10-CM

## 2023-10-02 DIAGNOSIS — Z95.2 S/P TAVR (TRANSCATHETER AORTIC VALVE REPLACEMENT): ICD-10-CM

## 2023-10-02 DIAGNOSIS — I10 ESSENTIAL HYPERTENSION, BENIGN: ICD-10-CM

## 2023-10-02 PROCEDURE — 99214 OFFICE O/P EST MOD 30 MIN: CPT | Performed by: NURSE PRACTITIONER

## 2023-10-02 NOTE — PATIENT INSTRUCTIONS
Thanks for participating in a office visit with the [facility] clinic today.    Doing well on a cardiac standpoint   Reviewed last limited echo showing a well functioning TAVR valve. No recurrent effusion.  Blood pressure mildly elevated today.   Monitor daily with a goal of < 140/90   Continue exercise and heart healthy diet.     Follow up in the spring with EBENEZER with echo prior    Please call my nurse at  [phone] with any questions or concerns.    Scheduling phone number: [phone]  Reminder: Please bring in all current medications, over the counter supplements and vitamin bottles to your next appointment.

## 2023-10-02 NOTE — PROGRESS NOTES
CARDIOLOGY CLINIC NOTE    PRIMARY CARDIOLOGIST    PRIMARY CARE PHYSICIAN:  bP Warren    HISTORY OF PRESENT ILLNESS:  Mars Reyes is a pleasant 81 year old patient who carries a past medical history significant for paroxysmal SVT, atrial flutter status post ablation in 2019 with known recurrent paroxysmal atrial flutter, MGUS, thrombocytopenia, moderate mitral stenosis, mild to moderate mitral regurgitation, and severe aortic stenosis status post TAVR using a 29 mm Medtronic evolute pro valve on 6/22/2021.  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    He was last seen on 6/2/2023 by Dr. Iglesias as part of hospital follow-up for pericardial effusion.  While vacationing in [place], he developed persistent chest pain and shortness of breath.  Per hospital records, his work-up consisted of a negative troponin, elevated TSH 4.7 but normal T4.  CT/ultrasound showed possible cholecystitis and incidentally found a moderate to large pericardial effusion, mostly surrounding the RV.  There was no evidence of tamponade.  He was tachycardic and hypotensive and subsequently underwent a pericardiocentesis removing 400 cc followed by 100 cc of bloody fluid drained overnight.  Cytology was negative for malignancy.  Follow-up echocardiogram showed no further reaccumulation.  At his follow-up visit, Eliquis was resumed.  A follow-up limited echocardiogram showed a normal ejection fraction estimated at 55 to 60%, severely dilated left atrium, moderate mitral regurgitation and mild to moderate tricuspid regurgitation.  A well-seated bioprosthetic aortic valve with a mean gradient of 3.8 mmHg.  No evidence of recurrent effusion.    He returns to the office today accompanied by his wife for follow-up.  He is feeling well on a cardiac standpoint, denies chest pain, shortness of breath, palpitations, PND, orthopnea, presyncope, syncope, or lower extremity  edema.    He follows closely with Dr. Mayer with heme-onc.  MGUS has remained stable.  He has chronic thrombocytopenia with last platelet count 63.     Recent device interrogation showed 5% atrial pacing at 76% ventricular pacing, underlying rhythm is sinus 70 to 80s.  20 atrial mode switches with the longest episode being 66 hours and 29 minutes.  2 episodes of nonsustained VT with the longest being 5-8 beats.    Blood pressure is mildly elevated today at 144/66, managed on lisinopril.  He does not monitor blood pressures at home.   Last BMP showed a sodium of 132, potassium 4.8, 17.9, creatinine 0.92, GFR 84  Lipid panel showed total cholesterol 147, HDL 42, LDL 79, triglycerides 131  BMI 25.05, weight 174 pounds    He exercises daily  Diet is high in sodium.  Drinks alcohol in moderation  Compliant with all medications.    He and his wife plan to spend the winter months in [place] and [place].     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, unspecified cause, unspecified laterality     Contact dermatitis and other eczema, due to unspecified cause     Diverticulosis of colon (without mention of hemorrhage)     DJD (degenerative joint disease)     Essential hypertension, benign     abstracted 7/22/02    Gout, unspecified     Hip joint replacement by other means 06/03/2009    MGUS (monoclonal gammopathy of unknown significance)     Mitral valve disease     mild MS, moderate-severe MR    Pacemaker     PSVT (paroxysmal supraventricular tachycardia)     Pulmonary HTN (H) 10/18/2011    Thrombocytopenia (H24)        MEDICATIONS:  Current Outpatient Medications   Medication    acetaminophen (TYLENOL) 325 MG tablet    allopurinol (ZYLOPRIM) 100 MG tablet    amoxicillin (AMOXIL) 500 MG tablet    ELIQUIS ANTICOAGULANT 5 MG  tablet    lisinopril (ZESTRIL) 20 MG tablet    pravastatin (PRAVACHOL) 20 MG tablet     No current facility-administered medications for this visit.       SOCIAL HISTORY:  I have reviewed this patient's social history and updated it with pertinent information if needed. Mars Reyes  reports that he has never smoked. He has never used smokeless tobacco. He reports current alcohol use. He reports that he does not use drugs.    PHYSICAL EXAM:  Pulse:  [96] 96  BP: (144)/(66) 144/66  SpO2:  [98 %] 98 %  174 lbs 9.6 oz    Constitutional: alert, no distress  Respiratory: Good bilateral air entry  Cardiovascular: Regular rate and rhythm  GI: nondistended  Neuropsychiatric: appropriate affact    ASSESSMENT: Pertinent issues addressed/ reviewed during this cardiology visit  Pericardial effusion  Atrial flutter status post ablation in 2019  Thrombocytopenia  Mitral stenosis/regurgitation  Severe aortic stenosis status post TAVR  Pacemaker    RECOMMENDATIONS:  Overall, he is doing well on a cardiac standpoint.  Recent echocardiogram showed a normal ejection fraction estimated at 55 to 60%, severely dilated left atrium, moderate mitral regurgitation and mild to moderate tricuspid regurgitation.  A well-seated bioprosthetic aortic valve with a mean gradient of 3.8 mmHg.  No evidence of recurrent effusion.  Recommend follow-up echocardiogram in 6 months upon return to [place].  Encourage exercise and heart healthy diet. Alcohol in moderation.   Currently in regular rhythm. Continue Eliquis for stroke prevention.  Blood pressure is borderline today, likely due to white coat syndrome. Continue lisinopril. Recommend daily blood pressure monitoring with a goal of < 140/90. Submit 2 weeks of readings to office.   Follow-up with device clinic as scheduled  Follow up in May with EBENEZER or sooner if needed.     It was a pleasure seeing this patient in clinic today. Please do not hesitate to contact me with any future questions.     Jolene  ALEXANDRA Bose, CNP  Cardiology - [facility]  October 2, 2023    Review of the result(s) of each unique test - Last echocardiogram, BMP, CBC, and lipid panel.      The level of medical decision making during this visit was of moderate complexity.    This note was completed in part using dictation via the Dragon voice recognition software. Some word and grammatical errors may occur and must be interpreted in the appropriate clinical context.  If there are any questions pertaining to this issue, please contact me for further clarification.

## 2023-10-02 NOTE — LETTER
10/2/2023    Pb Warren MD  [address] [facility] [address]    RE: Mars Reyes       Dear Colleague,     I had the pleasure of seeing Mars Reyes in the [facility].  CARDIOLOGY CLINIC NOTE    PRIMARY CARDIOLOGIST    PRIMARY CARE PHYSICIAN:  Pb Warren    HISTORY OF PRESENT ILLNESS:  Mars Reyes is a pleasant 81 year old patient who carries a past medical history significant for paroxysmal SVT, atrial flutter status post ablation in 2019 with known recurrent paroxysmal atrial flutter, MGUS, thrombocytopenia, moderate mitral stenosis, mild to moderate mitral regurgitation, and severe aortic stenosis status post TAVR using a 29 mm Medtronic evolute pro valve on 6/22/2021.  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    He was last seen on 6/2/2023 by Dr. Iglesias as part of hospital follow-up for pericardial effusion.  While vacationing in [place], he developed persistent chest pain and shortness of breath.  Per hospital records, his work-up consisted of a negative troponin, elevated TSH 4.7 but normal T4.  CT/ultrasound showed possible cholecystitis and incidentally found a moderate to large pericardial effusion, mostly surrounding the RV.  There was no evidence of tamponade.  He was tachycardic and hypotensive and subsequently underwent a pericardiocentesis removing 400 cc followed by 100 cc of bloody fluid drained overnight.  Cytology was negative for malignancy.  Follow-up echocardiogram showed no further reaccumulation.  At his follow-up visit, Eliquis was resumed.  A follow-up limited echocardiogram showed a normal ejection fraction estimated at 55 to 60%, severely dilated left atrium, moderate mitral regurgitation and mild to moderate tricuspid regurgitation.  A well-seated bioprosthetic aortic valve with a mean gradient of 3.8 mmHg.  No evidence of recurrent effusion.    He returns to the office  today accompanied by his wife for follow-up.  He is feeling well on a cardiac standpoint, denies chest pain, shortness of breath, palpitations, PND, orthopnea, presyncope, syncope, or lower extremity edema.    He follows closely with Dr. Mayer with heme-onc.  MGUS has remained stable.  He has chronic thrombocytopenia with last platelet count 63.     Recent device interrogation showed 5% atrial pacing at 76% ventricular pacing, underlying rhythm is sinus 70 to 80s.  20 atrial mode switches with the longest episode being 66 hours and 29 minutes.  2 episodes of nonsustained VT with the longest being 5-8 beats.    Blood pressure is mildly elevated today at 144/66, managed on lisinopril.  He does not monitor blood pressures at home.   Last BMP showed a sodium of 132, potassium 4.8, 17.9, creatinine 0.92, GFR 84  Lipid panel showed total cholesterol 147, HDL 42, LDL 79, triglycerides 131  BMI 25.05, weight 174 pounds    He exercises daily  Diet is high in sodium.  Drinks alcohol in moderation  Compliant with all medications.    He and his wife plan to spend the winter months in [facility].     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, unspecified cause, unspecified laterality     Contact dermatitis and other eczema, due to unspecified cause     Diverticulosis of colon (without mention of hemorrhage)     DJD (degenerative joint disease)     Essential hypertension, benign     abstracted 7/22/02    Gout, unspecified     Hip joint replacement by other means 06/03/2009    MGUS (monoclonal gammopathy of unknown significance)     Mitral valve disease     mild MS, moderate-severe MR    Pacemaker     PSVT (paroxysmal supraventricular tachycardia)     Pulmonary HTN (H) 10/18/2011    Thrombocytopenia (H24)         MEDICATIONS:  Current Outpatient Medications   Medication    acetaminophen (TYLENOL) 325 MG tablet    allopurinol (ZYLOPRIM) 100 MG tablet    amoxicillin (AMOXIL) 500 MG tablet    ELIQUIS ANTICOAGULANT 5 MG tablet    lisinopril (ZESTRIL) 20 MG tablet    pravastatin (PRAVACHOL) 20 MG tablet     No current facility-administered medications for this visit.       SOCIAL HISTORY:  I have reviewed this patient's social history and updated it with pertinent information if needed. Mars Reyes  reports that he has never smoked. He has never used smokeless tobacco. He reports current alcohol use. He reports that he does not use drugs.    PHYSICAL EXAM:  Pulse:  [96] 96  BP: (144)/(66) 144/66  SpO2:  [98 %] 98 %  174 lbs 9.6 oz    Constitutional: alert, no distress  Respiratory: Good bilateral air entry  Cardiovascular: Regular rate and rhythm  GI: nondistended  Neuropsychiatric: appropriate affact    ASSESSMENT: Pertinent issues addressed/ reviewed during this cardiology visit  Pericardial effusion  Atrial flutter status post ablation in 2019  Thrombocytopenia  Mitral stenosis/regurgitation  Severe aortic stenosis status post TAVR  Pacemaker    RECOMMENDATIONS:  Overall, he is doing well on a cardiac standpoint.  Recent echocardiogram showed a normal ejection fraction estimated at 55 to 60%, severely dilated left atrium, moderate mitral regurgitation and mild to moderate tricuspid regurgitation.  A well-seated bioprosthetic aortic valve with a mean gradient of 3.8 mmHg.  No evidence of recurrent effusion.  Recommend follow-up echocardiogram in 6 months upon return to [place].  Encourage exercise and heart healthy diet. Alcohol in moderation.   Currently in regular rhythm. Continue Eliquis for stroke prevention.  Blood pressure is borderline today, likely due to white coat syndrome. Continue lisinopril. Recommend daily blood pressure monitoring with a goal of < 140/90. Submit 2 weeks of readings to office.   Follow-up  with device clinic as scheduled  Follow up in May with EBENEZER or sooner if needed.     It was a pleasure seeing this patient in clinic today. Please do not hesitate to contact me with any future questions.     ALEXANDRA Guo, CNP  Cardiology - [facility]  October 2, 2023    Review of the result(s) of each unique test - Last echocardiogram, BMP, CBC, and lipid panel.      The level of medical decision making during this visit was of moderate complexity.    This note was completed in part using dictation via the Dragon voice recognition software. Some word and grammatical errors may occur and must be interpreted in the appropriate clinical context.  If there are any questions pertaining to this issue, please contact me for further clarification.      Thank you for allowing me to participate in the care of your patient.      Sincerely,     ALEXANDRA Guo [facility]  cc:   Brandt Iglesias MD

## 2023-10-31 DIAGNOSIS — E78.5 HYPERLIPIDEMIA LDL GOAL <130: ICD-10-CM

## 2023-10-31 DIAGNOSIS — I48.92 ATRIAL FLUTTER, UNSPECIFIED TYPE (H): ICD-10-CM

## 2023-10-31 RX ORDER — APIXABAN 5 MG/1
TABLET, FILM COATED ORAL
Qty: 180 TABLET | Refills: 3 | Status: SHIPPED | OUTPATIENT
Start: 2023-10-31 | End: 2024-05-02

## 2023-10-31 RX ORDER — PRAVASTATIN SODIUM 20 MG
TABLET ORAL
Qty: 90 TABLET | Refills: 0 | Status: SHIPPED | OUTPATIENT
Start: 2023-10-31 | End: 2024-02-07

## 2023-10-31 NOTE — TELEPHONE ENCOUNTER
Prescription approved per [facility] Refill Protocol.  Portia Hernandez, RN  [facility] Triage Nurse

## 2024-01-26 NOTE — ADDENDUM NOTE
Addended by: YVON OROURKE on: 7/30/2020 12:57 PM     Modules accepted: Orders, SmartSet    
27-Jan-2024

## 2024-01-29 ENCOUNTER — PATIENT OUTREACH (OUTPATIENT)
Dept: CARE COORDINATION | Facility: CLINIC | Age: 84
End: 2024-01-29
Payer: COMMERCIAL

## 2024-02-05 DIAGNOSIS — M10.9 GOUT, UNSPECIFIED CAUSE, UNSPECIFIED CHRONICITY, UNSPECIFIED SITE: ICD-10-CM

## 2024-02-07 DIAGNOSIS — E78.5 HYPERLIPIDEMIA LDL GOAL <130: ICD-10-CM

## 2024-02-07 DIAGNOSIS — I10 ESSENTIAL HYPERTENSION, BENIGN: ICD-10-CM

## 2024-02-07 RX ORDER — PRAVASTATIN SODIUM 20 MG
TABLET ORAL
Qty: 90 TABLET | Refills: 0 | Status: SHIPPED | OUTPATIENT
Start: 2024-02-07 | End: 2024-04-29

## 2024-02-07 RX ORDER — ALLOPURINOL 100 MG/1
100 TABLET ORAL DAILY
Qty: 90 TABLET | Refills: 0 | Status: SHIPPED | OUTPATIENT
Start: 2024-02-07 | End: 2024-05-02

## 2024-02-07 RX ORDER — LISINOPRIL 20 MG/1
20 TABLET ORAL DAILY
Qty: 90 TABLET | Refills: 1 | Status: SHIPPED | OUTPATIENT
Start: 2024-02-07 | End: 2024-05-02

## 2024-02-12 ENCOUNTER — PATIENT OUTREACH (OUTPATIENT)
Dept: CARE COORDINATION | Facility: CLINIC | Age: 84
End: 2024-02-12
Payer: COMMERCIAL

## 2024-03-14 ENCOUNTER — TELEPHONE (OUTPATIENT)
Dept: INTERNAL MEDICINE | Facility: CLINIC | Age: 84
End: 2024-03-14
Payer: COMMERCIAL

## 2024-03-14 NOTE — TELEPHONE ENCOUNTER
Diagnosis:  Congestive Heart Failure- pulmonary hypertension seen on 6/20/22 echo  Specified Heart Arrhythmias- on eliquis   Acute Myocardial Infarction- remove?  Coagulation Defects and Other Specified Hematological Disorders- platelets 48 on 7/26/23    Appointment:  Last office visit with PCP: 2/28/23  Due for: Annual Wellness Visit 3/1/23 or after     Raymundo Moss RN

## 2024-04-05 ENCOUNTER — TELEPHONE (OUTPATIENT)
Dept: CARDIOLOGY | Facility: CLINIC | Age: 84
End: 2024-04-05
Payer: COMMERCIAL

## 2024-04-05 DIAGNOSIS — I49.5 SICK SINUS SYNDROME (H): ICD-10-CM

## 2024-04-05 DIAGNOSIS — Z95.0 CARDIAC PACEMAKER IN SITU: Primary | ICD-10-CM

## 2024-04-05 DIAGNOSIS — I44.7 LBBB (LEFT BUNDLE BRANCH BLOCK): ICD-10-CM

## 2024-04-05 NOTE — TELEPHONE ENCOUNTER
[facility] Call Center    Phone Message    May a detailed message be left on voicemail: yes     Reason for Call: Order(s): Other:   Reason for requested: In Clinic Device Check  Date needed: as soon as able  Provider name: John    Please call pt to schedule the device check on the day of the Echo or the day of the appt.    Action Taken: Message routed to:  [facility] cardio    Travel Screening: Not Applicable    Thank you!  [facility]

## 2024-04-23 ENCOUNTER — ANCILLARY PROCEDURE (OUTPATIENT)
Dept: CARDIOLOGY | Facility: CLINIC | Age: 84
End: 2024-04-23
Attending: INTERNAL MEDICINE
Payer: COMMERCIAL

## 2024-04-23 DIAGNOSIS — I44.7 LBBB (LEFT BUNDLE BRANCH BLOCK): ICD-10-CM

## 2024-04-23 DIAGNOSIS — Z95.0 CARDIAC PACEMAKER IN SITU: ICD-10-CM

## 2024-04-23 DIAGNOSIS — I49.5 SICK SINUS SYNDROME (H): ICD-10-CM

## 2024-04-23 LAB
MDC_IDC_EPISODE_DTM: NORMAL
MDC_IDC_EPISODE_DURATION: 0 S
MDC_IDC_EPISODE_DURATION: 0 S
MDC_IDC_EPISODE_DURATION: 1 S
MDC_IDC_EPISODE_DURATION: 2 S
MDC_IDC_EPISODE_DURATION: 43 S
MDC_IDC_EPISODE_DURATION: 433 S
MDC_IDC_EPISODE_ID: 70
MDC_IDC_EPISODE_ID: 71
MDC_IDC_EPISODE_ID: 72
MDC_IDC_EPISODE_ID: 73
MDC_IDC_EPISODE_ID: 74
MDC_IDC_EPISODE_ID: 75
MDC_IDC_EPISODE_ID: 76
MDC_IDC_EPISODE_ID: 77
MDC_IDC_EPISODE_ID: 78
MDC_IDC_EPISODE_ID: 79
MDC_IDC_EPISODE_ID: 80
MDC_IDC_EPISODE_ID: 81
MDC_IDC_EPISODE_ID: 82
MDC_IDC_EPISODE_ID: 83
MDC_IDC_EPISODE_ID: 84
MDC_IDC_EPISODE_ID: 85
MDC_IDC_EPISODE_ID: 86
MDC_IDC_EPISODE_ID: 87
MDC_IDC_EPISODE_TYPE: NORMAL
MDC_IDC_LEAD_CONNECTION_STATUS: NORMAL
MDC_IDC_LEAD_CONNECTION_STATUS: NORMAL
MDC_IDC_LEAD_IMPLANT_DT: NORMAL
MDC_IDC_LEAD_IMPLANT_DT: NORMAL
MDC_IDC_LEAD_LOCATION: NORMAL
MDC_IDC_LEAD_LOCATION: NORMAL
MDC_IDC_LEAD_LOCATION_DETAIL_1: NORMAL
MDC_IDC_LEAD_LOCATION_DETAIL_1: NORMAL
MDC_IDC_LEAD_MFG: NORMAL
MDC_IDC_LEAD_MFG: NORMAL
MDC_IDC_LEAD_MODEL: NORMAL
MDC_IDC_LEAD_MODEL: NORMAL
MDC_IDC_LEAD_POLARITY_TYPE: NORMAL
MDC_IDC_LEAD_POLARITY_TYPE: NORMAL
MDC_IDC_LEAD_SERIAL: NORMAL
MDC_IDC_LEAD_SERIAL: NORMAL
MDC_IDC_MSMT_BATTERY_DTM: NORMAL
MDC_IDC_MSMT_BATTERY_REMAINING_LONGEVITY: 129 MO
MDC_IDC_MSMT_BATTERY_RRT_TRIGGER: 2.62
MDC_IDC_MSMT_BATTERY_STATUS: NORMAL
MDC_IDC_MSMT_BATTERY_VOLTAGE: 3.01 V
MDC_IDC_MSMT_LEADCHNL_RA_IMPEDANCE_VALUE: 285 OHM
MDC_IDC_MSMT_LEADCHNL_RA_IMPEDANCE_VALUE: 380 OHM
MDC_IDC_MSMT_LEADCHNL_RA_PACING_THRESHOLD_AMPLITUDE: 0.5 V
MDC_IDC_MSMT_LEADCHNL_RA_PACING_THRESHOLD_PULSEWIDTH: 0.4 MS
MDC_IDC_MSMT_LEADCHNL_RA_SENSING_INTR_AMPL: 3.62 MV
MDC_IDC_MSMT_LEADCHNL_RA_SENSING_INTR_AMPL: 3.62 MV
MDC_IDC_MSMT_LEADCHNL_RV_IMPEDANCE_VALUE: 266 OHM
MDC_IDC_MSMT_LEADCHNL_RV_IMPEDANCE_VALUE: 342 OHM
MDC_IDC_MSMT_LEADCHNL_RV_PACING_THRESHOLD_AMPLITUDE: 0.62 V
MDC_IDC_MSMT_LEADCHNL_RV_PACING_THRESHOLD_PULSEWIDTH: 0.4 MS
MDC_IDC_MSMT_LEADCHNL_RV_SENSING_INTR_AMPL: 10.62 MV
MDC_IDC_MSMT_LEADCHNL_RV_SENSING_INTR_AMPL: 10.62 MV
MDC_IDC_PG_IMPLANT_DTM: NORMAL
MDC_IDC_PG_MFG: NORMAL
MDC_IDC_PG_MODEL: NORMAL
MDC_IDC_PG_SERIAL: NORMAL
MDC_IDC_PG_TYPE: NORMAL
MDC_IDC_SESS_CLINIC_NAME: NORMAL
MDC_IDC_SESS_DTM: NORMAL
MDC_IDC_SESS_TYPE: NORMAL
MDC_IDC_SET_BRADY_AT_MODE_SWITCH_RATE: 171 {BEATS}/MIN
MDC_IDC_SET_BRADY_HYSTRATE: NORMAL
MDC_IDC_SET_BRADY_LOWRATE: 60 {BEATS}/MIN
MDC_IDC_SET_BRADY_MAX_SENSOR_RATE: 130 {BEATS}/MIN
MDC_IDC_SET_BRADY_MAX_TRACKING_RATE: 130 {BEATS}/MIN
MDC_IDC_SET_BRADY_MODE: NORMAL
MDC_IDC_SET_BRADY_PAV_DELAY_LOW: 300 MS
MDC_IDC_SET_BRADY_SAV_DELAY_LOW: 300 MS
MDC_IDC_SET_LEADCHNL_RA_PACING_AMPLITUDE: 1.5 V
MDC_IDC_SET_LEADCHNL_RA_PACING_ANODE_ELECTRODE_1: NORMAL
MDC_IDC_SET_LEADCHNL_RA_PACING_ANODE_LOCATION_1: NORMAL
MDC_IDC_SET_LEADCHNL_RA_PACING_CAPTURE_MODE: NORMAL
MDC_IDC_SET_LEADCHNL_RA_PACING_CATHODE_ELECTRODE_1: NORMAL
MDC_IDC_SET_LEADCHNL_RA_PACING_CATHODE_LOCATION_1: NORMAL
MDC_IDC_SET_LEADCHNL_RA_PACING_POLARITY: NORMAL
MDC_IDC_SET_LEADCHNL_RA_PACING_PULSEWIDTH: 0.4 MS
MDC_IDC_SET_LEADCHNL_RA_SENSING_ANODE_ELECTRODE_1: NORMAL
MDC_IDC_SET_LEADCHNL_RA_SENSING_ANODE_LOCATION_1: NORMAL
MDC_IDC_SET_LEADCHNL_RA_SENSING_CATHODE_ELECTRODE_1: NORMAL
MDC_IDC_SET_LEADCHNL_RA_SENSING_CATHODE_LOCATION_1: NORMAL
MDC_IDC_SET_LEADCHNL_RA_SENSING_POLARITY: NORMAL
MDC_IDC_SET_LEADCHNL_RA_SENSING_SENSITIVITY: 0.3 MV
MDC_IDC_SET_LEADCHNL_RV_PACING_AMPLITUDE: 2 V
MDC_IDC_SET_LEADCHNL_RV_PACING_ANODE_ELECTRODE_1: NORMAL
MDC_IDC_SET_LEADCHNL_RV_PACING_ANODE_LOCATION_1: NORMAL
MDC_IDC_SET_LEADCHNL_RV_PACING_CAPTURE_MODE: NORMAL
MDC_IDC_SET_LEADCHNL_RV_PACING_CATHODE_ELECTRODE_1: NORMAL
MDC_IDC_SET_LEADCHNL_RV_PACING_CATHODE_LOCATION_1: NORMAL
MDC_IDC_SET_LEADCHNL_RV_PACING_POLARITY: NORMAL
MDC_IDC_SET_LEADCHNL_RV_PACING_PULSEWIDTH: 0.4 MS
MDC_IDC_SET_LEADCHNL_RV_SENSING_ANODE_ELECTRODE_1: NORMAL
MDC_IDC_SET_LEADCHNL_RV_SENSING_ANODE_LOCATION_1: NORMAL
MDC_IDC_SET_LEADCHNL_RV_SENSING_CATHODE_ELECTRODE_1: NORMAL
MDC_IDC_SET_LEADCHNL_RV_SENSING_CATHODE_LOCATION_1: NORMAL
MDC_IDC_SET_LEADCHNL_RV_SENSING_POLARITY: NORMAL
MDC_IDC_SET_LEADCHNL_RV_SENSING_SENSITIVITY: 0.9 MV
MDC_IDC_SET_ZONE_DETECTION_INTERVAL: 350 MS
MDC_IDC_SET_ZONE_DETECTION_INTERVAL: 400 MS
MDC_IDC_SET_ZONE_STATUS: NORMAL
MDC_IDC_SET_ZONE_STATUS: NORMAL
MDC_IDC_SET_ZONE_TYPE: NORMAL
MDC_IDC_SET_ZONE_VENDOR_TYPE: NORMAL
MDC_IDC_STAT_AT_BURDEN_PERCENT: 3.7 %
MDC_IDC_STAT_AT_DTM_END: NORMAL
MDC_IDC_STAT_AT_DTM_START: NORMAL
MDC_IDC_STAT_BRADY_AP_VP_PERCENT: 8.7 %
MDC_IDC_STAT_BRADY_AP_VS_PERCENT: 3.28 %
MDC_IDC_STAT_BRADY_AS_VP_PERCENT: 70.79 %
MDC_IDC_STAT_BRADY_AS_VS_PERCENT: 17.21 %
MDC_IDC_STAT_BRADY_DTM_END: NORMAL
MDC_IDC_STAT_BRADY_DTM_START: NORMAL
MDC_IDC_STAT_BRADY_RA_PERCENT_PACED: 11.65 %
MDC_IDC_STAT_BRADY_RV_PERCENT_PACED: 79.64 %
MDC_IDC_STAT_EPISODE_RECENT_COUNT: 0
MDC_IDC_STAT_EPISODE_RECENT_COUNT: 2
MDC_IDC_STAT_EPISODE_RECENT_COUNT: 30
MDC_IDC_STAT_EPISODE_RECENT_COUNT_DTM_END: NORMAL
MDC_IDC_STAT_EPISODE_RECENT_COUNT_DTM_START: NORMAL
MDC_IDC_STAT_EPISODE_TOTAL_COUNT: 0
MDC_IDC_STAT_EPISODE_TOTAL_COUNT: 0
MDC_IDC_STAT_EPISODE_TOTAL_COUNT: 1
MDC_IDC_STAT_EPISODE_TOTAL_COUNT: 30
MDC_IDC_STAT_EPISODE_TOTAL_COUNT: 55
MDC_IDC_STAT_EPISODE_TOTAL_COUNT_DTM_END: NORMAL
MDC_IDC_STAT_EPISODE_TOTAL_COUNT_DTM_START: NORMAL
MDC_IDC_STAT_EPISODE_TYPE: NORMAL
MDC_IDC_STAT_TACHYTHERAPY_RECENT_DTM_END: NORMAL
MDC_IDC_STAT_TACHYTHERAPY_RECENT_DTM_START: NORMAL
MDC_IDC_STAT_TACHYTHERAPY_TOTAL_DTM_END: NORMAL
MDC_IDC_STAT_TACHYTHERAPY_TOTAL_DTM_START: NORMAL

## 2024-04-23 PROCEDURE — 93294 REM INTERROG EVL PM/LDLS PM: CPT | Performed by: INTERNAL MEDICINE

## 2024-04-23 PROCEDURE — 93296 REM INTERROG EVL PM/IDS: CPT | Performed by: INTERNAL MEDICINE

## 2024-04-26 SDOH — HEALTH STABILITY: PHYSICAL HEALTH: ON AVERAGE, HOW MANY DAYS PER WEEK DO YOU ENGAGE IN MODERATE TO STRENUOUS EXERCISE (LIKE A BRISK WALK)?: 6 DAYS

## 2024-04-26 SDOH — HEALTH STABILITY: PHYSICAL HEALTH: ON AVERAGE, HOW MANY MINUTES DO YOU ENGAGE IN EXERCISE AT THIS LEVEL?: 50 MIN

## 2024-04-26 ASSESSMENT — SOCIAL DETERMINANTS OF HEALTH (SDOH): HOW OFTEN DO YOU GET TOGETHER WITH FRIENDS OR RELATIVES?: TWICE A WEEK

## 2024-04-27 DIAGNOSIS — E78.5 HYPERLIPIDEMIA LDL GOAL <130: ICD-10-CM

## 2024-04-29 RX ORDER — PRAVASTATIN SODIUM 20 MG
TABLET ORAL
Qty: 90 TABLET | Refills: 0 | Status: SHIPPED | OUTPATIENT
Start: 2024-04-29 | End: 2024-05-02

## 2024-05-02 ENCOUNTER — OFFICE VISIT (OUTPATIENT)
Dept: INTERNAL MEDICINE | Facility: CLINIC | Age: 84
End: 2024-05-02
Payer: COMMERCIAL

## 2024-05-02 VITALS
HEART RATE: 88 BPM | WEIGHT: 181.9 LBS | DIASTOLIC BLOOD PRESSURE: 70 MMHG | BODY MASS INDEX: 26.04 KG/M2 | HEIGHT: 70 IN | OXYGEN SATURATION: 98 % | SYSTOLIC BLOOD PRESSURE: 138 MMHG | TEMPERATURE: 96.9 F | RESPIRATION RATE: 14 BRPM

## 2024-05-02 DIAGNOSIS — I35.0 SEVERE AORTIC STENOSIS: ICD-10-CM

## 2024-05-02 DIAGNOSIS — I48.92 ATRIAL FLUTTER, UNSPECIFIED TYPE (H): ICD-10-CM

## 2024-05-02 DIAGNOSIS — I10 ESSENTIAL HYPERTENSION, BENIGN: ICD-10-CM

## 2024-05-02 DIAGNOSIS — E78.5 HYPERLIPIDEMIA LDL GOAL <130: ICD-10-CM

## 2024-05-02 DIAGNOSIS — M10.9 GOUT, UNSPECIFIED CAUSE, UNSPECIFIED CHRONICITY, UNSPECIFIED SITE: ICD-10-CM

## 2024-05-02 DIAGNOSIS — D47.2 MGUS (MONOCLONAL GAMMOPATHY OF UNKNOWN SIGNIFICANCE): ICD-10-CM

## 2024-05-02 DIAGNOSIS — Z12.11 SCREEN FOR COLON CANCER: ICD-10-CM

## 2024-05-02 DIAGNOSIS — Z00.00 ENCOUNTER FOR SUBSEQUENT ANNUAL WELLNESS VISIT IN MEDICARE PATIENT: Primary | ICD-10-CM

## 2024-05-02 LAB
ALBUMIN SERPL BCG-MCNC: 4.7 G/DL (ref 3.5–5.2)
ALP SERPL-CCNC: 141 U/L (ref 40–150)
ALT SERPL W P-5'-P-CCNC: 23 U/L (ref 0–70)
ANION GAP SERPL CALCULATED.3IONS-SCNC: 12 MMOL/L (ref 7–15)
AST SERPL W P-5'-P-CCNC: 35 U/L (ref 0–45)
BILIRUB SERPL-MCNC: 1.4 MG/DL
BUN SERPL-MCNC: 18.6 MG/DL (ref 8–23)
CALCIUM SERPL-MCNC: 9.9 MG/DL (ref 8.8–10.2)
CHLORIDE SERPL-SCNC: 101 MMOL/L (ref 98–107)
CHOLEST SERPL-MCNC: 137 MG/DL
CREAT SERPL-MCNC: 1.09 MG/DL (ref 0.67–1.17)
DEPRECATED HCO3 PLAS-SCNC: 23 MMOL/L (ref 22–29)
EGFRCR SERPLBLD CKD-EPI 2021: 68 ML/MIN/1.73M2
ERYTHROCYTE [DISTWIDTH] IN BLOOD BY AUTOMATED COUNT: 13.3 % (ref 10–15)
FASTING STATUS PATIENT QL REPORTED: YES
GLUCOSE SERPL-MCNC: 101 MG/DL (ref 70–99)
HCT VFR BLD AUTO: 42.4 % (ref 40–53)
HDLC SERPL-MCNC: 43 MG/DL
HGB BLD-MCNC: 13.9 G/DL (ref 13.3–17.7)
LDLC SERPL CALC-MCNC: 72 MG/DL
MCH RBC QN AUTO: 29.7 PG (ref 26.5–33)
MCHC RBC AUTO-ENTMCNC: 32.8 G/DL (ref 31.5–36.5)
MCV RBC AUTO: 91 FL (ref 78–100)
NONHDLC SERPL-MCNC: 94 MG/DL
PLATELET # BLD AUTO: 88 10E3/UL (ref 150–450)
POTASSIUM SERPL-SCNC: 5.1 MMOL/L (ref 3.4–5.3)
PROT SERPL-MCNC: 8.2 G/DL (ref 6.4–8.3)
RBC # BLD AUTO: 4.68 10E6/UL (ref 4.4–5.9)
SODIUM SERPL-SCNC: 136 MMOL/L (ref 135–145)
TRIGL SERPL-MCNC: 112 MG/DL
URATE SERPL-MCNC: 6.7 MG/DL (ref 3.4–7)
WBC # BLD AUTO: 7.7 10E3/UL (ref 4–11)

## 2024-05-02 PROCEDURE — 36415 COLL VENOUS BLD VENIPUNCTURE: CPT | Performed by: INTERNAL MEDICINE

## 2024-05-02 PROCEDURE — 84550 ASSAY OF BLOOD/URIC ACID: CPT | Performed by: INTERNAL MEDICINE

## 2024-05-02 PROCEDURE — 80061 LIPID PANEL: CPT | Performed by: INTERNAL MEDICINE

## 2024-05-02 PROCEDURE — 99214 OFFICE O/P EST MOD 30 MIN: CPT | Mod: 25 | Performed by: INTERNAL MEDICINE

## 2024-05-02 PROCEDURE — 80053 COMPREHEN METABOLIC PANEL: CPT | Performed by: INTERNAL MEDICINE

## 2024-05-02 PROCEDURE — G0439 PPPS, SUBSEQ VISIT: HCPCS | Performed by: INTERNAL MEDICINE

## 2024-05-02 PROCEDURE — 85027 COMPLETE CBC AUTOMATED: CPT | Performed by: INTERNAL MEDICINE

## 2024-05-02 PROCEDURE — 82274 ASSAY TEST FOR BLOOD FECAL: CPT | Performed by: INTERNAL MEDICINE

## 2024-05-02 RX ORDER — PRAVASTATIN SODIUM 20 MG
20 TABLET ORAL DAILY
Qty: 90 TABLET | Refills: 0 | Status: SHIPPED | OUTPATIENT
Start: 2024-05-02 | End: 2024-09-03

## 2024-05-02 RX ORDER — ALLOPURINOL 100 MG/1
100 TABLET ORAL DAILY
Qty: 90 TABLET | Refills: 0 | Status: SHIPPED | OUTPATIENT
Start: 2024-05-02 | End: 2024-08-05

## 2024-05-02 RX ORDER — LISINOPRIL 20 MG/1
20 TABLET ORAL DAILY
Qty: 90 TABLET | Refills: 1 | Status: SHIPPED | OUTPATIENT
Start: 2024-05-02

## 2024-05-02 NOTE — PROGRESS NOTES
"Preventive Care Visit  [facility]  Pb Warren MD, Internal Medicine  May 2, 2024      Assessment & Plan     Encounter for subsequent annual wellness visit in Medicare patient  Advised and recommended routine vaccines as part of an update  - Comprehensive metabolic panel; Future  - CBC with platelets; Future    Hyperlipidemia LDL goal <130  Labs ordered as fasting per routine and continue with statin therapy  - Lipid panel reflex to direct LDL Non-fasting; Future  - pravastatin (PRAVACHOL) 20 MG tablet; Take 1 tablet (20 mg) by mouth daily    Essential hypertension, benign  Stable continue with current medical management as  - lisinopril (ZESTRIL) 20 MG tablet; Take 1 tablet (20 mg) by mouth daily    Atrial flutter, unspecified type (H)  Rate controlled, following up with cardiology.  Continue with anticoagulation  - apixaban ANTICOAGULANT (ELIQUIS ANTICOAGULANT) 5 MG tablet; Take 1 tablet (5 mg) by mouth 2 times daily    MGUS (monoclonal gammopathy of unknown significance)  Noted as historic and following up with hematology as direct    Screen for colon cancer  Recommend routine annual fit test  - Fecal colorectal cancer screen FIT; Future    Severe aortic stenosis  Status post TAVR and continuing with observation and imaging as needed      Gout, unspecified cause, unspecified chronicity, unspecified site  Active complaints and continue with allopurinol  - allopurinol (ZYLOPRIM) 100 MG tablet; Take 1 tablet (100 mg) by mouth daily              BMI  Estimated body mass index is 26.1 kg/m  as calculated from the following:    Height as of this encounter: 1.778 m (5' 10\").    Weight as of this encounter: 82.5 kg (181 lb 14.4 oz).       Counseling  Appropriate preventive services were discussed with this patient, including applicable screening as appropriate for fall prevention, nutrition, physical activity, Tobacco-use cessation, weight loss and cognition.  Checklist reviewing preventive " services available has been given to the patient.  Reviewed patient's diet, addressing concerns and/or questions.   He is at risk for psychosocial distress and has been provided with information to reduce risk.   Discussed possible causes of fatigue. The patient was provided with written information regarding signs of hearing loss.           Amara Crews is a 83 year old, presenting for the following:  Wellness Visit        Health Care Directive  Patient does not have a Health Care Directive or Living Will: Advance Directive received and scanned. Click on Code in the patient header to view.    HPI    Discuss cardiac health  Lower back pain, has been seen by [facility] in the past with no improvement from ablation or PT. Has follow up scheduled next week         4/26/2024   General Health   How would you rate your overall physical health? (!) FAIR   Feel stress (tense, anxious, or unable to sleep) Only a little   (!) STRESS CONCERN      4/26/2024   Nutrition   Diet: Regular (no restrictions)         4/26/2024   Exercise   Days per week of moderate/strenous exercise 6 days   Average minutes spent exercising at this level 50 min         4/26/2024   Social Factors   Frequency of gathering with friends or relatives Twice a week   Worry food won't last until get money to buy more No   Food not last or not have enough money for food? No   Do you have housing?  Yes   Are you worried about losing your housing? No   Lack of transportation? No   Unable to get utilities (heat,electricity)? No         5/2/2024   Fall Risk   Gait Speed Test (Document in seconds) 4.6   Gait Speed Test Interpretation Less than or equal to 5.00 seconds - PASS         4/26/2024   Activities of Daily Living- Home Safety   Needs help with the following daily activites None of the above   Safety concerns in the home None of the above         4/26/2024   Dental   Dentist two times every year? Yes         4/26/2024   Hearing Screening   Hearing concerns?  (!) TROUBLE UNDERSTANDING SOFT OR WHISPERED SPEECH.         4/26/2024   Driving Risk Screening   Patient/family members have concerns about driving No         4/26/2024   General Alertness/Fatigue Screening   Have you been more tired than usual lately? (!) YES         4/26/2024   Urinary Incontinence Screening   Bothered by leaking urine in past 6 months No         4/26/2024   TB Screening   Were you born outside of the [place]? No           Today's PHQ-2 Score:       5/2/2024    12:35 PM   PHQ-2 ( 1999 [employer])   Q1: Little interest or pleasure in doing things 0   Q2: Feeling down, depressed or hopeless 0   PHQ-2 Score 0   Q1: Little interest or pleasure in doing things Not at all   Q2: Feeling down, depressed or hopeless Not at all   PHQ-2 Score 0         4/26/2024   Substance Use   Alcohol more than 3/day or more than 7/wk No   Do you have a current opioid prescription? No   How severe/bad is pain from 1 to 10? 7/10   Do you use any other substances recreationally? No     Social History     Tobacco Use    Smoking status: Never    Smokeless tobacco: Never   Vaping Use    Vaping status: Never Used   Substance Use Topics    Alcohol use: Yes     Comment: Max 2 beers per day    Drug use: No       Reviewed and updated as needed this visit by Provider                    Lab work is in process  Current providers sharing in care for this patient include:  Patient Care Team:  Pb Warren MD as PCP - General (Internal Medicine)  Pino Mayer MD as Assigned Cancer Care Provider  Pb Warren MD as Assigned PCP  Jolene Bose APRN CNP as Assigned Heart and Vascular Provider    The following health maintenance items are reviewed in Epic and correct as of today:  Health Maintenance   Topic Date Due    ZOSTER IMMUNIZATION (1 of 2) Never done    IPV IMMUNIZATION (2 of 3 - Adult catch-up series) 03/20/1997    RSV VACCINE (Pregnancy & 60+) (1 - 1-dose 60+ series) Never done    ANNUAL REVIEW OF HM ORDERS  06/30/2023     "COVID-19 Vaccine (5 - 2023-24 season) 01/25/2024    LIPID  02/28/2024    COLORECTAL CANCER SCREENING  05/11/2024    MEDICARE ANNUAL WELLNESS VISIT  05/02/2025    FALL RISK ASSESSMENT  05/02/2025    ADVANCE CARE PLANNING  05/02/2029    DTAP/TDAP/TD IMMUNIZATION (4 - Td or Tdap) 11/03/2030    PHQ-2 (once per calendar year)  Completed    INFLUENZA VACCINE  Completed    Pneumococcal Vaccine: 65+ Years  Completed    HPV IMMUNIZATION  Aged Out    MENINGITIS IMMUNIZATION  Aged Out    RSV MONOCLONAL ANTIBODY  Aged Out         Review of Systems  CONSTITUTIONAL: NEGATIVE for fever, chills, change in weight  EYES: NEGATIVE for vision changes or irritation  ENT/MOUTH: NEGATIVE for ear, mouth and throat problems  RESP: NEGATIVE for significant cough or SOB  CV: NEGATIVE for chest pain, palpitations or peripheral edema  GI: NEGATIVE for nausea, abdominal pain, heartburn, or change in bowel habits  : NEGATIVE for frequency, dysuria, or hematuria  NEURO: NEGATIVE for weakness, dizziness or paresthesias  HEME: NEGATIVE for bleeding problems  PSYCHIATRIC: NEGATIVE for changes in mood or affect     Objective    Exam  /70   Pulse 88   Temp 96.9  F (36.1  C) (Temporal)   Resp 14   Ht 1.778 m (5' 10\")   Wt 82.5 kg (181 lb 14.4 oz)   SpO2 98%   BMI 26.10 kg/m     Estimated body mass index is 26.1 kg/m  as calculated from the following:    Height as of this encounter: 1.778 m (5' 10\").    Weight as of this encounter: 82.5 kg (181 lb 14.4 oz).    Physical Exam  GENERAL: alert and no distress  EYES: Eyes grossly normal to inspection, PERRL and conjunctivae and sclerae normal  HENT: ear canals and TM's normal, nose and mouth without ulcers or lesions  NECK: no adenopathy, no asymmetry, masses, or scars  RESP: lungs clear to auscultation - no rales, rhonchi or wheezes  CV: regular rate and rhythm, normal S1 S2, no S3 or S4, noted  murmur, click or rub, no peripheral edema  ABDOMEN: soft, nontender, no hepatosplenomegaly, no " masses and bowel sounds normal  MS: no gross musculoskeletal defects noted, no edema  NEURO: No focal changes  PSYCH: mentation appears normal, affect normal/bright         Signed Electronically by: Pb Warren MD

## 2024-05-08 LAB — HEMOCCULT STL QL IA: NEGATIVE

## 2024-05-09 ENCOUNTER — TRANSFERRED RECORDS (OUTPATIENT)
Dept: HEALTH INFORMATION MANAGEMENT | Facility: CLINIC | Age: 84
End: 2024-05-09
Payer: COMMERCIAL

## 2024-07-10 ENCOUNTER — ANCILLARY PROCEDURE (OUTPATIENT)
Dept: CARDIOLOGY | Facility: CLINIC | Age: 84
End: 2024-07-10
Attending: INTERNAL MEDICINE
Payer: COMMERCIAL

## 2024-07-10 ENCOUNTER — HOSPITAL ENCOUNTER (OUTPATIENT)
Dept: CARDIOLOGY | Facility: CLINIC | Age: 84
Discharge: HOME | End: 2024-07-10
Attending: NURSE PRACTITIONER | Admitting: NURSE PRACTITIONER
Payer: COMMERCIAL

## 2024-07-10 DIAGNOSIS — Z95.0 CARDIAC PACEMAKER IN SITU: ICD-10-CM

## 2024-07-10 DIAGNOSIS — I31.39 PERICARDIAL EFFUSION: ICD-10-CM

## 2024-07-10 DIAGNOSIS — I35.0 SEVERE AORTIC STENOSIS: ICD-10-CM

## 2024-07-10 DIAGNOSIS — I44.7 LBBB (LEFT BUNDLE BRANCH BLOCK): ICD-10-CM

## 2024-07-10 DIAGNOSIS — I49.5 SICK SINUS SYNDROME (H): ICD-10-CM

## 2024-07-10 LAB
LVEF ECHO: NORMAL
MDC_IDC_EPISODE_DTM: NORMAL
MDC_IDC_EPISODE_DURATION: 1281 S
MDC_IDC_EPISODE_DURATION: 1766 S
MDC_IDC_EPISODE_DURATION: 179 S
MDC_IDC_EPISODE_DURATION: 197 S
MDC_IDC_EPISODE_DURATION: 69 S
MDC_IDC_EPISODE_DURATION: 94 S
MDC_IDC_EPISODE_DURATION: NORMAL S
MDC_IDC_EPISODE_ID: 88
MDC_IDC_EPISODE_ID: 89
MDC_IDC_EPISODE_ID: 90
MDC_IDC_EPISODE_ID: 91
MDC_IDC_EPISODE_ID: 92
MDC_IDC_EPISODE_ID: 93
MDC_IDC_EPISODE_ID: 94
MDC_IDC_EPISODE_ID: 95
MDC_IDC_EPISODE_ID: 96
MDC_IDC_EPISODE_ID: 97
MDC_IDC_EPISODE_ID: 98
MDC_IDC_EPISODE_ID: 99
MDC_IDC_EPISODE_TYPE: NORMAL
MDC_IDC_EPISODE_TYPE_INDUCED: NO
MDC_IDC_LEAD_CONNECTION_STATUS: NORMAL
MDC_IDC_LEAD_CONNECTION_STATUS: NORMAL
MDC_IDC_LEAD_IMPLANT_DT: NORMAL
MDC_IDC_LEAD_IMPLANT_DT: NORMAL
MDC_IDC_LEAD_LOCATION: NORMAL
MDC_IDC_LEAD_LOCATION: NORMAL
MDC_IDC_LEAD_LOCATION_DETAIL_1: NORMAL
MDC_IDC_LEAD_LOCATION_DETAIL_1: NORMAL
MDC_IDC_LEAD_MFG: NORMAL
MDC_IDC_LEAD_MFG: NORMAL
MDC_IDC_LEAD_MODEL: NORMAL
MDC_IDC_LEAD_MODEL: NORMAL
MDC_IDC_LEAD_POLARITY_TYPE: NORMAL
MDC_IDC_LEAD_POLARITY_TYPE: NORMAL
MDC_IDC_LEAD_SERIAL: NORMAL
MDC_IDC_LEAD_SERIAL: NORMAL
MDC_IDC_MSMT_BATTERY_DTM: NORMAL
MDC_IDC_MSMT_BATTERY_REMAINING_LONGEVITY: 127 MO
MDC_IDC_MSMT_BATTERY_RRT_TRIGGER: 2.62
MDC_IDC_MSMT_BATTERY_STATUS: NORMAL
MDC_IDC_MSMT_BATTERY_VOLTAGE: 3.01 V
MDC_IDC_MSMT_LEADCHNL_RA_IMPEDANCE_VALUE: 323 OHM
MDC_IDC_MSMT_LEADCHNL_RA_IMPEDANCE_VALUE: 399 OHM
MDC_IDC_MSMT_LEADCHNL_RA_PACING_THRESHOLD_AMPLITUDE: 0.5 V
MDC_IDC_MSMT_LEADCHNL_RA_PACING_THRESHOLD_PULSEWIDTH: 0.4 MS
MDC_IDC_MSMT_LEADCHNL_RA_SENSING_INTR_AMPL: 3.88 MV
MDC_IDC_MSMT_LEADCHNL_RA_SENSING_INTR_AMPL: 4 MV
MDC_IDC_MSMT_LEADCHNL_RV_IMPEDANCE_VALUE: 285 OHM
MDC_IDC_MSMT_LEADCHNL_RV_IMPEDANCE_VALUE: 380 OHM
MDC_IDC_MSMT_LEADCHNL_RV_PACING_THRESHOLD_AMPLITUDE: 0.75 V
MDC_IDC_MSMT_LEADCHNL_RV_PACING_THRESHOLD_PULSEWIDTH: 0.4 MS
MDC_IDC_MSMT_LEADCHNL_RV_SENSING_INTR_AMPL: 10 MV
MDC_IDC_MSMT_LEADCHNL_RV_SENSING_INTR_AMPL: 14 MV
MDC_IDC_PG_IMPLANT_DTM: NORMAL
MDC_IDC_PG_MFG: NORMAL
MDC_IDC_PG_MODEL: NORMAL
MDC_IDC_PG_SERIAL: NORMAL
MDC_IDC_PG_TYPE: NORMAL
MDC_IDC_SESS_CLINIC_NAME: NORMAL
MDC_IDC_SESS_DTM: NORMAL
MDC_IDC_SESS_TYPE: NORMAL
MDC_IDC_SET_BRADY_AT_MODE_SWITCH_RATE: 171 {BEATS}/MIN
MDC_IDC_SET_BRADY_HYSTRATE: NORMAL
MDC_IDC_SET_BRADY_LOWRATE: 60 {BEATS}/MIN
MDC_IDC_SET_BRADY_MAX_SENSOR_RATE: 130 {BEATS}/MIN
MDC_IDC_SET_BRADY_MAX_TRACKING_RATE: 130 {BEATS}/MIN
MDC_IDC_SET_BRADY_MODE: NORMAL
MDC_IDC_SET_BRADY_PAV_DELAY_LOW: 300 MS
MDC_IDC_SET_BRADY_SAV_DELAY_LOW: 300 MS
MDC_IDC_SET_LEADCHNL_RA_PACING_AMPLITUDE: 1.5 V
MDC_IDC_SET_LEADCHNL_RA_PACING_ANODE_ELECTRODE_1: NORMAL
MDC_IDC_SET_LEADCHNL_RA_PACING_ANODE_LOCATION_1: NORMAL
MDC_IDC_SET_LEADCHNL_RA_PACING_CAPTURE_MODE: NORMAL
MDC_IDC_SET_LEADCHNL_RA_PACING_CATHODE_ELECTRODE_1: NORMAL
MDC_IDC_SET_LEADCHNL_RA_PACING_CATHODE_LOCATION_1: NORMAL
MDC_IDC_SET_LEADCHNL_RA_PACING_POLARITY: NORMAL
MDC_IDC_SET_LEADCHNL_RA_PACING_PULSEWIDTH: 0.4 MS
MDC_IDC_SET_LEADCHNL_RA_SENSING_ANODE_ELECTRODE_1: NORMAL
MDC_IDC_SET_LEADCHNL_RA_SENSING_ANODE_LOCATION_1: NORMAL
MDC_IDC_SET_LEADCHNL_RA_SENSING_CATHODE_ELECTRODE_1: NORMAL
MDC_IDC_SET_LEADCHNL_RA_SENSING_CATHODE_LOCATION_1: NORMAL
MDC_IDC_SET_LEADCHNL_RA_SENSING_POLARITY: NORMAL
MDC_IDC_SET_LEADCHNL_RA_SENSING_SENSITIVITY: 0.3 MV
MDC_IDC_SET_LEADCHNL_RV_PACING_AMPLITUDE: 2 V
MDC_IDC_SET_LEADCHNL_RV_PACING_ANODE_ELECTRODE_1: NORMAL
MDC_IDC_SET_LEADCHNL_RV_PACING_ANODE_LOCATION_1: NORMAL
MDC_IDC_SET_LEADCHNL_RV_PACING_CAPTURE_MODE: NORMAL
MDC_IDC_SET_LEADCHNL_RV_PACING_CATHODE_ELECTRODE_1: NORMAL
MDC_IDC_SET_LEADCHNL_RV_PACING_CATHODE_LOCATION_1: NORMAL
MDC_IDC_SET_LEADCHNL_RV_PACING_POLARITY: NORMAL
MDC_IDC_SET_LEADCHNL_RV_PACING_PULSEWIDTH: 0.4 MS
MDC_IDC_SET_LEADCHNL_RV_SENSING_ANODE_ELECTRODE_1: NORMAL
MDC_IDC_SET_LEADCHNL_RV_SENSING_ANODE_LOCATION_1: NORMAL
MDC_IDC_SET_LEADCHNL_RV_SENSING_CATHODE_ELECTRODE_1: NORMAL
MDC_IDC_SET_LEADCHNL_RV_SENSING_CATHODE_LOCATION_1: NORMAL
MDC_IDC_SET_LEADCHNL_RV_SENSING_POLARITY: NORMAL
MDC_IDC_SET_LEADCHNL_RV_SENSING_SENSITIVITY: 0.9 MV
MDC_IDC_SET_ZONE_DETECTION_INTERVAL: 350 MS
MDC_IDC_SET_ZONE_DETECTION_INTERVAL: 400 MS
MDC_IDC_SET_ZONE_STATUS: NORMAL
MDC_IDC_SET_ZONE_STATUS: NORMAL
MDC_IDC_SET_ZONE_TYPE: NORMAL
MDC_IDC_SET_ZONE_VENDOR_TYPE: NORMAL
MDC_IDC_STAT_AT_BURDEN_PERCENT: 19.8 %
MDC_IDC_STAT_AT_DTM_END: NORMAL
MDC_IDC_STAT_AT_DTM_START: NORMAL
MDC_IDC_STAT_BRADY_AP_VP_PERCENT: 7.97 %
MDC_IDC_STAT_BRADY_AP_VS_PERCENT: 3.37 %
MDC_IDC_STAT_BRADY_AS_VP_PERCENT: 70.09 %
MDC_IDC_STAT_BRADY_AS_VS_PERCENT: 18.46 %
MDC_IDC_STAT_BRADY_DTM_END: NORMAL
MDC_IDC_STAT_BRADY_DTM_START: NORMAL
MDC_IDC_STAT_BRADY_RA_PERCENT_PACED: 9.3 %
MDC_IDC_STAT_BRADY_RV_PERCENT_PACED: 80.29 %
MDC_IDC_STAT_EPISODE_RECENT_COUNT: 0
MDC_IDC_STAT_EPISODE_RECENT_COUNT: 33
MDC_IDC_STAT_EPISODE_RECENT_COUNT: 38
MDC_IDC_STAT_EPISODE_RECENT_COUNT_DTM_END: NORMAL
MDC_IDC_STAT_EPISODE_RECENT_COUNT_DTM_START: NORMAL
MDC_IDC_STAT_EPISODE_TOTAL_COUNT: 0
MDC_IDC_STAT_EPISODE_TOTAL_COUNT: 0
MDC_IDC_STAT_EPISODE_TOTAL_COUNT: 1
MDC_IDC_STAT_EPISODE_TOTAL_COUNT: 42
MDC_IDC_STAT_EPISODE_TOTAL_COUNT: 55
MDC_IDC_STAT_EPISODE_TOTAL_COUNT_DTM_END: NORMAL
MDC_IDC_STAT_EPISODE_TOTAL_COUNT_DTM_START: NORMAL
MDC_IDC_STAT_EPISODE_TYPE: NORMAL
MDC_IDC_STAT_TACHYTHERAPY_RECENT_DTM_END: NORMAL
MDC_IDC_STAT_TACHYTHERAPY_RECENT_DTM_START: NORMAL
MDC_IDC_STAT_TACHYTHERAPY_TOTAL_DTM_END: NORMAL
MDC_IDC_STAT_TACHYTHERAPY_TOTAL_DTM_START: NORMAL

## 2024-07-10 PROCEDURE — 93325 DOPPLER ECHO COLOR FLOW MAPG: CPT

## 2024-07-10 PROCEDURE — 93325 DOPPLER ECHO COLOR FLOW MAPG: CPT | Mod: 26 | Performed by: INTERNAL MEDICINE

## 2024-07-10 PROCEDURE — 93280 PM DEVICE PROGR EVAL DUAL: CPT | Performed by: INTERNAL MEDICINE

## 2024-07-10 PROCEDURE — 93321 DOPPLER ECHO F-UP/LMTD STD: CPT | Mod: 26 | Performed by: INTERNAL MEDICINE

## 2024-07-10 PROCEDURE — 93308 TTE F-UP OR LMTD: CPT | Mod: 26 | Performed by: INTERNAL MEDICINE

## 2024-07-15 ENCOUNTER — OFFICE VISIT (OUTPATIENT)
Dept: CARDIOLOGY | Facility: CLINIC | Age: 84
End: 2024-07-15
Attending: NURSE PRACTITIONER
Payer: COMMERCIAL

## 2024-07-15 VITALS
BODY MASS INDEX: 24.77 KG/M2 | HEIGHT: 70 IN | WEIGHT: 173 LBS | SYSTOLIC BLOOD PRESSURE: 128 MMHG | DIASTOLIC BLOOD PRESSURE: 72 MMHG | HEART RATE: 76 BPM

## 2024-07-15 DIAGNOSIS — E78.5 HYPERLIPIDEMIA LDL GOAL <130: ICD-10-CM

## 2024-07-15 DIAGNOSIS — I10 ESSENTIAL HYPERTENSION, BENIGN: ICD-10-CM

## 2024-07-15 DIAGNOSIS — Z95.2 S/P TAVR (TRANSCATHETER AORTIC VALVE REPLACEMENT): ICD-10-CM

## 2024-07-15 DIAGNOSIS — Z95.0 CARDIAC PACEMAKER IN SITU: ICD-10-CM

## 2024-07-15 DIAGNOSIS — I35.0 SEVERE AORTIC STENOSIS: ICD-10-CM

## 2024-07-15 DIAGNOSIS — I48.92 ATRIAL FLUTTER, UNSPECIFIED TYPE (H): ICD-10-CM

## 2024-07-15 DIAGNOSIS — I48.0 PAROXYSMAL ATRIAL FIBRILLATION (H): Primary | ICD-10-CM

## 2024-07-15 PROCEDURE — 99214 OFFICE O/P EST MOD 30 MIN: CPT | Performed by: NURSE PRACTITIONER

## 2024-07-15 NOTE — LETTER
7/15/2024    Pb Warren MD  [address] [facility] [address]    RE: Mars Reyes       Dear Colleague,     I had the pleasure of seeing Mars Reyes in the [facility].  CARDIOLOGY CLINIC NOTE    PRIMARY CARDIOLOGIST  Dr. 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     He returns to the office today for routine follow up.  Overall, he is feeling well and cardiac standpoint, denies chest pain, shortness of breath, palpitations, PND, orthopnea, presyncope, syncope, or leg edema.    Limited echocardiogram 5/17/2024 showed a normal ejection fraction estimated at 60 to 65%, normal RV size and function, moderate to severely dilated left atrium, mild mitral and tricuspid regurgitation, moderate pulmonary hypertension (46 to 55 mmHg), a well-seated bioprosthetic aortic valve with a mean AoV pressure gradient of 6.9 mmHg.  No pericardial effusion.    Device interrogation showed 9.3% atrial pacing and 80.3% ventricular pacing.  Underlying rhythm is atrial fibrillation in the 60s.  There were 12 mode switches of A-fib with the longest lasting 24 hours.  A-fib burden is 19.8%.  There is no ventricular arrhythmias noted. He is on chronic Eliquis.     Blood pressure is well-controlled at 128/72, managed on lisinopril.  Last BMP was unremarkable.  CBC was stable with the exception of a low platelet count of 88.  Lipid panel showed total  cholesterol 137, HDL 43, LDL 72, triglycerides 112  Weight 173 pounds, down approximately 8 pounds over the last 2 months.    Activity level is unchanged  Compliant with all medications.    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, unspecified cause, unspecified laterality     Contact dermatitis and other eczema, due to unspecified cause     Diverticulosis of colon (without mention of hemorrhage)     DJD (degenerative joint disease)     Essential hypertension, benign     abstracted 7/22/02    Gout, unspecified     Hip joint replacement by other means 06/03/2009    MGUS (monoclonal gammopathy of unknown significance)     Mitral valve disease     mild MS, moderate-severe MR    Pacemaker     PSVT (paroxysmal supraventricular tachycardia) (H24)     Pulmonary HTN (H) 10/18/2011    Thrombocytopenia (H24)        MEDICATIONS:  Current Outpatient Medications   Medication Sig Dispense Refill    acetaminophen (TYLENOL) 325 MG tablet Take 2 tablets (650 mg) by mouth every 4 hours as needed for other (mild pain) 100 tablet 0    allopurinol (ZYLOPRIM) 100 MG tablet Take 1 tablet (100 mg) by mouth daily 90 tablet 0    amoxicillin (AMOXIL) 500 MG tablet Take 2,000 mg by mouth daily as needed (1 hour prior to dental procedures)      apixaban ANTICOAGULANT (ELIQUIS ANTICOAGULANT) 5 MG tablet Take 1 tablet (5 mg) by mouth 2 times daily 180 tablet 3    lisinopril (ZESTRIL) 20 MG tablet Take 1 tablet (20 mg) by mouth daily 90 tablet 1    pravastatin (PRAVACHOL) 20 MG tablet Take 1 tablet (20 mg) by mouth daily 90 tablet 0     No current facility-administered medications for this visit.       SOCIAL HISTORY:  I have reviewed this patient's social history and updated it with pertinent information if needed. Mars Reyes  reports that he  has never smoked. He has never used smokeless tobacco. He reports current alcohol use. He reports that he does not use drugs.    PHYSICAL EXAM:  Pulse:  [76] 76  BP: (128)/(72) 128/72  173 lbs 0 oz    Constitutional: alert, no distress  Respiratory: Good bilateral air entry  Cardiovascular:   GI: nondistended  Neuropsychiatric: appropriate affact    ASSESSMENT/PLAN:  Pertinent issues addressed/ reviewed during this cardiology visit  Atrial Flutter/Fibrillation - s/p flutter ablation in 2019. Recurrent PAF, managed on no rate controlling agents. Continue Eliquis for stroke prevention.   Severe Aortic Stenosis - s/p TAVR ( 2021 ) using a 29 mm Medtronic evolute pro valve. Limited echocardiogram 5/17/2024 showed a normal ejection fraction estimated at 60 to 65%, normal RV size and function, moderate to severely dilated left atrium, mild mitral and tricuspid regurgitation, moderate pulmonary hypertension (46 to 55 mmHg), a well-seated bioprosthetic aortic valve with a mean AoV pressure gradient of 6.9 mmHg.  No pericardial effusion. Surveillance echo in 1 year.   Hypertension - Well controlled, continue lisinopril.   Hyperlipidemia - On pravastatin.   CHB s/p PPM -  Device interrogation showed 9.3% atrial pacing and 80.3% ventricular pacing.  Underlying rhythm is atrial fibrillation in the 60s.  There were 12 mode switches of A-fib with the longest lasting 24 hours.  A-fib burden is 19.8%.  There is no ventricular arrhythmias noted.     Follow up in 1 year with EBENEZER or sooner if needed.     It was a pleasure seeing this patient in clinic today. Please do not hesitate to contact me with any future questions.     ALEXANDRA Guo, CNP  Cardiology - [facility]  07/15/2024       The level of medical decision making during this visit was of moderate complexity.    This note was completed in part using dictation via the Dragon voice recognition software. Some word and grammatical errors may occur and must be interpreted in  the appropriate clinical context.  If there are any questions pertaining to this issue, please contact me for further clarification.      Thank you for allowing me to participate in the care of your patient.      Sincerely,     ALEXANDRA Guo CNP     [facility]  cc:   ALEXANDRA Guo CNP  [address] [place] [address]

## 2024-07-15 NOTE — PROGRESS NOTES
CARDIOLOGY CLINIC NOTE    PRIMARY CARDIOLOGIST  Dr. 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     He returns to the office today for routine follow up.  Overall, he is feeling well and cardiac standpoint, denies chest pain, shortness of breath, palpitations, PND, orthopnea, presyncope, syncope, or leg edema.    Limited echocardiogram 5/17/2024 showed a normal ejection fraction estimated at 60 to 65%, normal RV size and function, moderate to severely dilated left atrium, mild mitral and tricuspid regurgitation, moderate pulmonary hypertension (46 to 55 mmHg), a well-seated bioprosthetic aortic valve with a mean AoV pressure gradient of 6.9 mmHg.  No pericardial effusion.    Device interrogation showed 9.3% atrial pacing and 80.3% ventricular pacing.  Underlying rhythm is atrial fibrillation in the 60s.  There were 12 mode switches of A-fib with the longest lasting 24 hours.  A-fib burden is 19.8%.  There is no ventricular arrhythmias noted. He is on chronic Eliquis.     Blood pressure is well-controlled at 128/72, managed on lisinopril.  Last BMP was unremarkable.  CBC was stable with the exception of a low platelet count of 88.  Lipid panel showed total cholesterol 137, HDL 43, LDL 72, triglycerides 112  Weight 173 pounds, down approximately 8 pounds over the last 2 months.    Activity level is unchanged  Compliant with all medications.    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, unspecified cause, unspecified laterality     Contact dermatitis and other eczema, due to unspecified cause     Diverticulosis of colon (without mention of hemorrhage)     DJD (degenerative joint disease)     Essential hypertension, benign     abstracted 7/22/02    Gout, unspecified     Hip joint replacement by other means 06/03/2009    MGUS (monoclonal gammopathy of unknown significance)     Mitral valve disease     mild MS, moderate-severe MR    Pacemaker     PSVT (paroxysmal supraventricular tachycardia) (H24)     Pulmonary HTN (H) 10/18/2011    Thrombocytopenia (H24)        MEDICATIONS:  Current Outpatient Medications   Medication Sig Dispense Refill    acetaminophen (TYLENOL) 325 MG tablet Take 2 tablets (650 mg) by mouth every 4 hours as needed for other (mild pain) 100 tablet 0    allopurinol (ZYLOPRIM) 100 MG tablet Take 1 tablet (100 mg) by mouth daily 90 tablet 0    amoxicillin (AMOXIL) 500 MG tablet Take 2,000 mg by mouth daily as needed (1 hour prior to dental procedures)      apixaban ANTICOAGULANT (ELIQUIS ANTICOAGULANT) 5 MG tablet Take 1 tablet (5 mg) by mouth 2 times daily 180 tablet 3    lisinopril (ZESTRIL) 20 MG tablet Take 1 tablet (20 mg) by mouth daily 90 tablet 1    pravastatin (PRAVACHOL) 20 MG tablet Take 1 tablet (20 mg) by mouth daily 90 tablet 0     No current facility-administered medications for this visit.       SOCIAL HISTORY:  I have reviewed this patient's social history and updated it with pertinent information if needed. Mars Reyes  reports that he has never smoked. He has never used smokeless tobacco. He reports current alcohol use. He reports that he does not use drugs.    PHYSICAL EXAM:  Pulse:  [76] 76  BP: (128)/(72) 128/72  173 lbs 0  oz    Constitutional: alert, no distress  Respiratory: Good bilateral air entry  Cardiovascular:   GI: nondistended  Neuropsychiatric: appropriate affact    ASSESSMENT/PLAN:  Pertinent issues addressed/ reviewed during this cardiology visit  Atrial Flutter/Fibrillation - s/p flutter ablation in 2019. Recurrent PAF, managed on no rate controlling agents. Continue Eliquis for stroke prevention.   Severe Aortic Stenosis - s/p TAVR ( 2021 ) using a 29 mm Medtronic evolute pro valve. Limited echocardiogram 5/17/2024 showed a normal ejection fraction estimated at 60 to 65%, normal RV size and function, moderate to severely dilated left atrium, mild mitral and tricuspid regurgitation, moderate pulmonary hypertension (46 to 55 mmHg), a well-seated bioprosthetic aortic valve with a mean AoV pressure gradient of 6.9 mmHg.  No pericardial effusion. Surveillance echo in 1 year.   Hypertension - Well controlled, continue lisinopril.   Hyperlipidemia - On pravastatin.   CHB s/p PPM -  Device interrogation showed 9.3% atrial pacing and 80.3% ventricular pacing.  Underlying rhythm is atrial fibrillation in the 60s.  There were 12 mode switches of A-fib with the longest lasting 24 hours.  A-fib burden is 19.8%.  There is no ventricular arrhythmias noted.     Follow up in 1 year with EBENEZER or sooner if needed.     It was a pleasure seeing this patient in clinic today. Please do not hesitate to contact me with any future questions.     ALEXANDRA Guo, CNP  Cardiology - [facility]  07/15/2024       The level of medical decision making during this visit was of moderate complexity.    This note was completed in part using dictation via the Dragon voice recognition software. Some word and grammatical errors may occur and must be interpreted in the appropriate clinical context.  If there are any questions pertaining to this issue, please contact me for further clarification.

## 2024-07-15 NOTE — PATIENT INSTRUCTIONS
Thanks for participating in a office visit with the [facility] clinic today.    Doing well on a cardiac standpoint  Echocardiogram showed a well functioning aortic valve. Heart function is normal. You do have a mild leak in 2 of your valves.   Blood pressures well controlled.   Pacemaker picked up a few episodes of atrial fibrillation.   Continue Eliquis for stroke prevention.     Follow up echo in 1 year with EBENEZER follow up.     Please call my nurse at  [phone] with any questions or concerns.    Scheduling phone number: [phone]  Reminder: Please bring in all current medications, over the counter supplements and vitamin bottles to your next appointment.

## 2024-07-25 ENCOUNTER — LAB (OUTPATIENT)
Dept: ONCOLOGY | Facility: CLINIC | Age: 84
End: 2024-07-25
Attending: INTERNAL MEDICINE
Payer: COMMERCIAL

## 2024-07-25 DIAGNOSIS — D47.2 MGUS (MONOCLONAL GAMMOPATHY OF UNKNOWN SIGNIFICANCE): ICD-10-CM

## 2024-07-25 LAB
ANION GAP SERPL CALCULATED.3IONS-SCNC: 10 MMOL/L (ref 7–15)
BUN SERPL-MCNC: 16.9 MG/DL (ref 8–23)
CALCIUM SERPL-MCNC: 9.6 MG/DL (ref 8.8–10.4)
CHLORIDE SERPL-SCNC: 97 MMOL/L (ref 98–107)
CREAT SERPL-MCNC: 0.96 MG/DL (ref 0.67–1.17)
EGFRCR SERPLBLD CKD-EPI 2021: 79 ML/MIN/1.73M2
ERYTHROCYTE [DISTWIDTH] IN BLOOD BY AUTOMATED COUNT: 12.9 % (ref 10–15)
GLUCOSE SERPL-MCNC: 94 MG/DL (ref 70–99)
HCO3 SERPL-SCNC: 25 MMOL/L (ref 22–29)
HCT VFR BLD AUTO: 43.6 % (ref 40–53)
HGB BLD-MCNC: 14.5 G/DL (ref 13.3–17.7)
MCH RBC QN AUTO: 29.8 PG (ref 26.5–33)
MCHC RBC AUTO-ENTMCNC: 33.3 G/DL (ref 31.5–36.5)
MCV RBC AUTO: 90 FL (ref 78–100)
PLATELET # BLD AUTO: 92 10E3/UL (ref 150–450)
POTASSIUM SERPL-SCNC: 5 MMOL/L (ref 3.4–5.3)
RBC # BLD AUTO: 4.87 10E6/UL (ref 4.4–5.9)
SODIUM SERPL-SCNC: 132 MMOL/L (ref 135–145)
TOTAL PROTEIN SERUM FOR ELP: 7.4 G/DL (ref 6.4–8.3)
WBC # BLD AUTO: 7.3 10E3/UL (ref 4–11)

## 2024-07-25 PROCEDURE — 84165 PROTEIN E-PHORESIS SERUM: CPT | Mod: 26 | Performed by: PATHOLOGY

## 2024-07-25 PROCEDURE — 84155 ASSAY OF PROTEIN SERUM: CPT | Performed by: INTERNAL MEDICINE

## 2024-07-25 PROCEDURE — 84165 PROTEIN E-PHORESIS SERUM: CPT | Mod: TC | Performed by: PATHOLOGY

## 2024-07-25 PROCEDURE — 83521 IG LIGHT CHAINS FREE EACH: CPT | Performed by: INTERNAL MEDICINE

## 2024-07-25 PROCEDURE — 80048 BASIC METABOLIC PNL TOTAL CA: CPT | Performed by: INTERNAL MEDICINE

## 2024-07-25 PROCEDURE — 82784 ASSAY IGA/IGD/IGG/IGM EACH: CPT | Performed by: INTERNAL MEDICINE

## 2024-07-25 PROCEDURE — 85027 COMPLETE CBC AUTOMATED: CPT | Performed by: INTERNAL MEDICINE

## 2024-07-25 PROCEDURE — 36415 COLL VENOUS BLD VENIPUNCTURE: CPT

## 2024-07-25 NOTE — PROGRESS NOTES
Medical Assistant Note:  Mars Reyes presents today for blood draw.    Patient seen by provider today: No.   present during visit today: Not Applicable.    Concerns: No Concerns.    Procedure:  Lab draw site: blood draw, Needle type: butterfly, Gauge: 23.    Post Assessment:  Labs drawn without difficulty: Yes.    Discharge Plan:  Departure Mode: Ambulatory.    Face to Face Time: 10.    Tiffany Lowery, CMA

## 2024-07-26 LAB
ALBUMIN SERPL ELPH-MCNC: 4.6 G/DL (ref 3.7–5.1)
ALPHA1 GLOB SERPL ELPH-MCNC: 0.3 G/DL (ref 0.2–0.4)
ALPHA2 GLOB SERPL ELPH-MCNC: 0.7 G/DL (ref 0.5–0.9)
B-GLOBULIN SERPL ELPH-MCNC: 0.9 G/DL (ref 0.6–1)
GAMMA GLOB SERPL ELPH-MCNC: 1 G/DL (ref 0.7–1.6)
IGG SERPL-MCNC: 960 MG/DL (ref 610–1616)
KAPPA LC FREE SER-MCNC: 1.78 MG/DL (ref 0.33–1.94)
KAPPA LC FREE/LAMBDA FREE SER NEPH: 1.31 {RATIO} (ref 0.26–1.65)
LAMBDA LC FREE SERPL-MCNC: 1.36 MG/DL (ref 0.57–2.63)
LOCATION OF TASK: ABNORMAL
M PROTEIN SERPL ELPH-MCNC: 0.2 G/DL
PROT PATTERN SERPL ELPH-IMP: ABNORMAL

## 2024-07-26 NOTE — RESULT ENCOUNTER NOTE
Dear Mr. Reyes,    Blood tests are overall stable. We will review it during appointment.    Please, call me with any questions.    Pino Mayer MD

## 2024-08-04 DIAGNOSIS — M10.9 GOUT, UNSPECIFIED CAUSE, UNSPECIFIED CHRONICITY, UNSPECIFIED SITE: ICD-10-CM

## 2024-08-05 RX ORDER — ALLOPURINOL 100 MG/1
100 TABLET ORAL DAILY
Qty: 90 TABLET | Refills: 0 | Status: SHIPPED | OUTPATIENT
Start: 2024-08-05

## 2024-08-27 ENCOUNTER — VIRTUAL VISIT (OUTPATIENT)
Dept: ONCOLOGY | Facility: CLINIC | Age: 84
End: 2024-08-27
Attending: INTERNAL MEDICINE
Payer: COMMERCIAL

## 2024-08-27 VITALS — WEIGHT: 180 LBS | HEIGHT: 71 IN | BODY MASS INDEX: 25.2 KG/M2

## 2024-08-27 DIAGNOSIS — D47.2 MGUS (MONOCLONAL GAMMOPATHY OF UNKNOWN SIGNIFICANCE): Primary | ICD-10-CM

## 2024-08-27 DIAGNOSIS — D69.6 THROMBOCYTOPENIA (H): ICD-10-CM

## 2024-08-27 PROCEDURE — 99213 OFFICE O/P EST LOW 20 MIN: CPT | Performed by: INTERNAL MEDICINE

## 2024-08-27 ASSESSMENT — PAIN SCALES - GENERAL: PAINLEVEL: SEVERE PAIN (6)

## 2024-08-27 NOTE — LETTER
8/27/2024      Mars Reyes  [address] [address]      Dear Colleague,    Thank you for referring your patient, Mars Reyes, to the [facility] [place]. Please see a copy of my visit note below.    Virtual Visit Details    Type of service:  Telephone Visit   Phone call duration: 12 minutes   Originating Location (pt. Location): Home    Distant Location (provider location):  On-site    HEMATOLOGIC HISTORY:  Mr. Mars Reyes is a gentleman with chronic thrombocytopenia and MGUS.   1.  On 05/20/2009, platelets of 191. On 12/21/2011, platelets of 75.   2.  On 02/20/2017, platelets of 57 with hemoglobin of 13.9.   3  On 02/21/2017:    -Normal folate, LDH,TSH and vitamin B12 of 372.    -SPEP reveals M-spike of 0.1.   - Lupus negative.   4. US on 02/22/2017 reveals normal spleen size.  5.  Bone marrow biopsy on 02/23/2017 reveals normocellular bone marrow with 50% cellularity with trilineage hematopoiesis.  Adequate megakaryocyte.  Slight erythroid hypoplasia.  No increase in blasts. 5% to 10% plasma cells.   -Cytogenetics and FISH for myeloma panel is normal.    6. Serum ROSA reveals monoclonal IgG immunoglobulin of kappa light chain type and monoclonal free immunoglobulin light chain of lambda chain type.      SUBJECTIVE:    Mr. Reyes is an 83-year-old gentleman with MGUS and chronic thrombocytopenia. We are monitoring it.       His overall condition is stable.  He has mild generalized weakness which will go along with his age.  He is able to do activities of daily living.  No headache.  No dizziness.  No chest pain.  No shortness of breath.  No abdominal pain.  No nausea or vomiting.  Appetite is good.  No urinary or bowel complaints.  No bleeding.  No fever or night sweats.  No bone pain.  Mild arthritic joint pain.  All other review of systems negative.     PHYSICAL EXAMINATION:    He is alert and oriented x 3.  Not in distress.   Rest of the systems not examined as this is a  telephone visit.     LABORATORY:  CBC, BMP, SPEP, free light chain and IgG level done on 07/25/2024 reviewed.     ASSESSMENT:  1.  An 83-year-old gentleman with monoclonal gammopathy of undetermined significance (MGUS). MGUS is stable.  2.  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 Thrombocytopenia is stable.     PLAN:  1.  I had a telephone visit with the patient.  Patient overall is doing well.    Labs were reviewed with him.  Explained to him that MGUS is stable.  Natural history of disease explained.  Risk of progression to myeloma is low at about 1% per year. Unlikely that it will progress to myeloma in his lifetime.  MGUS will be monitored once a year.    2.  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  Thrombocytopenia has been stable.  He does not have any bleeding complications.  Will monitor CBC every 6 months.    3.  I will see him in 1 year with labs.  In between he will have CBC checked. Advised him to call us with any questions or concerns.     Total telephone visit time of 12 minutes.  Time spent in today's visit, review of chart/investigations today and documentation.      Again, thank you for allowing me to participate in the care of your patient.        Sincerely,        Pino Mayer MD

## 2024-08-27 NOTE — LETTER
8/27/2024      Mars Reyes  [address] [address]      Dear Colleague,    Thank you for referring your patient, Mars Reyes, to the [facility] [place]. Please see a copy of my visit note below.    Virtual Visit Details    Type of service:  Telephone Visit   Phone call duration: 12 minutes   Originating Location (pt. Location): Home    Distant Location (provider location):  On-site    HEMATOLOGIC HISTORY:  Mr. Mars Reyes is a gentleman with chronic thrombocytopenia and MGUS.   1.  On 05/20/2009, platelets of 191. On 12/21/2011, platelets of 75.   2.  On 02/20/2017, platelets of 57 with hemoglobin of 13.9.   3  On 02/21/2017:    -Normal folate, LDH,TSH and vitamin B12 of 372.    -SPEP reveals M-spike of 0.1.   - Lupus negative.   4. US on 02/22/2017 reveals normal spleen size.  5.  Bone marrow biopsy on 02/23/2017 reveals normocellular bone marrow with 50% cellularity with trilineage hematopoiesis.  Adequate megakaryocyte.  Slight erythroid hypoplasia.  No increase in blasts. 5% to 10% plasma cells.   -Cytogenetics and FISH for myeloma panel is normal.    6. Serum ROSA reveals monoclonal IgG immunoglobulin of kappa light chain type and monoclonal free immunoglobulin light chain of lambda chain type.      SUBJECTIVE:    Mr. Reyes is an 83-year-old gentleman with MGUS and chronic thrombocytopenia. We are monitoring it.       His overall condition is stable.  He has mild generalized weakness which will go along with his age.  He is able to do activities of daily living.  No headache.  No dizziness.  No chest pain.  No shortness of breath.  No abdominal pain.  No nausea or vomiting.  Appetite is good.  No urinary or bowel complaints.  No bleeding.  No fever or night sweats.  No bone pain.  Mild arthritic joint pain.  All other review of systems negative.     PHYSICAL EXAMINATION:    He is alert and oriented x 3.  Not in distress.   Rest of the systems not examined as this is a  telephone visit.     LABORATORY:  CBC, BMP, SPEP, free light chain and IgG level done on 07/25/2024 reviewed.     ASSESSMENT:  1.  An 83-year-old gentleman with monoclonal gammopathy of undetermined significance (MGUS). MGUS is stable.  2.  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 Thrombocytopenia is stable.     PLAN:  1.  I had a telephone visit with the patient.  Patient overall is doing well.    Labs were reviewed with him.  Explained to him that MGUS is stable.  Natural history of disease explained.  Risk of progression to myeloma is low at about 1% per year. Unlikely that it will progress to myeloma in his lifetime.  MGUS will be monitored once a year.    2.  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  Thrombocytopenia has been stable.  He does not have any bleeding complications.  Will monitor CBC every 6 months.    3.  I will see him in 1 year with labs.  In between he will have CBC checked. Advised him to call us with any questions or concerns.     Total telephone visit time of 12 minutes.  Time spent in today's visit, review of chart/investigations today and documentation.      Again, thank you for allowing me to participate in the care of your patient.        Sincerely,        Pino Mayer MD

## 2024-08-27 NOTE — PROGRESS NOTES
Virtual Visit Details    Type of service:  Telephone Visit   Phone call duration: 12 minutes   Originating Location (pt. Location): Home    Distant Location (provider location):  On-site    HEMATOLOGIC HISTORY:  Mr. Mars Reyes is a gentleman with chronic thrombocytopenia and MGUS.   1.  On 05/20/2009, platelets of 191. On 12/21/2011, platelets of 75.   2.  On 02/20/2017, platelets of 57 with hemoglobin of 13.9.   3  On 02/21/2017:    -Normal folate, LDH,TSH and vitamin B12 of 372.    -SPEP reveals M-spike of 0.1.   - Lupus negative.   4. US on 02/22/2017 reveals normal spleen size.  5.  Bone marrow biopsy on 02/23/2017 reveals normocellular bone marrow with 50% cellularity with trilineage hematopoiesis.  Adequate megakaryocyte.  Slight erythroid hypoplasia.  No increase in blasts. 5% to 10% plasma cells.   -Cytogenetics and FISH for myeloma panel is normal.    6. Serum ROSA reveals monoclonal IgG immunoglobulin of kappa light chain type and monoclonal free immunoglobulin light chain of lambda chain type.      SUBJECTIVE:    Mr. Reyes is an 83-year-old gentleman with MGUS and chronic thrombocytopenia. We are monitoring it.       His overall condition is stable.  He has mild generalized weakness which will go along with his age.  He is able to do activities of daily living.  No headache.  No dizziness.  No chest pain.  No shortness of breath.  No abdominal pain.  No nausea or vomiting.  Appetite is good.  No urinary or bowel complaints.  No bleeding.  No fever or night sweats.  No bone pain.  Mild arthritic joint pain.  All other review of systems negative.     PHYSICAL EXAMINATION:    He is alert and oriented x 3.  Not in distress.   Rest of the systems not examined as this is a telephone visit.     LABORATORY:  CBC, BMP, SPEP, free light chain and IgG level done on 07/25/2024 reviewed.     ASSESSMENT:  1.  An 83-year-old gentleman with monoclonal gammopathy of undetermined significance (MGUS). MGUS is stable.  2.   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 Thrombocytopenia is stable.     PLAN:  1.  I had a telephone visit with the patient.  Patient overall is doing well.    Labs were reviewed with him.  Explained to him that MGUS is stable.  Natural history of disease explained.  Risk of progression to myeloma is low at about 1% per year. Unlikely that it will progress to myeloma in his lifetime.  MGUS will be monitored once a year.    2.  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  Thrombocytopenia has been stable.  He does not have any bleeding complications.  Will monitor CBC every 6 months.    3.  I will see him in 1 year with labs.  In between he will have CBC checked. Advised him to call us with any questions or concerns.     Total telephone visit time of 12 minutes.  Time spent in today's visit, review of chart/investigations today and documentation.

## 2024-08-27 NOTE — NURSING NOTE
Current patient location: [facility] [facility] [address]    Is the patient currently in the state of [place]? YES    Visit mode:TELEPHONE    If the visit is dropped, the patient can be reconnected by: TELEPHONE VISIT: Phone number:   Telephone Information:   Mobile [phone]       Will anyone else be joining the visit? NO  (If patient encounters technical issues they should call [phone]    How would you like to obtain your AVS? MyChart    Are changes needed to the allergy or medication list? Pt stated no changes to allergies and Pt stated no med changes    Are refills needed on medications prescribed by this physician? NO    Rooming Documentation:  Questionnaire(s) completed      Reason for visit: MICKEY STAFFORDF

## 2024-09-03 ENCOUNTER — OFFICE VISIT (OUTPATIENT)
Dept: INTERNAL MEDICINE | Facility: CLINIC | Age: 84
End: 2024-09-03
Payer: COMMERCIAL

## 2024-09-03 VITALS
WEIGHT: 172.9 LBS | TEMPERATURE: 96.9 F | DIASTOLIC BLOOD PRESSURE: 82 MMHG | SYSTOLIC BLOOD PRESSURE: 130 MMHG | OXYGEN SATURATION: 99 % | RESPIRATION RATE: 15 BRPM | BODY MASS INDEX: 24.21 KG/M2 | HEIGHT: 71 IN | HEART RATE: 67 BPM

## 2024-09-03 DIAGNOSIS — I27.20 PULMONARY HYPERTENSION, UNSPECIFIED (H): ICD-10-CM

## 2024-09-03 DIAGNOSIS — Z95.2 S/P TAVR (TRANSCATHETER AORTIC VALVE REPLACEMENT): ICD-10-CM

## 2024-09-03 DIAGNOSIS — D69.3 IMMUNE THROMBOCYTOPENIC PURPURA (H): ICD-10-CM

## 2024-09-03 DIAGNOSIS — G89.29 CHRONIC BACK PAIN, UNSPECIFIED BACK LOCATION, UNSPECIFIED BACK PAIN LATERALITY: ICD-10-CM

## 2024-09-03 DIAGNOSIS — I10 ESSENTIAL HYPERTENSION, BENIGN: ICD-10-CM

## 2024-09-03 DIAGNOSIS — M54.9 CHRONIC BACK PAIN, UNSPECIFIED BACK LOCATION, UNSPECIFIED BACK PAIN LATERALITY: ICD-10-CM

## 2024-09-03 DIAGNOSIS — E78.5 HYPERLIPIDEMIA LDL GOAL <130: ICD-10-CM

## 2024-09-03 DIAGNOSIS — D47.2 MGUS (MONOCLONAL GAMMOPATHY OF UNKNOWN SIGNIFICANCE): Primary | ICD-10-CM

## 2024-09-03 LAB
ALBUMIN SERPL BCG-MCNC: 4.5 G/DL (ref 3.5–5.2)
ALP SERPL-CCNC: 121 U/L (ref 40–150)
ALT SERPL W P-5'-P-CCNC: 20 U/L (ref 0–70)
AST SERPL W P-5'-P-CCNC: 28 U/L (ref 0–45)
BILIRUB DIRECT SERPL-MCNC: 0.34 MG/DL (ref 0–0.3)
BILIRUB SERPL-MCNC: 1.2 MG/DL
PROT SERPL-MCNC: 7.5 G/DL (ref 6.4–8.3)

## 2024-09-03 PROCEDURE — 36415 COLL VENOUS BLD VENIPUNCTURE: CPT | Performed by: INTERNAL MEDICINE

## 2024-09-03 PROCEDURE — G2211 COMPLEX E/M VISIT ADD ON: HCPCS | Performed by: INTERNAL MEDICINE

## 2024-09-03 PROCEDURE — 80076 HEPATIC FUNCTION PANEL: CPT | Performed by: INTERNAL MEDICINE

## 2024-09-03 PROCEDURE — 99214 OFFICE O/P EST MOD 30 MIN: CPT | Performed by: INTERNAL MEDICINE

## 2024-09-03 RX ORDER — PRAVASTATIN SODIUM 20 MG
20 TABLET ORAL DAILY
Qty: 90 TABLET | Refills: 3 | Status: SHIPPED | OUTPATIENT
Start: 2024-09-03

## 2024-09-03 RX ORDER — GABAPENTIN 300 MG/1
300 CAPSULE ORAL 3 TIMES DAILY
Qty: 90 CAPSULE | Refills: 0 | Status: SHIPPED | OUTPATIENT
Start: 2024-09-03

## 2024-09-03 ASSESSMENT — PAIN SCALES - GENERAL: PAINLEVEL: MODERATE PAIN (5)

## 2024-09-03 NOTE — PROGRESS NOTES
Assessment & Plan     MGUS (monoclonal gammopathy of unknown significance)  Following with hematology as directed for routine lab work.    S/P TAVR (transcatheter aortic valve replacement)  Stable medically managed and follow-up with cardiology as directed.    Pulmonary hypertension, unspecified (H)  Noted as baseline history and clinically stable    Immune thrombocytopenic purpura (H)  Recent lab work reviewed with place patient as noted  Recent platelet count 92,000 as compared to 63 and 48,000 respectively 1 year ago    Essential hypertension, benign  Slightly elevated here at the clinic but patient informs me that it is normal at home.    Hyperlipidemia LDL goal <130  Rechecking liver function panel with goal LDL reviewed  LDL Cholesterol Calculated   Date Value Ref Range Status   05/02/2024 72 <=100 mg/dL Final   07/14/2020 84 <100 mg/dL Final     Comment:     Desirable:       <100 mg/dl       - pravastatin (PRAVACHOL) 20 MG tablet; Take 1 tablet (20 mg) by mouth daily.  - Hepatic function panel; Future    Chronic back pain, unspecified back location, unspecified back pain laterality  Likely due to spondylosis.  He has had injection therapy x 2 with no resolve and has functional pain and discomfort with limitations in his activities of daily living.  He is not interested in surgical intervention.  He has done physical therapy with no resolve.  We discussed potential treatment options with oral neuromodulators.  Suggested and discussed low-dose trial of gabapentin.  Discussed dosing and side effect profile.  Patient interested in a trial.  - gabapentin (NEURONTIN) 300 MG capsule; Take 1 capsule (300 mg) by mouth 3 times daily.      The longitudinal plan of care for the diagnosis(es)/condition(s) as documented were addressed during this visit. Due to the added complexity in care, I will continue to support Mars in the subsequent management and with ongoing continuity of care.      Work on weight loss  Regular  "exercise    Subjective   Mars is a 83 year old, presenting for the following health issues:  Results and Back Pain    Follow liver function     History of Present Illness       Back Pain:  He presents for follow up of back pain. Patient's back pain is a recurring problem.  Location of back pain:  Left lower back  Description of back pain: dull ache  Back pain spreads: left thigh    Since patient first noticed back pain, pain is: always present, but gets better and worse  Does back pain interfere with his job:  No   He consumes 0 sweetened beverage(s) daily.He exercises with enough effort to increase his heart rate 30 to 60 minutes per day.  He exercises with enough effort to increase his heart rate 5 days per week.   He is taking medications regularly.     Patient states he has had chronic left lower back pain for 2-3 years. Has been seen by [facility] and tried PT, ablation and multiple injections with no improvement. Patient would like to discuss alternative causes of his pain or second opinion     Review of Systems  CONSTITUTIONAL: NEGATIVE for fever, chills, change in weight  EYES: NEGATIVE for vision changes or irritation  ENT/MOUTH: NEGATIVE for ear, mouth and throat problems  RESP: NEGATIVE for significant cough or SOB  CV: NEGATIVE for chest pain, palpitations or peripheral edema  GI: NEGATIVE for nausea, abdominal pain, heartburn, or change in bowel habits  : NEGATIVE for frequency, dysuria, or hematuria  HEME: NEGATIVE for bleeding problems  PSYCHIATRIC: NEGATIVE for changes in mood or affect      Objective    /82   Pulse 67   Temp 96.9  F (36.1  C) (Temporal)   Resp 15   Ht 1.803 m (5' 11\")   Wt 78.4 kg (172 lb 14.4 oz)   SpO2 99%   BMI 24.11 kg/m    Body mass index is 24.11 kg/m .  Physical Exam   GENERAL: alert and no distress  EYES: Eyes grossly normal to inspection, PERRL and conjunctivae and sclerae normal  HENT: ear canals and TM's normal, nose and mouth without ulcers or lesions  NECK: no " adenopathy, no asymmetry, masses, or scars  RESP: lungs clear to auscultation - no rales, rhonchi or wheezes  CV: regular rate and rhythm, normal S1 S2, no S3 or S4, no click or rub, no peripheral edema  ABDOMEN: soft, nontender, no hepatosplenomegaly, no masses and bowel sounds normal  NEURO: No focal changes  PSYCH: mentation appears normal, affect normal/bright        Signed Electronically by: Pb Warren MD

## 2024-09-04 ENCOUNTER — MYC MEDICAL ADVICE (OUTPATIENT)
Dept: INTERNAL MEDICINE | Facility: CLINIC | Age: 84
End: 2024-09-04
Payer: COMMERCIAL

## 2024-09-04 ENCOUNTER — NURSE TRIAGE (OUTPATIENT)
Dept: INTERNAL MEDICINE | Facility: CLINIC | Age: 84
End: 2024-09-04
Payer: COMMERCIAL

## 2024-09-04 NOTE — TELEPHONE ENCOUNTER
Options include watching closely to see of increasing dose as discussed reduces his blood pressure in next 24 to 48 hours but if persistent remaining elevated may need team appointment in another clinic prior to departure

## 2024-09-04 NOTE — TELEPHONE ENCOUNTER
Nurse Triage SBAR    Is this a 2nd Level Triage? NO    Situation: Pt is reporting hypertension at home. His last value was 155/80.     Background: Pt states he has been taking lisinopril 20 mg daily (at bedtime) for 20 years or so. He declines any recent missed doses. Ge is using a home BP monitor which was purchased 2 months ago.     Assessment: Pt declines any symptoms at this time such as a headache, blurred vision, chest pain, or shortness of breath. Pt states early this morning his BP's were 212/113, 198/98, and 187/92. This afternoon, they were 146/77, 162/84, and most recently 155/80.     Protocol Recommended Disposition:   See in Office Within 2 Weeks    Recommendation: Routing to PCP to advise. Pt was seen in office yesterday.     Routed to provider    Does the patient meet one of the following criteria for ADS visit consideration? 16+ years old, with an [facility] PCP     TIP  Providers, please consider if this condition is appropriate for management at one of our Acute and Diagnostic Services sites.     If patient is a good candidate, please use dotphrase <dot>triageresponse and select Refer to ADS to document.    Reason for Disposition   Systolic BP >= 130 OR Diastolic >= 80, and is taking BP medications    Additional Information   Negative: Sounds like a life-threatening emergency to the triager   Negative: Symptom is main concern (e.g., headache, chest pain)   Negative: Low blood pressure is main concern   Negative: Systolic BP >= 160 OR Diastolic >= 100, and any cardiac (e.g., breathing difficulty, chest pain) or neurologic symptoms (e.g., new-onset blurred or double vision)   Negative: Pregnant 20 or more weeks (or postpartum < 6 weeks) with new hand or face swelling   Negative: Pregnant 20 or more weeks (or postpartum < 6 weeks) and Systolic BP >= 160 OR Diastolic >= 110   Negative: Patient sounds very sick or weak to the triager   Negative: Systolic BP >= 200 OR Diastolic >= 120 and having NO cardiac or  "neurologic symptoms   Negative: Pregnant 20 or more weeks (or postpartum < 6 weeks) with Systolic BP >= 140 OR Diastolic >= 90   Negative: Systolic BP >= 180 OR Diastolic >= 110, and missed most recent dose of blood pressure medication   Negative: Systolic BP >= 180 OR Diastolic >= 110   Negative: Patient wants to be seen   Negative: Ran out of BP medications   Negative: Taking BP medications and feels is having side effects (e.g., impotence, cough, dizziness)   Negative: Systolic BP >= 160 OR Diastolic >= 100   Negative: Systolic BP >= 130 OR Diastolic >= 80, and pregnant    Answer Assessment - Initial Assessment Questions  1. BLOOD PRESSURE: \"What is the blood pressure?\" \"Did you take at least two measurements 5 minutes apart?\"      146/77, 162/84, 155/80  2. ONSET: \"When did you take your blood pressure?\"      All throughout the day  3. HOW: \"How did you take your blood pressure?\" (e.g., automatic home BP monitor, visiting nurse)      Home monitor, bought new one 2 months ago   4. HISTORY: \"Do you have a history of high blood pressure?\"      Yes  5. MEDICINES: \"Are you taking any medicines for blood pressure?\" \"Have you missed any doses recently?\"      Lisinopril for 20 years, no missed doses, takes at night   6. OTHER SYMPTOMS: \"Do you have any symptoms?\" (e.g., blurred vision, chest pain, difficulty breathing, headache, weakness)      None  7. PREGNANCY: \"Is there any chance you are pregnant?\" \"When was your last menstrual period?\"      N/A    Protocols used: Blood Pressure - High-A-OH  Thank you,  Mary Beltran RN    "

## 2024-09-04 NOTE — TELEPHONE ENCOUNTER
Contacted pt and gave message below. Pt stated understanding re: medication changes. Discussed parameters in which pt should go to ED, and when to call the triage line.    Pt not currently scheduled for appt. Writer reviewed appt times. No appts available within the next 2 weeks at the [facility] and pt is leaving for [place] on Sept 11 and will be gone for the next several months.    Routing to PCP to advise re: appts. Would same/next day appropriate? Or scheduling with another clinic?    Bernice De La Cruz RN on 9/4/2024 at 4:09 PM

## 2024-09-04 NOTE — TELEPHONE ENCOUNTER
Suggest in the interim between now and when being seen patient to take his lisinopril twice daily, AM/PM and observe blood pressure response.

## 2024-09-05 ENCOUNTER — HOSPITAL ENCOUNTER (EMERGENCY)
Facility: CLINIC | Age: 84
Discharge: HOME | End: 2024-09-06
Attending: EMERGENCY MEDICINE | Admitting: EMERGENCY MEDICINE
Payer: COMMERCIAL

## 2024-09-05 ENCOUNTER — TELEPHONE (OUTPATIENT)
Dept: INTERNAL MEDICINE | Facility: CLINIC | Age: 84
End: 2024-09-05
Payer: COMMERCIAL

## 2024-09-05 DIAGNOSIS — I10 UNCONTROLLED HYPERTENSION: ICD-10-CM

## 2024-09-05 LAB
ANION GAP SERPL CALCULATED.3IONS-SCNC: 13 MMOL/L (ref 7–15)
BASOPHILS # BLD AUTO: 0.1 10E3/UL (ref 0–0.2)
BASOPHILS NFR BLD AUTO: 1 %
BUN SERPL-MCNC: 17.6 MG/DL (ref 8–23)
CALCIUM SERPL-MCNC: 9.7 MG/DL (ref 8.8–10.4)
CHLORIDE SERPL-SCNC: 97 MMOL/L (ref 98–107)
CREAT SERPL-MCNC: 0.85 MG/DL (ref 0.67–1.17)
EGFRCR SERPLBLD CKD-EPI 2021: 87 ML/MIN/1.73M2
EOSINOPHIL # BLD AUTO: 0.1 10E3/UL (ref 0–0.7)
EOSINOPHIL NFR BLD AUTO: 1 %
ERYTHROCYTE [DISTWIDTH] IN BLOOD BY AUTOMATED COUNT: 13.2 % (ref 10–15)
GLUCOSE SERPL-MCNC: 113 MG/DL (ref 70–99)
HCO3 SERPL-SCNC: 23 MMOL/L (ref 22–29)
HCT VFR BLD AUTO: 41 % (ref 40–53)
HGB BLD-MCNC: 13.8 G/DL (ref 13.3–17.7)
IMM GRANULOCYTES # BLD: 0 10E3/UL
IMM GRANULOCYTES NFR BLD: 0 %
LYMPHOCYTES # BLD AUTO: 1.9 10E3/UL (ref 0.8–5.3)
LYMPHOCYTES NFR BLD AUTO: 23 %
MCH RBC QN AUTO: 30.1 PG (ref 26.5–33)
MCHC RBC AUTO-ENTMCNC: 33.7 G/DL (ref 31.5–36.5)
MCV RBC AUTO: 89 FL (ref 78–100)
MONOCYTES # BLD AUTO: 0.7 10E3/UL (ref 0–1.3)
MONOCYTES NFR BLD AUTO: 9 %
NEUTROPHILS # BLD AUTO: 5.5 10E3/UL (ref 1.6–8.3)
NEUTROPHILS NFR BLD AUTO: 67 %
NRBC # BLD AUTO: 0 10E3/UL
NRBC BLD AUTO-RTO: 0 /100
PLATELET # BLD AUTO: 98 10E3/UL (ref 150–450)
POTASSIUM SERPL-SCNC: 4.3 MMOL/L (ref 3.4–5.3)
RBC # BLD AUTO: 4.59 10E6/UL (ref 4.4–5.9)
SODIUM SERPL-SCNC: 133 MMOL/L (ref 135–145)
TROPONIN T SERPL HS-MCNC: 35 NG/L
WBC # BLD AUTO: 8.3 10E3/UL (ref 4–11)

## 2024-09-05 PROCEDURE — 93005 ELECTROCARDIOGRAM TRACING: CPT

## 2024-09-05 PROCEDURE — 84484 ASSAY OF TROPONIN QUANT: CPT | Performed by: EMERGENCY MEDICINE

## 2024-09-05 PROCEDURE — 85025 COMPLETE CBC W/AUTO DIFF WBC: CPT | Performed by: EMERGENCY MEDICINE

## 2024-09-05 PROCEDURE — 99284 EMERGENCY DEPT VISIT MOD MDM: CPT

## 2024-09-05 PROCEDURE — 80048 BASIC METABOLIC PNL TOTAL CA: CPT | Performed by: EMERGENCY MEDICINE

## 2024-09-05 PROCEDURE — 36415 COLL VENOUS BLD VENIPUNCTURE: CPT | Performed by: EMERGENCY MEDICINE

## 2024-09-05 ASSESSMENT — ACTIVITIES OF DAILY LIVING (ADL): ADLS_ACUITY_SCORE: 37

## 2024-09-05 ASSESSMENT — COLUMBIA-SUICIDE SEVERITY RATING SCALE - C-SSRS
1. IN THE PAST MONTH, HAVE YOU WISHED YOU WERE DEAD OR WISHED YOU COULD GO TO SLEEP AND NOT WAKE UP?: NO
6. HAVE YOU EVER DONE ANYTHING, STARTED TO DO ANYTHING, OR PREPARED TO DO ANYTHING TO END YOUR LIFE?: NO
2. HAVE YOU ACTUALLY HAD ANY THOUGHTS OF KILLING YOURSELF IN THE PAST MONTH?: NO

## 2024-09-05 NOTE — TELEPHONE ENCOUNTER
S-(situation): Patient calling back with update on Blood Pressures. Last evening was 143/88, today was 195/110 before second dose of lisinopril and 188/113 30 minutes after the second dose of lisinopril    B-(background): HTN    A-(assessment): No symptoms. Writer gave patient option of provider from yesterday to continue to monitor for the 24 hr time frame to see if it comes down in an hr or two after taking second dose of lisinopril because he had just taken it 30 minutes ago, then taking it again, or going to ED now. Patient did decide that he would take bp again in an hr to see where he was and then decide from there if he should go to ED or call clinic back.

## 2024-09-06 ENCOUNTER — TELEPHONE (OUTPATIENT)
Dept: INTERNAL MEDICINE | Facility: CLINIC | Age: 84
End: 2024-09-06
Payer: COMMERCIAL

## 2024-09-06 VITALS
OXYGEN SATURATION: 97 % | BODY MASS INDEX: 23.99 KG/M2 | SYSTOLIC BLOOD PRESSURE: 135 MMHG | RESPIRATION RATE: 20 BRPM | DIASTOLIC BLOOD PRESSURE: 81 MMHG | TEMPERATURE: 97.8 F | HEART RATE: 60 BPM | WEIGHT: 172 LBS

## 2024-09-06 LAB
ATRIAL RATE - MUSE: 78 BPM
DIASTOLIC BLOOD PRESSURE - MUSE: NORMAL MMHG
HOLD SPECIMEN: NORMAL
HOLD SPECIMEN: NORMAL
INTERPRETATION ECG - MUSE: NORMAL
P AXIS - MUSE: NORMAL DEGREES
PR INTERVAL - MUSE: NORMAL MS
QRS DURATION - MUSE: 160 MS
QT - MUSE: 454 MS
QTC - MUSE: 472 MS
R AXIS - MUSE: -55 DEGREES
SYSTOLIC BLOOD PRESSURE - MUSE: NORMAL MMHG
T AXIS - MUSE: 106 DEGREES
TROPONIN T SERPL HS-MCNC: 33 NG/L
VENTRICULAR RATE- MUSE: 65 BPM

## 2024-09-06 PROCEDURE — 36415 COLL VENOUS BLD VENIPUNCTURE: CPT | Performed by: EMERGENCY MEDICINE

## 2024-09-06 PROCEDURE — 84484 ASSAY OF TROPONIN QUANT: CPT | Performed by: EMERGENCY MEDICINE

## 2024-09-06 ASSESSMENT — ACTIVITIES OF DAILY LIVING (ADL): ADLS_ACUITY_SCORE: 37

## 2024-09-06 NOTE — ED TRIAGE NOTES
Pt presents with HTN, Pt has been taking multiple BP since more, SBP were between 190 - 205. Pt denied any neuro symptoms, no CP, no SOB, No HA/Blurry vision     Triage Assessment (Adult)       Row Name 09/05/24 7927          Triage Assessment    Airway WDL WDL        Respiratory WDL    Respiratory WDL WDL        Skin Circulation/Temperature WDL    Skin Circulation/Temperature WDL WDL        Cardiac WDL    Cardiac WDL WDL        Peripheral/Neurovascular WDL    Peripheral Neurovascular WDL WDL        Cognitive/Neuro/Behavioral WDL    Cognitive/Neuro/Behavioral WDL WDL

## 2024-09-06 NOTE — TELEPHONE ENCOUNTER
Separate encounter made to figure out scheduling. Forwarded to PCP. Closing this encounter.   Lise Boyle -THOMAS

## 2024-09-06 NOTE — TELEPHONE ENCOUNTER
Please advise when pt can be seen. Triaged 9/4 and [facility] ED 9/5-9/6. First available is 10/8  Lise STONE

## 2024-09-06 NOTE — ED PROVIDER NOTES
Emergency Department Note      History of Present Illness     Chief Complaint   Hypertension      HPI   Mars Reyes is a 83 year old male with a history of atrial fibrillation, hypertension, SVT, S/P pacemaker placement, and S/P TAVR amongst others who presents to the ED with hypertension. He explains that for the past two days he has suffered ongoing hypertension. He has been taking his Lisinopril as prescribed (1x 20mg daily) and even doubled his dose yesterday. Today he first recorded a 205/94 and later a 205/115, prompting ED visit now. He states he is otherwise asymptomatic, specifically denying shortness of breath or chest pain.     Independent Historian   None    Review of External Notes   Reviewed 7/10/24 TTE:  Interpretation Summary     The visual ejection fraction is 60-65%.  The right ventricle is normal in structure, function and size.  The left atrium is moderate to severely dilated.  There is mild (1+) mitral regurgitation.  There is mild (1+) tricuspid regurgitation.  Moderate (46-55mmHg) pulmonary hypertension is present.  The gradient is normal for this prosthetic aortic valve.    Past Medical History     Medical History and Problem List   Aortic stenosis  Atypical atrial flutter   Atrial fibrillation   Chronic gout   Contact dermatitis and other eczema, due to unspecified cause  Diverticulosis of colon   DJD (degenerative joint disease)  Essential hypertension, benign  Gout, unspecified  Hip joint replacement by other means  MGUS (monoclonal gammopathy of unknown significance)  Mitral valve disease  Pacemaker  PSVT (paroxysmal supraventricular tachycardia)   Pulmonary HTN   Thrombocytopenia   Anemia    Medications   Tylenol  Zyloprim  Amoxil  Eliquis  Neurontin  Zestril  Pravachol    Surgical History   Past Surgical History:   Procedure Laterality Date    ANESTHESIA CARDIOVERSION N/A 3/21/2019    Procedure: ANESTHESIA CARDIOVERSION;  Surgeon: GENERIC ANESTHESIA PROVIDER;  Location:  OR     ARTHROPLASTY HIP ANTERIOR Right 6/22/2022    Procedure: Right direct anterior total hip arthroplasty;  Surgeon: Luisito Hammonds MD;  Location: [facility] OR    ARTHROPLASTY REVISION HIP ANTERIOR Left 9/9/2019    Procedure: REVISION DIRECT ANTERIOR TOTAL LEFT HIP ARTHROPLASTY;  Surgeon: Luisito Hammonds MD;  Location:  OR    BIOPSY  3/2017    BONE MARROW BIOPSY, BONE SPECIMEN, NEEDLE/TROCAR Right 2/23/2017    Procedure: BIOPSY BONE MARROW;  Surgeon: Romie Esquivel MD;  Location:  GI    COLONOSCOPY  2010    CV HEART CATHETERIZATION WITH POSSIBLE INTERVENTION N/A 8/25/2020    Procedure: Coronary Angiogram;  Surgeon: Brian Simmons MD;  Location:  HEART CARDIAC CATH LAB    CV LEFT HEART CATH N/A 8/25/2020    Procedure: Left Heart Cath;  Surgeon: Brian Simmons MD;  Location:  HEART CARDIAC CATH LAB    CV RIGHT HEART CATH MEASUREMENTS RECORDED N/A 8/25/2020    Procedure: Right Heart Cath;  Surgeon: Brian Simmons MD;  Location:  HEART CARDIAC CATH LAB    CV TRANSCATHETER AORTIC VALVE REPLACEMENT N/A 6/22/2021    Procedure: Transcatheter Aortic Valve Replacement;  Surgeon: Hunter Adan MD;  Location:  HEART CARDIAC CATH LAB    EP 3D TACHYCARDIA MAPPING N/A 10/18/2019    Procedure: EP 3D Tachycardia Mapping;  Surgeon: Delfino Gee MD;  Location:  HEART CARDIAC CATH LAB    EP ABLATION FOCAL AFIB N/A 10/18/2019    Procedure: EP Ablation Focal AFIB;  Surgeon: Delfino Gee MD;  Location:  HEART CARDIAC CATH LAB    EP PACEMAKER N/A 7/8/2021    Procedure: EP Pacemaker;  Surgeon: Blanca Lopez MD;  Location:  HEART CARDIAC CATH LAB    EYE SURGERY      Cataract, both eyes    ORTHOPEDIC SURGERY  2009    (L) total hip       Physical Exam     Patient Vitals for the past 24 hrs:   BP Temp Temp src Pulse Resp SpO2 Weight   09/06/24 0149 135/81 -- -- -- -- 97 % --   09/06/24 0130 (!) 142/74 -- -- 60 -- 99 % --   09/06/24 0115 126/74 -- -- 60 -- 98 % --   09/06/24 0100 (!) 142/63 --  -- 60 -- 97 % --   09/06/24 0031 137/73 -- -- 60 -- 98 % --   09/06/24 0016 (!) 150/73 -- -- 60 -- 98 % --   09/06/24 0001 134/71 -- -- 60 -- 98 % --   09/05/24 2345 131/73 -- -- 60 -- 97 % --   09/05/24 2331 133/79 -- -- 61 -- 96 % --   09/05/24 2312 (!) 164/83 -- -- 65 -- 98 % --   09/05/24 2310 129/70 -- -- 58 -- 99 % --   09/05/24 2304 (!) 147/74 -- -- 59 -- 99 % --   09/05/24 2247 (!) 205/109 97.8  F (36.6  C) Oral 72 20 98 % 78 kg (172 lb)     Physical Exam  Constitutional: Alert, attentive  HENT:    Nose: Nose normal.    Mouth/Throat: Oropharynx is clear, mucous membranes are moist   Eyes: EOM are normal.   CV: regular rate and rhythm; no murmurs, rubs or gallups  Chest: Effort normal and breath sounds normal.   GI:  There is no tenderness. No distension. Normal bowel sounds  MSK: Normal range of motion.   Neurological: Alert, attentive   Normal gait  Skin: Skin is warm and dry.      Diagnostics     Lab Results   Labs Ordered and Resulted from Time of ED Arrival to Time of ED Departure   BASIC METABOLIC PANEL - Abnormal       Result Value    Sodium 133 (*)     Potassium 4.3      Chloride 97 (*)     Carbon Dioxide (CO2) 23      Anion Gap 13      Urea Nitrogen 17.6      Creatinine 0.85      GFR Estimate 87      Calcium 9.7      Glucose 113 (*)    TROPONIN T, HIGH SENSITIVITY - Abnormal    Troponin T, High Sensitivity 35 (*)    CBC WITH PLATELETS AND DIFFERENTIAL - Abnormal    WBC Count 8.3      RBC Count 4.59      Hemoglobin 13.8      Hematocrit 41.0      MCV 89      MCH 30.1      MCHC 33.7      RDW 13.2      Platelet Count 98 (*)     % Neutrophils 67      % Lymphocytes 23      % Monocytes 9      % Eosinophils 1      % Basophils 1      % Immature Granulocytes 0      NRBCs per 100 WBC 0      Absolute Neutrophils 5.5      Absolute Lymphocytes 1.9      Absolute Monocytes 0.7      Absolute Eosinophils 0.1      Absolute Basophils 0.1      Absolute Immature Granulocytes 0.0      Absolute NRBCs 0.0     TROPONIN T,  HIGH SENSITIVITY - Abnormal    Troponin T, High Sensitivity 33 (*)      EKG   ECG taken at 2258, ECG read at 2304  Ventricular-paced rhythm with occasional premature ventricular complexes  Abnormal ECG   Rate 65 bpm. DE interval * ms. QRS duration 160 ms. QT/QTc 454/472 ms. P-R-T axes * -55 106.    Independent Interpretation   N/A    ED Course          Discussion of Management   None    ED Course   ED Course as of 09/06/24 0349   u Sep 05, 2024   2313 I obtained history and examined the patient as noted above.   Fri Sep 06, 2024   0007 I rechecked the patient and explained findings.   0151 I rechecked the patient.     Additional Documentation  None    Medical Decision Making / Diagnosis     CMS Diagnoses: None    MIPS       None    MDM   Mars Reyes is a 83 year old male with history of hypertension, SVT, pacemaker, and status post TAVR, who presents valuation of elevated blood pressures.  He remains asymptomatic and has not had symptoms over the last several days during which she has been evaluated and followed by his primary care doctor for uncontrolled blood pressures.  Yesterday, he increased his lisinopril dose from 20 to 40 mg daily.  He has a normal cardiopulmonary exam and no symptoms of hypertensive emergency.  EKG shows normal paced rhythm.  Renal function is normal.  Troponin is trivially elevated but stable and downtrending, likely consistent with known pulmonary hypertension, valvular abnormalities, etc.  On recheck, his blood pressure is improved without intervention to a normal range.  Plan follow-up by phone with his primary care doctor tomorrow and continued monitoring of blood pressures over the weekend.  Return precautions for chest pain, headache, or any other concerns.          Disposition   The patient was discharged.     Diagnosis     ICD-10-CM    1. Uncontrolled hypertension  I10            Discharge Medications   Discharge Medication List as of 9/6/2024  1:55 AM          Scribe  Disclosure:  I, SHALINI ANDINO, am serving as a scribe at 1:41 AM on 9/6/2024 to document services personally performed by Michael Reed MD based on my observations and the provider's statements to me.        Michael Reed MD  09/06/24 0350

## 2024-09-06 NOTE — DISCHARGE INSTRUCTIONS
It was a pleasure taking care of you today. I hope you feel much better soon.  Your workup was reassuring against organ damage from elevated blood pressure. Please monitor your BP over the weekend and call Dr. Warren tomorrow morning to check in.  Please follow-up with your primary care doctor in 3-5 days.  Return immediately for chest pain, headache, or any other concerns.

## 2024-09-06 NOTE — TELEPHONE ENCOUNTER
Reason for Call:  Appointment Request    Patient requesting this type of appt: Chronic Diease Management/Medication/Follow-Up    Requested provider: Pb Warren    Reason patient unable to be scheduled: Not within requested timeframe    When does patient want to be seen/preferred time:  ASAP    Comments: need follow up for high blood pressure and would like to be seen some time the week of 09/09 because will be going out of town and want to be seen with     Could we send this information to you in [facility] or would you prefer to receive a phone call?:   Patient would prefer a phone call   Okay to leave a detailed message?: Yes at Cell number on file:    Telephone Information:   Mobile [phone]       Call taken on 9/6/2024 at 3:23 PM by Aura Chambers

## 2024-09-09 ENCOUNTER — PATIENT OUTREACH (OUTPATIENT)
Dept: INTERNAL MEDICINE | Facility: CLINIC | Age: 84
End: 2024-09-09
Payer: COMMERCIAL

## 2024-09-09 DIAGNOSIS — I10 ESSENTIAL HYPERTENSION, BENIGN: Primary | ICD-10-CM

## 2024-09-09 RX ORDER — AMLODIPINE BESYLATE 5 MG/1
5 TABLET ORAL DAILY
Qty: 90 TABLET | Refills: 0 | Status: SHIPPED | OUTPATIENT
Start: 2024-09-09

## 2024-09-09 NOTE — TELEPHONE ENCOUNTER
Pt stated his BP this morning was 172/104 before taking his lisinopril. Pt took lisinopril at 0800. Pts recheck BP at 1100am this morning was 140/80.   Last night before he took lisinopril BP was 151/80. Pt reports SBP being up to 195 all weekend.     Pt also wanted to let PCP know that he hasn't started the gabapentin.     Routing to PCP as an FYI and to advise if further action is needed.         Transitions of Care Outreach  Chief Complaint   Patient presents with    Hospital F/U     Uncontrolled HTN       Most Recent Admission Date: 9/5/2024   Most Recent Admission Diagnosis:      Most Recent Discharge Date: 9/6/2024   Most Recent Discharge Diagnosis: Uncontrolled hypertension - I10     Transitions of Care Assessment    Discharge Assessment  How are you doing now that you are home?: Not much change over the weekend  How are your symptoms? (Red Flag symptoms escalate to triage hotline per guidelines): Unchanged  Do you know how to contact your clinic care team if you have future questions or changes to your health status? : Yes  Does the patient have their discharge instructions? : Yes  Does the patient have questions regarding their discharge instructions? : No  Were you started on any new medications or were there changes to any of your previous medications? : Yes (changed to 2 linisopril's per day)  Does the patient have all of their medications?: Yes  Do you have questions regarding any of your medications? : No  Do you have all of your needed medical supplies or equipment (DME)?  (i.e. oxygen tank, CPAP, cane, etc.): Yes    Follow up Plan          Future Appointments   Date Time Provider Department Center   9/12/2024  2:30 PM Vera Fournier MD [facility]    [facility]       Outpatient Plan as outlined on AVS reviewed with patient.    For any urgent concerns, please contact our 24 hour nurse triage line: [phone] ([phone])       Karine Ramirez RN

## 2024-09-09 NOTE — TELEPHONE ENCOUNTER
"Patient Contact    Attempt # 1    Was call answered?  Yes.  \"May I please speak with <patient name>\"  Is patient available?   Yes    Pt reporting he changed to 20mg of lisinopril one in the morning and one at night per Dr. Warren.     Pt requesting provider review;    Recent med change: Three days ago, pt started taking lisinopril twice a day per MD instruction. Pt is wondering if he should continue taking lisinopril twice daily, in addition to the additional medication prescribed, norvasc.     Routing to provider; please review and advise. Route to care team to update med list.     Pb Warren MD [place]    9/4/24  3:15 PM  Note  Suggest in the interim between now and when being seen patient to take his lisinopril twice daily, AM/PM and observe blood pressure response.        "

## 2024-09-09 NOTE — TELEPHONE ENCOUNTER
Will need to keep clinic visit to review BP.  Suggest adding low dose amlodipine 5mg in AM to see response then keeping scheduled appt for BP check.  Script sent to pharmacy

## 2024-09-12 ENCOUNTER — OFFICE VISIT (OUTPATIENT)
Dept: INTERNAL MEDICINE | Facility: CLINIC | Age: 84
End: 2024-09-12
Payer: COMMERCIAL

## 2024-09-12 VITALS
BODY MASS INDEX: 24.01 KG/M2 | DIASTOLIC BLOOD PRESSURE: 80 MMHG | RESPIRATION RATE: 16 BRPM | SYSTOLIC BLOOD PRESSURE: 156 MMHG | HEART RATE: 81 BPM | OXYGEN SATURATION: 99 % | WEIGHT: 171.5 LBS | HEIGHT: 71 IN

## 2024-09-12 DIAGNOSIS — I10 ESSENTIAL HYPERTENSION, BENIGN: Primary | ICD-10-CM

## 2024-09-12 DIAGNOSIS — Z95.2 S/P TAVR (TRANSCATHETER AORTIC VALVE REPLACEMENT): ICD-10-CM

## 2024-09-12 DIAGNOSIS — I48.0 PAROXYSMAL ATRIAL FIBRILLATION (H): ICD-10-CM

## 2024-09-12 PROCEDURE — 99214 OFFICE O/P EST MOD 30 MIN: CPT | Performed by: INTERNAL MEDICINE

## 2024-09-12 NOTE — PROGRESS NOTES
Assessment & Plan     Essential hypertension, benign  Had hypertensive urgency. I reviewed record of home BP's. Has a range of readings, mostly at goal with higher readings typically first thing in the morning. Not having side effects from low BP. No CP or SOB.     BP trending lower since starting amlodipine. Will have him continue with current medications with monitoring at home. He will decrease frequency of BP checks to only 1-2 times daily. If his BP is high he will rest and recheck in 5-10 minutes. He will send a record of his BP in 1-2 weeks.    Paroxysmal atrial fibrillation (H)  chronic    S/P TAVR (transcatheter aortic valve replacement)  No SP or SOB.        MED REC REQUIRED  Post Medication Reconciliation Status: discharge medications reconciled, continue medications without change        Subjective   Mars is a 83 year old, presenting for the following health issues:  ER F/U    Has had elevated BP recently, see in ED. At home in the past three days ranged from  / 56-96      Has been taking lisinopril BID and started amlodipine 5mg two days ago.      History of Present Illness       Hypertension: He presents for follow up of hypertension.  He does check blood pressure  regularly outside of the clinic. Outside blood pressures have been over 140/90. He follows a low salt diet. He consumes 0 sweetened beverage(s) daily.He exercises with enough effort to increase his heart rate 30 to 60 minutes per day.  He exercises with enough effort to increase his heart rate 5 days per week.   He is taking medications regularly.         ED/ Followup:    Facility:  [facility]  Date of visit: 9/5/2024  Reason for visit: uncontrolled hypertension   Current Status:                 Objective    There were no vitals taken for this visit.  There is no height or weight on file to calculate BMI.  Physical Exam       Well appearing  RRR, no murmur  No LE edema  Bruising RUE            Signed Electronically by: Vera  MD Shantanu

## 2024-09-23 ENCOUNTER — MYC MEDICAL ADVICE (OUTPATIENT)
Dept: INTERNAL MEDICINE | Facility: CLINIC | Age: 84
End: 2024-09-23
Payer: COMMERCIAL

## 2024-10-02 ENCOUNTER — MYC MEDICAL ADVICE (OUTPATIENT)
Dept: INTERNAL MEDICINE | Facility: CLINIC | Age: 84
End: 2024-10-02
Payer: COMMERCIAL

## 2024-10-14 ENCOUNTER — ANCILLARY PROCEDURE (OUTPATIENT)
Dept: CARDIOLOGY | Facility: CLINIC | Age: 84
End: 2024-10-14
Attending: INTERNAL MEDICINE
Payer: COMMERCIAL

## 2024-10-14 DIAGNOSIS — I44.7 LBBB (LEFT BUNDLE BRANCH BLOCK): ICD-10-CM

## 2024-10-14 DIAGNOSIS — Z95.0 CARDIAC PACEMAKER IN SITU: ICD-10-CM

## 2024-10-14 DIAGNOSIS — I49.5 SICK SINUS SYNDROME (H): ICD-10-CM

## 2024-10-14 PROCEDURE — 93296 REM INTERROG EVL PM/IDS: CPT | Performed by: INTERNAL MEDICINE

## 2024-10-14 PROCEDURE — 93294 REM INTERROG EVL PM/LDLS PM: CPT | Performed by: INTERNAL MEDICINE

## 2024-10-15 LAB
MDC_IDC_EPISODE_DTM: NORMAL
MDC_IDC_EPISODE_DTM: NORMAL
MDC_IDC_EPISODE_DURATION: NORMAL S
MDC_IDC_EPISODE_ID: 100
MDC_IDC_EPISODE_ID: 101
MDC_IDC_EPISODE_TYPE: NORMAL
MDC_IDC_EPISODE_TYPE: NORMAL
MDC_IDC_LEAD_CONNECTION_STATUS: NORMAL
MDC_IDC_LEAD_CONNECTION_STATUS: NORMAL
MDC_IDC_LEAD_IMPLANT_DT: NORMAL
MDC_IDC_LEAD_IMPLANT_DT: NORMAL
MDC_IDC_LEAD_LOCATION: NORMAL
MDC_IDC_LEAD_LOCATION: NORMAL
MDC_IDC_LEAD_LOCATION_DETAIL_1: NORMAL
MDC_IDC_LEAD_LOCATION_DETAIL_1: NORMAL
MDC_IDC_LEAD_MFG: NORMAL
MDC_IDC_LEAD_MFG: NORMAL
MDC_IDC_LEAD_MODEL: NORMAL
MDC_IDC_LEAD_MODEL: NORMAL
MDC_IDC_LEAD_POLARITY_TYPE: NORMAL
MDC_IDC_LEAD_POLARITY_TYPE: NORMAL
MDC_IDC_LEAD_SERIAL: NORMAL
MDC_IDC_LEAD_SERIAL: NORMAL
MDC_IDC_MSMT_BATTERY_DTM: NORMAL
MDC_IDC_MSMT_BATTERY_REMAINING_LONGEVITY: 124 MO
MDC_IDC_MSMT_BATTERY_RRT_TRIGGER: 2.62
MDC_IDC_MSMT_BATTERY_STATUS: NORMAL
MDC_IDC_MSMT_BATTERY_VOLTAGE: 3.01 V
MDC_IDC_MSMT_LEADCHNL_RA_IMPEDANCE_VALUE: 323 OHM
MDC_IDC_MSMT_LEADCHNL_RA_IMPEDANCE_VALUE: 399 OHM
MDC_IDC_MSMT_LEADCHNL_RA_PACING_THRESHOLD_AMPLITUDE: 0.5 V
MDC_IDC_MSMT_LEADCHNL_RA_PACING_THRESHOLD_PULSEWIDTH: 0.4 MS
MDC_IDC_MSMT_LEADCHNL_RA_SENSING_INTR_AMPL: 3.5 MV
MDC_IDC_MSMT_LEADCHNL_RA_SENSING_INTR_AMPL: 3.5 MV
MDC_IDC_MSMT_LEADCHNL_RV_IMPEDANCE_VALUE: 285 OHM
MDC_IDC_MSMT_LEADCHNL_RV_IMPEDANCE_VALUE: 380 OHM
MDC_IDC_MSMT_LEADCHNL_RV_PACING_THRESHOLD_AMPLITUDE: 0.62 V
MDC_IDC_MSMT_LEADCHNL_RV_PACING_THRESHOLD_PULSEWIDTH: 0.4 MS
MDC_IDC_MSMT_LEADCHNL_RV_SENSING_INTR_AMPL: 7.88 MV
MDC_IDC_MSMT_LEADCHNL_RV_SENSING_INTR_AMPL: 7.88 MV
MDC_IDC_PG_IMPLANT_DTM: NORMAL
MDC_IDC_PG_MFG: NORMAL
MDC_IDC_PG_MODEL: NORMAL
MDC_IDC_PG_SERIAL: NORMAL
MDC_IDC_PG_TYPE: NORMAL
MDC_IDC_SESS_CLINIC_NAME: NORMAL
MDC_IDC_SESS_DTM: NORMAL
MDC_IDC_SESS_TYPE: NORMAL
MDC_IDC_SET_BRADY_AT_MODE_SWITCH_RATE: 171 {BEATS}/MIN
MDC_IDC_SET_BRADY_HYSTRATE: NORMAL
MDC_IDC_SET_BRADY_LOWRATE: 60 {BEATS}/MIN
MDC_IDC_SET_BRADY_MAX_SENSOR_RATE: 130 {BEATS}/MIN
MDC_IDC_SET_BRADY_MAX_TRACKING_RATE: 130 {BEATS}/MIN
MDC_IDC_SET_BRADY_MODE: NORMAL
MDC_IDC_SET_BRADY_PAV_DELAY_LOW: 300 MS
MDC_IDC_SET_BRADY_SAV_DELAY_LOW: 300 MS
MDC_IDC_SET_LEADCHNL_RA_PACING_AMPLITUDE: 1.5 V
MDC_IDC_SET_LEADCHNL_RA_PACING_ANODE_ELECTRODE_1: NORMAL
MDC_IDC_SET_LEADCHNL_RA_PACING_ANODE_LOCATION_1: NORMAL
MDC_IDC_SET_LEADCHNL_RA_PACING_CAPTURE_MODE: NORMAL
MDC_IDC_SET_LEADCHNL_RA_PACING_CATHODE_ELECTRODE_1: NORMAL
MDC_IDC_SET_LEADCHNL_RA_PACING_CATHODE_LOCATION_1: NORMAL
MDC_IDC_SET_LEADCHNL_RA_PACING_POLARITY: NORMAL
MDC_IDC_SET_LEADCHNL_RA_PACING_PULSEWIDTH: 0.4 MS
MDC_IDC_SET_LEADCHNL_RA_SENSING_ANODE_ELECTRODE_1: NORMAL
MDC_IDC_SET_LEADCHNL_RA_SENSING_ANODE_LOCATION_1: NORMAL
MDC_IDC_SET_LEADCHNL_RA_SENSING_CATHODE_ELECTRODE_1: NORMAL
MDC_IDC_SET_LEADCHNL_RA_SENSING_CATHODE_LOCATION_1: NORMAL
MDC_IDC_SET_LEADCHNL_RA_SENSING_POLARITY: NORMAL
MDC_IDC_SET_LEADCHNL_RA_SENSING_SENSITIVITY: 0.3 MV
MDC_IDC_SET_LEADCHNL_RV_PACING_AMPLITUDE: 2 V
MDC_IDC_SET_LEADCHNL_RV_PACING_ANODE_ELECTRODE_1: NORMAL
MDC_IDC_SET_LEADCHNL_RV_PACING_ANODE_LOCATION_1: NORMAL
MDC_IDC_SET_LEADCHNL_RV_PACING_CAPTURE_MODE: NORMAL
MDC_IDC_SET_LEADCHNL_RV_PACING_CATHODE_ELECTRODE_1: NORMAL
MDC_IDC_SET_LEADCHNL_RV_PACING_CATHODE_LOCATION_1: NORMAL
MDC_IDC_SET_LEADCHNL_RV_PACING_POLARITY: NORMAL
MDC_IDC_SET_LEADCHNL_RV_PACING_PULSEWIDTH: 0.4 MS
MDC_IDC_SET_LEADCHNL_RV_SENSING_ANODE_ELECTRODE_1: NORMAL
MDC_IDC_SET_LEADCHNL_RV_SENSING_ANODE_LOCATION_1: NORMAL
MDC_IDC_SET_LEADCHNL_RV_SENSING_CATHODE_ELECTRODE_1: NORMAL
MDC_IDC_SET_LEADCHNL_RV_SENSING_CATHODE_LOCATION_1: NORMAL
MDC_IDC_SET_LEADCHNL_RV_SENSING_POLARITY: NORMAL
MDC_IDC_SET_LEADCHNL_RV_SENSING_SENSITIVITY: 0.9 MV
MDC_IDC_SET_ZONE_DETECTION_INTERVAL: 350 MS
MDC_IDC_SET_ZONE_DETECTION_INTERVAL: 400 MS
MDC_IDC_SET_ZONE_STATUS: NORMAL
MDC_IDC_SET_ZONE_STATUS: NORMAL
MDC_IDC_SET_ZONE_TYPE: NORMAL
MDC_IDC_SET_ZONE_VENDOR_TYPE: NORMAL
MDC_IDC_STAT_AT_BURDEN_PERCENT: 100 %
MDC_IDC_STAT_AT_DTM_END: NORMAL
MDC_IDC_STAT_AT_DTM_START: NORMAL
MDC_IDC_STAT_BRADY_DTM_END: NORMAL
MDC_IDC_STAT_BRADY_DTM_START: NORMAL
MDC_IDC_STAT_BRADY_RA_PERCENT_PACED: 0.62 %
MDC_IDC_STAT_BRADY_RV_PERCENT_PACED: 90.89 %
MDC_IDC_STAT_EPISODE_RECENT_COUNT: 0
MDC_IDC_STAT_EPISODE_RECENT_COUNT: 2
MDC_IDC_STAT_EPISODE_RECENT_COUNT_DTM_END: NORMAL
MDC_IDC_STAT_EPISODE_RECENT_COUNT_DTM_START: NORMAL
MDC_IDC_STAT_EPISODE_TOTAL_COUNT: 0
MDC_IDC_STAT_EPISODE_TOTAL_COUNT: 0
MDC_IDC_STAT_EPISODE_TOTAL_COUNT: 1
MDC_IDC_STAT_EPISODE_TOTAL_COUNT: 44
MDC_IDC_STAT_EPISODE_TOTAL_COUNT: 55
MDC_IDC_STAT_EPISODE_TOTAL_COUNT_DTM_END: NORMAL
MDC_IDC_STAT_EPISODE_TOTAL_COUNT_DTM_START: NORMAL
MDC_IDC_STAT_EPISODE_TYPE: NORMAL
MDC_IDC_STAT_TACHYTHERAPY_RECENT_DTM_END: NORMAL
MDC_IDC_STAT_TACHYTHERAPY_RECENT_DTM_START: NORMAL
MDC_IDC_STAT_TACHYTHERAPY_TOTAL_DTM_END: NORMAL
MDC_IDC_STAT_TACHYTHERAPY_TOTAL_DTM_START: NORMAL

## 2024-10-17 ENCOUNTER — MYC MEDICAL ADVICE (OUTPATIENT)
Dept: INTERNAL MEDICINE | Facility: CLINIC | Age: 84
End: 2024-10-17
Payer: COMMERCIAL

## 2024-10-17 DIAGNOSIS — I10 ESSENTIAL HYPERTENSION, BENIGN: ICD-10-CM

## 2024-10-17 RX ORDER — LISINOPRIL 20 MG/1
20 TABLET ORAL 2 TIMES DAILY
Qty: 180 TABLET | Refills: 1 | Status: SHIPPED | OUTPATIENT
Start: 2024-10-17

## 2024-10-18 DIAGNOSIS — M10.9 GOUT, UNSPECIFIED CAUSE, UNSPECIFIED CHRONICITY, UNSPECIFIED SITE: ICD-10-CM

## 2024-10-18 DIAGNOSIS — M54.9 CHRONIC BACK PAIN, UNSPECIFIED BACK LOCATION, UNSPECIFIED BACK PAIN LATERALITY: ICD-10-CM

## 2024-10-18 DIAGNOSIS — G89.29 CHRONIC BACK PAIN, UNSPECIFIED BACK LOCATION, UNSPECIFIED BACK PAIN LATERALITY: ICD-10-CM

## 2024-10-18 RX ORDER — ALLOPURINOL 100 MG/1
100 TABLET ORAL DAILY
Qty: 90 TABLET | Refills: 3 | Status: SHIPPED | OUTPATIENT
Start: 2024-10-18

## 2024-10-18 RX ORDER — GABAPENTIN 300 MG/1
300 CAPSULE ORAL 3 TIMES DAILY
Qty: 90 CAPSULE | Refills: 0 | Status: SHIPPED | OUTPATIENT
Start: 2024-10-18

## 2024-11-20 ENCOUNTER — MYC MEDICAL ADVICE (OUTPATIENT)
Dept: INTERNAL MEDICINE | Facility: CLINIC | Age: 84
End: 2024-11-20
Payer: COMMERCIAL

## 2024-11-20 NOTE — TELEPHONE ENCOUNTER
Please see [employer] message and advise.  Pt sending update on BP.    Caprice Matta RN

## 2024-12-04 ENCOUNTER — MYC REFILL (OUTPATIENT)
Dept: INTERNAL MEDICINE | Facility: CLINIC | Age: 84
End: 2024-12-04
Payer: COMMERCIAL

## 2024-12-04 DIAGNOSIS — I10 ESSENTIAL HYPERTENSION, BENIGN: ICD-10-CM

## 2024-12-04 RX ORDER — AMLODIPINE BESYLATE 5 MG/1
5 TABLET ORAL DAILY
Qty: 90 TABLET | Refills: 0 | Status: SHIPPED | OUTPATIENT
Start: 2024-12-04

## 2025-01-21 DIAGNOSIS — I49.5 SICK SINUS SYNDROME (H): ICD-10-CM

## 2025-01-21 DIAGNOSIS — Z95.0 CARDIAC PACEMAKER IN SITU: Primary | ICD-10-CM

## 2025-02-20 ENCOUNTER — OFFICE VISIT (OUTPATIENT)
Dept: INTERNAL MEDICINE | Facility: CLINIC | Age: 85
End: 2025-02-20
Payer: COMMERCIAL

## 2025-02-20 VITALS
SYSTOLIC BLOOD PRESSURE: 136 MMHG | HEIGHT: 68 IN | TEMPERATURE: 98.1 F | DIASTOLIC BLOOD PRESSURE: 76 MMHG | RESPIRATION RATE: 16 BRPM | WEIGHT: 180 LBS | OXYGEN SATURATION: 99 % | HEART RATE: 74 BPM | BODY MASS INDEX: 27.28 KG/M2

## 2025-02-20 DIAGNOSIS — Z95.2 S/P TAVR (TRANSCATHETER AORTIC VALVE REPLACEMENT): ICD-10-CM

## 2025-02-20 DIAGNOSIS — I10 ESSENTIAL HYPERTENSION, BENIGN: Primary | ICD-10-CM

## 2025-02-20 DIAGNOSIS — R21 RASH: ICD-10-CM

## 2025-02-20 DIAGNOSIS — D47.2 MGUS (MONOCLONAL GAMMOPATHY OF UNKNOWN SIGNIFICANCE): ICD-10-CM

## 2025-02-20 DIAGNOSIS — I48.0 PAROXYSMAL ATRIAL FIBRILLATION (H): ICD-10-CM

## 2025-02-20 DIAGNOSIS — M54.9 CHRONIC BACK PAIN, UNSPECIFIED BACK LOCATION, UNSPECIFIED BACK PAIN LATERALITY: ICD-10-CM

## 2025-02-20 DIAGNOSIS — G89.29 CHRONIC BACK PAIN, UNSPECIFIED BACK LOCATION, UNSPECIFIED BACK PAIN LATERALITY: ICD-10-CM

## 2025-02-20 DIAGNOSIS — C90.00 MULTIPLE MYELOMA NOT HAVING ACHIEVED REMISSION (H): ICD-10-CM

## 2025-02-20 ASSESSMENT — PATIENT HEALTH QUESTIONNAIRE - PHQ9
SUM OF ALL RESPONSES TO PHQ QUESTIONS 1-9: 0
SUM OF ALL RESPONSES TO PHQ QUESTIONS 1-9: 0
10. IF YOU CHECKED OFF ANY PROBLEMS, HOW DIFFICULT HAVE THESE PROBLEMS MADE IT FOR YOU TO DO YOUR WORK, TAKE CARE OF THINGS AT HOME, OR GET ALONG WITH OTHER PEOPLE: NOT DIFFICULT AT ALL

## 2025-02-20 NOTE — PROGRESS NOTES
"  Assessment & Plan     Essential hypertension, benign  Stable on therapy and continue with current medical management.    Chronic back pain, unspecified back location, unspecified back pain laterality  Discussed options with patient.  He would like to maintain a conservative approach at this point and will continue with routine daily exercise and follow-up accordingly    S/P TAVR (transcatheter aortic valve replacement)  Stable and following up with cardiology as directed.    MGUS (monoclonal gammopathy of unknown significance)  Noted prior lab work and upcoming appointment per hematology as ordered.    Paroxysmal atrial fibrillation (H)  Rate controlled currently on therapy.  Continue with apixaban.    Multiple myeloma not having achieved remission (H)  Noted as prior history.  Hematology as noted.    Rash  Discussed options with patient.  Suggest follow-up with dermatology if no response to topical steroids given at outside clinic.  - Adult Dermatology  Referral; Future          BMI  Estimated body mass index is 27.17 kg/m  as calculated from the following:    Height as of this encounter: 1.734 m (5' 8.25\").    Weight as of this encounter: 81.6 kg (180 lb).   Weight management plan: Discussed healthy diet and exercise guidelines      Regular exercise    Amara Crews is a 84 year old, presenting for the following health issues:  Recheck Medication    History of Present Illness       Back Pain:  He presents for follow up of back pain. Patient's back pain is a chronic problem.  Location of back pain:  Right middle of back  Description of back pain: dull ache  Back pain spreads: nowhere    Since patient first noticed back pain, pain is: always present, but gets better and worse  Does back pain interfere with his job:  No       Hypertension: He presents for follow up of hypertension.  He does check blood pressure  regularly outside of the clinic. Outpatient blood pressures have not been over 140/90. He " follows a low salt diet.     He eats 2-3 servings of fruits and vegetables daily.He consumes 0 sweetened beverage(s) daily.He exercises with enough effort to increase his heart rate 30 to 60 minutes per day.  He exercises with enough effort to increase his heart rate 6 days per week.   He is taking medications regularly.     ED/ Followup:    Facility:  [facility]   Date of visit: 2/5/25  Reason for visit: Rash and nonspecific skin   Current Status: Improved but still present on arms and legs, slightly itchy     Other concerns:  Patient stopped gabapentin due to no change in back pain       Current Outpatient Medications   Medication Sig Dispense Refill    acetaminophen (TYLENOL) 325 MG tablet Take 2 tablets (650 mg) by mouth every 4 hours as needed for other (mild pain) 100 tablet 0    allopurinol (ZYLOPRIM) 100 MG tablet Take 1 tablet (100 mg) by mouth daily. 90 tablet 3    amLODIPine (NORVASC) 5 MG tablet Take 1 tablet (5 mg) by mouth daily. 90 tablet 0    amoxicillin (AMOXIL) 500 MG tablet Take 2,000 mg by mouth daily as needed (1 hour prior to dental procedures)      apixaban ANTICOAGULANT (ELIQUIS ANTICOAGULANT) 5 MG tablet Take 1 tablet (5 mg) by mouth 2 times daily 180 tablet 3    lisinopril (ZESTRIL) 20 MG tablet Take 1 tablet (20 mg) by mouth 2 times daily. 180 tablet 1    pravastatin (PRAVACHOL) 20 MG tablet Take 1 tablet (20 mg) by mouth daily. 90 tablet 3    gabapentin (NEURONTIN) 300 MG capsule Take 1 capsule (300 mg) by mouth 3 times daily. (Patient not taking: Reported on 2/20/2025) 90 capsule 0     No current facility-administered medications for this visit.         Review of Systems:    CONSTITUTIONAL: NEGATIVE for fever, chills, change in weight  EYES: NEGATIVE for vision changes or irritation  ENT/MOUTH: NEGATIVE for ear, mouth and throat problems  RESP: NEGATIVE for significant cough or SOB  CV: NEGATIVE for chest pain, palpitations or peripheral edema  GI: NEGATIVE for nausea,  "abdominal pain, heartburn, or change in bowel habits  : NEGATIVE for frequency, dysuria, or hematuria  MUSCULOSKELETAL: NEGATIVE for significant arthralgias or myalgia  NEURO: NEGATIVE for weakness, dizziness or paresthesias  HEME: NEGATIVE for bleeding problems  PSYCHIATRIC: NEGATIVE for changes in mood or affect      Objective    /76   Pulse 74   Temp 98.1  F (36.7  C) (Temporal)   Resp 16   Ht 1.734 m (5' 8.25\")   Wt 81.6 kg (180 lb)   SpO2 99%   BMI 27.17 kg/m    Body mass index is 27.17 kg/m .    Physical Exam   GENERAL: alert and no distress  EYES: Eyes grossly normal to inspection, PERRL and conjunctivae and sclerae normal  HENT: ear canals and TM's normal, nose and mouth without ulcers or lesions  RESP: lungs clear to auscultation - no rales, rhonchi or wheezes  CV: regular rate and rhythm, normal S1 S2, no S3 or S4, no click or rub, no peripheral edema  ABDOMEN: soft, nontender, no hepatosplenomegaly, no masses and bowel sounds normal  MS: no gross musculoskeletal defects noted, no edema  NEURO: No focal changes  Skin:  diffuse erythematous rash through out upper torso and legs  PSYCH: mentation appears normal, affect normal/bright          Signed Electronically by: Pb Warren MD      "

## 2025-03-02 DIAGNOSIS — I10 ESSENTIAL HYPERTENSION, BENIGN: ICD-10-CM

## 2025-03-03 RX ORDER — AMLODIPINE BESYLATE 5 MG/1
5 TABLET ORAL DAILY
Qty: 90 TABLET | Refills: 1 | Status: SHIPPED | OUTPATIENT
Start: 2025-03-03

## 2025-04-02 ENCOUNTER — PATIENT OUTREACH (OUTPATIENT)
Dept: CARE COORDINATION | Facility: CLINIC | Age: 85
End: 2025-04-02
Payer: COMMERCIAL

## 2025-04-12 DIAGNOSIS — I10 ESSENTIAL HYPERTENSION, BENIGN: ICD-10-CM

## 2025-04-14 RX ORDER — LISINOPRIL 20 MG/1
20 TABLET ORAL
Qty: 180 TABLET | Refills: 1 | Status: SHIPPED | OUTPATIENT
Start: 2025-04-14

## 2025-04-16 ENCOUNTER — PATIENT OUTREACH (OUTPATIENT)
Dept: CARE COORDINATION | Facility: CLINIC | Age: 85
End: 2025-04-16
Payer: COMMERCIAL

## 2025-04-29 ENCOUNTER — ANCILLARY PROCEDURE (OUTPATIENT)
Dept: CARDIOLOGY | Facility: CLINIC | Age: 85
End: 2025-04-29
Attending: INTERNAL MEDICINE
Payer: COMMERCIAL

## 2025-04-29 DIAGNOSIS — Z95.0 CARDIAC PACEMAKER IN SITU: ICD-10-CM

## 2025-04-29 DIAGNOSIS — I49.5 SICK SINUS SYNDROME (H): ICD-10-CM

## 2025-04-29 DIAGNOSIS — I44.7 LBBB (LEFT BUNDLE BRANCH BLOCK): ICD-10-CM

## 2025-04-29 LAB
MDC_IDC_EPISODE_DTM: NORMAL
MDC_IDC_EPISODE_DURATION: 1156 S
MDC_IDC_EPISODE_DURATION: 1204 S
MDC_IDC_EPISODE_DURATION: 122 S
MDC_IDC_EPISODE_DURATION: 134 S
MDC_IDC_EPISODE_DURATION: 139 S
MDC_IDC_EPISODE_DURATION: 157 S
MDC_IDC_EPISODE_DURATION: 168 S
MDC_IDC_EPISODE_DURATION: 173 S
MDC_IDC_EPISODE_DURATION: 185 S
MDC_IDC_EPISODE_DURATION: 199 S
MDC_IDC_EPISODE_DURATION: 2098 S
MDC_IDC_EPISODE_DURATION: 219 S
MDC_IDC_EPISODE_DURATION: 23 S
MDC_IDC_EPISODE_DURATION: 276 S
MDC_IDC_EPISODE_DURATION: 2847 S
MDC_IDC_EPISODE_DURATION: 333 S
MDC_IDC_EPISODE_DURATION: 334 S
MDC_IDC_EPISODE_DURATION: 3377 S
MDC_IDC_EPISODE_DURATION: 3580 S
MDC_IDC_EPISODE_DURATION: 4059 S
MDC_IDC_EPISODE_DURATION: 41 S
MDC_IDC_EPISODE_DURATION: 427 S
MDC_IDC_EPISODE_DURATION: 448 S
MDC_IDC_EPISODE_DURATION: 45 S
MDC_IDC_EPISODE_DURATION: 4592 S
MDC_IDC_EPISODE_DURATION: 475 S
MDC_IDC_EPISODE_DURATION: 5489 S
MDC_IDC_EPISODE_DURATION: 58 S
MDC_IDC_EPISODE_DURATION: 6129 S
MDC_IDC_EPISODE_DURATION: 6386 S
MDC_IDC_EPISODE_DURATION: 686 S
MDC_IDC_EPISODE_DURATION: 724 S
MDC_IDC_EPISODE_DURATION: 74 S
MDC_IDC_EPISODE_DURATION: 87 S
MDC_IDC_EPISODE_DURATION: 9 S
MDC_IDC_EPISODE_DURATION: 9 S
MDC_IDC_EPISODE_DURATION: NORMAL S
MDC_IDC_EPISODE_ID: 110
MDC_IDC_EPISODE_ID: 141
MDC_IDC_EPISODE_ID: 142
MDC_IDC_EPISODE_ID: 143
MDC_IDC_EPISODE_ID: 144
MDC_IDC_EPISODE_ID: 145
MDC_IDC_EPISODE_ID: 146
MDC_IDC_EPISODE_ID: 147
MDC_IDC_EPISODE_ID: 148
MDC_IDC_EPISODE_ID: 149
MDC_IDC_EPISODE_ID: 150
MDC_IDC_EPISODE_ID: 151
MDC_IDC_EPISODE_ID: 152
MDC_IDC_EPISODE_ID: 153
MDC_IDC_EPISODE_ID: 154
MDC_IDC_EPISODE_ID: 155
MDC_IDC_EPISODE_ID: 156
MDC_IDC_EPISODE_ID: 157
MDC_IDC_EPISODE_ID: 158
MDC_IDC_EPISODE_ID: 159
MDC_IDC_EPISODE_ID: 160
MDC_IDC_EPISODE_ID: 161
MDC_IDC_EPISODE_ID: 162
MDC_IDC_EPISODE_ID: 163
MDC_IDC_EPISODE_ID: 164
MDC_IDC_EPISODE_ID: 165
MDC_IDC_EPISODE_ID: 166
MDC_IDC_EPISODE_ID: 167
MDC_IDC_EPISODE_ID: 168
MDC_IDC_EPISODE_ID: 169
MDC_IDC_EPISODE_ID: 170
MDC_IDC_EPISODE_ID: 171
MDC_IDC_EPISODE_ID: 172
MDC_IDC_EPISODE_ID: 173
MDC_IDC_EPISODE_ID: 174
MDC_IDC_EPISODE_ID: 175
MDC_IDC_EPISODE_ID: 176
MDC_IDC_EPISODE_ID: 177
MDC_IDC_EPISODE_ID: 178
MDC_IDC_EPISODE_ID: 179
MDC_IDC_EPISODE_ID: 180
MDC_IDC_EPISODE_ID: 181
MDC_IDC_EPISODE_ID: 182
MDC_IDC_EPISODE_ID: 183
MDC_IDC_EPISODE_ID: 184
MDC_IDC_EPISODE_ID: 185
MDC_IDC_EPISODE_ID: 186
MDC_IDC_EPISODE_ID: 187
MDC_IDC_EPISODE_ID: 188
MDC_IDC_EPISODE_ID: 189
MDC_IDC_EPISODE_ID: 190
MDC_IDC_EPISODE_ID: 191
MDC_IDC_EPISODE_ID: 192
MDC_IDC_EPISODE_TYPE: NORMAL
MDC_IDC_LEAD_CONNECTION_STATUS: NORMAL
MDC_IDC_LEAD_CONNECTION_STATUS: NORMAL
MDC_IDC_LEAD_IMPLANT_DT: NORMAL
MDC_IDC_LEAD_IMPLANT_DT: NORMAL
MDC_IDC_LEAD_LOCATION: NORMAL
MDC_IDC_LEAD_LOCATION: NORMAL
MDC_IDC_LEAD_LOCATION_DETAIL_1: NORMAL
MDC_IDC_LEAD_LOCATION_DETAIL_1: NORMAL
MDC_IDC_LEAD_MFG: NORMAL
MDC_IDC_LEAD_MFG: NORMAL
MDC_IDC_LEAD_MODEL: NORMAL
MDC_IDC_LEAD_MODEL: NORMAL
MDC_IDC_LEAD_POLARITY_TYPE: NORMAL
MDC_IDC_LEAD_POLARITY_TYPE: NORMAL
MDC_IDC_LEAD_SERIAL: NORMAL
MDC_IDC_LEAD_SERIAL: NORMAL
MDC_IDC_MSMT_BATTERY_DTM: NORMAL
MDC_IDC_MSMT_BATTERY_REMAINING_LONGEVITY: 115 MO
MDC_IDC_MSMT_BATTERY_RRT_TRIGGER: 2.62
MDC_IDC_MSMT_BATTERY_STATUS: NORMAL
MDC_IDC_MSMT_BATTERY_VOLTAGE: 3.01 V
MDC_IDC_MSMT_LEADCHNL_RA_IMPEDANCE_VALUE: 304 OHM
MDC_IDC_MSMT_LEADCHNL_RA_IMPEDANCE_VALUE: 380 OHM
MDC_IDC_MSMT_LEADCHNL_RA_PACING_THRESHOLD_AMPLITUDE: 0.5 V
MDC_IDC_MSMT_LEADCHNL_RA_PACING_THRESHOLD_PULSEWIDTH: 0.4 MS
MDC_IDC_MSMT_LEADCHNL_RA_SENSING_INTR_AMPL: 3.12 MV
MDC_IDC_MSMT_LEADCHNL_RA_SENSING_INTR_AMPL: 3.12 MV
MDC_IDC_MSMT_LEADCHNL_RV_IMPEDANCE_VALUE: 266 OHM
MDC_IDC_MSMT_LEADCHNL_RV_IMPEDANCE_VALUE: 342 OHM
MDC_IDC_MSMT_LEADCHNL_RV_PACING_THRESHOLD_AMPLITUDE: 0.62 V
MDC_IDC_MSMT_LEADCHNL_RV_PACING_THRESHOLD_PULSEWIDTH: 0.4 MS
MDC_IDC_MSMT_LEADCHNL_RV_SENSING_INTR_AMPL: 5.88 MV
MDC_IDC_MSMT_LEADCHNL_RV_SENSING_INTR_AMPL: 5.88 MV
MDC_IDC_PG_IMPLANT_DTM: NORMAL
MDC_IDC_PG_MFG: NORMAL
MDC_IDC_PG_MODEL: NORMAL
MDC_IDC_PG_SERIAL: NORMAL
MDC_IDC_PG_TYPE: NORMAL
MDC_IDC_SESS_CLINIC_NAME: NORMAL
MDC_IDC_SESS_DTM: NORMAL
MDC_IDC_SESS_TYPE: NORMAL
MDC_IDC_SET_BRADY_AT_MODE_SWITCH_RATE: 171 {BEATS}/MIN
MDC_IDC_SET_BRADY_HYSTRATE: NORMAL
MDC_IDC_SET_BRADY_LOWRATE: 60 {BEATS}/MIN
MDC_IDC_SET_BRADY_MAX_SENSOR_RATE: 130 {BEATS}/MIN
MDC_IDC_SET_BRADY_MAX_TRACKING_RATE: 130 {BEATS}/MIN
MDC_IDC_SET_BRADY_MODE: NORMAL
MDC_IDC_SET_BRADY_PAV_DELAY_LOW: 300 MS
MDC_IDC_SET_BRADY_SAV_DELAY_LOW: 300 MS
MDC_IDC_SET_LEADCHNL_RA_PACING_AMPLITUDE: 1.5 V
MDC_IDC_SET_LEADCHNL_RA_PACING_ANODE_ELECTRODE_1: NORMAL
MDC_IDC_SET_LEADCHNL_RA_PACING_ANODE_LOCATION_1: NORMAL
MDC_IDC_SET_LEADCHNL_RA_PACING_CAPTURE_MODE: NORMAL
MDC_IDC_SET_LEADCHNL_RA_PACING_CATHODE_ELECTRODE_1: NORMAL
MDC_IDC_SET_LEADCHNL_RA_PACING_CATHODE_LOCATION_1: NORMAL
MDC_IDC_SET_LEADCHNL_RA_PACING_POLARITY: NORMAL
MDC_IDC_SET_LEADCHNL_RA_PACING_PULSEWIDTH: 0.4 MS
MDC_IDC_SET_LEADCHNL_RA_SENSING_ANODE_ELECTRODE_1: NORMAL
MDC_IDC_SET_LEADCHNL_RA_SENSING_ANODE_LOCATION_1: NORMAL
MDC_IDC_SET_LEADCHNL_RA_SENSING_CATHODE_ELECTRODE_1: NORMAL
MDC_IDC_SET_LEADCHNL_RA_SENSING_CATHODE_LOCATION_1: NORMAL
MDC_IDC_SET_LEADCHNL_RA_SENSING_POLARITY: NORMAL
MDC_IDC_SET_LEADCHNL_RA_SENSING_SENSITIVITY: 0.3 MV
MDC_IDC_SET_LEADCHNL_RV_PACING_AMPLITUDE: 2 V
MDC_IDC_SET_LEADCHNL_RV_PACING_ANODE_ELECTRODE_1: NORMAL
MDC_IDC_SET_LEADCHNL_RV_PACING_ANODE_LOCATION_1: NORMAL
MDC_IDC_SET_LEADCHNL_RV_PACING_CAPTURE_MODE: NORMAL
MDC_IDC_SET_LEADCHNL_RV_PACING_CATHODE_ELECTRODE_1: NORMAL
MDC_IDC_SET_LEADCHNL_RV_PACING_CATHODE_LOCATION_1: NORMAL
MDC_IDC_SET_LEADCHNL_RV_PACING_POLARITY: NORMAL
MDC_IDC_SET_LEADCHNL_RV_PACING_PULSEWIDTH: 0.4 MS
MDC_IDC_SET_LEADCHNL_RV_SENSING_ANODE_ELECTRODE_1: NORMAL
MDC_IDC_SET_LEADCHNL_RV_SENSING_ANODE_LOCATION_1: NORMAL
MDC_IDC_SET_LEADCHNL_RV_SENSING_CATHODE_ELECTRODE_1: NORMAL
MDC_IDC_SET_LEADCHNL_RV_SENSING_CATHODE_LOCATION_1: NORMAL
MDC_IDC_SET_LEADCHNL_RV_SENSING_POLARITY: NORMAL
MDC_IDC_SET_LEADCHNL_RV_SENSING_SENSITIVITY: 0.9 MV
MDC_IDC_SET_ZONE_DETECTION_INTERVAL: 350 MS
MDC_IDC_SET_ZONE_DETECTION_INTERVAL: 400 MS
MDC_IDC_SET_ZONE_STATUS: NORMAL
MDC_IDC_SET_ZONE_STATUS: NORMAL
MDC_IDC_SET_ZONE_TYPE: NORMAL
MDC_IDC_SET_ZONE_VENDOR_TYPE: NORMAL
MDC_IDC_STAT_AT_BURDEN_PERCENT: 99.8 %
MDC_IDC_STAT_AT_DTM_END: NORMAL
MDC_IDC_STAT_AT_DTM_START: NORMAL
MDC_IDC_STAT_BRADY_DTM_END: NORMAL
MDC_IDC_STAT_BRADY_DTM_START: NORMAL
MDC_IDC_STAT_BRADY_RA_PERCENT_PACED: 0.95 %
MDC_IDC_STAT_BRADY_RV_PERCENT_PACED: 70.1 %
MDC_IDC_STAT_EPISODE_RECENT_COUNT: 0
MDC_IDC_STAT_EPISODE_RECENT_COUNT: 2
MDC_IDC_STAT_EPISODE_RECENT_COUNT: 81
MDC_IDC_STAT_EPISODE_RECENT_COUNT_DTM_END: NORMAL
MDC_IDC_STAT_EPISODE_RECENT_COUNT_DTM_START: NORMAL
MDC_IDC_STAT_EPISODE_TOTAL_COUNT: 0
MDC_IDC_STAT_EPISODE_TOTAL_COUNT: 0
MDC_IDC_STAT_EPISODE_TOTAL_COUNT: 133
MDC_IDC_STAT_EPISODE_TOTAL_COUNT: 3
MDC_IDC_STAT_EPISODE_TOTAL_COUNT: 55
MDC_IDC_STAT_EPISODE_TOTAL_COUNT_DTM_END: NORMAL
MDC_IDC_STAT_EPISODE_TOTAL_COUNT_DTM_START: NORMAL
MDC_IDC_STAT_EPISODE_TYPE: NORMAL
MDC_IDC_STAT_TACHYTHERAPY_RECENT_DTM_END: NORMAL
MDC_IDC_STAT_TACHYTHERAPY_RECENT_DTM_START: NORMAL
MDC_IDC_STAT_TACHYTHERAPY_TOTAL_DTM_END: NORMAL
MDC_IDC_STAT_TACHYTHERAPY_TOTAL_DTM_START: NORMAL

## 2025-04-29 PROCEDURE — 93294 REM INTERROG EVL PM/LDLS PM: CPT | Performed by: INTERNAL MEDICINE

## 2025-04-29 PROCEDURE — 93296 REM INTERROG EVL PM/IDS: CPT | Performed by: INTERNAL MEDICINE

## 2025-05-20 ENCOUNTER — TRANSFERRED RECORDS (OUTPATIENT)
Dept: HEALTH INFORMATION MANAGEMENT | Facility: CLINIC | Age: 85
End: 2025-05-20
Payer: COMMERCIAL

## 2025-06-01 SDOH — HEALTH STABILITY: PHYSICAL HEALTH: ON AVERAGE, HOW MANY DAYS PER WEEK DO YOU ENGAGE IN MODERATE TO STRENUOUS EXERCISE (LIKE A BRISK WALK)?: 5 DAYS

## 2025-06-01 SDOH — HEALTH STABILITY: PHYSICAL HEALTH: ON AVERAGE, HOW MANY MINUTES DO YOU ENGAGE IN EXERCISE AT THIS LEVEL?: 40 MIN

## 2025-06-01 ASSESSMENT — SOCIAL DETERMINANTS OF HEALTH (SDOH): HOW OFTEN DO YOU GET TOGETHER WITH FRIENDS OR RELATIVES?: ONCE A WEEK

## 2025-06-03 ENCOUNTER — OFFICE VISIT (OUTPATIENT)
Dept: INTERNAL MEDICINE | Facility: CLINIC | Age: 85
End: 2025-06-03
Payer: COMMERCIAL

## 2025-06-03 VITALS
WEIGHT: 170.1 LBS | HEART RATE: 89 BPM | HEIGHT: 68 IN | TEMPERATURE: 97.6 F | BODY MASS INDEX: 25.78 KG/M2 | RESPIRATION RATE: 16 BRPM | SYSTOLIC BLOOD PRESSURE: 138 MMHG | DIASTOLIC BLOOD PRESSURE: 75 MMHG | OXYGEN SATURATION: 97 %

## 2025-06-03 DIAGNOSIS — I10 ESSENTIAL HYPERTENSION, BENIGN: ICD-10-CM

## 2025-06-03 DIAGNOSIS — Z12.11 SCREEN FOR COLON CANCER: ICD-10-CM

## 2025-06-03 DIAGNOSIS — M1A.9XX0 CHRONIC GOUT INVOLVING TOE WITHOUT TOPHUS, UNSPECIFIED CAUSE, UNSPECIFIED LATERALITY: ICD-10-CM

## 2025-06-03 DIAGNOSIS — D47.2 MGUS (MONOCLONAL GAMMOPATHY OF UNKNOWN SIGNIFICANCE): ICD-10-CM

## 2025-06-03 DIAGNOSIS — Z00.00 ENCOUNTER FOR SUBSEQUENT ANNUAL WELLNESS VISIT IN MEDICARE PATIENT: Primary | ICD-10-CM

## 2025-06-03 DIAGNOSIS — Z95.2 S/P TAVR (TRANSCATHETER AORTIC VALVE REPLACEMENT): ICD-10-CM

## 2025-06-03 DIAGNOSIS — I48.0 PAROXYSMAL ATRIAL FIBRILLATION (H): ICD-10-CM

## 2025-06-03 DIAGNOSIS — E78.5 HYPERLIPIDEMIA LDL GOAL <130: ICD-10-CM

## 2025-06-03 LAB
ALBUMIN SERPL BCG-MCNC: 4.1 G/DL (ref 3.5–5.2)
ALP SERPL-CCNC: 213 U/L (ref 40–150)
ALT SERPL W P-5'-P-CCNC: 19 U/L (ref 0–70)
ANION GAP SERPL CALCULATED.3IONS-SCNC: 14 MMOL/L (ref 7–15)
AST SERPL W P-5'-P-CCNC: 29 U/L (ref 0–45)
BILIRUB SERPL-MCNC: 1.6 MG/DL
BUN SERPL-MCNC: 21 MG/DL (ref 8–23)
CALCIUM SERPL-MCNC: 9.8 MG/DL (ref 8.8–10.4)
CHLORIDE SERPL-SCNC: 103 MMOL/L (ref 98–107)
CREAT SERPL-MCNC: 0.98 MG/DL (ref 0.67–1.17)
EGFRCR SERPLBLD CKD-EPI 2021: 77 ML/MIN/1.73M2
ERYTHROCYTE [DISTWIDTH] IN BLOOD BY AUTOMATED COUNT: 14.5 % (ref 10–15)
GLUCOSE SERPL-MCNC: 95 MG/DL (ref 70–99)
HCO3 SERPL-SCNC: 20 MMOL/L (ref 22–29)
HCT VFR BLD AUTO: 36.1 % (ref 40–53)
HGB BLD-MCNC: 12 G/DL (ref 13.3–17.7)
MCH RBC QN AUTO: 28.1 PG (ref 26.5–33)
MCHC RBC AUTO-ENTMCNC: 33.2 G/DL (ref 31.5–36.5)
MCV RBC AUTO: 85 FL (ref 78–100)
PLATELET # BLD AUTO: 90 10E3/UL (ref 150–450)
POTASSIUM SERPL-SCNC: 5.9 MMOL/L (ref 3.4–5.3)
PROT SERPL-MCNC: 8.3 G/DL (ref 6.4–8.3)
RBC # BLD AUTO: 4.27 10E6/UL (ref 4.4–5.9)
SODIUM SERPL-SCNC: 137 MMOL/L (ref 135–145)
WBC # BLD AUTO: 8.3 10E3/UL (ref 4–11)

## 2025-06-03 PROCEDURE — 80053 COMPREHEN METABOLIC PANEL: CPT | Performed by: INTERNAL MEDICINE

## 2025-06-03 PROCEDURE — 36415 COLL VENOUS BLD VENIPUNCTURE: CPT | Performed by: INTERNAL MEDICINE

## 2025-06-03 PROCEDURE — 99214 OFFICE O/P EST MOD 30 MIN: CPT | Mod: 25 | Performed by: INTERNAL MEDICINE

## 2025-06-03 PROCEDURE — 3078F DIAST BP <80 MM HG: CPT | Performed by: INTERNAL MEDICINE

## 2025-06-03 PROCEDURE — G2211 COMPLEX E/M VISIT ADD ON: HCPCS | Performed by: INTERNAL MEDICINE

## 2025-06-03 PROCEDURE — 1125F AMNT PAIN NOTED PAIN PRSNT: CPT | Performed by: INTERNAL MEDICINE

## 2025-06-03 PROCEDURE — G0439 PPPS, SUBSEQ VISIT: HCPCS | Performed by: INTERNAL MEDICINE

## 2025-06-03 PROCEDURE — 85027 COMPLETE CBC AUTOMATED: CPT | Performed by: INTERNAL MEDICINE

## 2025-06-03 PROCEDURE — 3074F SYST BP LT 130 MM HG: CPT | Performed by: INTERNAL MEDICINE

## 2025-06-03 RX ORDER — CETIRIZINE HYDROCHLORIDE 10 MG/1
10 TABLET ORAL 2 TIMES DAILY
COMMUNITY

## 2025-06-03 RX ORDER — TRIAMCINOLONE ACETONIDE 1 MG/G
OINTMENT TOPICAL 2 TIMES DAILY
COMMUNITY
Start: 2025-05-18

## 2025-06-03 ASSESSMENT — PAIN SCALES - GENERAL: PAINLEVEL_OUTOF10: SEVERE PAIN (7)

## 2025-06-03 NOTE — PATIENT INSTRUCTIONS
Patient Education   Preventive Care Advice   This is general advice given by our system to help you stay healthy. However, your care team may have specific advice just for you. Please talk to your care team about your preventive care needs.  Nutrition  Eat 5 or more servings of fruits and vegetables each day.  Try wheat bread, brown rice and whole grain pasta (instead of white bread, rice, and pasta).  Get enough calcium and vitamin D. Check the label on foods and aim for 100% of the RDA (recommended daily allowance).  Lifestyle  Exercise at least 150 minutes each week  (30 minutes a day, 5 days a week).  Do muscle strengthening activities 2 days a week. These help control your weight and prevent disease.  No smoking.  Wear sunscreen to prevent skin cancer.  Have a dental exam and cleaning every 6 months.  Yearly exams  See your health care team every year to talk about:  Any changes in your health.  Any medicines your care team has prescribed.  Preventive care, family planning, and ways to prevent chronic diseases.  Shots (vaccines)   HPV shots (up to age 26), if you've never had them before.  Hepatitis B shots (up to age 59), if you've never had them before.  COVID-19 shot: Get this shot when it's due.  Flu shot: Get a flu shot every year.  Tetanus shot: Get a tetanus shot every 10 years.  Pneumococcal, hepatitis A, and RSV shots: Ask your care team if you need these based on your risk.  Shingles shot (for age 50 and up)  General health tests  Diabetes screening:  Starting at age 35, Get screened for diabetes at least every 3 years.  If you are younger than age 35, ask your care team if you should be screened for diabetes.  Cholesterol test: At age 39, start having a cholesterol test every 5 years, or more often if advised.  Bone density scan (DEXA): At age 50, ask your care team if you should have this scan for osteoporosis (brittle bones).  Hepatitis C: Get tested at least once in your life.  STIs (sexually  transmitted infections)  Before age 24: Ask your care team if you should be screened for STIs.  After age 24: Get screened for STIs if you're at risk. You are at risk for STIs (including HIV) if:  You are sexually active with more than one person.  You don't use condoms every time.  You or a partner was diagnosed with a sexually transmitted infection.  If you are at risk for HIV, ask about PrEP medicine to prevent HIV.  Get tested for HIV at least once in your life, whether you are at risk for HIV or not.  Cancer screening tests  Cervical cancer screening: If you have a cervix, begin getting regular cervical cancer screening tests starting at age 21.  Breast cancer scan (mammogram): If you've ever had breasts, begin having regular mammograms starting at age 40. This is a scan to check for breast cancer.  Colon cancer screening: It is important to start screening for colon cancer at age 45.  Have a colonoscopy test every 10 years (or more often if you're at risk) Or, ask your provider about stool tests like a FIT test every year or Cologuard test every 3 years.  To learn more about your testing options, visit:   .  For help making a decision, visit:   https://bit.ly/tn47952.  Prostate cancer screening test: If you have a prostate, ask your care team if a prostate cancer screening test (PSA) at age 55 is right for you.  Lung cancer screening: If you are a current or former smoker ages 50 to 80, ask your care team if ongoing lung cancer screenings are right for you.  For informational purposes only. Not to replace the advice of your health care provider. Copyright   2023 [facility]. All rights reserved. Clinically reviewed by the [facility] Transitions Program. [employer] 618455 - REV 01/24.  Preventing Falls: Care Instructions  Injuries and health problems such as trouble walking or poor eyesight can increase your risk of falling. So can some medicines. But there are things you can do to help  "prevent falls. You can exercise to get stronger. You can also arrange your home to make it safer.    Talk to your doctor about the medicines you take. Ask if any of them increase the risk of falls and whether they can be changed or stopped.   Try to exercise regularly. It can help improve your strength and balance. This can help lower your risk of falling.         Practice fall safety and prevention.   Wear low-heeled shoes that fit well and give your feet good support. Talk to your doctor if you have foot problems that make this hard.  Carry a cellphone or wear a medical alert device that you can use to call for help.  Use stepladders instead of chairs to reach high objects. Don't climb if you're at risk for falls. Ask for help, if needed.  Wear the correct eyeglasses, if you need them.        Make your home safer.   Remove rugs, cords, clutter, and furniture from walkways.  Keep your house well lit. Use night-lights in hallways and bathrooms.  Install and use sturdy handrails on stairways.  Wear nonskid footwear, even inside. Don't walk barefoot or in socks without shoes.        Be safe outside.   Use handrails, curb cuts, and ramps whenever possible.  Keep your hands free by using a shoulder bag or backpack.  Try to walk in well-lit areas. Watch out for uneven ground, changes in pavement, and debris.  Be careful in the winter. Walk on the grass or gravel when sidewalks are slippery. Use de-icer on steps and walkways. Add non-slip devices to shoes.    Put grab bars and nonskid mats in your shower or tub and near the toilet. Try to use a shower chair or bath bench when bathing.   Get into a tub or shower by putting in your weaker leg first. Get out with your strong side first. Have a phone or medical alert device in the bathroom with you.   Where can you learn more?  Go to https://www.[employer].net/patiented  Enter G117 in the search box to learn more about \"Preventing Falls: Care Instructions.\"  Current as of: " July 31, 2024  Content Version: 14.4    0227-9920 [employer].   Care instructions adapted under license by your healthcare professional. If you have questions about a medical condition or this instruction, always ask your healthcare professional. [employer] disclaims any warranty or liability for your use of this information.    Hearing Loss: Care Instructions  Overview     Hearing loss is a sudden or slow decrease in how well you hear. It can range from slight to profound. Permanent hearing loss can occur with aging. It also can happen when you are exposed long-term to loud noise. Examples include listening to loud music, riding motorcycles, or being around other loud machines.  Hearing loss can affect your work and home life. It can make you feel lonely or depressed. You may feel that you have lost your independence. But hearing aids and other devices can help you hear better and feel connected to others.  Follow-up care is a key part of your treatment and safety. Be sure to make and go to all appointments, and call your doctor if you are having problems. It's also a good idea to know your test results and keep a list of the medicines you take.  How can you care for yourself at home?  Avoid loud noises whenever possible. This helps keep your hearing from getting worse.  Always wear hearing protection around loud noises.  Wear a hearing aid as directed.  A professional can help you pick a hearing aid that will work best for you.  You can also get hearing aids over the counter for mild to moderate hearing loss.  Have hearing tests as your doctor suggests. They can show whether your hearing has changed. Your hearing aid may need to be adjusted.  Use other devices as needed. These may include:  Telephone amplifiers and hearing aids that can connect to a television, stereo, radio, or microphone.  Devices that use lights or vibrations. These alert you to the doorbell, a ringing telephone, or a  "baby monitor.  Television closed-captioning. This shows the words at the bottom of the screen. Most new TVs can do this.  TTY (text telephone). This lets you type messages back and forth on the telephone instead of talking or listening. These devices are also called TDD. When messages are typed on the keyboard, they are sent over the phone line to a receiving TTY. The message is shown on a monitor.  Use text messaging, social media, and email if it is hard for you to communicate by telephone.  Try to learn a listening technique called speechreading. It is not lipreading. You pay attention to people's gestures, expressions, posture, and tone of voice. These clues can help you understand what a person is saying. Face the person you are talking to, and have them face you. Make sure the lighting is good. You need to see the other person's face clearly.  Think about counseling if you need help to adjust to your hearing loss.  When should you call for help?  Watch closely for changes in your health, and be sure to contact your doctor if:    You think your hearing is getting worse.     You have new symptoms, such as dizziness or nausea.   Where can you learn more?  Go to https://www."[employer]".net/patiented  Enter R798 in the search box to learn more about \"Hearing Loss: Care Instructions.\"  Current as of: October 27, 2024  Content Version: 14.4 2024-2025 [employer].   Care instructions adapted under license by your healthcare professional. If you have questions about a medical condition or this instruction, always ask your healthcare professional. [employer] disclaims any warranty or liability for your use of this information.    Learning About Sleeping Well  What does sleeping well mean?     Sleeping well means getting enough sleep to feel good and stay healthy. How much sleep is enough varies among people.  The number of hours you sleep and how you feel when you wake up are both important. If " you do not feel refreshed, you probably need more sleep. Another sign of not getting enough sleep is feeling tired during the day.  Experts recommend that adults get at least 7 or more hours of sleep per day. Children and older adults need more sleep.  Why is getting enough sleep important?  Getting enough quality sleep is a basic part of good health. When your sleep suffers, your physical health, mood, and your thoughts can suffer too. You may find yourself feeling more grumpy or stressed. Not getting enough sleep also can lead to serious problems, including injury, accidents, anxiety, and depression.  What might cause poor sleeping?  Many things can cause sleep problems, including:  Changes to your sleep schedule.  Stress. Stress can be caused by fear about a single event, such as giving a speech. Or you may have ongoing stress, such as worry about work or school.  Depression, anxiety, and other mental or emotional conditions.  Changes in your sleep habits or surroundings. This includes changes that happen where you sleep, such as noise, light, or sleeping in a different bed. It also includes changes in your sleep pattern, such as having jet lag or working a late shift.  Health problems, such as pain, breathing problems, and restless legs syndrome.  Lack of regular exercise.  Using alcohol, nicotine, or caffeine before bed.  How can you help yourself?  Here are some tips that may help you sleep more soundly and wake up feeling more refreshed.  Your sleeping area   Use your bedroom only for sleeping and sex. A bit of light reading may help you fall asleep. But if it doesn't, do your reading elsewhere in the house. Try not to use your TV, computer, smartphone, or tablet while you are in bed.  Be sure your bed is big enough to stretch out comfortably, especially if you have a sleep partner.  Keep your bedroom quiet, dark, and cool. Use curtains, blinds, or a sleep mask to block out light. To block out noise, use  "earplugs, soothing music, or a \"white noise\" machine.  Your evening and bedtime routine   Create a relaxing bedtime routine. You might want to take a warm shower or bath, or listen to soothing music.  Go to bed at the same time every night. And get up at the same time every morning, even if you feel tired.  What to avoid   Limit caffeine (coffee, tea, caffeinated sodas) during the day, and don't have any for at least 6 hours before bedtime.  Avoid drinking alcohol before bedtime. Alcohol can cause you to wake up more often during the night.  Try not to smoke or use tobacco, especially in the evening. Nicotine can keep you awake.  Limit naps during the day, especially close to bedtime.  Avoid lying in bed awake for too long. If you can't fall asleep or if you wake up in the middle of the night and can't get back to sleep within about 20 minutes, get out of bed and go to another room until you feel sleepy.  Avoid taking medicine right before bed that may keep you awake or make you feel hyper or energized. Your doctor can tell you if your medicine may do this and if you can take it earlier in the day.  If you can't sleep   Imagine yourself in a peaceful, pleasant scene. Focus on the details and feelings of being in a place that is relaxing.  Get up and do a quiet or boring activity until you feel sleepy.  Avoid drinking any liquids before going to bed to help prevent waking up often to use the bathroom.  Where can you learn more?  Go to https://www.[employer].net/patiented  Enter J942 in the search box to learn more about \"Learning About Sleeping Well.\"  Current as of: July 31, 2024  Content Version: 14.4 2024-2025 [employer].   Care instructions adapted under license by your healthcare professional. If you have questions about a medical condition or this instruction, always ask your healthcare professional. [employer] disclaims any warranty or liability for your use of this " information.    Bladder Training: Care Instructions  Your Care Instructions     Bladder training is used to treat urge incontinence and stress incontinence. Urge incontinence means that the need to urinate comes on so fast that you can't get to a toilet in time. Stress incontinence means that you leak urine because of pressure on your bladder. For example, it may happen when you laugh, cough, or lift something heavy.  Bladder training can increase how long you can wait before you have to urinate. It can also help your bladder hold more urine. And it can give you better control over the urge to urinate.  It is important to remember that bladder training takes a few weeks to a few months to make a difference. You may not see results right away, but don't give up.  Follow-up care is a key part of your treatment and safety. Be sure to make and go to all appointments, and call your doctor if you are having problems. It's also a good idea to know your test results and keep a list of the medicines you take.  How can you care for yourself at home?  Work with your doctor to come up with a bladder training program that is right for you. You may use one or more of the following methods.  Delayed urination  In the beginning, try to keep from urinating for 5 minutes after you first feel the need to go.  While you wait, take deep, slow breaths to relax. Kegel exercises can also help you delay the need to go to the bathroom.  After some practice, when you can easily wait 5 minutes to urinate, try to wait 10 minutes before you urinate.  Slowly increase the waiting period until you are able to control when you have to urinate.  Scheduled urination  Empty your bladder when you first wake up in the morning.  Schedule times throughout the day when you will urinate.  Start by going to the bathroom every hour, even if you don't need to go.  Slowly increase the time between trips to the bathroom.  When you have found a schedule that works  "well for you, keep doing it.  If you wake up during the night and have to urinate, do it. Apply your schedule to waking hours only.  Kegel exercises  These tighten and strengthen pelvic muscles, which can help you control the flow of urine. (If doing these exercises causes pain, stop doing them and talk with your doctor.) To do Kegel exercises:  Squeeze your muscles as if you were trying not to pass gas. Or squeeze your muscles as if you were stopping the flow of urine. Your belly, legs, and buttocks shouldn't move.  Hold the squeeze for 3 seconds, then relax for 5 to 10 seconds.  Start with 3 seconds, then add 1 second each week until you are able to squeeze for 10 seconds.  Repeat the exercise 10 times a session. Do 3 to 8 sessions a day.  When should you call for help?  Watch closely for changes in your health, and be sure to contact your doctor if:    Your incontinence is getting worse.     You do not get better as expected.   Where can you learn more?  Go to https://www.[employer].net/patiented  Enter V684 in the search box to learn more about \"Bladder Training: Care Instructions.\"  Current as of: April 30, 2024  Content Version: 14.4    6975-9975 [employer].   Care instructions adapted under license by your healthcare professional. If you have questions about a medical condition or this instruction, always ask your healthcare professional. [employer] disclaims any warranty or liability for your use of this information.       "

## 2025-06-03 NOTE — PROGRESS NOTES
Preventive Care Visit  [facility]  Pb Warren MD, Internal Medicine  Leonardo 3, 2025      Assessment & Plan     Encounter for subsequent annual wellness visit in Medicare patient  Discussed with patient.  Suggest potential updating routine vaccines accordingly.  - Lipid panel reflex to direct LDL Non-fasting; Future  - Comprehensive metabolic panel; Future  - CBC with platelets; Future    Hyperlipidemia LDL goal <130  Labs ordered as fasting per routine  - Lipid panel reflex to direct LDL Non-fasting; Future    Essential hypertension, benign  Stable on therapy and continuing with current medical management  - Comprehensive metabolic panel; Future    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, unspecified cause, unspecified laterality  Stable without recurrence, on allopurinol.  Recheck uric acid  - Uric acid; Future    S/P TAVR (transcatheter aortic valve replacement)  Noted as prior history.    Paroxysmal atrial fibrillation (H)  Stable, device in place.  Currently anticoagulated.  Following up with cardiology as directed.    MGUS (monoclonal gammopathy of unknown significance)  Noted thrombocytopenia, stable.  Check CBC, follow-up with hematology.    Screen for colon cancer  Defer further colonoscopy and PSA testing per patient.  Suggest FIT testing  - Fecal colorectal cancer screen FIT - Future (S+30); Future        Counseling  Appropriate preventive services were addressed with this patient via screening, questionnaire, or discussion as appropriate for fall prevention, nutrition, physical activity, Tobacco-use cessation, social engagement, weight loss and cognition.  Checklist reviewing preventive services available has been given to the patient.  Reviewed patient's diet, addressing concerns and/or questions.   Discussed possible causes of fatigue. The patient was provided with written information regarding signs of hearing loss.   Information on urinary incontinence and treatment options  given to patient.       Amara Crews is a 84 year old, presenting for the following:  Wellness Visit         HPI:     Advance Care Planning  Document on file is a Health Care Directive or POLST.    Other concerns:  Ongoing rash on arms and legs. Patient was referred to dermatology in February but unable to get an appointment until July. Seen by allergist in [place] who did not think symptoms were related to allergies. Using triamcinolone cream twice daily & zyrtec   Increased fatigue and feeling more winded with exertion. Has cardiology follow up 7/15/25.  He has his follow-up echocardiogram scheduled with cardiology in about a month or so and a follow-up appointment for device check and cardiology appointment at that time.  Chronic lower back pain, MRI was completed last week. Has appointment with [facility] on Thursday 6/1/2025   General Health   How would you rate your overall physical health? (!) FAIR   Feel stress (tense, anxious, or unable to sleep) Only a little   (!) STRESS CONCERN      6/1/2025   Nutrition   Diet: Regular (no restrictions)         6/1/2025   Exercise   Days per week of moderate/strenous exercise 5 days   Average minutes spent exercising at this level 40 min         6/1/2025   Social Factors   Frequency of gathering with friends or relatives Once a week   Worry food won't last until get money to buy more No   Food not last or not have enough money for food? No   Do you have housing? (Housing is defined as stable permanent housing and does not include staying outside in a car, in a tent, in an abandoned building, in an overnight shelter, or couch-surfing.) Yes   Are you worried about losing your housing? No   Lack of transportation? No   Unable to get utilities (heat,electricity)? No         6/3/2025   Fall Risk   Gait Speed Test (Document in seconds) 3.61          6/1/2025   Activities of Daily Living- Home Safety   Needs help with the following daily activites None of the above    Safety concerns in the home None of the above         6/1/2025   Dental   Dentist two times every year? Yes         6/1/2025   Hearing Screening   Hearing concerns? (!) IT'S HARD TO FOLLOW A CONVERSATION IN A NOISY RESTAURANT OR CROWDED ROOM.         6/1/2025   Driving Risk Screening   Patient/family members have concerns about driving No         6/1/2025   General Alertness/Fatigue Screening   Have you been more tired than usual lately? (!) YES         6/1/2025   Urinary Incontinence Screening   Bothered by leaking urine in past 6 months Yes         Today's PHQ-2 Score:       6/2/2025     6:07 PM   PHQ-2 ( 1999 [employer])   Q1: Little interest or pleasure in doing things 0   Q2: Feeling down, depressed or hopeless 0   PHQ-2 Score 0    Q1: Little interest or pleasure in doing things Not at all   Q2: Feeling down, depressed or hopeless Not at all   PHQ-2 Score 0       Patient-reported           6/1/2025   Substance Use   Alcohol more than 3/day or more than 7/wk No   Do you have a current opioid prescription? No   How severe/bad is pain from 1 to 10? 8/10   Do you use any other substances recreationally? No     Social History     Tobacco Use    Smoking status: Never    Smokeless tobacco: Never   Vaping Use    Vaping status: Never Used   Substance Use Topics    Alcohol use: Yes     Comment: Max 1 beer per day    Drug use: Never         Reviewed and updated as needed this visit by Provider                    Lab work is in process  Current providers sharing in care for this patient include:  Patient Care Team:  Pb Warren MD as PCP - General (Internal Medicine)  Pino Mayer MD as Assigned Cancer Care Provider  Pb Warren MD as Assigned PCP  Jolene Bose APRN CNP as Assigned Heart and Vascular Provider    The following health maintenance items are reviewed in Epic and correct as of today:    Health Maintenance   Topic Date Due    ZOSTER VACCINE (1 of 2) Never done    RSV VACCINE (1 - 1-dose 75+  "series) Never done    ANNUAL REVIEW OF HM ORDERS  06/30/2023    COVID-19 VACCINE (5 - 2024-25 season) 09/01/2024    MEDICARE ANNUAL WELLNESS VISIT  05/02/2025    LIPID  05/02/2025    URIC ACID  05/02/2025    COLORECTAL CANCER SCREENING  05/02/2025    INFLUENZA VACCINE (Season Ended) 09/01/2025    BMP  09/05/2025    FALL RISK ASSESSMENT  06/03/2026    ADVANCE CARE PLANNING  06/03/2030    DTAP/TDAP/TD VACCINE (4 - Td or Tdap) 11/03/2030    PHQ-2 (once per calendar year)  Completed    PNEUMOCOCCAL VACCINE 50+ YEARS  Completed    HPV VACCINE  Aged Out    MENINGITIS VACCINE  Aged Out         Review of Systems  CONSTITUTIONAL: NEGATIVE for fever, chills, change in weight  EYES: NEGATIVE for vision changes or irritation  ENT/MOUTH: NEGATIVE for ear, mouth and throat problems  RESP: NEGATIVE for significant cough or SOB  CV: NEGATIVE for chest pain, palpitations or peripheral edema  GI: NEGATIVE for nausea, abdominal pain, heartburn, or change in bowel habits  : NEGATIVE for frequency, dysuria, or hematuria  NEURO: NEGATIVE for weakness, dizziness or paresthesias  ENDOCRINE: NEGATIVE for temperature intolerance, skin/hair changes  HEME: NEGATIVE for bleeding problems  PSYCHIATRIC: NEGATIVE for changes in mood or affect     Objective :    Exam  /75   Pulse 89   Temp 97.6  F (36.4  C) (Temporal)   Resp 16   Ht 1.734 m (5' 8.25\")   Wt 77.2 kg (170 lb 1.6 oz)   SpO2 97%   BMI 25.67 kg/m     Estimated body mass index is 25.67 kg/m  as calculated from the following:    Height as of this encounter: 1.734 m (5' 8.25\").    Weight as of this encounter: 77.2 kg (170 lb 1.6 oz).    Physical Exam:    GENERAL: alert and no distress  EYES: Eyes grossly normal to inspection, PERRL and conjunctivae and sclerae normal  HENT: ear canals and TM's normal, nose and mouth without ulcers or lesions  RESP: lungs clear to auscultation - no rales, rhonchi or wheezes  CV: regular rate and rhythm, normal S1 S2, no S3 or S4, no murmur, " click or rub, no peripheral edema  ABDOMEN: soft, nontender, no hepatosplenomegaly, no masses and bowel sounds normal.  MS: no gross musculoskeletal defects noted, no edema  NEURO: No focal changes  PSYCH: mentation appears normal, affect normal/bright         6/3/2025   Mini Cog   Clock Draw Score 2 Normal   3 Item Recall 3 objects recalled   Mini Cog Total Score 5     PSA   Date Value Ref Range Status   07/14/2020 3.05 0 - 4 ug/L Final     Comment:     Assay Method:  Chemiluminescence using Siemens Vista analyzer        Signed Electronically by: Pb Warren MD

## 2025-06-04 ENCOUNTER — RESULTS FOLLOW-UP (OUTPATIENT)
Dept: INTERNAL MEDICINE | Facility: CLINIC | Age: 85
End: 2025-06-04

## 2025-06-04 DIAGNOSIS — E87.5 HYPERKALEMIA: Primary | ICD-10-CM

## 2025-06-05 ENCOUNTER — TRANSFERRED RECORDS (OUTPATIENT)
Dept: HEALTH INFORMATION MANAGEMENT | Facility: CLINIC | Age: 85
End: 2025-06-05
Payer: COMMERCIAL

## 2025-06-10 ENCOUNTER — OFFICE VISIT (OUTPATIENT)
Dept: DERMATOLOGY | Facility: CLINIC | Age: 85
End: 2025-06-10
Attending: INTERNAL MEDICINE
Payer: COMMERCIAL

## 2025-06-10 DIAGNOSIS — R21 RASH: ICD-10-CM

## 2025-06-10 DIAGNOSIS — I87.2 STASIS DERMATITIS OF BOTH LEGS: Primary | ICD-10-CM

## 2025-06-10 LAB — HEMOCCULT STL QL IA: NEGATIVE

## 2025-06-10 PROCEDURE — 1126F AMNT PAIN NOTED NONE PRSNT: CPT | Performed by: STUDENT IN AN ORGANIZED HEALTH CARE EDUCATION/TRAINING PROGRAM

## 2025-06-10 PROCEDURE — 99204 OFFICE O/P NEW MOD 45 MIN: CPT | Performed by: STUDENT IN AN ORGANIZED HEALTH CARE EDUCATION/TRAINING PROGRAM

## 2025-06-10 RX ORDER — TRIAMCINOLONE ACETONIDE 1 MG/G
OINTMENT TOPICAL 2 TIMES DAILY
Qty: 454 G | Refills: 0 | Status: SHIPPED | OUTPATIENT
Start: 2025-06-10

## 2025-06-10 ASSESSMENT — PAIN SCALES - GENERAL: PAINLEVEL_OUTOF10: NO PAIN (0)

## 2025-06-10 NOTE — LETTER
6/10/2025      Mars Reyes  [address] [facility] [facility] [address]      Dear Colleague,    Thank you for referring your patient, Mars Reyes, to the [facility]. Please see a copy of my visit note below.    [facility] Dermatology Note  Encounter Date: Leonardo 10, 2025  Office Visit     Dermatology Problem List:  1.  Stasis dermatitis-triamcinolone ointment twice daily as needed    ____________________________________________    Assessment & Plan:     # Stasis Dermatitis  DDX: ezcematous (favored), vs early LDS  - Continue compression, elevation, reduction of lower extremity edema with primary care doctor. Pt is on amlodipine which may contribute to bilateral lower extremity edema.  - Start triamcinolone oint 0.1% BID to affected areas.  Apply to trunk and extremities, avoid face and folds.  We reviewed risks of skin atrophy, skin thinning and striae with long-term use or overuse.  Start with 2 week burst of topical therapy and reduce use to as needed for 3-5 days at a time with breaks/time off in between.     - Compression and elevation. Information provided in AVS.   - Gentle skin care, info in AVS    Procedures Performed:   None  None    Follow-up: prn for new or changing lesions, or worsening rash.    Staff:     Elham Montes MD PhD  Dermatology, Dermatopathology    ____________________________________________    CC: Rash (Very Itchy rash since Jan 23, 25)      HPI:  Mars Reyes is a(n) 84 year old male who presents today as a new patient for Evaluation of rash that has improved greatly after starting topical triamcinolone.    Severe itchying to the point of no sleep.  Saw an allergist (TAC ointment provided)    Prior to that he tried OTC Vanicream. ED also gave prescription (prednisone and claritin).          Patient is otherwise feeling well, without additional skin concerns.     Labs Reviewed:    Lab Results   Component Value Date    WBC 8.3 06/03/2025     HGB 12.0 (L) 06/03/2025    HCT 36.1 (L) 06/03/2025    PLT 90 (L) 06/03/2025     06/03/2025    POTASSIUM 5.9 (H) 06/03/2025    CHLORIDE 103 06/03/2025    CO2 20 (L) 06/03/2025    BUN 21.0 06/03/2025    CR 0.98 06/03/2025    GLC 95 06/03/2025    SED 7 08/12/2021    TROPI <0.012 03/03/2012    AST 29 06/03/2025    ALT 19 06/03/2025    ALKPHOS 213 (H) 06/03/2025    BILITOTAL 1.6 (H) 06/03/2025    INR 1.26 (H) 06/22/2022        Physical Exam:  Vitals: There were no vitals taken for this visit.  SKIN: Focused examination of legs, chest and back was performed.  - Bilateral lower extremities with generalized erythema, some scattered excoriations that have healed.  Minimal scale or rash today.  Somewhat glossy appearing.  There is some pitting edema.    Chest and back relatively clear.  Photos taken for the chart and clinical monitoring.    - No other lesions of concern on areas examined.       Medications:  Current Outpatient Medications   Medication Sig Dispense Refill     acetaminophen (TYLENOL) 325 MG tablet Take 2 tablets (650 mg) by mouth every 4 hours as needed for other (mild pain) 100 tablet 0     allopurinol (ZYLOPRIM) 100 MG tablet Take 1 tablet (100 mg) by mouth daily. 90 tablet 3     amLODIPine (NORVASC) 5 MG tablet TAKE 1 TABLET BY MOUTH EVERY DAY 90 tablet 1     apixaban ANTICOAGULANT (ELIQUIS ANTICOAGULANT) 5 MG tablet Take 1 tablet (5 mg) by mouth 2 times daily. 180 tablet 0     lisinopril (ZESTRIL) 20 MG tablet TAKE 1 TABLET BY MOUTH TWICE A  tablet 1     pravastatin (PRAVACHOL) 20 MG tablet Take 1 tablet (20 mg) by mouth daily. 90 tablet 3     triamcinolone (KENALOG) 0.1 % external ointment Apply topically 2 times daily.       amoxicillin (AMOXIL) 500 MG tablet Take 2,000 mg by mouth daily as needed (1 hour prior to dental procedures) (Patient not taking: Reported on 6/10/2025)       cetirizine (ZYRTEC) 10 MG tablet Take 10 mg by mouth 2 times daily. (Patient not taking: Reported on 6/10/2025)        No current facility-administered medications for this visit.      Past Medical History:   Patient Active Problem List   Diagnosis     Essential hypertension, benign     Anemia     Diverticulosis of large intestine     DJD (degenerative joint disease)     Aftercare following joint replacement     Hip joint replacement by other means     HYPERLIPIDEMIA LDL GOAL <130     Grief reaction     Non-rheumatic mitral valve stenosis     Immune thrombocytopenic purpura (H)     MGUS (monoclonal gammopathy of unknown significance)     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, unspecified cause, unspecified laterality     Atrial flutter, unspecified type (H)     S/P revision of total hip     Typical atrial flutter (H)     Nonrheumatic aortic valve stenosis     Status post coronary angiogram     Severe aortic stenosis     Diastolic dysfunction     Aortic stenosis, severe     Second degree AV block     S/P total hip arthroplasty     Status post total replacement of right hip     Status post total hip replacement, right     LBBB (left bundle branch block)     Cardiac pacemaker in situ     Paroxysmal atrial fibrillation (H)     S/P TAVR (transcatheter aortic valve replacement)     Multiple myeloma not having achieved remission (H)       Past Medical History:   Diagnosis Date     Aortic stenosis     moderate     Atypical atrial flutter (H)     PVI and LA linear ablation with successful termination of AFL 10/18/2019     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, unspecified cause, unspecified laterality      Contact dermatitis and other eczema, due to unspecified cause      Diverticulosis of colon (without mention of hemorrhage)      DJD (degenerative joint disease)      Essential hypertension, benign     abstracted 7/22/02     Gout, unspecified      Hip joint replacement by other means 06/03/2009     MGUS (monoclonal gammopathy of unknown significance)      Mitral valve disease     mild MS, moderate-severe MR     Pacemaker       PSVT (paroxysmal supraventricular tachycardia)      Pulmonary HTN (H) 10/18/2011     Thrombocytopenia        CC: Primary Care Provider: Pb Warren or Referring Provider: Pb Warren      Again, thank you for allowing me to participate in the care of your patient.        Sincerely,        Elham Montes MD    Electronically signed

## 2025-06-10 NOTE — PROGRESS NOTES
[facility] Dermatology Note  Encounter Date: Leonardo 10, 2025  Office Visit     Dermatology Problem List:  1.  Stasis dermatitis-triamcinolone ointment twice daily as needed    ____________________________________________    Assessment & Plan:     # Stasis Dermatitis  DDX: ezcematous (favored), vs early LDS  - Continue compression, elevation, reduction of lower extremity edema with primary care doctor. Pt is on amlodipine which may contribute to bilateral lower extremity edema.  - Start triamcinolone oint 0.1% BID to affected areas.  Apply to trunk and extremities, avoid face and folds.  We reviewed risks of skin atrophy, skin thinning and striae with long-term use or overuse.  Start with 2 week burst of topical therapy and reduce use to as needed for 3-5 days at a time with breaks/time off in between.     - Compression and elevation. Information provided in AVS.   - Gentle skin care, info in AVS    Procedures Performed:   None  None    Follow-up: prn for new or changing lesions, or worsening rash.    Staff:     Elham Montes MD PhD  Dermatology, Dermatopathology    ____________________________________________    CC: Rash (Very Itchy rash since Jan 23, 25)      HPI:  Mars Reyes is a(n) 84 year old male who presents today as a new patient for Evaluation of rash that has improved greatly after starting topical triamcinolone.    Severe itchying to the point of no sleep.  Saw an allergist (TAC ointment provided)    Prior to that he tried OTC Vanicream. ED also gave prescription (prednisone and claritin).          Patient is otherwise feeling well, without additional skin concerns.     Labs Reviewed:    Lab Results   Component Value Date    WBC 8.3 06/03/2025    HGB 12.0 (L) 06/03/2025    HCT 36.1 (L) 06/03/2025    PLT 90 (L) 06/03/2025     06/03/2025    POTASSIUM 5.9 (H) 06/03/2025    CHLORIDE 103 06/03/2025    CO2 20 (L) 06/03/2025    BUN 21.0 06/03/2025    CR 0.98 06/03/2025    GLC 95  06/03/2025    SED 7 08/12/2021    TROPI <0.012 03/03/2012    AST 29 06/03/2025    ALT 19 06/03/2025    ALKPHOS 213 (H) 06/03/2025    BILITOTAL 1.6 (H) 06/03/2025    INR 1.26 (H) 06/22/2022        Physical Exam:  Vitals: There were no vitals taken for this visit.  SKIN: Focused examination of legs, chest and back was performed.  - Bilateral lower extremities with generalized erythema, some scattered excoriations that have healed.  Minimal scale or rash today.  Somewhat glossy appearing.  There is some pitting edema.    Chest and back relatively clear.  Photos taken for the chart and clinical monitoring.    - No other lesions of concern on areas examined.       Medications:  Current Outpatient Medications   Medication Sig Dispense Refill    acetaminophen (TYLENOL) 325 MG tablet Take 2 tablets (650 mg) by mouth every 4 hours as needed for other (mild pain) 100 tablet 0    allopurinol (ZYLOPRIM) 100 MG tablet Take 1 tablet (100 mg) by mouth daily. 90 tablet 3    amLODIPine (NORVASC) 5 MG tablet TAKE 1 TABLET BY MOUTH EVERY DAY 90 tablet 1    apixaban ANTICOAGULANT (ELIQUIS ANTICOAGULANT) 5 MG tablet Take 1 tablet (5 mg) by mouth 2 times daily. 180 tablet 0    lisinopril (ZESTRIL) 20 MG tablet TAKE 1 TABLET BY MOUTH TWICE A  tablet 1    pravastatin (PRAVACHOL) 20 MG tablet Take 1 tablet (20 mg) by mouth daily. 90 tablet 3    triamcinolone (KENALOG) 0.1 % external ointment Apply topically 2 times daily.      amoxicillin (AMOXIL) 500 MG tablet Take 2,000 mg by mouth daily as needed (1 hour prior to dental procedures) (Patient not taking: Reported on 6/10/2025)      cetirizine (ZYRTEC) 10 MG tablet Take 10 mg by mouth 2 times daily. (Patient not taking: Reported on 6/10/2025)       No current facility-administered medications for this visit.      Past Medical History:   Patient Active Problem List   Diagnosis    Essential hypertension, benign    Anemia    Diverticulosis of large intestine    DJD (degenerative joint  disease)    Aftercare following joint replacement    Hip joint replacement by other means    HYPERLIPIDEMIA LDL GOAL <130    Grief reaction    Non-rheumatic mitral valve stenosis    Immune thrombocytopenic purpura (H)    MGUS (monoclonal gammopathy of unknown significance)    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, unspecified cause, unspecified laterality    Atrial flutter, unspecified type (H)    S/P revision of total hip    Typical atrial flutter (H)    Nonrheumatic aortic valve stenosis    Status post coronary angiogram    Severe aortic stenosis    Diastolic dysfunction    Aortic stenosis, severe    Second degree AV block    S/P total hip arthroplasty    Status post total replacement of right hip    Status post total hip replacement, right    LBBB (left bundle branch block)    Cardiac pacemaker in situ    Paroxysmal atrial fibrillation (H)    S/P TAVR (transcatheter aortic valve replacement)    Multiple myeloma not having achieved remission (H)       Past Medical History:   Diagnosis Date    Aortic stenosis     moderate    Atypical atrial flutter (H)     PVI and LA linear ablation with successful termination of AFL 10/18/2019    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, unspecified cause, unspecified laterality     Contact dermatitis and other eczema, due to unspecified cause     Diverticulosis of colon (without mention of hemorrhage)     DJD (degenerative joint disease)     Essential hypertension, benign     abstracted 7/22/02    Gout, unspecified     Hip joint replacement by other means 06/03/2009    MGUS (monoclonal gammopathy of unknown significance)     Mitral valve disease     mild MS, moderate-severe MR    Pacemaker     PSVT (paroxysmal supraventricular tachycardia)     Pulmonary HTN (H) 10/18/2011    Thrombocytopenia        CC: Primary Care Provider: Pb Warren or Referring Provider: Pb Warren

## 2025-08-08 ENCOUNTER — HOSPITAL ENCOUNTER (OUTPATIENT)
Dept: CARDIOLOGY | Facility: CLINIC | Age: 85
Discharge: HOME | End: 2025-08-08
Attending: NURSE PRACTITIONER | Admitting: NURSE PRACTITIONER
Payer: COMMERCIAL

## 2025-08-08 DIAGNOSIS — I35.0 SEVERE AORTIC STENOSIS: ICD-10-CM

## 2025-08-08 LAB — LVEF ECHO: NORMAL

## 2025-08-08 PROCEDURE — 93306 TTE W/DOPPLER COMPLETE: CPT | Mod: 26 | Performed by: INTERNAL MEDICINE

## 2025-08-08 PROCEDURE — 93306 TTE W/DOPPLER COMPLETE: CPT

## 2025-08-14 ENCOUNTER — OFFICE VISIT (OUTPATIENT)
Dept: CARDIOLOGY | Facility: CLINIC | Age: 85
End: 2025-08-14
Payer: COMMERCIAL

## 2025-08-14 VITALS
OXYGEN SATURATION: 97 % | SYSTOLIC BLOOD PRESSURE: 136 MMHG | HEART RATE: 78 BPM | WEIGHT: 173 LBS | HEIGHT: 68 IN | DIASTOLIC BLOOD PRESSURE: 74 MMHG | BODY MASS INDEX: 26.22 KG/M2

## 2025-08-14 DIAGNOSIS — I48.0 PAROXYSMAL ATRIAL FIBRILLATION (H): Primary | ICD-10-CM

## 2025-08-14 DIAGNOSIS — I35.0 NONRHEUMATIC AORTIC VALVE STENOSIS: ICD-10-CM

## 2025-08-14 DIAGNOSIS — E78.5 HYPERLIPIDEMIA LDL GOAL <130: ICD-10-CM

## 2025-08-14 DIAGNOSIS — Z95.2 S/P TAVR (TRANSCATHETER AORTIC VALVE REPLACEMENT): ICD-10-CM

## 2025-08-14 DIAGNOSIS — I34.0 MITRAL VALVE INSUFFICIENCY, UNSPECIFIED ETIOLOGY: ICD-10-CM

## 2025-08-14 DIAGNOSIS — Z95.0 CARDIAC PACEMAKER IN SITU: ICD-10-CM

## 2025-08-14 DIAGNOSIS — I47.29 NSVT (NONSUSTAINED VENTRICULAR TACHYCARDIA) (H): ICD-10-CM

## 2025-08-14 DIAGNOSIS — I07.1 TRICUSPID VALVE INSUFFICIENCY, UNSPECIFIED ETIOLOGY: ICD-10-CM

## 2025-08-14 RX ORDER — PRAVASTATIN SODIUM 20 MG
20 TABLET ORAL DAILY
Qty: 90 TABLET | Refills: 3 | Status: SHIPPED | OUTPATIENT
Start: 2025-08-14

## 2025-08-14 RX ORDER — METOPROLOL SUCCINATE 25 MG/1
25 TABLET, EXTENDED RELEASE ORAL EVERY EVENING
Qty: 90 TABLET | Refills: 3 | Status: SHIPPED | OUTPATIENT
Start: 2025-08-14

## 2025-08-23 DIAGNOSIS — I48.92 ATRIAL FLUTTER, UNSPECIFIED TYPE (H): ICD-10-CM

## 2025-08-25 RX ORDER — APIXABAN 5 MG/1
5 TABLET, FILM COATED ORAL
Qty: 180 TABLET | Refills: 0 | Status: SHIPPED | OUTPATIENT
Start: 2025-08-25

## 2025-08-27 ENCOUNTER — LAB (OUTPATIENT)
Dept: INFUSION THERAPY | Facility: CLINIC | Age: 85
End: 2025-08-27
Attending: INTERNAL MEDICINE
Payer: COMMERCIAL

## 2025-08-27 ENCOUNTER — ONCOLOGY VISIT (OUTPATIENT)
Dept: ONCOLOGY | Facility: CLINIC | Age: 85
End: 2025-08-27
Attending: INTERNAL MEDICINE
Payer: COMMERCIAL

## 2025-08-27 VITALS
HEART RATE: 71 BPM | RESPIRATION RATE: 16 BRPM | TEMPERATURE: 98.5 F | BODY MASS INDEX: 24.97 KG/M2 | DIASTOLIC BLOOD PRESSURE: 72 MMHG | OXYGEN SATURATION: 99 % | WEIGHT: 165.4 LBS | SYSTOLIC BLOOD PRESSURE: 123 MMHG

## 2025-08-27 DIAGNOSIS — D47.2 MGUS (MONOCLONAL GAMMOPATHY OF UNKNOWN SIGNIFICANCE): ICD-10-CM

## 2025-08-27 DIAGNOSIS — D47.2 MGUS (MONOCLONAL GAMMOPATHY OF UNKNOWN SIGNIFICANCE): Primary | ICD-10-CM

## 2025-08-27 DIAGNOSIS — D69.6 THROMBOCYTOPENIA: ICD-10-CM

## 2025-08-27 LAB
ANION GAP SERPL CALCULATED.3IONS-SCNC: 12 MMOL/L (ref 7–15)
BUN SERPL-MCNC: 16.7 MG/DL (ref 8–23)
CALCIUM SERPL-MCNC: 10 MG/DL (ref 8.8–10.4)
CHLORIDE SERPL-SCNC: 99 MMOL/L (ref 98–107)
CREAT SERPL-MCNC: 0.87 MG/DL (ref 0.67–1.17)
EGFRCR SERPLBLD CKD-EPI 2021: 86 ML/MIN/1.73M2
ERYTHROCYTE [DISTWIDTH] IN BLOOD BY AUTOMATED COUNT: 14.7 % (ref 10–15)
GLUCOSE SERPL-MCNC: 74 MG/DL (ref 70–99)
HCO3 SERPL-SCNC: 25 MMOL/L (ref 22–29)
HCT VFR BLD AUTO: 39.1 % (ref 40–53)
HGB BLD-MCNC: 12.9 G/DL (ref 13.3–17.7)
MCH RBC QN AUTO: 29.1 PG (ref 26.5–33)
MCHC RBC AUTO-ENTMCNC: 33 G/DL (ref 31.5–36.5)
MCV RBC AUTO: 88.1 FL (ref 78–100)
PLATELET # BLD AUTO: 137 10E3/UL (ref 150–450)
POTASSIUM SERPL-SCNC: 5.2 MMOL/L (ref 3.4–5.3)
PROT SERPL-MCNC: 7.9 G/DL (ref 6.4–8.3)
RBC # BLD AUTO: 4.44 10E6/UL (ref 4.4–5.9)
SODIUM SERPL-SCNC: 136 MMOL/L (ref 135–145)
WBC # BLD AUTO: 8.19 10E3/UL (ref 4–11)

## 2025-08-27 PROCEDURE — 99214 OFFICE O/P EST MOD 30 MIN: CPT | Performed by: INTERNAL MEDICINE

## 2025-08-27 PROCEDURE — G0463 HOSPITAL OUTPT CLINIC VISIT: HCPCS | Performed by: INTERNAL MEDICINE

## 2025-08-27 PROCEDURE — 36415 COLL VENOUS BLD VENIPUNCTURE: CPT

## 2025-08-27 PROCEDURE — G2211 COMPLEX E/M VISIT ADD ON: HCPCS | Performed by: INTERNAL MEDICINE

## 2025-08-27 ASSESSMENT — PAIN SCALES - GENERAL: PAINLEVEL_OUTOF10: SEVERE PAIN (7)

## 2025-08-28 LAB
ALBUMIN SERPL ELPH-MCNC: 4.3 G/DL (ref 3.7–5.1)
ALPHA1 GLOB SERPL ELPH-MCNC: 0.4 G/DL (ref 0.2–0.4)
ALPHA2 GLOB SERPL ELPH-MCNC: 0.8 G/DL (ref 0.5–0.9)
B-GLOBULIN SERPL ELPH-MCNC: 1 G/DL (ref 0.6–1)
GAMMA GLOB SERPL ELPH-MCNC: 1.5 G/DL (ref 0.7–1.6)
IGG SERPL-MCNC: 1559 MG/DL (ref 610–1616)
KAPPA LC FREE SER-MCNC: 3.43 MG/DL (ref 0.33–1.94)
KAPPA LC FREE/LAMBDA FREE SER NEPH: 1.25 {RATIO} (ref 0.26–1.65)
LAMBDA LC FREE SERPL-MCNC: 2.74 MG/DL (ref 0.57–2.63)
LOCATION OF TASK: ABNORMAL
M PROTEIN SERPL ELPH-MCNC: 0.2 G/DL
PROT PATTERN SERPL ELPH-IMP: ABNORMAL

## (undated) DEVICE — CATH ANGIO INFINITI AL2 4FRX100CM 538446

## (undated) DEVICE — GLOVE PROTEXIS POWDER FREE SMT 8.0  2D72PT80X

## (undated) DEVICE — WIRE GUIDE LUNDERQUIST 0.035"X260CM DBL CVD

## (undated) DEVICE — BLADE SAW SAGITTAL STRK 19.5X95X1.27MM 2108-109-000S15

## (undated) DEVICE — SUCTION MANIFOLD NEPTUNE 2 SYS 4 PORT 0702-020-000

## (undated) DEVICE — DEFIB PRO-PADZ LVP LQD GEL ADULT 8900-2105-01

## (undated) DEVICE — KIT HAND CONTROL ANGIOTOUCH ACIST 65CM AT-P65

## (undated) DEVICE — SU MONOCRYL 4-0 PS-2 18" UND Y496G

## (undated) DEVICE — NDL 22GA 1.5"

## (undated) DEVICE — SYR 10ML LL W/O NDL

## (undated) DEVICE — INTRO TERUMO 7FRX25CM W/MARKER RSB703

## (undated) DEVICE — CLOSURE ANGIOSEAL 6FR 610130

## (undated) DEVICE — PACK PCMKR PERM SRG PROC LF SAN32PC573

## (undated) DEVICE — BLADE KNIFE SURG 10 371110

## (undated) DEVICE — RAD CLOSURE ANGIOSEAL 8FR  610131

## (undated) DEVICE — DRSG TEGADERM 4X4 3/4" 1626W

## (undated) DEVICE — SHEATH PRELUDE SNAP 7FRX13CM PLS-1007

## (undated) DEVICE — SET HANDPIECE INTERPULSE W/COAXIAL FAN SPRAY TIP 0210118000

## (undated) DEVICE — Device

## (undated) DEVICE — CATH ANGIO INFINITI PIGTAIL 145 6 SH 6FRX110CM  534-652S

## (undated) DEVICE — SOL NACL 0.9% INJ 250ML BAG 2B1322Q

## (undated) DEVICE — SYR 50ML LL W/O NDL 309653

## (undated) DEVICE — BLADE SAW SAGITTAL STRK 18X90X1.27MM HD SYS 6 6118-127-090

## (undated) DEVICE — CATH 8MM NAVISTAR J-J CRV 115

## (undated) DEVICE — SUCTION TIP YANKAUER STR K87

## (undated) DEVICE — INTRO TERUMO 6FRX25CM W/MARKER RSB603

## (undated) DEVICE — GLOVE PROTEXIS POWDER FREE 7.5 ORTHOPEDIC 2D73ET75

## (undated) DEVICE — MANIFOLD KIT ANGIO AUTOMATED 014613

## (undated) DEVICE — SHEATH BRD GD EP PRFACE  W/MUL 301803M

## (undated) DEVICE — SU VICRYL 2-0 CT-1 27" UND J259H

## (undated) DEVICE — PACK TOTAL HIP W/U DRAPE SOP15HUFSC

## (undated) DEVICE — CLOSURE DEVICE 6FR VASC PROGLIDE MEDICATED SUTURE 12673-03

## (undated) DEVICE — BLADE KNIFE SURG 11 371111

## (undated) DEVICE — LINEN ORTHO ACL PACK 5447

## (undated) DEVICE — NDL 21GA 1.5"

## (undated) DEVICE — DELIVERY SYSTEM 23/26/29MM EVOLUT PRO+

## (undated) DEVICE — ADH SKIN CLOSURE PREMIERPRO EXOFIN 1.0ML 3470

## (undated) DEVICE — ESU GROUND PAD UNIVERSAL W/O CORD

## (undated) DEVICE — GUIDEWIRE TRNSEPT 0.014 X35CM SAFE SE

## (undated) DEVICE — WIRE GUIDE 0.035"X150CM EMERALD STR 502542

## (undated) DEVICE — DRAPE CONVERTORS U-DRAPE 60X72" 8476

## (undated) DEVICE — CATH ANGIO INFINITI 3DRC 4FRX100CM 538476

## (undated) DEVICE — KIT SURGICAL TURNOVER FVSD-01D

## (undated) DEVICE — NDL 18GA 1.5" 305196

## (undated) DEVICE — SYR ANGIOGRAPHY MULTIUSE KIT ACIST 014612

## (undated) DEVICE — GLOVE PROTEXIS POWDER FREE 8.0 ORTHOPEDIC 2D73ET80

## (undated) DEVICE — INTRODUCER SHEATH GREEN 6.5FRX11CM .038IN PSI-6F-11-038ACT

## (undated) DEVICE — NDL TRANSSEPTAL BRK 18GA 71CM BRK CVD 407200

## (undated) DEVICE — DRAPE TIBURON TOP SHEET 100X60" 29352

## (undated) DEVICE — WIRE GUIDE 0.035"X150CM EMERALD J TIP 502521

## (undated) DEVICE — SU VICRYL+ 1 MO-4 18" DYED VCP702D

## (undated) DEVICE — SMART CAPNOLINE H PLUS, ADULT/INTERMEDIATE O2, LONG

## (undated) DEVICE — SOL NACL 0.9% IRRIG 1000ML BOTTLE 2F7124

## (undated) DEVICE — CATH ANGIO INFINITI JL5 4FRX100CM 538422

## (undated) DEVICE — LINEN FULL SHEET 5511

## (undated) DEVICE — ESU GROUND PAD ADULT W/CORD E7507

## (undated) DEVICE — WIRE GUIDE 0.035"X260CM SAFE-T-J EXCHANGE G00517

## (undated) DEVICE — SPONGE RAY-TEC 4X8" 7318

## (undated) DEVICE — SHEATH INTRODUCER DRYSEAL FLEX W/HYDRO CT 18FRX33CM DSF1833

## (undated) DEVICE — LINEN TOWEL PACK X5 5464

## (undated) DEVICE — GUIDEWIRE VASC 300CM .014IN CHC PT I H7491215501J2

## (undated) DEVICE — SOL WATER IRRIG 1000ML BOTTLE 2F7114

## (undated) DEVICE — GLOVE PROTEXIS POWDER FREE 6.5 ORTHOPEDIC 2D73ET65

## (undated) DEVICE — GUIDEWIRE VASC SAFARI2 0.035X275CM H74939406XS1

## (undated) DEVICE — DRAPE STERI U 1015

## (undated) DEVICE — CATH DIAG 4FR JL 4.5 538417

## (undated) DEVICE — CATH RF CARD ABL BID JJ THERMO

## (undated) DEVICE — INTRO CATH 12CM 10FR FST-CATH

## (undated) DEVICE — SOL NACL 0.9% IRRIG 3000ML BAG 2B7477

## (undated) DEVICE — SU VICRYL 1 CTX CR 8X18" J765D

## (undated) DEVICE — SU VICRYL 0 CT-1 27" J340H

## (undated) DEVICE — INTRO SHEATH 4FRX10CM PINNACLE RSS402

## (undated) DEVICE — SYR 10ML FINGER CONTROL W/O NDL 309695

## (undated) DEVICE — SU ETHIBOND 2 V-37 4X30" MX69G

## (undated) DEVICE — GLOVE PROTEXIS BLUE W/NEU-THERA 8.0  2D73EB80

## (undated) DEVICE — CATH ANGIO SUPERTORQUE AL1 6FRX100CM 532-645

## (undated) DEVICE — CATH EP 110CM 7FR HALO XP 2-1

## (undated) DEVICE — HOOD FLYTE W/PEELAWAY 408-800-100

## (undated) DEVICE — NDL PERC ENTRY THINWALL 18GA 7.0" G00166

## (undated) DEVICE — BONE CLEANING TIP INTERPULSE  0210-010-000

## (undated) DEVICE — LINEN DRAPE 54X72" 5467

## (undated) DEVICE — CATH ANGIO INFINITI AL1 4FRX100CM 538445

## (undated) DEVICE — MANIFOLD NEPTUNE 4 PORT 700-20

## (undated) DEVICE — INTRO SHEATH 7FRX10CM PINNACLE RSS702

## (undated) DEVICE — PACK EP SRG PROC LF DISP SAN32EPFSR

## (undated) DEVICE — NDL SPINAL 18GA 3.5" 405184

## (undated) DEVICE — PEN MARKING SKIN

## (undated) DEVICE — DRSG TEGADERM 4X4 3/4" 1626

## (undated) DEVICE — ESU LIGASURE OPEN SEALER/DIVIDER SM JAW 16.5MM LF1212A

## (undated) DEVICE — PATCH CARTO 3 EXTERNAL REFERENCE 3D MAPPING CREFP6

## (undated) DEVICE — PACK SET-UP STD 9102

## (undated) DEVICE — CATH EP 60CM 5FR 2-8-2MM SPC-

## (undated) DEVICE — BAG CLEAR TRASH 1.3M 39X33" P4040C

## (undated) DEVICE — DRAPE IOBAN ISOLATION VERTICAL 320X21CM 6617

## (undated) DEVICE — CATH LAUNCHER 6FR AL 1.0 LA6AL10

## (undated) DEVICE — SYR 20ML LL W/O NDL 302830

## (undated) DEVICE — RAD INFLATOR BASIC COMPAK  IN4130

## (undated) DEVICE — CATH BALLOON Z-MED II  8FR 23MMX4X100CM 611765

## (undated) DEVICE — GLOVE PROTEXIS BLUE W/NEU-THERA 6.5  2D73EB65

## (undated) DEVICE — DRAPE SHEET REV FOLD 3/4 9349

## (undated) DEVICE — LOADING SYSTEM 23/26/29MM EVOLUT PRO+

## (undated) DEVICE — SOLUTION WOUND CLEANSING 3/4OZ 10% PVP EA-L3011FB-50

## (undated) DEVICE — ESU PENCIL W/SMOKE EVAC NEPTUNE STRYKER 0703-046-000

## (undated) DEVICE — CLOSURE SYS SKIN PREMIERPRO EXOFIN FUSION 4X22CM STRL 3472

## (undated) DEVICE — INTRO GLIDESHEATH SLENDER 6FR 10X45CM 60-1060

## (undated) DEVICE — CATH ANGIO INFINITI JL4 4FRX100CM 538420

## (undated) DEVICE — NDL BX BONE MARROW 11GA 4"

## (undated) DEVICE — TOTE ANGIO CORP PC15AT SAN32CC83O

## (undated) DEVICE — SPONGE SURGIFOAM 12 1972

## (undated) DEVICE — NDL BX BONE MARROW 15GA 2" A-152

## (undated) DEVICE — GLOVE PROTEXIS BLUE W/NEU-THERA 7.5  2D73EB75

## (undated) DEVICE — INTRO CATH 12CM 8.5FR FST-CATH

## (undated) DEVICE — ESU ELEC BLADE 6" COATED E1450-6

## (undated) DEVICE — CATH SYSTEM SENTINEL CEREBRAL PROTECTION 6FR CMS15-10C-US

## (undated) DEVICE — CATH EP PACEL 5FRX110CM 1MM RIGHT HEART CURVE 401763

## (undated) DEVICE — SUCTION IRR SYSTEM W/O TIP INTERPULSE HANDPIECE 0210-100-000

## (undated) DEVICE — KIT PATIENT CARE HANA TABLE PROFX SUPINE 6855

## (undated) DEVICE — BLADE SAW SAGITTAL STRK 25X79.5X1.24MM 4/2000 2108-318-000

## (undated) DEVICE — PREP POVIDONE IODINE SOLUTION 10% 120ML

## (undated) DEVICE — PACK HIP NAILING SOP32HPFC4

## (undated) DEVICE — ESU BIOPOLAR SEALER AQUAMANTIS 2.3MM 23-113-1

## (undated) DEVICE — LINEN HALF SHEET 5512

## (undated) DEVICE — PREP CHLORAPREP 26ML TINTED HI-LITE ORANGE 930815

## (undated) DEVICE — BONE WAX 2.5GM W31G

## (undated) DEVICE — DRAPE C-ARM 60X42" 1013

## (undated) DEVICE — GLIDEWIRE STRAIGHT .035CM GR3501

## (undated) DEVICE — RAD INTRODUCER KIT MICRO 5FRX10CM .018 NITINOL G/W

## (undated) DEVICE — PREP CHLORAPREP 26ML TINTED ORANGE  260815

## (undated) DEVICE — SLEEVE TR BAND RADIAL COMPRESSION DEVICE 24CM TRB24-REG

## (undated) RX ORDER — VERAPAMIL HYDROCHLORIDE 2.5 MG/ML
INJECTION, SOLUTION INTRAVENOUS
Status: DISPENSED
Start: 2021-06-22

## (undated) RX ORDER — PHENYLEPHRINE HCL IN 0.9% NACL 50MG/250ML
PLASTIC BAG, INJECTION (ML) INTRAVENOUS
Status: DISPENSED
Start: 2019-09-09

## (undated) RX ORDER — FENTANYL CITRATE-0.9 % NACL/PF 10 MCG/ML
PLASTIC BAG, INJECTION (ML) INTRAVENOUS
Status: DISPENSED
Start: 2022-06-22

## (undated) RX ORDER — VASOPRESSIN 20 U/ML
INJECTION PARENTERAL
Status: DISPENSED
Start: 2022-06-22

## (undated) RX ORDER — HEPARIN SODIUM 200 [USP'U]/100ML
INJECTION, SOLUTION INTRAVENOUS
Status: DISPENSED
Start: 2019-10-18

## (undated) RX ORDER — LIDOCAINE HYDROCHLORIDE 20 MG/ML
INJECTION, SOLUTION EPIDURAL; INFILTRATION; INTRACAUDAL; PERINEURAL
Status: DISPENSED
Start: 2019-09-09

## (undated) RX ORDER — FENTANYL CITRATE 50 UG/ML
INJECTION, SOLUTION INTRAMUSCULAR; INTRAVENOUS
Status: DISPENSED
Start: 2021-07-08

## (undated) RX ORDER — ALBUMIN, HUMAN INJ 5% 5 %
SOLUTION INTRAVENOUS
Status: DISPENSED
Start: 2021-06-22

## (undated) RX ORDER — BUPIVACAINE HYDROCHLORIDE AND EPINEPHRINE 5; 5 MG/ML; UG/ML
INJECTION, SOLUTION EPIDURAL; INTRACAUDAL; PERINEURAL
Status: DISPENSED
Start: 2019-09-09

## (undated) RX ORDER — VANCOMYCIN HYDROCHLORIDE 1 G/20ML
INJECTION, POWDER, LYOPHILIZED, FOR SOLUTION INTRAVENOUS
Status: DISPENSED
Start: 2019-09-09

## (undated) RX ORDER — ALBUMIN, HUMAN INJ 5% 5 %
SOLUTION INTRAVENOUS
Status: DISPENSED
Start: 2019-09-09

## (undated) RX ORDER — CEFAZOLIN SODIUM 1 G/3ML
INJECTION, POWDER, FOR SOLUTION INTRAMUSCULAR; INTRAVENOUS
Status: DISPENSED
Start: 2019-09-09

## (undated) RX ORDER — EPHEDRINE SULFATE 50 MG/ML
INJECTION, SOLUTION INTRAMUSCULAR; INTRAVENOUS; SUBCUTANEOUS
Status: DISPENSED
Start: 2022-06-22

## (undated) RX ORDER — TRANEXAMIC ACID 650 MG/1
TABLET ORAL
Status: DISPENSED
Start: 2022-06-22

## (undated) RX ORDER — HYDROXYZINE HYDROCHLORIDE 25 MG/1
TABLET, FILM COATED ORAL
Status: DISPENSED
Start: 2019-09-09

## (undated) RX ORDER — BUPIVACAINE HYDROCHLORIDE 2.5 MG/ML
INJECTION, SOLUTION EPIDURAL; INFILTRATION; INTRACAUDAL
Status: DISPENSED
Start: 2021-07-08

## (undated) RX ORDER — EPHEDRINE SULFATE 50 MG/ML
INJECTION, SOLUTION INTRAVENOUS
Status: DISPENSED
Start: 2022-06-22

## (undated) RX ORDER — HEPARIN SODIUM 1000 [USP'U]/ML
INJECTION, SOLUTION INTRAVENOUS; SUBCUTANEOUS
Status: DISPENSED
Start: 2021-06-22

## (undated) RX ORDER — ONDANSETRON 2 MG/ML
INJECTION INTRAMUSCULAR; INTRAVENOUS
Status: DISPENSED
Start: 2019-09-09

## (undated) RX ORDER — CELECOXIB 200 MG/1
CAPSULE ORAL
Status: DISPENSED
Start: 2019-09-09

## (undated) RX ORDER — FENTANYL CITRATE 50 UG/ML
INJECTION, SOLUTION INTRAMUSCULAR; INTRAVENOUS
Status: DISPENSED
Start: 2019-10-18

## (undated) RX ORDER — CEFAZOLIN SODIUM 2 G/100ML
INJECTION, SOLUTION INTRAVENOUS
Status: DISPENSED
Start: 2021-06-22

## (undated) RX ORDER — TRANEXAMIC ACID 10 MG/ML
INJECTION, SOLUTION INTRAVENOUS
Status: DISPENSED
Start: 2022-06-22

## (undated) RX ORDER — LIDOCAINE HYDROCHLORIDE AND EPINEPHRINE 10; 10 MG/ML; UG/ML
INJECTION, SOLUTION INFILTRATION; PERINEURAL
Status: DISPENSED
Start: 2022-06-22

## (undated) RX ORDER — CEFAZOLIN SODIUM 2 G/100ML
INJECTION, SOLUTION INTRAVENOUS
Status: DISPENSED
Start: 2019-09-09

## (undated) RX ORDER — CEFAZOLIN SODIUM/WATER 2 G/20 ML
SYRINGE (ML) INTRAVENOUS
Status: DISPENSED
Start: 2022-06-22

## (undated) RX ORDER — ACETAMINOPHEN 500 MG
TABLET ORAL
Status: DISPENSED
Start: 2019-09-09

## (undated) RX ORDER — LIDOCAINE HYDROCHLORIDE 10 MG/ML
INJECTION, SOLUTION EPIDURAL; INFILTRATION; INTRACAUDAL; PERINEURAL
Status: DISPENSED
Start: 2021-06-22

## (undated) RX ORDER — HYDROMORPHONE HYDROCHLORIDE 1 MG/ML
INJECTION, SOLUTION INTRAMUSCULAR; INTRAVENOUS; SUBCUTANEOUS
Status: DISPENSED
Start: 2019-09-09

## (undated) RX ORDER — VANCOMYCIN HYDROCHLORIDE 1 G/20ML
INJECTION, POWDER, LYOPHILIZED, FOR SOLUTION INTRAVENOUS
Status: DISPENSED
Start: 2022-06-22

## (undated) RX ORDER — LIDOCAINE HYDROCHLORIDE 10 MG/ML
INJECTION, SOLUTION EPIDURAL; INFILTRATION; INTRACAUDAL; PERINEURAL
Status: DISPENSED
Start: 2021-07-08

## (undated) RX ORDER — ALBUMIN, HUMAN INJ 5% 5 %
SOLUTION INTRAVENOUS
Status: DISPENSED
Start: 2022-06-22

## (undated) RX ORDER — ATROPINE SULFATE 0.4 MG/ML
AMPUL (ML) INJECTION
Status: DISPENSED
Start: 2022-06-22

## (undated) RX ORDER — LIDOCAINE HYDROCHLORIDE 10 MG/ML
INJECTION, SOLUTION EPIDURAL; INFILTRATION; INTRACAUDAL; PERINEURAL
Status: DISPENSED
Start: 2019-10-18

## (undated) RX ORDER — DEXAMETHASONE SODIUM PHOSPHATE 4 MG/ML
INJECTION, SOLUTION INTRA-ARTICULAR; INTRALESIONAL; INTRAMUSCULAR; INTRAVENOUS; SOFT TISSUE
Status: DISPENSED
Start: 2019-09-09

## (undated) RX ORDER — PROPOFOL 10 MG/ML
INJECTION, EMULSION INTRAVENOUS
Status: DISPENSED
Start: 2019-09-09

## (undated) RX ORDER — KETOROLAC TROMETHAMINE 30 MG/ML
INJECTION, SOLUTION INTRAMUSCULAR; INTRAVENOUS
Status: DISPENSED
Start: 2019-09-09

## (undated) RX ORDER — FENTANYL CITRATE 50 UG/ML
INJECTION, SOLUTION INTRAMUSCULAR; INTRAVENOUS
Status: DISPENSED
Start: 2019-09-09

## (undated) RX ORDER — GABAPENTIN 100 MG/1
CAPSULE ORAL
Status: DISPENSED
Start: 2019-09-09

## (undated) RX ORDER — CEFAZOLIN SODIUM 2 G/100ML
INJECTION, SOLUTION INTRAVENOUS
Status: DISPENSED
Start: 2021-07-08

## (undated) RX ORDER — HEPARIN SODIUM 1000 [USP'U]/ML
INJECTION, SOLUTION INTRAVENOUS; SUBCUTANEOUS
Status: DISPENSED
Start: 2019-10-18

## (undated) RX ORDER — HEPARIN SODIUM 200 [USP'U]/100ML
INJECTION, SOLUTION INTRAVENOUS
Status: DISPENSED
Start: 2021-06-22

## (undated) RX ORDER — FENTANYL CITRATE 50 UG/ML
INJECTION, SOLUTION INTRAMUSCULAR; INTRAVENOUS
Status: DISPENSED
Start: 2022-06-22